# Patient Record
Sex: FEMALE | Race: WHITE | NOT HISPANIC OR LATINO | Employment: OTHER | URBAN - NONMETROPOLITAN AREA
[De-identification: names, ages, dates, MRNs, and addresses within clinical notes are randomized per-mention and may not be internally consistent; named-entity substitution may affect disease eponyms.]

---

## 2017-01-01 ENCOUNTER — HOSPITAL ENCOUNTER (OUTPATIENT)
Dept: OTHER | Facility: HOSPITAL | Age: 57
Setting detail: RADIATION/ONCOLOGY SERIES
Discharge: HOME | End: 2017-01-20
Attending: RADIOLOGY | Admitting: RADIOLOGY

## 2017-03-24 ENCOUNTER — HOSPITAL ENCOUNTER (OUTPATIENT)
Dept: CT IMAGING | Facility: HOSPITAL | Age: 57
Discharge: HOME | End: 2017-03-24
Admitting: INTERNAL MEDICINE

## 2017-03-24 ENCOUNTER — HOSPITAL ENCOUNTER (OUTPATIENT)
Dept: ULTRASOUND IMAGING | Facility: HOSPITAL | Age: 57
Discharge: HOME | End: 2017-03-24

## 2017-03-24 ENCOUNTER — HOSPITAL ENCOUNTER (OUTPATIENT)
Dept: CT IMAGING | Facility: HOSPITAL | Age: 57
End: 2017-03-24

## 2017-03-24 ENCOUNTER — HOSPITAL ENCOUNTER (OUTPATIENT)
Dept: INTERVENTIONAL RADIOLOGY/VASCULAR | Facility: HOSPITAL | Age: 57
Discharge: HOME | End: 2017-03-24
Attending: INTERNAL MEDICINE

## 2017-03-24 ENCOUNTER — LAB REQUISITION (OUTPATIENT)
Dept: LAB | Facility: HOSPITAL | Age: 57
End: 2017-03-24

## 2017-03-24 DIAGNOSIS — C82.90 FOLLICULAR LYMPHOMA, UNSPECIFIED BODY REGION, UNSPECIFIED FOLLICULAR LYMPHOMA TYPE (HCC): Primary | ICD-10-CM

## 2017-03-24 DIAGNOSIS — C82.95: ICD-10-CM

## 2017-03-24 DIAGNOSIS — C82.93 FOLLICULAR LYMPHOMA OF INTRA-ABDOMINAL LYMPH NODES (HCC): ICD-10-CM

## 2017-03-24 DIAGNOSIS — C82.93: ICD-10-CM

## 2017-03-24 DIAGNOSIS — C82.90 FOLLICULAR LYMPHOMA (HCC): ICD-10-CM

## 2017-03-24 LAB
ALBUMIN SERPL-MCNC: 4.6 G/DL (ref 3.4–4.8)
ALBUMIN/GLOB SERPL: 1.6 G/DL (ref 1.1–1.8)
ALP SERPL-CCNC: 79 U/L (ref 38–126)
ALT SERPL W P-5'-P-CCNC: 45 U/L (ref 9–52)
ANION GAP SERPL CALCULATED.3IONS-SCNC: 15 MMOL/L (ref 5–15)
AST SERPL-CCNC: 36 U/L (ref 14–36)
BASOPHILS # BLD AUTO: 0.03 10*3/MM3 (ref 0–0.2)
BASOPHILS NFR BLD AUTO: 0.5 % (ref 0–2)
BILIRUB SERPL-MCNC: 0.5 MG/DL (ref 0.2–1.3)
BUN BLD-MCNC: 11 MG/DL (ref 7–21)
BUN/CREAT SERPL: 18 (ref 7–25)
CALCIUM SPEC-SCNC: 9.4 MG/DL (ref 8.4–10.2)
CHLORIDE SERPL-SCNC: 97 MMOL/L (ref 95–110)
CO2 SERPL-SCNC: 24 MMOL/L (ref 22–31)
CREAT BLD-MCNC: 0.61 MG/DL (ref 0.5–1)
DEPRECATED RDW RBC AUTO: 45.3 FL (ref 36.4–46.3)
EOSINOPHIL # BLD AUTO: 0 10*3/MM3 (ref 0–0.7)
EOSINOPHIL NFR BLD AUTO: 0 % (ref 0–7)
ERYTHROCYTE [DISTWIDTH] IN BLOOD BY AUTOMATED COUNT: 13.7 % (ref 11.5–14.5)
GFR SERPL CREATININE-BSD FRML MDRD: 101 ML/MIN/1.73 (ref 51–120)
GLOBULIN UR ELPH-MCNC: 2.9 GM/DL (ref 2.3–3.5)
GLUCOSE BLD-MCNC: 140 MG/DL (ref 60–100)
HCT VFR BLD AUTO: 41.2 % (ref 35–45)
HGB BLD-MCNC: 14.3 G/DL (ref 12–15.5)
IMM GRANULOCYTES # BLD: 0.05 10*3/MM3 (ref 0–0.02)
IMM GRANULOCYTES NFR BLD: 0.9 % (ref 0–0.5)
LDH SERPL-CCNC: 356 U/L (ref 313–618)
LYMPHOCYTES # BLD AUTO: 0.64 10*3/MM3 (ref 0.6–4.2)
LYMPHOCYTES NFR BLD AUTO: 11.1 % (ref 10–50)
MCH RBC QN AUTO: 31.3 PG (ref 26.5–34)
MCHC RBC AUTO-ENTMCNC: 34.7 G/DL (ref 31.4–36)
MCV RBC AUTO: 90.2 FL (ref 80–98)
MONOCYTES # BLD AUTO: 0.62 10*3/MM3 (ref 0–0.9)
MONOCYTES NFR BLD AUTO: 10.7 % (ref 0–12)
NEUTROPHILS # BLD AUTO: 4.44 10*3/MM3 (ref 2–8.6)
NEUTROPHILS NFR BLD AUTO: 76.8 % (ref 37–80)
NRBC BLD MANUAL-RTO: 0 /100 WBC (ref 0–0)
PLATELET # BLD AUTO: 272 10*3/MM3 (ref 150–450)
PMV BLD AUTO: 8.9 FL (ref 8–12)
POTASSIUM BLD-SCNC: 4.3 MMOL/L (ref 3.5–5.1)
PROT SERPL-MCNC: 7.5 G/DL (ref 6.3–8.6)
RBC # BLD AUTO: 4.57 10*6/MM3 (ref 3.77–5.16)
SODIUM BLD-SCNC: 136 MMOL/L (ref 137–145)
WBC NRBC COR # BLD: 5.78 10*3/MM3 (ref 3.2–9.8)

## 2017-03-24 PROCEDURE — 76937 US GUIDE VASCULAR ACCESS: CPT

## 2017-03-24 PROCEDURE — C1751 CATH, INF, PER/CENT/MIDLINE: HCPCS

## 2017-03-24 PROCEDURE — 85025 COMPLETE CBC W/AUTO DIFF WBC: CPT | Performed by: INTERNAL MEDICINE

## 2017-03-24 PROCEDURE — 71260 CT THORAX DX C+: CPT

## 2017-03-24 PROCEDURE — 83615 LACTATE (LD) (LDH) ENZYME: CPT | Performed by: INTERNAL MEDICINE

## 2017-03-24 PROCEDURE — 74177 CT ABD & PELVIS W/CONTRAST: CPT

## 2017-03-24 PROCEDURE — 80053 COMPREHEN METABOLIC PANEL: CPT | Performed by: INTERNAL MEDICINE

## 2017-03-24 PROCEDURE — 0 IOPAMIDOL 61 % SOLUTION: Performed by: FAMILY MEDICINE

## 2017-03-24 RX ADMIN — IOPAMIDOL 94 ML: 612 INJECTION, SOLUTION INTRAVENOUS at 13:45

## 2017-03-24 NOTE — PROGRESS NOTES
TWO PATIENT IDENTIFIERS WERE USED. THE PATIENT WAS DRAPED AND PREPPED IN USUAL STERILE TECHNIQUE. ULTRASOUND WAS USED TO LOCALIZE THERIGHT BASILIC VEIN. AT THAT POINT, THE SKIN WAS ANESTHETIZED WITH 2% LIDOCAINE. A 21 GAUGE NEEDLE WAS INSERTED INTO THERIGHT BASILIC VEIN AT THAT POINT AN 0.018 WIRE WAS INSERTED THROUG THE NEEDLE AND THE NEEDLE WAS REMOVED. A 4FR. CATHETER WAS PLACED OVER THE WIRE INTO THE VEIN. THE WIRE WAS REMOVED. CATHETER WAS FLUSHED WITH NORMAL SALINE AND SECURED WITH A TEGADERM. THIS WAS DONE IN ANGIOSUITE. PATIENT TOLERATED PROCEDURE WELL.    IMPRESSION: SUCCESSFUL PLACEMENT OF MIDLINE      Chloe Hamilton RN  3/24/2017  12:38 PM

## 2017-03-31 ENCOUNTER — OFFICE VISIT (OUTPATIENT)
Dept: RADIATION ONCOLOGY | Facility: HOSPITAL | Age: 57
End: 2017-03-31

## 2017-03-31 ENCOUNTER — APPOINTMENT (OUTPATIENT)
Dept: ONCOLOGY | Facility: HOSPITAL | Age: 57
End: 2017-03-31

## 2017-03-31 ENCOUNTER — HOSPITAL ENCOUNTER (OUTPATIENT)
Dept: RADIATION ONCOLOGY | Facility: HOSPITAL | Age: 57
Setting detail: RADIATION/ONCOLOGY SERIES
End: 2017-03-31

## 2017-03-31 ENCOUNTER — OFFICE VISIT (OUTPATIENT)
Dept: ONCOLOGY | Facility: CLINIC | Age: 57
End: 2017-03-31

## 2017-03-31 ENCOUNTER — LAB (OUTPATIENT)
Dept: ONCOLOGY | Facility: HOSPITAL | Age: 57
End: 2017-03-31

## 2017-03-31 VITALS
WEIGHT: 210.2 LBS | BODY MASS INDEX: 36.08 KG/M2 | TEMPERATURE: 97.5 F | RESPIRATION RATE: 16 BRPM | DIASTOLIC BLOOD PRESSURE: 77 MMHG | HEART RATE: 88 BPM | SYSTOLIC BLOOD PRESSURE: 138 MMHG

## 2017-03-31 VITALS
TEMPERATURE: 97.5 F | BODY MASS INDEX: 35.9 KG/M2 | HEIGHT: 64 IN | HEART RATE: 88 BPM | DIASTOLIC BLOOD PRESSURE: 77 MMHG | WEIGHT: 210.3 LBS | RESPIRATION RATE: 16 BRPM | SYSTOLIC BLOOD PRESSURE: 138 MMHG

## 2017-03-31 DIAGNOSIS — C82.08 GRADE 1 FOLLICULAR LYMPHOMA OF LYMPH NODES OF MULTIPLE REGIONS (HCC): ICD-10-CM

## 2017-03-31 DIAGNOSIS — C82.03 FOLLICULAR LYMPHOMA GRADE I OF INTRA-ABDOMINAL LYMPH NODES (HCC): ICD-10-CM

## 2017-03-31 DIAGNOSIS — C82.03 FOLLICULAR LYMPHOMA GRADE I OF INTRA-ABDOMINAL LYMPH NODES (HCC): Primary | ICD-10-CM

## 2017-03-31 PROBLEM — C82.00 FOLLICULAR LYMPHOMA GRADE I: Status: ACTIVE | Noted: 2017-03-31

## 2017-03-31 PROCEDURE — 86705 HEP B CORE ANTIBODY IGM: CPT | Performed by: INTERNAL MEDICINE

## 2017-03-31 PROCEDURE — 86803 HEPATITIS C AB TEST: CPT | Performed by: INTERNAL MEDICINE

## 2017-03-31 PROCEDURE — 86704 HEP B CORE ANTIBODY TOTAL: CPT | Performed by: INTERNAL MEDICINE

## 2017-03-31 PROCEDURE — 87350 HEPATITIS BE AG IA: CPT | Performed by: INTERNAL MEDICINE

## 2017-03-31 PROCEDURE — 86706 HEP B SURFACE ANTIBODY: CPT | Performed by: INTERNAL MEDICINE

## 2017-03-31 PROCEDURE — 86707 HEPATITIS BE ANTIBODY: CPT | Performed by: INTERNAL MEDICINE

## 2017-03-31 PROCEDURE — 99214 OFFICE O/P EST MOD 30 MIN: CPT | Performed by: INTERNAL MEDICINE

## 2017-03-31 PROCEDURE — 36415 COLL VENOUS BLD VENIPUNCTURE: CPT | Performed by: INTERNAL MEDICINE

## 2017-03-31 PROCEDURE — 99214 OFFICE O/P EST MOD 30 MIN: CPT | Performed by: RADIOLOGY

## 2017-03-31 PROCEDURE — G0463 HOSPITAL OUTPT CLINIC VISIT: HCPCS | Performed by: INTERNAL MEDICINE

## 2017-03-31 PROCEDURE — 87340 HEPATITIS B SURFACE AG IA: CPT | Performed by: INTERNAL MEDICINE

## 2017-03-31 NOTE — PROGRESS NOTES
Patient:  Chasity Leon  :  1960  Medical Record Number:  [id number]    Encounter Date:  3/31/2017      Diagnosis:      ICD-10-CM ICD-9-CM   1. Follicular lymphoma grade I of intra-abdominal lymph nodes C82.03 202.03   2. Grade 1 follicular lymphoma of lymph nodes of multiple regions C82.08 202.08    multiple recurrence at different site including groin and the pelvic lymph node area.    Treatment: Palliative intent radiation to the multiple site including groin pelvic lymph node.  Second course right inguinal radiation was completed 2016 and 2520 cGy was delivered via IMRT to the both groin region.     Chief Complaint:  Chief Complaint   Patient presents with   • Follow-up     lymphoma        Interval History:  Dr. Sood has seemed patient this morning and gave that test result of CT abdomen pelvis which has now shown new 5 cm lymph node mass in the periaortic and portocaval region.  This is the only known site at present time.  Patient disease is not remaining in remission from longer time and within short time.  It's coming back to different part of the body patient herself denies night sweats fever itching or weight loss.    Medications:  Medication reconciliation for the patient was reviewed and confirmed in the electronic medical record    History of Present Illness : Radiation follow-up and test result     Review of Systems:    Review of Systems   All other systems reviewed and are negative.      CONSTITUTIONAL: No  complaint of fatigue. Denies any fever, chills or weight loss.     HEENT:  No epistaxis, mouth sores or difficulty swallowing.    RESPIRATORY:  No complaint of shortness of breath. cough . Denies any hemoptysis.    CARDIOVASCULAR:  No chest pain or palpitations.    GASTROINTESTINAL:  No abdominal pain nausea, vomiting or blood in the stool, diarrhea.    GENITOURINARY:  Denies any hematuria. Dysuria, urgency or nocturia    MUSCULOSKELETAL: No complaint of bony pain,      LYMPHATICS:  Denies any abnormal swollen glands, lumps or bumps anywhere in the body.    Other six system reviewed and are negative.    Physical Exam:     Vitals:    Vitals:    03/31/17 1001   BP: 138/77   Pulse: 88   Resp: 16   Temp: 97.5 °F (36.4 °C)     BMI:  Body mass index is 36.08 kg/(m^2).     Patient was counseled on current BMI.  Patient was encouraged for diet, exercise, and healthy lifestyle.  BSA:  Body surface area is 2 meters squared.    Performance Status:  (0) Fully active, able to carry on all predisease performance without restriction  ACP: Advanced Care Planning was discussed. The patient does not have a living will documented in the medical record and declined to perform one today.  Smoking Cessation: Counseling given: Yes   quit long time ago and on planning to restart.    Physical Exam  :    Constitutional:  Well-developed, well-nourished  female   not in acute distress. Alert and oriented x 3.     HEENT:  Pupils reactive to light, conjunctivae pink. Oral cavity/oropharyngeal exam normal.    Neck:  Supple, no thyromegaly.    Lymphatics:  There is no lymphadenopathy in head, neck, supraclaivicular, axillary, or inguinal area.  Rest of the lymphatic drainage area exam in and no lymphadenopathy notice.    Lungs:  Clear on auscultation,no wheezing, crepitation.    Heart:  Rate and rhythm regular, no murmur    Abdomen: Soft, without organomegaly, non-tender, non-distended. No other masses palpable in abdomen. Bowel sounds are present.  Previously treated groin area is showing post radiation changes with minimal tanning but no residual recurrent lymph node mass notice.    Extremities:  Without cyanosis or edema, no clubbing.    Spine:  No tenderness    Skin:  Warm, dry, normal texture. No sign of jaundice.    Neurologic:  No focal neurological deficits. Physiologically intact. Normal gait.    Psychologic:  Alert and oriented x 3, good inisight.    Diagnostic Data:    Lab Requisition on 03/24/2017    Component Date Value Ref Range Status   • Glucose 03/24/2017 140* 60 - 100 mg/dL Final   • BUN 03/24/2017 11  7 - 21 mg/dL Final   • Creatinine 03/24/2017 0.61  0.50 - 1.00 mg/dL Final   • Sodium 03/24/2017 136* 137 - 145 mmol/L Final   • Potassium 03/24/2017 4.3  3.5 - 5.1 mmol/L Final   • Chloride 03/24/2017 97  95 - 110 mmol/L Final   • CO2 03/24/2017 24.0  22.0 - 31.0 mmol/L Final   • Calcium 03/24/2017 9.4  8.4 - 10.2 mg/dL Final   • Total Protein 03/24/2017 7.5  6.3 - 8.6 g/dL Final   • Albumin 03/24/2017 4.60  3.40 - 4.80 g/dL Final   • ALT (SGPT) 03/24/2017 45  9 - 52 U/L Final   • AST (SGOT) 03/24/2017 36  14 - 36 U/L Final   • Alkaline Phosphatase 03/24/2017 79  38 - 126 U/L Final   • Total Bilirubin 03/24/2017 0.5  0.2 - 1.3 mg/dL Final   • eGFR Non African Amer 03/24/2017 101  51 - 120 mL/min/1.73 Final   • Globulin 03/24/2017 2.9  2.3 - 3.5 gm/dL Final   • A/G Ratio 03/24/2017 1.6  1.1 - 1.8 g/dL Final   • BUN/Creatinine Ratio 03/24/2017 18.0  7.0 - 25.0 Final   • Anion Gap 03/24/2017 15.0  5.0 - 15.0 mmol/L Final   • LDH 03/24/2017 356  313 - 618 U/L Final   • WBC 03/24/2017 5.78  3.20 - 9.80 10*3/mm3 Final   • RBC 03/24/2017 4.57  3.77 - 5.16 10*6/mm3 Final   • Hemoglobin 03/24/2017 14.3  12.0 - 15.5 g/dL Final   • Hematocrit 03/24/2017 41.2  35.0 - 45.0 % Final   • MCV 03/24/2017 90.2  80.0 - 98.0 fL Final   • MCH 03/24/2017 31.3  26.5 - 34.0 pg Final   • MCHC 03/24/2017 34.7  31.4 - 36.0 g/dL Final   • RDW 03/24/2017 13.7  11.5 - 14.5 % Final   • RDW-SD 03/24/2017 45.3  36.4 - 46.3 fl Final   • MPV 03/24/2017 8.9  8.0 - 12.0 fL Final   • Platelets 03/24/2017 272  150 - 450 10*3/mm3 Final   • Neutrophil % 03/24/2017 76.8  37.0 - 80.0 % Final   • Lymphocyte % 03/24/2017 11.1  10.0 - 50.0 % Final   • Monocyte % 03/24/2017 10.7  0.0 - 12.0 % Final   • Eosinophil % 03/24/2017 0.0  0.0 - 7.0 % Final   • Basophil % 03/24/2017 0.5  0.0 - 2.0 % Final   • Immature Grans % 03/24/2017 0.9* 0.0 - 0.5 % Final    • Neutrophils, Absolute 03/24/2017 4.44  2.00 - 8.60 10*3/mm3 Final   • Lymphocytes, Absolute 03/24/2017 0.64  0.60 - 4.20 10*3/mm3 Final   • Monocytes, Absolute 03/24/2017 0.62  0.00 - 0.90 10*3/mm3 Final   • Eosinophils, Absolute 03/24/2017 0.00  0.00 - 0.70 10*3/mm3 Final   • Basophils, Absolute 03/24/2017 0.03  0.00 - 0.20 10*3/mm3 Final   • Immature Grans, Absolute 03/24/2017 0.05* 0.00 - 0.02 10*3/mm3 Final   • nRBC 03/24/2017 0.0  0.0 - 0.0 /100 WBC Final        CT of the abdomen pelvis reviewed and finding notated as well as discussed with the patient.  Case was also discussed with Dr. Sood.  Following consistencies has been reach.    Quality of Life:  100 - Full Activity    Time Spent Face to Face:  80 % of total 18 minute time spent in counseling, coordination of care, overall prognosis, different treatment options, and outcome result.       Impression:  Oncology Impressions: Progression of cancer    Plan:  1.  This is a time we should consider more systemic treatment instead of changes in individual  Recurrent site for radiation.  2.  Dr. Sood has agreed for this systemic the Rituxan and this point and see that help control lymphoma and put patient in remission for prolonged time.  3.  I will just remain on sideline and follow patient along with medical oncologist.  I will be happy to see patient sooner than next follow-up appointment if new problems arise.     Follow Up:  Return in about 6 months (around 9/30/2017) for Office Visit.    Sincerely,    Electronically signed by:    Leonel Vargas MD March 31, 2017 10:35 AM     [facility] [place]    CC:  Dr. Sood

## 2017-03-31 NOTE — PATIENT INSTRUCTIONS
Rituximab injection  What is this medicine?  RITUXIMAB (ri TUX i mab) is a monoclonal antibody. It is used commonly to treat non-Hodgkin lymphoma and other conditions. It is also used to treat rheumatoid arthritis (RA). In RA, this medicine slows the inflammatory process and help reduce joint pain and swelling. This medicine is often used with other cancer or arthritis medications.  This medicine may be used for other purposes; ask your health care provider or pharmacist if you have questions.  COMMON BRAND NAME(S): Rituxan  What should I tell my health care provider before I take this medicine?  They need to know if you have any of these conditions:  -blood disorders  -heart disease  -history of hepatitis B  -infection (especially a virus infection such as chickenpox, cold sores, or herpes)  -irregular heartbeat  -kidney disease  -lung or breathing disease, like asthma  -lupus  -an unusual or allergic reaction to rituximab, mouse proteins, other medicines, foods, dyes, or preservatives  -pregnant or trying to get pregnant  -breast-feeding  How should I use this medicine?  This medicine is for infusion into a vein. It is administered in a hospital or clinic by a specially trained health care professional.  A special MedGuide will be given to you by the pharmacist with each prescription and refill. Be sure to read this information carefully each time.  Talk to your pediatrician regarding the use of this medicine in children. This medicine is not approved for use in children.  Overdosage: If you think you have taken too much of this medicine contact a poison control center or emergency room at once.  NOTE: This medicine is only for you. Do not share this medicine with others.  What if I miss a dose?  It is important not to miss a dose. Call your doctor or health care professional if you are unable to keep an appointment.  What may interact with this medicine?  -cisplatin  -medicines for blood pressure  -some other  medicines for arthritis  -vaccines  This list may not describe all possible interactions. Give your health care provider a list of all the medicines, herbs, non-prescription drugs, or dietary supplements you use. Also tell them if you smoke, drink alcohol, or use illegal drugs. Some items may interact with your medicine.  What should I watch for while using this medicine?  Report any side effects that you notice during your treatment right away, such as changes in your breathing, fever, chills, dizziness or lightheadedness. These effects are more common with the first dose.  Visit your prescriber or health care professional for checks on your progress. You will need to have regular blood work. Report any other side effects. The side effects of this medicine can continue after you finish your treatment. Continue your course of treatment even though you feel ill unless your doctor tells you to stop.  Call your doctor or health care professional for advice if you get a fever, chills or sore throat, or other symptoms of a cold or flu. Do not treat yourself. This drug decreases your body's ability to fight infections. Try to avoid being around people who are sick.  This medicine may increase your risk to bruise or bleed. Call your doctor or health care professional if you notice any unusual bleeding.  Be careful brushing and flossing your teeth or using a toothpick because you may get an infection or bleed more easily. If you have any dental work done, tell your dentist you are receiving this medicine.  Avoid taking products that contain aspirin, acetaminophen, ibuprofen, naproxen, or ketoprofen unless instructed by your doctor. These medicines may hide a fever.  Do not become pregnant while taking this medicine. Women should inform their doctor if they wish to become pregnant or think they might be pregnant. There is a potential for serious side effects to an unborn child. Talk to your health care professional or  pharmacist for more information. Do not breast-feed an infant while taking this medicine.  What side effects may I notice from receiving this medicine?  Side effects that you should report to your doctor or health care professional as soon as possible:  -allergic reactions like skin rash, itching or hives, swelling of the face, lips, or tongue  -low blood counts - this medicine may decrease the number of white blood cells, red blood cells and platelets. You may be at increased risk for infections and bleeding.  -signs of infection - fever or chills, cough, sore throat, pain or difficulty passing urine  -signs of decreased platelets or bleeding - bruising, pinpoint red spots on the skin, black, tarry stools, blood in the urine  -signs of decreased red blood cells - unusually weak or tired, fainting spells, lightheadedness  -breathing problems  -confused, not responsive  -chest pain  -fast, irregular heartbeat  -feeling faint or lightheaded, falls  -mouth sores  -redness, blistering, peeling or loosening of the skin, including inside the mouth  -stomach pain  -swelling of the ankles, feet, or hands  -trouble passing urine or change in the amount of urine  Side effects that usually do not require medical attention (report to your doctor or health care professional if they continue or are bothersome):  -anxiety  -headache  -loss of appetite  -muscle aches  -nausea  -night sweats  This list may not describe all possible side effects. Call your doctor for medical advice about side effects. You may report side effects to FDA at 1-594-FDA-7242.  Where should I keep my medicine?  This drug is given in a hospital or clinic and will not be stored at home.  NOTE: This sheet is a summary. It may not cover all possible information. If you have questions about this medicine, talk to your doctor, pharmacist, or health care provider.     © 2017, [employer]. (2017-01-19 12:31:08)  Rituximab injection  What is this  medicine?  RITUXIMAB (ri TUX i mab) is a monoclonal antibody. It is used commonly to treat non-Hodgkin lymphoma and other conditions. It is also used to treat rheumatoid arthritis (RA). In RA, this medicine slows the inflammatory process and help reduce joint pain and swelling. This medicine is often used with other cancer or arthritis medications.  This medicine may be used for other purposes; ask your health care provider or pharmacist if you have questions.  COMMON BRAND NAME(S): Rituxan  What should I tell my health care provider before I take this medicine?  They need to know if you have any of these conditions:  -blood disorders  -heart disease  -history of hepatitis B  -infection (especially a virus infection such as chickenpox, cold sores, or herpes)  -irregular heartbeat  -kidney disease  -lung or breathing disease, like asthma  -lupus  -an unusual or allergic reaction to rituximab, mouse proteins, other medicines, foods, dyes, or preservatives  -pregnant or trying to get pregnant  -breast-feeding  How should I use this medicine?  This medicine is for infusion into a vein. It is administered in a hospital or clinic by a specially trained health care professional.  A special MedGuide will be given to you by the pharmacist with each prescription and refill. Be sure to read this information carefully each time.  Talk to your pediatrician regarding the use of this medicine in children. This medicine is not approved for use in children.  Overdosage: If you think you have taken too much of this medicine contact a poison control center or emergency room at once.  NOTE: This medicine is only for you. Do not share this medicine with others.  What if I miss a dose?  It is important not to miss a dose. Call your doctor or health care professional if you are unable to keep an appointment.  What may interact with this medicine?  -cisplatin  -medicines for blood pressure  -some other medicines for  arthritis  -vaccines  This list may not describe all possible interactions. Give your health care provider a list of all the medicines, herbs, non-prescription drugs, or dietary supplements you use. Also tell them if you smoke, drink alcohol, or use illegal drugs. Some items may interact with your medicine.  What should I watch for while using this medicine?  Report any side effects that you notice during your treatment right away, such as changes in your breathing, fever, chills, dizziness or lightheadedness. These effects are more common with the first dose.  Visit your prescriber or health care professional for checks on your progress. You will need to have regular blood work. Report any other side effects. The side effects of this medicine can continue after you finish your treatment. Continue your course of treatment even though you feel ill unless your doctor tells you to stop.  Call your doctor or health care professional for advice if you get a fever, chills or sore throat, or other symptoms of a cold or flu. Do not treat yourself. This drug decreases your body's ability to fight infections. Try to avoid being around people who are sick.  This medicine may increase your risk to bruise or bleed. Call your doctor or health care professional if you notice any unusual bleeding.  Be careful brushing and flossing your teeth or using a toothpick because you may get an infection or bleed more easily. If you have any dental work done, tell your dentist you are receiving this medicine.  Avoid taking products that contain aspirin, acetaminophen, ibuprofen, naproxen, or ketoprofen unless instructed by your doctor. These medicines may hide a fever.  Do not become pregnant while taking this medicine. Women should inform their doctor if they wish to become pregnant or think they might be pregnant. There is a potential for serious side effects to an unborn child. Talk to your health care professional or pharmacist for more  information. Do not breast-feed an infant while taking this medicine.  What side effects may I notice from receiving this medicine?  Side effects that you should report to your doctor or health care professional as soon as possible:  -allergic reactions like skin rash, itching or hives, swelling of the face, lips, or tongue  -low blood counts - this medicine may decrease the number of white blood cells, red blood cells and platelets. You may be at increased risk for infections and bleeding.  -signs of infection - fever or chills, cough, sore throat, pain or difficulty passing urine  -signs of decreased platelets or bleeding - bruising, pinpoint red spots on the skin, black, tarry stools, blood in the urine  -signs of decreased red blood cells - unusually weak or tired, fainting spells, lightheadedness  -breathing problems  -confused, not responsive  -chest pain  -fast, irregular heartbeat  -feeling faint or lightheaded, falls  -mouth sores  -redness, blistering, peeling or loosening of the skin, including inside the mouth  -stomach pain  -swelling of the ankles, feet, or hands  -trouble passing urine or change in the amount of urine  Side effects that usually do not require medical attention (report to your doctor or health care professional if they continue or are bothersome):  -anxiety  -headache  -loss of appetite  -muscle aches  -nausea  -night sweats  This list may not describe all possible side effects. Call your doctor for medical advice about side effects. You may report side effects to FDA at 8-941-FDA-6212.  Where should I keep my medicine?  This drug is given in a hospital or clinic and will not be stored at home.  NOTE: This sheet is a summary. It may not cover all possible information. If you have questions about this medicine, talk to your doctor, pharmacist, or health care provider.     © 2017, [employer]. (2017-01-19 12:31:08)

## 2017-03-31 NOTE — PROGRESS NOTES
DATE OF VISIT: 3/31/2017    REASON FOR VISIT: Recurrent follicular lymphoma    HISTORY OF PRESENT ILLNESS:    57-year-old female with a past medical history significant for recurrent follicular lymphoma who was last seen in clinic on December 30, 2016 is here for follow-up visit today.  Patient recently had a restaging CT of chest abdomen and pelvis with contrast, she is here to discuss the result of CT scan and further treatment recommendation.  Denies any fever or chills or night sweats.  Denies any abnormal swollen and anywhere in the body.  Denies any weight loss.        PAST MEDICAL HISTORY:    Past Medical History:   Diagnosis Date   • Acute bronchitis    • Agoraphobia with panic disorder     on SNRI, prn buspar, prn klonopin     • Anxiety    • Atrophic vaginitis    • Chest pain     work up as new onset angina    • Depression      MDD      • Essential hypertension    • Follicular non-Hodgkin's lymphoma    • Generalized anxiety disorder     SSRI - buspar, cont cymbalta, prn klonopin      • Hip pain     arthritis, artifical right hip     • History of bone density study 02/09/2009    DEXA (bone density) test, peripheral (Normal   • History of echocardiogram 01/03/2012    Echocardiogram 45772 (Normal echocardigram. EF 55-60%)   • History of mammogram    • Hyperlipidemia    • Mass of lower limb    • Nuclear senile cataract    • Obesity    • Otalgia     ENT REFER   • Panic disorder     with agoraphobia. On buspar and klonopin now      • Type 2 diabetes mellitus     NO BDR   • Upper respiratory infection        SOCIAL HISTORY:    Social History   Substance Use Topics   • Smoking status: Former Smoker     Years: 12.00     Quit date: 1/1/1998   • Smokeless tobacco: None      Comment: Smoked 1 pack per 2 weeks   • Alcohol use No       Surgical History :  Past Surgical History:   Procedure Laterality Date   • CENTRAL VENOUS LINE INSERTION  03/11/2016    Central line insertion (Successful placement of right upper  extremity midline.)   • COLONOSCOPY     • CYSTOSCOPY  03/23/1976    Cystoscopy (external urethroplasty and cysto-panendoscopy,urethal hymenal fusion with hypospadias)   • DILATATION AND CURETTAGE  01/25/1980    D&C (removal of IUD)   • EXCISION LESION  04/15/2014    Remove thigh lesion (Excision of a mass of the right thigh using a 16.2 cm incision with intermediate layered closure.)   • HYSTEROSCOPY  02/28/2003    Hysteroscope procedure (diagnostic hysteroscopy with fractional D&C)   • INJECTION OF MEDICATION  08/22/2011    kenalog (1)   • OTHER SURGICAL HISTORY      Explore parathyroid glands   • OTHER SURGICAL HISTORY  08/26/1985    Laparosc diagnostic (desires elective tubal reversal)   • PAP SMEAR  05/24/2005    PAP SMEAR (normal)   • SHOULDER ARTHROSCOPY  11/11/2013    Shoulder arthroscopy/surgery (Right shoulder. Rotator cuff repair. Subacromial decompression. Edgar procedure.)   • SHOULDER SURGERY  12/28/2015    Shoulder surgery procedure (Arthroscopy of the left shoulder with rotator cuff repair.Edgar procedure.Subacromial decompression.)   • TONSILLECTOMY AND ADENOIDECTOMY  05/29/1967    T&A (hypertrophied tonsils and adenoids with recurrent infection)   • TOTAL ABDOMINAL HYSTERECTOMY WITH SALPINGO OOPHORECTOMY  02/03/2004    ARIELLE/BSO (with a preop fractional dilation and curettage with frozen section,menorrhagia,dysmenorrhea,unresponsive to medical intervention)   • TOTAL HIP ARTHROPLASTY     • VAGINA SURGERY  03/02/1981    Anesth, surgery on vagina (colpotomy with bilateral partial salpingectomy, multiparity contraindicated)       ALLERGIES:    Allergies   Allergen Reactions   • Ace Inhibitors    • Latex      BLISTER     • Morphine And Related Nausea And Vomiting   • Lortab [Hydrocodone-Acetaminophen] Palpitations       REVIEW OF SYSTEMS:      CONSTITUTIONAL:  No fever, chills, or night sweats.     HEENT:  No epistaxis, mouth sores, or difficulty swallowing.    RESPIRATORY:  No new shortness of breath  "or cough at present.    CARDIOVASCULAR:  No chest pain or palpitations.    GASTROINTESTINAL:  No abdominal pain, nausea, vomiting, or blood in the stool.    GENITOURINARY:  No dysuria or hematuria.    MUSCULOSKELETAL:  No any new back pain or arthralgias.     NEUROLOGICAL:  No tingling or numbness. No new headache or dizziness.     LYMPHATICS:  Denies any abnormal swollen and anywhere in the body.    SKIN:  Denies any new skin rash.          PHYSICAL EXAMINATION:      VITAL SIGNS:  /77  Pulse 88  Temp 97.5 °F (36.4 °C)  Resp 16  Ht 64\" (162.6 cm)  Wt 210 lb 4.8 oz (95.4 kg)  BMI 36.1 kg/m2    GENERAL:  Not in any distress.    HEENT:  Normocephalic, Atraumatic.Mild Conjunctival pallor. No icterus. Extraocular Movements Intact. No Fascial Asymmetry noted.    NECK:  No adenopathy. No JVD.    RESPIRATORY:  Fair air entry bilateral. No rhonchi or wheezing.    CARDIOVASCULAR:  S1, S2. Regular rate and rhythm. No murmur or gallop appreciated.    ABDOMEN:  Soft, obese, nontender. Bowel sounds present in all four quadrants.  No organomegaly appreciated.    EXTREMITIES:  No edema.No Calf Tenderness.    NEUROLOGIC:  Alert, awake and oriented ×3.  No  Motor or sensory deficit appreciated. Cranial Nerves 2-12 grossly intact.          DIAGNOSTIC DATA:    Glucose   Date Value Ref Range Status   03/24/2017 140 (H) 60 - 100 mg/dL Final     Sodium   Date Value Ref Range Status   03/24/2017 136 (L) 137 - 145 mmol/L Final     Potassium   Date Value Ref Range Status   03/24/2017 4.3 3.5 - 5.1 mmol/L Final     CO2   Date Value Ref Range Status   03/24/2017 24.0 22.0 - 31.0 mmol/L Final     Chloride   Date Value Ref Range Status   03/24/2017 97 95 - 110 mmol/L Final     Anion Gap   Date Value Ref Range Status   03/24/2017 15.0 5.0 - 15.0 mmol/L Final     Creatinine   Date Value Ref Range Status   03/24/2017 0.61 0.50 - 1.00 mg/dL Final     BUN   Date Value Ref Range Status   03/24/2017 11 7 - 21 mg/dL Final     BUN/Creatinine " Ratio   Date Value Ref Range Status   03/24/2017 18.0 7.0 - 25.0 Final     Calcium   Date Value Ref Range Status   03/24/2017 9.4 8.4 - 10.2 mg/dL Final     eGFR Non  Amer   Date Value Ref Range Status   03/24/2017 101 51 - 120 mL/min/1.73 Final     Alkaline Phosphatase   Date Value Ref Range Status   03/24/2017 79 38 - 126 U/L Final     Total Protein   Date Value Ref Range Status   03/24/2017 7.5 6.3 - 8.6 g/dL Final     ALT (SGPT)   Date Value Ref Range Status   03/24/2017 45 9 - 52 U/L Final     AST (SGOT)   Date Value Ref Range Status   03/24/2017 36 14 - 36 U/L Final     Total Bilirubin   Date Value Ref Range Status   03/24/2017 0.5 0.2 - 1.3 mg/dL Final     Albumin   Date Value Ref Range Status   03/24/2017 4.60 3.40 - 4.80 g/dL Final     Globulin   Date Value Ref Range Status   03/24/2017 2.9 2.3 - 3.5 gm/dL Final     A/G Ratio   Date Value Ref Range Status   03/24/2017 1.6 1.1 - 1.8 g/dL Final     Lab Results   Component Value Date    WBC 5.78 03/24/2017    HGB 14.3 03/24/2017    HCT 41.2 03/24/2017    MCV 90.2 03/24/2017     03/24/2017     Lab Results   Component Value Date    NEUTROABS 4.44 03/24/2017         PATHOLOGY:  Pathology from right thigh biopsy done in April 2014 showed:  FINAL DIAGNOSIS:   A.  SUBCUTANEOUS MASS, RIGHT THIGH:             FOLLICULAR LYMPHOMA, GRADE 1 OF 3.   B.  SUBCUTANEOUS MASS, RIGHT THIGH:             FOLLICULAR LYMPHOMA, GRADE 1 OF 3.          Comment   COMMENT:   Immunostains have follicular lymphoma positive for CD20, negative for   CD3, negative for CD5, positive for CD10, positive for CD23, negative   for CD43, negative for cyclin D1, positive for BCL-2 and positive for   BCL-6.  The case is submitted to the [facility] for evaluation and   their report is attached.            RADIOLOGY DATA :  CT of chest, abdomen and pelvis with contrast done on March 24, 2017 was reviewed, discussed with patient, it showed:  CHEST FINDINGS:     LUNGS/PLEURA: The lungs are  normal.  TRACHEA AND BRONCHI: The trachea and bronchi are patent.  MEDIASTINUM, ROXANA AND LYMPH NODES: The mediastinum, roxana and  lymph nodes are normal.  HEART AND PERICARDIUM: The heart and pericardium are normal.  VASCULAR: There is a small amount of atherosclerotic  calcification in the coronary arteries.  OSSEOUS STRUCTURES: Unremarkable.        ABDOMINAL/PELVIC FINDINGS:     HEPATOBILIARY: Unremarkable.  SPLEEN: Unremarkable.  PANCREAS: Unremarkable  ADRENAL GLANDS: Unremarkable.  KIDNEYS/URETERS: No evidence of hydronephrosis or suspicious  mass.     GASTROINTESTINAL: Unremarkable  REPRODUCTIVE ORGANS: Unremarkable.  URINARY BLADDER: Unremarkable     VASCULAR: Unremarkable  LYMPH NODES: There is an enlarged lymph node in the aortocaval  region measuring 3.4 x 2 x 5.8 cm, not present on the prior exam.  PERITONEUM/RETROPERITONEUM: Unremarkable.      OSSEOUS STRUCTURES: Degenerative disc disease noted at L5-S1.  There is a left hip prosthesis.        IMPRESSION:  CONCLUSION:   Enlarged aortocaval lymph node, concerning for neoplasm.          ASSESSMENT AND PLAN:       1.  Recurrent follicular lymphoma grade 1, initially patient was diagnosed with a right groin lymph node adenopathy in appendectomy love 2014 for which patient underwent palliative radiation therapy.  Subsequently patient again had a relapse around September 2016 for workup with second course of palliative radiation therapy was given by Dr. Leonel Vargas in October 2016.  In view of enlarged lymph node in aortocaval region about 5 cm in size recommendation for treatment were discussed with patient with either single agent rituximab versus third course of radiation therapy.  In view of having a recurrence of adenopathy in short span after radiation therapy it was recommended to undergo treatment with single agent rituximab.  Patient is in agreement to get chemotherapy at this point.  We will do hepatitis profile today.  Since patient has a very poor  peripheral veins, we will refer patient to Gen. surgery for port placement.  We will start her treatment with rituximab on April 10, 2017.  All side effect of rituximab were discussed with patient and her  in the room.  We'll also provided information to read about rituximab today.  Case was discussed with Dr. Leonel Vargas today.    2.  History of dyslipidemia    3.  Health maintenance: Patient does not smoke.  Last colonoscopy was done in 2014 which was normal as per patient.  She remains full code.        Tino Sood MD  3/31/2017  3:49 PM        EMR Dragon/Transcription disclaimer:   Much of this encounter note is an electronic transcription/translation of spoken language to printed text. The electronic translation of spoken language may permit erroneous, or at times, nonsensical words or phrases to be inadvertently transcribed; Although I have reviewed the note for such errors, some may still exist.

## 2017-04-01 LAB — HCV AB SER DONR QL: NEGATIVE

## 2017-04-03 ENCOUNTER — OFFICE VISIT (OUTPATIENT)
Dept: SURGERY | Facility: CLINIC | Age: 57
End: 2017-04-03

## 2017-04-03 ENCOUNTER — ANESTHESIA EVENT (OUTPATIENT)
Dept: PERIOP | Facility: HOSPITAL | Age: 57
End: 2017-04-03

## 2017-04-03 ENCOUNTER — APPOINTMENT (OUTPATIENT)
Dept: PREADMISSION TESTING | Facility: HOSPITAL | Age: 57
End: 2017-04-03

## 2017-04-03 VITALS
SYSTOLIC BLOOD PRESSURE: 118 MMHG | HEIGHT: 64 IN | DIASTOLIC BLOOD PRESSURE: 80 MMHG | BODY MASS INDEX: 35.85 KG/M2 | WEIGHT: 210 LBS

## 2017-04-03 VITALS
DIASTOLIC BLOOD PRESSURE: 80 MMHG | OXYGEN SATURATION: 98 % | WEIGHT: 210 LBS | HEART RATE: 78 BPM | SYSTOLIC BLOOD PRESSURE: 142 MMHG | BODY MASS INDEX: 35.85 KG/M2 | HEIGHT: 64 IN | RESPIRATION RATE: 18 BRPM

## 2017-04-03 DIAGNOSIS — C82.08 GRADE 1 FOLLICULAR LYMPHOMA OF LYMPH NODES OF MULTIPLE REGIONS (HCC): Primary | ICD-10-CM

## 2017-04-03 DIAGNOSIS — C82.03 FOLLICULAR LYMPHOMA GRADE I OF INTRA-ABDOMINAL LYMPH NODES (HCC): Primary | ICD-10-CM

## 2017-04-03 PROCEDURE — 99202 OFFICE O/P NEW SF 15 MIN: CPT | Performed by: SURGERY

## 2017-04-03 PROCEDURE — 93005 ELECTROCARDIOGRAM TRACING: CPT

## 2017-04-03 PROCEDURE — 93010 ELECTROCARDIOGRAM REPORT: CPT | Performed by: INTERNAL MEDICINE

## 2017-04-03 RX ORDER — SODIUM CHLORIDE 0.9 % (FLUSH) 0.9 %
1-10 SYRINGE (ML) INJECTION AS NEEDED
Status: CANCELLED | OUTPATIENT
Start: 2017-04-03

## 2017-04-03 RX ORDER — SODIUM CHLORIDE 9 MG/ML
30 INJECTION, SOLUTION INTRAVENOUS CONTINUOUS
Status: CANCELLED | OUTPATIENT
Start: 2017-04-03

## 2017-04-03 NOTE — DISCHARGE INSTRUCTIONS
[facility]  Pre-op Information and Guidelines    You will be called after 2 p.m. the day before your surgery (Friday for Monday surgery) and notified of your time for arrival and approximate surgery time.  If you have not received a call by 4P.M., please contact Same Day Surgery at [phone] of if outside [facility] call [phone].    Please Follow these Important Safety Guidelines:    • The morning of your procedure, take only the medications listed below with   A sip of water:_____________________________________________       ______________________________________________    • DO NOT eat or drink anything after 12:00 midnight the night before surgery  Specific instructions concerning drinking clear liquids will be discussed during  the pre-surgery instruction call the day before your surgery.    • If you take a blood thinner (ex. Plavix, Coumadin, aspirin), ask your doctor when to stop it before surgery  STOP DATE: _________________    • Only 2 visitors are allowed in patient rooms at a time  Your visitors will be asked to wait in the lobby until the admission process is complete with the exception of a parent with a child and patients in need of special assistance.    • YOU CANNOT DRIVE YOURSELF HOME  You must be accompanied by someone who will be responsible for driving you home after surgery and for your care at home.    • DO NOT chew gum, use breath mints, hard candy, or smoke the day of surgery  • DO NOT drink alcohol for at least 24 hours before your surgery  • DO NOT wear any jewelry and remove all body piercing before coming to the hospital  • DO NOT wear make-up to the hospital  • If you are having surgery on an extremity (arm/leg/foot) remove nail polish/artificial nails on the surgical side  • Clothing, glasses, contacts, dentures, and hairpieces must be removed before surgery  • Bathe the night before or the morning of your surgery and do not use powders/lotions on  skin.

## 2017-04-04 ENCOUNTER — ANESTHESIA (OUTPATIENT)
Dept: PERIOP | Facility: HOSPITAL | Age: 57
End: 2017-04-04

## 2017-04-04 ENCOUNTER — APPOINTMENT (OUTPATIENT)
Dept: GENERAL RADIOLOGY | Facility: HOSPITAL | Age: 57
End: 2017-04-04

## 2017-04-04 ENCOUNTER — HOSPITAL ENCOUNTER (OUTPATIENT)
Facility: HOSPITAL | Age: 57
Setting detail: HOSPITAL OUTPATIENT SURGERY
Discharge: HOME | End: 2017-04-04
Attending: SURGERY | Admitting: SURGERY

## 2017-04-04 VITALS
SYSTOLIC BLOOD PRESSURE: 122 MMHG | TEMPERATURE: 98 F | BODY MASS INDEX: 35.83 KG/M2 | DIASTOLIC BLOOD PRESSURE: 58 MMHG | WEIGHT: 209.88 LBS | RESPIRATION RATE: 20 BRPM | OXYGEN SATURATION: 95 % | HEIGHT: 64 IN | HEART RATE: 80 BPM

## 2017-04-04 DIAGNOSIS — C82.03 FOLLICULAR LYMPHOMA GRADE I OF INTRA-ABDOMINAL LYMPH NODES (HCC): ICD-10-CM

## 2017-04-04 LAB — GLUCOSE BLDC GLUCOMTR-MCNC: 151 MG/DL (ref 70–130)

## 2017-04-04 PROCEDURE — C1788 PORT, INDWELLING, IMP: HCPCS | Performed by: SURGERY

## 2017-04-04 PROCEDURE — 82962 GLUCOSE BLOOD TEST: CPT

## 2017-04-04 PROCEDURE — 25010000002 ONDANSETRON PER 1 MG: Performed by: NURSE ANESTHETIST, CERTIFIED REGISTERED

## 2017-04-04 PROCEDURE — 76000 FLUOROSCOPY <1 HR PHYS/QHP: CPT

## 2017-04-04 PROCEDURE — 25010000002 PHENYLEPHRINE PER 1 ML: Performed by: NURSE ANESTHETIST, CERTIFIED REGISTERED

## 2017-04-04 PROCEDURE — 25010000002 MIDAZOLAM PER 1 MG: Performed by: NURSE ANESTHETIST, CERTIFIED REGISTERED

## 2017-04-04 PROCEDURE — 25010000002 PROPOFOL 10 MG/ML EMULSION: Performed by: NURSE ANESTHETIST, CERTIFIED REGISTERED

## 2017-04-04 PROCEDURE — 36561 INSERT TUNNELED CV CATH: CPT | Performed by: SURGERY

## 2017-04-04 PROCEDURE — 25010000002 HEPARIN FLUSH (PORCINE) 100 UNIT/ML SOLUTION: Performed by: SURGERY

## 2017-04-04 PROCEDURE — 25010000002 FENTANYL CITRATE (PF) 100 MCG/2ML SOLUTION: Performed by: NURSE ANESTHETIST, CERTIFIED REGISTERED

## 2017-04-04 DEVICE — POWERPORT M.R.I. IMPLANTABLE PORT WITH PRE-ATTACHED 9.6F  OPEN-ENDED SINGLE-LUMEN VENOUS CATHETER. INTERMEDIATE KIT (WITH SUTURE PLUGS)
Type: IMPLANTABLE DEVICE | Site: CHEST | Status: FUNCTIONAL
Brand: POWERPORT M.R.I.

## 2017-04-04 RX ORDER — ONDANSETRON 2 MG/ML
4 INJECTION INTRAMUSCULAR; INTRAVENOUS ONCE AS NEEDED
Status: DISCONTINUED | OUTPATIENT
Start: 2017-04-04 | End: 2017-04-04 | Stop reason: HOSPADM

## 2017-04-04 RX ORDER — SODIUM CHLORIDE 0.9 % (FLUSH) 0.9 %
1-10 SYRINGE (ML) INJECTION AS NEEDED
Status: DISCONTINUED | OUTPATIENT
Start: 2017-04-04 | End: 2017-04-04 | Stop reason: HOSPADM

## 2017-04-04 RX ORDER — OXYCODONE HYDROCHLORIDE AND ACETAMINOPHEN 5; 325 MG/1; MG/1
1 TABLET ORAL EVERY 6 HOURS PRN
Status: DISCONTINUED | OUTPATIENT
Start: 2017-04-04 | End: 2017-04-04 | Stop reason: HOSPADM

## 2017-04-04 RX ORDER — PROPOFOL 10 MG/ML
VIAL (ML) INTRAVENOUS AS NEEDED
Status: DISCONTINUED | OUTPATIENT
Start: 2017-04-04 | End: 2017-04-04 | Stop reason: SURG

## 2017-04-04 RX ORDER — ACETAMINOPHEN 325 MG/1
650 TABLET ORAL ONCE AS NEEDED
Status: DISCONTINUED | OUTPATIENT
Start: 2017-04-04 | End: 2017-04-04 | Stop reason: HOSPADM

## 2017-04-04 RX ORDER — NALOXONE HCL 0.4 MG/ML
0.2 VIAL (ML) INJECTION AS NEEDED
Status: DISCONTINUED | OUTPATIENT
Start: 2017-04-04 | End: 2017-04-04 | Stop reason: HOSPADM

## 2017-04-04 RX ORDER — LIDOCAINE HYDROCHLORIDE 20 MG/ML
INJECTION, SOLUTION INFILTRATION; PERINEURAL AS NEEDED
Status: DISCONTINUED | OUTPATIENT
Start: 2017-04-04 | End: 2017-04-04 | Stop reason: SURG

## 2017-04-04 RX ORDER — FENTANYL CITRATE 50 UG/ML
INJECTION, SOLUTION INTRAMUSCULAR; INTRAVENOUS AS NEEDED
Status: DISCONTINUED | OUTPATIENT
Start: 2017-04-04 | End: 2017-04-04 | Stop reason: SURG

## 2017-04-04 RX ORDER — ACETAMINOPHEN 650 MG/1
650 SUPPOSITORY RECTAL ONCE AS NEEDED
Status: DISCONTINUED | OUTPATIENT
Start: 2017-04-04 | End: 2017-04-04 | Stop reason: HOSPADM

## 2017-04-04 RX ORDER — LABETALOL HYDROCHLORIDE 5 MG/ML
5 INJECTION, SOLUTION INTRAVENOUS
Status: DISCONTINUED | OUTPATIENT
Start: 2017-04-04 | End: 2017-04-04 | Stop reason: HOSPADM

## 2017-04-04 RX ORDER — MIDAZOLAM HYDROCHLORIDE 1 MG/ML
INJECTION INTRAMUSCULAR; INTRAVENOUS AS NEEDED
Status: DISCONTINUED | OUTPATIENT
Start: 2017-04-04 | End: 2017-04-04 | Stop reason: SURG

## 2017-04-04 RX ORDER — DIPHENHYDRAMINE HYDROCHLORIDE 50 MG/ML
12.5 INJECTION INTRAMUSCULAR; INTRAVENOUS
Status: DISCONTINUED | OUTPATIENT
Start: 2017-04-04 | End: 2017-04-04 | Stop reason: HOSPADM

## 2017-04-04 RX ORDER — FLUMAZENIL 0.1 MG/ML
0.2 INJECTION INTRAVENOUS AS NEEDED
Status: DISCONTINUED | OUTPATIENT
Start: 2017-04-04 | End: 2017-04-04 | Stop reason: HOSPADM

## 2017-04-04 RX ORDER — ONDANSETRON 2 MG/ML
INJECTION INTRAMUSCULAR; INTRAVENOUS AS NEEDED
Status: DISCONTINUED | OUTPATIENT
Start: 2017-04-04 | End: 2017-04-04 | Stop reason: SURG

## 2017-04-04 RX ORDER — SODIUM CHLORIDE 9 MG/ML
30 INJECTION, SOLUTION INTRAVENOUS CONTINUOUS
Status: DISCONTINUED | OUTPATIENT
Start: 2017-04-04 | End: 2017-04-04 | Stop reason: HOSPADM

## 2017-04-04 RX ADMIN — SODIUM CHLORIDE 30 ML/HR: 900 INJECTION, SOLUTION INTRAVENOUS at 08:54

## 2017-04-04 RX ADMIN — FENTANYL CITRATE 50 MCG: 50 INJECTION, SOLUTION INTRAMUSCULAR; INTRAVENOUS at 11:49

## 2017-04-04 RX ADMIN — PHENYLEPHRINE HYDROCHLORIDE 100 MCG: 10 INJECTION INTRAVENOUS at 12:24

## 2017-04-04 RX ADMIN — CEFAZOLIN 1 G: 1 INJECTION, POWDER, FOR SOLUTION INTRAMUSCULAR; INTRAVENOUS; PARENTERAL at 11:56

## 2017-04-04 RX ADMIN — PHENYLEPHRINE HYDROCHLORIDE 100 MCG: 10 INJECTION INTRAVENOUS at 12:23

## 2017-04-04 RX ADMIN — FENTANYL CITRATE 50 MCG: 50 INJECTION, SOLUTION INTRAMUSCULAR; INTRAVENOUS at 12:00

## 2017-04-04 RX ADMIN — PROPOFOL 50 MG: 10 INJECTION, EMULSION INTRAVENOUS at 12:15

## 2017-04-04 RX ADMIN — PROPOFOL 150 MG: 10 INJECTION, EMULSION INTRAVENOUS at 11:49

## 2017-04-04 RX ADMIN — OXYCODONE HYDROCHLORIDE AND ACETAMINOPHEN 1 TABLET: 5; 325 TABLET ORAL at 14:03

## 2017-04-04 RX ADMIN — ONDANSETRON 4 MG: 2 INJECTION INTRAMUSCULAR; INTRAVENOUS at 12:25

## 2017-04-04 RX ADMIN — PHENYLEPHRINE HYDROCHLORIDE 100 MCG: 10 INJECTION INTRAVENOUS at 12:32

## 2017-04-04 RX ADMIN — MIDAZOLAM 2 MG: 1 INJECTION INTRAMUSCULAR; INTRAVENOUS at 11:46

## 2017-04-04 RX ADMIN — LIDOCAINE HYDROCHLORIDE 80 MG: 20 INJECTION, SOLUTION INFILTRATION; PERINEURAL at 11:49

## 2017-04-04 NOTE — OP NOTE
INSERTION VENOUS ACCESS DEVICE  Procedure Note    Chasity Leon  4/4/2017    Pre-op Diagnosis:   Follicular lymphoma grade I of intra-abdominal lymph nodes [C82.03]    Post-op Diagnosis:     Post-Op Diagnosis Codes:     * Follicular lymphoma grade I of intra-abdominal lymph nodes [C82.03]      Procedure(s):  INSERTION VENOUS ACCESS DEVICE (MEDIPORT) RIGHT INTERNAL JUGULAR VEIN    Surgeon(s):  Fortino Medina MD    Anesthesia: General    Staff:   Karime Musa    Estimated Blood Loss: 5 cc    Specimens:                * No specimens in log *      Drains:    None       Findings: None    Complications: None      INDICATION:  This is a 57-year-old with follicular lymphoma considered for chemotherapy. Taken to the OR for mediport placement    DESCRIPTION: The patient is brought to the operating room and placed supine on the operating table.  The right neck and chest wall were prepped and draped in sterile manner.  Ultrasound was used to localize the right internal jugular vein and a needle was passed under ultrasound guidance into the jugular vein.  A wire was passed through the needle and directed centrally.  Its good position was confirmed with fluoroscopy. The needle was removed. Chest incision was made transversely beneath the clavicle.  Subcutaneous pocket was created on the chest wall and a port was placed into the pocket and sutured in place with 3-0 Prolene.  The tubing was passed subcutaneously to the wire insertion site and cut to proper length.  Under fluoroscopic guidance, a dilator and sheath were passed over the wire and directed centrally. The wire and the dilator were removed leaving the sheath in excellent position.  The tubing was then passed through the sheath and the sheath peeled away leaving the tubing in excellent position in the superior vena cava.  Excellent flow and aspiration were achieved through the port.  The Prolene sutures were tied.  The neck wound was closed with 4-0 subcuticular Vicryl  on skin.  The chest wound was closed with interrupted 3-0 Vicryl deep and continuous 4-0 subcuticular Vicryl on skin.  Procedure was terminated. The patient tolerated well.

## 2017-04-04 NOTE — DISCHARGE INSTRUCTIONS
Dr. Fortino Medina  [address] [place] [address]  [phone] (office)  [phone] (hospital)    Discharge Instructions for Port Placement      1. Go home, rest and take it easy today; however, you should get up and move about several times today to reduce the risk of developing a clot in your legs.      2. You may experience some dizziness or memory loss from the anesthesia.  This may last for the next 24 hours.  Someone should plan on staying with you for the first 24 hours for your safety.    3. Do not make any important legal decisions or sign any legal papers for the next 24 hours.      4. Eat and drink lightly today.  Start off with liquids, jello, soup, crackers or other bland foods at first. You may advance your diet tomorrow as tolerated as long as you do not experience any nausea or vomiting.     5. You may remove your outer dressings in 3-4 days or until your first treatment whichever comes first. If you remove the dressing before your first treatment, please leave the little white tapes known as steri-strips alone.  They usually fall off in 1-2 weeks.  Do not worry if they come off sooner.     6. You may notice some bleeding/drainage on your outer dressings. A little bloody drainage is normal. If the bleeding/drainage is such that the bandage cannot absorb it, remove the dressing, apply clean gauze and apply firm pressure for a full 15 minutes.  If the bleeding continues, please call me.    7. You may shower tomorrow.  No tub baths until your incisions are completely healed.    8. You may have received a prescription for a narcotic pain medicine, as you may have some pain/discomfort following surgery. You will not be totally pain free, but your pain medicine should make the pain tolerable.  Please take your pain medicine as prescribed and always take your pills with food to prevent nausea. If you are having severe pain that cannot be controlled by the pain medicine, please contact me.  If the  pain is such that narcotic pain medicine is not required, you may take Tylenol or Ibuprofen as directed unless indicated otherwise.      9. You may have also received a prescription for an anti-nausea medicine.  Please take this as prescribed for any nausea or vomiting.  Nausea could be a result of the anesthesia or a result of the narcotic pain medicine.  If you experience severe nausea and vomiting that cannot be controlled by the nausea medicine, please call me.      10. No driving for 24 hours and for as long as you are taking your prescription pain medicine.        11. No surgical follow-up is needed unless a problem (such as drainage, redness of the incision, malfunction of the port) arises.              12. Remember to contact me for any of the following:    • Fever> 101 degrees  • Severe pain that cannot be controlled by taking your pain pills  • Severe nausea or vomiting that cannot be controlled by taking your nausea medicine  • Significant bleeding from your incision  • Drainage that has a bad smell or is yellow or green in appearance  • Any other questions or concerns

## 2017-04-04 NOTE — ANESTHESIA POSTPROCEDURE EVALUATION
Patient: Chasity Leon    Procedure Summary     Date Anesthesia Start Anesthesia Stop Room / Location    04/04/17 1148 1251 [facility] OR 10 / [facility]       Procedure Diagnosis Surgeon Provider    INSERTION VENOUS ACCESS DEVICE (MEDIPORT) right chest (Right Chest) Follicular lymphoma grade I of intra-abdominal lymph nodes  (Follicular lymphoma grade I of intra-abdominal lymph nodes [C82.03]) MD Kevon Yan MD          Anesthesia Type: general  Last vitals  /56 (04/04/17 1302)    Temp 97.3 °F (36.3 °C) (04/04/17 1302)    Pulse 77 (04/04/17 1302)   Resp 16 (04/04/17 1302)    SpO2 96 % (04/04/17 1302)      Post Anesthesia Care and Evaluation    Patient location during evaluation: bedside  Patient participation: complete - patient participated  Level of consciousness: awake and alert  Pain score: 1  Pain management: adequate  Airway patency: patent  Anesthetic complications: No anesthetic complications  PONV Status: none  Cardiovascular status: acceptable and stable  Respiratory status: room air  Hydration status: acceptable

## 2017-04-04 NOTE — ANESTHESIA PREPROCEDURE EVALUATION
Anesthesia Evaluation     Patient summary reviewed and Nursing notes reviewed   NPO Status: > 8 hours   Airway   Mallampati: III  TM distance: >3 FB  Neck ROM: full  Narrow palate and possible difficult intubation  Dental - normal exam     Pulmonary - negative pulmonary ROS and normal exam   Cardiovascular - normal exam    (+) hypertension,       Neuro/Psych- negative ROS  GI/Hepatic/Renal/Endo    (+) obesity,      Musculoskeletal (-) negative ROS    Abdominal  - normal exam   Substance History - negative use     OB/GYN negative ob/gyn ROS         Other                                    Anesthesia Plan    ASA 3     general     intravenous induction   Anesthetic plan and risks discussed with patient.    Plan discussed with CRNA.

## 2017-04-04 NOTE — PLAN OF CARE
Problem: Patient Care Overview (Adult)  Goal: Plan of Care Review  Outcome: Outcome(s) achieved Date Met:  04/04/17  Goal: Discharge Needs Assessment  Outcome: Outcome(s) achieved Date Met:  04/04/17    Problem: Perioperative Period (Adult)  Goal: Signs and Symptoms of Listed Potential Problems Will be Absent or Manageable (Perioperative Period)  Outcome: Outcome(s) achieved Date Met:  04/04/17

## 2017-04-04 NOTE — ANESTHESIA PROCEDURE NOTES
Airway  Urgency: elective    Date/Time: 4/4/2017 11:54 AM  Airway not difficult    General Information and Staff    Patient location during procedure: OR  CRNA: KARSTEN LAINEZ    Indications and Patient Condition  Indications for airway management: airway protection    Preoxygenated: yes  MILS maintained throughout  Mask difficulty assessment: 0 - not attempted    Final Airway Details  Final airway type: supraglottic airway      Successful airway: classic  Size 4    Number of attempts at approach: 1

## 2017-04-04 NOTE — H&P (VIEW-ONLY)
Chief Complaint   Patient presents with   • Follow-up     possible mediport placement        HPI  Hx of abdominal lymphoma- recurrent. Has had abdominal radiation and now with recurrence, chemotherapy is planned. No port hx. No anticoagulants.    Past Medical History:   Diagnosis Date   • Acute bronchitis    • Agoraphobia with panic disorder     on SNRI, prn buspar, prn klonopin     • Anxiety    • Atrophic vaginitis    • Chest pain     work up as new onset angina    • Depression      MDD      • Essential hypertension    • Follicular non-Hodgkin's lymphoma    • Generalized anxiety disorder     SSRI - buspar, cont cymbalta, prn klonopin      • Hip pain     arthritis, artifical right hip     • History of bone density study 02/09/2009    DEXA (bone density) test, peripheral (Normal   • History of echocardiogram 01/03/2012    Echocardiogram 82465 (Normal echocardigram. EF 55-60%)   • History of mammogram    • Hyperlipidemia    • Mass of lower limb    • Nuclear senile cataract    • Obesity    • Otalgia     ENT REFER   • Panic disorder     with agoraphobia. On buspar and klonopin now      • Type 2 diabetes mellitus     NO BDR   • Upper respiratory infection        Past Surgical History:   Procedure Laterality Date   • CENTRAL VENOUS LINE INSERTION  03/11/2016    Central line insertion (Successful placement of right upper extremity midline.)   • COLONOSCOPY     • CYSTOSCOPY  03/23/1976    Cystoscopy (external urethroplasty and cysto-panendoscopy,urethal hymenal fusion with hypospadias)   • DILATATION AND CURETTAGE  01/25/1980    D&C (removal of IUD)   • EXCISION LESION  04/15/2014    Remove thigh lesion (Excision of a mass of the right thigh using a 16.2 cm incision with intermediate layered closure.)   • HYSTEROSCOPY  02/28/2003    Hysteroscope procedure (diagnostic hysteroscopy with fractional D&C)   • INJECTION OF MEDICATION  08/22/2011    kenalog (1)             • OTHER SURGICAL HISTORY  08/26/1985    Laparosc  diagnostic (desires elective tubal reversal)             • SHOULDER ARTHROSCOPY  11/11/2013    Shoulder arthroscopy/surgery (Right shoulder. Rotator cuff repair. Subacromial decompression. Edgar procedure.)   • SHOULDER SURGERY  12/28/2015    Shoulder surgery procedure (Arthroscopy of the left shoulder with rotator cuff repair.Edgar procedure.Subacromial decompression.)   • TONSILLECTOMY AND ADENOIDECTOMY  05/29/1967    T&A (hypertrophied tonsils and adenoids with recurrent infection)   • TOTAL ABDOMINAL HYSTERECTOMY WITH SALPINGO OOPHORECTOMY  02/03/2004    ARIELLE/BSO (with a preop fractional dilation and curettage with frozen section,menorrhagia,dysmenorrhea,unresponsive to medical intervention)   • TOTAL HIP ARTHROPLASTY     • VAGINA SURGERY  03/02/1981    Anesth, surgery on vagina (colpotomy with bilateral partial salpingectomy, multiparity contraindicated)         Current Outpatient Prescriptions:     •  ARIPiprazole (ABILIFY) 10 MG tablet, Take 10 mg by mouth every night., Disp: , Rfl:   •  busPIRone (BUSPAR) 15 MG tablet, Take 15 mg by mouth 2 (two) times a day., Disp: , Rfl:   •  Calcium Citrate-Vitamin D (CALCIUM CITRATE +D PO), Take 2 tablets by mouth daily., Disp: , Rfl:   •  clonazePAM (KlonoPIN) 1 MG tablet, Take 1 mg by mouth as needed for seizures., Disp: , Rfl:   •  Desvenlafaxine Succinate (PRISTIQ PO), Take 1 tablet by mouth daily., Disp: , Rfl:   •  gabapentin (NEURONTIN) 300 MG capsule, Take 300 mg by mouth every night., Disp: , Rfl:   •  lamoTRIgine (LAMICTAL) 150 MG tablet, Take 150 mg by mouth 2 (two) times a day., Disp: , Rfl:   •  losartan (COZAAR) 100 MG tablet, Take 100 mg by mouth daily., Disp: , Rfl:   •  Multiple Vitamins-Minerals (CENTRUM SILVER PO), Take 1 tablet by mouth daily., Disp: , Rfl:   •  Omega-3 Fatty Acids (FISH OIL) 1000 MG capsule capsule, Take 1,000 mg by mouth 2 (two) times a day., Disp: , Rfl:   •  oxyCODONE-acetaminophen (PERCOCET) 5-325 MG per tablet, Take 1 tablet  by mouth every 6 (six) hours as needed., Disp: , Rfl:   •  ranitidine (ZANTAC) 150 MG tablet, Take 150 mg by mouth 2 (two) times a day., Disp: , Rfl:   •  simvastatin (ZOCOR) 80 MG tablet, Take 80 mg by mouth every night., Disp: , Rfl:   •  sitaGLIPtin-metFORMIN (JANUMET)  MG per tablet, Take 1 tablet by mouth 2 (two) times a day., Disp: , Rfl:   •  TRAZODONE HCL PO, Take 225 mg by mouth every night., Disp: , Rfl:   •  AMOXICILLIN PO, Take  by mouth daily., Disp: , Rfl:   •  solifenacin (VESICARE) 10 MG tablet, Take 10 mg by mouth every night., Disp: , Rfl:     Allergies   Allergen Reactions   • Ace Inhibitors    • Latex      BLISTER     • Morphine And Related Nausea And Vomiting   • Lortab [Hydrocodone-Acetaminophen] Palpitations       Family History   Problem Relation Age of Onset   • Breast cancer Mother    • Heart disease Father    • Liver cancer Father    • Diabetes Sister      INSULIN DEPENDENT   • Diabetes Brother      INSULIN DEPENDENT   • Coronary artery disease Other    • Diabetes Other        Social History     Social History   • Marital status:      Social History Main Topics   • Smoking status: Former Smoker     Years: 12.00     Quit date: 1/1/1998   • Smokeless tobacco: Not on file      Comment: Smoked 1 pack per 2 weeks   • Alcohol use No   • Drug use: No     Review of Systems   Constitutional: Positive for unexpected weight change. Negative for appetite change, chills and fever.   HENT: Negative for hearing loss, nosebleeds and trouble swallowing.    Eyes: Negative for visual disturbance.   Respiratory: Negative for apnea, cough, choking, chest tightness, shortness of breath, wheezing and stridor.    Cardiovascular: Negative for chest pain, palpitations and leg swelling.   Gastrointestinal: Positive for diarrhea. Negative for abdominal distention, abdominal pain, blood in stool, constipation, nausea and vomiting.   Endocrine: Negative for cold intolerance, heat intolerance, polydipsia,  polyphagia and polyuria.   Genitourinary: Negative for difficulty urinating, dysuria, frequency, hematuria and urgency.   Musculoskeletal: Negative for arthralgias, back pain, myalgias and neck pain.   Skin: Negative for color change, pallor and rash.   Allergic/Immunologic: Negative for immunocompromised state.   Neurological: Positive for dizziness. Negative for seizures, syncope, light-headedness, numbness and headaches.   Hematological: Negative for adenopathy.   Psychiatric/Behavioral: Negative for sleep disturbance and suicidal ideas. The patient is not nervous/anxious.        Physical Exam   Constitutional: She is oriented to person, place, and time. She appears well-developed and well-nourished.   HENT:   Head: Normocephalic.   Eyes: EOM are normal. Pupils are equal, round, and reactive to light.   Neck: Normal range of motion. Neck supple. No JVD present. No tracheal deviation present. No thyromegaly present.   Cardiovascular: Normal rate and regular rhythm.    No murmur heard.  Pulmonary/Chest: Effort normal and breath sounds normal. No stridor. No respiratory distress.   Abdominal: Soft. Bowel sounds are normal.   Musculoskeletal: Normal range of motion.   Lymphadenopathy:     She has no cervical adenopathy.   Neurological: She is alert and oriented to person, place, and time.   Skin: Skin is warm and dry. No rash noted. No erythema. No pallor.   Psychiatric: She has a normal mood and affect. Her behavior is normal. Judgment and thought content normal.   Vitals reviewed.        ASSESSMENT    Chasity was seen today for follow-up.    Diagnoses and all orders for this visit:    Follicular lymphoma grade I of intra-abdominal lymph nodes      PLAN    1. Mediport placement    The procedure is explained to the patient.  Risks of the procedure include but are not limited to bleeding, infection, pneumothorax, blood clots, poor wound healing, poor port function, and pain.  Alternatives include peripheral PICC line  placement area, although there are complications associated with this as well.  She understands, agrees, and desires to proceed.          This document has been electronically signed by Fortino Medina MD on April 3, 2017 10:28 AM

## 2017-04-06 ENCOUNTER — INFUSION (OUTPATIENT)
Dept: ONCOLOGY | Facility: HOSPITAL | Age: 57
End: 2017-04-06

## 2017-04-06 DIAGNOSIS — C82.08 GRADE 1 FOLLICULAR LYMPHOMA OF LYMPH NODES OF MULTIPLE REGIONS (HCC): Primary | ICD-10-CM

## 2017-04-06 PROCEDURE — 36591 DRAW BLOOD OFF VENOUS DEVICE: CPT

## 2017-04-06 PROCEDURE — 25010000002 HEPARIN FLUSH (PORCINE) 100 UNIT/ML SOLUTION: Performed by: INTERNAL MEDICINE

## 2017-04-06 RX ORDER — SODIUM CHLORIDE 0.9 % (FLUSH) 0.9 %
10 SYRINGE (ML) INJECTION AS NEEDED
Status: DISCONTINUED | OUTPATIENT
Start: 2017-04-06 | End: 2017-04-06 | Stop reason: HOSPADM

## 2017-04-06 RX ORDER — SODIUM CHLORIDE 0.9 % (FLUSH) 0.9 %
10 SYRINGE (ML) INJECTION AS NEEDED
Status: CANCELLED | OUTPATIENT
Start: 2017-04-06

## 2017-04-06 RX ADMIN — Medication 10 ML: at 09:53

## 2017-04-06 RX ADMIN — SODIUM CHLORIDE, PRESERVATIVE FREE 500 UNITS: 5 INJECTION INTRAVENOUS at 09:53

## 2017-04-07 ENCOUNTER — TELEPHONE (OUTPATIENT)
Dept: ONCOLOGY | Facility: HOSPITAL | Age: 57
End: 2017-04-07

## 2017-04-07 LAB
HBV CORE AB SER DONR QL IA: NEGATIVE
HBV CORE IGM SERPL QL IA: NEGATIVE
HBV E AB SERPL QL IA: NEGATIVE
HBV E AG SERPL QL IA: NEGATIVE
HBV SURFACE AB SER QL: NON REACTIVE
HBV SURFACE AG SERPL QL IA: NEGATIVE

## 2017-04-07 NOTE — TELEPHONE ENCOUNTER
Pt has had diarrhea for the last week she is going 3-4 times a day. She has not taken anything till today to stop diarrhea. She took a dose of pepto. Pt encourage to drink plenty of fluids and to take immodium for diarrhea. Pt is suppose to receive rituxan Monday. Dr Sood made aware of problems.

## 2017-04-07 NOTE — TELEPHONE ENCOUNTER
----- Message from Elaine Quinones sent at 4/7/2017  3:05 PM CDT -----  Regarding: Please call   Contact: [phone]  Pt states that she has had an increased amount of diarrhea, all week. Please call her back and advise. Indigo told her at her last visit that might be a sign of increased cancer activity .

## 2017-04-10 ENCOUNTER — INFUSION (OUTPATIENT)
Dept: ONCOLOGY | Facility: HOSPITAL | Age: 57
End: 2017-04-10

## 2017-04-10 ENCOUNTER — OFFICE VISIT (OUTPATIENT)
Dept: ONCOLOGY | Facility: CLINIC | Age: 57
End: 2017-04-10

## 2017-04-10 VITALS
WEIGHT: 210.3 LBS | DIASTOLIC BLOOD PRESSURE: 74 MMHG | BODY MASS INDEX: 36.1 KG/M2 | RESPIRATION RATE: 18 BRPM | HEART RATE: 98 BPM | TEMPERATURE: 97 F | SYSTOLIC BLOOD PRESSURE: 137 MMHG

## 2017-04-10 DIAGNOSIS — C82.08 GRADE 1 FOLLICULAR LYMPHOMA OF LYMPH NODES OF MULTIPLE REGIONS (HCC): Primary | ICD-10-CM

## 2017-04-10 DIAGNOSIS — C82.08 GRADE 1 FOLLICULAR LYMPHOMA OF LYMPH NODES OF MULTIPLE REGIONS (HCC): ICD-10-CM

## 2017-04-10 LAB
ALBUMIN SERPL-MCNC: 4.1 G/DL (ref 3.4–4.8)
ALBUMIN/GLOB SERPL: 1.6 G/DL (ref 1.1–1.8)
ALP SERPL-CCNC: 96 U/L (ref 38–126)
ALT SERPL W P-5'-P-CCNC: 44 U/L (ref 9–52)
ANION GAP SERPL CALCULATED.3IONS-SCNC: 19 MMOL/L (ref 5–15)
AST SERPL-CCNC: 33 U/L (ref 14–36)
BASOPHILS # BLD AUTO: 0.02 10*3/MM3 (ref 0–0.2)
BASOPHILS NFR BLD AUTO: 0.5 % (ref 0–2)
BILIRUB SERPL-MCNC: 0.5 MG/DL (ref 0.2–1.3)
BUN BLD-MCNC: 10 MG/DL (ref 7–21)
BUN/CREAT SERPL: 16.1 (ref 7–25)
CALCIUM SPEC-SCNC: 9.5 MG/DL (ref 8.4–10.2)
CHLORIDE SERPL-SCNC: 100 MMOL/L (ref 95–110)
CO2 SERPL-SCNC: 20 MMOL/L (ref 22–31)
CREAT BLD-MCNC: 0.62 MG/DL (ref 0.5–1)
DEPRECATED RDW RBC AUTO: 44.5 FL (ref 36.4–46.3)
EOSINOPHIL # BLD AUTO: 0 10*3/MM3 (ref 0–0.7)
EOSINOPHIL NFR BLD AUTO: 0 % (ref 0–7)
ERYTHROCYTE [DISTWIDTH] IN BLOOD BY AUTOMATED COUNT: 13.7 % (ref 11.5–14.5)
GFR SERPL CREATININE-BSD FRML MDRD: 99 ML/MIN/1.73 (ref 51–120)
GLOBULIN UR ELPH-MCNC: 2.5 GM/DL (ref 2.3–3.5)
GLUCOSE BLD-MCNC: 222 MG/DL (ref 60–100)
HCT VFR BLD AUTO: 37.7 % (ref 35–45)
HGB BLD-MCNC: 13.1 G/DL (ref 12–15.5)
HOLD SPECIMEN: NORMAL
IMM GRANULOCYTES # BLD: 0.02 10*3/MM3 (ref 0–0.02)
IMM GRANULOCYTES NFR BLD: 0.5 % (ref 0–0.5)
LDH SERPL-CCNC: 336 U/L (ref 313–618)
LYMPHOCYTES # BLD AUTO: 0.42 10*3/MM3 (ref 0.6–4.2)
LYMPHOCYTES NFR BLD AUTO: 11.4 % (ref 10–50)
MCH RBC QN AUTO: 30.8 PG (ref 26.5–34)
MCHC RBC AUTO-ENTMCNC: 34.7 G/DL (ref 31.4–36)
MCV RBC AUTO: 88.7 FL (ref 80–98)
MONOCYTES # BLD AUTO: 0.3 10*3/MM3 (ref 0–0.9)
MONOCYTES NFR BLD AUTO: 8.2 % (ref 0–12)
NEUTROPHILS # BLD AUTO: 2.92 10*3/MM3 (ref 2–8.6)
NEUTROPHILS NFR BLD AUTO: 79.4 % (ref 37–80)
NRBC BLD MANUAL-RTO: 0 /100 WBC (ref 0–0)
PLATELET # BLD AUTO: 260 10*3/MM3 (ref 150–450)
PMV BLD AUTO: 9.2 FL (ref 8–12)
POTASSIUM BLD-SCNC: 4.1 MMOL/L (ref 3.5–5.1)
PROT SERPL-MCNC: 6.6 G/DL (ref 6.3–8.6)
RBC # BLD AUTO: 4.25 10*6/MM3 (ref 3.77–5.16)
SODIUM BLD-SCNC: 139 MMOL/L (ref 137–145)
WBC NRBC COR # BLD: 3.68 10*3/MM3 (ref 3.2–9.8)

## 2017-04-10 PROCEDURE — 25010000002 RITUXIMAB 100 MG/10ML SOLUTION 10 ML VIAL: Performed by: INTERNAL MEDICINE

## 2017-04-10 PROCEDURE — 96375 TX/PRO/DX INJ NEW DRUG ADDON: CPT | Performed by: INTERNAL MEDICINE

## 2017-04-10 PROCEDURE — 36415 COLL VENOUS BLD VENIPUNCTURE: CPT | Performed by: INTERNAL MEDICINE

## 2017-04-10 PROCEDURE — 96415 CHEMO IV INFUSION ADDL HR: CPT | Performed by: INTERNAL MEDICINE

## 2017-04-10 PROCEDURE — 96413 CHEMO IV INFUSION 1 HR: CPT | Performed by: INTERNAL MEDICINE

## 2017-04-10 PROCEDURE — 25010000002 RITUXIMAB 500 MG/50ML SOLUTION 50 ML VIAL: Performed by: INTERNAL MEDICINE

## 2017-04-10 PROCEDURE — 25010000002 HEPARIN FLUSH (PORCINE) 100 UNIT/ML SOLUTION: Performed by: INTERNAL MEDICINE

## 2017-04-10 PROCEDURE — 25010000002 DIPHENHYDRAMINE PER 50 MG: Performed by: INTERNAL MEDICINE

## 2017-04-10 PROCEDURE — 99214 OFFICE O/P EST MOD 30 MIN: CPT | Performed by: INTERNAL MEDICINE

## 2017-04-10 RX ORDER — SODIUM CHLORIDE 9 MG/ML
250 INJECTION, SOLUTION INTRAVENOUS ONCE
Status: CANCELLED | OUTPATIENT
Start: 2017-04-10

## 2017-04-10 RX ORDER — FAMOTIDINE 10 MG/ML
20 INJECTION, SOLUTION INTRAVENOUS AS NEEDED
Status: DISCONTINUED | OUTPATIENT
Start: 2017-04-10 | End: 2017-04-10 | Stop reason: HOSPADM

## 2017-04-10 RX ORDER — MEPERIDINE HYDROCHLORIDE 50 MG/ML
25 INJECTION INTRAMUSCULAR; INTRAVENOUS; SUBCUTANEOUS
Status: CANCELLED | OUTPATIENT
Start: 2017-04-10

## 2017-04-10 RX ORDER — ACETAMINOPHEN 325 MG/1
650 TABLET ORAL ONCE
Status: COMPLETED | OUTPATIENT
Start: 2017-04-10 | End: 2017-04-10

## 2017-04-10 RX ORDER — DIPHENHYDRAMINE HYDROCHLORIDE 50 MG/ML
50 INJECTION INTRAMUSCULAR; INTRAVENOUS AS NEEDED
Status: CANCELLED | OUTPATIENT
Start: 2017-04-10

## 2017-04-10 RX ORDER — ACETAMINOPHEN 325 MG/1
650 TABLET ORAL ONCE
Status: CANCELLED | OUTPATIENT
Start: 2017-04-10

## 2017-04-10 RX ORDER — SODIUM CHLORIDE 9 MG/ML
250 INJECTION, SOLUTION INTRAVENOUS ONCE
Status: COMPLETED | OUTPATIENT
Start: 2017-04-10 | End: 2017-04-10

## 2017-04-10 RX ORDER — DIPHENHYDRAMINE HYDROCHLORIDE 50 MG/ML
50 INJECTION INTRAMUSCULAR; INTRAVENOUS AS NEEDED
Status: DISCONTINUED | OUTPATIENT
Start: 2017-04-10 | End: 2017-04-10 | Stop reason: HOSPADM

## 2017-04-10 RX ORDER — SODIUM CHLORIDE 0.9 % (FLUSH) 0.9 %
10 SYRINGE (ML) INJECTION AS NEEDED
Status: CANCELLED | OUTPATIENT
Start: 2017-04-10

## 2017-04-10 RX ORDER — SODIUM CHLORIDE 0.9 % (FLUSH) 0.9 %
10 SYRINGE (ML) INJECTION AS NEEDED
Status: DISCONTINUED | OUTPATIENT
Start: 2017-04-10 | End: 2017-04-10 | Stop reason: HOSPADM

## 2017-04-10 RX ORDER — MEPERIDINE HYDROCHLORIDE 50 MG/ML
25 INJECTION INTRAMUSCULAR; INTRAVENOUS; SUBCUTANEOUS
Status: DISCONTINUED | OUTPATIENT
Start: 2017-04-10 | End: 2017-04-10 | Stop reason: HOSPADM

## 2017-04-10 RX ORDER — FAMOTIDINE 10 MG/ML
20 INJECTION, SOLUTION INTRAVENOUS AS NEEDED
Status: CANCELLED | OUTPATIENT
Start: 2017-04-10

## 2017-04-10 RX ADMIN — SODIUM CHLORIDE 250 ML: 9 INJECTION, SOLUTION INTRAVENOUS at 09:14

## 2017-04-10 RX ADMIN — Medication 10 ML: at 07:59

## 2017-04-10 RX ADMIN — ACETAMINOPHEN 650 MG: 325 TABLET ORAL at 09:25

## 2017-04-10 RX ADMIN — SODIUM CHLORIDE, PRESERVATIVE FREE 500 UNITS: 5 INJECTION INTRAVENOUS at 14:59

## 2017-04-10 RX ADMIN — Medication 10 ML: at 14:59

## 2017-04-10 RX ADMIN — DIPHENHYDRAMINE HYDROCHLORIDE 25 MG: 50 INJECTION INTRAMUSCULAR; INTRAVENOUS at 09:40

## 2017-04-10 RX ADMIN — RITUXIMAB 750 MG: 10 INJECTION, SOLUTION INTRAVENOUS at 10:39

## 2017-04-10 NOTE — PROGRESS NOTES
DATE OF VISIT: 4/10/2017    REASON FOR VISIT: Recurrent follicular lymphoma    HISTORY OF PRESENT ILLNESS:    57-year-old female with a past medical history significant for recurrent follicular lymphoma, last round of radiation was around September 2016 for follicular lymphoma.  Patient was again found to have aortocaval adenopathy on recently done CT of chest abdomen and pelvis in March 2017.  Patient is here to get her first dose of rituximab today.  She states she had a diarrheal illness last week which is much better now.  Denies any fever or chills or night sweats.  Denies any abnormal swollen and anywhere in the body.  Denies any weight loss.        PAST MEDICAL HISTORY:    Past Medical History:   Diagnosis Date   • Acute bronchitis    • Agoraphobia with panic disorder     on SNRI, prn buspar, prn klonopin     • Anxiety    • Atrophic vaginitis    • Chest pain     work up as new onset angina    • Depression      MDD      • Essential hypertension    • Follicular non-Hodgkin's lymphoma    • Generalized anxiety disorder     SSRI - buspar, cont cymbalta, prn klonopin      • Hip pain     arthritis, artifical right hip     • History of bone density study 02/09/2009    DEXA (bone density) test, peripheral (Normal   • History of echocardiogram 01/03/2012    Echocardiogram 23526 (Normal echocardigram. EF 55-60%)   • History of mammogram    • Hyperlipidemia    • Mass of lower limb    • Nuclear senile cataract    • Obesity    • Otalgia     ENT REFER   • Panic disorder     with agoraphobia. On buspar and klonopin now      • Type 2 diabetes mellitus     NO BDR   • Upper respiratory infection        SOCIAL HISTORY:    Social History   Substance Use Topics   • Smoking status: Former Smoker     Years: 12.00     Quit date: 1/1/1998   • Smokeless tobacco: Never Used      Comment: Smoked 1 pack per 2 weeks   • Alcohol use No       Surgical History :  Past Surgical History:   Procedure Laterality Date   • CENTRAL VENOUS LINE  INSERTION  03/11/2016    Central line insertion (Successful placement of right upper extremity midline.)   • COLONOSCOPY     • CYSTOSCOPY  03/23/1976    Cystoscopy (external urethroplasty and cysto-panendoscopy,urethal hymenal fusion with hypospadias)   • DILATATION AND CURETTAGE  01/25/1980    D&C (removal of IUD)   • EXCISION LESION  04/15/2014    Remove thigh lesion (Excision of a mass of the right thigh using a 16.2 cm incision with intermediate layered closure.)   • HYSTEROSCOPY  02/28/2003    Hysteroscope procedure (diagnostic hysteroscopy with fractional D&C)   • INJECTION OF MEDICATION  08/22/2011    kenalog (1)   • OTHER SURGICAL HISTORY      Explore parathyroid glands   • OTHER SURGICAL HISTORY  08/26/1985    Laparosc diagnostic (desires elective tubal reversal)   • PAP SMEAR  05/24/2005    PAP SMEAR (normal)   • DE INSJ TUNNELED CVC W/O SUBQ PORT/ AGE 5 YR/> Right 4/4/2017    Procedure: INSERTION VENOUS ACCESS DEVICE (MEDIPORT) right chest;  Surgeon: Fortino Medina MD;  Location: [facility];  Service: General   • SHOULDER ARTHROSCOPY  11/11/2013    Shoulder arthroscopy/surgery (Right shoulder. Rotator cuff repair. Subacromial decompression. Edgar procedure.)   • SHOULDER SURGERY  12/28/2015    Shoulder surgery procedure (Arthroscopy of the left shoulder with rotator cuff repair.Edgar procedure.Subacromial decompression.)   • TONSILLECTOMY AND ADENOIDECTOMY  05/29/1967    T&A (hypertrophied tonsils and adenoids with recurrent infection)   • TOTAL ABDOMINAL HYSTERECTOMY WITH SALPINGO OOPHORECTOMY  02/03/2004    ARIELLE/BSO (with a preop fractional dilation and curettage with frozen section,menorrhagia,dysmenorrhea,unresponsive to medical intervention)   • TOTAL HIP ARTHROPLASTY     • VAGINA SURGERY  03/02/1981    Anesth, surgery on vagina (colpotomy with bilateral partial salpingectomy, multiparity contraindicated)       ALLERGIES:    Allergies   Allergen Reactions   • Ace Inhibitors    • Latex       BLISTER     • Morphine And Related Nausea And Vomiting   • Lortab [Hydrocodone-Acetaminophen] Palpitations       REVIEW OF SYSTEMS:      CONSTITUTIONAL:  No fever, chills, or night sweats.     HEENT:  No epistaxis, mouth sores, or difficulty swallowing.    RESPIRATORY:  No new shortness of breath or cough at present.    CARDIOVASCULAR:  No chest pain or palpitations.    GASTROINTESTINAL: Had Diarrhea last week which is resolved now.  No abdominal pain, nausea, vomiting, or blood in the stool.    GENITOURINARY:  No dysuria or hematuria.    MUSCULOSKELETAL:  No any new back pain or arthralgias.     NEUROLOGICAL:  No tingling or numbness. No new headache or dizziness.     LYMPHATICS:  Denies any abnormal swollen and anywhere in the body.    SKIN:  Denies any new skin rash.          PHYSICAL EXAMINATION:      VITAL SIGNS:  /74  Pulse 98  Temp 97 °F (36.1 °C)  Resp 18  Wt 210 lb 4.8 oz (95.4 kg)  BMI 36.1 kg/m2    GENERAL:  Not in any distress.    HEENT:  Normocephalic, Atraumatic.Mild Conjunctival pallor. No icterus. Extraocular Movements Intact. No Fascial Asymmetry noted.    NECK:  No adenopathy. No JVD.    RESPIRATORY:  Fair air entry bilateral. No rhonchi or wheezing.    CARDIOVASCULAR:  S1, S2. Regular rate and rhythm. No murmur or gallop appreciated.  Port-A-Cath present on the right chest wall.    ABDOMEN:  Soft, obese, nontender. Bowel sounds present in all four quadrants.  No organomegaly appreciated.    EXTREMITIES:  No edema.No Calf Tenderness.    NEUROLOGIC:  Alert, awake and oriented ×3.  No  Motor or sensory deficit appreciated. Cranial Nerves 2-12 grossly intact.          DIAGNOSTIC DATA:    Glucose   Date Value Ref Range Status   04/10/2017 222 (H) 60 - 100 mg/dL Final     Sodium   Date Value Ref Range Status   04/10/2017 139 137 - 145 mmol/L Final     Potassium   Date Value Ref Range Status   04/10/2017 4.1 3.5 - 5.1 mmol/L Final     CO2   Date Value Ref Range Status   04/10/2017 20.0 (L)  22.0 - 31.0 mmol/L Final     Chloride   Date Value Ref Range Status   04/10/2017 100 95 - 110 mmol/L Final     Anion Gap   Date Value Ref Range Status   04/10/2017 19.0 (H) 5.0 - 15.0 mmol/L Final     Creatinine   Date Value Ref Range Status   04/10/2017 0.62 0.50 - 1.00 mg/dL Final     BUN   Date Value Ref Range Status   04/10/2017 10 7 - 21 mg/dL Final     BUN/Creatinine Ratio   Date Value Ref Range Status   04/10/2017 16.1 7.0 - 25.0 Final     Calcium   Date Value Ref Range Status   04/10/2017 9.5 8.4 - 10.2 mg/dL Final     eGFR Non  Amer   Date Value Ref Range Status   04/10/2017 99 51 - 120 mL/min/1.73 Final     Alkaline Phosphatase   Date Value Ref Range Status   04/10/2017 96 38 - 126 U/L Final     Total Protein   Date Value Ref Range Status   04/10/2017 6.6 6.3 - 8.6 g/dL Final     ALT (SGPT)   Date Value Ref Range Status   04/10/2017 44 9 - 52 U/L Final     AST (SGOT)   Date Value Ref Range Status   04/10/2017 33 14 - 36 U/L Final     Total Bilirubin   Date Value Ref Range Status   04/10/2017 0.5 0.2 - 1.3 mg/dL Final     Albumin   Date Value Ref Range Status   04/10/2017 4.10 3.40 - 4.80 g/dL Final     Globulin   Date Value Ref Range Status   04/10/2017 2.5 2.3 - 3.5 gm/dL Final     A/G Ratio   Date Value Ref Range Status   04/10/2017 1.6 1.1 - 1.8 g/dL Final     Lab Results   Component Value Date    WBC 5.78 03/24/2017    HGB 14.3 03/24/2017    HCT 41.2 03/24/2017    MCV 90.2 03/24/2017     03/24/2017     Lab Results   Component Value Date    NEUTROABS 4.44 03/24/2017         PATHOLOGY:  Pathology from right thigh biopsy done in April 2014 showed:  FINAL DIAGNOSIS:   A.  SUBCUTANEOUS MASS, RIGHT THIGH:             FOLLICULAR LYMPHOMA, GRADE 1 OF 3.   B.  SUBCUTANEOUS MASS, RIGHT THIGH:             FOLLICULAR LYMPHOMA, GRADE 1 OF 3.          Comment   COMMENT:   Immunostains have follicular lymphoma positive for CD20, negative for   CD3, negative for CD5, positive for CD10, positive for CD23,  negative   for CD43, negative for cyclin D1, positive for BCL-2 and positive for   BCL-6.  The case is submitted to the [facility] for evaluation and   their report is attached.            RADIOLOGY DATA :  CT of chest, abdomen and pelvis with contrast done on March 24, 2017 was reviewed, discussed with patient, it showed:  CHEST FINDINGS:     LUNGS/PLEURA: The lungs are normal.  TRACHEA AND BRONCHI: The trachea and bronchi are patent.  MEDIASTINUM, ROXANA AND LYMPH NODES: The mediastinum, roxana and  lymph nodes are normal.  HEART AND PERICARDIUM: The heart and pericardium are normal.  VASCULAR: There is a small amount of atherosclerotic  calcification in the coronary arteries.  OSSEOUS STRUCTURES: Unremarkable.        ABDOMINAL/PELVIC FINDINGS:     HEPATOBILIARY: Unremarkable.  SPLEEN: Unremarkable.  PANCREAS: Unremarkable  ADRENAL GLANDS: Unremarkable.  KIDNEYS/URETERS: No evidence of hydronephrosis or suspicious  mass.     GASTROINTESTINAL: Unremarkable  REPRODUCTIVE ORGANS: Unremarkable.  URINARY BLADDER: Unremarkable     VASCULAR: Unremarkable  LYMPH NODES: There is an enlarged lymph node in the aortocaval  region measuring 3.4 x 2 x 5.8 cm, not present on the prior exam.  PERITONEUM/RETROPERITONEUM: Unremarkable.      OSSEOUS STRUCTURES: Degenerative disc disease noted at L5-S1.  There is a left hip prosthesis.        IMPRESSION:  CONCLUSION:   Enlarged aortocaval lymph node, concerning for neoplasm.          ASSESSMENT AND PLAN:       1.  Recurrent follicular lymphoma grade 1, initially patient was diagnosed with a right groin lymph node adenopathy in April 2014 for which patient underwent palliative radiation therapy.  Subsequently patient again had a relapse around September 2016 for workup with second course of palliative radiation therapy was given by Dr. Leonel Vargas in October 2016.  In view of enlarged lymph node in aortocaval region about 5 cm in size recommendation for treatment were discussed with  patient with either single agent rituximab versus third course of radiation therapy.  Will start her on rituximab today.  Plan is to get her rituximab every 2 months for next 2 years.  After getting had 3 dose of rituximab every 2 months with repeat a CT of chest abdomen and pelvis in about 6 months from now which will be October 2017.  Patient was explained about side effect of rituximab as well was provided information to read about rituximab last week.  She does not have any questions about rituximab infusion today.    2.  History of dyslipidemia    3.  Diabetes mellitus: Patient's blood sugar is 220 today she was counseled about the importance of controlling her blood sugar to prevent complication of diabetes mellitus.    4.  Health maintenance: Patient does not smoke.  Last colonoscopy was done in 2014 which was normal as per patient.  She remains full code.        Tino Sood MD  4/10/2017  8:45 AM        EMR Dragon/Transcription disclaimer:   Much of this encounter note is an electronic transcription/translation of spoken language to printed text. The electronic translation of spoken language may permit erroneous, or at times, nonsensical words or phrases to be inadvertently transcribed; Although I have reviewed the note for such errors, some may still exist.

## 2017-05-22 ENCOUNTER — INFUSION (OUTPATIENT)
Dept: ONCOLOGY | Facility: HOSPITAL | Age: 57
End: 2017-05-22

## 2017-05-22 DIAGNOSIS — Z45.2 ENCOUNTER FOR VENOUS ACCESS DEVICE CARE: ICD-10-CM

## 2017-05-22 DIAGNOSIS — C82.08 GRADE 1 FOLLICULAR LYMPHOMA OF LYMPH NODES OF MULTIPLE REGIONS (HCC): Primary | ICD-10-CM

## 2017-05-22 PROCEDURE — 25010000002 HEPARIN FLUSH (PORCINE) 100 UNIT/ML SOLUTION: Performed by: INTERNAL MEDICINE

## 2017-05-22 PROCEDURE — 96523 IRRIG DRUG DELIVERY DEVICE: CPT | Performed by: INTERNAL MEDICINE

## 2017-05-22 RX ORDER — SODIUM CHLORIDE 0.9 % (FLUSH) 0.9 %
10 SYRINGE (ML) INJECTION AS NEEDED
Status: DISCONTINUED | OUTPATIENT
Start: 2017-05-22 | End: 2017-05-22 | Stop reason: HOSPADM

## 2017-05-22 RX ORDER — SODIUM CHLORIDE 0.9 % (FLUSH) 0.9 %
10 SYRINGE (ML) INJECTION AS NEEDED
Status: CANCELLED | OUTPATIENT
Start: 2017-05-22

## 2017-05-22 RX ADMIN — SODIUM CHLORIDE, PRESERVATIVE FREE 500 UNITS: 5 INJECTION INTRAVENOUS at 11:29

## 2017-05-22 RX ADMIN — Medication 10 ML: at 11:29

## 2017-05-24 PROBLEM — Z45.2 ENCOUNTER FOR VENOUS ACCESS DEVICE CARE: Status: ACTIVE | Noted: 2017-05-24

## 2017-06-05 ENCOUNTER — OFFICE VISIT (OUTPATIENT)
Dept: SURGERY | Facility: CLINIC | Age: 57
End: 2017-06-05

## 2017-06-05 ENCOUNTER — OFFICE VISIT (OUTPATIENT)
Dept: ONCOLOGY | Facility: CLINIC | Age: 57
End: 2017-06-05

## 2017-06-05 ENCOUNTER — HOSPITAL ENCOUNTER (OUTPATIENT)
Dept: GENERAL RADIOLOGY | Facility: HOSPITAL | Age: 57
Discharge: HOME | End: 2017-06-05
Admitting: INTERNAL MEDICINE

## 2017-06-05 ENCOUNTER — INFUSION (OUTPATIENT)
Dept: ONCOLOGY | Facility: HOSPITAL | Age: 57
End: 2017-06-05

## 2017-06-05 ENCOUNTER — ANESTHESIA EVENT (OUTPATIENT)
Dept: PERIOP | Facility: HOSPITAL | Age: 57
End: 2017-06-05

## 2017-06-05 VITALS
HEIGHT: 64 IN | SYSTOLIC BLOOD PRESSURE: 110 MMHG | WEIGHT: 208 LBS | DIASTOLIC BLOOD PRESSURE: 70 MMHG | BODY MASS INDEX: 35.51 KG/M2

## 2017-06-05 VITALS
WEIGHT: 208.4 LBS | SYSTOLIC BLOOD PRESSURE: 144 MMHG | RESPIRATION RATE: 18 BRPM | HEART RATE: 85 BPM | TEMPERATURE: 97.6 F | HEIGHT: 64 IN | BODY MASS INDEX: 35.58 KG/M2 | DIASTOLIC BLOOD PRESSURE: 71 MMHG

## 2017-06-05 DIAGNOSIS — C82.08 GRADE 1 FOLLICULAR LYMPHOMA OF LYMPH NODES OF MULTIPLE REGIONS (HCC): Primary | ICD-10-CM

## 2017-06-05 DIAGNOSIS — Z45.2 ENCOUNTER FOR VENOUS ACCESS DEVICE CARE: ICD-10-CM

## 2017-06-05 LAB
ALBUMIN SERPL-MCNC: 4.4 G/DL (ref 3.4–4.8)
ALBUMIN/GLOB SERPL: 1.4 G/DL (ref 1.1–1.8)
ALP SERPL-CCNC: 76 U/L (ref 38–126)
ALT SERPL W P-5'-P-CCNC: 33 U/L (ref 9–52)
ANION GAP SERPL CALCULATED.3IONS-SCNC: 15 MMOL/L (ref 5–15)
AST SERPL-CCNC: 29 U/L (ref 14–36)
BASOPHILS # BLD AUTO: 0.09 10*3/MM3 (ref 0–0.2)
BASOPHILS NFR BLD AUTO: 2 % (ref 0–2)
BILIRUB SERPL-MCNC: 0.5 MG/DL (ref 0.2–1.3)
BUN BLD-MCNC: 12 MG/DL (ref 7–21)
BUN/CREAT SERPL: 16.4 (ref 7–25)
CALCIUM SPEC-SCNC: 9.6 MG/DL (ref 8.4–10.2)
CHLORIDE SERPL-SCNC: 99 MMOL/L (ref 95–110)
CO2 SERPL-SCNC: 24 MMOL/L (ref 22–31)
CREAT BLD-MCNC: 0.73 MG/DL (ref 0.5–1)
DEPRECATED RDW RBC AUTO: 46.9 FL (ref 36.4–46.3)
EOSINOPHIL # BLD AUTO: 0.27 10*3/MM3 (ref 0–0.7)
EOSINOPHIL NFR BLD AUTO: 6.1 % (ref 0–7)
ERYTHROCYTE [DISTWIDTH] IN BLOOD BY AUTOMATED COUNT: 14.5 % (ref 11.5–14.5)
GFR SERPL CREATININE-BSD FRML MDRD: 82 ML/MIN/1.73 (ref 51–120)
GLOBULIN UR ELPH-MCNC: 3.1 GM/DL (ref 2.3–3.5)
GLUCOSE BLD-MCNC: 137 MG/DL (ref 60–100)
HCT VFR BLD AUTO: 38.5 % (ref 35–45)
HGB BLD-MCNC: 13.5 G/DL (ref 12–15.5)
HOLD SPECIMEN: NORMAL
IMM GRANULOCYTES # BLD: 0.07 10*3/MM3 (ref 0–0.02)
IMM GRANULOCYTES NFR BLD: 1.6 % (ref 0–0.5)
LYMPHOCYTES # BLD AUTO: 0.45 10*3/MM3 (ref 0.6–4.2)
LYMPHOCYTES NFR BLD AUTO: 10.1 % (ref 10–50)
MCH RBC QN AUTO: 30.8 PG (ref 26.5–34)
MCHC RBC AUTO-ENTMCNC: 35.1 G/DL (ref 31.4–36)
MCV RBC AUTO: 87.7 FL (ref 80–98)
MONOCYTES # BLD AUTO: 0.48 10*3/MM3 (ref 0–0.9)
MONOCYTES NFR BLD AUTO: 10.8 % (ref 0–12)
NEUTROPHILS # BLD AUTO: 3.08 10*3/MM3 (ref 2–8.6)
NEUTROPHILS NFR BLD AUTO: 69.4 % (ref 37–80)
NRBC BLD MANUAL-RTO: 0 /100 WBC (ref 0–0)
PLATELET # BLD AUTO: 220 10*3/MM3 (ref 150–450)
PMV BLD AUTO: 10.1 FL (ref 8–12)
POTASSIUM BLD-SCNC: 4.4 MMOL/L (ref 3.5–5.1)
PROT SERPL-MCNC: 7.5 G/DL (ref 6.3–8.6)
RBC # BLD AUTO: 4.39 10*6/MM3 (ref 3.77–5.16)
SODIUM BLD-SCNC: 138 MMOL/L (ref 137–145)
WBC NRBC COR # BLD: 4.44 10*3/MM3 (ref 3.2–9.8)
WHOLE BLOOD HOLD SPECIMEN: NORMAL

## 2017-06-05 PROCEDURE — 0 IOPAMIDOL 61 % SOLUTION: Performed by: RADIOLOGY

## 2017-06-05 PROCEDURE — 99214 OFFICE O/P EST MOD 30 MIN: CPT | Performed by: INTERNAL MEDICINE

## 2017-06-05 PROCEDURE — 85025 COMPLETE CBC W/AUTO DIFF WBC: CPT | Performed by: INTERNAL MEDICINE

## 2017-06-05 PROCEDURE — 96523 IRRIG DRUG DELIVERY DEVICE: CPT | Performed by: INTERNAL MEDICINE

## 2017-06-05 PROCEDURE — 99024 POSTOP FOLLOW-UP VISIT: CPT | Performed by: SURGERY

## 2017-06-05 PROCEDURE — 36598 INJ W/FLUOR EVAL CV DEVICE: CPT

## 2017-06-05 PROCEDURE — 80053 COMPREHEN METABOLIC PANEL: CPT | Performed by: INTERNAL MEDICINE

## 2017-06-05 PROCEDURE — G0463 HOSPITAL OUTPT CLINIC VISIT: HCPCS | Performed by: INTERNAL MEDICINE

## 2017-06-05 RX ORDER — PIOGLITAZONEHYDROCHLORIDE 30 MG/1
30 TABLET ORAL DAILY
COMMUNITY
End: 2018-03-26

## 2017-06-05 RX ORDER — FESOTERODINE FUMARATE 8 MG/1
8 TABLET, EXTENDED RELEASE ORAL
COMMUNITY
End: 2020-08-27

## 2017-06-05 RX ORDER — SODIUM CHLORIDE 0.9 % (FLUSH) 0.9 %
1-10 SYRINGE (ML) INJECTION AS NEEDED
Status: CANCELLED | OUTPATIENT
Start: 2017-06-05

## 2017-06-05 RX ORDER — SODIUM CHLORIDE 0.9 % (FLUSH) 0.9 %
10 SYRINGE (ML) INJECTION AS NEEDED
Status: DISCONTINUED | OUTPATIENT
Start: 2017-06-05 | End: 2017-06-05 | Stop reason: HOSPADM

## 2017-06-05 RX ORDER — SODIUM CHLORIDE 0.9 % (FLUSH) 0.9 %
10 SYRINGE (ML) INJECTION AS NEEDED
Status: CANCELLED | OUTPATIENT
Start: 2017-06-05

## 2017-06-05 RX ORDER — ARIPIPRAZOLE 20 MG/1
20 TABLET ORAL NIGHTLY
COMMUNITY
Start: 2017-05-23

## 2017-06-05 RX ORDER — SODIUM CHLORIDE 9 MG/ML
30 INJECTION, SOLUTION INTRAVENOUS CONTINUOUS
Status: CANCELLED | OUTPATIENT
Start: 2017-06-05

## 2017-06-05 RX ADMIN — Medication 10 ML: at 07:49

## 2017-06-05 RX ADMIN — IOPAMIDOL 3.5 ML: 612 INJECTION, SOLUTION INTRAVENOUS at 10:00

## 2017-06-05 NOTE — PROGRESS NOTES
Chief Complaint   Patient presents with   • Follow-up     Recheck mediport        HPI  Port is not functioning well- xrays show it has pulled back significantly into the neck. Unable to flush. Currently being treated for follicular lymphoma.    Past Medical History:   Diagnosis Date   • Acute bronchitis    • Agoraphobia with panic disorder     on SNRI, prn buspar, prn klonopin     • Anxiety    • Atrophic vaginitis    • Chest pain     work up as new onset angina    • Depression      MDD      • Essential hypertension    • Follicular non-Hodgkin's lymphoma    • Generalized anxiety disorder     SSRI - buspar, cont cymbalta, prn klonopin      • Hip pain     arthritis, artifical right hip     • History of bone density study 02/09/2009    DEXA (bone density) test, peripheral (Normal   • History of echocardiogram 01/03/2012    Echocardiogram 79943 (Normal echocardigram. EF 55-60%)   • History of mammogram    • Hyperlipidemia    • Mass of lower limb    • Nuclear senile cataract    • Obesity    • Otalgia     ENT REFER   • Panic disorder     with agoraphobia. On buspar and klonopin now      • Type 2 diabetes mellitus     NO BDR   • Upper respiratory infection        Past Surgical History:   Procedure Laterality Date   • CENTRAL VENOUS LINE INSERTION  03/11/2016    Central line insertion (Successful placement of right upper extremity midline.)   • COLONOSCOPY     • CYSTOSCOPY  03/23/1976    Cystoscopy (external urethroplasty and cysto-panendoscopy,urethal hymenal fusion with hypospadias)   • DILATATION AND CURETTAGE  01/25/1980    D&C (removal of IUD)   • EXCISION LESION  04/15/2014    Remove thigh lesion (Excision of a mass of the right thigh using a 16.2 cm incision with intermediate layered closure.)   • HYSTEROSCOPY  02/28/2003    Hysteroscope procedure (diagnostic hysteroscopy with fractional D&C)   • INJECTION OF MEDICATION  08/22/2011    kenalog (1)   • OTHER SURGICAL HISTORY      Explore parathyroid glands   • OTHER  SURGICAL HISTORY  08/26/1985    Laparosc diagnostic (desires elective tubal reversal)   • PAP SMEAR  05/24/2005    PAP SMEAR (normal)   • [facility] INSJ TUNNELED CVC W/O SUBQ PORT/ AGE 5 YR/> Right 4/4/2017    Procedure: INSERTION VENOUS ACCESS DEVICE (MEDIPORT) right chest;  Surgeon: Fortino Medina MD;  Location: [facility];  Service: General   • SHOULDER ARTHROSCOPY  11/11/2013    Shoulder arthroscopy/surgery (Right shoulder. Rotator cuff repair. Subacromial decompression. Edgar procedure.)   • SHOULDER SURGERY  12/28/2015    Shoulder surgery procedure (Arthroscopy of the left shoulder with rotator cuff repair.Edgar procedure.Subacromial decompression.)   • TONSILLECTOMY AND ADENOIDECTOMY  05/29/1967    T&A (hypertrophied tonsils and adenoids with recurrent infection)   • TOTAL ABDOMINAL HYSTERECTOMY WITH SALPINGO OOPHORECTOMY  02/03/2004    ARIELLE/BSO (with a preop fractional dilation and curettage with frozen section,menorrhagia,dysmenorrhea,unresponsive to medical intervention)   • TOTAL HIP ARTHROPLASTY     • VAGINA SURGERY  03/02/1981    Anesth, surgery on vagina (colpotomy with bilateral partial salpingectomy, multiparity contraindicated)         Current Outpatient Prescriptions:   •  ARIPiprazole (ABILIFY) 10 MG tablet, Take 10 mg by mouth every night., Disp: , Rfl:   •  ARIPiprazole (ABILIFY) 20 MG tablet, , Disp: , Rfl:   •  busPIRone (BUSPAR) 15 MG tablet, Take 15 mg by mouth 2 (two) times a day., Disp: , Rfl:   •  Calcium Citrate-Vitamin D (CALCIUM CITRATE +D PO), Take 2 tablets by mouth daily., Disp: , Rfl:   •  clonazePAM (KlonoPIN) 1 MG tablet, Take 1 mg by mouth as needed for seizures., Disp: , Rfl:   •  Desvenlafaxine Succinate (PRISTIQ PO), Take 1 tablet by mouth daily., Disp: , Rfl:   •  gabapentin (NEURONTIN) 300 MG capsule, Take 300 mg by mouth every night., Disp: , Rfl:   •  lamoTRIgine (LAMICTAL) 150 MG tablet, Take 150 mg by mouth 2 (two) times a day., Disp: , Rfl:   •  losartan (COZAAR) 100 MG  tablet, Take 100 mg by mouth daily., Disp: , Rfl:   •  Multiple Vitamins-Minerals (CENTRUM SILVER PO), Take 1 tablet by mouth daily., Disp: , Rfl:   •  Omega-3 Fatty Acids (FISH OIL) 1000 MG capsule capsule, Take 1,000 mg by mouth 2 (two) times a day., Disp: , Rfl:   •  oxyCODONE-acetaminophen (PERCOCET) 5-325 MG per tablet, Take 1 tablet by mouth every 6 (six) hours as needed., Disp: , Rfl:   •  ranitidine (ZANTAC) 150 MG tablet, Take 150 mg by mouth 2 (two) times a day., Disp: , Rfl:   •  simvastatin (ZOCOR) 80 MG tablet, Take 80 mg by mouth every night., Disp: , Rfl:   •  sitaGLIPtin-metFORMIN (JANUMET)  MG per tablet, Take 1 tablet by mouth 2 (two) times a day., Disp: , Rfl:   •  solifenacin (VESICARE) 10 MG tablet, Take 10 mg by mouth every night., Disp: , Rfl:   •  TRAZODONE HCL PO, Take 225 mg by mouth every night., Disp: , Rfl:   No current facility-administered medications for this visit.     Facility-Administered Medications Ordered in Other Visits:   •  heparin flush (porcine) 100 UNIT/ML injection 500 Units, 500 Units, Intravenous, PRN, Tino Sood MD  •  sodium chloride 0.9 % flush 10 mL, 10 mL, Intravenous, PRN, Tino Sood MD, 10 mL at 06/05/17 0749    Allergies   Allergen Reactions   • Ace Inhibitors    • Latex      BLISTER     • Morphine And Related Nausea And Vomiting   • Lortab [Hydrocodone-Acetaminophen] Palpitations       Family History   Problem Relation Age of Onset   • Breast cancer Mother    • Heart disease Father    • Liver cancer Father    • Diabetes Sister      INSULIN DEPENDENT   • Diabetes Brother      INSULIN DEPENDENT   • Coronary artery disease Other    • Diabetes Other        Social History     Social History   • Marital status:      Spouse name: N/A   • Number of children: N/A   • Years of education: N/A     Occupational History   • Not on file.     Social History Main Topics   • Smoking status: Former Smoker     Years: 12.00     Quit date: 1/1/1998   • Smokeless  tobacco: Never Used      Comment: Smoked 1 pack per 2 weeks   • Alcohol use No   • Drug use: No   • Sexual activity: Defer     Other Topics Concern   • Not on file     Social History Narrative       Review of Systems  Nothing to add  Physical Exam   Constitutional: She appears well-developed and well-nourished.   Neck: Normal range of motion. Neck supple. No JVD present. No tracheal deviation present. No thyromegaly present.   Cardiovascular: Normal rate and regular rhythm.    Pulmonary/Chest: Effort normal and breath sounds normal.       Lymphadenopathy:     She has no cervical adenopathy.   Vitals reviewed.        ASSESSMENT    Chasity was seen today for follow-up.    Diagnoses and all orders for this visit:    Grade 1 follicular lymphoma of lymph nodes of multiple regions  -     sodium chloride 0.9 % flush 1-10 mL; Infuse 1-10 mL into a venous catheter As Needed for Line Care.  -     sodium chloride 0.9 % infusion; Infuse 30 mL/hr into a venous catheter Continuous.  -     ceFAZolin (ANCEF) 1 g in sodium chloride 0.9 % 100 mL IVPB; Infuse 1 g into a venous catheter 1 (One) Time.  -     Case Request; Standing    Other orders  -     Follow anesthesia standing orders.; Standing  -     Insert Peripheral IV; Standing  -     Saline Lock & Maintain IV Access; Standing  -     Follow anesthesia standing orders.  -     Provide instructions to patient on NPO status  -     CONNOR hose- To be placed on patient in pre-op; Standing  -     Obtain informed consent; Standing        PLAN    1. Revision or replacement of mediport    The procedure is explained to the patient.  Risks of the procedure include but are not limited to bleeding, infection, pneumothorax, blood clots, poor wound healing, poor port function, and pain.  Alternatives include peripheral PICC line placement area, although there are complications associated with this as well.  He understands, agrees, and desires to proceed.          This document has been electronically  signed by Fortino Medina MD on June 5, 2017 10:31 AM

## 2017-06-05 NOTE — PROGRESS NOTES
DATE OF VISIT: 6/5/2017    REASON FOR VISIT: Recurrent follicular lymphoma    HISTORY OF PRESENT ILLNESS:    57-year-old female with a past medical history significant for recurrent follicular lymphoma, last round of radiation was around September 2016 for follicular lymphoma.  Patient was again found to have aortocaval adenopathy on recently done CT of chest abdomen and pelvis in March 2017.  Patient was started on rituximab April 10, 2017.  Patient is here to get a second dose of rituximab today.  Complains of nausea and fatigue with the first dose of rituximab.  At the time of accessing her port) hard time getting blood return and she complain of pain in neck when the port was flushed.  Complains of some swelling on her side of left thigh.  Denies any fevers or chills or night sweats.        PAST MEDICAL HISTORY:    Past Medical History:   Diagnosis Date   • Acute bronchitis    • Agoraphobia with panic disorder     on SNRI, prn buspar, prn klonopin     • Anxiety    • Atrophic vaginitis    • Chest pain     work up as new onset angina    • Depression      MDD      • Essential hypertension    • Follicular non-Hodgkin's lymphoma    • Generalized anxiety disorder     SSRI - buspar, cont cymbalta, prn klonopin      • Hip pain     arthritis, artifical right hip     • History of bone density study 02/09/2009    DEXA (bone density) test, peripheral (Normal   • History of echocardiogram 01/03/2012    Echocardiogram 55533 (Normal echocardigram. EF 55-60%)   • History of mammogram    • Hyperlipidemia    • Mass of lower limb    • Nuclear senile cataract    • Obesity    • Otalgia     ENT REFER   • Panic disorder     with agoraphobia. On buspar and klonopin now      • Type 2 diabetes mellitus     NO BDR   • Upper respiratory infection        SOCIAL HISTORY:    Social History   Substance Use Topics   • Smoking status: Former Smoker     Years: 12.00     Quit date: 1/1/1998   • Smokeless tobacco: Never Used      Comment: Smoked 1  pack per 2 weeks   • Alcohol use No       Surgical History :  Past Surgical History:   Procedure Laterality Date   • CENTRAL VENOUS LINE INSERTION  03/11/2016    Central line insertion (Successful placement of right upper extremity midline.)   • COLONOSCOPY     • CYSTOSCOPY  03/23/1976    Cystoscopy (external urethroplasty and cysto-panendoscopy,urethal hymenal fusion with hypospadias)   • DILATATION AND CURETTAGE  01/25/1980    D&C (removal of IUD)   • EXCISION LESION  04/15/2014    Remove thigh lesion (Excision of a mass of the right thigh using a 16.2 cm incision with intermediate layered closure.)   • HYSTEROSCOPY  02/28/2003    Hysteroscope procedure (diagnostic hysteroscopy with fractional D&C)   • INJECTION OF MEDICATION  08/22/2011    kenalog (1)   • OTHER SURGICAL HISTORY      Explore parathyroid glands   • OTHER SURGICAL HISTORY  08/26/1985    Laparosc diagnostic (desires elective tubal reversal)   • PAP SMEAR  05/24/2005    PAP SMEAR (normal)   • [facility] INSJ TUNNELED CVC W/O SUBQ PORT/ AGE 5 YR/> Right 4/4/2017    Procedure: INSERTION VENOUS ACCESS DEVICE (MEDIPORT) right chest;  Surgeon: Fortino Medina MD;  Location: [facility];  Service: General   • SHOULDER ARTHROSCOPY  11/11/2013    Shoulder arthroscopy/surgery (Right shoulder. Rotator cuff repair. Subacromial decompression. Edgar procedure.)   • SHOULDER SURGERY  12/28/2015    Shoulder surgery procedure (Arthroscopy of the left shoulder with rotator cuff repair.Edgar procedure.Subacromial decompression.)   • TONSILLECTOMY AND ADENOIDECTOMY  05/29/1967    T&A (hypertrophied tonsils and adenoids with recurrent infection)   • TOTAL ABDOMINAL HYSTERECTOMY WITH SALPINGO OOPHORECTOMY  02/03/2004    ARIELLE/BSO (with a preop fractional dilation and curettage with frozen section,menorrhagia,dysmenorrhea,unresponsive to medical intervention)   • TOTAL HIP ARTHROPLASTY     • VAGINA SURGERY  03/02/1981    Anesth, surgery on vagina (colpotomy with bilateral partial  "salpingectomy, multiparity contraindicated)       ALLERGIES:    Allergies   Allergen Reactions   • Ace Inhibitors    • Latex      BLISTER     • Morphine And Related Nausea And Vomiting   • Lortab [Hydrocodone-Acetaminophen] Palpitations       REVIEW OF SYSTEMS:      CONSTITUTIONAL:  No fever, chills, or night sweats.     HEENT:Complains of pain in the neck after getting port flush this morning.  No epistaxis, mouth sores, or difficulty swallowing.    RESPIRATORY:  No new shortness of breath or cough at present.    CARDIOVASCULAR:  No chest pain or palpitations.    GASTROINTESTINAL:   No abdominal pain, nausea, vomiting, or blood in the stool.    GENITOURINARY:  No dysuria or hematuria.    MUSCULOSKELETAL: Complains of swelling getting her side of left side.  No any new back pain or arthralgias.     NEUROLOGICAL:  No tingling or numbness. No new headache or dizziness.     LYMPHATICS:  Denies any abnormal swollen and anywhere in the body.    SKIN:  Denies any new skin rash.          PHYSICAL EXAMINATION:      VITAL SIGNS:  /71  Pulse 85  Temp 97.6 °F (36.4 °C)  Resp 18  Ht 64\" (162.6 cm)  Wt 208 lb 6.4 oz (94.5 kg)  BMI 35.77 kg/m2    GENERAL:  Not in any distress.    HEENT:  Normocephalic, Atraumatic.Mild Conjunctival pallor. No icterus. Extraocular Movements Intact. No Fascial Asymmetry noted.    NECK:  No adenopathy. No JVD.  There is swelling present on the right side of the neck along the line of port.    RESPIRATORY:  Fair air entry bilateral. No rhonchi or wheezing.    CARDIOVASCULAR:  S1, S2. Regular rate and rhythm. No murmur or gallop appreciated.  Port-A-Cath present on the right chest wall.    ABDOMEN:  Soft, obese, nontender. Bowel sounds present in all four quadrants.  No organomegaly appreciated.    EXTREMITIES:  No edema.No Calf Tenderness.  No discrete and adenopathy palpable in the inguinal region when examination was done in presence of registered nurse Jiménez.    NEUROLOGIC:  Alert, " awake and oriented ×3.  No  Motor or sensory deficit appreciated. Cranial Nerves 2-12 grossly intact.          DIAGNOSTIC DATA:    Glucose   Date Value Ref Range Status   06/05/2017 137 (H) 60 - 100 mg/dL Final     Sodium   Date Value Ref Range Status   06/05/2017 138 137 - 145 mmol/L Final     Potassium   Date Value Ref Range Status   06/05/2017 4.4 3.5 - 5.1 mmol/L Final     CO2   Date Value Ref Range Status   06/05/2017 24.0 22.0 - 31.0 mmol/L Final     Chloride   Date Value Ref Range Status   06/05/2017 99 95 - 110 mmol/L Final     Anion Gap   Date Value Ref Range Status   06/05/2017 15.0 5.0 - 15.0 mmol/L Final     Creatinine   Date Value Ref Range Status   06/05/2017 0.73 0.50 - 1.00 mg/dL Final     BUN   Date Value Ref Range Status   06/05/2017 12 7 - 21 mg/dL Final     BUN/Creatinine Ratio   Date Value Ref Range Status   06/05/2017 16.4 7.0 - 25.0 Final     Calcium   Date Value Ref Range Status   06/05/2017 9.6 8.4 - 10.2 mg/dL Final     eGFR Non  Amer   Date Value Ref Range Status   06/05/2017 82 51 - 120 mL/min/1.73 Final     Alkaline Phosphatase   Date Value Ref Range Status   06/05/2017 76 38 - 126 U/L Final     Total Protein   Date Value Ref Range Status   06/05/2017 7.5 6.3 - 8.6 g/dL Final     ALT (SGPT)   Date Value Ref Range Status   06/05/2017 33 9 - 52 U/L Final     AST (SGOT)   Date Value Ref Range Status   06/05/2017 29 14 - 36 U/L Final     Total Bilirubin   Date Value Ref Range Status   06/05/2017 0.5 0.2 - 1.3 mg/dL Final     Albumin   Date Value Ref Range Status   06/05/2017 4.40 3.40 - 4.80 g/dL Final     Globulin   Date Value Ref Range Status   06/05/2017 3.1 2.3 - 3.5 gm/dL Final     A/G Ratio   Date Value Ref Range Status   06/05/2017 1.4 1.1 - 1.8 g/dL Final     Lab Results   Component Value Date    WBC 4.44 06/05/2017    HGB 13.5 06/05/2017    HCT 38.5 06/05/2017    MCV 87.7 06/05/2017     06/05/2017     Lab Results   Component Value Date    NEUTROABS 3.08 06/05/2017          PATHOLOGY:  Pathology from right thigh biopsy done in April 2014 showed:  FINAL DIAGNOSIS:   A.  SUBCUTANEOUS MASS, RIGHT THIGH:             FOLLICULAR LYMPHOMA, GRADE 1 OF 3.   B.  SUBCUTANEOUS MASS, RIGHT THIGH:             FOLLICULAR LYMPHOMA, GRADE 1 OF 3.          Comment   COMMENT:   Immunostains have follicular lymphoma positive for CD20, negative for   CD3, negative for CD5, positive for CD10, positive for CD23, negative   for CD43, negative for cyclin D1, positive for BCL-2 and positive for   BCL-6.  The case is submitted to the [facility] for evaluation and   their report is attached.            RADIOLOGY DATA :  IR Port study done earlier today on June 5, 2017 showed:  Seven seconds fluoroscopy was used. 5 mL of Isovue injected. 14  images were obtained from fluoroscopy, 2/s.     Contrast injected demonstrates the tip of the catheter within a   small venous structure probably a branch of the external jugular  vein. Contrast flows superiorly into the neck and not into the  superior vena cava.     Port revision may be necessary.           These findings communicated to Dr. Sood in the [facility] center at  9:55 AM.     IMPRESSION:  CONCLUSION: As above.        CT of chest, abdomen and pelvis with contrast done on March 24, 2017 was reviewed, discussed with patient, it showed:  CHEST FINDINGS:     LUNGS/PLEURA: The lungs are normal.  TRACHEA AND BRONCHI: The trachea and bronchi are patent.  MEDIASTINUM, ROXANA AND LYMPH NODES: The mediastinum, roxana and  lymph nodes are normal.  HEART AND PERICARDIUM: The heart and pericardium are normal.  VASCULAR: There is a small amount of atherosclerotic  calcification in the coronary arteries.  OSSEOUS STRUCTURES: Unremarkable.        ABDOMINAL/PELVIC FINDINGS:     HEPATOBILIARY: Unremarkable.  SPLEEN: Unremarkable.  PANCREAS: Unremarkable  ADRENAL GLANDS: Unremarkable.  KIDNEYS/URETERS: No evidence of hydronephrosis or suspicious  mass.     GASTROINTESTINAL:  Unremarkable  REPRODUCTIVE ORGANS: Unremarkable.  URINARY BLADDER: Unremarkable     VASCULAR: Unremarkable  LYMPH NODES: There is an enlarged lymph node in the aortocaval  region measuring 3.4 x 2 x 5.8 cm, not present on the prior exam.  PERITONEUM/RETROPERITONEUM: Unremarkable.      OSSEOUS STRUCTURES: Degenerative disc disease noted at L5-S1.  There is a left hip prosthesis.        IMPRESSION:  CONCLUSION:   Enlarged aortocaval lymph node, concerning for neoplasm.          ASSESSMENT AND PLAN:       1.  Recurrent follicular lymphoma grade 1, initially patient was diagnosed with a right groin lymph node adenopathy in April 2014 for which patient underwent palliative radiation therapy.  Subsequently patient again had a relapse around September 2016 for workup with second course of palliative radiation therapy was given by Dr. Leonel Vargas in October 2016.  In view of enlarged lymph node in aortocaval region about 5 cm in size recommendation for treatment were discussed with patient with either single agent rituximab versus third course of radiation therapy.  Patient was started on every 2 monthly rituximab on April 10, 2017.  In view of having problem with the port today we will hold rituximab today.  We will refer her back to Dr. Medina to get port fixed and then resume her rituximab.  Plan is to see her back in about 2 months and will get a CT scan of chest abdomen and pelvis with contrast prior to that to see response to dose of rituximab.    2.  History of dyslipidemia    3.  Diabetes mellitus    4.  Health maintenance: Patient does not smoke.  Last colonoscopy was done in 2014 which was normal as per patient.  She remains full code.        Tino Sood MD  6/5/2017  11:55 AM        EMR Dragon/Transcription disclaimer:   Much of this encounter note is an electronic transcription/translation of spoken language to printed text. The electronic translation of spoken language may permit erroneous, or at times,  nonsensical words or phrases to be inadvertently transcribed; Although I have reviewed the note for such errors, some may still exist.

## 2017-06-06 ENCOUNTER — HOSPITAL ENCOUNTER (OUTPATIENT)
Facility: HOSPITAL | Age: 57
Setting detail: HOSPITAL OUTPATIENT SURGERY
Discharge: HOME | End: 2017-06-06
Attending: SURGERY | Admitting: SURGERY

## 2017-06-06 ENCOUNTER — APPOINTMENT (OUTPATIENT)
Dept: GENERAL RADIOLOGY | Facility: HOSPITAL | Age: 57
End: 2017-06-06

## 2017-06-06 ENCOUNTER — ANESTHESIA (OUTPATIENT)
Dept: PERIOP | Facility: HOSPITAL | Age: 57
End: 2017-06-06

## 2017-06-06 VITALS
RESPIRATION RATE: 18 BRPM | BODY MASS INDEX: 35.53 KG/M2 | HEIGHT: 64 IN | OXYGEN SATURATION: 94 % | SYSTOLIC BLOOD PRESSURE: 125 MMHG | TEMPERATURE: 98 F | HEART RATE: 87 BPM | WEIGHT: 208.11 LBS | DIASTOLIC BLOOD PRESSURE: 65 MMHG

## 2017-06-06 DIAGNOSIS — C82.08 GRADE 1 FOLLICULAR LYMPHOMA OF LYMPH NODES OF MULTIPLE REGIONS (HCC): ICD-10-CM

## 2017-06-06 LAB — GLUCOSE BLDC GLUCOMTR-MCNC: 146 MG/DL (ref 70–130)

## 2017-06-06 PROCEDURE — 25010000002 SUCCINYLCHOLINE PER 20 MG: Performed by: NURSE ANESTHETIST, CERTIFIED REGISTERED

## 2017-06-06 PROCEDURE — 25010000002 MIDAZOLAM PER 1 MG: Performed by: NURSE ANESTHETIST, CERTIFIED REGISTERED

## 2017-06-06 PROCEDURE — 87205 SMEAR GRAM STAIN: CPT | Performed by: SURGERY

## 2017-06-06 PROCEDURE — 77001 FLUOROGUIDE FOR VEIN DEVICE: CPT | Performed by: SURGERY

## 2017-06-06 PROCEDURE — C1788 PORT, INDWELLING, IMP: HCPCS | Performed by: SURGERY

## 2017-06-06 PROCEDURE — 82962 GLUCOSE BLOOD TEST: CPT

## 2017-06-06 PROCEDURE — 25010000002 PHENYLEPHRINE PER 1 ML: Performed by: NURSE ANESTHETIST, CERTIFIED REGISTERED

## 2017-06-06 PROCEDURE — 25010000002 ONDANSETRON PER 1 MG: Performed by: NURSE ANESTHETIST, CERTIFIED REGISTERED

## 2017-06-06 PROCEDURE — 25010000002 HEPARIN FLUSH (PORCINE) 100 UNIT/ML SOLUTION: Performed by: SURGERY

## 2017-06-06 PROCEDURE — 25010000002 FENTANYL CITRATE (PF) 100 MCG/2ML SOLUTION: Performed by: NURSE ANESTHETIST, CERTIFIED REGISTERED

## 2017-06-06 PROCEDURE — 36561 INSERT TUNNELED CV CATH: CPT | Performed by: SURGERY

## 2017-06-06 PROCEDURE — 88300 SURGICAL PATH GROSS: CPT | Performed by: SURGERY

## 2017-06-06 PROCEDURE — 87070 CULTURE OTHR SPECIMN AEROBIC: CPT | Performed by: SURGERY

## 2017-06-06 PROCEDURE — 25010000002 PROPOFOL 10 MG/ML EMULSION: Performed by: NURSE ANESTHETIST, CERTIFIED REGISTERED

## 2017-06-06 PROCEDURE — 88300 SURGICAL PATH GROSS: CPT | Performed by: PATHOLOGY

## 2017-06-06 PROCEDURE — 87176 TISSUE HOMOGENIZATION CULTR: CPT | Performed by: SURGERY

## 2017-06-06 PROCEDURE — 36590 REMOVAL TUNNELED CV CATH: CPT | Performed by: SURGERY

## 2017-06-06 PROCEDURE — 76000 FLUOROSCOPY <1 HR PHYS/QHP: CPT

## 2017-06-06 DEVICE — POWERPORT M.R.I. IMPLANTABLE PORT WITH PRE-ATTACHED 9.6F  OPEN-ENDED SINGLE-LUMEN VENOUS CATHETER. INTERMEDIATE KIT (WITH SUTURE PLUGS)
Type: IMPLANTABLE DEVICE | Site: CHEST | Status: FUNCTIONAL
Brand: POWERPORT M.R.I.

## 2017-06-06 RX ORDER — SODIUM CHLORIDE 9 MG/ML
30 INJECTION, SOLUTION INTRAVENOUS CONTINUOUS
Status: DISCONTINUED | OUTPATIENT
Start: 2017-06-06 | End: 2017-06-06 | Stop reason: HOSPADM

## 2017-06-06 RX ORDER — LIDOCAINE HYDROCHLORIDE 20 MG/ML
INJECTION, SOLUTION INFILTRATION; PERINEURAL AS NEEDED
Status: DISCONTINUED | OUTPATIENT
Start: 2017-06-06 | End: 2017-06-06 | Stop reason: SURG

## 2017-06-06 RX ORDER — SODIUM CHLORIDE 0.9 % (FLUSH) 0.9 %
1-10 SYRINGE (ML) INJECTION AS NEEDED
Status: DISCONTINUED | OUTPATIENT
Start: 2017-06-06 | End: 2017-06-06 | Stop reason: HOSPADM

## 2017-06-06 RX ORDER — ROCURONIUM BROMIDE 10 MG/ML
INJECTION, SOLUTION INTRAVENOUS AS NEEDED
Status: DISCONTINUED | OUTPATIENT
Start: 2017-06-06 | End: 2017-06-06 | Stop reason: SURG

## 2017-06-06 RX ORDER — FLUMAZENIL 0.1 MG/ML
0.2 INJECTION INTRAVENOUS AS NEEDED
Status: DISCONTINUED | OUTPATIENT
Start: 2017-06-06 | End: 2017-06-06 | Stop reason: HOSPADM

## 2017-06-06 RX ORDER — FENTANYL CITRATE 50 UG/ML
INJECTION, SOLUTION INTRAMUSCULAR; INTRAVENOUS AS NEEDED
Status: DISCONTINUED | OUTPATIENT
Start: 2017-06-06 | End: 2017-06-06 | Stop reason: SURG

## 2017-06-06 RX ORDER — ACETAMINOPHEN 325 MG/1
650 TABLET ORAL ONCE AS NEEDED
Status: DISCONTINUED | OUTPATIENT
Start: 2017-06-06 | End: 2017-06-06 | Stop reason: HOSPADM

## 2017-06-06 RX ORDER — SUCCINYLCHOLINE CHLORIDE 20 MG/ML
INJECTION INTRAMUSCULAR; INTRAVENOUS AS NEEDED
Status: DISCONTINUED | OUTPATIENT
Start: 2017-06-06 | End: 2017-06-06 | Stop reason: SURG

## 2017-06-06 RX ORDER — ACETAMINOPHEN 650 MG/1
650 SUPPOSITORY RECTAL ONCE AS NEEDED
Status: DISCONTINUED | OUTPATIENT
Start: 2017-06-06 | End: 2017-06-06 | Stop reason: HOSPADM

## 2017-06-06 RX ORDER — ONDANSETRON 2 MG/ML
4 INJECTION INTRAMUSCULAR; INTRAVENOUS ONCE AS NEEDED
Status: DISCONTINUED | OUTPATIENT
Start: 2017-06-06 | End: 2017-06-06 | Stop reason: HOSPADM

## 2017-06-06 RX ORDER — LABETALOL HYDROCHLORIDE 5 MG/ML
5 INJECTION, SOLUTION INTRAVENOUS
Status: DISCONTINUED | OUTPATIENT
Start: 2017-06-06 | End: 2017-06-06 | Stop reason: HOSPADM

## 2017-06-06 RX ORDER — ONDANSETRON 2 MG/ML
INJECTION INTRAMUSCULAR; INTRAVENOUS AS NEEDED
Status: DISCONTINUED | OUTPATIENT
Start: 2017-06-06 | End: 2017-06-06 | Stop reason: SURG

## 2017-06-06 RX ORDER — MIDAZOLAM HYDROCHLORIDE 1 MG/ML
INJECTION INTRAMUSCULAR; INTRAVENOUS AS NEEDED
Status: DISCONTINUED | OUTPATIENT
Start: 2017-06-06 | End: 2017-06-06 | Stop reason: SURG

## 2017-06-06 RX ORDER — DIPHENHYDRAMINE HYDROCHLORIDE 50 MG/ML
12.5 INJECTION INTRAMUSCULAR; INTRAVENOUS
Status: DISCONTINUED | OUTPATIENT
Start: 2017-06-06 | End: 2017-06-06 | Stop reason: HOSPADM

## 2017-06-06 RX ORDER — PROPOFOL 10 MG/ML
VIAL (ML) INTRAVENOUS AS NEEDED
Status: DISCONTINUED | OUTPATIENT
Start: 2017-06-06 | End: 2017-06-06 | Stop reason: SURG

## 2017-06-06 RX ORDER — NALOXONE HCL 0.4 MG/ML
0.2 VIAL (ML) INJECTION AS NEEDED
Status: DISCONTINUED | OUTPATIENT
Start: 2017-06-06 | End: 2017-06-06 | Stop reason: HOSPADM

## 2017-06-06 RX ADMIN — PROPOFOL 20 MG: 10 INJECTION, EMULSION INTRAVENOUS at 09:43

## 2017-06-06 RX ADMIN — PROPOFOL 180 MG: 10 INJECTION, EMULSION INTRAVENOUS at 09:30

## 2017-06-06 RX ADMIN — PHENYLEPHRINE HYDROCHLORIDE 100 MCG: 10 INJECTION INTRAVENOUS at 10:23

## 2017-06-06 RX ADMIN — ROCURONIUM BROMIDE 5 MG: 10 INJECTION INTRAVENOUS at 09:30

## 2017-06-06 RX ADMIN — PHENYLEPHRINE HYDROCHLORIDE 100 MCG: 10 INJECTION INTRAVENOUS at 10:48

## 2017-06-06 RX ADMIN — SODIUM CHLORIDE: 9 INJECTION, SOLUTION INTRAVENOUS at 10:35

## 2017-06-06 RX ADMIN — FENTANYL CITRATE 25 MCG: 50 INJECTION, SOLUTION INTRAMUSCULAR; INTRAVENOUS at 10:40

## 2017-06-06 RX ADMIN — EPHEDRINE SULFATE 10 MG: 50 INJECTION INTRAMUSCULAR; INTRAVENOUS; SUBCUTANEOUS at 10:48

## 2017-06-06 RX ADMIN — SODIUM CHLORIDE 30 ML/HR: 9 INJECTION, SOLUTION INTRAVENOUS at 07:32

## 2017-06-06 RX ADMIN — PHENYLEPHRINE HYDROCHLORIDE 100 MCG: 10 INJECTION INTRAVENOUS at 10:20

## 2017-06-06 RX ADMIN — ONDANSETRON 4 MG: 2 INJECTION INTRAMUSCULAR; INTRAVENOUS at 10:40

## 2017-06-06 RX ADMIN — FENTANYL CITRATE 25 MCG: 50 INJECTION, SOLUTION INTRAMUSCULAR; INTRAVENOUS at 10:12

## 2017-06-06 RX ADMIN — MIDAZOLAM 2 MG: 1 INJECTION INTRAMUSCULAR; INTRAVENOUS at 09:21

## 2017-06-06 RX ADMIN — PHENYLEPHRINE HYDROCHLORIDE 100 MCG: 10 INJECTION INTRAVENOUS at 09:49

## 2017-06-06 RX ADMIN — CEFAZOLIN 1 G: 1 INJECTION, POWDER, FOR SOLUTION INTRAMUSCULAR; INTRAVENOUS; PARENTERAL at 09:36

## 2017-06-06 RX ADMIN — EPHEDRINE SULFATE 10 MG: 50 INJECTION INTRAMUSCULAR; INTRAVENOUS; SUBCUTANEOUS at 09:56

## 2017-06-06 RX ADMIN — PROPOFOL 50 MG: 10 INJECTION, EMULSION INTRAVENOUS at 10:34

## 2017-06-06 RX ADMIN — PHENYLEPHRINE HYDROCHLORIDE 100 MCG: 10 INJECTION INTRAVENOUS at 09:56

## 2017-06-06 RX ADMIN — FENTANYL CITRATE 50 MCG: 50 INJECTION, SOLUTION INTRAMUSCULAR; INTRAVENOUS at 09:30

## 2017-06-06 RX ADMIN — PHENYLEPHRINE HYDROCHLORIDE 100 MCG: 10 INJECTION INTRAVENOUS at 09:57

## 2017-06-06 RX ADMIN — LIDOCAINE HYDROCHLORIDE 50 MG: 20 INJECTION, SOLUTION INFILTRATION; PERINEURAL at 09:30

## 2017-06-06 RX ADMIN — SUCCINYLCHOLINE CHLORIDE 180 MG: 20 INJECTION, SOLUTION INTRAMUSCULAR; INTRAVENOUS at 09:30

## 2017-06-06 RX ADMIN — EPHEDRINE SULFATE 10 MG: 50 INJECTION INTRAMUSCULAR; INTRAVENOUS; SUBCUTANEOUS at 10:22

## 2017-06-06 NOTE — H&P (VIEW-ONLY)
Chief Complaint   Patient presents with   • Follow-up     Recheck mediport        HPI  Port is not functioning well- xrays show it has pulled back significantly into the neck. Unable to flush. Currently being treated for follicular lymphoma.    Past Medical History:   Diagnosis Date   • Acute bronchitis    • Agoraphobia with panic disorder     on SNRI, prn buspar, prn klonopin     • Anxiety    • Atrophic vaginitis    • Chest pain     work up as new onset angina    • Depression      MDD      • Essential hypertension    • Follicular non-Hodgkin's lymphoma    • Generalized anxiety disorder     SSRI - buspar, cont cymbalta, prn klonopin      • Hip pain     arthritis, artifical right hip     • History of bone density study 02/09/2009    DEXA (bone density) test, peripheral (Normal   • History of echocardiogram 01/03/2012    Echocardiogram 57150 (Normal echocardigram. EF 55-60%)   • History of mammogram    • Hyperlipidemia    • Mass of lower limb    • Nuclear senile cataract    • Obesity    • Otalgia     ENT REFER   • Panic disorder     with agoraphobia. On buspar and klonopin now      • Type 2 diabetes mellitus     NO BDR   • Upper respiratory infection        Past Surgical History:   Procedure Laterality Date   • CENTRAL VENOUS LINE INSERTION  03/11/2016    Central line insertion (Successful placement of right upper extremity midline.)   • COLONOSCOPY     • CYSTOSCOPY  03/23/1976    Cystoscopy (external urethroplasty and cysto-panendoscopy,urethal hymenal fusion with hypospadias)   • DILATATION AND CURETTAGE  01/25/1980    D&C (removal of IUD)   • EXCISION LESION  04/15/2014    Remove thigh lesion (Excision of a mass of the right thigh using a 16.2 cm incision with intermediate layered closure.)   • HYSTEROSCOPY  02/28/2003    Hysteroscope procedure (diagnostic hysteroscopy with fractional D&C)   • INJECTION OF MEDICATION  08/22/2011    kenalog (1)   • OTHER SURGICAL HISTORY      Explore parathyroid glands   • OTHER  SURGICAL HISTORY  08/26/1985    Laparosc diagnostic (desires elective tubal reversal)   • PAP SMEAR  05/24/2005    PAP SMEAR (normal)   • MS INSJ TUNNELED CVC W/O SUBQ PORT/ AGE 5 YR/> Right 4/4/2017    Procedure: INSERTION VENOUS ACCESS DEVICE (MEDIPORT) right chest;  Surgeon: Fortino Medina MD;  Location: [facility];  Service: General   • SHOULDER ARTHROSCOPY  11/11/2013    Shoulder arthroscopy/surgery (Right shoulder. Rotator cuff repair. Subacromial decompression. Edgar procedure.)   • SHOULDER SURGERY  12/28/2015    Shoulder surgery procedure (Arthroscopy of the left shoulder with rotator cuff repair.Edgar procedure.Subacromial decompression.)   • TONSILLECTOMY AND ADENOIDECTOMY  05/29/1967    T&A (hypertrophied tonsils and adenoids with recurrent infection)   • TOTAL ABDOMINAL HYSTERECTOMY WITH SALPINGO OOPHORECTOMY  02/03/2004    ARIELLE/BSO (with a preop fractional dilation and curettage with frozen section,menorrhagia,dysmenorrhea,unresponsive to medical intervention)   • TOTAL HIP ARTHROPLASTY     • VAGINA SURGERY  03/02/1981    Anesth, surgery on vagina (colpotomy with bilateral partial salpingectomy, multiparity contraindicated)         Current Outpatient Prescriptions:   •  ARIPiprazole (ABILIFY) 10 MG tablet, Take 10 mg by mouth every night., Disp: , Rfl:   •  ARIPiprazole (ABILIFY) 20 MG tablet, , Disp: , Rfl:   •  busPIRone (BUSPAR) 15 MG tablet, Take 15 mg by mouth 2 (two) times a day., Disp: , Rfl:   •  Calcium Citrate-Vitamin D (CALCIUM CITRATE +D PO), Take 2 tablets by mouth daily., Disp: , Rfl:   •  clonazePAM (KlonoPIN) 1 MG tablet, Take 1 mg by mouth as needed for seizures., Disp: , Rfl:   •  Desvenlafaxine Succinate (PRISTIQ PO), Take 1 tablet by mouth daily., Disp: , Rfl:   •  gabapentin (NEURONTIN) 300 MG capsule, Take 300 mg by mouth every night., Disp: , Rfl:   •  lamoTRIgine (LAMICTAL) 150 MG tablet, Take 150 mg by mouth 2 (two) times a day., Disp: , Rfl:   •  losartan (COZAAR) 100 MG  tablet, Take 100 mg by mouth daily., Disp: , Rfl:   •  Multiple Vitamins-Minerals (CENTRUM SILVER PO), Take 1 tablet by mouth daily., Disp: , Rfl:   •  Omega-3 Fatty Acids (FISH OIL) 1000 MG capsule capsule, Take 1,000 mg by mouth 2 (two) times a day., Disp: , Rfl:   •  oxyCODONE-acetaminophen (PERCOCET) 5-325 MG per tablet, Take 1 tablet by mouth every 6 (six) hours as needed., Disp: , Rfl:   •  ranitidine (ZANTAC) 150 MG tablet, Take 150 mg by mouth 2 (two) times a day., Disp: , Rfl:   •  simvastatin (ZOCOR) 80 MG tablet, Take 80 mg by mouth every night., Disp: , Rfl:   •  sitaGLIPtin-metFORMIN (JANUMET)  MG per tablet, Take 1 tablet by mouth 2 (two) times a day., Disp: , Rfl:   •  solifenacin (VESICARE) 10 MG tablet, Take 10 mg by mouth every night., Disp: , Rfl:   •  TRAZODONE HCL PO, Take 225 mg by mouth every night., Disp: , Rfl:   No current facility-administered medications for this visit.     Facility-Administered Medications Ordered in Other Visits:   •  heparin flush (porcine) 100 UNIT/ML injection 500 Units, 500 Units, Intravenous, PRN, Tino Sood MD  •  sodium chloride 0.9 % flush 10 mL, 10 mL, Intravenous, PRN, Tino Sood MD, 10 mL at 06/05/17 0749    Allergies   Allergen Reactions   • Ace Inhibitors    • Latex      BLISTER     • Morphine And Related Nausea And Vomiting   • Lortab [Hydrocodone-Acetaminophen] Palpitations       Family History   Problem Relation Age of Onset   • Breast cancer Mother    • Heart disease Father    • Liver cancer Father    • Diabetes Sister      INSULIN DEPENDENT   • Diabetes Brother      INSULIN DEPENDENT   • Coronary artery disease Other    • Diabetes Other        Social History     Social History   • Marital status:      Spouse name: N/A   • Number of children: N/A   • Years of education: N/A     Occupational History   • Not on file.     Social History Main Topics   • Smoking status: Former Smoker     Years: 12.00     Quit date: 1/1/1998   • Smokeless  tobacco: Never Used      Comment: Smoked 1 pack per 2 weeks   • Alcohol use No   • Drug use: No   • Sexual activity: Defer     Other Topics Concern   • Not on file     Social History Narrative       Review of Systems  Nothing to add  Physical Exam   Constitutional: She appears well-developed and well-nourished.   Neck: Normal range of motion. Neck supple. No JVD present. No tracheal deviation present. No thyromegaly present.   Cardiovascular: Normal rate and regular rhythm.    Pulmonary/Chest: Effort normal and breath sounds normal.       Lymphadenopathy:     She has no cervical adenopathy.   Vitals reviewed.        ASSESSMENT    Chasity was seen today for follow-up.    Diagnoses and all orders for this visit:    Grade 1 follicular lymphoma of lymph nodes of multiple regions  -     sodium chloride 0.9 % flush 1-10 mL; Infuse 1-10 mL into a venous catheter As Needed for Line Care.  -     sodium chloride 0.9 % infusion; Infuse 30 mL/hr into a venous catheter Continuous.  -     ceFAZolin (ANCEF) 1 g in sodium chloride 0.9 % 100 mL IVPB; Infuse 1 g into a venous catheter 1 (One) Time.  -     Case Request; Standing    Other orders  -     Follow anesthesia standing orders.; Standing  -     Insert Peripheral IV; Standing  -     Saline Lock & Maintain IV Access; Standing  -     Follow anesthesia standing orders.  -     Provide instructions to patient on NPO status  -     CONNOR hose- To be placed on patient in pre-op; Standing  -     Obtain informed consent; Standing        PLAN    1. Revision or replacement of mediport    The procedure is explained to the patient.  Risks of the procedure include but are not limited to bleeding, infection, pneumothorax, blood clots, poor wound healing, poor port function, and pain.  Alternatives include peripheral PICC line placement area, although there are complications associated with this as well.  He understands, agrees, and desires to proceed.          This document has been electronically  signed by Fortino Medina MD on June 5, 2017 10:31 AM

## 2017-06-06 NOTE — ANESTHESIA PREPROCEDURE EVALUATION
Anesthesia Evaluation     Patient summary reviewed and Nursing notes reviewed   NPO Solid Status: > 8 hours       Airway   Mallampati: III  TM distance: <3 FB  Neck ROM: full  possible difficult intubation  Dental - normal exam     Pulmonary - normal exam    breath sounds clear to auscultation  (+) recent URI resolved,   Cardiovascular - normal exam    ECG reviewed  Rhythm: regular  Rate: normal    (+) hypertension well controlled, hyperlipidemia    ROS comment: EKG:NSR    Neuro/Psych  (+) psychiatric history Anxiety and Depression,    GI/Hepatic/Renal/Endo    (+) morbid obesity, diabetes mellitus type 2 poorly controlled,     Musculoskeletal (-) negative ROS    Abdominal   (+) obese,    Substance History - negative use     OB/GYN negative ob/gyn ROS         Other - negative ROS                                       Anesthesia Plan    ASA 3     general and MAC     intravenous induction   Anesthetic plan and risks discussed with patient and spouse/significant other.

## 2017-06-06 NOTE — PLAN OF CARE
Problem: Patient Care Overview (Adult)  Goal: Plan of Care Review  Outcome: Ongoing (interventions implemented as appropriate)    06/06/17 1132   Coping/Psychosocial Response Interventions   Plan Of Care Reviewed With patient   Patient Care Overview   Progress improving   Outcome Evaluation   Outcome Summary/Follow up Plan VSS, pt meets pacu d/c criteria       Goal: Adult Individualization and Mutuality  Outcome: Ongoing (interventions implemented as appropriate)    Problem: Perioperative Period (Adult)  Goal: Signs and Symptoms of Listed Potential Problems Will be Absent or Manageable (Perioperative Period)  Outcome: Ongoing (interventions implemented as appropriate)

## 2017-06-06 NOTE — DISCHARGE INSTRUCTIONS
Dr. Fortino Medina  [address] [place] [address]  [phone] (office)  [phone] (hospital)  [phone] (cell)    Discharge Instructions for Port Placement      1. Go home, rest and take it easy today; however, you should get up and move about several times today to reduce the risk of developing a clot in your legs.    2. You may experience some dizziness or memory loss from the anesthesia.  This may last for the next 24 hours.  Someone should plan on staying with you for the first 24 hours for your safety.Do not make any important legal decisions or sign any legal papers for the next 24 hours.    3. Eat and drink lightly today.  Start off with liquids, jello, soup, crackers or other bland foods at first. You may advance your diet tomorrow as tolerated as long as you do not experience any nausea or vomiting.   4. You may remove your outer dressings in 3-4 days or until your first treatment whichever comes first. If you remove the dressing before your first treatment, please leave the little white tapes known as steri-strips alone.  They usually fall off in 1-2 weeks.  Do not worry if they come off sooner.   5. You may notice some bleeding/drainage on your outer dressings. A little bloody drainage is normal. If the bleeding/drainage is such that the bandage cannot absorb it, remove the dressing, apply clean gauze and apply firm pressure for a full 15 minutes.  If the bleeding continues, please call me.  6. You may shower tomorrow.  No tub baths until your incisions are completely healed.  7. You may have received a prescription for a narcotic pain medicine, as you may have some pain/discomfort following surgery. You will not be totally pain free, but your pain medicine should make the pain tolerable.  Please take your pain medicine as prescribed and always take your pills with food to prevent nausea. If you are having severe pain that cannot be controlled by the pain medicine, please contact me.  If the  pain is such that narcotic pain medicine is not required, you may take Tylenol or Ibuprofen as directed unless indicated otherwise.    8. You may have also received a prescription for an anti-nausea medicine.  Please take this as prescribed for any nausea or vomiting.  Nausea could be a result of the anesthesia or a result of the narcotic pain medicine.  If you experience severe nausea and vomiting that cannot be controlled by the nausea medicine, please call me.    9. No driving for 24 hours and for as long as you are taking your prescription pain medicine.    10. No surgical follow-up is needed unless a problem (such as drainage, redness of the incision, malfunction of the port) arises.    11. Remember to contact me for any of the following:    • Fever> 101 degrees  • Severe pain that cannot be controlled by taking your pain pills  • Severe nausea or vomiting that cannot be controlled by taking your nausea medicine  • Significant bleeding from your incision  • Drainage that has a bad smell or is yellow or green in appearance  • Any other questions or concerns

## 2017-06-06 NOTE — PLAN OF CARE
Problem: Patient Care Overview (Adult)  Goal: Plan of Care Review  Outcome: Outcome(s) achieved Date Met:  06/06/17  Dc criteria met.

## 2017-06-06 NOTE — ANESTHESIA POSTPROCEDURE EVALUATION
Patient: Chasity Leon    Procedure Summary     Date Anesthesia Start Anesthesia Stop Room / Location    06/06/17 0921 1112  [facility] / [facility]       Procedure Diagnosis Surgeon Provider    INSERTION VENOUS ACCESS DEVICE   (MEDIPORT)   (C-ARM#1) (N/A ) Grade 1 follicular lymphoma of lymph nodes of multiple regions  (Grade 1 follicular lymphoma of lymph nodes of multiple regions [C82.08]) MD Brice Yan MD          Anesthesia Type: general, MAC  Last vitals  BP      Temp      Pulse     Resp      SpO2        Post Anesthesia Care and Evaluation    Patient location during evaluation: PACU  Patient participation: complete - patient participated  Level of consciousness: awake and alert  Pain management: adequate  Airway patency: patent  Anesthetic complications: No anesthetic complications    Cardiovascular status: acceptable and stable  Respiratory status: acceptable, face mask and spontaneous ventilation  Hydration status: acceptable

## 2017-06-07 ENCOUNTER — INFUSION (OUTPATIENT)
Dept: ONCOLOGY | Facility: HOSPITAL | Age: 57
End: 2017-06-07

## 2017-06-07 ENCOUNTER — APPOINTMENT (OUTPATIENT)
Dept: ONCOLOGY | Facility: CLINIC | Age: 57
End: 2017-06-07

## 2017-06-07 VITALS — TEMPERATURE: 99 F | HEART RATE: 88 BPM | DIASTOLIC BLOOD PRESSURE: 50 MMHG | SYSTOLIC BLOOD PRESSURE: 125 MMHG

## 2017-06-07 DIAGNOSIS — C82.08 GRADE 1 FOLLICULAR LYMPHOMA OF LYMPH NODES OF MULTIPLE REGIONS (HCC): Primary | ICD-10-CM

## 2017-06-07 DIAGNOSIS — C82.08 GRADE 1 FOLLICULAR LYMPHOMA OF LYMPH NODES OF MULTIPLE REGIONS (HCC): ICD-10-CM

## 2017-06-07 DIAGNOSIS — Z45.2 ENCOUNTER FOR VENOUS ACCESS DEVICE CARE: ICD-10-CM

## 2017-06-07 LAB
LAB AP CASE REPORT: NORMAL
Lab: NORMAL
PATH REPORT.FINAL DX SPEC: NORMAL
PATH REPORT.GROSS SPEC: NORMAL

## 2017-06-07 PROCEDURE — 25010000002 DIPHENHYDRAMINE PER 50 MG: Performed by: INTERNAL MEDICINE

## 2017-06-07 PROCEDURE — 25010000002 RITUXIMAB 500 MG/50ML SOLUTION 50 ML VIAL: Performed by: INTERNAL MEDICINE

## 2017-06-07 PROCEDURE — 96413 CHEMO IV INFUSION 1 HR: CPT | Performed by: INTERNAL MEDICINE

## 2017-06-07 PROCEDURE — 96375 TX/PRO/DX INJ NEW DRUG ADDON: CPT | Performed by: INTERNAL MEDICINE

## 2017-06-07 PROCEDURE — 25010000002 RITUXIMAB 100 MG/10ML SOLUTION 10 ML VIAL: Performed by: INTERNAL MEDICINE

## 2017-06-07 PROCEDURE — 96415 CHEMO IV INFUSION ADDL HR: CPT | Performed by: INTERNAL MEDICINE

## 2017-06-07 PROCEDURE — 25010000002 HEPARIN FLUSH (PORCINE) 100 UNIT/ML SOLUTION: Performed by: INTERNAL MEDICINE

## 2017-06-07 RX ORDER — FAMOTIDINE 10 MG/ML
20 INJECTION, SOLUTION INTRAVENOUS AS NEEDED
Status: CANCELLED | OUTPATIENT
Start: 2017-06-07

## 2017-06-07 RX ORDER — SODIUM CHLORIDE 9 MG/ML
250 INJECTION, SOLUTION INTRAVENOUS ONCE
Status: COMPLETED | OUTPATIENT
Start: 2017-06-07 | End: 2017-06-07

## 2017-06-07 RX ORDER — SODIUM CHLORIDE 0.9 % (FLUSH) 0.9 %
10 SYRINGE (ML) INJECTION AS NEEDED
Status: DISCONTINUED | OUTPATIENT
Start: 2017-06-07 | End: 2017-06-07 | Stop reason: HOSPADM

## 2017-06-07 RX ORDER — SODIUM CHLORIDE 9 MG/ML
250 INJECTION, SOLUTION INTRAVENOUS ONCE
Status: CANCELLED | OUTPATIENT
Start: 2017-06-07

## 2017-06-07 RX ORDER — SODIUM CHLORIDE 0.9 % (FLUSH) 0.9 %
10 SYRINGE (ML) INJECTION AS NEEDED
Status: CANCELLED | OUTPATIENT
Start: 2017-06-07

## 2017-06-07 RX ORDER — DIPHENHYDRAMINE HYDROCHLORIDE 50 MG/ML
50 INJECTION INTRAMUSCULAR; INTRAVENOUS AS NEEDED
Status: CANCELLED | OUTPATIENT
Start: 2017-06-07

## 2017-06-07 RX ORDER — ACETAMINOPHEN 325 MG/1
650 TABLET ORAL ONCE
Status: CANCELLED | OUTPATIENT
Start: 2017-06-07

## 2017-06-07 RX ORDER — MEPERIDINE HYDROCHLORIDE 50 MG/ML
25 INJECTION INTRAMUSCULAR; INTRAVENOUS; SUBCUTANEOUS
Status: CANCELLED | OUTPATIENT
Start: 2017-06-07

## 2017-06-07 RX ORDER — ACETAMINOPHEN 325 MG/1
650 TABLET ORAL ONCE
Status: COMPLETED | OUTPATIENT
Start: 2017-06-07 | End: 2017-06-07

## 2017-06-07 RX ADMIN — Medication 10 ML: at 12:00

## 2017-06-07 RX ADMIN — SODIUM CHLORIDE, PRESERVATIVE FREE 500 UNITS: 5 INJECTION INTRAVENOUS at 12:00

## 2017-06-07 RX ADMIN — SODIUM CHLORIDE 250 ML: 9 INJECTION, SOLUTION INTRAVENOUS at 08:38

## 2017-06-07 RX ADMIN — RITUXIMAB 750 MG: 10 INJECTION, SOLUTION INTRAVENOUS at 09:13

## 2017-06-07 RX ADMIN — DIPHENHYDRAMINE HYDROCHLORIDE 25 MG: 50 INJECTION INTRAMUSCULAR; INTRAVENOUS at 08:37

## 2017-06-07 RX ADMIN — ACETAMINOPHEN 650 MG: 325 TABLET ORAL at 08:36

## 2017-06-08 LAB — CATHETER CULTURE: NORMAL

## 2017-06-11 LAB
BACTERIA SPEC AEROBE CULT: NORMAL
GRAM STN SPEC: NORMAL
GRAM STN SPEC: NORMAL

## 2017-07-05 ENCOUNTER — INFUSION (OUTPATIENT)
Dept: ONCOLOGY | Facility: HOSPITAL | Age: 57
End: 2017-07-05

## 2017-07-05 DIAGNOSIS — C82.08 GRADE 1 FOLLICULAR LYMPHOMA OF LYMPH NODES OF MULTIPLE REGIONS (HCC): ICD-10-CM

## 2017-07-05 DIAGNOSIS — Z45.2 ENCOUNTER FOR VENOUS ACCESS DEVICE CARE: Primary | ICD-10-CM

## 2017-07-05 PROCEDURE — 25010000002 HEPARIN FLUSH (PORCINE) 100 UNIT/ML SOLUTION: Performed by: INTERNAL MEDICINE

## 2017-07-05 PROCEDURE — 96523 IRRIG DRUG DELIVERY DEVICE: CPT | Performed by: INTERNAL MEDICINE

## 2017-07-05 RX ORDER — SODIUM CHLORIDE 0.9 % (FLUSH) 0.9 %
10 SYRINGE (ML) INJECTION AS NEEDED
Status: DISCONTINUED | OUTPATIENT
Start: 2017-07-05 | End: 2017-07-05 | Stop reason: HOSPADM

## 2017-07-05 RX ORDER — SODIUM CHLORIDE 0.9 % (FLUSH) 0.9 %
10 SYRINGE (ML) INJECTION AS NEEDED
Status: CANCELLED | OUTPATIENT
Start: 2017-07-05

## 2017-07-05 RX ADMIN — Medication 10 ML: at 14:57

## 2017-07-05 RX ADMIN — SODIUM CHLORIDE, PRESERVATIVE FREE 500 UNITS: 5 INJECTION INTRAVENOUS at 14:57

## 2017-07-17 ENCOUNTER — DOCUMENTATION (OUTPATIENT)
Dept: ONCOLOGY | Facility: HOSPITAL | Age: 57
End: 2017-07-17

## 2017-07-17 ENCOUNTER — HOSPITAL ENCOUNTER (OUTPATIENT)
Dept: CT IMAGING | Facility: HOSPITAL | Age: 57
Discharge: HOME | End: 2017-07-17
Attending: INTERNAL MEDICINE | Admitting: INTERNAL MEDICINE

## 2017-07-17 ENCOUNTER — LAB (OUTPATIENT)
Dept: LAB | Facility: HOSPITAL | Age: 57
End: 2017-07-17
Attending: INTERNAL MEDICINE

## 2017-07-17 ENCOUNTER — HOSPITAL ENCOUNTER (OUTPATIENT)
Dept: CT IMAGING | Facility: HOSPITAL | Age: 57
End: 2017-07-17
Attending: INTERNAL MEDICINE

## 2017-07-17 DIAGNOSIS — C82.08 GRADE 1 FOLLICULAR LYMPHOMA OF LYMPH NODES OF MULTIPLE REGIONS (HCC): ICD-10-CM

## 2017-07-17 LAB
ALBUMIN SERPL-MCNC: 4.2 G/DL (ref 3.4–4.8)
ALBUMIN/GLOB SERPL: 1.4 G/DL (ref 1.1–1.8)
ALP SERPL-CCNC: 84 U/L (ref 38–126)
ALT SERPL W P-5'-P-CCNC: 38 U/L (ref 9–52)
ANION GAP SERPL CALCULATED.3IONS-SCNC: 12 MMOL/L (ref 5–15)
AST SERPL-CCNC: 28 U/L (ref 14–36)
BASOPHILS # BLD AUTO: 0.03 10*3/MM3 (ref 0–0.2)
BASOPHILS NFR BLD AUTO: 0.8 % (ref 0–2)
BILIRUB SERPL-MCNC: 0.3 MG/DL (ref 0.2–1.3)
BUN BLD-MCNC: 5 MG/DL (ref 7–21)
BUN/CREAT SERPL: 8.9 (ref 7–25)
CALCIUM SPEC-SCNC: 9.1 MG/DL (ref 8.4–10.2)
CHLORIDE SERPL-SCNC: 98 MMOL/L (ref 95–110)
CO2 SERPL-SCNC: 26 MMOL/L (ref 22–31)
CREAT BLD-MCNC: 0.56 MG/DL (ref 0.5–1)
DEPRECATED RDW RBC AUTO: 48.9 FL (ref 36.4–46.3)
EOSINOPHIL # BLD AUTO: 0.07 10*3/MM3 (ref 0–0.7)
EOSINOPHIL NFR BLD AUTO: 1.9 % (ref 0–7)
ERYTHROCYTE [DISTWIDTH] IN BLOOD BY AUTOMATED COUNT: 14.6 % (ref 11.5–14.5)
GFR SERPL CREATININE-BSD FRML MDRD: 112 ML/MIN/1.73 (ref 60–120)
GLOBULIN UR ELPH-MCNC: 3 GM/DL (ref 2.3–3.5)
GLUCOSE BLD-MCNC: 109 MG/DL (ref 60–100)
HCT VFR BLD AUTO: 37.3 % (ref 35–45)
HGB BLD-MCNC: 12.5 G/DL (ref 12–15.5)
IMM GRANULOCYTES # BLD: 0.03 10*3/MM3 (ref 0–0.02)
IMM GRANULOCYTES NFR BLD: 0.8 % (ref 0–0.5)
LYMPHOCYTES # BLD AUTO: 0.42 10*3/MM3 (ref 0.6–4.2)
LYMPHOCYTES NFR BLD AUTO: 11.4 % (ref 10–50)
MCH RBC QN AUTO: 30.6 PG (ref 26.5–34)
MCHC RBC AUTO-ENTMCNC: 33.5 G/DL (ref 31.4–36)
MCV RBC AUTO: 91.2 FL (ref 80–98)
MONOCYTES # BLD AUTO: 0.42 10*3/MM3 (ref 0–0.9)
MONOCYTES NFR BLD AUTO: 11.4 % (ref 0–12)
NEUTROPHILS # BLD AUTO: 2.7 10*3/MM3 (ref 2–8.6)
NEUTROPHILS NFR BLD AUTO: 73.7 % (ref 37–80)
PLATELET # BLD AUTO: 283 10*3/MM3 (ref 150–450)
PMV BLD AUTO: 9.1 FL (ref 8–12)
POTASSIUM BLD-SCNC: 4.2 MMOL/L (ref 3.5–5.1)
PROT SERPL-MCNC: 7.2 G/DL (ref 6.3–8.6)
RBC # BLD AUTO: 4.09 10*6/MM3 (ref 3.77–5.16)
SODIUM BLD-SCNC: 136 MMOL/L (ref 137–145)
WBC NRBC COR # BLD: 3.67 10*3/MM3 (ref 3.2–9.8)

## 2017-07-17 PROCEDURE — 85025 COMPLETE CBC W/AUTO DIFF WBC: CPT

## 2017-07-17 PROCEDURE — 74177 CT ABD & PELVIS W/CONTRAST: CPT

## 2017-07-17 PROCEDURE — 80053 COMPREHEN METABOLIC PANEL: CPT

## 2017-07-17 PROCEDURE — 0 IOPAMIDOL 61 % SOLUTION: Performed by: INTERNAL MEDICINE

## 2017-07-17 PROCEDURE — 36415 COLL VENOUS BLD VENIPUNCTURE: CPT

## 2017-07-17 PROCEDURE — 71260 CT THORAX DX C+: CPT

## 2017-07-17 RX ADMIN — IOPAMIDOL 95 ML: 612 INJECTION, SOLUTION INTRAVENOUS at 12:45

## 2017-08-02 ENCOUNTER — INFUSION (OUTPATIENT)
Dept: ONCOLOGY | Facility: HOSPITAL | Age: 57
End: 2017-08-02

## 2017-08-02 ENCOUNTER — OFFICE VISIT (OUTPATIENT)
Dept: ONCOLOGY | Facility: CLINIC | Age: 57
End: 2017-08-02

## 2017-08-02 VITALS
DIASTOLIC BLOOD PRESSURE: 69 MMHG | WEIGHT: 210.1 LBS | RESPIRATION RATE: 16 BRPM | HEIGHT: 64 IN | BODY MASS INDEX: 35.87 KG/M2 | HEART RATE: 83 BPM | TEMPERATURE: 98.1 F | SYSTOLIC BLOOD PRESSURE: 137 MMHG

## 2017-08-02 DIAGNOSIS — C82.08 GRADE 1 FOLLICULAR LYMPHOMA OF LYMPH NODES OF MULTIPLE REGIONS (HCC): ICD-10-CM

## 2017-08-02 DIAGNOSIS — Z45.2 ENCOUNTER FOR VENOUS ACCESS DEVICE CARE: Primary | ICD-10-CM

## 2017-08-02 DIAGNOSIS — C82.03 FOLLICULAR LYMPHOMA GRADE I OF INTRA-ABDOMINAL LYMPH NODES (HCC): ICD-10-CM

## 2017-08-02 LAB
ALBUMIN SERPL-MCNC: 4.2 G/DL (ref 3.4–4.8)
ALBUMIN/GLOB SERPL: 1.4 G/DL (ref 1.1–1.8)
ALP SERPL-CCNC: 72 U/L (ref 38–126)
ALT SERPL W P-5'-P-CCNC: 43 U/L (ref 9–52)
ANION GAP SERPL CALCULATED.3IONS-SCNC: 13 MMOL/L (ref 5–15)
AST SERPL-CCNC: 28 U/L (ref 14–36)
BASOPHILS # BLD AUTO: 0.02 10*3/MM3 (ref 0–0.2)
BASOPHILS NFR BLD AUTO: 0.4 % (ref 0–2)
BILIRUB SERPL-MCNC: 0.3 MG/DL (ref 0.2–1.3)
BUN BLD-MCNC: 7 MG/DL (ref 7–21)
BUN/CREAT SERPL: 10.6 (ref 7–25)
CALCIUM SPEC-SCNC: 9.3 MG/DL (ref 8.4–10.2)
CHLORIDE SERPL-SCNC: 100 MMOL/L (ref 95–110)
CO2 SERPL-SCNC: 23 MMOL/L (ref 22–31)
CREAT BLD-MCNC: 0.66 MG/DL (ref 0.5–1)
DEPRECATED RDW RBC AUTO: 49.2 FL (ref 36.4–46.3)
EOSINOPHIL # BLD AUTO: 0.1 10*3/MM3 (ref 0–0.7)
EOSINOPHIL NFR BLD AUTO: 2.1 % (ref 0–7)
ERYTHROCYTE [DISTWIDTH] IN BLOOD BY AUTOMATED COUNT: 14.9 % (ref 11.5–14.5)
GFR SERPL CREATININE-BSD FRML MDRD: 92 ML/MIN/1.73 (ref 51–120)
GLOBULIN UR ELPH-MCNC: 2.9 GM/DL (ref 2.3–3.5)
GLUCOSE BLD-MCNC: 147 MG/DL (ref 60–100)
HCT VFR BLD AUTO: 37.7 % (ref 35–45)
HGB BLD-MCNC: 12.7 G/DL (ref 12–15.5)
IMM GRANULOCYTES # BLD: 0.02 10*3/MM3 (ref 0–0.02)
IMM GRANULOCYTES NFR BLD: 0.4 % (ref 0–0.5)
LYMPHOCYTES # BLD AUTO: 0.41 10*3/MM3 (ref 0.6–4.2)
LYMPHOCYTES NFR BLD AUTO: 8.7 % (ref 10–50)
MCH RBC QN AUTO: 30.5 PG (ref 26.5–34)
MCHC RBC AUTO-ENTMCNC: 33.7 G/DL (ref 31.4–36)
MCV RBC AUTO: 90.4 FL (ref 80–98)
MONOCYTES # BLD AUTO: 0.52 10*3/MM3 (ref 0–0.9)
MONOCYTES NFR BLD AUTO: 11 % (ref 0–12)
NEUTROPHILS # BLD AUTO: 3.66 10*3/MM3 (ref 2–8.6)
NEUTROPHILS NFR BLD AUTO: 77.4 % (ref 37–80)
PLATELET # BLD AUTO: 274 10*3/MM3 (ref 150–450)
PMV BLD AUTO: 8.9 FL (ref 8–12)
POTASSIUM BLD-SCNC: 3.9 MMOL/L (ref 3.5–5.1)
PROT SERPL-MCNC: 7.1 G/DL (ref 6.3–8.6)
RBC # BLD AUTO: 4.17 10*6/MM3 (ref 3.77–5.16)
SODIUM BLD-SCNC: 136 MMOL/L (ref 137–145)
WBC NRBC COR # BLD: 4.73 10*3/MM3 (ref 3.2–9.8)

## 2017-08-02 PROCEDURE — 25010000002 HEPARIN FLUSH (PORCINE) 100 UNIT/ML SOLUTION: Performed by: INTERNAL MEDICINE

## 2017-08-02 PROCEDURE — 99214 OFFICE O/P EST MOD 30 MIN: CPT | Performed by: INTERNAL MEDICINE

## 2017-08-02 PROCEDURE — 25010000002 RITUXIMAB 100 MG/10ML SOLUTION 10 ML VIAL: Performed by: INTERNAL MEDICINE

## 2017-08-02 PROCEDURE — 25010000002 RITUXIMAB 500 MG/50ML SOLUTION 50 ML VIAL: Performed by: INTERNAL MEDICINE

## 2017-08-02 PROCEDURE — 25010000002 DIPHENHYDRAMINE PER 50 MG: Performed by: INTERNAL MEDICINE

## 2017-08-02 PROCEDURE — 96413 CHEMO IV INFUSION 1 HR: CPT | Performed by: INTERNAL MEDICINE

## 2017-08-02 PROCEDURE — 96375 TX/PRO/DX INJ NEW DRUG ADDON: CPT | Performed by: INTERNAL MEDICINE

## 2017-08-02 PROCEDURE — 36591 DRAW BLOOD OFF VENOUS DEVICE: CPT | Performed by: INTERNAL MEDICINE

## 2017-08-02 RX ORDER — SODIUM CHLORIDE 9 MG/ML
250 INJECTION, SOLUTION INTRAVENOUS ONCE
Status: COMPLETED | OUTPATIENT
Start: 2017-08-02 | End: 2017-08-02

## 2017-08-02 RX ORDER — SODIUM CHLORIDE 0.9 % (FLUSH) 0.9 %
10 SYRINGE (ML) INJECTION AS NEEDED
Status: CANCELLED | OUTPATIENT
Start: 2017-08-02

## 2017-08-02 RX ORDER — MEPERIDINE HYDROCHLORIDE 50 MG/ML
25 INJECTION INTRAMUSCULAR; INTRAVENOUS; SUBCUTANEOUS
Status: CANCELLED | OUTPATIENT
Start: 2017-08-02

## 2017-08-02 RX ORDER — ACETAMINOPHEN 325 MG/1
650 TABLET ORAL ONCE
Status: COMPLETED | OUTPATIENT
Start: 2017-08-02 | End: 2017-08-02

## 2017-08-02 RX ORDER — SODIUM CHLORIDE 9 MG/ML
250 INJECTION, SOLUTION INTRAVENOUS ONCE
Status: CANCELLED | OUTPATIENT
Start: 2017-08-02

## 2017-08-02 RX ORDER — DIPHENHYDRAMINE HYDROCHLORIDE 50 MG/ML
50 INJECTION INTRAMUSCULAR; INTRAVENOUS AS NEEDED
Status: DISCONTINUED | OUTPATIENT
Start: 2017-08-02 | End: 2017-08-02 | Stop reason: HOSPADM

## 2017-08-02 RX ORDER — ACETAMINOPHEN 325 MG/1
650 TABLET ORAL ONCE
Status: CANCELLED | OUTPATIENT
Start: 2017-08-02

## 2017-08-02 RX ORDER — SODIUM CHLORIDE 0.9 % (FLUSH) 0.9 %
10 SYRINGE (ML) INJECTION AS NEEDED
Status: DISCONTINUED | OUTPATIENT
Start: 2017-08-02 | End: 2017-08-02 | Stop reason: HOSPADM

## 2017-08-02 RX ORDER — DIPHENHYDRAMINE HYDROCHLORIDE 50 MG/ML
50 INJECTION INTRAMUSCULAR; INTRAVENOUS AS NEEDED
Status: CANCELLED | OUTPATIENT
Start: 2017-08-02

## 2017-08-02 RX ORDER — FAMOTIDINE 10 MG/ML
20 INJECTION, SOLUTION INTRAVENOUS AS NEEDED
Status: CANCELLED | OUTPATIENT
Start: 2017-08-02

## 2017-08-02 RX ORDER — FAMOTIDINE 10 MG/ML
20 INJECTION, SOLUTION INTRAVENOUS AS NEEDED
Status: DISCONTINUED | OUTPATIENT
Start: 2017-08-02 | End: 2017-08-02 | Stop reason: HOSPADM

## 2017-08-02 RX ORDER — MEPERIDINE HYDROCHLORIDE 50 MG/ML
25 INJECTION INTRAMUSCULAR; INTRAVENOUS; SUBCUTANEOUS
Status: DISCONTINUED | OUTPATIENT
Start: 2017-08-02 | End: 2017-08-02 | Stop reason: HOSPADM

## 2017-08-02 RX ADMIN — SODIUM CHLORIDE, PRESERVATIVE FREE 500 UNITS: 5 INJECTION INTRAVENOUS at 11:30

## 2017-08-02 RX ADMIN — Medication 10 ML: at 08:26

## 2017-08-02 RX ADMIN — DIPHENHYDRAMINE HYDROCHLORIDE 25 MG: 50 INJECTION INTRAMUSCULAR; INTRAVENOUS at 09:27

## 2017-08-02 RX ADMIN — Medication 10 ML: at 11:30

## 2017-08-02 RX ADMIN — RITUXIMAB 750 MG: 10 INJECTION, SOLUTION INTRAVENOUS at 09:56

## 2017-08-02 RX ADMIN — SODIUM CHLORIDE 250 ML: 9 INJECTION, SOLUTION INTRAVENOUS at 09:11

## 2017-08-02 RX ADMIN — ACETAMINOPHEN 650 MG: 325 TABLET ORAL at 09:34

## 2017-08-02 NOTE — PROGRESS NOTES
DATE OF VISIT: 8/2/2017    REASON FOR VISIT: Recurrent follicular lymphoma    HISTORY OF PRESENT ILLNESS:    57-year-old female with a past medical history significant for recurrent follicular lymphoma, last round of radiation was around September 2016 for follicular lymphoma.  Patient was again found to have aortocaval adenopathy on  CT of chest abdomen and pelvis in March 2017.  Patient was started on rituximab April 10, 2017.  Patient is here to get a third dose of rituximab today.  Complains of nausea and fatigue for 2-3 days after rituximab. Denies any fevers or chills or night sweats.  Denies any abnormal swollen and anywhere in the body.        PAST MEDICAL HISTORY:    Past Medical History:   Diagnosis Date   • Acute bronchitis    • Agoraphobia with panic disorder     on SNRI, prn buspar, prn klonopin     • Anxiety    • Arthritis    • Atrophic vaginitis    • Chest pain     work up as new onset angina    • Depression      MDD      • Essential hypertension    • Follicular non-Hodgkin's lymphoma    • Generalized anxiety disorder     SSRI - buspar, cont cymbalta, prn klonopin      • Hip pain     arthritis, artifical right hip     • History of bone density study 02/09/2009    DEXA (bone density) test, peripheral (Normal   • History of echocardiogram 01/03/2012    Echocardiogram 62388 (Normal echocardigram. EF 55-60%)   • History of mammogram    • Hyperlipidemia    • Mass of lower limb    • Nuclear senile cataract    • Obesity    • Otalgia     ENT REFER   • Panic disorder     with agoraphobia. On buspar and klonopin now      • Type 2 diabetes mellitus     NO BDR   • Upper respiratory infection        SOCIAL HISTORY:    Social History   Substance Use Topics   • Smoking status: Former Smoker     Years: 12.00     Quit date: 1/1/1998   • Smokeless tobacco: Never Used      Comment: Smoked 1 pack per 2 weeks   • Alcohol use No       Surgical History :  Past Surgical History:   Procedure Laterality Date   • CENTRAL VENOUS  LINE INSERTION  03/11/2016    Central line insertion (Successful placement of right upper extremity midline.)   • COLONOSCOPY     • CYSTOSCOPY  03/23/1976    Cystoscopy (external urethroplasty and cysto-panendoscopy,urethal hymenal fusion with hypospadias)   • DILATATION AND CURETTAGE  01/25/1980    D&C (removal of IUD)   • EXCISION LESION  04/15/2014    Remove thigh lesion (Excision of a mass of the right thigh using a 16.2 cm incision with intermediate layered closure.)   • HYSTEROSCOPY  02/28/2003    Hysteroscope procedure (diagnostic hysteroscopy with fractional D&C)   • INJECTION OF MEDICATION  08/22/2011    kenalog (1)   • OTHER SURGICAL HISTORY      Explore parathyroid glands   • OTHER SURGICAL HISTORY  08/26/1985    Laparosc diagnostic (desires elective tubal reversal)   • PAP SMEAR  05/24/2005    PAP SMEAR (normal)   • NC INSJ TUNNELED CVC W/O SUBQ PORT/ AGE 5 YR/> Right 4/4/2017    Procedure: INSERTION VENOUS ACCESS DEVICE (MEDIPORT) right chest;  Surgeon: Fortino Medina MD;  Location: [facility];  Service: General   • NC INSJ TUNNELED CVC W/O SUBQ PORT/ AGE 5 YR/> N/A 6/6/2017    Procedure: INSERTION VENOUS ACCESS DEVICE   (MEDIPORT)   (C-ARM#1);  Surgeon: Fortino Medina MD;  Location: [facility];  Service: General   • SHOULDER ARTHROSCOPY  11/11/2013    Shoulder arthroscopy/surgery (Right shoulder. Rotator cuff repair. Subacromial decompression. Edgar procedure.)   • SHOULDER SURGERY  12/28/2015    Shoulder surgery procedure (Arthroscopy of the left shoulder with rotator cuff repair.Edgar procedure.Subacromial decompression.)   • TONSILLECTOMY AND ADENOIDECTOMY  05/29/1967    T&A (hypertrophied tonsils and adenoids with recurrent infection)   • TOTAL ABDOMINAL HYSTERECTOMY WITH SALPINGO OOPHORECTOMY  02/03/2004    ARIELLE/BSO (with a preop fractional dilation and curettage with frozen section,menorrhagia,dysmenorrhea,unresponsive to medical intervention)   • TOTAL HIP ARTHROPLASTY     • VAGINA SURGERY   "03/02/1981    Anesth, surgery on vagina (colpotomy with bilateral partial salpingectomy, multiparity contraindicated)       ALLERGIES:    Allergies   Allergen Reactions   • Ace Inhibitors Cough   • Latex      BLISTER     • Morphine And Related Nausea And Vomiting   • Lortab [Hydrocodone-Acetaminophen] Palpitations       REVIEW OF SYSTEMS:      CONSTITUTIONAL:  No fever, chills, or night sweats.     HEENT:  No epistaxis, mouth sores, or difficulty swallowing.    RESPIRATORY:  No new shortness of breath or cough at present.    CARDIOVASCULAR:  No chest pain or palpitations.    GASTROINTESTINAL: Complains of nausea for 2-3 days after getting rituximab.  No abdominal pain,  vomiting, or blood in the stool.    GENITOURINARY:  No dysuria or hematuria.    MUSCULOSKELETAL:   No any new back pain or arthralgias.     NEUROLOGICAL:  No tingling or numbness. No new headache or dizziness.     LYMPHATICS:  Denies any abnormal swollen and anywhere in the body.    SKIN:  Denies any new skin rash.          PHYSICAL EXAMINATION:      VITAL SIGNS:  /69  Pulse 83  Temp 98.1 °F (36.7 °C) (Temporal Artery )   Resp 16  Ht 64\" (162.6 cm)  Wt 210 lb 1.6 oz (95.3 kg)  BMI 36.06 kg/m2    GENERAL:  Not in any distress.    HEENT:  Normocephalic, Atraumatic.Mild Conjunctival pallor. No icterus. Extraocular Movements Intact. No Fascial Asymmetry noted.    NECK:  No adenopathy. No JVD.      RESPIRATORY:  Fair air entry bilateral. No rhonchi or wheezing.    CARDIOVASCULAR:  S1, S2. Regular rate and rhythm. No murmur or gallop appreciated.  Port-A-Cath present on the right chest wall.    ABDOMEN:  Soft, obese, nontender. Bowel sounds present in all four quadrants.  No organomegaly appreciated.    EXTREMITIES:  No edema.No Calf Tenderness.  No discrete and adenopathy palpable in the inguinal region when examination was done in presence of registered nurse Jessy.    NEUROLOGIC:  Alert, awake and oriented ×3.  No  Motor or sensory deficit " appreciated. Cranial Nerves 2-12 grossly intact.          DIAGNOSTIC DATA:    Glucose   Date Value Ref Range Status   08/02/2017 147 (H) 60 - 100 mg/dL Final     Sodium   Date Value Ref Range Status   08/02/2017 136 (L) 137 - 145 mmol/L Final     Potassium   Date Value Ref Range Status   08/02/2017 3.9 3.5 - 5.1 mmol/L Final     CO2   Date Value Ref Range Status   08/02/2017 23.0 22.0 - 31.0 mmol/L Final     Chloride   Date Value Ref Range Status   08/02/2017 100 95 - 110 mmol/L Final     Anion Gap   Date Value Ref Range Status   08/02/2017 13.0 5.0 - 15.0 mmol/L Final     Creatinine   Date Value Ref Range Status   08/02/2017 0.66 0.50 - 1.00 mg/dL Final     BUN   Date Value Ref Range Status   08/02/2017 7 7 - 21 mg/dL Final     BUN/Creatinine Ratio   Date Value Ref Range Status   08/02/2017 10.6 7.0 - 25.0 Final     Calcium   Date Value Ref Range Status   08/02/2017 9.3 8.4 - 10.2 mg/dL Final     eGFR Non  Amer   Date Value Ref Range Status   08/02/2017 92 51 - 120 mL/min/1.73 Final     Alkaline Phosphatase   Date Value Ref Range Status   08/02/2017 72 38 - 126 U/L Final     Total Protein   Date Value Ref Range Status   08/02/2017 7.1 6.3 - 8.6 g/dL Final     ALT (SGPT)   Date Value Ref Range Status   08/02/2017 43 9 - 52 U/L Final     AST (SGOT)   Date Value Ref Range Status   08/02/2017 28 14 - 36 U/L Final     Total Bilirubin   Date Value Ref Range Status   08/02/2017 0.3 0.2 - 1.3 mg/dL Final     Albumin   Date Value Ref Range Status   08/02/2017 4.20 3.40 - 4.80 g/dL Final     Globulin   Date Value Ref Range Status   08/02/2017 2.9 2.3 - 3.5 gm/dL Final     A/G Ratio   Date Value Ref Range Status   08/02/2017 1.4 1.1 - 1.8 g/dL Final     Lab Results   Component Value Date    WBC 4.73 08/02/2017    HGB 12.7 08/02/2017    HCT 37.7 08/02/2017    MCV 90.4 08/02/2017     08/02/2017     Lab Results   Component Value Date    NEUTROABS 3.66 08/02/2017         PATHOLOGY:  Pathology from right thigh biopsy  done in April 2014 showed:  FINAL DIAGNOSIS:   A.  SUBCUTANEOUS MASS, RIGHT THIGH:             FOLLICULAR LYMPHOMA, GRADE 1 OF 3.   B.  SUBCUTANEOUS MASS, RIGHT THIGH:             FOLLICULAR LYMPHOMA, GRADE 1 OF 3.          Comment   COMMENT:   Immunostains have follicular lymphoma positive for CD20, negative for   CD3, negative for CD5, positive for CD10, positive for CD23, negative   for CD43, negative for cyclin D1, positive for BCL-2 and positive for   BCL-6.  The case is submitted to the [facility] for evaluation and   their report is attached.            RADIOLOGY DATA :  CT Of chest, abdomen and pelvis with contrast done on July 17, 2017 showed:  IMPRESSION:  CONCLUSION:      Decrease in size of the enlarged aortocaval retroperitoneal lymph  nodes seen on prior examination March 24, 2017. Favorable  interval change. Right total hip arthroplasty. Otherwise  unremarkable CT chest, abdomen and pelvis with contrast.            ASSESSMENT AND PLAN:       1.  Recurrent follicular lymphoma grade 1, initially patient was diagnosed with a right groin lymph node adenopathy in April 2014 for which patient underwent palliative radiation therapy.  Subsequently patient again had a relapse around September 2016 for workup with second course of palliative radiation therapy was given by Dr. Leonel Vargas in October 2016.  Her CHOP 2017 again in view of enlarged aortocaval lymph nodes, patient was started on rituximab treatment.  So far patient has got 2 cycles of rituximab 2 months apart.  Recently density scan in July 2017 shows decreasing in the size of aortocaval lymph node which was discussed with patient.  Plan is to continue with rituximab maintenance at this point.  We will see her back in about 2 months with a repeat CBC and CMP on that day.  Patient was encouraged to call us with any side effects from rituximab. Next CT scan is being planned around April 2018 which was discussed with patient    2.  History of  dyslipidemia    3.  Diabetes mellitus    4.  Health maintenance: Patient does not smoke.  Last colonoscopy was done in 2014 which was normal as per patient.  She remains full code.        Tino Sood MD  8/2/2017  8:52 AM        EMR Dragon/Transcription disclaimer:   Much of this encounter note is an electronic transcription/translation of spoken language to printed text. The electronic translation of spoken language may permit erroneous, or at times, nonsensical words or phrases to be inadvertently transcribed; Although I have reviewed the note for such errors, some may still exist.

## 2017-08-30 ENCOUNTER — INFUSION (OUTPATIENT)
Dept: ONCOLOGY | Facility: HOSPITAL | Age: 57
End: 2017-08-30

## 2017-08-30 DIAGNOSIS — C82.08 GRADE 1 FOLLICULAR LYMPHOMA OF LYMPH NODES OF MULTIPLE REGIONS (HCC): ICD-10-CM

## 2017-08-30 DIAGNOSIS — Z45.2 ENCOUNTER FOR VENOUS ACCESS DEVICE CARE: Primary | ICD-10-CM

## 2017-08-30 PROCEDURE — 96523 IRRIG DRUG DELIVERY DEVICE: CPT | Performed by: INTERNAL MEDICINE

## 2017-08-30 PROCEDURE — 25010000002 HEPARIN FLUSH (PORCINE) 100 UNIT/ML SOLUTION: Performed by: INTERNAL MEDICINE

## 2017-08-30 RX ORDER — SODIUM CHLORIDE 0.9 % (FLUSH) 0.9 %
10 SYRINGE (ML) INJECTION AS NEEDED
Status: CANCELLED | OUTPATIENT
Start: 2017-08-30

## 2017-08-30 RX ORDER — SODIUM CHLORIDE 0.9 % (FLUSH) 0.9 %
10 SYRINGE (ML) INJECTION AS NEEDED
Status: DISCONTINUED | OUTPATIENT
Start: 2017-08-30 | End: 2017-08-30 | Stop reason: HOSPADM

## 2017-08-30 RX ADMIN — Medication 10 ML: at 15:00

## 2017-08-30 RX ADMIN — SODIUM CHLORIDE, PRESERVATIVE FREE 500 UNITS: 5 INJECTION INTRAVENOUS at 14:59

## 2017-10-09 ENCOUNTER — INFUSION (OUTPATIENT)
Dept: ONCOLOGY | Facility: HOSPITAL | Age: 57
End: 2017-10-09

## 2017-10-09 ENCOUNTER — OFFICE VISIT (OUTPATIENT)
Dept: ONCOLOGY | Facility: CLINIC | Age: 57
End: 2017-10-09

## 2017-10-09 VITALS
HEART RATE: 83 BPM | BODY MASS INDEX: 36.77 KG/M2 | TEMPERATURE: 97.8 F | DIASTOLIC BLOOD PRESSURE: 66 MMHG | SYSTOLIC BLOOD PRESSURE: 148 MMHG | WEIGHT: 214.2 LBS | RESPIRATION RATE: 16 BRPM

## 2017-10-09 DIAGNOSIS — Z45.2 ENCOUNTER FOR VENOUS ACCESS DEVICE CARE: ICD-10-CM

## 2017-10-09 DIAGNOSIS — C82.08 GRADE 1 FOLLICULAR LYMPHOMA OF LYMPH NODES OF MULTIPLE REGIONS (HCC): Primary | ICD-10-CM

## 2017-10-09 DIAGNOSIS — C82.08 GRADE 1 FOLLICULAR LYMPHOMA OF LYMPH NODES OF MULTIPLE REGIONS (HCC): ICD-10-CM

## 2017-10-09 LAB
ALBUMIN SERPL-MCNC: 3.9 G/DL (ref 3.4–4.8)
ALBUMIN/GLOB SERPL: 1.3 G/DL (ref 1.1–1.8)
ALP SERPL-CCNC: 68 U/L (ref 38–126)
ALT SERPL W P-5'-P-CCNC: 38 U/L (ref 9–52)
ANION GAP SERPL CALCULATED.3IONS-SCNC: 12 MMOL/L (ref 5–15)
AST SERPL-CCNC: 32 U/L (ref 14–36)
BASOPHILS # BLD AUTO: 0.05 10*3/MM3 (ref 0–0.2)
BASOPHILS NFR BLD AUTO: 0.9 % (ref 0–2)
BILIRUB SERPL-MCNC: 0.5 MG/DL (ref 0.2–1.3)
BUN BLD-MCNC: 7 MG/DL (ref 7–21)
BUN/CREAT SERPL: 11.1 (ref 7–25)
CALCIUM SPEC-SCNC: 9.4 MG/DL (ref 8.4–10.2)
CHLORIDE SERPL-SCNC: 98 MMOL/L (ref 95–110)
CO2 SERPL-SCNC: 26 MMOL/L (ref 22–31)
CREAT BLD-MCNC: 0.63 MG/DL (ref 0.5–1)
DEPRECATED RDW RBC AUTO: 48.2 FL (ref 36.4–46.3)
EOSINOPHIL # BLD AUTO: 0.38 10*3/MM3 (ref 0–0.7)
EOSINOPHIL NFR BLD AUTO: 6.9 % (ref 0–7)
ERYTHROCYTE [DISTWIDTH] IN BLOOD BY AUTOMATED COUNT: 14.7 % (ref 11.5–14.5)
GFR SERPL CREATININE-BSD FRML MDRD: 97 ML/MIN/1.73 (ref 60–120)
GLOBULIN UR ELPH-MCNC: 3.1 GM/DL (ref 2.3–3.5)
GLUCOSE BLD-MCNC: 165 MG/DL (ref 60–100)
HCT VFR BLD AUTO: 36.7 % (ref 35–45)
HGB BLD-MCNC: 12.9 G/DL (ref 12–15.5)
IMM GRANULOCYTES # BLD: 0.07 10*3/MM3 (ref 0–0.02)
IMM GRANULOCYTES NFR BLD: 1.3 % (ref 0–0.5)
LYMPHOCYTES # BLD AUTO: 0.41 10*3/MM3 (ref 0.6–4.2)
LYMPHOCYTES NFR BLD AUTO: 7.5 % (ref 10–50)
MCH RBC QN AUTO: 31.5 PG (ref 26.5–34)
MCHC RBC AUTO-ENTMCNC: 35.1 G/DL (ref 31.4–36)
MCV RBC AUTO: 89.5 FL (ref 80–98)
MONOCYTES # BLD AUTO: 0.6 10*3/MM3 (ref 0–0.9)
MONOCYTES NFR BLD AUTO: 10.9 % (ref 0–12)
NEUTROPHILS # BLD AUTO: 3.98 10*3/MM3 (ref 2–8.6)
NEUTROPHILS NFR BLD AUTO: 72.5 % (ref 37–80)
PLATELET # BLD AUTO: 331 10*3/MM3 (ref 150–450)
PMV BLD AUTO: 8.4 FL (ref 8–12)
POTASSIUM BLD-SCNC: 4.3 MMOL/L (ref 3.5–5.1)
PROT SERPL-MCNC: 7 G/DL (ref 6.3–8.6)
RBC # BLD AUTO: 4.1 10*6/MM3 (ref 3.77–5.16)
SODIUM BLD-SCNC: 136 MMOL/L (ref 137–145)
WBC NRBC COR # BLD: 5.49 10*3/MM3 (ref 3.2–9.8)

## 2017-10-09 PROCEDURE — 96375 TX/PRO/DX INJ NEW DRUG ADDON: CPT | Performed by: INTERNAL MEDICINE

## 2017-10-09 PROCEDURE — 25010000002 HEPARIN FLUSH (PORCINE) 100 UNIT/ML SOLUTION: Performed by: INTERNAL MEDICINE

## 2017-10-09 PROCEDURE — 99214 OFFICE O/P EST MOD 30 MIN: CPT | Performed by: INTERNAL MEDICINE

## 2017-10-09 PROCEDURE — 80053 COMPREHEN METABOLIC PANEL: CPT

## 2017-10-09 PROCEDURE — 96413 CHEMO IV INFUSION 1 HR: CPT | Performed by: INTERNAL MEDICINE

## 2017-10-09 PROCEDURE — 85025 COMPLETE CBC W/AUTO DIFF WBC: CPT

## 2017-10-09 PROCEDURE — 25010000002 DIPHENHYDRAMINE PER 50 MG: Performed by: INTERNAL MEDICINE

## 2017-10-09 PROCEDURE — 25010000002 RITUXIMAB 100 MG/10ML SOLUTION 10 ML VIAL: Performed by: INTERNAL MEDICINE

## 2017-10-09 PROCEDURE — 36591 DRAW BLOOD OFF VENOUS DEVICE: CPT | Performed by: INTERNAL MEDICINE

## 2017-10-09 PROCEDURE — 96415 CHEMO IV INFUSION ADDL HR: CPT | Performed by: INTERNAL MEDICINE

## 2017-10-09 PROCEDURE — 25010000002 RITUXIMAB 500 MG/50ML SOLUTION 50 ML VIAL: Performed by: INTERNAL MEDICINE

## 2017-10-09 RX ORDER — DIPHENHYDRAMINE HYDROCHLORIDE 50 MG/ML
50 INJECTION INTRAMUSCULAR; INTRAVENOUS AS NEEDED
Status: DISCONTINUED | OUTPATIENT
Start: 2017-10-09 | End: 2017-10-09 | Stop reason: HOSPADM

## 2017-10-09 RX ORDER — DIPHENHYDRAMINE HYDROCHLORIDE 50 MG/ML
50 INJECTION INTRAMUSCULAR; INTRAVENOUS AS NEEDED
Status: CANCELLED | OUTPATIENT
Start: 2017-10-09

## 2017-10-09 RX ORDER — ACETAMINOPHEN 325 MG/1
650 TABLET ORAL ONCE
Status: COMPLETED | OUTPATIENT
Start: 2017-10-09 | End: 2017-10-09

## 2017-10-09 RX ORDER — MEPERIDINE HYDROCHLORIDE 50 MG/ML
25 INJECTION INTRAMUSCULAR; INTRAVENOUS; SUBCUTANEOUS
Status: CANCELLED | OUTPATIENT
Start: 2017-10-09

## 2017-10-09 RX ORDER — SODIUM CHLORIDE 0.9 % (FLUSH) 0.9 %
10 SYRINGE (ML) INJECTION AS NEEDED
Status: DISCONTINUED | OUTPATIENT
Start: 2017-10-09 | End: 2017-10-09 | Stop reason: HOSPADM

## 2017-10-09 RX ORDER — SODIUM CHLORIDE 9 MG/ML
250 INJECTION, SOLUTION INTRAVENOUS ONCE
Status: CANCELLED | OUTPATIENT
Start: 2017-10-09

## 2017-10-09 RX ORDER — FAMOTIDINE 10 MG/ML
20 INJECTION, SOLUTION INTRAVENOUS AS NEEDED
Status: CANCELLED | OUTPATIENT
Start: 2017-10-09

## 2017-10-09 RX ORDER — SODIUM CHLORIDE 9 MG/ML
250 INJECTION, SOLUTION INTRAVENOUS ONCE
Status: COMPLETED | OUTPATIENT
Start: 2017-10-09 | End: 2017-10-09

## 2017-10-09 RX ORDER — ACETAMINOPHEN 325 MG/1
650 TABLET ORAL ONCE
Status: CANCELLED | OUTPATIENT
Start: 2017-10-09

## 2017-10-09 RX ORDER — MEPERIDINE HYDROCHLORIDE 50 MG/ML
25 INJECTION INTRAMUSCULAR; INTRAVENOUS; SUBCUTANEOUS
Status: DISCONTINUED | OUTPATIENT
Start: 2017-10-09 | End: 2017-10-09 | Stop reason: HOSPADM

## 2017-10-09 RX ORDER — SODIUM CHLORIDE 0.9 % (FLUSH) 0.9 %
10 SYRINGE (ML) INJECTION AS NEEDED
Status: CANCELLED | OUTPATIENT
Start: 2017-10-09

## 2017-10-09 RX ORDER — FAMOTIDINE 10 MG/ML
20 INJECTION, SOLUTION INTRAVENOUS AS NEEDED
Status: DISCONTINUED | OUTPATIENT
Start: 2017-10-09 | End: 2017-10-09 | Stop reason: HOSPADM

## 2017-10-09 RX ADMIN — RITUXIMAB 750 MG: 10 INJECTION, SOLUTION INTRAVENOUS at 13:07

## 2017-10-09 RX ADMIN — Medication 10 ML: at 15:19

## 2017-10-09 RX ADMIN — SODIUM CHLORIDE, PRESERVATIVE FREE 500 UNITS: 5 INJECTION INTRAVENOUS at 15:21

## 2017-10-09 RX ADMIN — SODIUM CHLORIDE 250 ML: 9 INJECTION, SOLUTION INTRAVENOUS at 10:39

## 2017-10-09 RX ADMIN — Medication 10 ML: at 09:21

## 2017-10-09 RX ADMIN — DIPHENHYDRAMINE HYDROCHLORIDE 25 MG: 50 INJECTION INTRAMUSCULAR; INTRAVENOUS at 10:42

## 2017-10-09 RX ADMIN — ACETAMINOPHEN 650 MG: 325 TABLET ORAL at 10:42

## 2017-10-09 NOTE — PROGRESS NOTES
DATE OF VISIT: 10/9/2017    REASON FOR VISIT: Recurrent follicular lymphoma    HISTORY OF PRESENT ILLNESS:    57-year-old female with a past medical history significant for recurrent follicular lymphoma, last round of radiation was around September 2016 for follicular lymphoma.  Patient was again found to have aortocaval adenopathy on  CT of chest abdomen and pelvis in March 2017.  Patient was started on rituximab April 10, 2017.  Patient is here to get fourth dose of rituximab today.  Complains of postnasal drainage.  Complains of cough productive of clear sputum.  Denies any fevers or chills or night sweats.  Denies any abnormal swollen and anywhere in the body.        PAST MEDICAL HISTORY:    Past Medical History:   Diagnosis Date   • Acute bronchitis    • Agoraphobia with panic disorder     on SNRI, prn buspar, prn klonopin     • Anxiety    • Arthritis    • Atrophic vaginitis    • Chest pain     work up as new onset angina    • Depression      MDD      • Essential hypertension    • Follicular non-Hodgkin's lymphoma    • Generalized anxiety disorder     SSRI - buspar, cont cymbalta, prn klonopin      • Hip pain     arthritis, artifical right hip     • History of bone density study 02/09/2009    DEXA (bone density) test, peripheral (Normal   • History of echocardiogram 01/03/2012    Echocardiogram 14257 (Normal echocardigram. EF 55-60%)   • History of mammogram    • Hyperlipidemia    • Mass of lower limb    • Nuclear senile cataract    • Obesity    • Otalgia     ENT REFER   • Panic disorder     with agoraphobia. On buspar and klonopin now      • Type 2 diabetes mellitus     NO BDR   • Upper respiratory infection        SOCIAL HISTORY:    Social History   Substance Use Topics   • Smoking status: Former Smoker     Years: 12.00     Quit date: 1/1/1998   • Smokeless tobacco: Never Used      Comment: Smoked 1 pack per 2 weeks   • Alcohol use No       Surgical History :  Past Surgical History:   Procedure Laterality  Date   • CENTRAL VENOUS LINE INSERTION  03/11/2016    Central line insertion (Successful placement of right upper extremity midline.)   • COLONOSCOPY     • CYSTOSCOPY  03/23/1976    Cystoscopy (external urethroplasty and cysto-panendoscopy,urethal hymenal fusion with hypospadias)   • DILATATION AND CURETTAGE  01/25/1980    D&C (removal of IUD)   • EXCISION LESION  04/15/2014    Remove thigh lesion (Excision of a mass of the right thigh using a 16.2 cm incision with intermediate layered closure.)   • HYSTEROSCOPY  02/28/2003    Hysteroscope procedure (diagnostic hysteroscopy with fractional D&C)   • INJECTION OF MEDICATION  08/22/2011    kenalog (1)   • OTHER SURGICAL HISTORY      Explore parathyroid glands   • OTHER SURGICAL HISTORY  08/26/1985    Laparosc diagnostic (desires elective tubal reversal)   • PAP SMEAR  05/24/2005    PAP SMEAR (normal)   • RI INSJ TUNNELED CVC W/O SUBQ PORT/ AGE 5 YR/> Right 4/4/2017    Procedure: INSERTION VENOUS ACCESS DEVICE (MEDIPORT) right chest;  Surgeon: Fortino Medina MD;  Location: [facility];  Service: General   • RI INSJ TUNNELED CVC W/O SUBQ PORT/ AGE 5 YR/> N/A 6/6/2017    Procedure: INSERTION VENOUS ACCESS DEVICE   (MEDIPORT)   (C-ARM#1);  Surgeon: Fortino Medina MD;  Location: [facility];  Service: General   • SHOULDER ARTHROSCOPY  11/11/2013    Shoulder arthroscopy/surgery (Right shoulder. Rotator cuff repair. Subacromial decompression. Edgar procedure.)   • SHOULDER SURGERY  12/28/2015    Shoulder surgery procedure (Arthroscopy of the left shoulder with rotator cuff repair.Edgar procedure.Subacromial decompression.)   • TONSILLECTOMY AND ADENOIDECTOMY  05/29/1967    T&A (hypertrophied tonsils and adenoids with recurrent infection)   • TOTAL ABDOMINAL HYSTERECTOMY WITH SALPINGO OOPHORECTOMY  02/03/2004    ARIELLE/BSO (with a preop fractional dilation and curettage with frozen section,menorrhagia,dysmenorrhea,unresponsive to medical intervention)   • TOTAL HIP ARTHROPLASTY      • VAGINA SURGERY  03/02/1981    Anesth, surgery on vagina (colpotomy with bilateral partial salpingectomy, multiparity contraindicated)       ALLERGIES:    Allergies   Allergen Reactions   • Ace Inhibitors Cough   • Latex      BLISTER     • Morphine And Related Nausea And Vomiting   • Lortab [Hydrocodone-Acetaminophen] Palpitations       REVIEW OF SYSTEMS:      CONSTITUTIONAL:  No fever, chills, or night sweats.     HEENT:  No epistaxis, mouth sores, or difficulty swallowing.    RESPIRATORY: Complains of postnasal drainage.  Complains of cough productive of clear sputum.  No new shortness of breath .    CARDIOVASCULAR:  No chest pain or palpitations.    GASTROINTESTINAL:   No abdominal pain,  Nausea,vomiting, or blood in the stool.    GENITOURINARY:  No dysuria or hematuria.    MUSCULOSKELETAL:   No any new back pain or arthralgias.     NEUROLOGICAL:  No tingling or numbness. No new headache or dizziness.     LYMPHATICS:  Denies any abnormal swollen and anywhere in the body.    SKIN:  Denies any new skin rash.          PHYSICAL EXAMINATION:      VITAL SIGNS:  /66  Pulse 83  Temp 97.8 °F (36.6 °C)  Resp 16  Wt 214 lb 3.2 oz (97.2 kg)  BMI 36.77 kg/m2    GENERAL:  Not in any distress.    HEENT:  Normocephalic, Atraumatic.Mild Conjunctival pallor. No icterus. Extraocular Movements Intact. No Fascial Asymmetry noted.    NECK:  No adenopathy. No JVD.      RESPIRATORY:  Fair air entry bilateral. No rhonchi or wheezing.    CARDIOVASCULAR:  S1, S2. Regular rate and rhythm. No murmur or gallop appreciated.  Port-A-Cath present on the right chest wall.    ABDOMEN:  Soft, obese, nontender. Bowel sounds present in all four quadrants.  No organomegaly appreciated.    EXTREMITIES:  No edema.No Calf Tenderness.      NEUROLOGIC:  Alert, awake and oriented ×3.  No  Motor or sensory deficit appreciated. Cranial Nerves 2-12 grossly intact.          DIAGNOSTIC DATA:    Glucose   Date Value Ref Range Status   10/09/2017  165 (H) 60 - 100 mg/dL Final     Sodium   Date Value Ref Range Status   10/09/2017 136 (L) 137 - 145 mmol/L Final     Potassium   Date Value Ref Range Status   10/09/2017 4.3 3.5 - 5.1 mmol/L Final     CO2   Date Value Ref Range Status   10/09/2017 26.0 22.0 - 31.0 mmol/L Final     Chloride   Date Value Ref Range Status   10/09/2017 98 95 - 110 mmol/L Final     Anion Gap   Date Value Ref Range Status   10/09/2017 12.0 5.0 - 15.0 mmol/L Final     Creatinine   Date Value Ref Range Status   10/09/2017 0.63 0.50 - 1.00 mg/dL Final     BUN   Date Value Ref Range Status   10/09/2017 7 7 - 21 mg/dL Final     BUN/Creatinine Ratio   Date Value Ref Range Status   10/09/2017 11.1 7.0 - 25.0 Final     Calcium   Date Value Ref Range Status   10/09/2017 9.4 8.4 - 10.2 mg/dL Final     eGFR Non  Amer   Date Value Ref Range Status   10/09/2017 97 >60 mL/min/1.73 Final     Alkaline Phosphatase   Date Value Ref Range Status   10/09/2017 68 38 - 126 U/L Final     Total Protein   Date Value Ref Range Status   10/09/2017 7.0 6.3 - 8.6 g/dL Final     ALT (SGPT)   Date Value Ref Range Status   10/09/2017 38 9 - 52 U/L Final     AST (SGOT)   Date Value Ref Range Status   10/09/2017 32 14 - 36 U/L Final     Total Bilirubin   Date Value Ref Range Status   10/09/2017 0.5 0.2 - 1.3 mg/dL Final     Albumin   Date Value Ref Range Status   10/09/2017 3.90 3.40 - 4.80 g/dL Final     Globulin   Date Value Ref Range Status   10/09/2017 3.1 2.3 - 3.5 gm/dL Final     A/G Ratio   Date Value Ref Range Status   10/09/2017 1.3 1.1 - 1.8 g/dL Final     Lab Results   Component Value Date    WBC 5.49 10/09/2017    HGB 12.9 10/09/2017    HCT 36.7 10/09/2017    MCV 89.5 10/09/2017     10/09/2017     Lab Results   Component Value Date    NEUTROABS 3.98 10/09/2017         PATHOLOGY:  Pathology from right thigh biopsy done in April 2014 showed:  FINAL DIAGNOSIS:   A.  SUBCUTANEOUS MASS, RIGHT THIGH:             FOLLICULAR LYMPHOMA, GRADE 1 OF 3.   B.   SUBCUTANEOUS MASS, RIGHT THIGH:             FOLLICULAR LYMPHOMA, GRADE 1 OF 3.          Comment   COMMENT:   Immunostains have follicular lymphoma positive for CD20, negative for   CD3, negative for CD5, positive for CD10, positive for CD23, negative   for CD43, negative for cyclin D1, positive for BCL-2 and positive for   BCL-6.  The case is submitted to the [facility] for evaluation and   their report is attached.            RADIOLOGY DATA :  CT Of chest, abdomen and pelvis with contrast done on July 17, 2017 showed:  IMPRESSION:  CONCLUSION:      Decrease in size of the enlarged aortocaval retroperitoneal lymph  nodes seen on prior examination March 24, 2017. Favorable  interval change. Right total hip arthroplasty. Otherwise  unremarkable CT chest, abdomen and pelvis with contrast.            ASSESSMENT AND PLAN:       1.  Recurrent follicular lymphoma grade 1, initially patient was diagnosed with a right groin lymph node adenopathy in April 2014 for which patient underwent palliative radiation therapy.  Subsequently patient again had a relapse around September 2016 for workup with second course of palliative radiation therapy was given by Dr. Leonel Vargas in October 2016.  In view of enlarged aortocaval lymph nodes, patient was started on rituximab treatment in April 2017.  So far patient has got 3 cycles of rituximab 2 months apart.  CT scan done in July 2017 shows decreasing in the size of aortocaval lymph node which was discussed with patient.  We will go head with cycle 4 of rituximab today.  We will see her back in about 2 months with a repeat CBC and CMP on that day.  Patient was encouraged to call us with any side effects from rituximab. Next CT scan is being planned around April 2018 which was discussed with patient    2.  History of dyslipidemia    3.  Diabetes mellitus    4.  Health maintenance: Patient does not smoke.  Last colonoscopy was done in 2014 which was normal as per patient.  She remains  full code.        Tino Sood MD  10/9/2017  9:56 AM        EMR Dragon/Transcription disclaimer:   Much of this encounter note is an electronic transcription/translation of spoken language to printed text. The electronic translation of spoken language may permit erroneous, or at times, nonsensical words or phrases to be inadvertently transcribed; Although I have reviewed the note for such errors, some may still exist.

## 2017-11-06 ENCOUNTER — INFUSION (OUTPATIENT)
Dept: ONCOLOGY | Facility: HOSPITAL | Age: 57
End: 2017-11-06

## 2017-11-06 DIAGNOSIS — C82.08 GRADE 1 FOLLICULAR LYMPHOMA OF LYMPH NODES OF MULTIPLE REGIONS (HCC): ICD-10-CM

## 2017-11-06 DIAGNOSIS — Z45.2 ENCOUNTER FOR VENOUS ACCESS DEVICE CARE: Primary | ICD-10-CM

## 2017-11-06 PROCEDURE — 25010000002 HEPARIN FLUSH (PORCINE) 100 UNIT/ML SOLUTION: Performed by: INTERNAL MEDICINE

## 2017-11-06 PROCEDURE — 96523 IRRIG DRUG DELIVERY DEVICE: CPT

## 2017-11-06 RX ORDER — SODIUM CHLORIDE 0.9 % (FLUSH) 0.9 %
10 SYRINGE (ML) INJECTION AS NEEDED
Status: CANCELLED | OUTPATIENT
Start: 2017-11-06

## 2017-11-06 RX ORDER — SODIUM CHLORIDE 0.9 % (FLUSH) 0.9 %
10 SYRINGE (ML) INJECTION AS NEEDED
Status: DISCONTINUED | OUTPATIENT
Start: 2017-11-06 | End: 2017-11-06 | Stop reason: HOSPADM

## 2017-11-06 RX ADMIN — SODIUM CHLORIDE, PRESERVATIVE FREE 500 UNITS: 5 INJECTION INTRAVENOUS at 14:22

## 2017-11-06 RX ADMIN — Medication 10 ML: at 14:21

## 2017-11-21 ENCOUNTER — LAB (OUTPATIENT)
Dept: LAB | Facility: CLINIC | Age: 57
End: 2017-11-21

## 2017-11-21 ENCOUNTER — TRANSCRIBE ORDERS (OUTPATIENT)
Dept: LAB | Facility: CLINIC | Age: 57
End: 2017-11-21

## 2017-11-21 DIAGNOSIS — N30.90 CYSTITIS, UNSPECIFIED WITHOUT HEMATURIA: Primary | ICD-10-CM

## 2017-11-21 DIAGNOSIS — E11.9 DIABETES MELLITUS WITHOUT COMPLICATION (HCC): Primary | ICD-10-CM

## 2017-11-21 PROCEDURE — 87086 URINE CULTURE/COLONY COUNT: CPT | Performed by: FAMILY MEDICINE

## 2017-11-21 PROCEDURE — 81001 URINALYSIS AUTO W/SCOPE: CPT | Performed by: FAMILY MEDICINE

## 2017-11-22 LAB
BACTERIA UR QL AUTO: ABNORMAL /HPF
BILIRUB UR QL STRIP: NEGATIVE
CLARITY UR: ABNORMAL
COLOR UR: ABNORMAL
GLUCOSE UR STRIP-MCNC: NEGATIVE MG/DL
HGB UR QL STRIP.AUTO: NEGATIVE
HYALINE CASTS UR QL AUTO: ABNORMAL /LPF
KETONES UR QL STRIP: NEGATIVE
LEUKOCYTE ESTERASE UR QL STRIP.AUTO: ABNORMAL
NITRITE UR QL STRIP: POSITIVE
PH UR STRIP.AUTO: 6.5 [PH] (ref 5–9)
PROT UR QL STRIP: NEGATIVE
RBC # UR: ABNORMAL /HPF
REF LAB TEST METHOD: ABNORMAL
SP GR UR STRIP: 1 (ref 1–1.03)
SQUAMOUS #/AREA URNS HPF: ABNORMAL /HPF
UROBILINOGEN UR QL STRIP: ABNORMAL
WBC UR QL AUTO: ABNORMAL /HPF

## 2017-11-22 PROCEDURE — 87220 TISSUE EXAM FOR FUNGI: CPT | Performed by: FAMILY MEDICINE

## 2017-11-22 PROCEDURE — 87102 FUNGUS ISOLATION CULTURE: CPT | Performed by: FAMILY MEDICINE

## 2017-11-23 LAB
BACTERIA SPEC AEROBE CULT: NORMAL
KOH PREP NAIL: NORMAL

## 2017-12-04 ENCOUNTER — OFFICE VISIT (OUTPATIENT)
Dept: ONCOLOGY | Facility: CLINIC | Age: 57
End: 2017-12-04

## 2017-12-04 ENCOUNTER — INFUSION (OUTPATIENT)
Dept: ONCOLOGY | Facility: HOSPITAL | Age: 57
End: 2017-12-04

## 2017-12-04 VITALS
HEART RATE: 85 BPM | SYSTOLIC BLOOD PRESSURE: 138 MMHG | RESPIRATION RATE: 18 BRPM | WEIGHT: 213.63 LBS | TEMPERATURE: 97.8 F | HEIGHT: 64 IN | BODY MASS INDEX: 36.47 KG/M2 | DIASTOLIC BLOOD PRESSURE: 81 MMHG

## 2017-12-04 VITALS — HEART RATE: 86 BPM | SYSTOLIC BLOOD PRESSURE: 125 MMHG | DIASTOLIC BLOOD PRESSURE: 64 MMHG

## 2017-12-04 DIAGNOSIS — Z45.2 ENCOUNTER FOR VENOUS ACCESS DEVICE CARE: ICD-10-CM

## 2017-12-04 DIAGNOSIS — C82.08 GRADE 1 FOLLICULAR LYMPHOMA OF LYMPH NODES OF MULTIPLE REGIONS (HCC): Primary | ICD-10-CM

## 2017-12-04 DIAGNOSIS — C82.08 GRADE 1 FOLLICULAR LYMPHOMA OF LYMPH NODES OF MULTIPLE REGIONS (HCC): ICD-10-CM

## 2017-12-04 LAB
ALBUMIN SERPL-MCNC: 4.1 G/DL (ref 3.4–4.8)
ALBUMIN/GLOB SERPL: 1.3 G/DL (ref 1.1–1.8)
ALP SERPL-CCNC: 73 U/L (ref 38–126)
ALT SERPL W P-5'-P-CCNC: 36 U/L (ref 9–52)
ANION GAP SERPL CALCULATED.3IONS-SCNC: 12 MMOL/L (ref 5–15)
AST SERPL-CCNC: 28 U/L (ref 14–36)
BASOPHILS # BLD AUTO: 0.05 10*3/MM3 (ref 0–0.2)
BASOPHILS NFR BLD AUTO: 1.1 % (ref 0–2)
BILIRUB SERPL-MCNC: 0.5 MG/DL (ref 0.2–1.3)
BUN BLD-MCNC: 8 MG/DL (ref 7–21)
BUN/CREAT SERPL: 12.5 (ref 7–25)
CALCIUM SPEC-SCNC: 9.4 MG/DL (ref 8.4–10.2)
CHLORIDE SERPL-SCNC: 98 MMOL/L (ref 95–110)
CO2 SERPL-SCNC: 25 MMOL/L (ref 22–31)
CREAT BLD-MCNC: 0.64 MG/DL (ref 0.5–1)
DEPRECATED RDW RBC AUTO: 46.8 FL (ref 36.4–46.3)
EOSINOPHIL # BLD AUTO: 0.13 10*3/MM3 (ref 0–0.7)
EOSINOPHIL NFR BLD AUTO: 2.7 % (ref 0–7)
ERYTHROCYTE [DISTWIDTH] IN BLOOD BY AUTOMATED COUNT: 14.3 % (ref 11.5–14.5)
GFR SERPL CREATININE-BSD FRML MDRD: 96 ML/MIN/1.73 (ref 51–120)
GLOBULIN UR ELPH-MCNC: 3.2 GM/DL (ref 2.3–3.5)
GLUCOSE BLD-MCNC: 160 MG/DL (ref 60–100)
HCT VFR BLD AUTO: 37 % (ref 35–45)
HGB BLD-MCNC: 12.5 G/DL (ref 12–15.5)
IMM GRANULOCYTES # BLD: 0.04 10*3/MM3 (ref 0–0.02)
IMM GRANULOCYTES NFR BLD: 0.8 % (ref 0–0.5)
LYMPHOCYTES # BLD AUTO: 0.48 10*3/MM3 (ref 0.6–4.2)
LYMPHOCYTES NFR BLD AUTO: 10.1 % (ref 10–50)
MCH RBC QN AUTO: 30.4 PG (ref 26.5–34)
MCHC RBC AUTO-ENTMCNC: 33.8 G/DL (ref 31.4–36)
MCV RBC AUTO: 90 FL (ref 80–98)
MONOCYTES # BLD AUTO: 0.49 10*3/MM3 (ref 0–0.9)
MONOCYTES NFR BLD AUTO: 10.4 % (ref 0–12)
NEUTROPHILS # BLD AUTO: 3.54 10*3/MM3 (ref 2–8.6)
NEUTROPHILS NFR BLD AUTO: 74.9 % (ref 37–80)
PLATELET # BLD AUTO: 301 10*3/MM3 (ref 150–450)
PMV BLD AUTO: 9 FL (ref 8–12)
POTASSIUM BLD-SCNC: 4.1 MMOL/L (ref 3.5–5.1)
PROT SERPL-MCNC: 7.3 G/DL (ref 6.3–8.6)
RBC # BLD AUTO: 4.11 10*6/MM3 (ref 3.77–5.16)
SODIUM BLD-SCNC: 135 MMOL/L (ref 137–145)
WBC NRBC COR # BLD: 4.73 10*3/MM3 (ref 3.2–9.8)

## 2017-12-04 PROCEDURE — 25010000002 RITUXIMAB 100 MG/10ML SOLUTION 10 ML VIAL: Performed by: INTERNAL MEDICINE

## 2017-12-04 PROCEDURE — 80053 COMPREHEN METABOLIC PANEL: CPT | Performed by: INTERNAL MEDICINE

## 2017-12-04 PROCEDURE — 96415 CHEMO IV INFUSION ADDL HR: CPT | Performed by: INTERNAL MEDICINE

## 2017-12-04 PROCEDURE — 25010000002 RITUXIMAB 500 MG/50ML SOLUTION 50 ML VIAL: Performed by: INTERNAL MEDICINE

## 2017-12-04 PROCEDURE — 99214 OFFICE O/P EST MOD 30 MIN: CPT | Performed by: INTERNAL MEDICINE

## 2017-12-04 PROCEDURE — 36415 COLL VENOUS BLD VENIPUNCTURE: CPT | Performed by: INTERNAL MEDICINE

## 2017-12-04 PROCEDURE — 25010000002 DIPHENHYDRAMINE PER 50 MG: Performed by: INTERNAL MEDICINE

## 2017-12-04 PROCEDURE — 25010000002 HEPARIN FLUSH (PORCINE) 100 UNIT/ML SOLUTION: Performed by: INTERNAL MEDICINE

## 2017-12-04 PROCEDURE — 96413 CHEMO IV INFUSION 1 HR: CPT | Performed by: INTERNAL MEDICINE

## 2017-12-04 PROCEDURE — 96375 TX/PRO/DX INJ NEW DRUG ADDON: CPT | Performed by: INTERNAL MEDICINE

## 2017-12-04 PROCEDURE — 36591 DRAW BLOOD OFF VENOUS DEVICE: CPT | Performed by: INTERNAL MEDICINE

## 2017-12-04 PROCEDURE — 85025 COMPLETE CBC W/AUTO DIFF WBC: CPT | Performed by: INTERNAL MEDICINE

## 2017-12-04 RX ORDER — SODIUM CHLORIDE 0.9 % (FLUSH) 0.9 %
10 SYRINGE (ML) INJECTION AS NEEDED
Status: CANCELLED | OUTPATIENT
Start: 2017-12-04

## 2017-12-04 RX ORDER — DIPHENHYDRAMINE HYDROCHLORIDE 50 MG/ML
50 INJECTION INTRAMUSCULAR; INTRAVENOUS AS NEEDED
Status: DISCONTINUED | OUTPATIENT
Start: 2017-12-04 | End: 2017-12-04 | Stop reason: HOSPADM

## 2017-12-04 RX ORDER — ACETAMINOPHEN 325 MG/1
650 TABLET ORAL ONCE
Status: CANCELLED | OUTPATIENT
Start: 2017-12-04

## 2017-12-04 RX ORDER — ACETAMINOPHEN 325 MG/1
650 TABLET ORAL ONCE
Status: COMPLETED | OUTPATIENT
Start: 2017-12-04 | End: 2017-12-04

## 2017-12-04 RX ORDER — FAMOTIDINE 10 MG/ML
20 INJECTION, SOLUTION INTRAVENOUS AS NEEDED
Status: CANCELLED | OUTPATIENT
Start: 2017-12-04

## 2017-12-04 RX ORDER — DIPHENHYDRAMINE HYDROCHLORIDE 50 MG/ML
50 INJECTION INTRAMUSCULAR; INTRAVENOUS AS NEEDED
Status: CANCELLED | OUTPATIENT
Start: 2017-12-04

## 2017-12-04 RX ORDER — MEPERIDINE HYDROCHLORIDE 50 MG/ML
25 INJECTION INTRAMUSCULAR; INTRAVENOUS; SUBCUTANEOUS
Status: DISCONTINUED | OUTPATIENT
Start: 2017-12-04 | End: 2017-12-04 | Stop reason: HOSPADM

## 2017-12-04 RX ORDER — FAMOTIDINE 10 MG/ML
20 INJECTION, SOLUTION INTRAVENOUS AS NEEDED
Status: DISCONTINUED | OUTPATIENT
Start: 2017-12-04 | End: 2017-12-04 | Stop reason: HOSPADM

## 2017-12-04 RX ORDER — SODIUM CHLORIDE 0.9 % (FLUSH) 0.9 %
10 SYRINGE (ML) INJECTION AS NEEDED
Status: DISCONTINUED | OUTPATIENT
Start: 2017-12-04 | End: 2017-12-04 | Stop reason: HOSPADM

## 2017-12-04 RX ORDER — SODIUM CHLORIDE 9 MG/ML
250 INJECTION, SOLUTION INTRAVENOUS ONCE
Status: CANCELLED | OUTPATIENT
Start: 2017-12-04

## 2017-12-04 RX ORDER — MEPERIDINE HYDROCHLORIDE 50 MG/ML
25 INJECTION INTRAMUSCULAR; INTRAVENOUS; SUBCUTANEOUS
Status: CANCELLED | OUTPATIENT
Start: 2017-12-04

## 2017-12-04 RX ORDER — SODIUM CHLORIDE 9 MG/ML
250 INJECTION, SOLUTION INTRAVENOUS ONCE
Status: COMPLETED | OUTPATIENT
Start: 2017-12-04 | End: 2017-12-04

## 2017-12-04 RX ADMIN — Medication 10 ML: at 08:19

## 2017-12-04 RX ADMIN — RITUXIMAB 750 MG: 10 INJECTION, SOLUTION INTRAVENOUS at 10:16

## 2017-12-04 RX ADMIN — Medication 10 ML: at 13:11

## 2017-12-04 RX ADMIN — SODIUM CHLORIDE 250 ML: 9 INJECTION, SOLUTION INTRAVENOUS at 09:14

## 2017-12-04 RX ADMIN — ACETAMINOPHEN 650 MG: 325 TABLET ORAL at 09:41

## 2017-12-04 RX ADMIN — SODIUM CHLORIDE, PRESERVATIVE FREE 500 UNITS: 5 INJECTION INTRAVENOUS at 13:11

## 2017-12-04 RX ADMIN — DIPHENHYDRAMINE HYDROCHLORIDE 25 MG: 50 INJECTION INTRAMUSCULAR; INTRAVENOUS at 09:51

## 2017-12-04 NOTE — PROGRESS NOTES
DATE OF VISIT: 12/4/2017    REASON FOR VISIT: Recurrent follicular lymphoma    HISTORY OF PRESENT ILLNESS:    57-year-old female with a past medical history significant for recurrent follicular lymphoma status post radiation, last round of radiation was around September 2016 for follicular lymphoma.  Patient was again found to have aortocaval adenopathy on  CT of chest abdomen and pelvis in March 2017.  Patient was started on rituximab April 10, 2017.  Patient is here to get fifth dose of rituximab today.  Denies any fevers or chills or night sweats.  Denies any abnormal swollen and anywhere in the body.        PAST MEDICAL HISTORY:    Past Medical History:   Diagnosis Date   • Acute bronchitis    • Agoraphobia with panic disorder     on SNRI, prn buspar, prn klonopin     • Anxiety    • Arthritis    • Atrophic vaginitis    • Chest pain     work up as new onset angina    • Depression      MDD      • Essential hypertension    • Follicular non-Hodgkin's lymphoma    • Generalized anxiety disorder     SSRI - buspar, cont cymbalta, prn klonopin      • Hip pain     arthritis, artifical right hip     • History of bone density study 02/09/2009    DEXA (bone density) test, peripheral (Normal   • History of echocardiogram 01/03/2012    Echocardiogram 16115 (Normal echocardigram. EF 55-60%)   • History of mammogram    • Hyperlipidemia    • Mass of lower limb    • Nuclear senile cataract    • Obesity    • Otalgia     ENT REFER   • Panic disorder     with agoraphobia. On buspar and klonopin now      • Type 2 diabetes mellitus     NO BDR   • Upper respiratory infection        SOCIAL HISTORY:    Social History   Substance Use Topics   • Smoking status: Former Smoker     Years: 12.00     Quit date: 1/1/1998   • Smokeless tobacco: Never Used      Comment: Smoked 1 pack per 2 weeks   • Alcohol use No       Surgical History :  Past Surgical History:   Procedure Laterality Date   • CENTRAL VENOUS LINE INSERTION  03/11/2016    Central  line insertion (Successful placement of right upper extremity midline.)   • COLONOSCOPY     • CYSTOSCOPY  03/23/1976    Cystoscopy (external urethroplasty and cysto-panendoscopy,urethal hymenal fusion with hypospadias)   • DILATATION AND CURETTAGE  01/25/1980    D&C (removal of IUD)   • EXCISION LESION  04/15/2014    Remove thigh lesion (Excision of a mass of the right thigh using a 16.2 cm incision with intermediate layered closure.)   • HYSTEROSCOPY  02/28/2003    Hysteroscope procedure (diagnostic hysteroscopy with fractional D&C)   • INJECTION OF MEDICATION  08/22/2011    kenalog (1)   • OTHER SURGICAL HISTORY      Explore parathyroid glands   • OTHER SURGICAL HISTORY  08/26/1985    Laparosc diagnostic (desires elective tubal reversal)   • PAP SMEAR  05/24/2005    PAP SMEAR (normal)   • AK INSJ TUNNELED CVC W/O SUBQ PORT/ AGE 5 YR/> Right 4/4/2017    Procedure: INSERTION VENOUS ACCESS DEVICE (MEDIPORT) right chest;  Surgeon: Fortino Medina MD;  Location: [facility];  Service: General   • AK INSJ TUNNELED CVC W/O SUBQ PORT/ AGE 5 YR/> N/A 6/6/2017    Procedure: INSERTION VENOUS ACCESS DEVICE   (MEDIPORT)   (C-ARM#1);  Surgeon: Fortino Medina MD;  Location: [facility];  Service: General   • SHOULDER ARTHROSCOPY  11/11/2013    Shoulder arthroscopy/surgery (Right shoulder. Rotator cuff repair. Subacromial decompression. Edgar procedure.)   • SHOULDER SURGERY  12/28/2015    Shoulder surgery procedure (Arthroscopy of the left shoulder with rotator cuff repair.Edgar procedure.Subacromial decompression.)   • TONSILLECTOMY AND ADENOIDECTOMY  05/29/1967    T&A (hypertrophied tonsils and adenoids with recurrent infection)   • TOTAL ABDOMINAL HYSTERECTOMY WITH SALPINGO OOPHORECTOMY  02/03/2004    ARIELLE/BSO (with a preop fractional dilation and curettage with frozen section,menorrhagia,dysmenorrhea,unresponsive to medical intervention)   • TOTAL HIP ARTHROPLASTY     • VAGINA SURGERY  03/02/1981    Anesth, surgery on vagina  "(colpotomy with bilateral partial salpingectomy, multiparity contraindicated)       ALLERGIES:    Allergies   Allergen Reactions   • Ace Inhibitors Cough   • Latex      BLISTER     • Morphine And Related Nausea And Vomiting   • Lortab [Hydrocodone-Acetaminophen] Palpitations       REVIEW OF SYSTEMS:      CONSTITUTIONAL: Complains of fatigue for 2 weeks after rituximab infusion.  No fever, chills, or night sweats.     HEENT:  No epistaxis, mouth sores, or difficulty swallowing.    RESPIRATORY:  No new shortness of breath .  No new cough or hemoptysis.    CARDIOVASCULAR:  No chest pain or palpitations.    GASTROINTESTINAL: Complains of nausea without vomiting for 1 week after rituximab infusion.  No abdominal pain,  Nausea,vomiting, or blood in the stool.    GENITOURINARY:  No dysuria or hematuria.    MUSCULOSKELETAL:   No any new back pain or arthralgias.     NEUROLOGICAL:  No tingling or numbness. No new headache or dizziness.     LYMPHATICS:  Denies any abnormal swollen and anywhere in the body.    SKIN:  Denies any new skin rash.          PHYSICAL EXAMINATION:      VITAL SIGNS:  /81  Pulse 85  Temp 97.8 °F (36.6 °C) (Temporal Artery )   Resp 18  Ht 64.02\" (162.6 cm)  Wt 213 lb 10 oz (96.9 kg)  BMI 36.65 kg/m2    GENERAL:  Not in any distress.    HEENT:  Normocephalic, Atraumatic.Mild Conjunctival pallor. No icterus. Extraocular Movements Intact. No Fascial Asymmetry noted.    NECK:  No adenopathy. No JVD.      RESPIRATORY:  Fair air entry bilateral. No rhonchi or wheezing.    CARDIOVASCULAR:  S1, S2. Regular rate and rhythm. No murmur or gallop appreciated.  Port-A-Cath present on the right chest wall.    ABDOMEN:  Soft, obese, nontender. Bowel sounds present in all four quadrants.  No organomegaly appreciated.    EXTREMITIES:  No edema.No Calf Tenderness.      NEUROLOGIC:  Alert, awake and oriented ×3.  No  Motor or sensory deficit appreciated. Cranial Nerves 2-12 grossly intact.          DIAGNOSTIC " DATA:    Glucose   Date Value Ref Range Status   12/04/2017 160 (H) 60 - 100 mg/dL Final     Sodium   Date Value Ref Range Status   12/04/2017 135 (L) 137 - 145 mmol/L Final     Potassium   Date Value Ref Range Status   12/04/2017 4.1 3.5 - 5.1 mmol/L Final     CO2   Date Value Ref Range Status   12/04/2017 25.0 22.0 - 31.0 mmol/L Final     Chloride   Date Value Ref Range Status   12/04/2017 98 95 - 110 mmol/L Final     Anion Gap   Date Value Ref Range Status   12/04/2017 12.0 5.0 - 15.0 mmol/L Final     Creatinine   Date Value Ref Range Status   12/04/2017 0.64 0.50 - 1.00 mg/dL Final     BUN   Date Value Ref Range Status   12/04/2017 8 7 - 21 mg/dL Final     BUN/Creatinine Ratio   Date Value Ref Range Status   12/04/2017 12.5 7.0 - 25.0 Final     Calcium   Date Value Ref Range Status   12/04/2017 9.4 8.4 - 10.2 mg/dL Final     eGFR Non  Amer   Date Value Ref Range Status   12/04/2017 96 51 - 120 mL/min/1.73 Final     Alkaline Phosphatase   Date Value Ref Range Status   12/04/2017 73 38 - 126 U/L Final     Total Protein   Date Value Ref Range Status   12/04/2017 7.3 6.3 - 8.6 g/dL Final     ALT (SGPT)   Date Value Ref Range Status   12/04/2017 36 9 - 52 U/L Final     AST (SGOT)   Date Value Ref Range Status   12/04/2017 28 14 - 36 U/L Final     Total Bilirubin   Date Value Ref Range Status   12/04/2017 0.5 0.2 - 1.3 mg/dL Final     Albumin   Date Value Ref Range Status   12/04/2017 4.10 3.40 - 4.80 g/dL Final     Globulin   Date Value Ref Range Status   12/04/2017 3.2 2.3 - 3.5 gm/dL Final     A/G Ratio   Date Value Ref Range Status   12/04/2017 1.3 1.1 - 1.8 g/dL Final     Lab Results   Component Value Date    WBC 4.73 12/04/2017    HGB 12.5 12/04/2017    HCT 37.0 12/04/2017    MCV 90.0 12/04/2017     12/04/2017     Lab Results   Component Value Date    NEUTROABS 3.54 12/04/2017         PATHOLOGY:  Pathology from right thigh biopsy done in April 2014 showed:  FINAL DIAGNOSIS:   A.  SUBCUTANEOUS MASS,  RIGHT THIGH:             FOLLICULAR LYMPHOMA, GRADE 1 OF 3.   B.  SUBCUTANEOUS MASS, RIGHT THIGH:             FOLLICULAR LYMPHOMA, GRADE 1 OF 3.          Comment   COMMENT:   Immunostains have follicular lymphoma positive for CD20, negative for   CD3, negative for CD5, positive for CD10, positive for CD23, negative   for CD43, negative for cyclin D1, positive for BCL-2 and positive for   BCL-6.  The case is submitted to the [facility] for evaluation and   their report is attached.            RADIOLOGY DATA :  CT Of chest, abdomen and pelvis with contrast done on July 17, 2017 showed:  IMPRESSION:  CONCLUSION:      Decrease in size of the enlarged aortocaval retroperitoneal lymph  nodes seen on prior examination March 24, 2017. Favorable  interval change. Right total hip arthroplasty. Otherwise  unremarkable CT chest, abdomen and pelvis with contrast.            ASSESSMENT AND PLAN:       1.  Recurrent follicular lymphoma grade 1, initially patient was diagnosed with a right groin lymph node adenopathy in April 2014 for which patient underwent palliative radiation therapy.  Subsequently patient again had a relapse around September 2016 for workup with second course of palliative radiation therapy was given by Dr. Leonel Vargas in October 2016.  In view of enlarged aortocaval lymph nodes, patient was started on rituximab treatment in April 2017.    CT scan done in July 2017 shows decreasing in the size of aortocaval lymph node which was discussed with patient.  We will go head with cycle 5 of rituximab today.  We will see her back in about 2 months with a repeat CBC and CMP on that day.  Patient was encouraged to call us with any side effects from rituximab. Next CT scan is being planned around April 2018 which was discussed with patient    2.  History of dyslipidemia    3.  Diabetes mellitus    4.  Health maintenance: Patient does not smoke.  Last colonoscopy was done in 2014 which was normal as per patient.  She  remains full code.        Tino Sood MD  12/4/2017  9:52 AM        EMR Dragon/Transcription disclaimer:   Much of this encounter note is an electronic transcription/translation of spoken language to printed text. The electronic translation of spoken language may permit erroneous, or at times, nonsensical words or phrases to be inadvertently transcribed; Although I have reviewed the note for such errors, some may still exist.

## 2017-12-04 NOTE — PROGRESS NOTES
1045- Rituxan rate slowed due to pt having complaints of heart 'fluttering'.  Vs obtained and are wnl, will continue to monitor pt.

## 2017-12-08 ENCOUNTER — DOCUMENTATION (OUTPATIENT)
Dept: NUTRITION | Facility: HOSPITAL | Age: 57
End: 2017-12-08

## 2017-12-08 NOTE — PROGRESS NOTES
"Adult Outpatient Nutrition  Assessment    Patient Name:  Chasity Leon  YOB: 1960  MRN: [id number]    Assessment Date:  Late Entry from 12/4/17  CHART REVIEW  Chasity Leon   1960  57 y.o.  Wt: 213.9 lb  Ht: 64 in  Dx: recurrent follicular lymphoma  Tx: chemo  Labs: blood sugar 160-- \"150-200\" lately at home    PATIENT/FAMILY COMMENTS  Weight Change: stable  Diet: diabetic  Food Allergies:   Number of Feedings Daily: 2  Appetite/Intake: good  Supplements: none  Meal Preparation: \"not much cooking going on at home\" stated pt  (eats out or uses processed options)  Nutrition Concerns:  -mild nausea  -hyperglycemia       GOAL(s)  -to optimize nutrition in attempt to prevent loss of LBM      EDUCATION  Discussed  -high protein diet (urged more frequent feedings and simple sugars less often)  -importance of staying hydrated  -food safety    To reinforce education, written information with RD's name & number provided.  Patient receptive to suggestions--agreed to call on dietitian as needed.              Raegan Penny RD                                Electronically signed by:  Raegan Penny RD  12/08/17 2:26 PM   "

## 2017-12-20 LAB — FUNGUS WND CULT: NORMAL

## 2018-01-02 ENCOUNTER — APPOINTMENT (OUTPATIENT)
Dept: ONCOLOGY | Facility: HOSPITAL | Age: 58
End: 2018-01-02

## 2018-01-05 ENCOUNTER — INFUSION (OUTPATIENT)
Dept: ONCOLOGY | Facility: HOSPITAL | Age: 58
End: 2018-01-05

## 2018-01-05 DIAGNOSIS — C82.08 GRADE 1 FOLLICULAR LYMPHOMA OF LYMPH NODES OF MULTIPLE REGIONS (HCC): ICD-10-CM

## 2018-01-05 DIAGNOSIS — Z45.2 ENCOUNTER FOR VENOUS ACCESS DEVICE CARE: Primary | ICD-10-CM

## 2018-01-05 PROCEDURE — 96523 IRRIG DRUG DELIVERY DEVICE: CPT | Performed by: NURSE PRACTITIONER

## 2018-01-05 PROCEDURE — 25010000002 HEPARIN FLUSH (PORCINE) 100 UNIT/ML SOLUTION

## 2018-01-05 RX ORDER — SODIUM CHLORIDE 0.9 % (FLUSH) 0.9 %
10 SYRINGE (ML) INJECTION AS NEEDED
Status: DISCONTINUED | OUTPATIENT
Start: 2018-01-05 | End: 2018-01-05 | Stop reason: HOSPADM

## 2018-01-05 RX ORDER — SODIUM CHLORIDE 0.9 % (FLUSH) 0.9 %
10 SYRINGE (ML) INJECTION AS NEEDED
Status: CANCELLED | OUTPATIENT
Start: 2018-01-29

## 2018-01-05 RX ADMIN — SODIUM CHLORIDE, PRESERVATIVE FREE 500 UNITS: 5 INJECTION INTRAVENOUS at 14:42

## 2018-01-05 RX ADMIN — Medication 10 ML: at 14:41

## 2018-01-29 ENCOUNTER — OFFICE VISIT (OUTPATIENT)
Dept: ONCOLOGY | Facility: CLINIC | Age: 58
End: 2018-01-29

## 2018-01-29 ENCOUNTER — INFUSION (OUTPATIENT)
Dept: ONCOLOGY | Facility: HOSPITAL | Age: 58
End: 2018-01-29

## 2018-01-29 VITALS
TEMPERATURE: 98 F | RESPIRATION RATE: 18 BRPM | HEART RATE: 80 BPM | SYSTOLIC BLOOD PRESSURE: 147 MMHG | BODY MASS INDEX: 36.85 KG/M2 | DIASTOLIC BLOOD PRESSURE: 85 MMHG | WEIGHT: 214.8 LBS

## 2018-01-29 DIAGNOSIS — C82.08 GRADE 1 FOLLICULAR LYMPHOMA OF LYMPH NODES OF MULTIPLE REGIONS (HCC): ICD-10-CM

## 2018-01-29 DIAGNOSIS — C82.08 GRADE 1 FOLLICULAR LYMPHOMA OF LYMPH NODES OF MULTIPLE REGIONS (HCC): Primary | ICD-10-CM

## 2018-01-29 DIAGNOSIS — Z45.2 ENCOUNTER FOR VENOUS ACCESS DEVICE CARE: ICD-10-CM

## 2018-01-29 LAB
ALBUMIN SERPL-MCNC: 4.1 G/DL (ref 3.4–4.8)
ALBUMIN/GLOB SERPL: 1.5 G/DL (ref 1.1–1.8)
ALP SERPL-CCNC: 80 U/L (ref 38–126)
ALT SERPL W P-5'-P-CCNC: 34 U/L (ref 9–52)
ANION GAP SERPL CALCULATED.3IONS-SCNC: 12 MMOL/L (ref 5–15)
AST SERPL-CCNC: 25 U/L (ref 14–36)
BASOPHILS # BLD AUTO: 0.02 10*3/MM3 (ref 0–0.2)
BASOPHILS NFR BLD AUTO: 0.4 % (ref 0–2)
BILIRUB SERPL-MCNC: 0.2 MG/DL (ref 0.2–1.3)
BUN BLD-MCNC: 8 MG/DL (ref 7–21)
BUN/CREAT SERPL: 14 (ref 7–25)
CALCIUM SPEC-SCNC: 9.3 MG/DL (ref 8.4–10.2)
CHLORIDE SERPL-SCNC: 100 MMOL/L (ref 95–110)
CO2 SERPL-SCNC: 24 MMOL/L (ref 22–31)
CREAT BLD-MCNC: 0.57 MG/DL (ref 0.5–1)
DEPRECATED RDW RBC AUTO: 48 FL (ref 36.4–46.3)
EOSINOPHIL # BLD AUTO: 0.14 10*3/MM3 (ref 0–0.7)
EOSINOPHIL NFR BLD AUTO: 2.8 % (ref 0–7)
ERYTHROCYTE [DISTWIDTH] IN BLOOD BY AUTOMATED COUNT: 14.6 % (ref 11.5–14.5)
GFR SERPL CREATININE-BSD FRML MDRD: 109 ML/MIN/1.73 (ref 51–120)
GLOBULIN UR ELPH-MCNC: 2.8 GM/DL (ref 2.3–3.5)
GLUCOSE BLD-MCNC: 142 MG/DL (ref 60–100)
HCT VFR BLD AUTO: 36.9 % (ref 35–45)
HGB BLD-MCNC: 12.5 G/DL (ref 12–15.5)
IMM GRANULOCYTES # BLD: 0.03 10*3/MM3 (ref 0–0.02)
IMM GRANULOCYTES NFR BLD: 0.6 % (ref 0–0.5)
LYMPHOCYTES # BLD AUTO: 0.52 10*3/MM3 (ref 0.6–4.2)
LYMPHOCYTES NFR BLD AUTO: 10.3 % (ref 10–50)
MCH RBC QN AUTO: 30.2 PG (ref 26.5–34)
MCHC RBC AUTO-ENTMCNC: 33.9 G/DL (ref 31.4–36)
MCV RBC AUTO: 89.1 FL (ref 80–98)
MONOCYTES # BLD AUTO: 0.54 10*3/MM3 (ref 0–0.9)
MONOCYTES NFR BLD AUTO: 10.7 % (ref 0–12)
NEUTROPHILS # BLD AUTO: 3.81 10*3/MM3 (ref 2–8.6)
NEUTROPHILS NFR BLD AUTO: 75.2 % (ref 37–80)
PLATELET # BLD AUTO: 310 10*3/MM3 (ref 150–450)
PMV BLD AUTO: 8.9 FL (ref 8–12)
POTASSIUM BLD-SCNC: 4 MMOL/L (ref 3.5–5.1)
PROT SERPL-MCNC: 6.9 G/DL (ref 6.3–8.6)
RBC # BLD AUTO: 4.14 10*6/MM3 (ref 3.77–5.16)
SODIUM BLD-SCNC: 136 MMOL/L (ref 137–145)
WBC NRBC COR # BLD: 5.06 10*3/MM3 (ref 3.2–9.8)

## 2018-01-29 PROCEDURE — 96413 CHEMO IV INFUSION 1 HR: CPT | Performed by: NURSE PRACTITIONER

## 2018-01-29 PROCEDURE — 25010000002 RITUXIMAB 100 MG/10ML SOLUTION 10 ML VIAL: Performed by: NURSE PRACTITIONER

## 2018-01-29 PROCEDURE — 85025 COMPLETE CBC W/AUTO DIFF WBC: CPT | Performed by: INTERNAL MEDICINE

## 2018-01-29 PROCEDURE — 80053 COMPREHEN METABOLIC PANEL: CPT | Performed by: INTERNAL MEDICINE

## 2018-01-29 PROCEDURE — 25010000002 DIPHENHYDRAMINE PER 50 MG: Performed by: NURSE PRACTITIONER

## 2018-01-29 PROCEDURE — 96374 THER/PROPH/DIAG INJ IV PUSH: CPT | Performed by: NURSE PRACTITIONER

## 2018-01-29 PROCEDURE — 99214 OFFICE O/P EST MOD 30 MIN: CPT | Performed by: NURSE PRACTITIONER

## 2018-01-29 PROCEDURE — 25010000002 HEPARIN FLUSH (PORCINE) 100 UNIT/ML SOLUTION: Performed by: INTERNAL MEDICINE

## 2018-01-29 PROCEDURE — 25010000002 RITUXIMAB 500 MG/50ML SOLUTION 50 ML VIAL: Performed by: NURSE PRACTITIONER

## 2018-01-29 PROCEDURE — 36415 COLL VENOUS BLD VENIPUNCTURE: CPT | Performed by: INTERNAL MEDICINE

## 2018-01-29 RX ORDER — DIPHENHYDRAMINE HYDROCHLORIDE 50 MG/ML
50 INJECTION INTRAMUSCULAR; INTRAVENOUS AS NEEDED
Status: CANCELLED | OUTPATIENT
Start: 2018-01-29

## 2018-01-29 RX ORDER — ACETAMINOPHEN 325 MG/1
650 TABLET ORAL ONCE
Status: CANCELLED | OUTPATIENT
Start: 2018-01-29

## 2018-01-29 RX ORDER — SODIUM CHLORIDE 9 MG/ML
250 INJECTION, SOLUTION INTRAVENOUS ONCE
Status: CANCELLED | OUTPATIENT
Start: 2018-01-29

## 2018-01-29 RX ORDER — MEPERIDINE HYDROCHLORIDE 50 MG/ML
25 INJECTION INTRAMUSCULAR; INTRAVENOUS; SUBCUTANEOUS
Status: CANCELLED | OUTPATIENT
Start: 2018-01-29

## 2018-01-29 RX ORDER — SODIUM CHLORIDE 0.9 % (FLUSH) 0.9 %
10 SYRINGE (ML) INJECTION AS NEEDED
Status: CANCELLED | OUTPATIENT
Start: 2018-02-26

## 2018-01-29 RX ORDER — FAMOTIDINE 10 MG/ML
20 INJECTION, SOLUTION INTRAVENOUS AS NEEDED
Status: CANCELLED | OUTPATIENT
Start: 2018-01-29

## 2018-01-29 RX ORDER — ACETAMINOPHEN 325 MG/1
650 TABLET ORAL ONCE
Status: COMPLETED | OUTPATIENT
Start: 2018-01-29 | End: 2018-01-29

## 2018-01-29 RX ORDER — FAMOTIDINE 10 MG/ML
20 INJECTION, SOLUTION INTRAVENOUS AS NEEDED
Status: DISCONTINUED | OUTPATIENT
Start: 2018-01-29 | End: 2018-01-29 | Stop reason: HOSPADM

## 2018-01-29 RX ORDER — DIPHENHYDRAMINE HYDROCHLORIDE 50 MG/ML
50 INJECTION INTRAMUSCULAR; INTRAVENOUS AS NEEDED
Status: DISCONTINUED | OUTPATIENT
Start: 2018-01-29 | End: 2018-01-29 | Stop reason: HOSPADM

## 2018-01-29 RX ORDER — SODIUM CHLORIDE 9 MG/ML
250 INJECTION, SOLUTION INTRAVENOUS ONCE
Status: COMPLETED | OUTPATIENT
Start: 2018-01-29 | End: 2018-01-29

## 2018-01-29 RX ORDER — SODIUM CHLORIDE 0.9 % (FLUSH) 0.9 %
10 SYRINGE (ML) INJECTION AS NEEDED
Status: DISCONTINUED | OUTPATIENT
Start: 2018-01-29 | End: 2018-01-29 | Stop reason: HOSPADM

## 2018-01-29 RX ORDER — MEPERIDINE HYDROCHLORIDE 50 MG/ML
25 INJECTION INTRAMUSCULAR; INTRAVENOUS; SUBCUTANEOUS
Status: DISCONTINUED | OUTPATIENT
Start: 2018-01-29 | End: 2018-01-29 | Stop reason: HOSPADM

## 2018-01-29 RX ADMIN — DIPHENHYDRAMINE HYDROCHLORIDE 25 MG: 50 INJECTION INTRAMUSCULAR; INTRAVENOUS at 10:36

## 2018-01-29 RX ADMIN — Medication 10 ML: at 13:40

## 2018-01-29 RX ADMIN — Medication 10 ML: at 08:59

## 2018-01-29 RX ADMIN — SODIUM CHLORIDE, PRESERVATIVE FREE 500 UNITS: 5 INJECTION INTRAVENOUS at 13:40

## 2018-01-29 RX ADMIN — ACETAMINOPHEN 650 MG: 325 TABLET ORAL at 10:19

## 2018-01-29 RX ADMIN — SODIUM CHLORIDE 250 ML: 9 INJECTION, SOLUTION INTRAVENOUS at 10:18

## 2018-01-29 RX ADMIN — RITUXIMAB 750 MG: 10 INJECTION, SOLUTION INTRAVENOUS at 11:59

## 2018-01-29 NOTE — PROGRESS NOTES
DATE OF VISIT: 1/29/2018    REASON FOR VISIT:  Recurrent Follicular Lymphoma    HISTORY OF PRESENT ILLNESS:  58-year-old female with a past medical history significant for recurrent follicular lymphoma status post radiation, last round of radiation was around September 2016 for follicular lymphoma.  Patient was again found to have aortocaval adenopathy on  CT of chest abdomen and pelvis in March 2017.  Patient was started on maintenance rituximab April 10, 2017.  Patient is here to get 6th dose of rituximab today.  Denies any fevers or chills or night sweats.  Denies any abnormal swollen and anywhere in the body. Per Dr. Sood last note she will due for CT scan in April.       PAST MEDICAL HISTORY:    Past Medical History:   Diagnosis Date   • Acute bronchitis    • Agoraphobia with panic disorder     on SNRI, prn buspar, prn klonopin     • Anxiety    • Arthritis    • Atrophic vaginitis    • Chest pain     work up as new onset angina    • Depression      MDD      • Essential hypertension    • Follicular non-Hodgkin's lymphoma    • Generalized anxiety disorder     SSRI - buspar, cont cymbalta, prn klonopin      • Hip pain     arthritis, artifical right hip     • History of bone density study 02/09/2009    DEXA (bone density) test, peripheral (Normal   • History of echocardiogram 01/03/2012    Echocardiogram 41544 (Normal echocardigram. EF 55-60%)   • History of mammogram    • Hyperlipidemia    • Mass of lower limb    • Nuclear senile cataract    • Obesity    • Otalgia     ENT REFER   • Panic disorder     with agoraphobia. On buspar and klonopin now      • Type 2 diabetes mellitus     NO BDR   • Upper respiratory infection        SOCIAL HISTORY:    Social History   Substance Use Topics   • Smoking status: Former Smoker     Years: 12.00     Quit date: 1/1/1998   • Smokeless tobacco: Never Used      Comment: Smoked 1 pack per 2 weeks   • Alcohol use No       Surgical History :  Past Surgical History:   Procedure  Laterality Date   • CENTRAL VENOUS LINE INSERTION  03/11/2016    Central line insertion (Successful placement of right upper extremity midline.)   • COLONOSCOPY     • CYSTOSCOPY  03/23/1976    Cystoscopy (external urethroplasty and cysto-panendoscopy,urethal hymenal fusion with hypospadias)   • DILATATION AND CURETTAGE  01/25/1980    D&C (removal of IUD)   • EXCISION LESION  04/15/2014    Remove thigh lesion (Excision of a mass of the right thigh using a 16.2 cm incision with intermediate layered closure.)   • HYSTEROSCOPY  02/28/2003    Hysteroscope procedure (diagnostic hysteroscopy with fractional D&C)   • INJECTION OF MEDICATION  08/22/2011    kenalog (1)   • OTHER SURGICAL HISTORY      Explore parathyroid glands   • OTHER SURGICAL HISTORY  08/26/1985    Laparosc diagnostic (desires elective tubal reversal)   • PAP SMEAR  05/24/2005    PAP SMEAR (normal)   • NV INSJ TUNNELED CVC W/O SUBQ PORT/ AGE 5 YR/> Right 4/4/2017    Procedure: INSERTION VENOUS ACCESS DEVICE (MEDIPORT) right chest;  Surgeon: Fortino Medina MD;  Location: [facility];  Service: General   • NV INSJ TUNNELED CVC W/O SUBQ PORT/ AGE 5 YR/> N/A 6/6/2017    Procedure: INSERTION VENOUS ACCESS DEVICE   (MEDIPORT)   (C-ARM#1);  Surgeon: Fortino Medina MD;  Location: [facility];  Service: General   • SHOULDER ARTHROSCOPY  11/11/2013    Shoulder arthroscopy/surgery (Right shoulder. Rotator cuff repair. Subacromial decompression. Edgar procedure.)   • SHOULDER SURGERY  12/28/2015    Shoulder surgery procedure (Arthroscopy of the left shoulder with rotator cuff repair.Edgar procedure.Subacromial decompression.)   • TONSILLECTOMY AND ADENOIDECTOMY  05/29/1967    T&A (hypertrophied tonsils and adenoids with recurrent infection)   • TOTAL ABDOMINAL HYSTERECTOMY WITH SALPINGO OOPHORECTOMY  02/03/2004    ARIELLE/BSO (with a preop fractional dilation and curettage with frozen section,menorrhagia,dysmenorrhea,unresponsive to medical intervention)   • TOTAL HIP  ARTHROPLASTY     • VAGINA SURGERY  03/02/1981    Anesth, surgery on vagina (colpotomy with bilateral partial salpingectomy, multiparity contraindicated)       ALLERGIES:    Allergies   Allergen Reactions   • Ace Inhibitors Cough   • Latex      BLISTER     • Morphine And Related Nausea And Vomiting   • Lortab [Hydrocodone-Acetaminophen] Palpitations       REVIEW OF SYSTEMS:      CONSTITUTIONAL:  No fever, chills, or night sweats.     HEENT:  No epistaxis, mouth sores, or difficulty swallowing.    RESPIRATORY:  No new shortness of breath or cough at present.    CARDIOVASCULAR:  No chest pain or palpitations.    GASTROINTESTINAL:  No abdominal pain, nausea, vomiting, or blood in the stool.    GENITOURINARY:  No dysuria or hematuria.    MUSCULOSKELETAL:  No any new back pain or arthralgias.     NEUROLOGICAL:  No tingling or numbness. No new headache or dizziness.     LYMPHATICS:  Denies any abnormal swollen and anywhere in the body.    SKIN:  Denies any new skin rash.    PHYSICAL EXAMINATION:      VITAL SIGNS:  /85  Pulse 80  Temp 98 °F (36.7 °C)  Resp 18  Wt 97.4 kg (214 lb 12.8 oz)  BMI 36.85 kg/m2    GENERAL:  Not in any distress.    HEENT:  Normocephalic, Atraumatic.Mild Conjunctival pallor. No icterus.  No Facial Asymmetry noted.    NECK:  No adenopathy. No JVD.    RESPIRATORY:  Fair air entry bilateral. No rhonchi or wheezing.    CARDIOVASCULAR:  S1, S2. Regular rate and rhythm. No murmur or gallop appreciated.    ABDOMEN:  Soft, obese, nontender. Bowel sounds present in all four quadrants.  No organomegaly appreciated.    EXTREMITIES:  No edema.No Calf Tenderness.    NEUROLOGIC:  Alert, awake and oriented ×3.      SKIN : No new skin lesion identified    Lymph: no palpable cervical, supraclavicular or axillary adenopathy appreciated.    DIAGNOSTIC DATA:    Glucose   Date Value Ref Range Status   01/29/2018 142 (H) 60 - 100 mg/dL Final     Sodium   Date Value Ref Range Status   01/29/2018 136 (L) 137 -  145 mmol/L Final     Potassium   Date Value Ref Range Status   01/29/2018 4.0 3.5 - 5.1 mmol/L Final     CO2   Date Value Ref Range Status   01/29/2018 24.0 22.0 - 31.0 mmol/L Final     Chloride   Date Value Ref Range Status   01/29/2018 100 95 - 110 mmol/L Final     Anion Gap   Date Value Ref Range Status   01/29/2018 12.0 5.0 - 15.0 mmol/L Final     Creatinine   Date Value Ref Range Status   01/29/2018 0.57 0.50 - 1.00 mg/dL Final     BUN   Date Value Ref Range Status   01/29/2018 8 7 - 21 mg/dL Final     BUN/Creatinine Ratio   Date Value Ref Range Status   01/29/2018 14.0 7.0 - 25.0 Final     Calcium   Date Value Ref Range Status   01/29/2018 9.3 8.4 - 10.2 mg/dL Final     eGFR Non  Amer   Date Value Ref Range Status   01/29/2018 109 51 - 120 mL/min/1.73 Final     Alkaline Phosphatase   Date Value Ref Range Status   01/29/2018 80 38 - 126 U/L Final     Total Protein   Date Value Ref Range Status   01/29/2018 6.9 6.3 - 8.6 g/dL Final     ALT (SGPT)   Date Value Ref Range Status   01/29/2018 34 9 - 52 U/L Final     AST (SGOT)   Date Value Ref Range Status   01/29/2018 25 14 - 36 U/L Final     Total Bilirubin   Date Value Ref Range Status   01/29/2018 0.2 0.2 - 1.3 mg/dL Final     Albumin   Date Value Ref Range Status   01/29/2018 4.10 3.40 - 4.80 g/dL Final     Globulin   Date Value Ref Range Status   01/29/2018 2.8 2.3 - 3.5 gm/dL Final     A/G Ratio   Date Value Ref Range Status   01/29/2018 1.5 1.1 - 1.8 g/dL Final     Lab Results   Component Value Date    WBC 5.06 01/29/2018    HGB 12.5 01/29/2018    HCT 36.9 01/29/2018    MCV 89.1 01/29/2018     01/29/2018     Lab Results   Component Value Date    NEUTROABS 3.81 01/29/2018     No results found for: , LABCA2, AFPTM, HCGQUANT, , CHROMGRNA, [id number], CEA, REFLABREPO]    PATHOLOGY:  Pathology from right thigh biopsy done in April 2014 showed:  FINAL DIAGNOSIS:   A.  SUBCUTANEOUS MASS, RIGHT THIGH:             FOLLICULAR LYMPHOMA, GRADE 1  OF 3.   B.  SUBCUTANEOUS MASS, RIGHT THIGH:             FOLLICULAR LYMPHOMA, GRADE 1 OF 3.          Comment   COMMENT:   Immunostains have follicular lymphoma positive for CD20, negative for   CD3, negative for CD5, positive for CD10, positive for CD23, negative   for CD43, negative for cyclin D1, positive for BCL-2 and positive for   BCL-6.  The case is submitted to the [facility] for evaluation and   their report is attached.                RADIOLOGY DATA :  CT Of chest, abdomen and pelvis with contrast done on July 17, 2017 showed:  IMPRESSION:  CONCLUSION:       Decrease in size of the enlarged aortocaval retroperitoneal lymph  nodes seen on prior examination March 24, 2017. Favorable  interval change. Right total hip arthroplasty. Otherwise  unremarkable CT chest, abdomen and pelvis with contrast.           ASSESSMENT AND PLAN:    1. Recurrent follicular lymphoma grade 1, initially patient was diagnosed with a right groin lymph node adenopathy in April 2014 for which patient underwent palliative radiation therapy.  Subsequently patient again had a relapse around September 2016 for workup with second course of palliative radiation therapy was given by Dr. Leonel Vargas in October 2016.  In view of enlarged aortocaval lymph nodes, patient was started on rituximab treatment in April 2017.    CT scan done in July 2017 shows decreasing in the size of aortocaval lymph node which was discussed with patient.  We will go head with cycle 6 of rituximab today. She will return to clinic end of March for cycle #7 and will be scheduled for CT scans in April. This was discussed with patient.     2. Diabetes, pt blood sugar was 142    3. Health maintenance, she does not smoke and she had a colonoscopy in 2014.       This document has been signed by MANSI Pop on January 29, 2018 10:30 AM

## 2018-01-30 ENCOUNTER — DOCUMENTATION (OUTPATIENT)
Dept: NUTRITION | Facility: HOSPITAL | Age: 58
End: 2018-01-30

## 2018-02-26 ENCOUNTER — INFUSION (OUTPATIENT)
Dept: ONCOLOGY | Facility: HOSPITAL | Age: 58
End: 2018-02-26

## 2018-02-26 ENCOUNTER — APPOINTMENT (OUTPATIENT)
Dept: ONCOLOGY | Facility: HOSPITAL | Age: 58
End: 2018-02-26

## 2018-02-26 DIAGNOSIS — C82.08 GRADE 1 FOLLICULAR LYMPHOMA OF LYMPH NODES OF MULTIPLE REGIONS (HCC): ICD-10-CM

## 2018-02-26 DIAGNOSIS — Z45.2 ENCOUNTER FOR VENOUS ACCESS DEVICE CARE: Primary | ICD-10-CM

## 2018-02-26 PROCEDURE — 25010000002 HEPARIN FLUSH (PORCINE) 100 UNIT/ML SOLUTION: Performed by: INTERNAL MEDICINE

## 2018-02-26 PROCEDURE — 96523 IRRIG DRUG DELIVERY DEVICE: CPT | Performed by: INTERNAL MEDICINE

## 2018-02-26 RX ORDER — SODIUM CHLORIDE 0.9 % (FLUSH) 0.9 %
10 SYRINGE (ML) INJECTION AS NEEDED
Status: CANCELLED | OUTPATIENT
Start: 2018-03-26

## 2018-02-26 RX ORDER — SODIUM CHLORIDE 0.9 % (FLUSH) 0.9 %
10 SYRINGE (ML) INJECTION AS NEEDED
Status: DISCONTINUED | OUTPATIENT
Start: 2018-02-26 | End: 2018-02-26 | Stop reason: HOSPADM

## 2018-02-26 RX ADMIN — Medication 10 ML: at 15:12

## 2018-02-26 RX ADMIN — SODIUM CHLORIDE, PRESERVATIVE FREE 500 UNITS: 5 INJECTION INTRAVENOUS at 15:12

## 2018-03-26 ENCOUNTER — INFUSION (OUTPATIENT)
Dept: ONCOLOGY | Facility: HOSPITAL | Age: 58
End: 2018-03-26

## 2018-03-26 ENCOUNTER — OFFICE VISIT (OUTPATIENT)
Dept: ONCOLOGY | Facility: CLINIC | Age: 58
End: 2018-03-26

## 2018-03-26 VITALS
RESPIRATION RATE: 16 BRPM | HEART RATE: 97 BPM | SYSTOLIC BLOOD PRESSURE: 130 MMHG | BODY MASS INDEX: 38.75 KG/M2 | HEIGHT: 63 IN | WEIGHT: 218.7 LBS | TEMPERATURE: 97.9 F | DIASTOLIC BLOOD PRESSURE: 64 MMHG

## 2018-03-26 DIAGNOSIS — C82.08 GRADE 1 FOLLICULAR LYMPHOMA OF LYMPH NODES OF MULTIPLE REGIONS (HCC): Primary | ICD-10-CM

## 2018-03-26 DIAGNOSIS — C82.08 GRADE 1 FOLLICULAR LYMPHOMA OF LYMPH NODES OF MULTIPLE REGIONS (HCC): ICD-10-CM

## 2018-03-26 DIAGNOSIS — R11.0 NAUSEA: ICD-10-CM

## 2018-03-26 DIAGNOSIS — Z45.2 ENCOUNTER FOR VENOUS ACCESS DEVICE CARE: ICD-10-CM

## 2018-03-26 LAB
ALBUMIN SERPL-MCNC: 4.2 G/DL (ref 3.4–4.8)
ALBUMIN/GLOB SERPL: 1.4 G/DL (ref 1.1–1.8)
ALP SERPL-CCNC: 90 U/L (ref 38–126)
ALT SERPL W P-5'-P-CCNC: 46 U/L (ref 9–52)
ANION GAP SERPL CALCULATED.3IONS-SCNC: 20 MMOL/L (ref 5–15)
AST SERPL-CCNC: 36 U/L (ref 14–36)
BASOPHILS # BLD AUTO: 0.02 10*3/MM3 (ref 0–0.2)
BASOPHILS NFR BLD AUTO: 0.4 % (ref 0–2)
BILIRUB SERPL-MCNC: 0.3 MG/DL (ref 0.2–1.3)
BUN BLD-MCNC: 4 MG/DL (ref 7–21)
BUN/CREAT SERPL: 7.7 (ref 7–25)
CALCIUM SPEC-SCNC: 9.3 MG/DL (ref 8.4–10.2)
CHLORIDE SERPL-SCNC: 95 MMOL/L (ref 95–110)
CO2 SERPL-SCNC: 20 MMOL/L (ref 22–31)
CREAT BLD-MCNC: 0.52 MG/DL (ref 0.5–1)
DEPRECATED RDW RBC AUTO: 46 FL (ref 36.4–46.3)
EOSINOPHIL # BLD AUTO: 0.12 10*3/MM3 (ref 0–0.7)
EOSINOPHIL NFR BLD AUTO: 2.4 % (ref 0–7)
ERYTHROCYTE [DISTWIDTH] IN BLOOD BY AUTOMATED COUNT: 14.2 % (ref 11.5–14.5)
GFR SERPL CREATININE-BSD FRML MDRD: 121 ML/MIN/1.73 (ref 51–120)
GLOBULIN UR ELPH-MCNC: 3 GM/DL (ref 2.3–3.5)
GLUCOSE BLD-MCNC: 228 MG/DL (ref 60–100)
HCT VFR BLD AUTO: 37.5 % (ref 35–45)
HGB BLD-MCNC: 13 G/DL (ref 12–15.5)
IMM GRANULOCYTES # BLD: 0.05 10*3/MM3 (ref 0–0.02)
IMM GRANULOCYTES NFR BLD: 1 % (ref 0–0.5)
LYMPHOCYTES # BLD AUTO: 0.48 10*3/MM3 (ref 0.6–4.2)
LYMPHOCYTES NFR BLD AUTO: 9.6 % (ref 10–50)
MCH RBC QN AUTO: 30.7 PG (ref 26.5–34)
MCHC RBC AUTO-ENTMCNC: 34.7 G/DL (ref 31.4–36)
MCV RBC AUTO: 88.4 FL (ref 80–98)
MONOCYTES # BLD AUTO: 0.6 10*3/MM3 (ref 0–0.9)
MONOCYTES NFR BLD AUTO: 12 % (ref 0–12)
NEUTROPHILS # BLD AUTO: 3.73 10*3/MM3 (ref 2–8.6)
NEUTROPHILS NFR BLD AUTO: 74.6 % (ref 37–80)
NRBC BLD MANUAL-RTO: 0 /100 WBC (ref 0–0)
PLATELET # BLD AUTO: 319 10*3/MM3 (ref 150–450)
PMV BLD AUTO: 9 FL (ref 8–12)
POTASSIUM BLD-SCNC: 4.2 MMOL/L (ref 3.5–5.1)
PROT SERPL-MCNC: 7.2 G/DL (ref 6.3–8.6)
RBC # BLD AUTO: 4.24 10*6/MM3 (ref 3.77–5.16)
SODIUM BLD-SCNC: 135 MMOL/L (ref 137–145)
WBC NRBC COR # BLD: 5 10*3/MM3 (ref 3.2–9.8)

## 2018-03-26 PROCEDURE — 36415 COLL VENOUS BLD VENIPUNCTURE: CPT | Performed by: INTERNAL MEDICINE

## 2018-03-26 PROCEDURE — 25010000002 HEPARIN FLUSH (PORCINE) 100 UNIT/ML SOLUTION: Performed by: INTERNAL MEDICINE

## 2018-03-26 PROCEDURE — 25010000002 RITUXIMAB 500 MG/50ML SOLUTION 50 ML VIAL: Performed by: INTERNAL MEDICINE

## 2018-03-26 PROCEDURE — 85025 COMPLETE CBC W/AUTO DIFF WBC: CPT | Performed by: INTERNAL MEDICINE

## 2018-03-26 PROCEDURE — 99214 OFFICE O/P EST MOD 30 MIN: CPT | Performed by: INTERNAL MEDICINE

## 2018-03-26 PROCEDURE — 80053 COMPREHEN METABOLIC PANEL: CPT | Performed by: INTERNAL MEDICINE

## 2018-03-26 PROCEDURE — 25010000002 DIPHENHYDRAMINE PER 50 MG: Performed by: INTERNAL MEDICINE

## 2018-03-26 PROCEDURE — 25010000002 RITUXIMAB 100 MG/10ML SOLUTION 10 ML VIAL: Performed by: INTERNAL MEDICINE

## 2018-03-26 PROCEDURE — 25010000002 ONDANSETRON PER 1 MG: Performed by: INTERNAL MEDICINE

## 2018-03-26 PROCEDURE — 96415 CHEMO IV INFUSION ADDL HR: CPT | Performed by: NURSE PRACTITIONER

## 2018-03-26 PROCEDURE — 96413 CHEMO IV INFUSION 1 HR: CPT | Performed by: NURSE PRACTITIONER

## 2018-03-26 PROCEDURE — G8417 CALC BMI ABV UP PARAM F/U: HCPCS | Performed by: INTERNAL MEDICINE

## 2018-03-26 PROCEDURE — 96375 TX/PRO/DX INJ NEW DRUG ADDON: CPT | Performed by: NURSE PRACTITIONER

## 2018-03-26 RX ORDER — FAMOTIDINE 10 MG/ML
20 INJECTION, SOLUTION INTRAVENOUS AS NEEDED
Status: DISCONTINUED | OUTPATIENT
Start: 2018-03-26 | End: 2018-03-26 | Stop reason: HOSPADM

## 2018-03-26 RX ORDER — MEPERIDINE HYDROCHLORIDE 50 MG/ML
25 INJECTION INTRAMUSCULAR; INTRAVENOUS; SUBCUTANEOUS
Status: DISCONTINUED | OUTPATIENT
Start: 2018-03-26 | End: 2018-03-26 | Stop reason: HOSPADM

## 2018-03-26 RX ORDER — EXENATIDE 2 MG/.65ML
INJECTION, SUSPENSION, EXTENDED RELEASE SUBCUTANEOUS
COMMUNITY
Start: 2018-03-15 | End: 2020-08-27

## 2018-03-26 RX ORDER — SODIUM CHLORIDE 9 MG/ML
250 INJECTION, SOLUTION INTRAVENOUS ONCE
Status: CANCELLED | OUTPATIENT
Start: 2018-03-26

## 2018-03-26 RX ORDER — DIPHENHYDRAMINE HYDROCHLORIDE 50 MG/ML
50 INJECTION INTRAMUSCULAR; INTRAVENOUS AS NEEDED
Status: DISCONTINUED | OUTPATIENT
Start: 2018-03-26 | End: 2018-03-26 | Stop reason: HOSPADM

## 2018-03-26 RX ORDER — MEPERIDINE HYDROCHLORIDE 50 MG/ML
25 INJECTION INTRAMUSCULAR; INTRAVENOUS; SUBCUTANEOUS
Status: CANCELLED | OUTPATIENT
Start: 2018-03-26

## 2018-03-26 RX ORDER — FAMOTIDINE 10 MG/ML
20 INJECTION, SOLUTION INTRAVENOUS AS NEEDED
Status: CANCELLED | OUTPATIENT
Start: 2018-03-26

## 2018-03-26 RX ORDER — ACETAMINOPHEN 325 MG/1
650 TABLET ORAL ONCE
Status: CANCELLED | OUTPATIENT
Start: 2018-03-26

## 2018-03-26 RX ORDER — SODIUM CHLORIDE 9 MG/ML
250 INJECTION, SOLUTION INTRAVENOUS ONCE
Status: COMPLETED | OUTPATIENT
Start: 2018-03-26 | End: 2018-03-26

## 2018-03-26 RX ORDER — SODIUM CHLORIDE 0.9 % (FLUSH) 0.9 %
10 SYRINGE (ML) INJECTION AS NEEDED
Status: CANCELLED | OUTPATIENT
Start: 2018-04-30

## 2018-03-26 RX ORDER — DIPHENHYDRAMINE HYDROCHLORIDE 50 MG/ML
50 INJECTION INTRAMUSCULAR; INTRAVENOUS AS NEEDED
Status: CANCELLED | OUTPATIENT
Start: 2018-03-26

## 2018-03-26 RX ORDER — SODIUM CHLORIDE 0.9 % (FLUSH) 0.9 %
10 SYRINGE (ML) INJECTION AS NEEDED
Status: DISCONTINUED | OUTPATIENT
Start: 2018-03-26 | End: 2018-03-26 | Stop reason: HOSPADM

## 2018-03-26 RX ORDER — ACETAMINOPHEN 325 MG/1
650 TABLET ORAL ONCE
Status: COMPLETED | OUTPATIENT
Start: 2018-03-26 | End: 2018-03-26

## 2018-03-26 RX ADMIN — Medication 10 ML: at 08:53

## 2018-03-26 RX ADMIN — ONDANSETRON 8 MG: 2 INJECTION INTRAMUSCULAR; INTRAVENOUS at 11:40

## 2018-03-26 RX ADMIN — ACETAMINOPHEN 650 MG: 325 TABLET ORAL at 09:57

## 2018-03-26 RX ADMIN — SODIUM CHLORIDE, PRESERVATIVE FREE 500 UNITS: 5 INJECTION INTRAVENOUS at 14:35

## 2018-03-26 RX ADMIN — Medication 10 ML: at 14:35

## 2018-03-26 RX ADMIN — SODIUM CHLORIDE 250 ML: 9 INJECTION, SOLUTION INTRAVENOUS at 09:48

## 2018-03-26 RX ADMIN — DIPHENHYDRAMINE HYDROCHLORIDE 25 MG: 50 INJECTION INTRAMUSCULAR; INTRAVENOUS at 10:12

## 2018-03-26 RX ADMIN — RITUXIMAB 750 MG: 10 INJECTION, SOLUTION INTRAVENOUS at 10:48

## 2018-03-26 NOTE — PROGRESS NOTES
DATE OF VISIT: 3/26/2018    REASON FOR VISIT: Recurrent follicular lymphoma    HISTORY OF PRESENT ILLNESS:    58-year-old female with a past medical history significant for recurrent follicular lymphoma status post radiation, last round of radiation was around September 2016 for follicular lymphoma.  Patient was again found to have aortocaval adenopathy on  CT of chest abdomen and pelvis in March 2017.  Patient was started on rituximab April 10, 2017.  Patient is here to get Seventh dose of rituximab today.  Complains of intermittent nausea and diarrhea.  Complains of discomfort in right upper quadrant.  Denies any fevers or chills or night sweats.  Denies any abnormal swollen and anywhere in the body.        PAST MEDICAL HISTORY:    Past Medical History:   Diagnosis Date   • Acute bronchitis    • Agoraphobia with panic disorder     on SNRI, prn buspar, prn klonopin     • Anxiety    • Arthritis    • Atrophic vaginitis    • Chest pain     work up as new onset angina    • Depression      MDD      • Essential hypertension    • Follicular non-Hodgkin's lymphoma    • Generalized anxiety disorder     SSRI - buspar, cont cymbalta, prn klonopin      • Hip pain     arthritis, artifical right hip     • History of bone density study 02/09/2009    DEXA (bone density) test, peripheral (Normal   • History of echocardiogram 01/03/2012    Echocardiogram 43105 (Normal echocardigram. EF 55-60%)   • History of mammogram    • Hyperlipidemia    • Mass of lower limb    • Nuclear senile cataract    • Obesity    • Otalgia     ENT REFER   • Panic disorder     with agoraphobia. On buspar and klonopin now      • Type 2 diabetes mellitus     NO BDR   • Upper respiratory infection        SOCIAL HISTORY:    Social History   Substance Use Topics   • Smoking status: Former Smoker     Years: 12.00     Quit date: 1/1/1998   • Smokeless tobacco: Never Used      Comment: Smoked 1 pack per 2 weeks   • Alcohol use No       Surgical History :  Past  Surgical History:   Procedure Laterality Date   • CENTRAL VENOUS LINE INSERTION  03/11/2016    Central line insertion (Successful placement of right upper extremity midline.)   • COLONOSCOPY     • CYSTOSCOPY  03/23/1976    Cystoscopy (external urethroplasty and cysto-panendoscopy,urethal hymenal fusion with hypospadias)   • DILATATION AND CURETTAGE  01/25/1980    D&C (removal of IUD)   • EXCISION LESION  04/15/2014    Remove thigh lesion (Excision of a mass of the right thigh using a 16.2 cm incision with intermediate layered closure.)   • HYSTEROSCOPY  02/28/2003    Hysteroscope procedure (diagnostic hysteroscopy with fractional D&C)   • INJECTION OF MEDICATION  08/22/2011    kenalog (1)   • OTHER SURGICAL HISTORY      Explore parathyroid glands   • OTHER SURGICAL HISTORY  08/26/1985    Laparosc diagnostic (desires elective tubal reversal)   • PAP SMEAR  05/24/2005    PAP SMEAR (normal)   • [place] INSJ TUNNELED CVC W/O SUBQ PORT/ AGE 5 YR/> Right 4/4/2017    Procedure: INSERTION VENOUS ACCESS DEVICE (MEDIPORT) right chest;  Surgeon: Fortino Medina MD;  Location: [facility];  Service: General   • [place] INSJ TUNNELED CVC W/O SUBQ PORT/ AGE 5 YR/> N/A 6/6/2017    Procedure: INSERTION VENOUS ACCESS DEVICE   (MEDIPORT)   (C-ARM#1);  Surgeon: Fortino Medina MD;  Location: [facility];  Service: General   • SHOULDER ARTHROSCOPY  11/11/2013    Shoulder arthroscopy/surgery (Right shoulder. Rotator cuff repair. Subacromial decompression. Edgar procedure.)   • SHOULDER SURGERY  12/28/2015    Shoulder surgery procedure (Arthroscopy of the left shoulder with rotator cuff repair.Edgar procedure.Subacromial decompression.)   • TONSILLECTOMY AND ADENOIDECTOMY  05/29/1967    T&A (hypertrophied tonsils and adenoids with recurrent infection)   • TOTAL ABDOMINAL HYSTERECTOMY WITH SALPINGO OOPHORECTOMY  02/03/2004    ARIELLE/BSO (with a preop fractional dilation and curettage with frozen section,menorrhagia,dysmenorrhea,unresponsive to medical  "intervention)   • TOTAL HIP ARTHROPLASTY     • VAGINA SURGERY  03/02/1981    Anesth, surgery on vagina (colpotomy with bilateral partial salpingectomy, multiparity contraindicated)       ALLERGIES:    Allergies   Allergen Reactions   • Ace Inhibitors Cough   • Latex      BLISTER     • Morphine And Related Nausea And Vomiting   • Lortab [Hydrocodone-Acetaminophen] Palpitations       REVIEW OF SYSTEMS:      CONSTITUTIONAL:   No fever, chills, or night sweats.     HEENT:  No epistaxis, mouth sores, or difficulty swallowing.    RESPIRATORY:  No new shortness of breath .  No new cough or hemoptysis.    CARDIOVASCULAR:  No chest pain or palpitations.    GASTROINTESTINAL: Complains of intermittent nausea and diarrhea. Complains of discomfort in right upper quadrant. No vomiting, or blood in the stool.    GENITOURINARY:  No dysuria or hematuria.    MUSCULOSKELETAL:   No any new back pain or arthralgias.     NEUROLOGICAL:  No tingling or numbness. No new headache or dizziness.     LYMPHATICS:  Denies any abnormal swollen and anywhere in the body.    SKIN:  Denies any new skin rash.          PHYSICAL EXAMINATION:      VITAL SIGNS:  /64   Pulse 97   Temp 97.9 °F (36.6 °C) (Temporal Artery )   Resp 16   Ht 160 cm (63\") Comment: Manual  Wt 99.2 kg (218 lb 11.1 oz)   BMI 38.74 kg/m²     GENERAL:  Not in any distress.    HEENT:  Normocephalic, Atraumatic.Mild Conjunctival pallor. No icterus. Extraocular Movements Intact. No Fascial Asymmetry noted.    NECK:  No adenopathy. No JVD.      RESPIRATORY:  Fair air entry bilateral. No rhonchi or wheezing.    CARDIOVASCULAR:  S1, S2. Regular rate and rhythm. No murmur or gallop appreciated.  Port-A-Cath present on the right chest wall.    ABDOMEN:  Soft, obese, nontender. Bowel sounds present in all four quadrants.  No organomegaly appreciated.    EXTREMITIES:  No edema.No Calf Tenderness.      NEUROLOGIC:  Alert, awake and oriented ×3.  No  Motor or sensory deficit " appreciated. Cranial Nerves 2-12 grossly intact.          DIAGNOSTIC DATA:    Glucose   Date Value Ref Range Status   03/26/2018 228 (H) 60 - 100 mg/dL Final     Sodium   Date Value Ref Range Status   03/26/2018 135 (L) 137 - 145 mmol/L Final     Potassium   Date Value Ref Range Status   03/26/2018 4.2 3.5 - 5.1 mmol/L Final     CO2   Date Value Ref Range Status   03/26/2018 20.0 (L) 22.0 - 31.0 mmol/L Final     Chloride   Date Value Ref Range Status   03/26/2018 95 95 - 110 mmol/L Final     Anion Gap   Date Value Ref Range Status   03/26/2018 20.0 (H) 5.0 - 15.0 mmol/L Final     Creatinine   Date Value Ref Range Status   03/26/2018 0.52 0.50 - 1.00 mg/dL Final     BUN   Date Value Ref Range Status   03/26/2018 4 (L) 7 - 21 mg/dL Final     BUN/Creatinine Ratio   Date Value Ref Range Status   03/26/2018 7.7 7.0 - 25.0 Final     Calcium   Date Value Ref Range Status   03/26/2018 9.3 8.4 - 10.2 mg/dL Final     eGFR Non  Amer   Date Value Ref Range Status   03/26/2018 121 (H) 51 - 120 mL/min/1.73 Final     Alkaline Phosphatase   Date Value Ref Range Status   03/26/2018 90 38 - 126 U/L Final     Total Protein   Date Value Ref Range Status   03/26/2018 7.2 6.3 - 8.6 g/dL Final     ALT (SGPT)   Date Value Ref Range Status   03/26/2018 46 9 - 52 U/L Final     AST (SGOT)   Date Value Ref Range Status   03/26/2018 36 14 - 36 U/L Final     Total Bilirubin   Date Value Ref Range Status   03/26/2018 0.3 0.2 - 1.3 mg/dL Final     Albumin   Date Value Ref Range Status   03/26/2018 4.20 3.40 - 4.80 g/dL Final     Globulin   Date Value Ref Range Status   03/26/2018 3.0 2.3 - 3.5 gm/dL Final     A/G Ratio   Date Value Ref Range Status   03/26/2018 1.4 1.1 - 1.8 g/dL Final     Lab Results   Component Value Date    WBC 5.00 03/26/2018    HGB 13.0 03/26/2018    HCT 37.5 03/26/2018    MCV 88.4 03/26/2018     03/26/2018     Lab Results   Component Value Date    NEUTROABS 3.73 03/26/2018         PATHOLOGY:  Pathology from  right thigh biopsy done in April 2014 showed:  FINAL DIAGNOSIS:   A.  SUBCUTANEOUS MASS, RIGHT THIGH:             FOLLICULAR LYMPHOMA, GRADE 1 OF 3.   B.  SUBCUTANEOUS MASS, RIGHT THIGH:             FOLLICULAR LYMPHOMA, GRADE 1 OF 3.          Comment   COMMENT:   Immunostains have follicular lymphoma positive for CD20, negative for   CD3, negative for CD5, positive for CD10, positive for CD23, negative   for CD43, negative for cyclin D1, positive for BCL-2 and positive for   BCL-6.  The case is submitted to the [facility] for evaluation and   their report is attached.            RADIOLOGY DATA :  CT Of chest, abdomen and pelvis with contrast done on July 17, 2017 showed:  IMPRESSION:  CONCLUSION:      Decrease in size of the enlarged aortocaval retroperitoneal lymph  nodes seen on prior examination March 24, 2017. Favorable  interval change. Right total hip arthroplasty. Otherwise  unremarkable CT chest, abdomen and pelvis with contrast.            ASSESSMENT AND PLAN:       1.  Recurrent follicular lymphoma grade 1, initially patient was diagnosed with a right groin lymph node adenopathy in April 2014 for which patient underwent palliative radiation therapy.  Subsequently patient again had a relapse around September 2016 for workup with second course of palliative radiation therapy was given by Dr. Leonel Vargas in October 2016.  In view of enlarged aortocaval lymph nodes, patient was started on rituximab treatment in April 2017.    CT scan done in July 2017 shows decreasing in the size of aortocaval lymph node which was discussed with patient.  We will go head with cycle 7 of rituximab today.  We will see her back in about 2 months with a repeat CBC and CMP on that day. We'll get restaging CT of chest, abdomen and pelvis with contrast to be done prior to next clinic visit in 2 months.    2.  History of dyslipidemia    3.  Diabetes mellitus    4. Elevated BMI : Patient's BMI is elevated at 38.8.  Patient was  informed about elevated BMI and need to lose weight.  Patient was counseled about diet and exercise for weight loss.    5.  Health maintenance: Patient does not smoke.  Last colonoscopy was done in 2014 which was normal as per patient.  She remains full code.        Tino Sood MD  3/26/2018  1:10 PM        EMR Dragon/Transcription disclaimer:   Much of this encounter note is an electronic transcription/translation of spoken language to printed text. The electronic translation of spoken language may permit erroneous, or at times, nonsensical words or phrases to be inadvertently transcribed; Although I have reviewed the note for such errors, some may still exist.

## 2018-04-06 ENCOUNTER — TELEPHONE (OUTPATIENT)
Dept: ONCOLOGY | Facility: HOSPITAL | Age: 58
End: 2018-04-06

## 2018-04-06 NOTE — TELEPHONE ENCOUNTER
----- Message from Tino oSod MD sent at 4/5/2018  3:05 PM CDT -----  Regarding: RE: stomach pain  Contact: [phone]  No. If it shows something abnormal ,in that case we may have to.otherwise will keep same appointment. Thanks  ----- Message -----  From: Shannon Majano RN  Sent: 4/5/2018   2:40 PM  To: Tino Sood MD  Subject: FW: stomach pain                                 CT is scheduled for 4/11. Do I need to move her follow up with you?    ----- Message -----  From: Tino Sood MD  Sent: 4/5/2018  12:39 PM  To: Shannon Majano RN  Subject: RE: stomach pain                                 That is fine we can do it sooner. Please Schedule it next week. Thanks  ----- Message -----  From: Shannon Majano RN  Sent: 4/5/2018  10:42 AM  To: Tino Sood MD  Subject: FW: stomach pain                                 Patient states that she is still having stomach pain and wants to know if we can move her CT scan up sooner? Please advise.    ----- Message -----  From: Bindu Coronado  Sent: 4/5/2018  10:01 AM  To: [facility]  Subject: stomach pain                                     Patient is wanting to be seen, having stomach pain a little over a week. Please advise

## 2018-04-11 ENCOUNTER — APPOINTMENT (OUTPATIENT)
Dept: CT IMAGING | Facility: HOSPITAL | Age: 58
End: 2018-04-11

## 2018-04-11 ENCOUNTER — HOSPITAL ENCOUNTER (OUTPATIENT)
Dept: CT IMAGING | Facility: HOSPITAL | Age: 58
Discharge: HOME | End: 2018-04-11
Admitting: INTERNAL MEDICINE

## 2018-04-11 DIAGNOSIS — C82.08 GRADE 1 FOLLICULAR LYMPHOMA OF LYMPH NODES OF MULTIPLE REGIONS (HCC): ICD-10-CM

## 2018-04-11 PROCEDURE — 25010000002 IOPAMIDOL 61 % SOLUTION: Performed by: INTERNAL MEDICINE

## 2018-04-11 PROCEDURE — 71260 CT THORAX DX C+: CPT

## 2018-04-11 PROCEDURE — 74177 CT ABD & PELVIS W/CONTRAST: CPT

## 2018-04-11 RX ADMIN — IOPAMIDOL 93 ML: 612 INJECTION, SOLUTION INTRAVENOUS at 09:30

## 2018-04-13 ENCOUNTER — OFFICE VISIT (OUTPATIENT)
Dept: ONCOLOGY | Facility: CLINIC | Age: 58
End: 2018-04-13

## 2018-04-13 VITALS
DIASTOLIC BLOOD PRESSURE: 84 MMHG | SYSTOLIC BLOOD PRESSURE: 140 MMHG | WEIGHT: 215.6 LBS | HEIGHT: 63 IN | BODY MASS INDEX: 38.2 KG/M2 | HEART RATE: 87 BPM | RESPIRATION RATE: 18 BRPM | TEMPERATURE: 97.8 F

## 2018-04-13 DIAGNOSIS — C82.03 FOLLICULAR LYMPHOMA GRADE I OF INTRA-ABDOMINAL LYMPH NODES (HCC): Primary | ICD-10-CM

## 2018-04-13 DIAGNOSIS — C82.08 GRADE 1 FOLLICULAR LYMPHOMA OF LYMPH NODES OF MULTIPLE REGIONS (HCC): ICD-10-CM

## 2018-04-13 PROCEDURE — G0463 HOSPITAL OUTPT CLINIC VISIT: HCPCS | Performed by: INTERNAL MEDICINE

## 2018-04-13 PROCEDURE — 99214 OFFICE O/P EST MOD 30 MIN: CPT | Performed by: INTERNAL MEDICINE

## 2018-04-13 RX ORDER — ONDANSETRON 4 MG/1
4 TABLET, FILM COATED ORAL 4 TIMES DAILY PRN
Qty: 40 TABLET | Refills: 3 | Status: SHIPPED | OUTPATIENT
Start: 2018-04-13 | End: 2018-08-21

## 2018-04-13 NOTE — PATIENT INSTRUCTIONS
Bendamustine Injection  What is this medicine?  BENDAMUSTINE (FARAZ da MUS teen) is a chemotherapy drug. It is used to treat chronic lymphocytic leukemia and non-Hodgkin lymphoma.  This medicine may be used for other purposes; ask your health care provider or pharmacist if you have questions.  COMMON BRAND NAME(S): Marilee RAMAN  What should I tell my health care provider before I take this medicine?  They need to know if you have any of these conditions:  -infection (especially a virus infection such as chickenpox, cold sores, or herpes)  -kidney disease  -liver disease  -an unusual or allergic reaction to bendamustine, mannitol, other medicines, foods, dyes, or preservatives  -pregnant or trying to get pregnant  -breast-feeding  How should I use this medicine?  This medicine is for infusion into a vein. It is given by a health care professional in a hospital or clinic setting.  Talk to your pediatrician regarding the use of this medicine in children. Special care may be needed.  Overdosage: If you think you have taken too much of this medicine contact a poison control center or emergency room at once.  NOTE: This medicine is only for you. Do not share this medicine with others.  What if I miss a dose?  It is important not to miss your dose. Call your doctor or health care professional if you are unable to keep an appointment.  What may interact with this medicine?  Do not take this medicine with any of the following medications:  -clozapine  This medicine may also interact with the following medications:  -atazanavir  -cimetidine  -ciprofloxacin  -enoxacin  -fluvoxamine  -medicines for seizures like carbamazepine and phenobarbital  -mexiletine  -rifampin  -tacrine  -thiabendazole  -zileuton  This list may not describe all possible interactions. Give your health care provider a list of all the medicines, herbs, non-prescription drugs, or dietary supplements you use. Also tell them if you smoke, drink alcohol, or  use illegal drugs. Some items may interact with your medicine.  What should I watch for while using this medicine?  This drug may make you feel generally unwell. This is not uncommon, as chemotherapy can affect healthy cells as well as cancer cells. Report any side effects. Continue your course of treatment even though you feel ill unless your doctor tells you to stop.  You may need blood work done while you are taking this medicine.  Call your doctor or health care professional for advice if you get a fever, chills or sore throat, or other symptoms of a cold or flu. Do not treat yourself. This drug decreases your body's ability to fight infections. Try to avoid being around people who are sick.  This medicine may increase your risk to bruise or bleed. Call your doctor or health care professional if you notice any unusual bleeding.  Talk to your doctor about your risk of cancer. You may be more at risk for certain types of cancers if you take this medicine.  Do not become pregnant while taking this medicine or for 3 months after stopping it. Women should inform their doctor if they wish to become pregnant or think they might be pregnant. Men should not father a child while taking this medicine and for 3 months after stopping it.There is a potential for serious side effects to an unborn child. Talk to your health care professional or pharmacist for more information. Do not breast-feed an infant while taking this medicine.  This medicine may interfere with the ability to have a child. You should talk with your doctor or health care professional if you are concerned about your fertility.  What side effects may I notice from receiving this medicine?  Side effects that you should report to your doctor or health care professional as soon as possible:  -allergic reactions like skin rash, itching or hives, swelling of the face, lips, or tongue  -low blood counts - this medicine may decrease the number of white blood cells,  red blood cells and platelets. You may be at increased risk for infections and bleeding.  -redness, blistering, peeling or loosening of the skin, including inside the mouth  -signs of infection - fever or chills, cough, sore throat, pain or difficulty passing urine  -signs of decreased platelets or bleeding - bruising, pinpoint red spots on the skin, black, tarry stools, blood in the urine  -signs of decreased red blood cells - unusually weak or tired, fainting spells, lightheadedness  -signs and symptoms of kidney injury like trouble passing urine or change in the amount of urine  -signs and symptoms of liver injury like dark yellow or brown urine; general ill feeling or flu-like symptoms; light-colored stools; loss of appetite; nausea; right upper belly pain; unusually weak or tired; yellowing of the eyes or skin  Side effects that usually do not require medical attention (report to your doctor or health care professional if they continue or are bothersome):  -constipation  -decreased appetite  -diarrhea  -headache  -mouth sores  -nausea/vomiting  -tiredness  This list may not describe all possible side effects. Call your doctor for medical advice about side effects. You may report side effects to FDA at 6-222-FDA-0094.  Where should I keep my medicine?  This drug is given in a hospital or clinic and will not be stored at home.  NOTE: This sheet is a summary. It may not cover all possible information. If you have questions about this medicine, talk to your doctor, pharmacist, or health care provider.  © 2018 [employer] (2016-10-20 08:45:41)  Bendamustine Injection  What is this medicine?  BENDAMUSTINE (FARAZ da MUS teen) is a chemotherapy drug. It is used to treat chronic lymphocytic leukemia and non-Hodgkin lymphoma.  This medicine may be used for other purposes; ask your health care provider or pharmacist if you have questions.  COMMON BRAND NAME(S): Marilee RAMAN  What should I tell my health care  provider before I take this medicine?  They need to know if you have any of these conditions:  -infection (especially a virus infection such as chickenpox, cold sores, or herpes)  -kidney disease  -liver disease  -an unusual or allergic reaction to bendamustine, mannitol, other medicines, foods, dyes, or preservatives  -pregnant or trying to get pregnant  -breast-feeding  How should I use this medicine?  This medicine is for infusion into a vein. It is given by a health care professional in a hospital or clinic setting.  Talk to your pediatrician regarding the use of this medicine in children. Special care may be needed.  Overdosage: If you think you have taken too much of this medicine contact a poison control center or emergency room at once.  NOTE: This medicine is only for you. Do not share this medicine with others.  What if I miss a dose?  It is important not to miss your dose. Call your doctor or health care professional if you are unable to keep an appointment.  What may interact with this medicine?  Do not take this medicine with any of the following medications:  -clozapine  This medicine may also interact with the following medications:  -atazanavir  -cimetidine  -ciprofloxacin  -enoxacin  -fluvoxamine  -medicines for seizures like carbamazepine and phenobarbital  -mexiletine  -rifampin  -tacrine  -thiabendazole  -zileuton  This list may not describe all possible interactions. Give your health care provider a list of all the medicines, herbs, non-prescription drugs, or dietary supplements you use. Also tell them if you smoke, drink alcohol, or use illegal drugs. Some items may interact with your medicine.  What should I watch for while using this medicine?  This drug may make you feel generally unwell. This is not uncommon, as chemotherapy can affect healthy cells as well as cancer cells. Report any side effects. Continue your course of treatment even though you feel ill unless your doctor tells you to  stop.  You may need blood work done while you are taking this medicine.  Call your doctor or health care professional for advice if you get a fever, chills or sore throat, or other symptoms of a cold or flu. Do not treat yourself. This drug decreases your body's ability to fight infections. Try to avoid being around people who are sick.  This medicine may increase your risk to bruise or bleed. Call your doctor or health care professional if you notice any unusual bleeding.  Talk to your doctor about your risk of cancer. You may be more at risk for certain types of cancers if you take this medicine.  Do not become pregnant while taking this medicine or for 3 months after stopping it. Women should inform their doctor if they wish to become pregnant or think they might be pregnant. Men should not father a child while taking this medicine and for 3 months after stopping it.There is a potential for serious side effects to an unborn child. Talk to your health care professional or pharmacist for more information. Do not breast-feed an infant while taking this medicine.  This medicine may interfere with the ability to have a child. You should talk with your doctor or health care professional if you are concerned about your fertility.  What side effects may I notice from receiving this medicine?  Side effects that you should report to your doctor or health care professional as soon as possible:  -allergic reactions like skin rash, itching or hives, swelling of the face, lips, or tongue  -low blood counts - this medicine may decrease the number of white blood cells, red blood cells and platelets. You may be at increased risk for infections and bleeding.  -redness, blistering, peeling or loosening of the skin, including inside the mouth  -signs of infection - fever or chills, cough, sore throat, pain or difficulty passing urine  -signs of decreased platelets or bleeding - bruising, pinpoint red spots on the skin, black, tarry  stools, blood in the urine  -signs of decreased red blood cells - unusually weak or tired, fainting spells, lightheadedness  -signs and symptoms of kidney injury like trouble passing urine or change in the amount of urine  -signs and symptoms of liver injury like dark yellow or brown urine; general ill feeling or flu-like symptoms; light-colored stools; loss of appetite; nausea; right upper belly pain; unusually weak or tired; yellowing of the eyes or skin  Side effects that usually do not require medical attention (report to your doctor or health care professional if they continue or are bothersome):  -constipation  -decreased appetite  -diarrhea  -headache  -mouth sores  -nausea/vomiting  -tiredness  This list may not describe all possible side effects. Call your doctor for medical advice about side effects. You may report side effects to FDA at 3-705-FDA-5930.  Where should I keep my medicine?  This drug is given in a hospital or clinic and will not be stored at home.  NOTE: This sheet is a summary. It may not cover all possible information. If you have questions about this medicine, talk to your doctor, pharmacist, or health care provider.  © 2018 [employer] (2016-10-20 08:45:41)  Rituximab injection  What is this medicine?  RITUXIMAB (ri TUX i mab) is a monoclonal antibody. It is used to treat certain types of cancer like non-Hodgkin lymphoma and chronic lymphocytic leukemia. It is also used to treat rheumatoid arthritis, granulomatosis with polyangiitis (or Wegener's granulomatosis), and microscopic polyangiitis.  This medicine may be used for other purposes; ask your health care provider or pharmacist if you have questions.  COMMON BRAND NAME(S): Rituxan  What should I tell my health care provider before I take this medicine?  They need to know if you have any of these conditions:  -heart disease  -infection (especially a virus infection such as hepatitis B, chickenpox, cold sores, or  herpes)  -immune system problems  -irregular heartbeat  -kidney disease  -lung or breathing disease, like asthma  -recently received or scheduled to receive a vaccine  -an unusual or allergic reaction to rituximab, mouse proteins, other medicines, foods, dyes, or preservatives  -pregnant or trying to get pregnant  -breast-feeding  How should I use this medicine?  This medicine is for infusion into a vein. It is administered in a hospital or clinic by a specially trained health care professional.  A special MedGuide will be given to you by the pharmacist with each prescription and refill. Be sure to read this information carefully each time.  Talk to your pediatrician regarding the use of this medicine in children. This medicine is not approved for use in children.  Overdosage: If you think you have taken too much of this medicine contact a poison control center or emergency room at once.  NOTE: This medicine is only for you. Do not share this medicine with others.  What if I miss a dose?  It is important not to miss a dose. Call your doctor or health care professional if you are unable to keep an appointment.  What may interact with this medicine?  -cisplatin  -other medicines for arthritis like disease modifying antirheumatic drugs or tumor necrosis factor inhibitors  -live virus vaccines  This list may not describe all possible interactions. Give your health care provider a list of all the medicines, herbs, non-prescription drugs, or dietary supplements you use. Also tell them if you smoke, drink alcohol, or use illegal drugs. Some items may interact with your medicine.  What should I watch for while using this medicine?  Your condition will be monitored carefully while you are receiving this medicine. You may need blood work done while you are taking this medicine.  This medicine can cause serious allergic reactions. To reduce your risk you may need to take medicine before treatment with this medicine. Take your  medicine as directed.  In some patients, this medicine may cause a serious brain infection that may cause death. If you have any problems seeing, thinking, speaking, walking, or standing, tell your doctor right away. If you cannot reach your doctor, urgently seek other source of medical care.  Call your doctor or health care professional for advice if you get a fever, chills or sore throat, or other symptoms of a cold or flu. Do not treat yourself. This drug decreases your body's ability to fight infections. Try to avoid being around people who are sick.  Do not become pregnant while taking this medicine or for 12 months after stopping it. Women should inform their doctor if they wish to become pregnant or think they might be pregnant. There is a potential for serious side effects to an unborn child. Talk to your health care professional or pharmacist for more information.  What side effects may I notice from receiving this medicine?  Side effects that you should report to your doctor or health care professional as soon as possible:  -breathing problems  -chest pain  -dizziness or feeling faint  -fast, irregular heartbeat  -low blood counts - this medicine may decrease the number of white blood cells, red blood cells and platelets. You may be at increased risk for infections and bleeding.  -mouth sores  -redness, blistering, peeling or loosening of the skin, including inside the mouth (this can be added for any serious or exfoliative rash that could lead to hospitalization)  -signs of infection - fever or chills, cough, sore throat, pain or difficulty passing urine  -signs and symptoms of kidney injury like trouble passing urine or change in the amount of urine  -signs and symptoms of liver injury like dark yellow or brown urine; general ill feeling or flu-like symptoms; light-colored stools; loss of appetite; nausea; right upper belly pain; unusually weak or tired; yellowing of the eyes or skin  -stomach  pain  -vomiting  Side effects that usually do not require medical attention (report to your doctor or health care professional if they continue or are bothersome):  -headache  -joint pain  -muscle cramps or muscle pain  This list may not describe all possible side effects. Call your doctor for medical advice about side effects. You may report side effects to FDA at 3-552-JGS-8099.  Where should I keep my medicine?  This drug is given in a hospital or clinic and will not be stored at home.  NOTE: This sheet is a summary. It may not cover all possible information. If you have questions about this medicine, talk to your doctor, pharmacist, or health care provider.  © 2018 [employer] (2017-07-26 15:28:09)

## 2018-04-13 NOTE — PROGRESS NOTES
DATE OF VISIT: 4/13/2018    REASON FOR VISIT: Recurrent follicular lymphoma    HISTORY OF PRESENT ILLNESS:    58-year-old female with a past medical history significant for recurrent follicular lymphoma status post radiation, last round of radiation was around September 2016 for follicular lymphoma.  Patient was again found to have aortocaval adenopathy on  CT of chest abdomen and pelvis in March 2017.  Patient was started on rituximab April 10, 2017.  Patient has so far received 7 doses of rituximab 2 months apart last of which was on March 26,2018.  In view of worsening abdominal pain patient had a restaging CT of chest, abdomen and pelvis with contrast done earlier this week.  She is here to discuss the results of CT scan and further recommendation.  Complains of intermittent nausea.  Denies any bleeding.  Denies any new lymph node enlargement.      PAST MEDICAL HISTORY:    Past Medical History:   Diagnosis Date   • Acute bronchitis    • Agoraphobia with panic disorder     on SNRI, prn buspar, prn klonopin     • Anxiety    • Arthritis    • Atrophic vaginitis    • Chest pain     work up as new onset angina    • Depression      MDD      • Essential hypertension    • Follicular non-Hodgkin's lymphoma    • Generalized anxiety disorder     SSRI - buspar, cont cymbalta, prn klonopin      • Hip pain     arthritis, artifical right hip     • History of bone density study 02/09/2009    DEXA (bone density) test, peripheral (Normal   • History of echocardiogram 01/03/2012    Echocardiogram 78954 (Normal echocardigram. EF 55-60%)   • History of mammogram    • Hyperlipidemia    • Mass of lower limb    • Nuclear senile cataract    • Obesity    • Otalgia     ENT REFER   • Panic disorder     with agoraphobia. On buspar and klonopin now      • Type 2 diabetes mellitus     NO BDR   • Upper respiratory infection        SOCIAL HISTORY:    Social History   Substance Use Topics   • Smoking status: Former Smoker     Years: 12.00      Quit date: 1/1/1998   • Smokeless tobacco: Never Used      Comment: Smoked 1 pack per 2 weeks   • Alcohol use No       Surgical History :  Past Surgical History:   Procedure Laterality Date   • CENTRAL VENOUS LINE INSERTION  03/11/2016    Central line insertion (Successful placement of right upper extremity midline.)   • COLONOSCOPY     • CYSTOSCOPY  03/23/1976    Cystoscopy (external urethroplasty and cysto-panendoscopy,urethal hymenal fusion with hypospadias)   • DILATATION AND CURETTAGE  01/25/1980    D&C (removal of IUD)   • EXCISION LESION  04/15/2014    Remove thigh lesion (Excision of a mass of the right thigh using a 16.2 cm incision with intermediate layered closure.)   • HYSTEROSCOPY  02/28/2003    Hysteroscope procedure (diagnostic hysteroscopy with fractional D&C)   • INJECTION OF MEDICATION  08/22/2011    kenalog (1)   • OTHER SURGICAL HISTORY      Explore parathyroid glands   • OTHER SURGICAL HISTORY  08/26/1985    Laparosc diagnostic (desires elective tubal reversal)   • PAP SMEAR  05/24/2005    PAP SMEAR (normal)   • [place] INSJ TUNNELED CVC W/O SUBQ PORT/ AGE 5 YR/> Right 4/4/2017    Procedure: INSERTION VENOUS ACCESS DEVICE (MEDIPORT) right chest;  Surgeon: Fortino Medina MD;  Location: [facility] OR;  Service: General   • ID INSJ TUNNELED CVC W/O SUBQ PORT/ AGE 5 YR/> N/A 6/6/2017    Procedure: INSERTION VENOUS ACCESS DEVICE   (MEDIPORT)   (C-ARM#1);  Surgeon: Fortino Medina MD;  Location: [facility] OR;  Service: General   • SHOULDER ARTHROSCOPY  11/11/2013    Shoulder arthroscopy/surgery (Right shoulder. Rotator cuff repair. Subacromial decompression. Edgar procedure.)   • SHOULDER SURGERY  12/28/2015    Shoulder surgery procedure (Arthroscopy of the left shoulder with rotator cuff repair.Edgar procedure.Subacromial decompression.)   • TONSILLECTOMY AND ADENOIDECTOMY  05/29/1967    T&A (hypertrophied tonsils and adenoids with recurrent infection)   • TOTAL ABDOMINAL HYSTERECTOMY WITH SALPINGO  "OOPHORECTOMY  02/03/2004    ARIELEL/BSO (with a preop fractional dilation and curettage with frozen section,menorrhagia,dysmenorrhea,unresponsive to medical intervention)   • TOTAL HIP ARTHROPLASTY     • VAGINA SURGERY  03/02/1981    Anesth, surgery on vagina (colpotomy with bilateral partial salpingectomy, multiparity contraindicated)       ALLERGIES:    Allergies   Allergen Reactions   • Ace Inhibitors Cough   • Latex      BLISTER     • Morphine And Related Nausea And Vomiting   • Lortab [Hydrocodone-Acetaminophen] Palpitations       REVIEW OF SYSTEMS:      CONSTITUTIONAL:   No fever, chills, or night sweats.     HEENT:  No epistaxis, mouth sores, or difficulty swallowing.    RESPIRATORY:  No new shortness of breath .  No new cough or hemoptysis.    CARDIOVASCULAR:  No chest pain or palpitations.    GASTROINTESTINAL: Complains of intermittent nausea and diarrhea. Complains of worsening abdominal pain. No vomiting, or blood in the stool.    GENITOURINARY:  No dysuria or hematuria.    MUSCULOSKELETAL:   No any new back pain or arthralgias.     NEUROLOGICAL:  No tingling or numbness. No new headache or dizziness.     LYMPHATICS:  Denies any abnormal swollen and anywhere in the body.    SKIN:  Denies any new skin rash.          PHYSICAL EXAMINATION:      VITAL SIGNS:  /84   Pulse 87   Temp 97.8 °F (36.6 °C) (Temporal Artery )   Resp 18   Ht 160 cm (62.99\")   Wt 97.8 kg (215 lb 9.6 oz)   BMI 38.20 kg/m²      ECOG performance status: 1    GENERAL:  Not in any distress.    HEENT:  Normocephalic, Atraumatic.Mild Conjunctival pallor. No icterus. Extraocular Movements Intact. No Fascial Asymmetry noted.    NECK:  No adenopathy. No JVD.      RESPIRATORY:  Fair air entry bilateral. No rhonchi or wheezing.    CARDIOVASCULAR:  S1, S2. Regular rate and rhythm. No murmur or gallop appreciated.  Port-A-Cath present on the right chest wall.    ABDOMEN:  Soft, obese, nontender. Bowel sounds present in all four quadrants.  No " organomegaly appreciated.    MUSCULOSKELETAL:  No edema.No Calf Tenderness.      NEUROLOGIC:  Alert, awake and oriented ×3.  No  Motor or sensory deficit appreciated. Cranial Nerves 2-12 grossly intact.    SKIN: No new skin lesions.            DIAGNOSTIC DATA:    Glucose   Date Value Ref Range Status   03/26/2018 228 (H) 60 - 100 mg/dL Final     Sodium   Date Value Ref Range Status   03/26/2018 135 (L) 137 - 145 mmol/L Final     Potassium   Date Value Ref Range Status   03/26/2018 4.2 3.5 - 5.1 mmol/L Final     CO2   Date Value Ref Range Status   03/26/2018 20.0 (L) 22.0 - 31.0 mmol/L Final     Chloride   Date Value Ref Range Status   03/26/2018 95 95 - 110 mmol/L Final     Anion Gap   Date Value Ref Range Status   03/26/2018 20.0 (H) 5.0 - 15.0 mmol/L Final     Creatinine   Date Value Ref Range Status   03/26/2018 0.52 0.50 - 1.00 mg/dL Final     BUN   Date Value Ref Range Status   03/26/2018 4 (L) 7 - 21 mg/dL Final     BUN/Creatinine Ratio   Date Value Ref Range Status   03/26/2018 7.7 7.0 - 25.0 Final     Calcium   Date Value Ref Range Status   03/26/2018 9.3 8.4 - 10.2 mg/dL Final     eGFR Non  Amer   Date Value Ref Range Status   03/26/2018 121 (H) 51 - 120 mL/min/1.73 Final     Alkaline Phosphatase   Date Value Ref Range Status   03/26/2018 90 38 - 126 U/L Final     Total Protein   Date Value Ref Range Status   03/26/2018 7.2 6.3 - 8.6 g/dL Final     ALT (SGPT)   Date Value Ref Range Status   03/26/2018 46 9 - 52 U/L Final     AST (SGOT)   Date Value Ref Range Status   03/26/2018 36 14 - 36 U/L Final     Total Bilirubin   Date Value Ref Range Status   03/26/2018 0.3 0.2 - 1.3 mg/dL Final     Albumin   Date Value Ref Range Status   03/26/2018 4.20 3.40 - 4.80 g/dL Final     Globulin   Date Value Ref Range Status   03/26/2018 3.0 2.3 - 3.5 gm/dL Final     A/G Ratio   Date Value Ref Range Status   03/26/2018 1.4 1.1 - 1.8 g/dL Final     Lab Results   Component Value Date    WBC 5.00 03/26/2018    HGB 13.0  03/26/2018    HCT 37.5 03/26/2018    MCV 88.4 03/26/2018     03/26/2018     Lab Results   Component Value Date    NEUTROABS 3.73 03/26/2018         PATHOLOGY:  Pathology from right thigh biopsy done in April 2014 showed:  FINAL DIAGNOSIS:   A.  SUBCUTANEOUS MASS, RIGHT THIGH:             FOLLICULAR LYMPHOMA, GRADE 1 OF 3.   B.  SUBCUTANEOUS MASS, RIGHT THIGH:             FOLLICULAR LYMPHOMA, GRADE 1 OF 3.          Comment   COMMENT:   Immunostains have follicular lymphoma positive for CD20, negative for   CD3, negative for CD5, positive for CD10, positive for CD23, negative   for CD43, negative for cyclin D1, positive for BCL-2 and positive for   BCL-6.  The case is submitted to the [facility] for evaluation and   their report is attached.            RADIOLOGY DATA :  CT of chest, abdomen and pelvis with contrast done on April 6, 2018 was reviewed with radiologist Dr. lance, discussed with patient, it showed:  IMPRESSION:  CONCLUSION:   Enlarged aortocaval retroperitoneal lymph node/nodes measuring  3.4 x 2.5 cm, previously 4 x 2.3 cm, minimally increased in size  compared to the prior exam.       CT Of chest, abdomen and pelvis with contrast done on July 17, 2017 showed:  IMPRESSION:  CONCLUSION:      Decrease in size of the enlarged aortocaval retroperitoneal lymph  nodes seen on prior examination March 24, 2017. Favorable  interval change. Right total hip arthroplasty. Otherwise  unremarkable CT chest, abdomen and pelvis with contrast.            ASSESSMENT AND PLAN:       1.  Recurrent follicular lymphoma grade 1, initially patient was diagnosed with a right groin lymph node adenopathy in April 2014 for which patient underwent palliative radiation therapy.  Subsequently patient again had a relapse around September 2016 for workup with second course of palliative radiation therapy was given by Dr. Leonel Vargas in October 2016.  In view of enlarged aortocaval lymph nodes, patient was started on rituximab  treatment in April 2017.    CT scan done in July 2017 shows decreasing in the size of aortocaval lymph node which was discussed with patient.  Juhi has so far received 7 rituximab treatment every 2 months apart last of which was on March 26, 2018.  In view of generalized abdominal pain which was worsening, patient had CT scan done on April 6, 2018 which showed slight increase in the size of aortocaval adenopathy.  Treatment option consisting of changing treatment to bendamustine and rituximab versus palliative radiation treatment were discussed with patient and her family.  Side effects of chemotherapy with bendamustine and rituximab was also discussed with patient at great detail.  We provided information to read about chemotherapy today.  Patient wants to do chemotherapy rather than radiation.  We will start her on chemotherapy with bendamustine and rituximab in about 2 weeks from now.  Prescription for Zofran has been sent to her pharmacy today on April 13, 2018.    2.  History of dyslipidemia    3.  Diabetes mellitus    4. Elevated BMI : Patient's BMI is elevated at 38.8.  Patient was informed about elevated BMI and need to lose weight.  Patient was counseled about diet and exercise for weight loss.    5.  Health maintenance: Patient does not smoke.  Last colonoscopy was done in 2014 which was normal as per patient.  She remains full code.        Tino Sood MD  4/13/2018  3:00 PM        EMR Dragon/Transcription disclaimer:   Much of this encounter note is an electronic transcription/translation of spoken language to printed text. The electronic translation of spoken language may permit erroneous, or at times, nonsensical words or phrases to be inadvertently transcribed; Although I have reviewed the note for such errors, some may still exist.

## 2018-04-26 ENCOUNTER — APPOINTMENT (OUTPATIENT)
Dept: ONCOLOGY | Facility: HOSPITAL | Age: 58
End: 2018-04-26

## 2018-04-30 ENCOUNTER — OFFICE VISIT (OUTPATIENT)
Dept: ONCOLOGY | Facility: CLINIC | Age: 58
End: 2018-04-30

## 2018-04-30 ENCOUNTER — INFUSION (OUTPATIENT)
Dept: ONCOLOGY | Facility: HOSPITAL | Age: 58
End: 2018-04-30

## 2018-04-30 VITALS
BODY MASS INDEX: 38.48 KG/M2 | HEIGHT: 63 IN | SYSTOLIC BLOOD PRESSURE: 143 MMHG | HEART RATE: 91 BPM | RESPIRATION RATE: 18 BRPM | TEMPERATURE: 98.2 F | WEIGHT: 217.2 LBS | DIASTOLIC BLOOD PRESSURE: 73 MMHG

## 2018-04-30 DIAGNOSIS — C82.08 GRADE 1 FOLLICULAR LYMPHOMA OF LYMPH NODES OF MULTIPLE REGIONS (HCC): Primary | ICD-10-CM

## 2018-04-30 DIAGNOSIS — Z45.2 ENCOUNTER FOR VENOUS ACCESS DEVICE CARE: ICD-10-CM

## 2018-04-30 DIAGNOSIS — C82.08 GRADE 1 FOLLICULAR LYMPHOMA OF LYMPH NODES OF MULTIPLE REGIONS (HCC): ICD-10-CM

## 2018-04-30 LAB
ALBUMIN SERPL-MCNC: 4.2 G/DL (ref 3.4–4.8)
ALBUMIN/GLOB SERPL: 1.4 G/DL (ref 1.1–1.8)
ALP SERPL-CCNC: 89 U/L (ref 38–126)
ALT SERPL W P-5'-P-CCNC: 55 U/L (ref 9–52)
ANION GAP SERPL CALCULATED.3IONS-SCNC: 15 MMOL/L (ref 5–15)
AST SERPL-CCNC: 36 U/L (ref 14–36)
BASOPHILS # BLD AUTO: 0.02 10*3/MM3 (ref 0–0.2)
BASOPHILS NFR BLD AUTO: 0.3 % (ref 0–2)
BILIRUB SERPL-MCNC: 0.3 MG/DL (ref 0.2–1.3)
BUN BLD-MCNC: 5 MG/DL (ref 7–21)
BUN/CREAT SERPL: 10 (ref 7–25)
CALCIUM SPEC-SCNC: 9.4 MG/DL (ref 8.4–10.2)
CHLORIDE SERPL-SCNC: 99 MMOL/L (ref 95–110)
CO2 SERPL-SCNC: 23 MMOL/L (ref 22–31)
CREAT BLD-MCNC: 0.5 MG/DL (ref 0.5–1)
DEPRECATED RDW RBC AUTO: 45.5 FL (ref 36.4–46.3)
EOSINOPHIL # BLD AUTO: 0.03 10*3/MM3 (ref 0–0.7)
EOSINOPHIL NFR BLD AUTO: 0.5 % (ref 0–7)
ERYTHROCYTE [DISTWIDTH] IN BLOOD BY AUTOMATED COUNT: 14.2 % (ref 11.5–14.5)
GFR SERPL CREATININE-BSD FRML MDRD: 127 ML/MIN/1.73 (ref 51–120)
GLOBULIN UR ELPH-MCNC: 3 GM/DL (ref 2.3–3.5)
GLUCOSE BLD-MCNC: 144 MG/DL (ref 60–100)
HCT VFR BLD AUTO: 37.6 % (ref 35–45)
HGB BLD-MCNC: 13 G/DL (ref 12–15.5)
IMM GRANULOCYTES # BLD: 0.06 10*3/MM3 (ref 0–0.02)
IMM GRANULOCYTES NFR BLD: 1 % (ref 0–0.5)
LYMPHOCYTES # BLD AUTO: 0.34 10*3/MM3 (ref 0.6–4.2)
LYMPHOCYTES NFR BLD AUTO: 5.6 % (ref 10–50)
MCH RBC QN AUTO: 30.4 PG (ref 26.5–34)
MCHC RBC AUTO-ENTMCNC: 34.6 G/DL (ref 31.4–36)
MCV RBC AUTO: 87.9 FL (ref 80–98)
MONOCYTES # BLD AUTO: 0.63 10*3/MM3 (ref 0–0.9)
MONOCYTES NFR BLD AUTO: 10.4 % (ref 0–12)
NEUTROPHILS # BLD AUTO: 4.99 10*3/MM3 (ref 2–8.6)
NEUTROPHILS NFR BLD AUTO: 82.2 % (ref 37–80)
PLATELET # BLD AUTO: 336 10*3/MM3 (ref 150–450)
PMV BLD AUTO: 9 FL (ref 8–12)
POTASSIUM BLD-SCNC: 4 MMOL/L (ref 3.5–5.1)
PROT SERPL-MCNC: 7.2 G/DL (ref 6.3–8.6)
RBC # BLD AUTO: 4.28 10*6/MM3 (ref 3.77–5.16)
SODIUM BLD-SCNC: 137 MMOL/L (ref 137–145)
WBC NRBC COR # BLD: 6.07 10*3/MM3 (ref 3.2–9.8)

## 2018-04-30 PROCEDURE — 96375 TX/PRO/DX INJ NEW DRUG ADDON: CPT | Performed by: INTERNAL MEDICINE

## 2018-04-30 PROCEDURE — 25010000002 RITUXIMAB 10 MG/ML SOLUTION 10 ML VIAL: Performed by: INTERNAL MEDICINE

## 2018-04-30 PROCEDURE — 25010000002 PALONOSETRON PER 25 MCG: Performed by: INTERNAL MEDICINE

## 2018-04-30 PROCEDURE — 25010000002 HEPARIN FLUSH (PORCINE) 100 UNIT/ML SOLUTION: Performed by: INTERNAL MEDICINE

## 2018-04-30 PROCEDURE — 96413 CHEMO IV INFUSION 1 HR: CPT | Performed by: INTERNAL MEDICINE

## 2018-04-30 PROCEDURE — 25010000002 BENDAMUSTINE PER 1 MG: Performed by: INTERNAL MEDICINE

## 2018-04-30 PROCEDURE — 99214 OFFICE O/P EST MOD 30 MIN: CPT | Performed by: INTERNAL MEDICINE

## 2018-04-30 PROCEDURE — 96415 CHEMO IV INFUSION ADDL HR: CPT | Performed by: INTERNAL MEDICINE

## 2018-04-30 PROCEDURE — 85025 COMPLETE CBC W/AUTO DIFF WBC: CPT

## 2018-04-30 PROCEDURE — 25010000002 RITUXIMAB 10 MG/ML SOLUTION 50 ML VIAL: Performed by: INTERNAL MEDICINE

## 2018-04-30 PROCEDURE — 80053 COMPREHEN METABOLIC PANEL: CPT

## 2018-04-30 PROCEDURE — 25010000002 DIPHENHYDRAMINE PER 50 MG: Performed by: INTERNAL MEDICINE

## 2018-04-30 PROCEDURE — G8417 CALC BMI ABV UP PARAM F/U: HCPCS | Performed by: INTERNAL MEDICINE

## 2018-04-30 PROCEDURE — 25010000003 DEXAMETHASONE SODIUM PHOSPHATE 100 MG/10ML SOLUTION 10 ML VIAL: Performed by: INTERNAL MEDICINE

## 2018-04-30 PROCEDURE — 96417 CHEMO IV INFUS EACH ADDL SEQ: CPT | Performed by: INTERNAL MEDICINE

## 2018-04-30 RX ORDER — ACETAMINOPHEN 325 MG/1
650 TABLET ORAL ONCE
Status: CANCELLED | OUTPATIENT
Start: 2018-04-30

## 2018-04-30 RX ORDER — FAMOTIDINE 10 MG/ML
20 INJECTION, SOLUTION INTRAVENOUS AS NEEDED
Status: CANCELLED | OUTPATIENT
Start: 2018-04-30

## 2018-04-30 RX ORDER — SODIUM CHLORIDE 0.9 % (FLUSH) 0.9 %
10 SYRINGE (ML) INJECTION AS NEEDED
Status: CANCELLED | OUTPATIENT
Start: 2018-05-01

## 2018-04-30 RX ORDER — DIPHENHYDRAMINE HYDROCHLORIDE 50 MG/ML
50 INJECTION INTRAMUSCULAR; INTRAVENOUS AS NEEDED
Status: CANCELLED | OUTPATIENT
Start: 2018-04-30

## 2018-04-30 RX ORDER — SODIUM CHLORIDE 0.9 % (FLUSH) 0.9 %
10 SYRINGE (ML) INJECTION AS NEEDED
Status: DISCONTINUED | OUTPATIENT
Start: 2018-04-30 | End: 2018-04-30 | Stop reason: HOSPADM

## 2018-04-30 RX ORDER — MEPERIDINE HYDROCHLORIDE 50 MG/ML
25 INJECTION INTRAMUSCULAR; INTRAVENOUS; SUBCUTANEOUS
Status: CANCELLED | OUTPATIENT
Start: 2018-04-30

## 2018-04-30 RX ORDER — ACETAMINOPHEN 325 MG/1
650 TABLET ORAL ONCE
Status: COMPLETED | OUTPATIENT
Start: 2018-04-30 | End: 2018-04-30

## 2018-04-30 RX ORDER — SODIUM CHLORIDE 9 MG/ML
250 INJECTION, SOLUTION INTRAVENOUS ONCE
Status: CANCELLED | OUTPATIENT
Start: 2018-05-01 | End: 2018-05-01

## 2018-04-30 RX ORDER — PALONOSETRON 0.05 MG/ML
0.25 INJECTION, SOLUTION INTRAVENOUS ONCE
Status: COMPLETED | OUTPATIENT
Start: 2018-04-30 | End: 2018-04-30

## 2018-04-30 RX ORDER — SODIUM CHLORIDE 9 MG/ML
250 INJECTION, SOLUTION INTRAVENOUS ONCE
Status: CANCELLED | OUTPATIENT
Start: 2018-04-30

## 2018-04-30 RX ORDER — PALONOSETRON 0.05 MG/ML
0.25 INJECTION, SOLUTION INTRAVENOUS ONCE
Status: CANCELLED | OUTPATIENT
Start: 2018-04-30

## 2018-04-30 RX ORDER — SODIUM CHLORIDE 9 MG/ML
250 INJECTION, SOLUTION INTRAVENOUS ONCE
Status: COMPLETED | OUTPATIENT
Start: 2018-04-30 | End: 2018-04-30

## 2018-04-30 RX ADMIN — ACETAMINOPHEN 650 MG: 325 TABLET ORAL at 09:46

## 2018-04-30 RX ADMIN — BENDAMUSTINE HYDROCHLORIDE 180 MG: 100 INJECTION, POWDER, LYOPHILIZED, FOR SOLUTION INTRAVENOUS at 11:32

## 2018-04-30 RX ADMIN — DIPHENHYDRAMINE HYDROCHLORIDE 50 MG: 50 INJECTION INTRAMUSCULAR; INTRAVENOUS at 10:10

## 2018-04-30 RX ADMIN — PALONOSETRON HYDROCHLORIDE 0.25 MG: 0.25 INJECTION INTRAVENOUS at 11:06

## 2018-04-30 RX ADMIN — SODIUM CHLORIDE 250 ML: 9 INJECTION, SOLUTION INTRAVENOUS at 09:46

## 2018-04-30 RX ADMIN — DEXAMETHASONE SODIUM PHOSPHATE 12 MG: 10 INJECTION, SOLUTION INTRAMUSCULAR; INTRAVENOUS at 10:37

## 2018-04-30 RX ADMIN — SODIUM CHLORIDE, PRESERVATIVE FREE 500 UNITS: 5 INJECTION INTRAVENOUS at 15:34

## 2018-04-30 RX ADMIN — Medication 10 ML: at 15:34

## 2018-04-30 RX ADMIN — RITUXIMAB 750 MG: 10 INJECTION, SOLUTION INTRAVENOUS at 12:34

## 2018-04-30 RX ADMIN — Medication 10 ML: at 08:43

## 2018-04-30 NOTE — PROGRESS NOTES
DATE OF VISIT: 4/30/2018      REASON FOR VISIT: Recurrent follicular lymphoma      HISTORY OF PRESENT ILLNESS:    58-year-old female with a past medical history significant for recurrent follicular lymphoma status post radiation, last round of radiation was around September 2016 for follicular lymphoma.  Patient was again found to have aortocaval adenopathy on  CT of chest abdomen and pelvis in March 2017.  Patient was started on rituximab April 10, 2017.  Patient has so far received 7 doses of rituximab 2 months apart last of which was on March 26,2018.  In view of enlargement of aortocaval lymph node, patient is here to resume for cycle of chemotherapy with bendamustine and rituximab today on April 30, 2018.  Still complains of abdominal discomfort.  Complains of intermittent nausea.  Denies any bleeding.  Denies any new lymph node enlargement.      PAST MEDICAL HISTORY:    Past Medical History:   Diagnosis Date   • Acute bronchitis    • Agoraphobia with panic disorder     on SNRI, prn buspar, prn klonopin     • Anxiety    • Arthritis    • Atrophic vaginitis    • Chest pain     work up as new onset angina    • Depression      MDD      • Essential hypertension    • Follicular non-Hodgkin's lymphoma    • Generalized anxiety disorder     SSRI - buspar, cont cymbalta, prn klonopin      • Hip pain     arthritis, artifical right hip     • History of bone density study 02/09/2009    DEXA (bone density) test, peripheral (Normal   • History of echocardiogram 01/03/2012    Echocardiogram 31704 (Normal echocardigram. EF 55-60%)   • History of mammogram    • Hyperlipidemia    • Mass of lower limb    • Nuclear senile cataract    • Obesity    • Otalgia     ENT REFER   • Panic disorder     with agoraphobia. On buspar and klonopin now      • Type 2 diabetes mellitus     NO BDR   • Upper respiratory infection        SOCIAL HISTORY:    Social History   Substance Use Topics   • Smoking status: Former Smoker     Years: 12.00      Quit date: 1/1/1998   • Smokeless tobacco: Never Used      Comment: Smoked 1 pack per 2 weeks   • Alcohol use No       Surgical History :  Past Surgical History:   Procedure Laterality Date   • CENTRAL VENOUS LINE INSERTION  03/11/2016    Central line insertion (Successful placement of right upper extremity midline.)   • COLONOSCOPY     • CYSTOSCOPY  03/23/1976    Cystoscopy (external urethroplasty and cysto-panendoscopy,urethal hymenal fusion with hypospadias)   • DILATATION AND CURETTAGE  01/25/1980    D&C (removal of IUD)   • EXCISION LESION  04/15/2014    Remove thigh lesion (Excision of a mass of the right thigh using a 16.2 cm incision with intermediate layered closure.)   • HYSTEROSCOPY  02/28/2003    Hysteroscope procedure (diagnostic hysteroscopy with fractional D&C)   • INJECTION OF MEDICATION  08/22/2011    kenalog (1)   • OTHER SURGICAL HISTORY      Explore parathyroid glands   • OTHER SURGICAL HISTORY  08/26/1985    Laparosc diagnostic (desires elective tubal reversal)   • PAP SMEAR  05/24/2005    PAP SMEAR (normal)   • [place] INSJ TUNNELED CVC W/O SUBQ PORT/ AGE 5 YR/> Right 4/4/2017    Procedure: INSERTION VENOUS ACCESS DEVICE (MEDIPORT) right chest;  Surgeon: Fortino Medina MD;  Location: [facility] OR;  Service: General   • [place] INSJ TUNNELED CVC W/O SUBQ PORT/ AGE 5 YR/> N/A 6/6/2017    Procedure: INSERTION VENOUS ACCESS DEVICE   (MEDIPORT)   (C-ARM#1);  Surgeon: Fortino Medina MD;  Location: [facility] OR;  Service: General   • SHOULDER ARTHROSCOPY  11/11/2013    Shoulder arthroscopy/surgery (Right shoulder. Rotator cuff repair. Subacromial decompression. Edgar procedure.)   • SHOULDER SURGERY  12/28/2015    Shoulder surgery procedure (Arthroscopy of the left shoulder with rotator cuff repair.Edgar procedure.Subacromial decompression.)   • TONSILLECTOMY AND ADENOIDECTOMY  05/29/1967    T&A (hypertrophied tonsils and adenoids with recurrent infection)   • TOTAL ABDOMINAL HYSTERECTOMY WITH SALPINGO  "OOPHORECTOMY  02/03/2004    ARIELLE/BSO (with a preop fractional dilation and curettage with frozen section,menorrhagia,dysmenorrhea,unresponsive to medical intervention)   • TOTAL HIP ARTHROPLASTY     • VAGINA SURGERY  03/02/1981    Anesth, surgery on vagina (colpotomy with bilateral partial salpingectomy, multiparity contraindicated)       ALLERGIES:    Allergies   Allergen Reactions   • Ace Inhibitors Cough   • Latex      BLISTER     • Morphine And Related Nausea And Vomiting   • Lortab [Hydrocodone-Acetaminophen] Palpitations       REVIEW OF SYSTEMS:      CONSTITUTIONAL:   No fever, chills, or night sweats.     HEENT:  No epistaxis, mouth sores, or difficulty swallowing.    RESPIRATORY:  No new shortness of breath .  No new cough or hemoptysis.    CARDIOVASCULAR:  No chest pain or palpitations.    GASTROINTESTINAL: Complains of intermittent nausea and diarrhea. Complains of intermittent abdominal pain. No vomiting, or blood in the stool.    GENITOURINARY:  No dysuria or hematuria.    MUSCULOSKELETAL:   No any new back pain or arthralgias.     NEUROLOGICAL:  No tingling or numbness. No new headache or dizziness.     LYMPHATICS:  Denies any abnormal swollen and anywhere in the body.    SKIN:  Denies any new skin rash.          PHYSICAL EXAMINATION:      VITAL SIGNS:  /73   Pulse 91   Temp 98.2 °F (36.8 °C) (Temporal Artery )   Resp 18   Ht 160 cm (62.99\")   Wt 98.5 kg (217 lb 3.2 oz)   BMI 38.49 kg/m²      ECOG performance status: 1    GENERAL:  Not in any distress.    HEENT:  Normocephalic, Atraumatic.Mild Conjunctival pallor. No icterus. Extraocular Movements Intact. No Fascial Asymmetry noted.    NECK:  No adenopathy. No JVD.      RESPIRATORY:  Fair air entry bilateral. No rhonchi or wheezing.    CARDIOVASCULAR:  S1, S2. Regular rate and rhythm. No murmur or gallop appreciated.  Port-A-Cath present on the right chest wall.    ABDOMEN:  Soft, obese, nontender. Bowel sounds present in all four quadrants.  " No organomegaly appreciated.    MUSCULOSKELETAL:  No edema.No Calf Tenderness.      NEUROLOGIC:  Alert, awake and oriented ×3.  No  Motor or sensory deficit appreciated. Cranial Nerves 2-12 grossly intact.    SKIN: No new skin lesions.    LYMPHATICS: No new enlarged lymph node in neck or supraclavicular area.            DIAGNOSTIC DATA:    Glucose   Date Value Ref Range Status   04/30/2018 144 (H) 60 - 100 mg/dL Final     Sodium   Date Value Ref Range Status   04/30/2018 137 137 - 145 mmol/L Final     Potassium   Date Value Ref Range Status   04/30/2018 4.0 3.5 - 5.1 mmol/L Final     CO2   Date Value Ref Range Status   04/30/2018 23.0 22.0 - 31.0 mmol/L Final     Chloride   Date Value Ref Range Status   04/30/2018 99 95 - 110 mmol/L Final     Anion Gap   Date Value Ref Range Status   04/30/2018 15.0 5.0 - 15.0 mmol/L Final     Creatinine   Date Value Ref Range Status   04/30/2018 0.50 0.50 - 1.00 mg/dL Final     BUN   Date Value Ref Range Status   04/30/2018 5 (L) 7 - 21 mg/dL Final     BUN/Creatinine Ratio   Date Value Ref Range Status   04/30/2018 10.0 7.0 - 25.0 Final     Calcium   Date Value Ref Range Status   04/30/2018 9.4 8.4 - 10.2 mg/dL Final     eGFR Non  Amer   Date Value Ref Range Status   04/30/2018 127 (H) 51 - 120 mL/min/1.73 Final     Alkaline Phosphatase   Date Value Ref Range Status   04/30/2018 89 38 - 126 U/L Final     Total Protein   Date Value Ref Range Status   04/30/2018 7.2 6.3 - 8.6 g/dL Final     ALT (SGPT)   Date Value Ref Range Status   04/30/2018 55 (H) 9 - 52 U/L Final     AST (SGOT)   Date Value Ref Range Status   04/30/2018 36 14 - 36 U/L Final     Total Bilirubin   Date Value Ref Range Status   04/30/2018 0.3 0.2 - 1.3 mg/dL Final     Albumin   Date Value Ref Range Status   04/30/2018 4.20 3.40 - 4.80 g/dL Final     Globulin   Date Value Ref Range Status   04/30/2018 3.0 2.3 - 3.5 gm/dL Final     A/G Ratio   Date Value Ref Range Status   04/30/2018 1.4 1.1 - 1.8 g/dL Final      Lab Results   Component Value Date    WBC 6.07 04/30/2018    HGB 13.0 04/30/2018    HCT 37.6 04/30/2018    MCV 87.9 04/30/2018     04/30/2018     Lab Results   Component Value Date    NEUTROABS 4.99 04/30/2018         PATHOLOGY:  Pathology from right thigh biopsy done in April 2014 showed:  FINAL DIAGNOSIS:   A.  SUBCUTANEOUS MASS, RIGHT THIGH:             FOLLICULAR LYMPHOMA, GRADE 1 OF 3.   B.  SUBCUTANEOUS MASS, RIGHT THIGH:             FOLLICULAR LYMPHOMA, GRADE 1 OF 3.          Comment   COMMENT:   Immunostains have follicular lymphoma positive for CD20, negative for   CD3, negative for CD5, positive for CD10, positive for CD23, negative   for CD43, negative for cyclin D1, positive for BCL-2 and positive for   BCL-6.  The case is submitted to the [facility] for evaluation and   their report is attached.            RADIOLOGY DATA :  CT of chest, abdomen and pelvis with contrast done on April 6, 2018 was reviewed with radiologist Dr. lance, discussed with patient, it showed:  IMPRESSION:  CONCLUSION:   Enlarged aortocaval retroperitoneal lymph node/nodes measuring  3.4 x 2.5 cm, previously 4 x 2.3 cm, minimally increased in size  compared to the prior exam.       CT Of chest, abdomen and pelvis with contrast done on July 17, 2017 showed:  IMPRESSION:  CONCLUSION:      Decrease in size of the enlarged aortocaval retroperitoneal lymph  nodes seen on prior examination March 24, 2017. Favorable  interval change. Right total hip arthroplasty. Otherwise  unremarkable CT chest, abdomen and pelvis with contrast.            ASSESSMENT AND PLAN:       1.  Recurrent follicular lymphoma grade 1, initially patient was diagnosed with a right groin lymph node adenopathy in April 2014 for which patient underwent palliative radiation therapy.  Subsequently patient again had a relapse around September 2016 for workup with second course of palliative radiation therapy was given by Dr. Leonel Vargas in October 2016.  In  view of enlarged aortocaval lymph nodes, patient was started on rituximab treatment in April 2017.    CT scan done in July 2017 shows decreasing in the size of aortocaval lymph node which was discussed with patient.  Juhi has so far received 7 rituximab treatment every 2 months apart last of which was on March 26, 2018.  In view of generalized abdominal pain which was worsening, patient had CT scan done on April 6, 2018 which showed slight increase in the size of aortocaval adenopathy.  Treatment option consisting of changing treatment to bendamustine and rituximab versus palliative radiation treatment were discussed with patient and her family.  Patient wanted to try chemotherapy with bendamustine and rituximab first.  We will coordinate with cycle 1 of bendamustine and rituximab today on April 30, 2018.  Plan is to dose 2 cycles of bendamustine and rituximab, after that we will do CT of abdomen and pelvis with contrast for re-staging purpose which was discussed with patient.    2.  History of dyslipidemia    3.  Diabetes mellitus    4. Elevated BMI : Patient's BMI is elevated at 38.8.  Patient was informed about elevated BMI and need to lose weight.  Patient was counseled about diet and exercise for weight loss.    5.  Health maintenance: Patient does not smoke.  Last colonoscopy was done in 2014 which was normal as per patient.  She remains full code.        Tino Sood MD  4/30/2018  4:36 PM        EMR Dragon/Transcription disclaimer:   Much of this encounter note is an electronic transcription/translation of spoken language to printed text. The electronic translation of spoken language may permit erroneous, or at times, nonsensical words or phrases to be inadvertently transcribed; Although I have reviewed the note for such errors, some may still exist.

## 2018-05-01 ENCOUNTER — INFUSION (OUTPATIENT)
Dept: ONCOLOGY | Facility: HOSPITAL | Age: 58
End: 2018-05-01

## 2018-05-01 VITALS
HEART RATE: 83 BPM | RESPIRATION RATE: 18 BRPM | DIASTOLIC BLOOD PRESSURE: 67 MMHG | TEMPERATURE: 98.4 F | SYSTOLIC BLOOD PRESSURE: 138 MMHG

## 2018-05-01 DIAGNOSIS — C82.08 GRADE 1 FOLLICULAR LYMPHOMA OF LYMPH NODES OF MULTIPLE REGIONS (HCC): Primary | ICD-10-CM

## 2018-05-01 DIAGNOSIS — Z45.2 ENCOUNTER FOR VENOUS ACCESS DEVICE CARE: ICD-10-CM

## 2018-05-01 PROCEDURE — 25010000002 HEPARIN FLUSH (PORCINE) 100 UNIT/ML SOLUTION: Performed by: INTERNAL MEDICINE

## 2018-05-01 PROCEDURE — 96413 CHEMO IV INFUSION 1 HR: CPT | Performed by: INTERNAL MEDICINE

## 2018-05-01 PROCEDURE — 96375 TX/PRO/DX INJ NEW DRUG ADDON: CPT | Performed by: INTERNAL MEDICINE

## 2018-05-01 PROCEDURE — 25010000003 DEXAMETHASONE SODIUM PHOSPHATE 100 MG/10ML SOLUTION 10 ML VIAL: Performed by: INTERNAL MEDICINE

## 2018-05-01 PROCEDURE — 25010000002 BENDAMUSTINE PER 1 MG: Performed by: INTERNAL MEDICINE

## 2018-05-01 RX ORDER — SODIUM CHLORIDE 0.9 % (FLUSH) 0.9 %
10 SYRINGE (ML) INJECTION AS NEEDED
Status: DISCONTINUED | OUTPATIENT
Start: 2018-05-01 | End: 2018-05-01 | Stop reason: HOSPADM

## 2018-05-01 RX ORDER — SODIUM CHLORIDE 0.9 % (FLUSH) 0.9 %
10 SYRINGE (ML) INJECTION AS NEEDED
Status: CANCELLED | OUTPATIENT
Start: 2018-05-29

## 2018-05-01 RX ORDER — SODIUM CHLORIDE 9 MG/ML
250 INJECTION, SOLUTION INTRAVENOUS ONCE
Status: COMPLETED | OUTPATIENT
Start: 2018-05-01 | End: 2018-05-01

## 2018-05-01 RX ADMIN — DEXAMETHASONE SODIUM PHOSPHATE 12 MG: 10 INJECTION, SOLUTION INTRAMUSCULAR; INTRAVENOUS at 13:10

## 2018-05-01 RX ADMIN — BENDAMUSTINE HYDROCHLORIDE 180 MG: 100 INJECTION, POWDER, LYOPHILIZED, FOR SOLUTION INTRAVENOUS at 13:43

## 2018-05-01 RX ADMIN — Medication 10 ML: at 14:26

## 2018-05-01 RX ADMIN — SODIUM CHLORIDE, PRESERVATIVE FREE 500 UNITS: 5 INJECTION INTRAVENOUS at 14:26

## 2018-05-01 RX ADMIN — SODIUM CHLORIDE 250 ML: 9 INJECTION, SOLUTION INTRAVENOUS at 13:09

## 2018-05-28 NOTE — PROGRESS NOTES
DATE OF VISIT: 5/29/2018      REASON FOR VISIT: Recurrent follicular lymphoma on Bendamustine/rituximab      HISTORY OF PRESENT ILLNESS:    58-year-old female with a past medical history significant for recurrent follicular lymphoma status post radiation, last round of radiation was around September 2016 for follicular lymphoma.  Patient was again found to have aortocaval adenopathy on  CT of chest abdomen and pelvis in March 2017.  Patient was started on rituximab April 10, 2017.  Patient has so far received 7 doses of rituximab 2 months apart last of which was on March 26,2018.  In view of enlargement of aortocaval lymph node, patient was started on  chemotherapy with bendamustine and rituximab  on April 30, 2018.Patient is here to get 2nd cycle of chemotherapy with bendamustine and rituximab.  Still complains of abdominal discomfort.  Complains of intermittent nausea.  Denies any bleeding.  Denies any new lymph node enlargement.      PAST MEDICAL HISTORY:    Past Medical History:   Diagnosis Date   • Acute bronchitis    • Agoraphobia with panic disorder     on SNRI, prn buspar, prn klonopin     • Anxiety    • Arthritis    • Atrophic vaginitis    • Chest pain     work up as new onset angina    • Depression      MDD      • Essential hypertension    • Follicular non-Hodgkin's lymphoma    • Generalized anxiety disorder     SSRI - buspar, cont cymbalta, prn klonopin      • Hip pain     arthritis, artifical right hip     • History of bone density study 02/09/2009    DEXA (bone density) test, peripheral (Normal   • History of echocardiogram 01/03/2012    Echocardiogram 50520 (Normal echocardigram. EF 55-60%)   • History of mammogram    • Hyperlipidemia    • Mass of lower limb    • Nuclear senile cataract    • Obesity    • Otalgia     ENT REFER   • Panic disorder     with agoraphobia. On buspar and klonopin now      • Type 2 diabetes mellitus     NO BDR   • Upper respiratory infection        SOCIAL HISTORY:    Social  History   Substance Use Topics   • Smoking status: Former Smoker     Years: 12.00     Quit date: 1/1/1998   • Smokeless tobacco: Never Used      Comment: Smoked 1 pack per 2 weeks   • Alcohol use No       Surgical History :  Past Surgical History:   Procedure Laterality Date   • CENTRAL VENOUS LINE INSERTION  03/11/2016    Central line insertion (Successful placement of right upper extremity midline.)   • COLONOSCOPY     • CYSTOSCOPY  03/23/1976    Cystoscopy (external urethroplasty and cysto-panendoscopy,urethal hymenal fusion with hypospadias)   • DILATATION AND CURETTAGE  01/25/1980    D&C (removal of IUD)   • EXCISION LESION  04/15/2014    Remove thigh lesion (Excision of a mass of the right thigh using a 16.2 cm incision with intermediate layered closure.)   • HYSTEROSCOPY  02/28/2003    Hysteroscope procedure (diagnostic hysteroscopy with fractional D&C)   • INJECTION OF MEDICATION  08/22/2011    kenalog (1)   • OTHER SURGICAL HISTORY      Explore parathyroid glands   • OTHER SURGICAL HISTORY  08/26/1985    Laparosc diagnostic (desires elective tubal reversal)   • PAP SMEAR  05/24/2005    PAP SMEAR (normal)   • IL INSJ TUNNELED CVC W/O SUBQ PORT/ AGE 5 YR/> Right 4/4/2017    Procedure: INSERTION VENOUS ACCESS DEVICE (MEDIPORT) right chest;  Surgeon: Fortino Medina MD;  Location: [facility];  Service: General   • IL INSJ TUNNELED CVC W/O SUBQ PORT/ AGE 5 YR/> N/A 6/6/2017    Procedure: INSERTION VENOUS ACCESS DEVICE   (MEDIPORT)   (C-ARM#1);  Surgeon: Fortino Medina MD;  Location: [facility];  Service: General   • SHOULDER ARTHROSCOPY  11/11/2013    Shoulder arthroscopy/surgery (Right shoulder. Rotator cuff repair. Subacromial decompression. Edgar procedure.)   • SHOULDER SURGERY  12/28/2015    Shoulder surgery procedure (Arthroscopy of the left shoulder with rotator cuff repair.Edgar procedure.Subacromial decompression.)   • TONSILLECTOMY AND ADENOIDECTOMY  05/29/1967    T&A (hypertrophied tonsils and  "adenoids with recurrent infection)   • TOTAL ABDOMINAL HYSTERECTOMY WITH SALPINGO OOPHORECTOMY  02/03/2004    ARIELLE/BSO (with a preop fractional dilation and curettage with frozen section,menorrhagia,dysmenorrhea,unresponsive to medical intervention)   • TOTAL HIP ARTHROPLASTY     • VAGINA SURGERY  03/02/1981    Anesth, surgery on vagina (colpotomy with bilateral partial salpingectomy, multiparity contraindicated)       ALLERGIES:    Allergies   Allergen Reactions   • Ace Inhibitors Cough   • Latex      BLISTER     • Morphine And Related Nausea And Vomiting   • Lortab [Hydrocodone-Acetaminophen] Palpitations       REVIEW OF SYSTEMS:      CONSTITUTIONAL:   No fever, chills, or night sweats.     HEENT:  No epistaxis, mouth sores, or difficulty swallowing.    RESPIRATORY:  No new shortness of breath .  No new cough or hemoptysis.    CARDIOVASCULAR:  No chest pain or palpitations.    GASTROINTESTINAL: Complains of intermittent nausea . Complains of intermittent abdominal pain.Complains of diarrhea alternating with constipation. No vomiting, or blood in the stool.    GENITOURINARY:  No dysuria or hematuria.    MUSCULOSKELETAL:   No any new back pain or arthralgias.     NEUROLOGICAL:  No tingling or numbness. No new headache or dizziness.     LYMPHATICS:  Denies any abnormal swollen and anywhere in the body.    SKIN:  Denies any new skin rash.          PHYSICAL EXAMINATION:      VITAL SIGNS:  /81   Pulse 101   Temp 98.8 °F (37.1 °C) (Temporal Artery )   Resp 18   Ht 160 cm (62.99\")   Wt 97.1 kg (214 lb)   BMI 37.92 kg/m²      ECOG performance status: 1    GENERAL:  Not in any distress.Obese female.    HEENT:  Normocephalic, Atraumatic.Mild Conjunctival pallor. No icterus. Extraocular Movements Intact. No Fascial Asymmetry noted.    NECK:  No adenopathy. No JVD.      RESPIRATORY:  Fair air entry bilateral. No rhonchi or wheezing.    CARDIOVASCULAR:  S1, S2. Regular rate and rhythm. No murmur or gallop " appreciated.  Port-A-Cath present on the right chest wall.    ABDOMEN:  Soft, obese, nontender. Bowel sounds present in all four quadrants.  No organomegaly appreciated.    MUSCULOSKELETAL:  No edema.No Calf Tenderness.      NEUROLOGIC:  Alert, awake and oriented ×3.  No  Motor or sensory deficit appreciated. Cranial Nerves 2-12 grossly intact.    SKIN: No new skin lesions.    LYMPHATICS: No new enlarged lymph node in neck or supraclavicular area.            DIAGNOSTIC DATA:    Glucose   Date Value Ref Range Status   05/29/2018 214 (H) 60 - 100 mg/dL Final     Sodium   Date Value Ref Range Status   05/29/2018 140 137 - 145 mmol/L Final     Potassium   Date Value Ref Range Status   05/29/2018 4.1 3.5 - 5.1 mmol/L Final     CO2   Date Value Ref Range Status   05/29/2018 22.0 22.0 - 31.0 mmol/L Final     Chloride   Date Value Ref Range Status   05/29/2018 102 95 - 110 mmol/L Final     Anion Gap   Date Value Ref Range Status   05/29/2018 16.0 (H) 5.0 - 15.0 mmol/L Final     Creatinine   Date Value Ref Range Status   05/29/2018 0.54 0.50 - 1.00 mg/dL Final     BUN   Date Value Ref Range Status   05/29/2018 8 7 - 21 mg/dL Final     BUN/Creatinine Ratio   Date Value Ref Range Status   05/29/2018 14.8 7.0 - 25.0 Final     Calcium   Date Value Ref Range Status   05/29/2018 9.4 8.4 - 10.2 mg/dL Final     eGFR Non  Amer   Date Value Ref Range Status   05/29/2018 116 51 - 120 mL/min/1.73 Final     Alkaline Phosphatase   Date Value Ref Range Status   05/29/2018 79 38 - 126 U/L Final     Total Protein   Date Value Ref Range Status   05/29/2018 7.0 6.3 - 8.6 g/dL Final     ALT (SGPT)   Date Value Ref Range Status   05/29/2018 69 (H) 9 - 52 U/L Final     AST (SGOT)   Date Value Ref Range Status   05/29/2018 41 (H) 14 - 36 U/L Final     Total Bilirubin   Date Value Ref Range Status   05/29/2018 0.3 0.2 - 1.3 mg/dL Final     Albumin   Date Value Ref Range Status   05/29/2018 4.20 3.40 - 4.80 g/dL Final     Globulin   Date  Value Ref Range Status   05/29/2018 2.8 2.3 - 3.5 gm/dL Final     A/G Ratio   Date Value Ref Range Status   05/29/2018 1.5 1.1 - 1.8 g/dL Final     Lab Results   Component Value Date    WBC 6.15 05/29/2018    HGB 13.2 05/29/2018    HCT 38.8 05/29/2018    MCV 88.6 05/29/2018     05/29/2018     Lab Results   Component Value Date    NEUTROABS 5.26 05/29/2018         PATHOLOGY:  Pathology from right thigh biopsy done in April 2014 showed:  FINAL DIAGNOSIS:   A.  SUBCUTANEOUS MASS, RIGHT THIGH:             FOLLICULAR LYMPHOMA, GRADE 1 OF 3.   B.  SUBCUTANEOUS MASS, RIGHT THIGH:             FOLLICULAR LYMPHOMA, GRADE 1 OF 3.          Comment   COMMENT:   Immunostains have follicular lymphoma positive for CD20, negative for   CD3, negative for CD5, positive for CD10, positive for CD23, negative   for CD43, negative for cyclin D1, positive for BCL-2 and positive for   BCL-6.  The case is submitted to the [facility] for evaluation and   their report is attached.            RADIOLOGY DATA :  CT of chest, abdomen and pelvis with contrast done on April 6, 2018 was reviewed with radiologist Dr. lance, discussed with patient, it showed:  IMPRESSION:  CONCLUSION:   Enlarged aortocaval retroperitoneal lymph node/nodes measuring  3.4 x 2.5 cm, previously 4 x 2.3 cm, minimally increased in size  compared to the prior exam.       CT Of chest, abdomen and pelvis with contrast done on July 17, 2017 showed:  IMPRESSION:  CONCLUSION:      Decrease in size of the enlarged aortocaval retroperitoneal lymph  nodes seen on prior examination March 24, 2017. Favorable  interval change. Right total hip arthroplasty. Otherwise  unremarkable CT chest, abdomen and pelvis with contrast.            ASSESSMENT AND PLAN:       1.  Recurrent follicular lymphoma grade 1, initially patient was diagnosed with a right groin lymph node adenopathy in April 2014 for which patient underwent palliative radiation therapy.   - Subsequently patient again had  a relapse around September 2016 for workup with second course of palliative radiation therapy was given by Dr. Leonel Vargas in October 2016.    -In view of enlarged aortocaval lymph nodes, patient was started on rituximab treatment in April 2017.    CT scan done in July 2017 shows decreasing in the size of aortocaval lymph node which was discussed with patient.  -  Patient  had so far received 7 rituximab treatment every 2 months apart last of which was on March 26, 2018.  -  In view of generalized abdominal pain which was worsening, patient had CT scan done on April 6, 2018 which showed slight increase in the size of aortocaval adenopathy.  Treatment option consisting of changing treatment to bendamustine and rituximab versus palliative radiation treatment were discussed with patient and her family.  Patient wanted to try chemotherapy with bendamustine and rituximab first.    -Patient was started on chemotherapy with bendamustine and rituximab on April 30, 2018.  Patient tolerated first round of chemotherapy well.  We will go ahead with round 2 of chemotherapy today with bendamustine and rituximab.  -We will get restaging CT of chest, abdomen and pelvis with contrast prior to next clinic visit in one month which was discussed with patient.    2.  History of dyslipidemia    3.  Diabetes mellitus    4.  Elevated liver function test: Patient could've component of fatty liver plus chemotherapy can also contribute to elevated liver function tests were monitored with CMP for now.    5. Elevated BMI : Patient's BMI is elevated at 37.9.  Patient was informed about elevated BMI and need to lose weight.  Patient was counseled about diet and exercise for weight loss.    6.  Health maintenance: Patient does not smoke.  Last colonoscopy was done in 2014 which was normal as per patient.      7.  Advance care planning: For now patient remains full code unable to make her decisions.  Patient is health care surrogate mentioned on  chart.        Tino Sood MD  5/29/2018  1:19 PM        EMR Dragon/Transcription disclaimer:   Much of this encounter note is an electronic transcription/translation of spoken language to printed text. The electronic translation of spoken language may permit erroneous, or at times, nonsensical words or phrases to be inadvertently transcribed; Although I have reviewed the note for such errors, some may still exist.

## 2018-05-29 ENCOUNTER — INFUSION (OUTPATIENT)
Dept: ONCOLOGY | Facility: HOSPITAL | Age: 58
End: 2018-05-29

## 2018-05-29 ENCOUNTER — OFFICE VISIT (OUTPATIENT)
Dept: ONCOLOGY | Facility: CLINIC | Age: 58
End: 2018-05-29

## 2018-05-29 VITALS — TEMPERATURE: 98.8 F | DIASTOLIC BLOOD PRESSURE: 91 MMHG | HEART RATE: 85 BPM | SYSTOLIC BLOOD PRESSURE: 159 MMHG

## 2018-05-29 VITALS
TEMPERATURE: 98.8 F | HEART RATE: 101 BPM | DIASTOLIC BLOOD PRESSURE: 81 MMHG | HEIGHT: 63 IN | RESPIRATION RATE: 18 BRPM | WEIGHT: 214 LBS | BODY MASS INDEX: 37.92 KG/M2 | SYSTOLIC BLOOD PRESSURE: 151 MMHG

## 2018-05-29 DIAGNOSIS — Z45.2 ENCOUNTER FOR VENOUS ACCESS DEVICE CARE: ICD-10-CM

## 2018-05-29 DIAGNOSIS — R79.89 ELEVATED LFTS: Primary | ICD-10-CM

## 2018-05-29 DIAGNOSIS — C82.08 GRADE 1 FOLLICULAR LYMPHOMA OF LYMPH NODES OF MULTIPLE REGIONS (HCC): ICD-10-CM

## 2018-05-29 DIAGNOSIS — C82.08 GRADE 1 FOLLICULAR LYMPHOMA OF LYMPH NODES OF MULTIPLE REGIONS (HCC): Primary | ICD-10-CM

## 2018-05-29 LAB
ALBUMIN SERPL-MCNC: 4.2 G/DL (ref 3.4–4.8)
ALBUMIN/GLOB SERPL: 1.5 G/DL (ref 1.1–1.8)
ALP SERPL-CCNC: 79 U/L (ref 38–126)
ALT SERPL W P-5'-P-CCNC: 69 U/L (ref 9–52)
ANION GAP SERPL CALCULATED.3IONS-SCNC: 16 MMOL/L (ref 5–15)
AST SERPL-CCNC: 41 U/L (ref 14–36)
BASOPHILS # BLD AUTO: 0.02 10*3/MM3 (ref 0–0.2)
BASOPHILS NFR BLD AUTO: 0.3 % (ref 0–2)
BILIRUB SERPL-MCNC: 0.3 MG/DL (ref 0.2–1.3)
BUN BLD-MCNC: 8 MG/DL (ref 7–21)
BUN/CREAT SERPL: 14.8 (ref 7–25)
CALCIUM SPEC-SCNC: 9.4 MG/DL (ref 8.4–10.2)
CHLORIDE SERPL-SCNC: 102 MMOL/L (ref 95–110)
CO2 SERPL-SCNC: 22 MMOL/L (ref 22–31)
CREAT BLD-MCNC: 0.54 MG/DL (ref 0.5–1)
DEPRECATED RDW RBC AUTO: 47.5 FL (ref 36.4–46.3)
EOSINOPHIL # BLD AUTO: 0.15 10*3/MM3 (ref 0–0.7)
EOSINOPHIL NFR BLD AUTO: 2.4 % (ref 0–7)
ERYTHROCYTE [DISTWIDTH] IN BLOOD BY AUTOMATED COUNT: 14.6 % (ref 11.5–14.5)
GFR SERPL CREATININE-BSD FRML MDRD: 116 ML/MIN/1.73 (ref 51–120)
GLOBULIN UR ELPH-MCNC: 2.8 GM/DL (ref 2.3–3.5)
GLUCOSE BLD-MCNC: 214 MG/DL (ref 60–100)
HCT VFR BLD AUTO: 38.8 % (ref 35–45)
HGB BLD-MCNC: 13.2 G/DL (ref 12–15.5)
IMM GRANULOCYTES # BLD: 0.03 10*3/MM3 (ref 0–0.02)
IMM GRANULOCYTES NFR BLD: 0.5 % (ref 0–0.5)
LYMPHOCYTES # BLD AUTO: 0.2 10*3/MM3 (ref 0.6–4.2)
LYMPHOCYTES NFR BLD AUTO: 3.3 % (ref 10–50)
MCH RBC QN AUTO: 30.1 PG (ref 26.5–34)
MCHC RBC AUTO-ENTMCNC: 34 G/DL (ref 31.4–36)
MCV RBC AUTO: 88.6 FL (ref 80–98)
MONOCYTES # BLD AUTO: 0.49 10*3/MM3 (ref 0–0.9)
MONOCYTES NFR BLD AUTO: 8 % (ref 0–12)
NEUTROPHILS # BLD AUTO: 5.26 10*3/MM3 (ref 2–8.6)
NEUTROPHILS NFR BLD AUTO: 85.5 % (ref 37–80)
PLATELET # BLD AUTO: 234 10*3/MM3 (ref 150–450)
PMV BLD AUTO: 9.2 FL (ref 8–12)
POTASSIUM BLD-SCNC: 4.1 MMOL/L (ref 3.5–5.1)
PROT SERPL-MCNC: 7 G/DL (ref 6.3–8.6)
RBC # BLD AUTO: 4.38 10*6/MM3 (ref 3.77–5.16)
SODIUM BLD-SCNC: 140 MMOL/L (ref 137–145)
WBC NRBC COR # BLD: 6.15 10*3/MM3 (ref 3.2–9.8)

## 2018-05-29 PROCEDURE — 25010000002 BENDAMUSTINE HCL 100 MG/4ML SOLUTION 4 ML VIAL: Performed by: INTERNAL MEDICINE

## 2018-05-29 PROCEDURE — 96415 CHEMO IV INFUSION ADDL HR: CPT | Performed by: INTERNAL MEDICINE

## 2018-05-29 PROCEDURE — 25010000002 HEPARIN FLUSH (PORCINE) 100 UNIT/ML SOLUTION: Performed by: INTERNAL MEDICINE

## 2018-05-29 PROCEDURE — 96413 CHEMO IV INFUSION 1 HR: CPT | Performed by: INTERNAL MEDICINE

## 2018-05-29 PROCEDURE — 96375 TX/PRO/DX INJ NEW DRUG ADDON: CPT | Performed by: INTERNAL MEDICINE

## 2018-05-29 PROCEDURE — 25010000002 DIPHENHYDRAMINE PER 50 MG: Performed by: INTERNAL MEDICINE

## 2018-05-29 PROCEDURE — 99214 OFFICE O/P EST MOD 30 MIN: CPT | Performed by: INTERNAL MEDICINE

## 2018-05-29 PROCEDURE — 80053 COMPREHEN METABOLIC PANEL: CPT

## 2018-05-29 PROCEDURE — 25010000002 PALONOSETRON PER 25 MCG: Performed by: INTERNAL MEDICINE

## 2018-05-29 PROCEDURE — 25010000002 RITUXIMAB 500 MG/50ML SOLUTION 50 ML VIAL: Performed by: INTERNAL MEDICINE

## 2018-05-29 PROCEDURE — G8417 CALC BMI ABV UP PARAM F/U: HCPCS | Performed by: INTERNAL MEDICINE

## 2018-05-29 PROCEDURE — 85025 COMPLETE CBC W/AUTO DIFF WBC: CPT

## 2018-05-29 PROCEDURE — 25010000002 RITUXIMAB 100 MG/10ML SOLUTION 10 ML VIAL: Performed by: INTERNAL MEDICINE

## 2018-05-29 PROCEDURE — 25010000003 DEXAMETHASONE SODIUM PHOSPHATE 100 MG/10ML SOLUTION 10 ML VIAL: Performed by: INTERNAL MEDICINE

## 2018-05-29 PROCEDURE — 96411 CHEMO IV PUSH ADDL DRUG: CPT | Performed by: INTERNAL MEDICINE

## 2018-05-29 PROCEDURE — 1123F ACP DISCUSS/DSCN MKR DOCD: CPT | Performed by: INTERNAL MEDICINE

## 2018-05-29 RX ORDER — PALONOSETRON 0.05 MG/ML
0.25 INJECTION, SOLUTION INTRAVENOUS ONCE
Status: COMPLETED | OUTPATIENT
Start: 2018-05-29 | End: 2018-05-29

## 2018-05-29 RX ORDER — MEPERIDINE HYDROCHLORIDE 50 MG/ML
25 INJECTION INTRAMUSCULAR; INTRAVENOUS; SUBCUTANEOUS
Status: CANCELLED | OUTPATIENT
Start: 2018-05-29

## 2018-05-29 RX ORDER — MEPERIDINE HYDROCHLORIDE 50 MG/ML
25 INJECTION INTRAMUSCULAR; INTRAVENOUS; SUBCUTANEOUS
Status: DISCONTINUED | OUTPATIENT
Start: 2018-05-29 | End: 2018-05-29 | Stop reason: HOSPADM

## 2018-05-29 RX ORDER — SODIUM CHLORIDE 0.9 % (FLUSH) 0.9 %
10 SYRINGE (ML) INJECTION AS NEEDED
Status: DISCONTINUED | OUTPATIENT
Start: 2018-05-29 | End: 2018-05-29 | Stop reason: HOSPADM

## 2018-05-29 RX ORDER — FAMOTIDINE 10 MG/ML
20 INJECTION, SOLUTION INTRAVENOUS AS NEEDED
Status: CANCELLED | OUTPATIENT
Start: 2018-05-29

## 2018-05-29 RX ORDER — FAMOTIDINE 10 MG/ML
20 INJECTION, SOLUTION INTRAVENOUS AS NEEDED
Status: DISCONTINUED | OUTPATIENT
Start: 2018-05-29 | End: 2018-05-29 | Stop reason: HOSPADM

## 2018-05-29 RX ORDER — ACETAMINOPHEN 325 MG/1
650 TABLET ORAL ONCE
Status: COMPLETED | OUTPATIENT
Start: 2018-05-29 | End: 2018-05-29

## 2018-05-29 RX ORDER — SODIUM CHLORIDE 0.9 % (FLUSH) 0.9 %
10 SYRINGE (ML) INJECTION AS NEEDED
Status: CANCELLED | OUTPATIENT
Start: 2018-05-30

## 2018-05-29 RX ORDER — SODIUM CHLORIDE 9 MG/ML
250 INJECTION, SOLUTION INTRAVENOUS ONCE
Status: CANCELLED | OUTPATIENT
Start: 2018-05-30 | End: 2018-05-30

## 2018-05-29 RX ORDER — ACETAMINOPHEN 325 MG/1
650 TABLET ORAL ONCE
Status: CANCELLED | OUTPATIENT
Start: 2018-05-29

## 2018-05-29 RX ORDER — SODIUM CHLORIDE 9 MG/ML
250 INJECTION, SOLUTION INTRAVENOUS ONCE
Status: COMPLETED | OUTPATIENT
Start: 2018-05-29 | End: 2018-05-29

## 2018-05-29 RX ORDER — PALONOSETRON 0.05 MG/ML
0.25 INJECTION, SOLUTION INTRAVENOUS ONCE
Status: CANCELLED | OUTPATIENT
Start: 2018-05-29

## 2018-05-29 RX ORDER — SODIUM CHLORIDE 9 MG/ML
250 INJECTION, SOLUTION INTRAVENOUS ONCE
Status: CANCELLED | OUTPATIENT
Start: 2018-05-29

## 2018-05-29 RX ORDER — DIPHENHYDRAMINE HYDROCHLORIDE 50 MG/ML
50 INJECTION INTRAMUSCULAR; INTRAVENOUS AS NEEDED
Status: CANCELLED | OUTPATIENT
Start: 2018-05-29

## 2018-05-29 RX ADMIN — DEXAMETHASONE SODIUM PHOSPHATE 12 MG: 10 INJECTION, SOLUTION INTRAMUSCULAR; INTRAVENOUS at 13:47

## 2018-05-29 RX ADMIN — ACETAMINOPHEN 650 MG: 325 TABLET ORAL at 10:20

## 2018-05-29 RX ADMIN — BENDAMUSTINE HYDROCHLORIDE 180 MG: 25 INJECTION, SOLUTION INTRAVENOUS at 14:27

## 2018-05-29 RX ADMIN — SODIUM CHLORIDE, PRESERVATIVE FREE 500 UNITS: 5 INJECTION INTRAVENOUS at 14:47

## 2018-05-29 RX ADMIN — DIPHENHYDRAMINE HYDROCHLORIDE 50 MG: 50 INJECTION INTRAMUSCULAR; INTRAVENOUS at 10:40

## 2018-05-29 RX ADMIN — SODIUM CHLORIDE 250 ML: 9 INJECTION, SOLUTION INTRAVENOUS at 10:20

## 2018-05-29 RX ADMIN — PALONOSETRON HYDROCHLORIDE 0.25 MG: 0.25 INJECTION INTRAVENOUS at 14:13

## 2018-05-29 RX ADMIN — Medication 10 ML: at 08:50

## 2018-05-29 RX ADMIN — Medication 10 ML: at 14:47

## 2018-05-29 RX ADMIN — RITUXIMAB 750 MG: 10 INJECTION, SOLUTION INTRAVENOUS at 11:07

## 2018-05-30 ENCOUNTER — INFUSION (OUTPATIENT)
Dept: ONCOLOGY | Facility: HOSPITAL | Age: 58
End: 2018-05-30

## 2018-05-30 VITALS
RESPIRATION RATE: 18 BRPM | SYSTOLIC BLOOD PRESSURE: 159 MMHG | HEART RATE: 85 BPM | DIASTOLIC BLOOD PRESSURE: 92 MMHG | TEMPERATURE: 98.2 F

## 2018-05-30 DIAGNOSIS — Z45.2 ENCOUNTER FOR VENOUS ACCESS DEVICE CARE: ICD-10-CM

## 2018-05-30 DIAGNOSIS — C82.08 GRADE 1 FOLLICULAR LYMPHOMA OF LYMPH NODES OF MULTIPLE REGIONS (HCC): Primary | ICD-10-CM

## 2018-05-30 PROCEDURE — 25010000002 HEPARIN FLUSH (PORCINE) 100 UNIT/ML SOLUTION: Performed by: INTERNAL MEDICINE

## 2018-05-30 PROCEDURE — 25010000002 BENDAMUSTINE HCL 100 MG/4ML SOLUTION 4 ML VIAL: Performed by: INTERNAL MEDICINE

## 2018-05-30 PROCEDURE — 96413 CHEMO IV INFUSION 1 HR: CPT | Performed by: INTERNAL MEDICINE

## 2018-05-30 PROCEDURE — 25010000003 DEXAMETHASONE SODIUM PHOSPHATE 100 MG/10ML SOLUTION 10 ML VIAL: Performed by: INTERNAL MEDICINE

## 2018-05-30 PROCEDURE — 96375 TX/PRO/DX INJ NEW DRUG ADDON: CPT | Performed by: INTERNAL MEDICINE

## 2018-05-30 RX ORDER — SODIUM CHLORIDE 0.9 % (FLUSH) 0.9 %
10 SYRINGE (ML) INJECTION AS NEEDED
Status: CANCELLED | OUTPATIENT
Start: 2018-05-30

## 2018-05-30 RX ORDER — SODIUM CHLORIDE 9 MG/ML
250 INJECTION, SOLUTION INTRAVENOUS ONCE
Status: COMPLETED | OUTPATIENT
Start: 2018-05-30 | End: 2018-05-30

## 2018-05-30 RX ORDER — SODIUM CHLORIDE 0.9 % (FLUSH) 0.9 %
10 SYRINGE (ML) INJECTION AS NEEDED
Status: DISCONTINUED | OUTPATIENT
Start: 2018-05-30 | End: 2018-05-30 | Stop reason: HOSPADM

## 2018-05-30 RX ADMIN — DEXAMETHASONE SODIUM PHOSPHATE 12 MG: 10 INJECTION, SOLUTION INTRAMUSCULAR; INTRAVENOUS at 10:59

## 2018-05-30 RX ADMIN — Medication 10 ML: at 11:53

## 2018-05-30 RX ADMIN — SODIUM CHLORIDE 250 ML: 9 INJECTION, SOLUTION INTRAVENOUS at 10:39

## 2018-05-30 RX ADMIN — BENDAMUSTINE HYDROCHLORIDE 180 MG: 25 INJECTION, SOLUTION INTRAVENOUS at 11:31

## 2018-05-30 RX ADMIN — SODIUM CHLORIDE, PRESERVATIVE FREE 500 UNITS: 5 INJECTION INTRAVENOUS at 11:53

## 2018-06-04 RX ORDER — PROMETHAZINE HYDROCHLORIDE 25 MG/1
25 TABLET ORAL EVERY 6 HOURS PRN
Qty: 40 TABLET | Refills: 3 | Status: SHIPPED | OUTPATIENT
Start: 2018-06-04 | End: 2020-10-01 | Stop reason: SDUPTHER

## 2018-06-06 ENCOUNTER — TELEPHONE (OUTPATIENT)
Dept: ONCOLOGY | Facility: HOSPITAL | Age: 58
End: 2018-06-06

## 2018-06-06 NOTE — TELEPHONE ENCOUNTER
----- Message from Tino Avila MD sent at 6/4/2018  8:01 PM CDT -----  Regarding: RE: NEW PRESCRIPTION  Contact: [phone]  Prescription sent. Thanks  ----- Message -----  From: Chloe Gould RN  Sent: 6/4/2018   2:11 PM  To: Tino Avila MD  Subject: FW: NEW PRESCRIPTION                                 ----- Message -----  From: Jenna Aviles  Sent: 6/4/2018  12:49 PM  To: [facility]  Subject: NEW PRESCRIPTION                                 PT CALLED AND STATED THAT [facility] [employer] HER AND ASKED IF DR AVILA CAN PRESCRIBE HER PHENERGAN.   SHE USES [facility] IN [place].

## 2018-06-21 ENCOUNTER — INFUSION (OUTPATIENT)
Dept: ONCOLOGY | Facility: HOSPITAL | Age: 58
End: 2018-06-21

## 2018-06-21 ENCOUNTER — APPOINTMENT (OUTPATIENT)
Dept: CT IMAGING | Facility: HOSPITAL | Age: 58
End: 2018-06-21
Attending: INTERNAL MEDICINE

## 2018-06-21 ENCOUNTER — HOSPITAL ENCOUNTER (OUTPATIENT)
Dept: CT IMAGING | Facility: HOSPITAL | Age: 58
Discharge: HOME | End: 2018-06-21
Attending: INTERNAL MEDICINE | Admitting: INTERNAL MEDICINE

## 2018-06-21 DIAGNOSIS — Z45.2 ENCOUNTER FOR VENOUS ACCESS DEVICE CARE: ICD-10-CM

## 2018-06-21 DIAGNOSIS — C82.08 GRADE 1 FOLLICULAR LYMPHOMA OF LYMPH NODES OF MULTIPLE REGIONS (HCC): Primary | ICD-10-CM

## 2018-06-21 DIAGNOSIS — C82.08 GRADE 1 FOLLICULAR LYMPHOMA OF LYMPH NODES OF MULTIPLE REGIONS (HCC): ICD-10-CM

## 2018-06-21 PROCEDURE — 74177 CT ABD & PELVIS W/CONTRAST: CPT

## 2018-06-21 PROCEDURE — 25010000002 HEPARIN FLUSH (PORCINE) 100 UNIT/ML SOLUTION: Performed by: INTERNAL MEDICINE

## 2018-06-21 PROCEDURE — 25010000002 IOPAMIDOL 61 % SOLUTION: Performed by: INTERNAL MEDICINE

## 2018-06-21 PROCEDURE — 71260 CT THORAX DX C+: CPT

## 2018-06-21 RX ORDER — SODIUM CHLORIDE 0.9 % (FLUSH) 0.9 %
10 SYRINGE (ML) INJECTION AS NEEDED
Status: DISCONTINUED | OUTPATIENT
Start: 2018-06-21 | End: 2018-06-21 | Stop reason: HOSPADM

## 2018-06-21 RX ORDER — SODIUM CHLORIDE 0.9 % (FLUSH) 0.9 %
10 SYRINGE (ML) INJECTION AS NEEDED
Status: CANCELLED | OUTPATIENT
Start: 2018-06-26

## 2018-06-21 RX ADMIN — IOPAMIDOL 90 ML: 612 INJECTION, SOLUTION INTRAVENOUS at 12:15

## 2018-06-21 NOTE — PROGRESS NOTES
Patient did not get port accessed or lab drawn.  Pt checked in as add-on to get port accessed for CT.  Patient left went to CT for CT scan before we called back to Procedure Chair.  CT nurse accessed patient's port for CT and patient was discharged from CT.  Subha Luu RN  June 21, 2018  11:55 AM

## 2018-06-25 NOTE — PROGRESS NOTES
DATE OF VISIT: 6/26/2018      REASON FOR VISIT: Recurrent follicular lymphoma on Bendamustine/rituximab      HISTORY OF PRESENT ILLNESS:    58-year-old female with a past medical history significant for recurrent follicular lymphoma status post radiation, last round of radiation was around September 2016 for follicular lymphoma.  Patient was again found to have aortocaval adenopathy on  CT of chest abdomen and pelvis in March 2017.  Patient was started on rituximab April 10, 2017.  Patient has so far received 7 doses of rituximab 2 months apart last of which was on March 26,2018.  In view of enlargement of aortocaval lymph node, patient was started on  chemotherapy with bendamustine and rituximab  on April 30, 2018.Patient is here to get  cycle  3 of chemotherapy with bendamustine and rituximab.  Patient had a restaging CT of chest, abdomen and pelvis with contrast done on June 21, 2018.  She is here to discuss the result of CT scan and further recommendation.  Still complains of abdominal discomfort.  Complains of intermittent nausea.  Denies any bleeding.  Denies any new lymph node enlargement.      PAST MEDICAL HISTORY:    Past Medical History:   Diagnosis Date   • Acute bronchitis    • Agoraphobia with panic disorder     on SNRI, prn buspar, prn klonopin     • Anxiety    • Arthritis    • Atrophic vaginitis    • Chest pain     work up as new onset angina    • Depression      MDD      • Essential hypertension    • Follicular non-Hodgkin's lymphoma    • Generalized anxiety disorder     SSRI - buspar, cont cymbalta, prn klonopin      • Hip pain     arthritis, artifical right hip     • History of bone density study 02/09/2009    DEXA (bone density) test, peripheral (Normal   • History of echocardiogram 01/03/2012    Echocardiogram 02394 (Normal echocardigram. EF 55-60%)   • History of mammogram    • Hyperlipidemia    • Mass of lower limb    • Nuclear senile cataract    • Obesity    • Otalgia     ENT REFER   • Panic  disorder     with agoraphobia. On buspar and klonopin now      • Type 2 diabetes mellitus     NO BDR   • Upper respiratory infection        SOCIAL HISTORY:    Social History   Substance Use Topics   • Smoking status: Former Smoker     Years: 12.00     Quit date: 1/1/1998   • Smokeless tobacco: Never Used      Comment: Smoked 1 pack per 2 weeks   • Alcohol use No       Surgical History :  Past Surgical History:   Procedure Laterality Date   • CENTRAL VENOUS LINE INSERTION  03/11/2016    Central line insertion (Successful placement of right upper extremity midline.)   • COLONOSCOPY     • CYSTOSCOPY  03/23/1976    Cystoscopy (external urethroplasty and cysto-panendoscopy,urethal hymenal fusion with hypospadias)   • DILATATION AND CURETTAGE  01/25/1980    D&C (removal of IUD)   • EXCISION LESION  04/15/2014    Remove thigh lesion (Excision of a mass of the right thigh using a 16.2 cm incision with intermediate layered closure.)   • HYSTEROSCOPY  02/28/2003    Hysteroscope procedure (diagnostic hysteroscopy with fractional D&C)   • INJECTION OF MEDICATION  08/22/2011    kenalog (1)   • OTHER SURGICAL HISTORY      Explore parathyroid glands   • OTHER SURGICAL HISTORY  08/26/1985    Laparosc diagnostic (desires elective tubal reversal)   • PAP SMEAR  05/24/2005    PAP SMEAR (normal)   • WI INSJ TUNNELED CVC W/O SUBQ PORT/ AGE 5 YR/> Right 4/4/2017    Procedure: INSERTION VENOUS ACCESS DEVICE (MEDIPORT) right chest;  Surgeon: Fortino Medina MD;  Location: [facility] OR;  Service: General   • WI INSJ TUNNELED CVC W/O SUBQ PORT/ AGE 5 YR/> N/A 6/6/2017    Procedure: INSERTION VENOUS ACCESS DEVICE   (MEDIPORT)   (C-ARM#1);  Surgeon: Fortino Medina MD;  Location: [facility];  Service: General   • SHOULDER ARTHROSCOPY  11/11/2013    Shoulder arthroscopy/surgery (Right shoulder. Rotator cuff repair. Subacromial decompression. Edgar procedure.)   • SHOULDER SURGERY  12/28/2015    Shoulder surgery procedure (Arthroscopy of the left  "shoulder with rotator cuff repair.Edgar procedure.Subacromial decompression.)   • TONSILLECTOMY AND ADENOIDECTOMY  05/29/1967    T&A (hypertrophied tonsils and adenoids with recurrent infection)   • TOTAL ABDOMINAL HYSTERECTOMY WITH SALPINGO OOPHORECTOMY  02/03/2004    ARIELLE/BSO (with a preop fractional dilation and curettage with frozen section,menorrhagia,dysmenorrhea,unresponsive to medical intervention)   • TOTAL HIP ARTHROPLASTY     • VAGINA SURGERY  03/02/1981    Anesth, surgery on vagina (colpotomy with bilateral partial salpingectomy, multiparity contraindicated)       ALLERGIES:    Allergies   Allergen Reactions   • Ace Inhibitors Cough   • Latex      BLISTER     • Morphine And Related Nausea And Vomiting   • Lortab [Hydrocodone-Acetaminophen] Palpitations       REVIEW OF SYSTEMS:      CONSTITUTIONAL: 9 pound intentional weight loss since last clinic visit 4 weeks ago.  No fever, chills, or night sweats.     HEENT:  No epistaxis, mouth sores, or difficulty swallowing.    RESPIRATORY:  No new shortness of breath .  No new cough or hemoptysis.    CARDIOVASCULAR:  No chest pain or palpitations.    GASTROINTESTINAL: Complains of intermittent nausea . Complains of intermittent abdominal pain.Complains of diarrhea alternating with constipation. No vomiting, or blood in the stool.    GENITOURINARY:  No dysuria or hematuria.    MUSCULOSKELETAL:   No any new back pain or arthralgias.     NEUROLOGICAL:  No tingling or numbness. No new headache or dizziness.     LYMPHATICS:  Denies any abnormal swollen and anywhere in the body.    SKIN:  Denies any new skin rash.          PHYSICAL EXAMINATION:      VITAL SIGNS:  /81   Pulse 96   Temp 97.9 °F (36.6 °C) (Temporal Artery )   Resp 18   Ht 160 cm (62.99\")   Wt 93.3 kg (205 lb 9.6 oz)   BMI 36.43 kg/m²      ECOG performance status: 1    GENERAL:  Not in any distress.Obese female.    HEENT:  Normocephalic, Atraumatic.Mild Conjunctival pallor. No icterus. " Extraocular Movements Intact. No Fascial Asymmetry noted.    NECK:  No adenopathy. No JVD.      RESPIRATORY:  Fair air entry bilateral. No rhonchi or wheezing.    CARDIOVASCULAR:  S1, S2. Regular rate and rhythm. No murmur or gallop appreciated.  Port-A-Cath present on the right chest wall.    ABDOMEN:  Soft, obese, nontender. Bowel sounds present in all four quadrants.  No organomegaly appreciated.    MUSCULOSKELETAL:  No edema.No Calf Tenderness.      NEUROLOGIC:  Alert, awake and oriented ×3.  No  Motor or sensory deficit appreciated. Cranial Nerves 2-12 grossly intact.    SKIN: No new skin lesions.    LYMPHATICS: No new enlarged lymph node in neck or supraclavicular area.            DIAGNOSTIC DATA:    Glucose   Date Value Ref Range Status   06/26/2018 206 (H) 60 - 100 mg/dL Final     Sodium   Date Value Ref Range Status   06/26/2018 139 137 - 145 mmol/L Final     Potassium   Date Value Ref Range Status   06/26/2018 3.7 3.5 - 5.1 mmol/L Final     CO2   Date Value Ref Range Status   06/26/2018 22.0 22.0 - 31.0 mmol/L Final     Chloride   Date Value Ref Range Status   06/26/2018 102 95 - 110 mmol/L Final     Anion Gap   Date Value Ref Range Status   06/26/2018 15.0 5.0 - 15.0 mmol/L Final     Creatinine   Date Value Ref Range Status   06/26/2018 0.49 (L) 0.50 - 1.00 mg/dL Final     BUN   Date Value Ref Range Status   06/26/2018 5 (L) 7 - 21 mg/dL Final     BUN/Creatinine Ratio   Date Value Ref Range Status   06/26/2018 10.2 7.0 - 25.0 Final     Calcium   Date Value Ref Range Status   06/26/2018 9.5 8.4 - 10.2 mg/dL Final     eGFR Non  Amer   Date Value Ref Range Status   06/26/2018 130 (H) 51 - 120 mL/min/1.73 Final     Alkaline Phosphatase   Date Value Ref Range Status   06/26/2018 83 38 - 126 U/L Final     Total Protein   Date Value Ref Range Status   06/26/2018 7.2 6.3 - 8.6 g/dL Final     ALT (SGPT)   Date Value Ref Range Status   06/26/2018 114 (H) 9 - 52 U/L Final     AST (SGOT)   Date Value Ref Range  Status   06/26/2018 71 (H) 14 - 36 U/L Final     Total Bilirubin   Date Value Ref Range Status   06/26/2018 0.4 0.2 - 1.3 mg/dL Final     Albumin   Date Value Ref Range Status   06/26/2018 4.40 3.40 - 4.80 g/dL Final     Globulin   Date Value Ref Range Status   06/26/2018 2.8 2.3 - 3.5 gm/dL Final     A/G Ratio   Date Value Ref Range Status   06/26/2018 1.6 1.1 - 1.8 g/dL Final     Lab Results   Component Value Date    WBC 3.54 06/26/2018    HGB 13.9 06/26/2018    HCT 40.4 06/26/2018    MCV 87.8 06/26/2018     06/26/2018     Lab Results   Component Value Date    NEUTROABS 2.81 06/26/2018         PATHOLOGY:  Pathology from right thigh biopsy done in April 2014 showed:  FINAL DIAGNOSIS:   A.  SUBCUTANEOUS MASS, RIGHT THIGH:             FOLLICULAR LYMPHOMA, GRADE 1 OF 3.   B.  SUBCUTANEOUS MASS, RIGHT THIGH:             FOLLICULAR LYMPHOMA, GRADE 1 OF 3.          Comment   COMMENT:   Immunostains have follicular lymphoma positive for CD20, negative for   CD3, negative for CD5, positive for CD10, positive for CD23, negative   for CD43, negative for cyclin D1, positive for BCL-2 and positive for   BCL-6.  The case is submitted to the [facility] for evaluation and   their report is attached.            RADIOLOGY DATA :  CT of chest, abdomen and pelvis with contrast done on June 21, 2018 was reviewed, discussed with patient, it showed:  COMMENTS:             - - - CT CHEST - - -                 PULMONARY PARENCHYMA:           - air spaces:      negative         - interstitium:     grossly within normal limits for age         - misc.:      no pulmonary nodules or mass              MEDIASTINUM / ROXANA:         - heart:                normal size , no pericardial fluid         - aorta/great vessels:  normal caliber and configuration for  age         - misc.:      no mediastinal mass / significant adenopathy          PLEURAL COMPARTMENT:            - misc.:      no pleural fluid or mass               MISC:         -  inferior neck:     negative         - osseous / body wall:  negative         - misc:             - - - CT ABDOMEN - - -         ABDOMEN:           - LIVER:      normal size / contour, no ductal dilatation , no  focal lesion          - GB:      grossly negative         - CBD:      grossly negative        - SPLEEN:    normal size and contour         - PANCREAS:    normal in size, contour, no focal mass           - VISCERA:    normal caliber, no wall thickening        - MESENTERY:    no mesenteric mass        - CAVITY:    no free abdominal fluid, no free intraperitoneal  air        - BODY WALL:    wnl        - OSSEOUS:    Moderate to high-grade acquired spinal stenosis  with broad-based disc bulge at the L4/5 level.             RETROPERITONEUM:          - KIDNEYS:    normal size / contour, no collecting system  dilation                       no evidence of an enhancing mass          - URETERS:    normal course, caliber           - ADRENALS:    normal size, contour          - MISC:      no sig retroperitoneal adenopathy or mass        - VASCULAR:    aorta / iliacs: wnl for age            - - - CT PELVIS - - -           - VISCERA:    normal caliber small/large bowel, no focal  thickening/mass           - MESENTERY:      no mass          - VASCULAR:    wnl for age        - CAVITY:    no free fluid / air         - BLADDER:    unremarkable         - OSSEOUS:    Status post right hip replacement.         - MISC:     - UTERUS/OVARY:  status post hysterectomy                          .                  IMPRESSION:  CONCLUSION:      1. Stable examination.                       ASSESSMENT AND PLAN:       1.  Recurrent follicular lymphoma grade 1, initially patient was diagnosed with a right groin lymph node adenopathy in April 2014 for which patient underwent palliative radiation therapy.   - Subsequently patient again had a relapse around September 2016 for workup with second course of palliative radiation therapy was given by  Dr. Leonel Vargas in October 2016.    -In view of enlarged aortocaval lymph nodes, patient was started on rituximab treatment in April 2017.    CT scan done in July 2017 shows decreasing in the size of aortocaval lymph node which was discussed with patient.  -  Patient  had so far received 7 rituximab treatment every 2 months apart last of which was on March 26, 2018.  -  In view of generalized abdominal pain which was worsening, patient had CT scan done on April 6, 2018 which showed slight increase in the size of aortocaval adenopathy.  Treatment option consisting of changing treatment to bendamustine and rituximab versus palliative radiation treatment were discussed with patient and her family.  Patient wanted to try chemotherapy with bendamustine and rituximab first.    -Patient was started on chemotherapy with bendamustine and rituximab on April 30, 2018.  Patient tolerated first round of chemotherapy well.  We will go ahead with round 2 of chemotherapy today with bendamustine and rituximab.  -Restaging CT of chest, abdomen and pelvis with contrast done on June 21, 2018 shows decrease in size of adenopathy involving aortocaval area.  Result of CT scan were discussed with patient and her family.  -CMP done earlier today on June 26, 2018 shows worsening of AST and ALT which could be coming secondary to bendamustine.  At this point we'll hold chemotherapy.  We'll ask patient to return to clinic in 4 weeks with repeat repeat CBC and CMP on that day.  -Further treatment recommendation will depend on the result of CMP upon next clinic visit.    2.  History of dyslipidemia    3.  Diabetes mellitus    4.  Elevated liver function test: Most active secondary to chemotherapy plus or minus fatty liver.  Both AST and AST are elevated as compared to last clinic visit.  We'll hold chemotherapy for now and reevaluate in 4 weeks with CMP.    5. Elevated BMI : Patient's Body mass index is 36.43 kg/m². BMI is higher then reference  range.  Patient was informed about elevated BMI and need to lose weight.  Patient was counseled about diet and exercise for weight loss.    6.  Health maintenance: Patient does not smoke.  Last colonoscopy was done in 2014 which was normal as per patient.      7.  Advance care planning: For now patient remains full code unable to make her decisions.  Patient is health care surrogate mentioned on chart.        Tino Sood MD  6/26/2018  11:43 AM        EMR Dragon/Transcription disclaimer:   Much of this encounter note is an electronic transcription/translation of spoken language to printed text. The electronic translation of spoken language may permit erroneous, or at times, nonsensical words or phrases to be inadvertently transcribed; Although I have reviewed the note for such errors, some may still exist.

## 2018-06-26 ENCOUNTER — APPOINTMENT (OUTPATIENT)
Dept: ONCOLOGY | Facility: HOSPITAL | Age: 58
End: 2018-06-26

## 2018-06-26 ENCOUNTER — OFFICE VISIT (OUTPATIENT)
Dept: ONCOLOGY | Facility: CLINIC | Age: 58
End: 2018-06-26

## 2018-06-26 ENCOUNTER — INFUSION (OUTPATIENT)
Dept: ONCOLOGY | Facility: HOSPITAL | Age: 58
End: 2018-06-26

## 2018-06-26 VITALS
TEMPERATURE: 97.9 F | BODY MASS INDEX: 36.43 KG/M2 | SYSTOLIC BLOOD PRESSURE: 146 MMHG | DIASTOLIC BLOOD PRESSURE: 81 MMHG | RESPIRATION RATE: 18 BRPM | HEIGHT: 63 IN | WEIGHT: 205.6 LBS | HEART RATE: 96 BPM

## 2018-06-26 DIAGNOSIS — R79.89 ELEVATED LFTS: ICD-10-CM

## 2018-06-26 DIAGNOSIS — Z45.2 ENCOUNTER FOR VENOUS ACCESS DEVICE CARE: ICD-10-CM

## 2018-06-26 DIAGNOSIS — C82.08 GRADE 1 FOLLICULAR LYMPHOMA OF LYMPH NODES OF MULTIPLE REGIONS (HCC): Primary | ICD-10-CM

## 2018-06-26 DIAGNOSIS — C82.03 FOLLICULAR LYMPHOMA GRADE I OF INTRA-ABDOMINAL LYMPH NODES (HCC): Primary | ICD-10-CM

## 2018-06-26 LAB
ALBUMIN SERPL-MCNC: 4.4 G/DL (ref 3.4–4.8)
ALBUMIN/GLOB SERPL: 1.6 G/DL (ref 1.1–1.8)
ALP SERPL-CCNC: 83 U/L (ref 38–126)
ALT SERPL W P-5'-P-CCNC: 114 U/L (ref 9–52)
ANION GAP SERPL CALCULATED.3IONS-SCNC: 15 MMOL/L (ref 5–15)
AST SERPL-CCNC: 71 U/L (ref 14–36)
BASOPHILS # BLD AUTO: 0.02 10*3/MM3 (ref 0–0.2)
BASOPHILS NFR BLD AUTO: 0.6 % (ref 0–2)
BILIRUB SERPL-MCNC: 0.4 MG/DL (ref 0.2–1.3)
BUN BLD-MCNC: 5 MG/DL (ref 7–21)
BUN/CREAT SERPL: 10.2 (ref 7–25)
CALCIUM SPEC-SCNC: 9.5 MG/DL (ref 8.4–10.2)
CHLORIDE SERPL-SCNC: 102 MMOL/L (ref 95–110)
CO2 SERPL-SCNC: 22 MMOL/L (ref 22–31)
CREAT BLD-MCNC: 0.49 MG/DL (ref 0.5–1)
DEPRECATED RDW RBC AUTO: 49.2 FL (ref 36.4–46.3)
EOSINOPHIL # BLD AUTO: 0.06 10*3/MM3 (ref 0–0.7)
EOSINOPHIL NFR BLD AUTO: 1.7 % (ref 0–7)
ERYTHROCYTE [DISTWIDTH] IN BLOOD BY AUTOMATED COUNT: 15.4 % (ref 11.5–14.5)
GFR SERPL CREATININE-BSD FRML MDRD: 130 ML/MIN/1.73 (ref 51–120)
GLOBULIN UR ELPH-MCNC: 2.8 GM/DL (ref 2.3–3.5)
GLUCOSE BLD-MCNC: 206 MG/DL (ref 60–100)
HCT VFR BLD AUTO: 40.4 % (ref 35–45)
HGB BLD-MCNC: 13.9 G/DL (ref 12–15.5)
IMM GRANULOCYTES # BLD: 0.04 10*3/MM3 (ref 0–0.02)
IMM GRANULOCYTES NFR BLD: 1.1 % (ref 0–0.5)
LYMPHOCYTES # BLD AUTO: 0.34 10*3/MM3 (ref 0.6–4.2)
LYMPHOCYTES NFR BLD AUTO: 9.6 % (ref 10–50)
MCH RBC QN AUTO: 30.2 PG (ref 26.5–34)
MCHC RBC AUTO-ENTMCNC: 34.4 G/DL (ref 31.4–36)
MCV RBC AUTO: 87.8 FL (ref 80–98)
MONOCYTES # BLD AUTO: 0.27 10*3/MM3 (ref 0–0.9)
MONOCYTES NFR BLD AUTO: 7.6 % (ref 0–12)
NEUTROPHILS # BLD AUTO: 2.81 10*3/MM3 (ref 2–8.6)
NEUTROPHILS NFR BLD AUTO: 79.4 % (ref 37–80)
PLATELET # BLD AUTO: 247 10*3/MM3 (ref 150–450)
PMV BLD AUTO: 8.8 FL (ref 8–12)
POTASSIUM BLD-SCNC: 3.7 MMOL/L (ref 3.5–5.1)
PROT SERPL-MCNC: 7.2 G/DL (ref 6.3–8.6)
RBC # BLD AUTO: 4.6 10*6/MM3 (ref 3.77–5.16)
SODIUM BLD-SCNC: 139 MMOL/L (ref 137–145)
WBC NRBC COR # BLD: 3.54 10*3/MM3 (ref 3.2–9.8)

## 2018-06-26 PROCEDURE — 85025 COMPLETE CBC W/AUTO DIFF WBC: CPT

## 2018-06-26 PROCEDURE — 1123F ACP DISCUSS/DSCN MKR DOCD: CPT | Performed by: INTERNAL MEDICINE

## 2018-06-26 PROCEDURE — 25010000002 HEPARIN FLUSH (PORCINE) 100 UNIT/ML SOLUTION: Performed by: INTERNAL MEDICINE

## 2018-06-26 PROCEDURE — 36591 DRAW BLOOD OFF VENOUS DEVICE: CPT | Performed by: INTERNAL MEDICINE

## 2018-06-26 PROCEDURE — G8417 CALC BMI ABV UP PARAM F/U: HCPCS | Performed by: INTERNAL MEDICINE

## 2018-06-26 PROCEDURE — 80053 COMPREHEN METABOLIC PANEL: CPT

## 2018-06-26 PROCEDURE — G0463 HOSPITAL OUTPT CLINIC VISIT: HCPCS | Performed by: INTERNAL MEDICINE

## 2018-06-26 PROCEDURE — 99214 OFFICE O/P EST MOD 30 MIN: CPT | Performed by: INTERNAL MEDICINE

## 2018-06-26 RX ORDER — SODIUM CHLORIDE 0.9 % (FLUSH) 0.9 %
10 SYRINGE (ML) INJECTION AS NEEDED
Status: CANCELLED | OUTPATIENT
Start: 2018-07-24

## 2018-06-26 RX ORDER — SODIUM CHLORIDE 0.9 % (FLUSH) 0.9 %
10 SYRINGE (ML) INJECTION AS NEEDED
Status: DISCONTINUED | OUTPATIENT
Start: 2018-06-26 | End: 2018-06-26 | Stop reason: HOSPADM

## 2018-06-26 RX ADMIN — Medication 10 ML: at 08:17

## 2018-06-26 RX ADMIN — SODIUM CHLORIDE, PRESERVATIVE FREE 500 UNITS: 5 INJECTION INTRAVENOUS at 09:01

## 2018-07-24 ENCOUNTER — OFFICE VISIT (OUTPATIENT)
Dept: ONCOLOGY | Facility: CLINIC | Age: 58
End: 2018-07-24

## 2018-07-24 ENCOUNTER — INFUSION (OUTPATIENT)
Dept: ONCOLOGY | Facility: HOSPITAL | Age: 58
End: 2018-07-24

## 2018-07-24 VITALS
WEIGHT: 205.8 LBS | TEMPERATURE: 98.1 F | SYSTOLIC BLOOD PRESSURE: 145 MMHG | RESPIRATION RATE: 18 BRPM | HEIGHT: 63 IN | OXYGEN SATURATION: 96 % | HEART RATE: 84 BPM | DIASTOLIC BLOOD PRESSURE: 71 MMHG | BODY MASS INDEX: 36.46 KG/M2

## 2018-07-24 VITALS — DIASTOLIC BLOOD PRESSURE: 59 MMHG | SYSTOLIC BLOOD PRESSURE: 137 MMHG | HEART RATE: 85 BPM

## 2018-07-24 DIAGNOSIS — R79.89 ELEVATED LFTS: ICD-10-CM

## 2018-07-24 DIAGNOSIS — C82.08 GRADE 1 FOLLICULAR LYMPHOMA OF LYMPH NODES OF MULTIPLE REGIONS (HCC): ICD-10-CM

## 2018-07-24 DIAGNOSIS — C82.03 FOLLICULAR LYMPHOMA GRADE I OF INTRA-ABDOMINAL LYMPH NODES (HCC): Primary | ICD-10-CM

## 2018-07-24 DIAGNOSIS — C82.08 GRADE 1 FOLLICULAR LYMPHOMA OF LYMPH NODES OF MULTIPLE REGIONS (HCC): Primary | ICD-10-CM

## 2018-07-24 DIAGNOSIS — Z45.2 ENCOUNTER FOR VENOUS ACCESS DEVICE CARE: ICD-10-CM

## 2018-07-24 LAB
ALBUMIN SERPL-MCNC: 4.5 G/DL (ref 3.4–4.8)
ALBUMIN/GLOB SERPL: 1.5 G/DL (ref 1.1–1.8)
ALP SERPL-CCNC: 93 U/L (ref 38–126)
ALT SERPL W P-5'-P-CCNC: 78 U/L (ref 9–52)
ANION GAP SERPL CALCULATED.3IONS-SCNC: 14 MMOL/L (ref 5–15)
AST SERPL-CCNC: 63 U/L (ref 14–36)
BASOPHILS # BLD AUTO: 0.03 10*3/MM3 (ref 0–0.2)
BASOPHILS NFR BLD AUTO: 0.4 % (ref 0–2)
BILIRUB SERPL-MCNC: 0.4 MG/DL (ref 0.2–1.3)
BUN BLD-MCNC: 6 MG/DL (ref 7–21)
BUN/CREAT SERPL: 11.1 (ref 7–25)
CALCIUM SPEC-SCNC: 9.4 MG/DL (ref 8.4–10.2)
CHLORIDE SERPL-SCNC: 101 MMOL/L (ref 95–110)
CO2 SERPL-SCNC: 23 MMOL/L (ref 22–31)
CREAT BLD-MCNC: 0.54 MG/DL (ref 0.5–1)
DEPRECATED RDW RBC AUTO: 50.6 FL (ref 36.4–46.3)
EOSINOPHIL # BLD AUTO: 0.12 10*3/MM3 (ref 0–0.7)
EOSINOPHIL NFR BLD AUTO: 1.6 % (ref 0–7)
ERYTHROCYTE [DISTWIDTH] IN BLOOD BY AUTOMATED COUNT: 15.6 % (ref 11.5–14.5)
GFR SERPL CREATININE-BSD FRML MDRD: 116 ML/MIN/1.73 (ref 51–120)
GLOBULIN UR ELPH-MCNC: 3 GM/DL (ref 2.3–3.5)
GLUCOSE BLD-MCNC: 193 MG/DL (ref 60–100)
HCT VFR BLD AUTO: 39.2 % (ref 35–45)
HGB BLD-MCNC: 13.5 G/DL (ref 12–15.5)
IMM GRANULOCYTES # BLD: 0.06 10*3/MM3 (ref 0–0.02)
IMM GRANULOCYTES NFR BLD: 0.8 % (ref 0–0.5)
LYMPHOCYTES # BLD AUTO: 0.67 10*3/MM3 (ref 0.6–4.2)
LYMPHOCYTES NFR BLD AUTO: 8.9 % (ref 10–50)
MCH RBC QN AUTO: 30.4 PG (ref 26.5–34)
MCHC RBC AUTO-ENTMCNC: 34.4 G/DL (ref 31.4–36)
MCV RBC AUTO: 88.3 FL (ref 80–98)
MONOCYTES # BLD AUTO: 0.46 10*3/MM3 (ref 0–0.9)
MONOCYTES NFR BLD AUTO: 6.1 % (ref 0–12)
NEUTROPHILS # BLD AUTO: 6.16 10*3/MM3 (ref 2–8.6)
NEUTROPHILS NFR BLD AUTO: 82.2 % (ref 37–80)
PLATELET # BLD AUTO: 287 10*3/MM3 (ref 150–450)
PMV BLD AUTO: 8.9 FL (ref 8–12)
POTASSIUM BLD-SCNC: 4.2 MMOL/L (ref 3.5–5.1)
PROT SERPL-MCNC: 7.5 G/DL (ref 6.3–8.6)
RBC # BLD AUTO: 4.44 10*6/MM3 (ref 3.77–5.16)
SODIUM BLD-SCNC: 138 MMOL/L (ref 137–145)
WBC NRBC COR # BLD: 7.5 10*3/MM3 (ref 3.2–9.8)

## 2018-07-24 PROCEDURE — G8417 CALC BMI ABV UP PARAM F/U: HCPCS | Performed by: INTERNAL MEDICINE

## 2018-07-24 PROCEDURE — 99214 OFFICE O/P EST MOD 30 MIN: CPT | Performed by: INTERNAL MEDICINE

## 2018-07-24 PROCEDURE — 25010000002 RITUXIMAB 10 MG/ML SOLUTION 50 ML VIAL: Performed by: INTERNAL MEDICINE

## 2018-07-24 PROCEDURE — 25010000002 BENDAMUSTINE HCL 100 MG/4ML SOLUTION 4 ML VIAL: Performed by: INTERNAL MEDICINE

## 2018-07-24 PROCEDURE — 1123F ACP DISCUSS/DSCN MKR DOCD: CPT | Performed by: INTERNAL MEDICINE

## 2018-07-24 PROCEDURE — 25010000002 PALONOSETRON PER 25 MCG: Performed by: INTERNAL MEDICINE

## 2018-07-24 PROCEDURE — 80053 COMPREHEN METABOLIC PANEL: CPT

## 2018-07-24 PROCEDURE — 96375 TX/PRO/DX INJ NEW DRUG ADDON: CPT | Performed by: INTERNAL MEDICINE

## 2018-07-24 PROCEDURE — 25010000002 HEPARIN FLUSH (PORCINE) 100 UNIT/ML SOLUTION: Performed by: INTERNAL MEDICINE

## 2018-07-24 PROCEDURE — 85025 COMPLETE CBC W/AUTO DIFF WBC: CPT

## 2018-07-24 PROCEDURE — 25010000003 DEXAMETHASONE SODIUM PHOSPHATE 100 MG/10ML SOLUTION 10 ML VIAL: Performed by: INTERNAL MEDICINE

## 2018-07-24 PROCEDURE — 25010000002 RITUXIMAB 10 MG/ML SOLUTION 10 ML VIAL: Performed by: INTERNAL MEDICINE

## 2018-07-24 PROCEDURE — 96415 CHEMO IV INFUSION ADDL HR: CPT | Performed by: INTERNAL MEDICINE

## 2018-07-24 PROCEDURE — 25010000002 DIPHENHYDRAMINE PER 50 MG: Performed by: INTERNAL MEDICINE

## 2018-07-24 PROCEDURE — 96413 CHEMO IV INFUSION 1 HR: CPT | Performed by: INTERNAL MEDICINE

## 2018-07-24 PROCEDURE — 96411 CHEMO IV PUSH ADDL DRUG: CPT | Performed by: INTERNAL MEDICINE

## 2018-07-24 RX ORDER — SODIUM CHLORIDE 9 MG/ML
250 INJECTION, SOLUTION INTRAVENOUS ONCE
Status: CANCELLED | OUTPATIENT
Start: 2018-07-24

## 2018-07-24 RX ORDER — FAMOTIDINE 10 MG/ML
20 INJECTION, SOLUTION INTRAVENOUS AS NEEDED
Status: CANCELLED | OUTPATIENT
Start: 2018-07-24

## 2018-07-24 RX ORDER — SODIUM CHLORIDE 9 MG/ML
250 INJECTION, SOLUTION INTRAVENOUS ONCE
Status: COMPLETED | OUTPATIENT
Start: 2018-07-24 | End: 2018-07-24

## 2018-07-24 RX ORDER — ACETAMINOPHEN 325 MG/1
650 TABLET ORAL ONCE
Status: COMPLETED | OUTPATIENT
Start: 2018-07-24 | End: 2018-07-24

## 2018-07-24 RX ORDER — SODIUM CHLORIDE 9 MG/ML
250 INJECTION, SOLUTION INTRAVENOUS ONCE
Status: CANCELLED | OUTPATIENT
Start: 2018-07-25 | End: 2018-07-25

## 2018-07-24 RX ORDER — SODIUM CHLORIDE 0.9 % (FLUSH) 0.9 %
10 SYRINGE (ML) INJECTION AS NEEDED
Status: CANCELLED | OUTPATIENT
Start: 2018-07-25

## 2018-07-24 RX ORDER — SODIUM CHLORIDE 0.9 % (FLUSH) 0.9 %
10 SYRINGE (ML) INJECTION AS NEEDED
Status: DISCONTINUED | OUTPATIENT
Start: 2018-07-24 | End: 2018-07-24 | Stop reason: HOSPADM

## 2018-07-24 RX ORDER — ACETAMINOPHEN 325 MG/1
650 TABLET ORAL ONCE
Status: CANCELLED | OUTPATIENT
Start: 2018-07-24

## 2018-07-24 RX ORDER — DIPHENHYDRAMINE HYDROCHLORIDE 50 MG/ML
50 INJECTION INTRAMUSCULAR; INTRAVENOUS AS NEEDED
Status: CANCELLED | OUTPATIENT
Start: 2018-07-24

## 2018-07-24 RX ORDER — PALONOSETRON 0.05 MG/ML
0.25 INJECTION, SOLUTION INTRAVENOUS ONCE
Status: CANCELLED | OUTPATIENT
Start: 2018-07-24

## 2018-07-24 RX ORDER — PALONOSETRON 0.05 MG/ML
0.25 INJECTION, SOLUTION INTRAVENOUS ONCE
Status: COMPLETED | OUTPATIENT
Start: 2018-07-24 | End: 2018-07-24

## 2018-07-24 RX ORDER — MEPERIDINE HYDROCHLORIDE 50 MG/ML
25 INJECTION INTRAMUSCULAR; INTRAVENOUS; SUBCUTANEOUS
Status: CANCELLED | OUTPATIENT
Start: 2018-07-24

## 2018-07-24 RX ADMIN — SODIUM CHLORIDE 250 ML: 9 INJECTION, SOLUTION INTRAVENOUS at 09:13

## 2018-07-24 RX ADMIN — DIPHENHYDRAMINE HYDROCHLORIDE 50 MG: 50 INJECTION INTRAMUSCULAR; INTRAVENOUS at 09:25

## 2018-07-24 RX ADMIN — Medication 10 ML: at 14:39

## 2018-07-24 RX ADMIN — RITUXIMAB 740 MG: 10 INJECTION, SOLUTION INTRAVENOUS at 10:56

## 2018-07-24 RX ADMIN — PALONOSETRON HYDROCHLORIDE 0.25 MG: 0.25 INJECTION INTRAVENOUS at 10:21

## 2018-07-24 RX ADMIN — BENDAMUSTINE HYDROCHLORIDE 140 MG: 25 INJECTION, SOLUTION INTRAVENOUS at 10:31

## 2018-07-24 RX ADMIN — DEXAMETHASONE SODIUM PHOSPHATE 12 MG: 10 INJECTION, SOLUTION INTRAMUSCULAR; INTRAVENOUS at 09:52

## 2018-07-24 RX ADMIN — ACETAMINOPHEN 650 MG: 325 TABLET ORAL at 09:25

## 2018-07-24 RX ADMIN — SODIUM CHLORIDE, PRESERVATIVE FREE 500 UNITS: 5 INJECTION INTRAVENOUS at 14:39

## 2018-07-24 RX ADMIN — Medication 10 ML: at 08:15

## 2018-07-24 NOTE — PROGRESS NOTES
DATE OF VISIT: 7/24/2018      REASON FOR VISIT: Recurrent follicular lymphoma on Bendamustine/rituximab      HISTORY OF PRESENT ILLNESS:    58-year-old female with a past medical history significant for recurrent follicular lymphoma status post radiation, last round of radiation was around September 2016 for follicular lymphoma.  Patient was again found to have aortocaval adenopathy on  CT of chest abdomen and pelvis in March 2017.  Patient was started on rituximab April 10, 2017.  Patient has so far received 7 doses of rituximab 2 months apart last of which was on March 26,2018.  In view of enlargement of aortocaval lymph node, patient was started on  chemotherapy with bendamustine and rituximab  on April 30, 2018.Patient is here to get  cycle  3 of chemotherapy with bendamustine and rituximab.  Upon last clinic visit on June 26, 2018 chemotherapy was held secondary to elevated AST and ALT.  Complains of intermittent nausea.  Denies any bleeding.  Denies any new lymph node enlargement.      PAST MEDICAL HISTORY:    Past Medical History:   Diagnosis Date   • Acute bronchitis    • Agoraphobia with panic disorder     on SNRI, prn buspar, prn klonopin     • Anxiety    • Arthritis    • Atrophic vaginitis    • Chest pain     work up as new onset angina    • Depression      MDD      • Essential hypertension    • Follicular non-Hodgkin's lymphoma (CMS/HCC)    • Generalized anxiety disorder     SSRI - buspar, cont cymbalta, prn klonopin      • Hip pain     arthritis, artifical right hip     • History of bone density study 02/09/2009    DEXA (bone density) test, peripheral (Normal   • History of echocardiogram 01/03/2012    Echocardiogram 49741 (Normal echocardigram. EF 55-60%)   • History of mammogram    • Hyperlipidemia    • Mass of lower limb    • Nuclear senile cataract    • Obesity    • Otalgia     ENT REFER   • Panic disorder     with agoraphobia. On buspar and klonopin now      • Type 2 diabetes mellitus (CMS/HCC)      NO BDR   • Upper respiratory infection        SOCIAL HISTORY:    Social History   Substance Use Topics   • Smoking status: Former Smoker     Years: 12.00     Quit date: 1/1/1998   • Smokeless tobacco: Never Used      Comment: Smoked 1 pack per 2 weeks   • Alcohol use No       Surgical History :  Past Surgical History:   Procedure Laterality Date   • CENTRAL VENOUS LINE INSERTION  03/11/2016    Central line insertion (Successful placement of right upper extremity midline.)   • COLONOSCOPY     • CYSTOSCOPY  03/23/1976    Cystoscopy (external urethroplasty and cysto-panendoscopy,urethal hymenal fusion with hypospadias)   • DILATATION AND CURETTAGE  01/25/1980    D&C (removal of IUD)   • EXCISION LESION  04/15/2014    Remove thigh lesion (Excision of a mass of the right thigh using a 16.2 cm incision with intermediate layered closure.)   • HYSTEROSCOPY  02/28/2003    Hysteroscope procedure (diagnostic hysteroscopy with fractional D&C)   • INJECTION OF MEDICATION  08/22/2011    kenalog (1)   • OTHER SURGICAL HISTORY      Explore parathyroid glands   • OTHER SURGICAL HISTORY  08/26/1985    Laparosc diagnostic (desires elective tubal reversal)   • PAP SMEAR  05/24/2005    PAP SMEAR (normal)   • GA INSJ TUNNELED CVC W/O SUBQ PORT/ AGE 5 YR/> Right 4/4/2017    Procedure: INSERTION VENOUS ACCESS DEVICE (MEDIPORT) right chest;  Surgeon: Fortino Medina MD;  Location: [facility];  Service: General   • GA INSJ TUNNELED CVC W/O SUBQ PORT/ AGE 5 YR/> N/A 6/6/2017    Procedure: INSERTION VENOUS ACCESS DEVICE   (MEDIPORT)   (C-ARM#1);  Surgeon: Fortino Medina MD;  Location: [facility];  Service: General   • SHOULDER ARTHROSCOPY  11/11/2013    Shoulder arthroscopy/surgery (Right shoulder. Rotator cuff repair. Subacromial decompression. Edgar procedure.)   • SHOULDER SURGERY  12/28/2015    Shoulder surgery procedure (Arthroscopy of the left shoulder with rotator cuff repair.Edgar procedure.Subacromial decompression.)   •  "TONSILLECTOMY AND ADENOIDECTOMY  05/29/1967    T&A (hypertrophied tonsils and adenoids with recurrent infection)   • TOTAL ABDOMINAL HYSTERECTOMY WITH SALPINGO OOPHORECTOMY  02/03/2004    ARIELLE/BSO (with a preop fractional dilation and curettage with frozen section,menorrhagia,dysmenorrhea,unresponsive to medical intervention)   • TOTAL HIP ARTHROPLASTY     • VAGINA SURGERY  03/02/1981    Anesth, surgery on vagina (colpotomy with bilateral partial salpingectomy, multiparity contraindicated)       ALLERGIES:    Allergies   Allergen Reactions   • Ace Inhibitors Cough   • Latex      BLISTER     • Morphine And Related Nausea And Vomiting   • Lortab [Hydrocodone-Acetaminophen] Palpitations       REVIEW OF SYSTEMS:      CONSTITUTIONAL: 9 pound intentional weight loss since last clinic visit 4 weeks ago.  No fever, chills, or night sweats.     HEENT:  No epistaxis, mouth sores, or difficulty swallowing.    RESPIRATORY:  No new shortness of breath .  No new cough or hemoptysis.    CARDIOVASCULAR:  No chest pain or palpitations.    GASTROINTESTINAL: Complains of intermittent nausea . Complains of diarrhea alternating with constipation. No vomiting, or blood in the stool.    GENITOURINARY:  No dysuria or hematuria.    MUSCULOSKELETAL:   No any new back pain or arthralgias.     NEUROLOGICAL:  No tingling or numbness. No new headache or dizziness.     LYMPHATICS:  Denies any abnormal swollen and anywhere in the body.    SKIN:  Denies any new skin rash.          PHYSICAL EXAMINATION:      VITAL SIGNS:  /71   Pulse 84   Temp 98.1 °F (36.7 °C) (Temporal Artery )   Resp 18   Ht 160 cm (62.99\")   Wt 93.4 kg (205 lb 12.8 oz)   SpO2 96%   BMI 36.47 kg/m²      ECOG performance status: 1    GENERAL:  Not in any distress.Obese female.    HEENT:  Normocephalic, Atraumatic.Mild Conjunctival pallor. No icterus. Extraocular Movements Intact. No Fascial Asymmetry noted.    NECK:  No adenopathy. No JVD.      RESPIRATORY:  Fair air " entry bilateral. No rhonchi or wheezing.    CARDIOVASCULAR:  S1, S2. Regular rate and rhythm. No murmur or gallop appreciated.  Port-A-Cath present on the right chest wall.    ABDOMEN:  Soft, obese, nontender. Bowel sounds present in all four quadrants.  No organomegaly appreciated.    MUSCULOSKELETAL:  No edema.No Calf Tenderness.  Decreased range of motion    NEUROLOGIC:  Alert, awake and oriented ×3.  No  Motor  deficit appreciated. Cranial Nerves 2-12 grossly intact.    SKIN: No new skin lesions.    LYMPHATICS: No new enlarged lymph node in neck or supraclavicular area.            DIAGNOSTIC DATA:    Glucose   Date Value Ref Range Status   07/24/2018 193 (H) 60 - 100 mg/dL Final     Sodium   Date Value Ref Range Status   07/24/2018 138 137 - 145 mmol/L Final     Potassium   Date Value Ref Range Status   07/24/2018 4.2 3.5 - 5.1 mmol/L Final     CO2   Date Value Ref Range Status   07/24/2018 23.0 22.0 - 31.0 mmol/L Final     Chloride   Date Value Ref Range Status   07/24/2018 101 95 - 110 mmol/L Final     Anion Gap   Date Value Ref Range Status   07/24/2018 14.0 5.0 - 15.0 mmol/L Final     Creatinine   Date Value Ref Range Status   07/24/2018 0.54 0.50 - 1.00 mg/dL Final     BUN   Date Value Ref Range Status   07/24/2018 6 (L) 7 - 21 mg/dL Final     BUN/Creatinine Ratio   Date Value Ref Range Status   07/24/2018 11.1 7.0 - 25.0 Final     Calcium   Date Value Ref Range Status   07/24/2018 9.4 8.4 - 10.2 mg/dL Final     eGFR Non  Amer   Date Value Ref Range Status   07/24/2018 116 51 - 120 mL/min/1.73 Final     Alkaline Phosphatase   Date Value Ref Range Status   07/24/2018 93 38 - 126 U/L Final     Total Protein   Date Value Ref Range Status   07/24/2018 7.5 6.3 - 8.6 g/dL Final     ALT (SGPT)   Date Value Ref Range Status   07/24/2018 78 (H) 9 - 52 U/L Final     AST (SGOT)   Date Value Ref Range Status   07/24/2018 63 (H) 14 - 36 U/L Final     Total Bilirubin   Date Value Ref Range Status   07/24/2018 0.4  0.2 - 1.3 mg/dL Final     Albumin   Date Value Ref Range Status   07/24/2018 4.50 3.40 - 4.80 g/dL Final     Globulin   Date Value Ref Range Status   07/24/2018 3.0 2.3 - 3.5 gm/dL Final     Lab Results   Component Value Date    WBC 7.50 07/24/2018    HGB 13.5 07/24/2018    HCT 39.2 07/24/2018    MCV 88.3 07/24/2018     07/24/2018     Lab Results   Component Value Date    NEUTROABS 6.16 07/24/2018         PATHOLOGY:  Pathology from right thigh biopsy done in April 2014 showed:  FINAL DIAGNOSIS:   A.  SUBCUTANEOUS MASS, RIGHT THIGH:             FOLLICULAR LYMPHOMA, GRADE 1 OF 3.   B.  SUBCUTANEOUS MASS, RIGHT THIGH:             FOLLICULAR LYMPHOMA, GRADE 1 OF 3.          Comment   COMMENT:   Immunostains have follicular lymphoma positive for CD20, negative for   CD3, negative for CD5, positive for CD10, positive for CD23, negative   for CD43, negative for cyclin D1, positive for BCL-2 and positive for   BCL-6.  The case is submitted to the [facility] for evaluation and   their report is attached.            RADIOLOGY DATA :  CT of chest, abdomen and pelvis with contrast done on June 21, 2018 was reviewed, discussed with patient, it showed:  COMMENTS:             - - - CT CHEST - - -                 PULMONARY PARENCHYMA:           - air spaces:      negative         - interstitium:     grossly within normal limits for age         - misc.:      no pulmonary nodules or mass              MEDIASTINUM / ROXANA:         - heart:                normal size , no pericardial fluid         - aorta/great vessels:  normal caliber and configuration for  age         - misc.:      no mediastinal mass / significant adenopathy          PLEURAL COMPARTMENT:            - misc.:      no pleural fluid or mass               MISC:         - inferior neck:     negative         - osseous / body wall:  negative         - misc:             - - - CT ABDOMEN - - -         ABDOMEN:           - LIVER:      normal size / contour, no ductal  dilatation , no  focal lesion          - GB:      grossly negative         - CBD:      grossly negative        - SPLEEN:    normal size and contour         - PANCREAS:    normal in size, contour, no focal mass           - VISCERA:    normal caliber, no wall thickening        - MESENTERY:    no mesenteric mass        - CAVITY:    no free abdominal fluid, no free intraperitoneal  air        - BODY WALL:    wnl        - OSSEOUS:    Moderate to high-grade acquired spinal stenosis  with broad-based disc bulge at the L4/5 level.             RETROPERITONEUM:          - KIDNEYS:    normal size / contour, no collecting system  dilation                       no evidence of an enhancing mass          - URETERS:    normal course, caliber           - ADRENALS:    normal size, contour          - MISC:      no sig retroperitoneal adenopathy or mass        - VASCULAR:    aorta / iliacs: wnl for age            - - - CT PELVIS - - -           - VISCERA:    normal caliber small/large bowel, no focal  thickening/mass           - MESENTERY:      no mass          - VASCULAR:    wnl for age        - CAVITY:    no free fluid / air         - BLADDER:    unremarkable         - OSSEOUS:    Status post right hip replacement.         - MISC:     - UTERUS/OVARY:  status post hysterectomy                          .                  IMPRESSION:  CONCLUSION:      1. Stable examination.                       ASSESSMENT AND PLAN:       1.  Recurrent follicular lymphoma grade 1, initially patient was diagnosed with a right groin lymph node adenopathy in April 2014 for which patient underwent palliative radiation therapy.   - Subsequently patient again had a relapse around September 2016 for workup with second course of palliative radiation therapy was given by Dr. Leonel Vargas in October 2016.    -In view of enlarged aortocaval lymph nodes, patient was started on rituximab treatment in April 2017.    CT scan done in July 2017 shows decreasing in the  size of aortocaval lymph node which was discussed with patient.  -  Patient  had so far received 7 rituximab treatment every 2 months apart last of which was on March 26, 2018.  -  In view of generalized abdominal pain which was worsening, patient had CT scan done on April 6, 2018 which showed slight increase in the size of aortocaval adenopathy.  Treatment option consisting of changing treatment to bendamustine and rituximab versus palliative radiation treatment were discussed with patient and her family.  Patient wanted to try chemotherapy with bendamustine and rituximab first.    -Patient was started on chemotherapy with bendamustine and rituximab on April 30, 2018.  Patient tolerated first round of chemotherapy well.  We will go ahead with round 2 of chemotherapy today with bendamustine and rituximab.  -Restaging CT of chest, abdomen and pelvis with contrast done on June 21, 2018 shows decrease in size of adenopathy involving aortocaval area.  Result of CT scan were discussed with patient and her family.  -CMP done  on June 26, 2018 shows worsening of AST and ALT which could be coming secondary to bendamustine.  Chemotherapy was held on that day.  -CMP done earlier today on July 24, 2018 shows improvement in AST and ALT.  We will resume chemotherapy today with cycle 3.  We'll decrease the dose of bendamustine by 20%.  Plan will be told to 4 rounds of chemotherapy with bendamustine and rituximab after that we'll repeat restaging CT of chest, abdomen and pelvis with contrast which was discussed with patient.  -We will ask patient return to clinic in 4 weeks with a repeat CBC will see and CMP on that day prior to cycle 4.    2.  History of dyslipidemia    3.  Diabetes mellitus    4.  Elevated liver function test: Most active secondary to chemotherapy plus or minus fatty liver.  Both AST and AST are improved.    5. Elevated BMI : Patient's Body mass index is 36.47 kg/m². BMI is higher then reference range.  Patient  was informed about elevated BMI and need to lose weight.  Patient was counseled about diet and exercise for weight loss.    6.  Health maintenance: Patient does not smoke.  Last colonoscopy was done in 2014 which was normal as per patient.      7.  Advance care planning: For now patient remains full code unable to make her decisions.  Patient is health care surrogate mentioned on chart.        Tino Sood MD  7/24/2018  5:32 PM        EMR Dragon/Transcription disclaimer:   Much of this encounter note is an electronic transcription/translation of spoken language to printed text. The electronic translation of spoken language may permit erroneous, or at times, nonsensical words or phrases to be inadvertently transcribed; Although I have reviewed the note for such errors, some may still exist.

## 2018-07-25 ENCOUNTER — INFUSION (OUTPATIENT)
Dept: ONCOLOGY | Facility: HOSPITAL | Age: 58
End: 2018-07-25

## 2018-07-25 VITALS
HEART RATE: 88 BPM | DIASTOLIC BLOOD PRESSURE: 67 MMHG | TEMPERATURE: 97.9 F | RESPIRATION RATE: 20 BRPM | SYSTOLIC BLOOD PRESSURE: 129 MMHG

## 2018-07-25 DIAGNOSIS — Z45.2 ENCOUNTER FOR VENOUS ACCESS DEVICE CARE: ICD-10-CM

## 2018-07-25 DIAGNOSIS — C82.08 GRADE 1 FOLLICULAR LYMPHOMA OF LYMPH NODES OF MULTIPLE REGIONS (HCC): Primary | ICD-10-CM

## 2018-07-25 PROCEDURE — 25010000002 BENDAMUSTINE HCL 100 MG/4ML SOLUTION 4 ML VIAL: Performed by: INTERNAL MEDICINE

## 2018-07-25 PROCEDURE — 25010000003 DEXAMETHASONE SODIUM PHOSPHATE 100 MG/10ML SOLUTION 10 ML VIAL: Performed by: INTERNAL MEDICINE

## 2018-07-25 PROCEDURE — 96375 TX/PRO/DX INJ NEW DRUG ADDON: CPT | Performed by: INTERNAL MEDICINE

## 2018-07-25 PROCEDURE — 25010000002 HEPARIN FLUSH (PORCINE) 100 UNIT/ML SOLUTION: Performed by: INTERNAL MEDICINE

## 2018-07-25 PROCEDURE — 96413 CHEMO IV INFUSION 1 HR: CPT | Performed by: INTERNAL MEDICINE

## 2018-07-25 RX ORDER — SODIUM CHLORIDE 9 MG/ML
250 INJECTION, SOLUTION INTRAVENOUS ONCE
Status: COMPLETED | OUTPATIENT
Start: 2018-07-25 | End: 2018-07-25

## 2018-07-25 RX ORDER — SODIUM CHLORIDE 0.9 % (FLUSH) 0.9 %
10 SYRINGE (ML) INJECTION AS NEEDED
Status: CANCELLED | OUTPATIENT
Start: 2018-08-21

## 2018-07-25 RX ORDER — SODIUM CHLORIDE 0.9 % (FLUSH) 0.9 %
10 SYRINGE (ML) INJECTION AS NEEDED
Status: DISCONTINUED | OUTPATIENT
Start: 2018-07-25 | End: 2018-07-25 | Stop reason: HOSPADM

## 2018-07-25 RX ADMIN — DEXAMETHASONE SODIUM PHOSPHATE 12 MG: 10 INJECTION, SOLUTION INTRAMUSCULAR; INTRAVENOUS at 13:23

## 2018-07-25 RX ADMIN — SODIUM CHLORIDE, PRESERVATIVE FREE 500 UNITS: 5 INJECTION INTRAVENOUS at 14:27

## 2018-07-25 RX ADMIN — BENDAMUSTINE HYDROCHLORIDE 140 MG: 25 INJECTION, SOLUTION INTRAVENOUS at 13:54

## 2018-07-25 RX ADMIN — SODIUM CHLORIDE 250 ML: 9 INJECTION, SOLUTION INTRAVENOUS at 12:57

## 2018-07-25 RX ADMIN — Medication 10 ML: at 14:27

## 2018-08-21 ENCOUNTER — OFFICE VISIT (OUTPATIENT)
Dept: ONCOLOGY | Facility: CLINIC | Age: 58
End: 2018-08-21

## 2018-08-21 ENCOUNTER — INFUSION (OUTPATIENT)
Dept: ONCOLOGY | Facility: HOSPITAL | Age: 58
End: 2018-08-21

## 2018-08-21 VITALS
HEIGHT: 63 IN | OXYGEN SATURATION: 93 % | SYSTOLIC BLOOD PRESSURE: 150 MMHG | TEMPERATURE: 98 F | RESPIRATION RATE: 18 BRPM | HEART RATE: 86 BPM | WEIGHT: 203.6 LBS | BODY MASS INDEX: 36.07 KG/M2 | DIASTOLIC BLOOD PRESSURE: 90 MMHG

## 2018-08-21 DIAGNOSIS — R79.89 ELEVATED LFTS: ICD-10-CM

## 2018-08-21 DIAGNOSIS — C82.08 GRADE 1 FOLLICULAR LYMPHOMA OF LYMPH NODES OF MULTIPLE REGIONS (HCC): ICD-10-CM

## 2018-08-21 DIAGNOSIS — Z45.2 ENCOUNTER FOR VENOUS ACCESS DEVICE CARE: ICD-10-CM

## 2018-08-21 DIAGNOSIS — C82.03 FOLLICULAR LYMPHOMA GRADE I OF INTRA-ABDOMINAL LYMPH NODES (HCC): Primary | ICD-10-CM

## 2018-08-21 DIAGNOSIS — C82.08 GRADE 1 FOLLICULAR LYMPHOMA OF LYMPH NODES OF MULTIPLE REGIONS (HCC): Primary | ICD-10-CM

## 2018-08-21 LAB
ALBUMIN SERPL-MCNC: 4.4 G/DL (ref 3.4–4.8)
ALBUMIN/GLOB SERPL: 1.3 G/DL (ref 1.1–1.8)
ALP SERPL-CCNC: 92 U/L (ref 38–126)
ALT SERPL W P-5'-P-CCNC: 88 U/L (ref 9–52)
ANION GAP SERPL CALCULATED.3IONS-SCNC: 12 MMOL/L (ref 5–15)
AST SERPL-CCNC: 67 U/L (ref 14–36)
BASOPHILS # BLD AUTO: 0.03 10*3/MM3 (ref 0–0.2)
BASOPHILS NFR BLD AUTO: 0.6 % (ref 0–2)
BILIRUB SERPL-MCNC: 0.5 MG/DL (ref 0.2–1.3)
BUN BLD-MCNC: 8 MG/DL (ref 7–21)
BUN/CREAT SERPL: 16 (ref 7–25)
CALCIUM SPEC-SCNC: 9.6 MG/DL (ref 8.4–10.2)
CHLORIDE SERPL-SCNC: 98 MMOL/L (ref 95–110)
CO2 SERPL-SCNC: 25 MMOL/L (ref 22–31)
CREAT BLD-MCNC: 0.5 MG/DL (ref 0.5–1)
DEPRECATED RDW RBC AUTO: 49.7 FL (ref 36.4–46.3)
EOSINOPHIL # BLD AUTO: 0.06 10*3/MM3 (ref 0–0.7)
EOSINOPHIL NFR BLD AUTO: 1.2 % (ref 0–7)
ERYTHROCYTE [DISTWIDTH] IN BLOOD BY AUTOMATED COUNT: 15.3 % (ref 11.5–14.5)
GFR SERPL CREATININE-BSD FRML MDRD: 127 ML/MIN/1.73 (ref 51–120)
GLOBULIN UR ELPH-MCNC: 3.4 GM/DL (ref 2.3–3.5)
GLUCOSE BLD-MCNC: 168 MG/DL (ref 60–100)
HCT VFR BLD AUTO: 40.4 % (ref 35–45)
HGB BLD-MCNC: 13.9 G/DL (ref 12–15.5)
IMM GRANULOCYTES # BLD: 0.04 10*3/MM3 (ref 0–0.02)
IMM GRANULOCYTES NFR BLD: 0.8 % (ref 0–0.5)
LYMPHOCYTES # BLD AUTO: 0.42 10*3/MM3 (ref 0.6–4.2)
LYMPHOCYTES NFR BLD AUTO: 8.6 % (ref 10–50)
MCH RBC QN AUTO: 30.6 PG (ref 26.5–34)
MCHC RBC AUTO-ENTMCNC: 34.4 G/DL (ref 31.4–36)
MCV RBC AUTO: 89 FL (ref 80–98)
MONOCYTES # BLD AUTO: 0.6 10*3/MM3 (ref 0–0.9)
MONOCYTES NFR BLD AUTO: 12.3 % (ref 0–12)
NEUTROPHILS # BLD AUTO: 3.71 10*3/MM3 (ref 2–8.6)
NEUTROPHILS NFR BLD AUTO: 76.5 % (ref 37–80)
PLATELET # BLD AUTO: 262 10*3/MM3 (ref 150–450)
PMV BLD AUTO: 8.9 FL (ref 8–12)
POTASSIUM BLD-SCNC: 4.3 MMOL/L (ref 3.5–5.1)
PROT SERPL-MCNC: 7.8 G/DL (ref 6.3–8.6)
RBC # BLD AUTO: 4.54 10*6/MM3 (ref 3.77–5.16)
SODIUM BLD-SCNC: 135 MMOL/L (ref 137–145)
WBC NRBC COR # BLD: 4.86 10*3/MM3 (ref 3.2–9.8)

## 2018-08-21 PROCEDURE — 25010000002 HEPARIN FLUSH (PORCINE) 100 UNIT/ML SOLUTION: Performed by: INTERNAL MEDICINE

## 2018-08-21 PROCEDURE — 25010000002 DIPHENHYDRAMINE PER 50 MG: Performed by: INTERNAL MEDICINE

## 2018-08-21 PROCEDURE — 80053 COMPREHEN METABOLIC PANEL: CPT

## 2018-08-21 PROCEDURE — 25010000002 BENDAMUSTINE HCL 100 MG/4ML SOLUTION 4 ML VIAL: Performed by: INTERNAL MEDICINE

## 2018-08-21 PROCEDURE — G8417 CALC BMI ABV UP PARAM F/U: HCPCS | Performed by: INTERNAL MEDICINE

## 2018-08-21 PROCEDURE — 85025 COMPLETE CBC W/AUTO DIFF WBC: CPT

## 2018-08-21 PROCEDURE — 25010000002 PALONOSETRON PER 25 MCG: Performed by: INTERNAL MEDICINE

## 2018-08-21 PROCEDURE — 96413 CHEMO IV INFUSION 1 HR: CPT

## 2018-08-21 PROCEDURE — 96415 CHEMO IV INFUSION ADDL HR: CPT

## 2018-08-21 PROCEDURE — 36591 DRAW BLOOD OFF VENOUS DEVICE: CPT | Performed by: INTERNAL MEDICINE

## 2018-08-21 PROCEDURE — 25010000002 RITUXIMAB 10 MG/ML SOLUTION 50 ML VIAL: Performed by: INTERNAL MEDICINE

## 2018-08-21 PROCEDURE — 99214 OFFICE O/P EST MOD 30 MIN: CPT | Performed by: INTERNAL MEDICINE

## 2018-08-21 PROCEDURE — 1123F ACP DISCUSS/DSCN MKR DOCD: CPT | Performed by: INTERNAL MEDICINE

## 2018-08-21 PROCEDURE — 25010000002 RITUXIMAB 10 MG/ML SOLUTION 10 ML VIAL: Performed by: INTERNAL MEDICINE

## 2018-08-21 PROCEDURE — 25010000003 DEXAMETHASONE SODIUM PHOSPHATE 100 MG/10ML SOLUTION 10 ML VIAL: Performed by: INTERNAL MEDICINE

## 2018-08-21 PROCEDURE — 96375 TX/PRO/DX INJ NEW DRUG ADDON: CPT

## 2018-08-21 PROCEDURE — 96411 CHEMO IV PUSH ADDL DRUG: CPT

## 2018-08-21 RX ORDER — FAMOTIDINE 10 MG/ML
20 INJECTION, SOLUTION INTRAVENOUS AS NEEDED
Status: DISCONTINUED | OUTPATIENT
Start: 2018-08-21 | End: 2018-08-21 | Stop reason: HOSPADM

## 2018-08-21 RX ORDER — ACETAMINOPHEN 325 MG/1
650 TABLET ORAL ONCE
Status: COMPLETED | OUTPATIENT
Start: 2018-08-21 | End: 2018-08-21

## 2018-08-21 RX ORDER — BUDESONIDE AND FORMOTEROL FUMARATE DIHYDRATE 160; 4.5 UG/1; UG/1
AEROSOL RESPIRATORY (INHALATION)
COMMUNITY
Start: 2018-08-06 | End: 2020-08-27

## 2018-08-21 RX ORDER — PALONOSETRON 0.05 MG/ML
0.25 INJECTION, SOLUTION INTRAVENOUS ONCE
Status: COMPLETED | OUTPATIENT
Start: 2018-08-21 | End: 2018-08-21

## 2018-08-21 RX ORDER — PALONOSETRON 0.05 MG/ML
0.25 INJECTION, SOLUTION INTRAVENOUS ONCE
Status: CANCELLED | OUTPATIENT
Start: 2018-08-21

## 2018-08-21 RX ORDER — DIPHENHYDRAMINE HYDROCHLORIDE 50 MG/ML
50 INJECTION INTRAMUSCULAR; INTRAVENOUS AS NEEDED
Status: CANCELLED | OUTPATIENT
Start: 2018-08-21

## 2018-08-21 RX ORDER — DIPHENHYDRAMINE HYDROCHLORIDE 50 MG/ML
50 INJECTION INTRAMUSCULAR; INTRAVENOUS AS NEEDED
Status: DISCONTINUED | OUTPATIENT
Start: 2018-08-21 | End: 2018-08-21 | Stop reason: HOSPADM

## 2018-08-21 RX ORDER — SODIUM CHLORIDE 9 MG/ML
250 INJECTION, SOLUTION INTRAVENOUS ONCE
Status: COMPLETED | OUTPATIENT
Start: 2018-08-21 | End: 2018-08-21

## 2018-08-21 RX ORDER — SODIUM CHLORIDE 9 MG/ML
250 INJECTION, SOLUTION INTRAVENOUS ONCE
Status: CANCELLED | OUTPATIENT
Start: 2018-08-21

## 2018-08-21 RX ORDER — MEPERIDINE HYDROCHLORIDE 50 MG/ML
25 INJECTION INTRAMUSCULAR; INTRAVENOUS; SUBCUTANEOUS
Status: DISCONTINUED | OUTPATIENT
Start: 2018-08-21 | End: 2018-08-21 | Stop reason: HOSPADM

## 2018-08-21 RX ORDER — MEPERIDINE HYDROCHLORIDE 50 MG/ML
25 INJECTION INTRAMUSCULAR; INTRAVENOUS; SUBCUTANEOUS
Status: CANCELLED | OUTPATIENT
Start: 2018-08-21

## 2018-08-21 RX ORDER — SODIUM CHLORIDE 0.9 % (FLUSH) 0.9 %
10 SYRINGE (ML) INJECTION AS NEEDED
Status: DISCONTINUED | OUTPATIENT
Start: 2018-08-21 | End: 2018-08-21 | Stop reason: HOSPADM

## 2018-08-21 RX ORDER — SODIUM CHLORIDE 9 MG/ML
250 INJECTION, SOLUTION INTRAVENOUS ONCE
Status: CANCELLED | OUTPATIENT
Start: 2018-08-22 | End: 2018-08-22

## 2018-08-21 RX ORDER — ACETAMINOPHEN 325 MG/1
650 TABLET ORAL ONCE
Status: CANCELLED | OUTPATIENT
Start: 2018-08-21

## 2018-08-21 RX ORDER — SODIUM CHLORIDE 0.9 % (FLUSH) 0.9 %
10 SYRINGE (ML) INJECTION AS NEEDED
Status: CANCELLED | OUTPATIENT
Start: 2018-08-22

## 2018-08-21 RX ORDER — FAMOTIDINE 10 MG/ML
20 INJECTION, SOLUTION INTRAVENOUS AS NEEDED
Status: CANCELLED | OUTPATIENT
Start: 2018-08-21

## 2018-08-21 RX ADMIN — DEXAMETHASONE SODIUM PHOSPHATE 12 MG: 10 INJECTION, SOLUTION INTRAMUSCULAR; INTRAVENOUS at 10:39

## 2018-08-21 RX ADMIN — BENDAMUSTINE HYDROCHLORIDE 140 MG: 25 INJECTION, SOLUTION INTRAVENOUS at 11:19

## 2018-08-21 RX ADMIN — ACETAMINOPHEN 650 MG: 325 TABLET ORAL at 10:16

## 2018-08-21 RX ADMIN — SODIUM CHLORIDE 250 ML: 9 INJECTION, SOLUTION INTRAVENOUS at 10:00

## 2018-08-21 RX ADMIN — Medication 10 ML: at 09:23

## 2018-08-21 RX ADMIN — DIPHENHYDRAMINE HYDROCHLORIDE 50 MG: 50 INJECTION INTRAMUSCULAR; INTRAVENOUS at 10:16

## 2018-08-21 RX ADMIN — Medication 10 ML: at 14:42

## 2018-08-21 RX ADMIN — RITUXIMAB 740 MG: 10 INJECTION, SOLUTION INTRAVENOUS at 11:48

## 2018-08-21 RX ADMIN — HEPARIN SODIUM (PORCINE) LOCK FLUSH IV SOLN 100 UNIT/ML 500 UNITS: 100 SOLUTION at 14:42

## 2018-08-21 RX ADMIN — PALONOSETRON HYDROCHLORIDE 0.25 MG: 0.25 INJECTION INTRAVENOUS at 11:07

## 2018-08-21 NOTE — PROGRESS NOTES
DATE OF VISIT: 8/21/2018      REASON FOR VISIT: Recurrent follicular lymphoma on Bendamustine/rituximab      HISTORY OF PRESENT ILLNESS:    58-year-old female with a past medical history significant for recurrent follicular lymphoma status post radiation, last round of radiation was around September 2016 for follicular lymphoma.  Patient was again found to have aortocaval adenopathy on  CT of chest abdomen and pelvis in March 2017.  Patient was started on rituximab April 10, 2017.  Patient has so far received 7 doses of rituximab 2 months apart last of which was on March 26,2018.  In view of enlargement of aortocaval lymph node, patient was started on  chemotherapy with bendamustine and rituximab  on April 30, 2018.Patient is here to get  cycle  4 of chemotherapy with bendamustine and rituximab.  Complains of intermittent diarrhea.  Complains of intermittent nausea.  Denies any bleeding.  Denies any new lymph node enlargement.      PAST MEDICAL HISTORY:    Past Medical History:   Diagnosis Date   • Acute bronchitis    • Agoraphobia with panic disorder     on SNRI, prn buspar, prn klonopin     • Anxiety    • Arthritis    • Atrophic vaginitis    • Chest pain     work up as new onset angina    • Depression      MDD      • Essential hypertension    • Follicular non-Hodgkin's lymphoma (CMS/HCC)    • Generalized anxiety disorder     SSRI - buspar, cont cymbalta, prn klonopin      • Hip pain     arthritis, artifical right hip     • History of bone density study 02/09/2009    DEXA (bone density) test, peripheral (Normal   • History of echocardiogram 01/03/2012    Echocardiogram 05370 (Normal echocardigram. EF 55-60%)   • History of mammogram    • Hyperlipidemia    • Mass of lower limb    • Nuclear senile cataract    • Obesity    • Otalgia     ENT REFER   • Panic disorder     with agoraphobia. On buspar and klonopin now      • Type 2 diabetes mellitus (CMS/HCC)     NO BDR   • Upper respiratory infection        SOCIAL  HISTORY:    Social History   Substance Use Topics   • Smoking status: Former Smoker     Years: 12.00     Quit date: 1/1/1998   • Smokeless tobacco: Never Used      Comment: Smoked 1 pack per 2 weeks   • Alcohol use No       Surgical History :  Past Surgical History:   Procedure Laterality Date   • CENTRAL VENOUS LINE INSERTION  03/11/2016    Central line insertion (Successful placement of right upper extremity midline.)   • COLONOSCOPY     • CYSTOSCOPY  03/23/1976    Cystoscopy (external urethroplasty and cysto-panendoscopy,urethal hymenal fusion with hypospadias)   • DILATATION AND CURETTAGE  01/25/1980    D&C (removal of IUD)   • EXCISION LESION  04/15/2014    Remove thigh lesion (Excision of a mass of the right thigh using a 16.2 cm incision with intermediate layered closure.)   • HYSTEROSCOPY  02/28/2003    Hysteroscope procedure (diagnostic hysteroscopy with fractional D&C)   • INJECTION OF MEDICATION  08/22/2011    kenalog (1)   • OTHER SURGICAL HISTORY      Explore parathyroid glands   • OTHER SURGICAL HISTORY  08/26/1985    Laparosc diagnostic (desires elective tubal reversal)   • PAP SMEAR  05/24/2005    PAP SMEAR (normal)   • KS INSJ TUNNELED CVC W/O SUBQ PORT/ AGE 5 YR/> Right 4/4/2017    Procedure: INSERTION VENOUS ACCESS DEVICE (MEDIPORT) right chest;  Surgeon: Fortino Medina MD;  Location: [facility];  Service: General   • KS INSJ TUNNELED CVC W/O SUBQ PORT/ AGE 5 YR/> N/A 6/6/2017    Procedure: INSERTION VENOUS ACCESS DEVICE   (MEDIPORT)   (C-ARM#1);  Surgeon: Fortino Medina MD;  Location: [facility];  Service: General   • SHOULDER ARTHROSCOPY  11/11/2013    Shoulder arthroscopy/surgery (Right shoulder. Rotator cuff repair. Subacromial decompression. Edgar procedure.)   • SHOULDER SURGERY  12/28/2015    Shoulder surgery procedure (Arthroscopy of the left shoulder with rotator cuff repair.Edgar procedure.Subacromial decompression.)   • TONSILLECTOMY AND ADENOIDECTOMY  05/29/1967    T&A  "(hypertrophied tonsils and adenoids with recurrent infection)   • TOTAL ABDOMINAL HYSTERECTOMY WITH SALPINGO OOPHORECTOMY  02/03/2004    ARIELLE/BSO (with a preop fractional dilation and curettage with frozen section,menorrhagia,dysmenorrhea,unresponsive to medical intervention)   • TOTAL HIP ARTHROPLASTY     • VAGINA SURGERY  03/02/1981    Anesth, surgery on vagina (colpotomy with bilateral partial salpingectomy, multiparity contraindicated)       ALLERGIES:    Allergies   Allergen Reactions   • Ace Inhibitors Cough   • Latex      BLISTER     • Morphine And Related Nausea And Vomiting   • Lortab [Hydrocodone-Acetaminophen] Palpitations       REVIEW OF SYSTEMS:      CONSTITUTIONAL: 2 pound intentional weight loss since last clinic visit 4 weeks ago.  No fever, chills, or night sweats.     HEENT:  No epistaxis, mouth sores, or difficulty swallowing.    RESPIRATORY:  No new shortness of breath .  No new cough or hemoptysis.    CARDIOVASCULAR:  No chest pain or palpitations.    GASTROINTESTINAL: Complains of intermittent nausea . Complains of intermittent diarrhea . No vomiting, or blood in the stool.    GENITOURINARY:  No dysuria or hematuria.    MUSCULOSKELETAL:   No any new back pain or arthralgias.     NEUROLOGICAL:  No tingling or numbness. No new headache or dizziness.     LYMPHATICS:  Denies any abnormal swollen and anywhere in the body.    SKIN:  Denies any new skin rash.          PHYSICAL EXAMINATION:      VITAL SIGNS:  /90   Pulse 86   Temp 98 °F (36.7 °C) (Temporal Artery )   Resp 18   Ht 160 cm (62.99\")   Wt 92.4 kg (203 lb 9.6 oz)   SpO2 93%   BMI 36.08 kg/m²      ECOG performance status: 1    GENERAL:  Not in any distress.Obese female.    HEENT:  Normocephalic, Atraumatic.Mild Conjunctival pallor. No icterus. Extraocular Movements Intact. No Fascial Asymmetry noted.    NECK:  No adenopathy. No JVD.      RESPIRATORY:  Fair air entry bilateral. No rhonchi or wheezing.    CARDIOVASCULAR:  S1, S2. " Regular rate and rhythm. No murmur or gallop appreciated.  Port-A-Cath present on the right chest wall.    ABDOMEN:  Soft, obese, nontender. Bowel sounds present in all four quadrants.  No organomegaly appreciated.    MUSCULOSKELETAL:  No edema.No Calf Tenderness.  Decreased range of motion    NEUROLOGIC:  Alert, awake and oriented ×3.  No  Motor  deficit appreciated. Cranial Nerves 2-12 grossly intact.    SKIN: No new skin lesions.    LYMPHATICS: No new enlarged lymph node in neck or supraclavicular area.            DIAGNOSTIC DATA:    Glucose   Date Value Ref Range Status   08/21/2018 168 (H) 60 - 100 mg/dL Final     Sodium   Date Value Ref Range Status   08/21/2018 135 (L) 137 - 145 mmol/L Final     Potassium   Date Value Ref Range Status   08/21/2018 4.3 3.5 - 5.1 mmol/L Final     CO2   Date Value Ref Range Status   08/21/2018 25.0 22.0 - 31.0 mmol/L Final     Chloride   Date Value Ref Range Status   08/21/2018 98 95 - 110 mmol/L Final     Anion Gap   Date Value Ref Range Status   08/21/2018 12.0 5.0 - 15.0 mmol/L Final     Creatinine   Date Value Ref Range Status   08/21/2018 0.50 0.50 - 1.00 mg/dL Final     BUN   Date Value Ref Range Status   08/21/2018 8 7 - 21 mg/dL Final     BUN/Creatinine Ratio   Date Value Ref Range Status   08/21/2018 16.0 7.0 - 25.0 Final     Calcium   Date Value Ref Range Status   08/21/2018 9.6 8.4 - 10.2 mg/dL Final     eGFR Non  Amer   Date Value Ref Range Status   08/21/2018 127 (H) 51 - 120 mL/min/1.73 Final     Alkaline Phosphatase   Date Value Ref Range Status   08/21/2018 92 38 - 126 U/L Final     Total Protein   Date Value Ref Range Status   08/21/2018 7.8 6.3 - 8.6 g/dL Final     ALT (SGPT)   Date Value Ref Range Status   08/21/2018 88 (H) 9 - 52 U/L Final     AST (SGOT)   Date Value Ref Range Status   08/21/2018 67 (H) 14 - 36 U/L Final     Total Bilirubin   Date Value Ref Range Status   08/21/2018 0.5 0.2 - 1.3 mg/dL Final     Albumin   Date Value Ref Range Status    08/21/2018 4.40 3.40 - 4.80 g/dL Final     Globulin   Date Value Ref Range Status   08/21/2018 3.4 2.3 - 3.5 gm/dL Final     Lab Results   Component Value Date    WBC 4.86 08/21/2018    HGB 13.9 08/21/2018    HCT 40.4 08/21/2018    MCV 89.0 08/21/2018     08/21/2018     Lab Results   Component Value Date    NEUTROABS 3.71 08/21/2018         PATHOLOGY:  Pathology from right thigh biopsy done in April 2014 showed:  FINAL DIAGNOSIS:   A.  SUBCUTANEOUS MASS, RIGHT THIGH:             FOLLICULAR LYMPHOMA, GRADE 1 OF 3.   B.  SUBCUTANEOUS MASS, RIGHT THIGH:             FOLLICULAR LYMPHOMA, GRADE 1 OF 3.          Comment   COMMENT:   Immunostains have follicular lymphoma positive for CD20, negative for   CD3, negative for CD5, positive for CD10, positive for CD23, negative   for CD43, negative for cyclin D1, positive for BCL-2 and positive for   BCL-6.  The case is submitted to the [facility] for evaluation and   their report is attached.            RADIOLOGY DATA :  CT of chest, abdomen and pelvis with contrast done on June 21, 2018 was reviewed, discussed with patient, it showed:  COMMENTS:             - - - CT CHEST - - -                 PULMONARY PARENCHYMA:           - air spaces:      negative         - interstitium:     grossly within normal limits for age         - misc.:      no pulmonary nodules or mass              MEDIASTINUM / ROXANA:         - heart:                normal size , no pericardial fluid         - aorta/great vessels:  normal caliber and configuration for  age         - misc.:      no mediastinal mass / significant adenopathy          PLEURAL COMPARTMENT:            - misc.:      no pleural fluid or mass               MISC:         - inferior neck:     negative         - osseous / body wall:  negative         - misc:             - - - CT ABDOMEN - - -         ABDOMEN:           - LIVER:      normal size / contour, no ductal dilatation , no  focal lesion          - GB:      grossly negative          - CBD:      grossly negative        - SPLEEN:    normal size and contour         - PANCREAS:    normal in size, contour, no focal mass           - VISCERA:    normal caliber, no wall thickening        - MESENTERY:    no mesenteric mass        - CAVITY:    no free abdominal fluid, no free intraperitoneal  air        - BODY WALL:    wnl        - OSSEOUS:    Moderate to high-grade acquired spinal stenosis  with broad-based disc bulge at the L4/5 level.             RETROPERITONEUM:          - KIDNEYS:    normal size / contour, no collecting system  dilation                       no evidence of an enhancing mass          - URETERS:    normal course, caliber           - ADRENALS:    normal size, contour          - MISC:      no sig retroperitoneal adenopathy or mass        - VASCULAR:    aorta / iliacs: wnl for age            - - - CT PELVIS - - -           - VISCERA:    normal caliber small/large bowel, no focal  thickening/mass           - MESENTERY:      no mass          - VASCULAR:    wnl for age        - CAVITY:    no free fluid / air         - BLADDER:    unremarkable         - OSSEOUS:    Status post right hip replacement.         - MISC:     - UTERUS/OVARY:  status post hysterectomy                          .                  IMPRESSION:  CONCLUSION:      1. Stable examination.                       ASSESSMENT AND PLAN:       1.  Recurrent follicular lymphoma grade 1, initially patient was diagnosed with a right groin lymph node adenopathy in April 2014 for which patient underwent palliative radiation therapy.   - Subsequently patient again had a relapse around September 2016 for workup with second course of palliative radiation therapy was given by Dr. Leonel Vargas in October 2016.    -In view of enlarged aortocaval lymph nodes, patient was started on rituximab treatment in April 2017.    CT scan done in July 2017 shows decreasing in the size of aortocaval lymph node which was discussed with patient.  -   Patient  had so far received 7 rituximab treatment every 2 months apart last of which was on March 26, 2018.  -  In view of generalized abdominal pain which was worsening, patient had CT scan done on April 6, 2018 which showed slight increase in the size of aortocaval adenopathy.  Treatment option consisting of changing treatment to bendamustine and rituximab versus palliative radiation treatment were discussed with patient and her family.  Patient wanted to try chemotherapy with bendamustine and rituximab first.    -Patient was started on chemotherapy with bendamustine and rituximab on April 30, 2018.  Patient tolerated first round of chemotherapy well.  We will go ahead with round 2 of chemotherapy today with bendamustine and rituximab.  -Restaging CT of chest, abdomen and pelvis with contrast done on June 21, 2018 shows decrease in size of adenopathy involving aortocaval area.  Result of CT scan were discussed with patient and her family.  -CMP done  on June 26, 2018 shows worsening of AST and ALT which could be coming secondary to bendamustine.  Chemotherapy was held on that day.  -Cycle 3 on July 24, 2018 dose of bendamustine was reduced by 20%.  -We will go head with cycle 4 today.  We will continue with the reduced dose of bendamustine with cycle 4 as well.  -We will ask patient to return to clinic in 4 weeks with a repeat CBC, CMP, CT of chest, abdomen and pelvis with contrast to be done prior to next clinic visit.  Further treatment recommendation will depend on the result of CT scan which was discussed with the patient.    2.  History of dyslipidemia    3.  Diabetes mellitus    4.  Elevated liver function test: Most active secondary to chemotherapy plus or minus fatty liver.  Both AST and AST are elavated again.    5. Elevated BMI : Patient's Body mass index is 36.08 kg/m². BMI is higher then reference range.  Patient was informed about elevated BMI and need to lose weight.  Patient was counseled about  diet and exercise for weight loss.    6.  Health maintenance: Patient does not smoke.  Last colonoscopy was done in 2014 which was normal as per patient.      7.  Advance care planning: For now patient remains full code unable to make her decisions.  Patient is health care surrogate mentioned on chart.        Tino Sood MD  8/21/2018  6:18 PM        EMR Dragon/Transcription disclaimer:   Much of this encounter note is an electronic transcription/translation of spoken language to printed text. The electronic translation of spoken language may permit erroneous, or at times, nonsensical words or phrases to be inadvertently transcribed; Although I have reviewed the note for such errors, some may still exist.

## 2018-08-21 NOTE — PATIENT INSTRUCTIONS
Patient Instructions for CT Scan    · Your CT scan is being done without any oral contrast.  Your CT scan may be done with IV contrast, if you have an allergy to iodine--please tell your nurse.    · Do not eat or drink 4 hours prior to scan.     · You may take your medications with sips of water, except DO NOT take your diabetic pill the morning of the test.    Arrive at the Outpatient Surgery entrance at [facility] 20 minutes prior to appointment time.    You will receive a phone call with an appointment for your CT scan.  Please call our office, if someone does not contact you with 3 days.    Shannon Majano RN  August 21, 2018  9:56 AM

## 2018-08-22 ENCOUNTER — INFUSION (OUTPATIENT)
Dept: ONCOLOGY | Facility: HOSPITAL | Age: 58
End: 2018-08-22

## 2018-08-22 ENCOUNTER — APPOINTMENT (OUTPATIENT)
Dept: ONCOLOGY | Facility: HOSPITAL | Age: 58
End: 2018-08-22

## 2018-08-22 VITALS
TEMPERATURE: 96 F | DIASTOLIC BLOOD PRESSURE: 73 MMHG | HEART RATE: 88 BPM | SYSTOLIC BLOOD PRESSURE: 137 MMHG | RESPIRATION RATE: 20 BRPM

## 2018-08-22 DIAGNOSIS — Z45.2 ENCOUNTER FOR VENOUS ACCESS DEVICE CARE: ICD-10-CM

## 2018-08-22 DIAGNOSIS — C82.08 GRADE 1 FOLLICULAR LYMPHOMA OF LYMPH NODES OF MULTIPLE REGIONS (HCC): Primary | ICD-10-CM

## 2018-08-22 PROCEDURE — 96409 CHEMO IV PUSH SNGL DRUG: CPT | Performed by: INTERNAL MEDICINE

## 2018-08-22 PROCEDURE — 96375 TX/PRO/DX INJ NEW DRUG ADDON: CPT | Performed by: INTERNAL MEDICINE

## 2018-08-22 PROCEDURE — 25010000003 DEXAMETHASONE SODIUM PHOSPHATE 100 MG/10ML SOLUTION 10 ML VIAL: Performed by: INTERNAL MEDICINE

## 2018-08-22 PROCEDURE — 25010000002 HEPARIN FLUSH (PORCINE) 100 UNIT/ML SOLUTION: Performed by: INTERNAL MEDICINE

## 2018-08-22 PROCEDURE — 25010000002 BENDAMUSTINE HCL 100 MG/4ML SOLUTION 4 ML VIAL: Performed by: INTERNAL MEDICINE

## 2018-08-22 RX ORDER — SODIUM CHLORIDE 0.9 % (FLUSH) 0.9 %
10 SYRINGE (ML) INJECTION AS NEEDED
Status: DISCONTINUED | OUTPATIENT
Start: 2018-08-22 | End: 2018-08-22 | Stop reason: HOSPADM

## 2018-08-22 RX ORDER — SODIUM CHLORIDE 9 MG/ML
250 INJECTION, SOLUTION INTRAVENOUS ONCE
Status: COMPLETED | OUTPATIENT
Start: 2018-08-22 | End: 2018-08-22

## 2018-08-22 RX ORDER — SODIUM CHLORIDE 0.9 % (FLUSH) 0.9 %
10 SYRINGE (ML) INJECTION AS NEEDED
Status: CANCELLED | OUTPATIENT
Start: 2018-09-18

## 2018-08-22 RX ADMIN — BENDAMUSTINE HYDROCHLORIDE 140 MG: 25 INJECTION, SOLUTION INTRAVENOUS at 10:25

## 2018-08-22 RX ADMIN — Medication 10 ML: at 10:45

## 2018-08-22 RX ADMIN — DEXAMETHASONE SODIUM PHOSPHATE 12 MG: 10 INJECTION, SOLUTION INTRAMUSCULAR; INTRAVENOUS at 09:51

## 2018-08-22 RX ADMIN — SODIUM CHLORIDE 250 ML: 9 INJECTION, SOLUTION INTRAVENOUS at 09:41

## 2018-08-22 RX ADMIN — HEPARIN SODIUM (PORCINE) LOCK FLUSH IV SOLN 100 UNIT/ML 500 UNITS: 100 SOLUTION at 10:45

## 2018-09-12 ENCOUNTER — HOSPITAL ENCOUNTER (OUTPATIENT)
Dept: CT IMAGING | Facility: HOSPITAL | Age: 58
Discharge: HOME | End: 2018-09-12
Attending: INTERNAL MEDICINE

## 2018-09-12 ENCOUNTER — HOSPITAL ENCOUNTER (OUTPATIENT)
Dept: CT IMAGING | Facility: HOSPITAL | Age: 58
Discharge: HOME | End: 2018-09-12
Attending: INTERNAL MEDICINE | Admitting: INTERNAL MEDICINE

## 2018-09-12 DIAGNOSIS — C82.08 GRADE 1 FOLLICULAR LYMPHOMA OF LYMPH NODES OF MULTIPLE REGIONS (HCC): ICD-10-CM

## 2018-09-12 DIAGNOSIS — R79.89 ELEVATED LFTS: ICD-10-CM

## 2018-09-12 DIAGNOSIS — C82.03 FOLLICULAR LYMPHOMA GRADE I OF INTRA-ABDOMINAL LYMPH NODES (HCC): ICD-10-CM

## 2018-09-12 PROCEDURE — 25010000002 IOPAMIDOL 61 % SOLUTION: Performed by: INTERNAL MEDICINE

## 2018-09-12 PROCEDURE — 71260 CT THORAX DX C+: CPT

## 2018-09-12 PROCEDURE — 74177 CT ABD & PELVIS W/CONTRAST: CPT

## 2018-09-12 PROCEDURE — 25010000002 HEPARIN FLUSH (PORCINE) 100 UNIT/ML SOLUTION: Performed by: INTERNAL MEDICINE

## 2018-09-12 RX ORDER — 0.9 % SODIUM CHLORIDE 0.9 %
10 VIAL (ML) INJECTION ONCE
Status: COMPLETED | OUTPATIENT
Start: 2018-09-12 | End: 2018-09-12

## 2018-09-12 RX ADMIN — SODIUM CHLORIDE, PRESERVATIVE FREE 500 UNITS: 5 INJECTION INTRAVENOUS at 07:57

## 2018-09-12 RX ADMIN — IOPAMIDOL 50 ML: 612 INJECTION, SOLUTION INTRAVENOUS at 08:06

## 2018-09-12 RX ADMIN — IOPAMIDOL 44 ML: 612 INJECTION, SOLUTION INTRAVENOUS at 08:07

## 2018-09-12 RX ADMIN — SODIUM CHLORIDE 10 ML: 9 INJECTION, SOLUTION INTRAMUSCULAR; INTRAVENOUS; SUBCUTANEOUS at 07:57

## 2018-09-18 ENCOUNTER — OFFICE VISIT (OUTPATIENT)
Dept: ONCOLOGY | Facility: CLINIC | Age: 58
End: 2018-09-18

## 2018-09-18 ENCOUNTER — INFUSION (OUTPATIENT)
Dept: ONCOLOGY | Facility: HOSPITAL | Age: 58
End: 2018-09-18

## 2018-09-18 VITALS
HEART RATE: 91 BPM | HEIGHT: 63 IN | RESPIRATION RATE: 18 BRPM | OXYGEN SATURATION: 96 % | DIASTOLIC BLOOD PRESSURE: 66 MMHG | BODY MASS INDEX: 35.72 KG/M2 | WEIGHT: 201.6 LBS | SYSTOLIC BLOOD PRESSURE: 140 MMHG | TEMPERATURE: 98.1 F

## 2018-09-18 DIAGNOSIS — C82.03 FOLLICULAR LYMPHOMA GRADE I OF INTRA-ABDOMINAL LYMPH NODES (HCC): Primary | ICD-10-CM

## 2018-09-18 DIAGNOSIS — C82.08 GRADE 1 FOLLICULAR LYMPHOMA OF LYMPH NODES OF MULTIPLE REGIONS (HCC): Primary | ICD-10-CM

## 2018-09-18 DIAGNOSIS — R79.89 ELEVATED LFTS: ICD-10-CM

## 2018-09-18 DIAGNOSIS — Z45.2 ENCOUNTER FOR VENOUS ACCESS DEVICE CARE: ICD-10-CM

## 2018-09-18 LAB
ALBUMIN SERPL-MCNC: 4.2 G/DL (ref 3.4–4.8)
ALBUMIN/GLOB SERPL: 1.4 G/DL (ref 1.1–1.8)
ALP SERPL-CCNC: 114 U/L (ref 38–126)
ALT SERPL W P-5'-P-CCNC: 117 U/L (ref 9–52)
ANION GAP SERPL CALCULATED.3IONS-SCNC: 14 MMOL/L (ref 5–15)
AST SERPL-CCNC: 79 U/L (ref 14–36)
BASOPHILS # BLD AUTO: 0.01 10*3/MM3 (ref 0–0.2)
BASOPHILS NFR BLD AUTO: 0.3 % (ref 0–2)
BILIRUB SERPL-MCNC: 0.4 MG/DL (ref 0.2–1.3)
BUN BLD-MCNC: 7 MG/DL (ref 7–21)
BUN/CREAT SERPL: 12.3 (ref 7–25)
CALCIUM SPEC-SCNC: 9.7 MG/DL (ref 8.4–10.2)
CHLORIDE SERPL-SCNC: 101 MMOL/L (ref 95–110)
CO2 SERPL-SCNC: 21 MMOL/L (ref 22–31)
CREAT BLD-MCNC: 0.57 MG/DL (ref 0.5–1)
DEPRECATED RDW RBC AUTO: 50.9 FL (ref 36.4–46.3)
EOSINOPHIL # BLD AUTO: 0 10*3/MM3 (ref 0–0.7)
EOSINOPHIL NFR BLD AUTO: 0 % (ref 0–7)
ERYTHROCYTE [DISTWIDTH] IN BLOOD BY AUTOMATED COUNT: 15.1 % (ref 11.5–14.5)
GFR SERPL CREATININE-BSD FRML MDRD: 109 ML/MIN/1.73 (ref 51–120)
GLOBULIN UR ELPH-MCNC: 3.1 GM/DL (ref 2.3–3.5)
GLUCOSE BLD-MCNC: 243 MG/DL (ref 60–100)
HCT VFR BLD AUTO: 39.9 % (ref 35–45)
HGB BLD-MCNC: 13.9 G/DL (ref 12–15.5)
IMM GRANULOCYTES # BLD: 0 10*3/MM3 (ref 0–0.02)
IMM GRANULOCYTES NFR BLD: 0 % (ref 0–0.5)
LYMPHOCYTES # BLD AUTO: 0.35 10*3/MM3 (ref 0.6–4.2)
LYMPHOCYTES NFR BLD AUTO: 10.3 % (ref 10–50)
MCH RBC QN AUTO: 31.7 PG (ref 26.5–34)
MCHC RBC AUTO-ENTMCNC: 34.8 G/DL (ref 31.4–36)
MCV RBC AUTO: 91.1 FL (ref 80–98)
MONOCYTES # BLD AUTO: 0.35 10*3/MM3 (ref 0–0.9)
MONOCYTES NFR BLD AUTO: 10.3 % (ref 0–12)
NEUTROPHILS # BLD AUTO: 2.69 10*3/MM3 (ref 2–8.6)
NEUTROPHILS NFR BLD AUTO: 79.1 % (ref 37–80)
PLATELET # BLD AUTO: 261 10*3/MM3 (ref 150–450)
PMV BLD AUTO: 8 FL (ref 8–12)
POTASSIUM BLD-SCNC: 4.2 MMOL/L (ref 3.5–5.1)
PROT SERPL-MCNC: 7.3 G/DL (ref 6.3–8.6)
RBC # BLD AUTO: 4.38 10*6/MM3 (ref 3.77–5.16)
SODIUM BLD-SCNC: 136 MMOL/L (ref 137–145)
WBC NRBC COR # BLD: 3.4 10*3/MM3 (ref 3.2–9.8)

## 2018-09-18 PROCEDURE — 80053 COMPREHEN METABOLIC PANEL: CPT

## 2018-09-18 PROCEDURE — G8417 CALC BMI ABV UP PARAM F/U: HCPCS | Performed by: INTERNAL MEDICINE

## 2018-09-18 PROCEDURE — 25010000002 HEPARIN FLUSH (PORCINE) 100 UNIT/ML SOLUTION: Performed by: INTERNAL MEDICINE

## 2018-09-18 PROCEDURE — 99214 OFFICE O/P EST MOD 30 MIN: CPT | Performed by: INTERNAL MEDICINE

## 2018-09-18 PROCEDURE — G0463 HOSPITAL OUTPT CLINIC VISIT: HCPCS | Performed by: INTERNAL MEDICINE

## 2018-09-18 PROCEDURE — 1123F ACP DISCUSS/DSCN MKR DOCD: CPT | Performed by: INTERNAL MEDICINE

## 2018-09-18 PROCEDURE — 85025 COMPLETE CBC W/AUTO DIFF WBC: CPT

## 2018-09-18 PROCEDURE — 36591 DRAW BLOOD OFF VENOUS DEVICE: CPT | Performed by: INTERNAL MEDICINE

## 2018-09-18 RX ORDER — SODIUM CHLORIDE 0.9 % (FLUSH) 0.9 %
10 SYRINGE (ML) INJECTION AS NEEDED
Status: CANCELLED | OUTPATIENT
Start: 2018-10-30

## 2018-09-18 RX ORDER — SODIUM CHLORIDE 0.9 % (FLUSH) 0.9 %
10 SYRINGE (ML) INJECTION AS NEEDED
Status: DISCONTINUED | OUTPATIENT
Start: 2018-09-18 | End: 2018-09-18 | Stop reason: HOSPADM

## 2018-09-18 RX ADMIN — Medication 10 ML: at 09:05

## 2018-09-18 RX ADMIN — Medication 10 ML: at 09:39

## 2018-09-18 RX ADMIN — HEPARIN SODIUM (PORCINE) LOCK FLUSH IV SOLN 100 UNIT/ML 500 UNITS: 100 SOLUTION at 09:39

## 2018-09-18 NOTE — PATIENT INSTRUCTIONS
Patient Instructions for CT Scan    · Your CT scan is being done without any oral contrast.  Your CT scan may be done with IV contrast, if you have an allergy to iodine--please tell your nurse.    · Do not eat or drink 4 hours prior to scan.     · You may take your medications with sips of water, except DO NOT take your diabetic pill the morning of the test.    Arrive at the Outpatient Surgery entrance at [facility] 20 minutes prior to appointment time.    You will receive a phone call with an appointment for your CT scan.  Please call our office, if someone does not contact you with 3 days.    Shannon Majano RN  September 18, 2018  9:42 AM

## 2018-09-18 NOTE — PROGRESS NOTES
DATE OF VISIT: 9/18/2018      REASON FOR VISIT: Recurrent follicular lymphoma on Bendamustine/rituximab ,Elevated LFT      HISTORY OF PRESENT ILLNESS:    58-year-old female with a past medical history significant for recurrent follicular lymphoma status post radiation, last round of radiation was around September 2016 for follicular lymphoma.  Patient was again found to have aortocaval adenopathy on  CT of chest abdomen and pelvis in March 2017.  Patient was started on rituximab April 10, 2017.  Patient has so far received 7 doses of rituximab 2 months apart last of which was on March 26,2018.  In view of enlargement of aortocaval lymph node, patient was started on  chemotherapy with bendamustine and rituximab  on April 30, 2018.Patient is here to get  cycle  5 of chemotherapy with bendamustine and rituximab.  Had a restaging CT of chest, abdomen and pelvis with contrast done on September 12, 2018.  Patient is here to get result of CT scan and further recommendation.  Complains of intermittent diarrhea.  Complains of intermittent nausea.  Denies any bleeding.  Denies any new lymph node enlargement.      PAST MEDICAL HISTORY:    Past Medical History:   Diagnosis Date   • Acute bronchitis    • Agoraphobia with panic disorder     on SNRI, prn buspar, prn klonopin     • Anxiety    • Arthritis    • Atrophic vaginitis    • Chest pain     work up as new onset angina    • Depression      MDD      • Essential hypertension    • Follicular non-Hodgkin's lymphoma (CMS/HCC)    • Generalized anxiety disorder     SSRI - buspar, cont cymbalta, prn klonopin      • Hip pain     arthritis, artifical right hip     • History of bone density study 02/09/2009    DEXA (bone density) test, peripheral (Normal   • History of echocardiogram 01/03/2012    Echocardiogram 29142 (Normal echocardigram. EF 55-60%)   • History of mammogram    • Hyperlipidemia    • Mass of lower limb    • Nuclear senile cataract    • Obesity    • Otalgia     ENT  REFER   • Panic disorder     with agoraphobia. On buspar and klonopin now      • Type 2 diabetes mellitus (CMS/HCC)     NO BDR   • Upper respiratory infection        SOCIAL HISTORY:    Social History   Substance Use Topics   • Smoking status: Former Smoker     Years: 12.00     Quit date: 1/1/1998   • Smokeless tobacco: Never Used      Comment: Smoked 1 pack per 2 weeks   • Alcohol use No       Surgical History :  Past Surgical History:   Procedure Laterality Date   • CENTRAL VENOUS LINE INSERTION  03/11/2016    Central line insertion (Successful placement of right upper extremity midline.)   • COLONOSCOPY     • CYSTOSCOPY  03/23/1976    Cystoscopy (external urethroplasty and cysto-panendoscopy,urethal hymenal fusion with hypospadias)   • DILATATION AND CURETTAGE  01/25/1980    D&C (removal of IUD)   • EXCISION LESION  04/15/2014    Remove thigh lesion (Excision of a mass of the right thigh using a 16.2 cm incision with intermediate layered closure.)   • HYSTEROSCOPY  02/28/2003    Hysteroscope procedure (diagnostic hysteroscopy with fractional D&C)   • INJECTION OF MEDICATION  08/22/2011    kenalog (1)   • OTHER SURGICAL HISTORY      Explore parathyroid glands   • OTHER SURGICAL HISTORY  08/26/1985    Laparosc diagnostic (desires elective tubal reversal)   • PAP SMEAR  05/24/2005    PAP SMEAR (normal)   • [place] INSJ TUNNELED CVC W/O SUBQ PORT/ AGE 5 YR/> Right 4/4/2017    Procedure: INSERTION VENOUS ACCESS DEVICE (MEDIPORT) right chest;  Surgeon: Fortino Medina MD;  Location: [facility];  Service: General   • [place] INSJ TUNNELED CVC W/O SUBQ PORT/ AGE 5 YR/> N/A 6/6/2017    Procedure: INSERTION VENOUS ACCESS DEVICE   (MEDIPORT)   (C-ARM#1);  Surgeon: Fortino Medina MD;  Location: [facility];  Service: General   • SHOULDER ARTHROSCOPY  11/11/2013    Shoulder arthroscopy/surgery (Right shoulder. Rotator cuff repair. Subacromial decompression. Edgar procedure.)   • SHOULDER SURGERY  12/28/2015    Shoulder surgery procedure  "(Arthroscopy of the left shoulder with rotator cuff repair.Edgar procedure.Subacromial decompression.)   • TONSILLECTOMY AND ADENOIDECTOMY  05/29/1967    T&A (hypertrophied tonsils and adenoids with recurrent infection)   • TOTAL ABDOMINAL HYSTERECTOMY WITH SALPINGO OOPHORECTOMY  02/03/2004    ARIELLE/BSO (with a preop fractional dilation and curettage with frozen section,menorrhagia,dysmenorrhea,unresponsive to medical intervention)   • TOTAL HIP ARTHROPLASTY     • VAGINA SURGERY  03/02/1981    Anesth, surgery on vagina (colpotomy with bilateral partial salpingectomy, multiparity contraindicated)       ALLERGIES:    Allergies   Allergen Reactions   • Ace Inhibitors Cough   • Latex      BLISTER     • Morphine And Related Nausea And Vomiting   • Lortab [Hydrocodone-Acetaminophen] Palpitations       REVIEW OF SYSTEMS:      CONSTITUTIONAL: 2 pound intentional weight loss since last clinic visit 4 weeks ago.  No fever, chills, or night sweats.     HEENT:  No epistaxis, mouth sores, or difficulty swallowing.    RESPIRATORY:  No new shortness of breath .  No new cough or hemoptysis.    CARDIOVASCULAR:  No chest pain or palpitations.    GASTROINTESTINAL: Complains of intermittent nausea . Complains of intermittent diarrhea . No vomiting, or blood in the stool.    GENITOURINARY:  No dysuria or hematuria.    MUSCULOSKELETAL:   No any new back pain or arthralgias.     NEUROLOGICAL:  No tingling or numbness. No new headache or dizziness.     LYMPHATICS:  Denies any abnormal swollen and anywhere in the body.    SKIN:  Denies any new skin rash.          PHYSICAL EXAMINATION:      VITAL SIGNS:  /66   Pulse 91   Temp 98.1 °F (36.7 °C) (Temporal Artery )   Resp 18   Ht 160 cm (62.99\")   Wt 91.4 kg (201 lb 9.6 oz)   SpO2 96%   BMI 35.72 kg/m²      ECOG performance status: 1    GENERAL:  Not in any distress.Obese female.    HEENT:  Normocephalic, Atraumatic.Mild Conjunctival pallor. No icterus. Extraocular Movements " Intact. No Fascial Asymmetry noted.    NECK:  No adenopathy. No JVD.      RESPIRATORY:  Fair air entry bilateral. No rhonchi or wheezing.    CARDIOVASCULAR:  S1, S2. Regular rate and rhythm. No murmur or gallop appreciated.  Port-A-Cath present on the right chest wall.    ABDOMEN:  Soft, obese, nontender. Bowel sounds present in all four quadrants.  No organomegaly appreciated.    MUSCULOSKELETAL:  No edema.No Calf Tenderness.  Decreased range of motion    NEUROLOGIC:  Alert, awake and oriented ×3.  No  Motor  deficit appreciated. Cranial Nerves 2-12 grossly intact.    SKIN: No new skin lesions.    LYMPHATICS: No new enlarged lymph node in neck or supraclavicular area.            DIAGNOSTIC DATA:    Glucose   Date Value Ref Range Status   09/18/2018 243 (H) 60 - 100 mg/dL Final     Sodium   Date Value Ref Range Status   09/18/2018 136 (L) 137 - 145 mmol/L Final     Potassium   Date Value Ref Range Status   09/18/2018 4.2 3.5 - 5.1 mmol/L Final     CO2   Date Value Ref Range Status   09/18/2018 21.0 (L) 22.0 - 31.0 mmol/L Final     Chloride   Date Value Ref Range Status   09/18/2018 101 95 - 110 mmol/L Final     Anion Gap   Date Value Ref Range Status   09/18/2018 14.0 5.0 - 15.0 mmol/L Final     Creatinine   Date Value Ref Range Status   09/18/2018 0.57 0.50 - 1.00 mg/dL Final     BUN   Date Value Ref Range Status   09/18/2018 7 7 - 21 mg/dL Final     BUN/Creatinine Ratio   Date Value Ref Range Status   09/18/2018 12.3 7.0 - 25.0 Final     Calcium   Date Value Ref Range Status   09/18/2018 9.7 8.4 - 10.2 mg/dL Final     eGFR Non  Amer   Date Value Ref Range Status   09/18/2018 109 51 - 120 mL/min/1.73 Final     Alkaline Phosphatase   Date Value Ref Range Status   09/18/2018 114 38 - 126 U/L Final     Total Protein   Date Value Ref Range Status   09/18/2018 7.3 6.3 - 8.6 g/dL Final     ALT (SGPT)   Date Value Ref Range Status   09/18/2018 117 (H) 9 - 52 U/L Final     AST (SGOT)   Date Value Ref Range Status    09/18/2018 79 (H) 14 - 36 U/L Final     Total Bilirubin   Date Value Ref Range Status   09/18/2018 0.4 0.2 - 1.3 mg/dL Final     Albumin   Date Value Ref Range Status   09/18/2018 4.20 3.40 - 4.80 g/dL Final     Globulin   Date Value Ref Range Status   09/18/2018 3.1 2.3 - 3.5 gm/dL Final     Lab Results   Component Value Date    WBC 3.40 09/18/2018    HGB 13.9 09/18/2018    HCT 39.9 09/18/2018    MCV 91.1 09/18/2018     09/18/2018     Lab Results   Component Value Date    NEUTROABS 2.69 09/18/2018         PATHOLOGY:  Pathology from right thigh biopsy done in April 2014 showed:  FINAL DIAGNOSIS:   A.  SUBCUTANEOUS MASS, RIGHT THIGH:             FOLLICULAR LYMPHOMA, GRADE 1 OF 3.   B.  SUBCUTANEOUS MASS, RIGHT THIGH:             FOLLICULAR LYMPHOMA, GRADE 1 OF 3.          Comment   COMMENT:   Immunostains have follicular lymphoma positive for CD20, negative for   CD3, negative for CD5, positive for CD10, positive for CD23, negative   for CD43, negative for cyclin D1, positive for BCL-2 and positive for   BCL-6.  The case is submitted to the [facility] for evaluation and   their report is attached.            RADIOLOGY DATA :  CT of chest, abdomen and pelvis with contrast done on September 12, 2018 was reviewed, discussed with patient, it showed:  - - - CT CHEST - - -                 PULMONARY PARENCHYMA:           - air spaces:      negative         - interstitium:     grossly within normal limits for age         - misc.:      no pulmonary nodules or mass              MEDIASTINUM / ROXANA:         - heart:                normal size , no pericardial fluid         - aorta/great vessels:  normal caliber and configuration for  age         - misc.:      no mediastinal mass / significant adenopathy          PLEURAL COMPARTMENT:            - misc.:      no pleural fluid or mass               MISC:         - inferior neck:     negative         - osseous / body wall:  negative         - misc:             - - - CT  ABDOMEN - - -         ABDOMEN:           - LIVER:      normal size / contour, no ductal dilatation , no  focal lesion          - GB:      grossly negative         - CBD:      grossly negative        - SPLEEN:    normal size and contour         - PANCREAS:    normal in size, contour, no focal mass           - VISCERA:    normal caliber, no wall thickening        - MESENTERY:    no mesenteric mass        - CAVITY:    no free abdominal fluid, no free intraperitoneal  air        - BODY WALL:    wnl        - OSSEOUS:    grossly negative for age             RETROPERITONEUM:          - KIDNEYS:    normal size / contour, no collecting system  dilation                       no evidence of an enhancing mass          - URETERS:    normal course, caliber           - ADRENALS:    normal size, contour          - MISC:      no sig retroperitoneal adenopathy or mass        - VASCULAR:    aorta / iliacs: wnl for age            - - - CT PELVIS - - -           - VISCERA:    normal caliber small/large bowel, no focal  thickening/mass           - MESENTERY:      no mass          - VASCULAR:    wnl for age        - CAVITY:    no free fluid / air         - BLADDER:    unremarkable         - OSSEOUS:    wnl         - MISC:     - UTERUS/OVARY: Status post hysterectomy                          .                  IMPRESSION:  CONCLUSION:      1. Stable examination.               CT of chest, abdomen and pelvis with contrast done on June 21, 2018 was reviewed, discussed with patient, it showed:  COMMENTS:             - - - CT CHEST - - -                 PULMONARY PARENCHYMA:           - air spaces:      negative         - interstitium:     grossly within normal limits for age         - misc.:      no pulmonary nodules or mass              MEDIASTINUM / ROXANA:         - heart:                normal size , no pericardial fluid         - aorta/great vessels:  normal caliber and configuration for  age         - misc.:      no mediastinal mass /  significant adenopathy          PLEURAL COMPARTMENT:            - misc.:      no pleural fluid or mass               MISC:         - inferior neck:     negative         - osseous / body wall:  negative         - misc:             - - - CT ABDOMEN - - -         ABDOMEN:           - LIVER:      normal size / contour, no ductal dilatation , no  focal lesion          - GB:      grossly negative         - CBD:      grossly negative        - SPLEEN:    normal size and contour         - PANCREAS:    normal in size, contour, no focal mass           - VISCERA:    normal caliber, no wall thickening        - MESENTERY:    no mesenteric mass        - CAVITY:    no free abdominal fluid, no free intraperitoneal  air        - BODY WALL:    wnl        - OSSEOUS:    Moderate to high-grade acquired spinal stenosis  with broad-based disc bulge at the L4/5 level.             RETROPERITONEUM:          - KIDNEYS:    normal size / contour, no collecting system  dilation                       no evidence of an enhancing mass          - URETERS:    normal course, caliber           - ADRENALS:    normal size, contour          - MISC:      no sig retroperitoneal adenopathy or mass        - VASCULAR:    aorta / iliacs: wnl for age            - - - CT PELVIS - - -           - VISCERA:    normal caliber small/large bowel, no focal  thickening/mass           - MESENTERY:      no mass          - VASCULAR:    wnl for age        - CAVITY:    no free fluid / air         - BLADDER:    unremarkable         - OSSEOUS:    Status post right hip replacement.         - MISC:     - UTERUS/OVARY:  status post hysterectomy                          .                  IMPRESSION:  CONCLUSION:      1. Stable examination.                       ASSESSMENT AND PLAN:       1.  Recurrent follicular lymphoma grade 1, initially patient was diagnosed with a right groin lymph node adenopathy in April 2014 for which patient underwent palliative radiation therapy.   -  Subsequently patient again had a relapse around September 2016 for workup with second course of palliative radiation therapy was given by Dr. Leonel Vargas in October 2016.    -In view of enlarged aortocaval lymph nodes, patient was started on rituximab treatment in April 2017.    CT scan done in July 2017 shows decreasing in the size of aortocaval lymph node which was discussed with patient.  -  Patient  had so far received 7 rituximab treatment every 2 months apart last of which was on March 26, 2018.  -  In view of generalized abdominal pain which was worsening, patient had CT scan done on April 6, 2018 which showed slight increase in the size of aortocaval adenopathy.  Treatment option consisting of changing treatment to bendamustine and rituximab versus palliative radiation treatment were discussed with patient and her family.  Patient wanted to try chemotherapy with bendamustine and rituximab first.    -Patient was started on chemotherapy with bendamustine and rituximab on April 30, 2018.  Patient tolerated first round of chemotherapy well.  We will go ahead with round 2 of chemotherapy today with bendamustine and rituximab.  -Restaging CT of chest, abdomen and pelvis with contrast done on June 21, 2018 shows decrease in size of adenopathy involving aortocaval area.  Result of CT scan were discussed with patient and her family.  -Cycle 3 on July 24, 2018 dose of bendamustine was reduced by 20%.  -Patient has received 4 cycles of bendamustine and rituximab from April 30, 2018 until August 21, 2018.  -CT of chest, abdomen and pelvis with contrast done on September 12, 2018 after cycle 4 of bendamustine and rituximab showed no evidence of lymphadenopathy involving chest, abdomen or pelvis.  At this point we will discontinue chemotherapy and continue with clinical surveillance.  -We will ask patient to return to clinic in 3 months with repeat CBC, CMP and CT of chest, abdomen and pelvis with contrast to be done  prior to that.    2.  History of dyslipidemia    3.  Diabetes mellitus    4.  Elevated liver function test: Most active secondary to chemotherapy plus or minus fatty liver.  Both AST and AST are elavated again.    5. Elevated BMI : Patient's Body mass index is 35.72 kg/m². BMI is higher then reference range.  Patient was informed about elevated BMI and need to lose weight.  Patient was counseled about diet and exercise for weight loss.    6.  Health maintenance: Patient does not smoke.  Last colonoscopy was done in 2014 which was normal as per patient.      7.  Advance care planning: For now patient remains full code unable to make her decisions.  Patient is health care surrogate mentioned on chart.        Tino Sood MD  9/18/2018  9:57 AM        EMR Dragon/Transcription disclaimer:   Much of this encounter note is an electronic transcription/translation of spoken language to printed text. The electronic translation of spoken language may permit erroneous, or at times, nonsensical words or phrases to be inadvertently transcribed; Although I have reviewed the note for such errors, some may still exist.

## 2018-12-13 ENCOUNTER — APPOINTMENT (OUTPATIENT)
Dept: LAB | Facility: HOSPITAL | Age: 58
End: 2018-12-13
Attending: INTERNAL MEDICINE

## 2018-12-13 ENCOUNTER — INFUSION (OUTPATIENT)
Dept: ONCOLOGY | Facility: HOSPITAL | Age: 58
End: 2018-12-13

## 2018-12-13 ENCOUNTER — HOSPITAL ENCOUNTER (OUTPATIENT)
Dept: CT IMAGING | Facility: HOSPITAL | Age: 58
Discharge: HOME | End: 2018-12-13
Admitting: INTERNAL MEDICINE

## 2018-12-13 DIAGNOSIS — Z45.2 ENCOUNTER FOR VENOUS ACCESS DEVICE CARE: Primary | ICD-10-CM

## 2018-12-13 DIAGNOSIS — R79.89 ELEVATED LFTS: ICD-10-CM

## 2018-12-13 DIAGNOSIS — C82.03 FOLLICULAR LYMPHOMA GRADE I OF INTRA-ABDOMINAL LYMPH NODES (HCC): ICD-10-CM

## 2018-12-13 LAB
ALBUMIN SERPL-MCNC: 4.5 G/DL (ref 3.4–4.8)
ALBUMIN/GLOB SERPL: 1.6 G/DL (ref 1.1–1.8)
ALP SERPL-CCNC: 82 U/L (ref 38–126)
ALT SERPL W P-5'-P-CCNC: 46 U/L (ref 9–52)
ANION GAP SERPL CALCULATED.3IONS-SCNC: 12 MMOL/L (ref 5–15)
AST SERPL-CCNC: 36 U/L (ref 14–36)
BASOPHILS # BLD AUTO: 0.01 10*3/MM3 (ref 0–0.2)
BASOPHILS NFR BLD AUTO: 0.2 % (ref 0–2)
BILIRUB SERPL-MCNC: 0.4 MG/DL (ref 0.2–1.3)
BUN BLD-MCNC: 7 MG/DL (ref 7–21)
BUN/CREAT SERPL: 11.7 (ref 7–25)
CALCIUM SPEC-SCNC: 9.6 MG/DL (ref 8.4–10.2)
CHLORIDE SERPL-SCNC: 99 MMOL/L (ref 95–110)
CO2 SERPL-SCNC: 25 MMOL/L (ref 22–31)
CREAT BLD-MCNC: 0.6 MG/DL (ref 0.5–1)
DEPRECATED RDW RBC AUTO: 45.6 FL (ref 36.4–46.3)
EOSINOPHIL # BLD AUTO: 0.05 10*3/MM3 (ref 0–0.7)
EOSINOPHIL NFR BLD AUTO: 1.2 % (ref 0–7)
ERYTHROCYTE [DISTWIDTH] IN BLOOD BY AUTOMATED COUNT: 13.9 % (ref 11.5–14.5)
GFR SERPL CREATININE-BSD FRML MDRD: 103 ML/MIN/1.73 (ref 51–120)
GLOBULIN UR ELPH-MCNC: 2.9 GM/DL (ref 2.3–3.5)
GLUCOSE BLD-MCNC: 164 MG/DL (ref 60–100)
HCT VFR BLD AUTO: 37.7 % (ref 35–45)
HGB BLD-MCNC: 13.3 G/DL (ref 12–15.5)
IMM GRANULOCYTES # BLD: 0.01 10*3/MM3 (ref 0–0.02)
IMM GRANULOCYTES NFR BLD: 0.2 % (ref 0–0.5)
LDH SERPL-CCNC: 379 U/L (ref 313–618)
LYMPHOCYTES # BLD AUTO: 0.3 10*3/MM3 (ref 0.6–4.2)
LYMPHOCYTES NFR BLD AUTO: 7.2 % (ref 10–50)
MCH RBC QN AUTO: 31.6 PG (ref 26.5–34)
MCHC RBC AUTO-ENTMCNC: 35.3 G/DL (ref 31.4–36)
MCV RBC AUTO: 89.5 FL (ref 80–98)
MONOCYTES # BLD AUTO: 0.45 10*3/MM3 (ref 0–0.9)
MONOCYTES NFR BLD AUTO: 10.8 % (ref 0–12)
NEUTROPHILS # BLD AUTO: 3.35 10*3/MM3 (ref 2–8.6)
NEUTROPHILS NFR BLD AUTO: 80.4 % (ref 37–80)
PLATELET # BLD AUTO: 303 10*3/MM3 (ref 150–450)
PMV BLD AUTO: 8.7 FL (ref 8–12)
POTASSIUM BLD-SCNC: 4.1 MMOL/L (ref 3.5–5.1)
PROT SERPL-MCNC: 7.4 G/DL (ref 6.3–8.6)
RBC # BLD AUTO: 4.21 10*6/MM3 (ref 3.77–5.16)
SODIUM BLD-SCNC: 136 MMOL/L (ref 137–145)
WBC NRBC COR # BLD: 4.17 10*3/MM3 (ref 3.2–9.8)

## 2018-12-13 PROCEDURE — 25010000002 IOPAMIDOL 61 % SOLUTION: Performed by: INTERNAL MEDICINE

## 2018-12-13 PROCEDURE — 80053 COMPREHEN METABOLIC PANEL: CPT

## 2018-12-13 PROCEDURE — 85025 COMPLETE CBC W/AUTO DIFF WBC: CPT

## 2018-12-13 PROCEDURE — 71260 CT THORAX DX C+: CPT

## 2018-12-13 PROCEDURE — 74177 CT ABD & PELVIS W/CONTRAST: CPT

## 2018-12-13 PROCEDURE — 83615 LACTATE (LD) (LDH) ENZYME: CPT

## 2018-12-13 PROCEDURE — 36591 DRAW BLOOD OFF VENOUS DEVICE: CPT | Performed by: INTERNAL MEDICINE

## 2018-12-13 RX ORDER — SODIUM CHLORIDE 0.9 % (FLUSH) 0.9 %
10 SYRINGE (ML) INJECTION AS NEEDED
Status: DISCONTINUED | OUTPATIENT
Start: 2018-12-13 | End: 2018-12-13 | Stop reason: HOSPADM

## 2018-12-13 RX ORDER — SODIUM CHLORIDE 0.9 % (FLUSH) 0.9 %
10 SYRINGE (ML) INJECTION AS NEEDED
Status: CANCELLED | OUTPATIENT
Start: 2019-01-24

## 2018-12-13 RX ORDER — 0.9 % SODIUM CHLORIDE 0.9 %
10 VIAL (ML) INJECTION ONCE
Status: COMPLETED | OUTPATIENT
Start: 2018-12-13 | End: 2018-12-13

## 2018-12-13 RX ADMIN — SODIUM CHLORIDE, PRESERVATIVE FREE 500 UNITS: 5 INJECTION INTRAVENOUS at 10:12

## 2018-12-13 RX ADMIN — IOPAMIDOL 95 ML: 612 INJECTION, SOLUTION INTRAVENOUS at 10:05

## 2018-12-13 RX ADMIN — Medication 10 ML: at 08:54

## 2018-12-13 RX ADMIN — SODIUM CHLORIDE 10 ML: 9 INJECTION, SOLUTION INTRAMUSCULAR; INTRAVENOUS; SUBCUTANEOUS at 10:11

## 2018-12-18 ENCOUNTER — OFFICE VISIT (OUTPATIENT)
Dept: ONCOLOGY | Facility: CLINIC | Age: 58
End: 2018-12-18

## 2018-12-18 VITALS
WEIGHT: 206.2 LBS | DIASTOLIC BLOOD PRESSURE: 67 MMHG | HEIGHT: 63 IN | OXYGEN SATURATION: 96 % | BODY MASS INDEX: 36.54 KG/M2 | HEART RATE: 97 BPM | TEMPERATURE: 98.7 F | RESPIRATION RATE: 18 BRPM | SYSTOLIC BLOOD PRESSURE: 144 MMHG

## 2018-12-18 DIAGNOSIS — C82.03 FOLLICULAR LYMPHOMA GRADE I OF INTRA-ABDOMINAL LYMPH NODES (HCC): Primary | ICD-10-CM

## 2018-12-18 DIAGNOSIS — R79.89 ELEVATED LFTS: ICD-10-CM

## 2018-12-18 PROCEDURE — 99214 OFFICE O/P EST MOD 30 MIN: CPT | Performed by: INTERNAL MEDICINE

## 2018-12-18 PROCEDURE — G8417 CALC BMI ABV UP PARAM F/U: HCPCS | Performed by: INTERNAL MEDICINE

## 2018-12-18 PROCEDURE — 1123F ACP DISCUSS/DSCN MKR DOCD: CPT | Performed by: INTERNAL MEDICINE

## 2018-12-18 PROCEDURE — G0463 HOSPITAL OUTPT CLINIC VISIT: HCPCS | Performed by: INTERNAL MEDICINE

## 2018-12-18 NOTE — PROGRESS NOTES
DATE OF VISIT: 12/18/2018      REASON FOR VISIT: Recurrent follicular lymphoma on surveillance ,Elevated LFT      HISTORY OF PRESENT ILLNESS:    58-year-old female with a past medical history significant for recurrent follicular lymphoma status post radiation, last round of radiation was around September 2016 for follicular lymphoma.  Patient was again found to have aortocaval adenopathy on  CT of chest abdomen and pelvis in March 2017.  Patient was started on rituximab April 10, 2017.  Patient has so far received 7 doses of rituximab 2 months apart last of which was on March 26,2018.  In view of enlargement of aortocaval lymph node, patient was started on  chemotherapy with bendamustine and rituximab  on April 30, 2018.  Patient received last cycle which was cycle 4 of bendamustine and rituximab on August 21, 2018.  After that patient was placed on surveillance.  Patient is here for 3 month follow-up appointment today.  She had a restaging CT of chest, abdomen and pelvis with contrast done last week.  She is here to discuss the results and further recommendation.  Denies any new lymph node enlargement.  Denies any new fever or night sweats.    PAST MEDICAL HISTORY:    Past Medical History:   Diagnosis Date   • Acute bronchitis    • Agoraphobia with panic disorder     on SNRI, prn buspar, prn klonopin     • Anxiety    • Arthritis    • Atrophic vaginitis    • Chest pain     work up as new onset angina    • Depression      MDD      • Essential hypertension    • Follicular non-Hodgkin's lymphoma (CMS/HCC)    • Generalized anxiety disorder     SSRI - buspar, cont cymbalta, prn klonopin      • Hip pain     arthritis, artifical right hip     • History of bone density study 02/09/2009    DEXA (bone density) test, peripheral (Normal   • History of echocardiogram 01/03/2012    Echocardiogram 70558 (Normal echocardigram. EF 55-60%)   • History of mammogram    • Hyperlipidemia    • Mass of lower limb    • Nuclear senile  cataract    • Obesity    • Otalgia     ENT REFER   • Panic disorder     with agoraphobia. On buspar and klonopin now      • Type 2 diabetes mellitus (CMS/HCC)     NO BDR   • Upper respiratory infection        SOCIAL HISTORY:    Social History     Tobacco Use   • Smoking status: Former Smoker     Years: 12.00     Last attempt to quit: 1998     Years since quittin.9   • Smokeless tobacco: Never Used   • Tobacco comment: Smoked 1 pack per 2 weeks   Substance Use Topics   • Alcohol use: No   • Drug use: No       Surgical History :  Past Surgical History:   Procedure Laterality Date   • CENTRAL VENOUS LINE INSERTION  2016    Central line insertion (Successful placement of right upper extremity midline.)   • COLONOSCOPY     • CYSTOSCOPY  1976    Cystoscopy (external urethroplasty and cysto-panendoscopy,urethal hymenal fusion with hypospadias)   • DILATATION AND CURETTAGE  1980    D&C (removal of IUD)   • EXCISION LESION  04/15/2014    Remove thigh lesion (Excision of a mass of the right thigh using a 16.2 cm incision with intermediate layered closure.)   • HYSTEROSCOPY  2003    Hysteroscope procedure (diagnostic hysteroscopy with fractional D&C)   • INJECTION OF MEDICATION  2011    kenalog (1)   • OTHER SURGICAL HISTORY      Explore parathyroid glands   • OTHER SURGICAL HISTORY  1985    Laparosc diagnostic (desires elective tubal reversal)   • PAP SMEAR  2005    PAP SMEAR (normal)   • SHOULDER ARTHROSCOPY  2013    Shoulder arthroscopy/surgery (Right shoulder. Rotator cuff repair. Subacromial decompression. Edgar procedure.)   • SHOULDER SURGERY  2015    Shoulder surgery procedure (Arthroscopy of the left shoulder with rotator cuff repair.Edgar procedure.Subacromial decompression.)   • TONSILLECTOMY AND ADENOIDECTOMY  1967    T&A (hypertrophied tonsils and adenoids with recurrent infection)   • TOTAL ABDOMINAL HYSTERECTOMY WITH SALPINGO OOPHORECTOMY   "02/03/2004    ARIELLE/BSO (with a preop fractional dilation and curettage with frozen section,menorrhagia,dysmenorrhea,unresponsive to medical intervention)   • TOTAL HIP ARTHROPLASTY     • VAGINA SURGERY  03/02/1981    Anesth, surgery on vagina (colpotomy with bilateral partial salpingectomy, multiparity contraindicated)       ALLERGIES:    Allergies   Allergen Reactions   • Ace Inhibitors Cough   • Latex      BLISTER     • Morphine And Related Nausea And Vomiting   • Lortab [Hydrocodone-Acetaminophen] Palpitations       REVIEW OF SYSTEMS:      CONSTITUTIONAL: Has gained 5 pounds since last clinic visit.  No fever, chills, or night sweats.     HEENT:  No epistaxis, mouth sores, or difficulty swallowing.    RESPIRATORY:  No new shortness of breath .  No new cough or hemoptysis.    CARDIOVASCULAR:  No chest pain or palpitations.    GASTROINTESTINAL: Complains of intermittent nausea . Complains of intermittent diarrhea . No vomiting, or blood in the stool.    GENITOURINARY:  No dysuria or hematuria.    MUSCULOSKELETAL:   No any new back pain or arthralgias.     NEUROLOGICAL:  No tingling or numbness. No new headache or dizziness.     LYMPHATICS:  Denies any abnormal swollen and anywhere in the body.    SKIN:  Denies any new skin rash.          PHYSICAL EXAMINATION:      VITAL SIGNS:  /67   Pulse 97   Temp 98.7 °F (37.1 °C) (Temporal)   Resp 18   Ht 160 cm (62.99\")   Wt 93.5 kg (206 lb 3.2 oz)   SpO2 96%   BMI 36.54 kg/m²      ECOG performance status: 1    GENERAL:  Not in any distress.Obese female.    HEENT:  Normocephalic, Atraumatic.Mild Conjunctival pallor. No icterus. Extraocular Movements Intact. No Fascial Asymmetry noted.    NECK:  No adenopathy. No JVD.      RESPIRATORY:  Fair air entry bilateral. No rhonchi or wheezing.    CARDIOVASCULAR:  S1, S2. Regular rate and rhythm. No murmur or gallop appreciated.  Port-A-Cath present on the right chest wall.    ABDOMEN:  Soft, obese, nontender. Bowel sounds " present in all four quadrants.  No hepatosplenomegaly  Appreciated due to body habitus.    MUSCULOSKELETAL:  No edema.No Calf Tenderness.  Decreased range of motion    NEUROLOGIC:  Alert, awake and oriented ×3.  No  Motor  deficit appreciated. Cranial Nerves 2-12 grossly intact.    SKIN: No new skin lesions.    LYMPHATICS: No new enlarged lymph node in neck or supraclavicular area.            DIAGNOSTIC DATA:    Glucose   Date Value Ref Range Status   12/13/2018 164 (H) 60 - 100 mg/dL Final     Sodium   Date Value Ref Range Status   12/13/2018 136 (L) 137 - 145 mmol/L Final     Potassium   Date Value Ref Range Status   12/13/2018 4.1 3.5 - 5.1 mmol/L Final     CO2   Date Value Ref Range Status   12/13/2018 25.0 22.0 - 31.0 mmol/L Final     Chloride   Date Value Ref Range Status   12/13/2018 99 95 - 110 mmol/L Final     Anion Gap   Date Value Ref Range Status   12/13/2018 12.0 5.0 - 15.0 mmol/L Final     Creatinine   Date Value Ref Range Status   12/13/2018 0.60 0.50 - 1.00 mg/dL Final     BUN   Date Value Ref Range Status   12/13/2018 7 7 - 21 mg/dL Final     BUN/Creatinine Ratio   Date Value Ref Range Status   12/13/2018 11.7 7.0 - 25.0 Final     Calcium   Date Value Ref Range Status   12/13/2018 9.6 8.4 - 10.2 mg/dL Final     eGFR Non  Amer   Date Value Ref Range Status   12/13/2018 103 51 - 120 mL/min/1.73 Final     Alkaline Phosphatase   Date Value Ref Range Status   12/13/2018 82 38 - 126 U/L Final     Total Protein   Date Value Ref Range Status   12/13/2018 7.4 6.3 - 8.6 g/dL Final     ALT (SGPT)   Date Value Ref Range Status   12/13/2018 46 9 - 52 U/L Final     AST (SGOT)   Date Value Ref Range Status   12/13/2018 36 14 - 36 U/L Final     Total Bilirubin   Date Value Ref Range Status   12/13/2018 0.4 0.2 - 1.3 mg/dL Final     Albumin   Date Value Ref Range Status   12/13/2018 4.50 3.40 - 4.80 g/dL Final     Globulin   Date Value Ref Range Status   12/13/2018 2.9 2.3 - 3.5 gm/dL Final     Lab Results    Component Value Date    WBC 4.17 12/13/2018    HGB 13.3 12/13/2018    HCT 37.7 12/13/2018    MCV 89.5 12/13/2018     12/13/2018     Lab Results   Component Value Date    NEUTROABS 3.35 12/13/2018         PATHOLOGY:  Pathology from right thigh biopsy done in April 2014 showed:  FINAL DIAGNOSIS:   A.  SUBCUTANEOUS MASS, RIGHT THIGH:             FOLLICULAR LYMPHOMA, GRADE 1 OF 3.   B.  SUBCUTANEOUS MASS, RIGHT THIGH:             FOLLICULAR LYMPHOMA, GRADE 1 OF 3.          Comment   COMMENT:   Immunostains have follicular lymphoma positive for CD20, negative for   CD3, negative for CD5, positive for CD10, positive for CD23, negative   for CD43, negative for cyclin D1, positive for BCL-2 and positive for   BCL-6.  The case is submitted to the [facility] for evaluation and   their report is attached.            RADIOLOGY DATA :  CT of chest, abdomen and pelvis with contrast done on December 13, 2018 was reviewed, discussed with patient, it showed:  FINDINGS: Comparison study dated September 12, 2018. Axial  computer tomography sequential imaging was performed from the  thoracic inlet through the symphysis pubis after administration  of IV contrast. .Sagittal and coronal reformation was performed .     Left subclavian Port-A-Cath with tip within the region of SVC.  Mediastinal structures of the chest are normal. No  lymphadenopathy or pericardial effusion. Lungs are clear. Pleural  spaces are normal.     The liver is normal. Very small gallstone is seen within the  gallbladder lumen. Otherwise gallbladder is unremarkable. The  biliary system is normal. The pancreas is normal. The spleen is  normal. Bilateral adrenal glands are normal. Right kidney and  ureter are normal. Left kidney and ureter are normal. The bladder  is normal. The uterus is surgically absent. Evaluation in the  pelvis is limited secondary to streak artifact from right hip  prostheses. The hollow viscera is normal. No lymphadenopathy in  the  abdomen or the pelvis. No acute osseous abnormality.     IMPRESSION:  1. Cholelithiasis.  2. No acute CT chest abdomen or pelvis abnormality.                      CT of chest, abdomen and pelvis with contrast done on June 21, 2018 was reviewed, discussed with patient, it showed:  COMMENTS:             - - - CT CHEST - - -                 PULMONARY PARENCHYMA:           - air spaces:      negative         - interstitium:     grossly within normal limits for age         - misc.:      no pulmonary nodules or mass              MEDIASTINUM / ROXANA:         - heart:                normal size , no pericardial fluid         - aorta/great vessels:  normal caliber and configuration for  age         - misc.:      no mediastinal mass / significant adenopathy          PLEURAL COMPARTMENT:            - misc.:      no pleural fluid or mass               MISC:         - inferior neck:     negative         - osseous / body wall:  negative         - misc:             - - - CT ABDOMEN - - -         ABDOMEN:           - LIVER:      normal size / contour, no ductal dilatation , no  focal lesion          - GB:      grossly negative         - CBD:      grossly negative        - SPLEEN:    normal size and contour         - PANCREAS:    normal in size, contour, no focal mass           - VISCERA:    normal caliber, no wall thickening        - MESENTERY:    no mesenteric mass        - CAVITY:    no free abdominal fluid, no free intraperitoneal  air        - BODY WALL:    wnl        - OSSEOUS:    Moderate to high-grade acquired spinal stenosis  with broad-based disc bulge at the L4/5 level.             RETROPERITONEUM:          - KIDNEYS:    normal size / contour, no collecting system  dilation                       no evidence of an enhancing mass          - URETERS:    normal course, caliber           - ADRENALS:    normal size, contour          - MISC:      no sig retroperitoneal adenopathy or mass        - VASCULAR:    aorta / iliacs: wnl  for age            - - - CT PELVIS - - -           - VISCERA:    normal caliber small/large bowel, no focal  thickening/mass           - MESENTERY:      no mass          - VASCULAR:    wnl for age        - CAVITY:    no free fluid / air         - BLADDER:    unremarkable         - OSSEOUS:    Status post right hip replacement.         - MISC:     - UTERUS/OVARY:  status post hysterectomy                          .                  IMPRESSION:  CONCLUSION:      1. Stable examination.                       ASSESSMENT AND PLAN:      1.  Recurrent follicular lymphoma grade 1, initially patient was diagnosed with a right groin lymph node adenopathy in April 2014 for which patient underwent palliative radiation therapy.   - Subsequently patient again had a relapse around September 2016 for workup with second course of palliative radiation therapy was given by Dr. Leonel Vargas in October 2016.    -In view of enlarged aortocaval lymph nodes, patient was started on rituximab treatment in April 2017.    CT scan done in July 2017 shows decreasing in the size of aortocaval lymph node which was discussed with patient.  -  Patient  had so far received 7 rituximab treatment every 2 months apart last of which was on March 26, 2018.  -  In view of generalized abdominal pain which was worsening, patient had CT scan done on April 6, 2018 which showed slight increase in the size of aortocaval adenopathy.  Treatment option consisting of changing treatment to bendamustine and rituximab versus palliative radiation treatment were discussed with patient and her family.  Patient wanted to try chemotherapy with bendamustine and rituximab first.    -Patient was started on chemotherapy with bendamustine and rituximab on April 30, 2018.  Patient tolerated first round of chemotherapy well.  We will go ahead with round 2 of chemotherapy today with bendamustine and rituximab.  -Restaging CT of chest, abdomen and pelvis with contrast done on June 21,  2018 shows decrease in size of adenopathy involving aortocaval area.  Result of CT scan were discussed with patient and her family.  -Cycle 3 on July 24, 2018 dose of bendamustine was reduced by 20%.  -Patient has received 4 cycles of bendamustine and rituximab from April 30, 2018 until August 21, 2018.  -CT of chest, abdomen and pelvis with contrast done on September 12, 2018 after cycle 4 of bendamustine and rituximab showed no evidence of lymphadenopathy involving chest, abdomen or pelvis.  -CT of chest, abdomen and pelvis with contrast done on December 13, 2018 does not show any evidence of recurrence or adenopathy.  Result of CT scan were discussed with patient.  -We will ask patient to return to clinic in 3 months with repeat CBC, CMP and LDH to be done on that day.  CT scan for surveillance will be planned around June 2019.    2.  History of dyslipidemia    3.  Diabetes mellitus    4.  Elevated liver function test: Most active secondary to chemotherapy plus or minus fatty liver.  Liver function is normal at this point.    5. Elevated BMI : Patient's Body mass index is 36.54 kg/m². BMI is higher then reference range.  Patient was informed about elevated BMI and need to lose weight.  Patient was counseled about diet and exercise for weight loss.    6.  Health maintenance: Patient does not smoke.  Last colonoscopy was done in 2014 which was normal as per patient.      7.  Advance care planning: For now patient remains full code and able to make her decisions.  Patient is health care surrogate mentioned on chart.        Tino Sood MD  12/18/2018  10:08 AM        EMR Dragon/Transcription disclaimer:   Much of this encounter note is an electronic transcription/translation of spoken language to printed text. The electronic translation of spoken language may permit erroneous, or at times, nonsensical words or phrases to be inadvertently transcribed; Although I have reviewed the note for such errors, some may still  exist.

## 2019-01-24 ENCOUNTER — INFUSION (OUTPATIENT)
Dept: ONCOLOGY | Facility: HOSPITAL | Age: 59
End: 2019-01-24

## 2019-01-24 DIAGNOSIS — Z45.2 ENCOUNTER FOR VENOUS ACCESS DEVICE CARE: Primary | ICD-10-CM

## 2019-01-24 PROCEDURE — 96523 IRRIG DRUG DELIVERY DEVICE: CPT | Performed by: INTERNAL MEDICINE

## 2019-01-24 RX ORDER — SODIUM CHLORIDE 0.9 % (FLUSH) 0.9 %
10 SYRINGE (ML) INJECTION AS NEEDED
Status: CANCELLED | OUTPATIENT
Start: 2019-03-07

## 2019-01-24 RX ORDER — SODIUM CHLORIDE 0.9 % (FLUSH) 0.9 %
10 SYRINGE (ML) INJECTION AS NEEDED
Status: DISCONTINUED | OUTPATIENT
Start: 2019-01-24 | End: 2019-01-24 | Stop reason: HOSPADM

## 2019-01-24 RX ADMIN — SODIUM CHLORIDE, PRESERVATIVE FREE 500 UNITS: 5 INJECTION INTRAVENOUS at 09:42

## 2019-01-24 RX ADMIN — SODIUM CHLORIDE, PRESERVATIVE FREE 10 ML: 5 INJECTION INTRAVENOUS at 09:42

## 2019-03-07 ENCOUNTER — INFUSION (OUTPATIENT)
Dept: ONCOLOGY | Facility: HOSPITAL | Age: 59
End: 2019-03-07

## 2019-03-07 ENCOUNTER — OFFICE VISIT (OUTPATIENT)
Dept: ONCOLOGY | Facility: CLINIC | Age: 59
End: 2019-03-07

## 2019-03-07 VITALS
HEIGHT: 63 IN | HEART RATE: 102 BPM | OXYGEN SATURATION: 94 % | TEMPERATURE: 98.2 F | DIASTOLIC BLOOD PRESSURE: 68 MMHG | SYSTOLIC BLOOD PRESSURE: 149 MMHG | BODY MASS INDEX: 37.17 KG/M2 | WEIGHT: 209.8 LBS | RESPIRATION RATE: 18 BRPM

## 2019-03-07 DIAGNOSIS — R79.89 ELEVATED LFTS: ICD-10-CM

## 2019-03-07 DIAGNOSIS — C82.03 FOLLICULAR LYMPHOMA GRADE I OF INTRA-ABDOMINAL LYMPH NODES (HCC): Primary | ICD-10-CM

## 2019-03-07 DIAGNOSIS — Z45.2 ENCOUNTER FOR VENOUS ACCESS DEVICE CARE: Primary | ICD-10-CM

## 2019-03-07 DIAGNOSIS — C82.03 FOLLICULAR LYMPHOMA GRADE I OF INTRA-ABDOMINAL LYMPH NODES (HCC): ICD-10-CM

## 2019-03-07 LAB
ALBUMIN SERPL-MCNC: 4.3 G/DL (ref 3.4–4.8)
ALBUMIN/GLOB SERPL: 1.4 G/DL (ref 1.1–1.8)
ALP SERPL-CCNC: 117 U/L (ref 38–126)
ALT SERPL W P-5'-P-CCNC: 78 U/L (ref 9–52)
ANION GAP SERPL CALCULATED.3IONS-SCNC: 12 MMOL/L (ref 5–15)
AST SERPL-CCNC: 61 U/L (ref 14–36)
BASOPHILS # BLD AUTO: 0.04 10*3/MM3 (ref 0–0.2)
BASOPHILS NFR BLD AUTO: 0.9 % (ref 0–1.5)
BILIRUB SERPL-MCNC: 0.4 MG/DL (ref 0.2–1.3)
BUN BLD-MCNC: 6 MG/DL (ref 7–21)
BUN/CREAT SERPL: 11.1 (ref 7–25)
CALCIUM SPEC-SCNC: 9.6 MG/DL (ref 8.4–10.2)
CHLORIDE SERPL-SCNC: 99 MMOL/L (ref 95–110)
CO2 SERPL-SCNC: 23 MMOL/L (ref 22–31)
CREAT BLD-MCNC: 0.54 MG/DL (ref 0.5–1)
DEPRECATED RDW RBC AUTO: 44.5 FL (ref 37–54)
EOSINOPHIL # BLD AUTO: 0 10*3/MM3 (ref 0–0.4)
EOSINOPHIL NFR BLD AUTO: 0 % (ref 0.3–6.2)
ERYTHROCYTE [DISTWIDTH] IN BLOOD BY AUTOMATED COUNT: 13.4 % (ref 12.3–15.4)
GFR SERPL CREATININE-BSD FRML MDRD: 116 ML/MIN/1.73 (ref 51–120)
GLOBULIN UR ELPH-MCNC: 3.1 GM/DL (ref 2.3–3.5)
GLUCOSE BLD-MCNC: 293 MG/DL (ref 60–100)
HCT VFR BLD AUTO: 38.8 % (ref 34–46.6)
HGB BLD-MCNC: 13.4 G/DL (ref 12–15.9)
IMM GRANULOCYTES # BLD AUTO: 0.03 10*3/MM3 (ref 0–0.05)
IMM GRANULOCYTES NFR BLD AUTO: 0.7 % (ref 0–0.5)
LDH SERPL-CCNC: 446 U/L (ref 313–618)
LYMPHOCYTES # BLD AUTO: 0.5 10*3/MM3 (ref 0.7–3.1)
LYMPHOCYTES NFR BLD AUTO: 11.4 % (ref 19.6–45.3)
MCH RBC QN AUTO: 31 PG (ref 26.6–33)
MCHC RBC AUTO-ENTMCNC: 34.5 G/DL (ref 31.5–35.7)
MCV RBC AUTO: 89.8 FL (ref 79–97)
MONOCYTES # BLD AUTO: 0.42 10*3/MM3 (ref 0.1–0.9)
MONOCYTES NFR BLD AUTO: 9.5 % (ref 5–12)
NEUTROPHILS # BLD AUTO: 3.41 10*3/MM3 (ref 1.4–7)
NEUTROPHILS NFR BLD AUTO: 77.5 % (ref 42.7–76)
NRBC BLD AUTO-RTO: 0 /100 WBC (ref 0–0)
PLATELET # BLD AUTO: 316 10*3/MM3 (ref 140–450)
PMV BLD AUTO: 9 FL (ref 6–12)
POTASSIUM BLD-SCNC: 4.1 MMOL/L (ref 3.5–5.1)
PROT SERPL-MCNC: 7.4 G/DL (ref 6.3–8.6)
RBC # BLD AUTO: 4.32 10*6/MM3 (ref 3.77–5.28)
SODIUM BLD-SCNC: 134 MMOL/L (ref 137–145)
WBC NRBC COR # BLD: 4.4 10*3/MM3 (ref 3.4–10.8)

## 2019-03-07 PROCEDURE — 83615 LACTATE (LD) (LDH) ENZYME: CPT

## 2019-03-07 PROCEDURE — 80053 COMPREHEN METABOLIC PANEL: CPT

## 2019-03-07 PROCEDURE — 36591 DRAW BLOOD OFF VENOUS DEVICE: CPT | Performed by: INTERNAL MEDICINE

## 2019-03-07 PROCEDURE — 1123F ACP DISCUSS/DSCN MKR DOCD: CPT | Performed by: INTERNAL MEDICINE

## 2019-03-07 PROCEDURE — G8417 CALC BMI ABV UP PARAM F/U: HCPCS | Performed by: INTERNAL MEDICINE

## 2019-03-07 PROCEDURE — 36591 DRAW BLOOD OFF VENOUS DEVICE: CPT

## 2019-03-07 PROCEDURE — 85025 COMPLETE CBC W/AUTO DIFF WBC: CPT

## 2019-03-07 PROCEDURE — 99213 OFFICE O/P EST LOW 20 MIN: CPT | Performed by: INTERNAL MEDICINE

## 2019-03-07 PROCEDURE — G0463 HOSPITAL OUTPT CLINIC VISIT: HCPCS | Performed by: INTERNAL MEDICINE

## 2019-03-07 RX ORDER — SODIUM CHLORIDE 0.9 % (FLUSH) 0.9 %
10 SYRINGE (ML) INJECTION AS NEEDED
Status: DISCONTINUED | OUTPATIENT
Start: 2019-03-07 | End: 2019-03-07 | Stop reason: HOSPADM

## 2019-03-07 RX ORDER — SODIUM CHLORIDE 0.9 % (FLUSH) 0.9 %
10 SYRINGE (ML) INJECTION AS NEEDED
Status: CANCELLED | OUTPATIENT
Start: 2019-04-18

## 2019-03-07 RX ADMIN — SODIUM CHLORIDE, PRESERVATIVE FREE 10 ML: 5 INJECTION INTRAVENOUS at 11:12

## 2019-03-07 RX ADMIN — SODIUM CHLORIDE, PRESERVATIVE FREE 10 ML: 5 INJECTION INTRAVENOUS at 10:43

## 2019-03-07 RX ADMIN — SODIUM CHLORIDE, PRESERVATIVE FREE 500 UNITS: 5 INJECTION INTRAVENOUS at 11:12

## 2019-03-07 NOTE — PATIENT INSTRUCTIONS
Patient Instructions for CT Scan    · Your CT scan is being done without any oral contrast.  Your CT scan may be done with IV contrast, if you have an allergy to iodine--please tell your nurse.    · Do not eat or drink 4 hours prior to scan.     · You may take your medications with sips of water, except DO NOT take your diabetic pill the morning of the test.    Arrive at the Outpatient Surgery entrance at [facility] 20 minutes prior to appointment time.    You will receive a phone call with an appointment for your CT scan.  Please call our office, if someone does not contact you with 3 days.    Shannon Majano RN  March 7, 2019  11:12 AM

## 2019-03-07 NOTE — PROGRESS NOTES
DATE OF VISIT: 3/7/2019      REASON FOR VISIT: Recurrent follicular lymphoma on surveillance ,Elevated LFT      HISTORY OF PRESENT ILLNESS:    58-year-old female with a past medical history significant for recurrent follicular lymphoma status post radiation, last round of radiation was around September 2016 for follicular lymphoma.  Patient was again found to have aortocaval adenopathy on  CT of chest abdomen and pelvis in March 2017.  Patient was started on rituximab April 10, 2017.  Patient has so far received 7 doses of rituximab 2 months apart last of which was on March 26,2018.  In view of enlargement of aortocaval lymph node, patient was started on  chemotherapy with bendamustine and rituximab  on April 30, 2018.  Patient received last cycle which was cycle 4 of bendamustine and rituximab on August 21, 2018.  After that patient was placed on surveillance.  Patient is here for 3 month follow-up appointment today.  Complains of night sweats.  Complains of diarrhea..  Denies any new lymph node enlargement.  Denies any new fever .    PAST MEDICAL HISTORY:    Past Medical History:   Diagnosis Date   • Acute bronchitis    • Agoraphobia with panic disorder     on SNRI, prn buspar, prn klonopin     • Anxiety    • Arthritis    • Atrophic vaginitis    • Chest pain     work up as new onset angina    • Depression      MDD      • Essential hypertension    • Follicular non-Hodgkin's lymphoma (CMS/HCC)    • Generalized anxiety disorder     SSRI - buspar, cont cymbalta, prn klonopin      • Hip pain     arthritis, artifical right hip     • History of bone density study 02/09/2009    DEXA (bone density) test, peripheral (Normal   • History of echocardiogram 01/03/2012    Echocardiogram 34299 (Normal echocardigram. EF 55-60%)   • History of mammogram    • Hyperlipidemia    • Mass of lower limb    • Nuclear senile cataract    • Obesity    • Otalgia     ENT REFER   • Panic disorder     with agoraphobia. On buspar and klonopin now       • Type 2 diabetes mellitus (CMS/HCC)     NO BDR   • Upper respiratory infection        SOCIAL HISTORY:    Social History     Tobacco Use   • Smoking status: Former Smoker     Years: 12.00     Last attempt to quit: 1998     Years since quittin.1   • Smokeless tobacco: Never Used   • Tobacco comment: Smoked 1 pack per 2 weeks   Substance Use Topics   • Alcohol use: No   • Drug use: No       Surgical History :  Past Surgical History:   Procedure Laterality Date   • CENTRAL VENOUS LINE INSERTION  2016    Central line insertion (Successful placement of right upper extremity midline.)   • COLONOSCOPY     • CYSTOSCOPY  1976    Cystoscopy (external urethroplasty and cysto-panendoscopy,urethal hymenal fusion with hypospadias)   • DILATATION AND CURETTAGE  1980    D&C (removal of IUD)   • EXCISION LESION  04/15/2014    Remove thigh lesion (Excision of a mass of the right thigh using a 16.2 cm incision with intermediate layered closure.)   • HYSTEROSCOPY  2003    Hysteroscope procedure (diagnostic hysteroscopy with fractional D&C)   • INJECTION OF MEDICATION  2011    kenalog (1)   • OTHER SURGICAL HISTORY      Explore parathyroid glands   • OTHER SURGICAL HISTORY  1985    Laparosc diagnostic (desires elective tubal reversal)   • PAP SMEAR  2005    PAP SMEAR (normal)   • OR INSJ TUNNELED CVC W/O SUBQ PORT/ AGE 5 YR/> Right 2017    Procedure: INSERTION VENOUS ACCESS DEVICE (MEDIPORT) right chest;  Surgeon: Fortino Medina MD;  Location: [facility];  Service: General   • OR INSJ TUNNELED CVC W/O SUBQ PORT/ AGE 5 YR/> N/A 2017    Procedure: INSERTION VENOUS ACCESS DEVICE   (MEDIPORT)   (C-ARM#1);  Surgeon: Fortino Medina MD;  Location: [facility];  Service: General   • SHOULDER ARTHROSCOPY  2013    Shoulder arthroscopy/surgery (Right shoulder. Rotator cuff repair. Subacromial decompression. Edgar procedure.)   • SHOULDER SURGERY  2015    Shoulder surgery  "procedure (Arthroscopy of the left shoulder with rotator cuff repair.Edgar procedure.Subacromial decompression.)   • TONSILLECTOMY AND ADENOIDECTOMY  05/29/1967    T&A (hypertrophied tonsils and adenoids with recurrent infection)   • TOTAL ABDOMINAL HYSTERECTOMY WITH SALPINGO OOPHORECTOMY  02/03/2004    ARIELLE/BSO (with a preop fractional dilation and curettage with frozen section,menorrhagia,dysmenorrhea,unresponsive to medical intervention)   • TOTAL HIP ARTHROPLASTY     • VAGINA SURGERY  03/02/1981    Anesth, surgery on vagina (colpotomy with bilateral partial salpingectomy, multiparity contraindicated)       ALLERGIES:    Allergies   Allergen Reactions   • Ace Inhibitors Cough   • Latex      BLISTER     • Morphine And Related Nausea And Vomiting   • Lortab [Hydrocodone-Acetaminophen] Palpitations       REVIEW OF SYSTEMS:      CONSTITUTIONAL: Has gained 3 pounds since last clinic visit.  Complains of night sweats.  No fever, chills.     HEENT:  No epistaxis, mouth sores, or difficulty swallowing.    RESPIRATORY:  No new shortness of breath .  No new cough or hemoptysis.    CARDIOVASCULAR:  No chest pain or palpitations.    GASTROINTESTINAL: Complains of intermittent nausea . Complains of intermittent diarrhea . No vomiting, or blood in the stool.    GENITOURINARY:  No dysuria or hematuria.    MUSCULOSKELETAL:   No any new back pain or arthralgias.     NEUROLOGICAL:  No tingling or numbness. No new headache or dizziness.     LYMPHATICS:  Denies any abnormal swollen and anywhere in the body.    SKIN:  Denies any new skin rash.          PHYSICAL EXAMINATION:      VITAL SIGNS:  /68   Pulse 102   Temp 98.2 °F (36.8 °C) (Temporal)   Resp 18   Ht 160 cm (62.99\")   Wt 95.2 kg (209 lb 12.8 oz)   SpO2 94%   BMI 37.17 kg/m²      ECOG performance status: 1    GENERAL:  Not in any distress.Obese female.    HEENT:  Normocephalic, Atraumatic.Mild Conjunctival pallor. No icterus. Extraocular Movements Intact. No " Fascial Asymmetry noted.    NECK:  No adenopathy. No JVD.      RESPIRATORY:  Fair air entry bilateral. No rhonchi or wheezing.    CARDIOVASCULAR:  S1, S2. Regular rate and rhythm. No murmur or gallop appreciated.  Port-A-Cath present on the right chest wall.    ABDOMEN:  Soft, obese, nontender. Bowel sounds present in all four quadrants.  No hepatosplenomegaly  Appreciated due to body habitus.    MUSCULOSKELETAL:  No edema.No Calf Tenderness.  Decreased range of motion    NEUROLOGIC:  Alert, awake and oriented ×3.  No  Motor  deficit appreciated. Cranial Nerves 2-12 grossly intact.    SKIN: No new skin lesions.    LYMPHATICS: No new enlarged lymph node in neck or supraclavicular area.            DIAGNOSTIC DATA:    Glucose   Date Value Ref Range Status   03/07/2019 293 (H) 60 - 100 mg/dL Final     Sodium   Date Value Ref Range Status   03/07/2019 134 (L) 137 - 145 mmol/L Final     Potassium   Date Value Ref Range Status   03/07/2019 4.1 3.5 - 5.1 mmol/L Final     CO2   Date Value Ref Range Status   03/07/2019 23.0 22.0 - 31.0 mmol/L Final     Chloride   Date Value Ref Range Status   03/07/2019 99 95 - 110 mmol/L Final     Anion Gap   Date Value Ref Range Status   03/07/2019 12.0 5.0 - 15.0 mmol/L Final     Creatinine   Date Value Ref Range Status   03/07/2019 0.54 0.50 - 1.00 mg/dL Final     BUN   Date Value Ref Range Status   03/07/2019 6 (L) 7 - 21 mg/dL Final     BUN/Creatinine Ratio   Date Value Ref Range Status   03/07/2019 11.1 7.0 - 25.0 Final     Calcium   Date Value Ref Range Status   03/07/2019 9.6 8.4 - 10.2 mg/dL Final     eGFR Non  Amer   Date Value Ref Range Status   03/07/2019 116 51 - 120 mL/min/1.73 Final     Alkaline Phosphatase   Date Value Ref Range Status   03/07/2019 117 38 - 126 U/L Final     Total Protein   Date Value Ref Range Status   03/07/2019 7.4 6.3 - 8.6 g/dL Final     ALT (SGPT)   Date Value Ref Range Status   03/07/2019 78 (H) 9 - 52 U/L Final     AST (SGOT)   Date Value Ref  Range Status   03/07/2019 61 (H) 14 - 36 U/L Final     Total Bilirubin   Date Value Ref Range Status   03/07/2019 0.4 0.2 - 1.3 mg/dL Final     Albumin   Date Value Ref Range Status   03/07/2019 4.30 3.40 - 4.80 g/dL Final     Globulin   Date Value Ref Range Status   03/07/2019 3.1 2.3 - 3.5 gm/dL Final     Lab Results   Component Value Date    WBC 4.40 03/07/2019    HGB 13.4 03/07/2019    HCT 38.8 03/07/2019    MCV 89.8 03/07/2019     03/07/2019     Lab Results   Component Value Date    NEUTROABS 3.41 03/07/2019         PATHOLOGY:  Pathology from right thigh biopsy done in April 2014 showed:  FINAL DIAGNOSIS:   A.  SUBCUTANEOUS MASS, RIGHT THIGH:             FOLLICULAR LYMPHOMA, GRADE 1 OF 3.   B.  SUBCUTANEOUS MASS, RIGHT THIGH:             FOLLICULAR LYMPHOMA, GRADE 1 OF 3.          Comment   COMMENT:   Immunostains have follicular lymphoma positive for CD20, negative for   CD3, negative for CD5, positive for CD10, positive for CD23, negative   for CD43, negative for cyclin D1, positive for BCL-2 and positive for   BCL-6.  The case is submitted to the [facility] for evaluation and   their report is attached.            RADIOLOGY DATA :  CT of chest, abdomen and pelvis with contrast done on December 13, 2018 was reviewed, discussed with patient, it showed:  FINDINGS: Comparison study dated September 12, 2018. Axial  computer tomography sequential imaging was performed from the  thoracic inlet through the symphysis pubis after administration  of IV contrast. .Sagittal and coronal reformation was performed .     Left subclavian Port-A-Cath with tip within the region of SVC.  Mediastinal structures of the chest are normal. No  lymphadenopathy or pericardial effusion. Lungs are clear. Pleural  spaces are normal.     The liver is normal. Very small gallstone is seen within the  gallbladder lumen. Otherwise gallbladder is unremarkable. The  biliary system is normal. The pancreas is normal. The spleen is  normal.  Bilateral adrenal glands are normal. Right kidney and  ureter are normal. Left kidney and ureter are normal. The bladder  is normal. The uterus is surgically absent. Evaluation in the  pelvis is limited secondary to streak artifact from right hip  prostheses. The hollow viscera is normal. No lymphadenopathy in  the abdomen or the pelvis. No acute osseous abnormality.     IMPRESSION:  1. Cholelithiasis.  2. No acute CT chest abdomen or pelvis abnormality.                      CT of chest, abdomen and pelvis with contrast done on June 21, 2018 was reviewed, discussed with patient, it showed:  COMMENTS:             - - - CT CHEST - - -                 PULMONARY PARENCHYMA:           - air spaces:      negative         - interstitium:     grossly within normal limits for age         - misc.:      no pulmonary nodules or mass              MEDIASTINUM / ROXANA:         - heart:                normal size , no pericardial fluid         - aorta/great vessels:  normal caliber and configuration for  age         - misc.:      no mediastinal mass / significant adenopathy          PLEURAL COMPARTMENT:            - misc.:      no pleural fluid or mass               MISC:         - inferior neck:     negative         - osseous / body wall:  negative         - misc:             - - - CT ABDOMEN - - -         ABDOMEN:           - LIVER:      normal size / contour, no ductal dilatation , no  focal lesion          - GB:      grossly negative         - CBD:      grossly negative        - SPLEEN:    normal size and contour         - PANCREAS:    normal in size, contour, no focal mass           - VISCERA:    normal caliber, no wall thickening        - MESENTERY:    no mesenteric mass        - CAVITY:    no free abdominal fluid, no free intraperitoneal  air        - BODY WALL:    wnl        - OSSEOUS:    Moderate to high-grade acquired spinal stenosis  with broad-based disc bulge at the L4/5 level.             RETROPERITONEUM:          -  KIDNEYS:    normal size / contour, no collecting system  dilation                       no evidence of an enhancing mass          - URETERS:    normal course, caliber           - ADRENALS:    normal size, contour          - MISC:      no sig retroperitoneal adenopathy or mass        - VASCULAR:    aorta / iliacs: wnl for age            - - - CT PELVIS - - -           - VISCERA:    normal caliber small/large bowel, no focal  thickening/mass           - MESENTERY:      no mass          - VASCULAR:    wnl for age        - CAVITY:    no free fluid / air         - BLADDER:    unremarkable         - OSSEOUS:    Status post right hip replacement.         - MISC:     - UTERUS/OVARY:  status post hysterectomy                          .                  IMPRESSION:  CONCLUSION:      1. Stable examination.                       ASSESSMENT AND PLAN:      1.  Recurrent follicular lymphoma grade 1, initially patient was diagnosed with a right groin lymph node adenopathy in April 2014 for which patient underwent palliative radiation therapy.   - Subsequently patient again had a relapse around September 2016 for workup with second course of palliative radiation therapy was given by Dr. Leonel Vargas in October 2016.    -In view of enlarged aortocaval lymph nodes, patient was started on rituximab treatment in April 2017.    CT scan done in July 2017 shows decreasing in the size of aortocaval lymph node which was discussed with patient.  -  Patient  had so far received 7 rituximab treatment every 2 months apart last of which was on March 26, 2018.  -  In view of generalized abdominal pain which was worsening, patient had CT scan done on April 6, 2018 which showed slight increase in the size of aortocaval adenopathy.  Treatment option consisting of changing treatment to bendamustine and rituximab versus palliative radiation treatment were discussed with patient and her family.  Patient wanted to try chemotherapy with bendamustine and  rituximab first.    -Patient was started on chemotherapy with bendamustine and rituximab on April 30, 2018.  Patient tolerated first round of chemotherapy well.  We will go ahead with round 2 of chemotherapy today with bendamustine and rituximab.  -Restaging CT of chest, abdomen and pelvis with contrast done on June 21, 2018 shows decrease in size of adenopathy involving aortocaval area.  Result of CT scan were discussed with patient and her family.  -Cycle 3 on July 24, 2018 dose of bendamustine was reduced by 20%.  -Patient has received 4 cycles of bendamustine and rituximab from April 30, 2018 until August 21, 2018.  -CT of chest, abdomen and pelvis with contrast done on September 12, 2018 after cycle 4 of bendamustine and rituximab showed no evidence of lymphadenopathy involving chest, abdomen or pelvis.  -CT of chest, abdomen and pelvis with contrast done on December 13, 2018 does not show any evidence of recurrence or adenopathy.  Result of CT scan were discussed with patient.  -Patient complains of night sweats but there is no evidence of adenopathy in neck or axillary area.  At this point will continue with clinical surveillance.  -We will ask patient to return to clinic in 3 months with repeat CBC, CMP and LDH and CT of chest, abdomen and pelvis with contrast to be done prior to that for surveillance purposes.       2.  History of dyslipidemia    3.  Diabetes mellitus    4.  Elevated liver function test: Secondary to fatty liver.  AST and ALT are mildly elevated today.  Patient was again counseled about weight loss today.    5. Elevated BMI : Patient's Body mass index is 37.17 kg/m². BMI is higher then reference range.  Patient was informed about elevated BMI and need to lose weight.  Patient was counseled about diet and exercise for weight loss.    6.  Health maintenance: Patient does not smoke.  Last colonoscopy was done in 2014 which was normal as per patient.      7.  Advance care planning: For now  patient remains full code and able to make her decisions.  Patient is health care surrogate mentioned on chart.        Tino Sood MD  3/7/2019  12:15 PM        EMR Dragon/Transcription disclaimer:   Much of this encounter note is an electronic transcription/translation of spoken language to printed text. The electronic translation of spoken language may permit erroneous, or at times, nonsensical words or phrases to be inadvertently transcribed; Although I have reviewed the note for such errors, some may still exist.

## 2019-04-18 ENCOUNTER — INFUSION (OUTPATIENT)
Dept: ONCOLOGY | Facility: HOSPITAL | Age: 59
End: 2019-04-18

## 2019-04-18 DIAGNOSIS — Z45.2 ENCOUNTER FOR VENOUS ACCESS DEVICE CARE: Primary | ICD-10-CM

## 2019-04-18 PROCEDURE — 96523 IRRIG DRUG DELIVERY DEVICE: CPT | Performed by: INTERNAL MEDICINE

## 2019-04-18 RX ORDER — SODIUM CHLORIDE 0.9 % (FLUSH) 0.9 %
10 SYRINGE (ML) INJECTION AS NEEDED
Status: CANCELLED | OUTPATIENT
Start: 2019-04-18

## 2019-04-18 RX ORDER — SODIUM CHLORIDE 0.9 % (FLUSH) 0.9 %
10 SYRINGE (ML) INJECTION AS NEEDED
Status: DISCONTINUED | OUTPATIENT
Start: 2019-04-18 | End: 2019-04-18 | Stop reason: HOSPADM

## 2019-04-18 RX ADMIN — Medication 500 UNITS: at 14:13

## 2019-04-18 RX ADMIN — SODIUM CHLORIDE, PRESERVATIVE FREE 10 ML: 5 INJECTION INTRAVENOUS at 14:13

## 2019-05-30 ENCOUNTER — TRANSCRIBE ORDERS (OUTPATIENT)
Dept: CT IMAGING | Facility: HOSPITAL | Age: 59
End: 2019-05-30

## 2019-05-30 DIAGNOSIS — C82.03 FOLLICULAR LYMPHOMA GRADE I OF INTRA-ABDOMINAL LYMPH NODES (HCC): Primary | ICD-10-CM

## 2019-06-04 ENCOUNTER — HOSPITAL ENCOUNTER (OUTPATIENT)
Dept: CT IMAGING | Facility: HOSPITAL | Age: 59
Discharge: HOME | End: 2019-06-04
Admitting: INTERNAL MEDICINE

## 2019-06-04 ENCOUNTER — LAB (OUTPATIENT)
Dept: ONCOLOGY | Facility: HOSPITAL | Age: 59
End: 2019-06-04

## 2019-06-04 ENCOUNTER — APPOINTMENT (OUTPATIENT)
Dept: CT IMAGING | Facility: HOSPITAL | Age: 59
End: 2019-06-04

## 2019-06-04 DIAGNOSIS — Z45.2 ENCOUNTER FOR VENOUS ACCESS DEVICE CARE: ICD-10-CM

## 2019-06-04 DIAGNOSIS — C82.03 FOLLICULAR LYMPHOMA GRADE I OF INTRA-ABDOMINAL LYMPH NODES (HCC): ICD-10-CM

## 2019-06-04 DIAGNOSIS — C82.03 FOLLICULAR LYMPHOMA GRADE I OF INTRA-ABDOMINAL LYMPH NODES (HCC): Primary | ICD-10-CM

## 2019-06-04 DIAGNOSIS — R79.89 ELEVATED LFTS: ICD-10-CM

## 2019-06-04 LAB
ALBUMIN SERPL-MCNC: 4.2 G/DL (ref 3.5–5.2)
ALBUMIN/GLOB SERPL: 1.6 G/DL
ALP SERPL-CCNC: 83 U/L (ref 39–117)
ALT SERPL W P-5'-P-CCNC: 68 U/L (ref 1–33)
ANION GAP SERPL CALCULATED.3IONS-SCNC: 14 MMOL/L
AST SERPL-CCNC: 49 U/L (ref 1–32)
BASOPHILS # BLD AUTO: 0.05 10*3/MM3 (ref 0–0.2)
BASOPHILS NFR BLD AUTO: 0.9 % (ref 0–1.5)
BILIRUB SERPL-MCNC: 0.3 MG/DL (ref 0.2–1.2)
BUN BLD-MCNC: 8 MG/DL (ref 6–20)
BUN/CREAT SERPL: 11.6 (ref 7–25)
CALCIUM SPEC-SCNC: 9.4 MG/DL (ref 8.6–10.5)
CHLORIDE SERPL-SCNC: 98 MMOL/L (ref 98–107)
CO2 SERPL-SCNC: 25 MMOL/L (ref 22–29)
CREAT BLD-MCNC: 0.69 MG/DL (ref 0.57–1)
DEPRECATED RDW RBC AUTO: 44.1 FL (ref 37–54)
EOSINOPHIL # BLD AUTO: 0 10*3/MM3 (ref 0–0.4)
EOSINOPHIL NFR BLD AUTO: 0 % (ref 0.3–6.2)
ERYTHROCYTE [DISTWIDTH] IN BLOOD BY AUTOMATED COUNT: 13.4 % (ref 12.3–15.4)
GFR SERPL CREATININE-BSD FRML MDRD: 87 ML/MIN/1.73
GLOBULIN UR ELPH-MCNC: 2.7 GM/DL
GLUCOSE BLD-MCNC: 180 MG/DL (ref 65–99)
HCT VFR BLD AUTO: 39.8 % (ref 34–46.6)
HGB BLD-MCNC: 13.5 G/DL (ref 12–15.9)
IMM GRANULOCYTES # BLD AUTO: 0.04 10*3/MM3 (ref 0–0.05)
IMM GRANULOCYTES NFR BLD AUTO: 0.7 % (ref 0–0.5)
LYMPHOCYTES # BLD AUTO: 1.1 10*3/MM3 (ref 0.7–3.1)
LYMPHOCYTES NFR BLD AUTO: 20.5 % (ref 19.6–45.3)
MCH RBC QN AUTO: 30.4 PG (ref 26.6–33)
MCHC RBC AUTO-ENTMCNC: 33.9 G/DL (ref 31.5–35.7)
MCV RBC AUTO: 89.6 FL (ref 79–97)
MONOCYTES # BLD AUTO: 0.5 10*3/MM3 (ref 0.1–0.9)
MONOCYTES NFR BLD AUTO: 9.3 % (ref 5–12)
NEUTROPHILS # BLD AUTO: 3.67 10*3/MM3 (ref 1.7–7)
NEUTROPHILS NFR BLD AUTO: 68.6 % (ref 42.7–76)
NRBC BLD AUTO-RTO: 0 /100 WBC (ref 0–0.2)
PLATELET # BLD AUTO: 313 10*3/MM3 (ref 140–450)
PMV BLD AUTO: 8.8 FL (ref 6–12)
POTASSIUM BLD-SCNC: 4.3 MMOL/L (ref 3.5–5.2)
PROT SERPL-MCNC: 6.9 G/DL (ref 6–8.5)
RBC # BLD AUTO: 4.44 10*6/MM3 (ref 3.77–5.28)
SODIUM BLD-SCNC: 137 MMOL/L (ref 136–145)
WBC NRBC COR # BLD: 5.36 10*3/MM3 (ref 3.4–10.8)

## 2019-06-04 PROCEDURE — 25010000002 IOPAMIDOL 61 % SOLUTION: Performed by: INTERNAL MEDICINE

## 2019-06-04 PROCEDURE — 85025 COMPLETE CBC W/AUTO DIFF WBC: CPT | Performed by: INTERNAL MEDICINE

## 2019-06-04 PROCEDURE — 80053 COMPREHEN METABOLIC PANEL: CPT | Performed by: INTERNAL MEDICINE

## 2019-06-04 PROCEDURE — 74177 CT ABD & PELVIS W/CONTRAST: CPT

## 2019-06-04 PROCEDURE — 36591 DRAW BLOOD OFF VENOUS DEVICE: CPT | Performed by: INTERNAL MEDICINE

## 2019-06-04 PROCEDURE — 71260 CT THORAX DX C+: CPT

## 2019-06-04 RX ORDER — SODIUM CHLORIDE 0.9 % (FLUSH) 0.9 %
10 SYRINGE (ML) INJECTION AS NEEDED
Status: DISCONTINUED | OUTPATIENT
Start: 2019-06-04 | End: 2019-06-04 | Stop reason: HOSPADM

## 2019-06-04 RX ORDER — SODIUM CHLORIDE 0.9 % (FLUSH) 0.9 %
10 SYRINGE (ML) INJECTION AS NEEDED
Status: CANCELLED | OUTPATIENT
Start: 2019-08-29

## 2019-06-04 RX ADMIN — IOPAMIDOL 90 ML: 612 INJECTION, SOLUTION INTRAVENOUS at 10:09

## 2019-06-04 RX ADMIN — SODIUM CHLORIDE, PRESERVATIVE FREE 10 ML: 5 INJECTION INTRAVENOUS at 09:39

## 2019-06-04 RX ADMIN — Medication 500 UNITS: at 10:15

## 2019-06-06 ENCOUNTER — OFFICE VISIT (OUTPATIENT)
Dept: ONCOLOGY | Facility: CLINIC | Age: 59
End: 2019-06-06

## 2019-06-06 VITALS
OXYGEN SATURATION: 97 % | SYSTOLIC BLOOD PRESSURE: 133 MMHG | RESPIRATION RATE: 18 BRPM | HEIGHT: 63 IN | DIASTOLIC BLOOD PRESSURE: 84 MMHG | WEIGHT: 213.2 LBS | HEART RATE: 80 BPM | BODY MASS INDEX: 37.78 KG/M2 | TEMPERATURE: 98.7 F

## 2019-06-06 DIAGNOSIS — R79.89 ELEVATED LFTS: ICD-10-CM

## 2019-06-06 DIAGNOSIS — R93.3 ABNORMAL CT SCAN, COLON: ICD-10-CM

## 2019-06-06 DIAGNOSIS — C82.03 FOLLICULAR LYMPHOMA GRADE I OF INTRA-ABDOMINAL LYMPH NODES (HCC): Primary | ICD-10-CM

## 2019-06-06 PROCEDURE — G8417 CALC BMI ABV UP PARAM F/U: HCPCS | Performed by: INTERNAL MEDICINE

## 2019-06-06 PROCEDURE — 1123F ACP DISCUSS/DSCN MKR DOCD: CPT | Performed by: INTERNAL MEDICINE

## 2019-06-06 PROCEDURE — 99214 OFFICE O/P EST MOD 30 MIN: CPT | Performed by: INTERNAL MEDICINE

## 2019-06-06 PROCEDURE — G9903 PT SCRN TBCO ID AS NON USER: HCPCS | Performed by: INTERNAL MEDICINE

## 2019-06-06 NOTE — PROGRESS NOTES
DATE OF VISIT: 6/6/2019      REASON FOR VISIT: Recurrent follicular lymphoma on surveillance ,Elevated LFT, abnormal CT scan of colon      HISTORY OF PRESENT ILLNESS:    59-year-old female with a past medical history significant for recurrent follicular lymphoma status post radiation, last round of radiation was around September 2016 for follicular lymphoma.  Patient was again found to have aortocaval adenopathy on  CT of chest abdomen and pelvis in March 2017.  Patient was started on rituximab April 10, 2017.  Patient has so far received 7 doses of rituximab 2 months apart last of which was on March 26,2018.  In view of enlargement of aortocaval lymph node, patient was started on  chemotherapy with bendamustine and rituximab  on April 30, 2018.  Patient received last cycle which was cycle 4 of bendamustine and rituximab on August 21, 2018.  After that patient was placed on surveillance.  Had a surveillance CT of chest, abdomen and pelvis with contrast done on June 4, 2019, she is here to discuss the results and further recommendation.  Patient is here for 3 month follow-up appointment today.  Complains of night sweats.  Denies any blood in stool.  Denies any change in bowel habits.  Denies any new lymph node enlargement.  Denies any new fever .    PAST MEDICAL HISTORY:    Past Medical History:   Diagnosis Date   • Acute bronchitis    • Agoraphobia with panic disorder     on SNRI, prn buspar, prn klonopin     • Anxiety    • Arthritis    • Atrophic vaginitis    • Chest pain     work up as new onset angina    • Depression      MDD      • Essential hypertension    • Follicular non-Hodgkin's lymphoma (CMS/HCC)    • Generalized anxiety disorder     SSRI - buspar, cont cymbalta, prn klonopin      • Hip pain     arthritis, artifical right hip     • History of bone density study 02/09/2009    DEXA (bone density) test, peripheral (Normal   • History of echocardiogram 01/03/2012    Echocardiogram 72005 (Normal echocardigram.  EF 55-60%)   • History of mammogram    • Hyperlipidemia    • Mass of lower limb    • Nuclear senile cataract    • Obesity    • Otalgia     ENT REFER   • Panic disorder     with agoraphobia. On buspar and klonopin now      • Type 2 diabetes mellitus (CMS/HCC)     NO BDR   • Upper respiratory infection        SOCIAL HISTORY:    Social History     Tobacco Use   • Smoking status: Former Smoker     Years: 12.00     Last attempt to quit: 1998     Years since quittin.4   • Smokeless tobacco: Never Used   • Tobacco comment: Smoked 1 pack per 2 weeks   Substance Use Topics   • Alcohol use: No   • Drug use: No       Surgical History :  Past Surgical History:   Procedure Laterality Date   • CENTRAL VENOUS LINE INSERTION  2016    Central line insertion (Successful placement of right upper extremity midline.)   • COLONOSCOPY     • CYSTOSCOPY  1976    Cystoscopy (external urethroplasty and cysto-panendoscopy,urethal hymenal fusion with hypospadias)   • DILATATION AND CURETTAGE  1980    D&C (removal of IUD)   • EXCISION LESION  04/15/2014    Remove thigh lesion (Excision of a mass of the right thigh using a 16.2 cm incision with intermediate layered closure.)   • HYSTEROSCOPY  2003    Hysteroscope procedure (diagnostic hysteroscopy with fractional D&C)   • INJECTION OF MEDICATION  2011    kenalog (1)   • OTHER SURGICAL HISTORY      Explore parathyroid glands   • OTHER SURGICAL HISTORY  1985    Laparosc diagnostic (desires elective tubal reversal)   • PAP SMEAR  2005    PAP SMEAR (normal)   • [place] INSJ TUNNELED CVC W/O SUBQ PORT/ AGE 5 YR/> Right 2017    Procedure: INSERTION VENOUS ACCESS DEVICE (MEDIPORT) right chest;  Surgeon: Fortino Medina MD;  Location: [facility] OR;  Service: General   • [place] INSJ TUNNELED CVC W/O SUBQ PORT/ AGE 5 YR/> N/A 2017    Procedure: INSERTION VENOUS ACCESS DEVICE   (MEDIPORT)   (C-ARM#1);  Surgeon: Fortino Medina MD;  Location: [facility] OR;   "Service: General   • SHOULDER ARTHROSCOPY  11/11/2013    Shoulder arthroscopy/surgery (Right shoulder. Rotator cuff repair. Subacromial decompression. Edgar procedure.)   • SHOULDER SURGERY  12/28/2015    Shoulder surgery procedure (Arthroscopy of the left shoulder with rotator cuff repair.Edgar procedure.Subacromial decompression.)   • TONSILLECTOMY AND ADENOIDECTOMY  05/29/1967    T&A (hypertrophied tonsils and adenoids with recurrent infection)   • TOTAL ABDOMINAL HYSTERECTOMY WITH SALPINGO OOPHORECTOMY  02/03/2004    ARIELLE/BSO (with a preop fractional dilation and curettage with frozen section,menorrhagia,dysmenorrhea,unresponsive to medical intervention)   • TOTAL HIP ARTHROPLASTY     • VAGINA SURGERY  03/02/1981    Anesth, surgery on vagina (colpotomy with bilateral partial salpingectomy, multiparity contraindicated)       ALLERGIES:    Allergies   Allergen Reactions   • Ace Inhibitors Cough   • Latex      BLISTER     • Morphine And Related Nausea And Vomiting   • Lortab [Hydrocodone-Acetaminophen] Palpitations       REVIEW OF SYSTEMS:      CONSTITUTIONAL: Has gained 4 pounds since last clinic visit.  Complains of night sweats.  No fever, chills.     HEENT:  No epistaxis, mouth sores, or difficulty swallowing.    RESPIRATORY:  No new shortness of breath .  No new cough or hemoptysis.    CARDIOVASCULAR:  No chest pain or palpitations.    GASTROINTESTINAL: Complains of intermittent nausea .  Denies any recent change in bowel habits. No vomiting, or blood in the stool.    GENITOURINARY:  No dysuria or hematuria.    MUSCULOSKELETAL:   No any new back pain or arthralgias.     NEUROLOGICAL:  No tingling or numbness. No new headache or dizziness.     LYMPHATICS:  Denies any abnormal swollen and anywhere in the body.    SKIN:  Denies any new skin rash.          PHYSICAL EXAMINATION:      VITAL SIGNS:  /84   Pulse 80   Temp 98.7 °F (37.1 °C) (Temporal)   Resp 18   Ht 160 cm (62.99\")   Wt 96.7 kg (213 lb " 3.2 oz)   SpO2 97%   BMI 37.78 kg/m²      ECOG performance status: 1    GENERAL:  Not in any distress.Obese female.    HEENT:  Normocephalic, Atraumatic.Mild Conjunctival pallor. No icterus. Extraocular Movements Intact. No Fascial Asymmetry noted.    NECK:  No adenopathy. No JVD.  Trachea in midline.    RESPIRATORY:  Fair air entry bilateral. No rhonchi or wheezing.    CARDIOVASCULAR:  S1, S2. Regular rate and rhythm. No murmur or gallop appreciated.  Port-A-Cath present on the right chest wall.    ABDOMEN:  Soft, obese, nontender. Bowel sounds present in all four quadrants.  No hepatosplenomegaly  appreciated due to body habitus.    MUSCULOSKELETAL:  No edema.No Calf Tenderness.  Decreased range of motion    NEUROLOGIC:  Alert, awake and oriented ×3.  No  Motor  deficit appreciated. Cranial Nerves 2-12 grossly intact.    SKIN: No new skin lesions.  Skin is warm and moist.    LYMPHATICS: No new enlarged lymph node in neck or supraclavicular area.            DIAGNOSTIC DATA:    Glucose   Date Value Ref Range Status   06/04/2019 180 (H) 65 - 99 mg/dL Final     Sodium   Date Value Ref Range Status   06/04/2019 137 136 - 145 mmol/L Final     Potassium   Date Value Ref Range Status   06/04/2019 4.3 3.5 - 5.2 mmol/L Final     CO2   Date Value Ref Range Status   06/04/2019 25.0 22.0 - 29.0 mmol/L Final     Chloride   Date Value Ref Range Status   06/04/2019 98 98 - 107 mmol/L Final     Anion Gap   Date Value Ref Range Status   06/04/2019 14.0 mmol/L Final     Creatinine   Date Value Ref Range Status   06/04/2019 0.69 0.57 - 1.00 mg/dL Final     BUN   Date Value Ref Range Status   06/04/2019 8 6 - 20 mg/dL Final     BUN/Creatinine Ratio   Date Value Ref Range Status   06/04/2019 11.6 7.0 - 25.0 Final     Calcium   Date Value Ref Range Status   06/04/2019 9.4 8.6 - 10.5 mg/dL Final     eGFR Non  Amer   Date Value Ref Range Status   06/04/2019 87 >60 mL/min/1.73 Final     Alkaline Phosphatase   Date Value Ref Range  Status   06/04/2019 83 39 - 117 U/L Final     Total Protein   Date Value Ref Range Status   06/04/2019 6.9 6.0 - 8.5 g/dL Final     ALT (SGPT)   Date Value Ref Range Status   06/04/2019 68 (H) 1 - 33 U/L Final     AST (SGOT)   Date Value Ref Range Status   06/04/2019 49 (H) 1 - 32 U/L Final     Total Bilirubin   Date Value Ref Range Status   06/04/2019 0.3 0.2 - 1.2 mg/dL Final     Albumin   Date Value Ref Range Status   06/04/2019 4.20 3.50 - 5.20 g/dL Final     Globulin   Date Value Ref Range Status   06/04/2019 2.7 gm/dL Final     Lab Results   Component Value Date    WBC 5.36 06/04/2019    HGB 13.5 06/04/2019    HCT 39.8 06/04/2019    MCV 89.6 06/04/2019     06/04/2019     Lab Results   Component Value Date    NEUTROABS 3.67 06/04/2019         PATHOLOGY:  Pathology from right thigh biopsy done in April 2014 showed:  FINAL DIAGNOSIS:   A.  SUBCUTANEOUS MASS, RIGHT THIGH:             FOLLICULAR LYMPHOMA, GRADE 1 OF 3.   B.  SUBCUTANEOUS MASS, RIGHT THIGH:             FOLLICULAR LYMPHOMA, GRADE 1 OF 3.          Comment   COMMENT:   Immunostains have follicular lymphoma positive for CD20, negative for   CD3, negative for CD5, positive for CD10, positive for CD23, negative   for CD43, negative for cyclin D1, positive for BCL-2 and positive for   BCL-6.  The case is submitted to the [facility] for evaluation and   their report is attached.            RADIOLOGY DATA :  CT of chest, abdomen and pelvis with contrast done on June 4, 2019 was reviewed, discussed with patient, it showed:  - - - CT CHEST - - -     Left-sided Mediport terminates in the superior vena cava     PULMONARY PARENCHYMA:      - air spaces: Negative    - interstitium: Grossly within normal limits for age    - misc: No pulmonary nodules or mass     MEDIASTINUM / ROXANA:      - heart: Normal size, no pericardial fluid. Mild  atherosclerotic coronary artery calcification.    - aorta/great vessels: Normal caliber and configuration for age    - misc:  No mediastinal mass / significant adenopathy     PLEURAL COMPARTMENT:      - misc: No pleural fluid or mass        MISC:      - inferior neck: Negative    - osseous / body wall: Negative     - misc:         - - - CT ABDOMEN - - -      ABDOMEN:   - LIVER:  Normal size / contour, no ductal dilatation, no focal  lesion. The liver appears diffusely fatty infiltrated   - GB: There may be a tiny gallstone in the gallbladder neck.   - CBD:  Grossly negative   - SPLEEN:  Normal size and contour   - PANCREAS:  Normal in size, contour, no focal mass    - VISCERA:  Normal caliber, no wall thickening   - MESENTERY:  No mesenteric mass   - CAVITY:  No free abdominal fluid, no free intraperitoneal air   - BODY WALL:  Small umbilical hernia contains only fat   - OSSEOUS:  Grossly negative for age      RETROPERITONEUM:   - KIDNEYS:Normal size / contour, no collecting system dilation                    No evidence of an enhancing mass   - URETERS:  Normal course, caliber   - ADRENALS:  Normal size, contour   - MISC:  No sig. retroperitoneal adenopathy or mass   - VASCULAR:  Aorta/iliacs: within normal limits for age     - - - CT PELVIS - - -   There is some limitation of evaluation due to beam hardening  artifact from patient's right prosthetic hip   - VISCERA:  Normal caliber small/large bowel, no focal   thickening/mass. There is focal narrowing at the rectosigmoid  junction, which may reflect focal peristalsis, but it can be seen  on the previous study, intraluminal narrowing is suspected.  Sigmoidoscopy may be helpful in further evaluation.    - APPENDIX:  Not visualized, and may be absent   - MESENTERY:  No mass   - VASCULAR:  Within normal limits for age   - CAVITY:  No free fluid/air   - BLADDER:  Unremarkable   - OSSEOUS: No acute abnormality. Right total hip prosthesis  again noted.   - MISC:   - UTERUS/OVARY: Uterus is surgically absent. The ovaries are not  well seen, and may be absent as well.     IMPRESSION:  1. No new  abnormalities identified  2. Hepatic steatosis  3. Possible tiny gallstone in the gallbladder  3. Surgical absence of the uterus  4. Luminal narrowing at the rectosigmoid junction, also present  on the previous study. Sigmoidoscopy may be helpful in further  evaluation of this finding.           CT of chest, abdomen and pelvis with contrast done on December 13, 2018 was reviewed, discussed with patient, it showed:  FINDINGS: Comparison study dated September 12, 2018. Axial  computer tomography sequential imaging was performed from the  thoracic inlet through the symphysis pubis after administration  of IV contrast. .Sagittal and coronal reformation was performed .     Left subclavian Port-A-Cath with tip within the region of SVC.  Mediastinal structures of the chest are normal. No  lymphadenopathy or pericardial effusion. Lungs are clear. Pleural  spaces are normal.     The liver is normal. Very small gallstone is seen within the  gallbladder lumen. Otherwise gallbladder is unremarkable. The  biliary system is normal. The pancreas is normal. The spleen is  normal. Bilateral adrenal glands are normal. Right kidney and  ureter are normal. Left kidney and ureter are normal. The bladder  is normal. The uterus is surgically absent. Evaluation in the  pelvis is limited secondary to streak artifact from right hip  prostheses. The hollow viscera is normal. No lymphadenopathy in  the abdomen or the pelvis. No acute osseous abnormality.     IMPRESSION:  1. Cholelithiasis.  2. No acute CT chest abdomen or pelvis abnormality.                                ASSESSMENT AND PLAN:      1.  Recurrent follicular lymphoma grade 1, initially patient was diagnosed with a right groin lymph node adenopathy in April 2014 for which patient underwent palliative radiation therapy.   - Subsequently patient again had a relapse around September 2016 for workup with second course of palliative radiation therapy was given by Dr. Leonel Vargas in  October 2016.    -In view of enlarged aortocaval lymph nodes, patient was started on rituximab treatment in April 2017.    CT scan done in July 2017 shows decreasing in the size of aortocaval lymph node which was discussed with patient.  -  Patient  had so far received 7 rituximab treatment every 2 months apart last of which was on March 26, 2018.  -  In view of generalized abdominal pain which was worsening, patient had CT scan done on April 6, 2018 which showed slight increase in the size of aortocaval adenopathy.  Treatment option consisting of changing treatment to bendamustine and rituximab versus palliative radiation treatment were discussed with patient and her family.  Patient wanted to try chemotherapy with bendamustine and rituximab first.    -Patient was started on chemotherapy with bendamustine and rituximab on April 30, 2018.  Patient tolerated first round of chemotherapy well.  We will go ahead with round 2 of chemotherapy today with bendamustine and rituximab.  -Restaging CT of chest, abdomen and pelvis with contrast done on June 21, 2018 shows decrease in size of adenopathy involving aortocaval area.  Result of CT scan were discussed with patient and her family.  -Cycle 3 on July 24, 2018 dose of bendamustine was reduced by 20%.  -Patient has received 4 cycles of bendamustine and rituximab from April 30, 2018 until August 21, 2018.  -CT of chest, abdomen and pelvis with contrast done on September 12, 2018 after cycle 4 of bendamustine and rituximab showed no evidence of lymphadenopathy involving chest, abdomen or pelvis.  -CT of chest, abdomen and pelvis with contrast done on December 13, 2018 does not show any evidence of recurrence or adenopathy.  Result of CT scan were discussed with patient.  -CT of chest, abdomen and pelvis with contrast done on June 4, 2019 does not show any evidence of recurrence of lymphoma or adenopathy.  However there is a luminal narrowing at rectosigmoid junction.   Results of CT scan were discussed with patient.  - At this point will continue with clinical surveillance.  -We will ask patient to return to clinic in 3 months with repeat CBC, CMP to be done on that day.       2.  Abnormal CT scan of colon:(Problem is new to me)  -CT of chest, abdomen and pelvis with contrast done on June 4, 2019 shows luminal narrowing at rectosigmoid junction.  Results of CT scan were discussed with the patient.  -In view of abnormal CT scan of colon as well as persistent elevated liver function test, will refer her to Dr Salvador today.  Referral has been placed today on June 6, 2019.    3.  Diabetes mellitus    4.  Elevated liver function test: Secondary to fatty liver.  AST and ALT are mildly elevated today.  Patient was again counseled about weight loss today.    5. Obesity : Patient's Body mass index is 37.78 kg/m². BMI is higher then reference range.  Patient was informed about elevated BMI and need to lose weight.  Patient was counseled about diet and exercise for weight loss.    6.  Health maintenance: Patient does not smoke.  Last colonoscopy was done around 2014 by Dr Salvador which was normal as per patient.      7.  Advance care planning: For now patient remains full code and able to make her decisions.  Patient is health care surrogate mentioned on chart.    8.  Pain assessment:  -Patient denies any pain today.            Tino Sood MD  6/6/2019  11:27 AM        EMR Dragon/Transcription disclaimer:   Much of this encounter note is an electronic transcription/translation of spoken language to printed text. The electronic translation of spoken language may permit erroneous, or at times, nonsensical words or phrases to be inadvertently transcribed; Although I have reviewed the note for such errors, some may still exist.

## 2019-07-18 ENCOUNTER — ANESTHESIA (OUTPATIENT)
Dept: GASTROENTEROLOGY | Facility: HOSPITAL | Age: 59
End: 2019-07-18

## 2019-07-18 ENCOUNTER — HOSPITAL ENCOUNTER (OUTPATIENT)
Facility: HOSPITAL | Age: 59
Setting detail: HOSPITAL OUTPATIENT SURGERY
Discharge: HOME | End: 2019-07-18
Attending: INTERNAL MEDICINE | Admitting: INTERNAL MEDICINE

## 2019-07-18 ENCOUNTER — ANESTHESIA EVENT (OUTPATIENT)
Dept: GASTROENTEROLOGY | Facility: HOSPITAL | Age: 59
End: 2019-07-18

## 2019-07-18 VITALS
WEIGHT: 208 LBS | RESPIRATION RATE: 18 BRPM | OXYGEN SATURATION: 95 % | BODY MASS INDEX: 35.51 KG/M2 | DIASTOLIC BLOOD PRESSURE: 54 MMHG | SYSTOLIC BLOOD PRESSURE: 106 MMHG | HEIGHT: 64 IN | TEMPERATURE: 97.1 F | HEART RATE: 85 BPM

## 2019-07-18 DIAGNOSIS — R93.3 ABNORMAL FINDINGS ON EXAMINATION OF GASTROINTESTINAL TRACT: ICD-10-CM

## 2019-07-18 LAB — GLUCOSE BLDC GLUCOMTR-MCNC: 133 MG/DL (ref 70–130)

## 2019-07-18 PROCEDURE — 88305 TISSUE EXAM BY PATHOLOGIST: CPT | Performed by: PATHOLOGY

## 2019-07-18 PROCEDURE — 25010000002 PROPOFOL 10 MG/ML EMULSION: Performed by: NURSE ANESTHETIST, CERTIFIED REGISTERED

## 2019-07-18 PROCEDURE — 88305 TISSUE EXAM BY PATHOLOGIST: CPT | Performed by: INTERNAL MEDICINE

## 2019-07-18 PROCEDURE — 82962 GLUCOSE BLOOD TEST: CPT

## 2019-07-18 RX ORDER — 0.9 % SODIUM CHLORIDE 0.9 %
10 VIAL (ML) INJECTION ONCE
Status: COMPLETED | OUTPATIENT
Start: 2019-07-18 | End: 2019-07-18

## 2019-07-18 RX ORDER — DEXTROSE AND SODIUM CHLORIDE 5; .45 G/100ML; G/100ML
30 INJECTION, SOLUTION INTRAVENOUS CONTINUOUS PRN
Status: DISCONTINUED | OUTPATIENT
Start: 2019-07-18 | End: 2019-07-18 | Stop reason: HOSPADM

## 2019-07-18 RX ORDER — LIDOCAINE HYDROCHLORIDE 20 MG/ML
INJECTION, SOLUTION INTRAVENOUS AS NEEDED
Status: DISCONTINUED | OUTPATIENT
Start: 2019-07-18 | End: 2019-07-18 | Stop reason: SURG

## 2019-07-18 RX ORDER — PROPOFOL 10 MG/ML
VIAL (ML) INTRAVENOUS AS NEEDED
Status: DISCONTINUED | OUTPATIENT
Start: 2019-07-18 | End: 2019-07-18 | Stop reason: SURG

## 2019-07-18 RX ADMIN — LIDOCAINE HYDROCHLORIDE 50 MG: 20 INJECTION, SOLUTION INTRAVENOUS at 11:13

## 2019-07-18 RX ADMIN — SODIUM CHLORIDE, PRESERVATIVE FREE 10 ML: 5 INJECTION INTRAVENOUS at 11:45

## 2019-07-18 RX ADMIN — PROPOFOL 70 MG: 10 INJECTION, EMULSION INTRAVENOUS at 11:13

## 2019-07-18 RX ADMIN — DEXTROSE AND SODIUM CHLORIDE 30 ML/HR: 5; 450 INJECTION, SOLUTION INTRAVENOUS at 10:50

## 2019-07-18 RX ADMIN — PROPOFOL 30 MG: 10 INJECTION, EMULSION INTRAVENOUS at 11:26

## 2019-07-18 RX ADMIN — PROPOFOL 40 MG: 10 INJECTION, EMULSION INTRAVENOUS at 11:23

## 2019-07-18 RX ADMIN — PROPOFOL 30 MG: 10 INJECTION, EMULSION INTRAVENOUS at 11:15

## 2019-07-18 RX ADMIN — PROPOFOL 40 MG: 10 INJECTION, EMULSION INTRAVENOUS at 11:17

## 2019-07-18 RX ADMIN — Medication 500 UNITS: at 11:46

## 2019-07-18 RX ADMIN — PROPOFOL 20 MG: 10 INJECTION, EMULSION INTRAVENOUS at 11:19

## 2019-07-18 NOTE — ANESTHESIA POSTPROCEDURE EVALUATION
Patient: Chasity Leon    Procedure Summary     Date:  07/18/19 Room / Location:  [facility] ENDOSCOPY 2 / [facility] ENDOSCOPY    Anesthesia Start:  1105 Anesthesia Stop:  1129    Procedure:  COLONOSCOPY (N/A ) Diagnosis:       Abnormal findings on examination of gastrointestinal tract      (Abnormal findings on examination of gastrointestinal tract [R93.3])    Surgeon:  Brandon Salvador DO Provider:  Jorge Luis Ly CRNA    Anesthesia Type:  general, MAC ASA Status:  3          Anesthesia Type: general, MAC  Last vitals  BP   144/79 (07/18/19 1029)   Temp   98.1 °F (36.7 °C) (07/18/19 1029)   Pulse   84 (07/18/19 1029)   Resp   20 (07/18/19 1029)     SpO2   96 % (07/18/19 1029)     Post Anesthesia Care and Evaluation    Patient location during evaluation: bedside  Patient participation: complete - patient participated  Level of consciousness: awake and awake and alert  Pain score: 0  Pain management: satisfactory to patient  Airway patency: patent  Anesthetic complications: No anesthetic complications  PONV Status: none  Cardiovascular status: acceptable and stable  Respiratory status: acceptable, room air and spontaneous ventilation  Hydration status: acceptable

## 2019-07-18 NOTE — ANESTHESIA PREPROCEDURE EVALUATION
Anesthesia Evaluation     Patient summary reviewed and Nursing notes reviewed   NPO Solid Status: > 8 hours  NPO Liquid Status: > 4 hours           Airway   Mallampati: III  TM distance: <3 FB  Neck ROM: full  possible difficult intubation  Dental - normal exam     Pulmonary - normal exam    breath sounds clear to auscultation  (+) recent URI resolved,   Cardiovascular - normal exam    ECG reviewed  Rhythm: regular  Rate: normal    (+) hypertension well controlled, hyperlipidemia,     ROS comment: EKG:NSR    Neuro/Psych  (+) psychiatric history Anxiety and Depression,     GI/Hepatic/Renal/Endo    (+) obesity, morbid obesity,  diabetes mellitus type 2 poorly controlled,     Musculoskeletal     Abdominal   (+) obese,    Substance History - negative use     OB/GYN negative ob/gyn ROS         Other   (+) arthritis   history of cancer                      Anesthesia Plan    ASA 3     general and MAC     intravenous induction   Anesthetic plan, all risks, benefits, and alternatives have been provided, discussed and informed consent has been obtained with: patient.

## 2019-07-18 NOTE — H&P
Winifred Cesar DO,[facility]  Gastroenterology  Hepatology  Endoscopy  Board Certified in Internal Medicine and gastroenterology  [address], [address]  [place]. [address]  - [phone]   F - [phone]     GASTROENTEROLOGY HISTORY AND PHYSICAL  NOTE   WINIFRED CESAR DO.         SUBJECTIVE:   7/18/2019    Name: Chasity Leon  DOD: 1960        Chief Complaint:       Subjective : Imaging shows a possible stricture of the sigmoid colon.  History of lymphoma treated with radiation therapy.  Completely asymptomatic  Patient is 59 y.o. female presents with desire for elective colonoscopy.      ROS/HISTORY/ CURRENT MEDICATIONS/OBJECTIVE/VS/PE:   Review of Systems:  All systems unremarkable unless specified below.  Constitutional   HENT  Eyes   Respiratory    Cardiovascular  Gastrointestinal   Endocrine  Genitourinary    Musculoskeletal   Skin  Allergic/Immunologic    Neurological    Hematological  Psychiatric/Behavioral    History:     Past Medical History:   Diagnosis Date   • Acute bronchitis    • Agoraphobia with panic disorder     on SNRI, prn buspar, prn klonopin     • Anxiety    • Arthritis    • Atrophic vaginitis    • Chest pain     work up as new onset angina    • Depression      MDD      • Essential hypertension    • Follicular non-Hodgkin's lymphoma (CMS/HCC)    • Generalized anxiety disorder     SSRI - buspar, cont cymbalta, prn klonopin      • Hip pain     arthritis, artifical right hip     • History of bone density study 02/09/2009    DEXA (bone density) test, peripheral (Normal   • History of echocardiogram 01/03/2012    Echocardiogram 78101 (Normal echocardigram. EF 55-60%)   • History of mammogram    • Hyperlipidemia    • Mass of lower limb    • Nuclear senile cataract    • Obesity    • Otalgia     ENT REFER   • Panic disorder     with agoraphobia. On buspar and klonopin now      • Type 2 diabetes mellitus (CMS/HCC)     NO BDR   • Upper respiratory infection      Past Surgical  History:   Procedure Laterality Date   • CENTRAL VENOUS LINE INSERTION  03/11/2016    Central line insertion (Successful placement of right upper extremity midline.)   • COLONOSCOPY     • CYSTOSCOPY  03/23/1976    Cystoscopy (external urethroplasty and cysto-panendoscopy,urethal hymenal fusion with hypospadias)   • DILATATION AND CURETTAGE  01/25/1980    D&C (removal of IUD)   • EXCISION LESION  04/15/2014    Remove thigh lesion (Excision of a mass of the right thigh using a 16.2 cm incision with intermediate layered closure.)   • HYSTEROSCOPY  02/28/2003    Hysteroscope procedure (diagnostic hysteroscopy with fractional D&C)   • INJECTION OF MEDICATION  08/22/2011    kenalog (1)   • OTHER SURGICAL HISTORY      Explore parathyroid glands   • OTHER SURGICAL HISTORY  08/26/1985    Laparosc diagnostic (desires elective tubal reversal)   • PAP SMEAR  05/24/2005    PAP SMEAR (normal)   • MN INSJ TUNNELED CVC W/O SUBQ PORT/ AGE 5 YR/> Right 4/4/2017    Procedure: INSERTION VENOUS ACCESS DEVICE (MEDIPORT) right chest;  Surgeon: Fortino Medina MD;  Location: [facility];  Service: General   • MN INSJ TUNNELED CVC W/O SUBQ PORT/ AGE 5 YR/> N/A 6/6/2017    Procedure: INSERTION VENOUS ACCESS DEVICE   (MEDIPORT)   (C-ARM#1);  Surgeon: Fortino Medina MD;  Location: [facility];  Service: General   • SHOULDER ARTHROSCOPY  11/11/2013    Shoulder arthroscopy/surgery (Right shoulder. Rotator cuff repair. Subacromial decompression. Edgar procedure.)   • SHOULDER SURGERY  12/28/2015    Shoulder surgery procedure (Arthroscopy of the left shoulder with rotator cuff repair.Edgar procedure.Subacromial decompression.)   • TONSILLECTOMY AND ADENOIDECTOMY  05/29/1967    T&A (hypertrophied tonsils and adenoids with recurrent infection)   • TOTAL ABDOMINAL HYSTERECTOMY WITH SALPINGO OOPHORECTOMY  02/03/2004    ARIELLE/BSO (with a preop fractional dilation and curettage with frozen section,menorrhagia,dysmenorrhea,unresponsive to medical  intervention)   • TOTAL HIP ARTHROPLASTY     • VAGINA SURGERY  1981    Anesth, surgery on vagina (colpotomy with bilateral partial salpingectomy, multiparity contraindicated)     Family History   Problem Relation Age of Onset   • Breast cancer Mother    • Heart disease Father    • Liver cancer Father    • Diabetes Sister         INSULIN DEPENDENT   • Diabetes Brother         INSULIN DEPENDENT   • Coronary artery disease Other    • Diabetes Other      Social History     Tobacco Use   • Smoking status: Former Smoker     Years: 12.00     Last attempt to quit: 1998     Years since quittin.5   • Smokeless tobacco: Never Used   • Tobacco comment: Smoked 1 pack per 2 weeks   Substance Use Topics   • Alcohol use: No   • Drug use: No     Medications Prior to Admission   Medication Sig Dispense Refill Last Dose   • ARIPiprazole (ABILIFY) 20 MG tablet Take 20 mg by mouth Every Night.   2019 at Unknown time   • busPIRone (BUSPAR) 15 MG tablet Take 15 mg by mouth 2 (two) times a day.   2019 at Unknown time   • BYDUREON 2 MG pen-injector injection    2019 at Unknown time   • Calcium Citrate-Vitamin D (CALCIUM CITRATE +D PO) Take 2 tablets by mouth daily.   2019 at Unknown time   • clonazePAM (KlonoPIN) 1 MG tablet Take 1 mg by mouth as needed for seizures.   2019 at Unknown time   • Desvenlafaxine Succinate (PRISTIQ PO) Take 1 tablet by mouth daily.   2019 at Unknown time   • fesoterodine fumarate (TOVIAZ ER) 8 MG tablet sustained-release 24 hour capsule Take 8 mg by mouth Daily. bedtime   2019 at Unknown time   • gabapentin (NEURONTIN) 300 MG capsule Take 300 mg by mouth every night.   2019 at Unknown time   • lamoTRIgine (LAMICTAL) 150 MG tablet Take 150 mg by mouth 2 (two) times a day.   2019 at Unknown time   • losartan (COZAAR) 100 MG tablet Take 100 mg by mouth daily.   2019 at Unknown time   • Multiple Vitamins-Minerals (CENTRUM SILVER PO) Take 1 tablet by  mouth daily.   7/18/2019 at Unknown time   • Omega-3 Fatty Acids (FISH OIL) 1000 MG capsule capsule Take 1,000 mg by mouth 2 (two) times a day.   7/17/2019 at Unknown time   • oxyCODONE-acetaminophen (PERCOCET) 5-325 MG per tablet Take 1 tablet by mouth every 6 (six) hours as needed.   7/17/2019 at Unknown time   • promethazine (PHENERGAN) 25 MG tablet Take 1 tablet by mouth Every 6 (Six) Hours As Needed for Nausea or Vomiting. 40 tablet 3 7/17/2019 at Unknown time   • ranitidine (ZANTAC) 150 MG tablet Take 150 mg by mouth 2 (two) times a day.   7/17/2019 at Unknown time   • simvastatin (ZOCOR) 80 MG tablet Take 80 mg by mouth every night.   7/17/2019 at Unknown time   • sitaGLIPtin-metFORMIN (JANUMET)  MG per tablet Take 1 tablet by mouth 2 (two) times a day.   7/17/2019 at Unknown time   • SYMBICORT 160-4.5 MCG/ACT inhaler    7/17/2019 at Unknown time   • TRAZODONE HCL PO Take 225 mg by mouth every night.   7/17/2019 at Unknown time     Allergies:  Ace inhibitors; Latex; Morphine and related; and Lortab [hydrocodone-acetaminophen]    I have reviewed the patients medical history, surgical history and family history in the available medical record system.     Current Medications:     Current Facility-Administered Medications   Medication Dose Route Frequency Provider Last Rate Last Dose   • dextrose 5 % and sodium chloride 0.45 % infusion  30 mL/hr Intravenous Continuous PRN Brandon Salvador DO 30 mL/hr at 07/18/19 1050 30 mL/hr at 07/18/19 1050   • heparin flush (porcine) 100 UNIT/ML injection 500 Units  500 Units Intracatheter Once Brandon Salvador DO       • sodium chloride 0.9 % flush 10 mL  10 mL Intracatheter Once Brandon Salvador DO           Objective     Physical Exam:   Temp:  [98.1 °F (36.7 °C)] 98.1 °F (36.7 °C)  Heart Rate:  [84] 84  Resp:  [20] 20  BP: (144)/(79) 144/79    Physical Exam:  General Appearance:    Alert, cooperative, in no acute distress   Head:    Normocephalic, without  obvious abnormality, atraumatic   Eyes:            Lids and lashes normal, conjunctivae and sclerae normal, no   icterus, no pallor, corneas clear, PERRLA   Ears:    Ears appear intact with no abnormalities noted   Throat:   No oral lesions, no thrush, oral mucosa moist   Neck:   No adenopathy, supple, trachea midline, no thyromegaly, no     carotid bruit, no JVD   Back:     No kyphosis present, no scoliosis present, no skin lesions,       erythema or scars, no tenderness to percussion or                   palpation,   range of motion normal   Lungs:     Clear to auscultation,respirations regular, even and                   unlabored    Heart:    Regular rhythm and normal rate, normal S1 and S2, no            murmur, no gallop, no rub, no click   Breast Exam:    Deferred   Abdomen:     Normal bowel sounds, no masses, no organomegaly, soft        non-tender, non-distended, no guarding, no rebound                 tenderness   Genitalia:    Deferred   Extremities:   Moves all extremities well, no edema, no cyanosis, no              redness   Pulses:   Pulses palpable and equal bilaterally   Skin:   No bleeding, bruising or rash   Lymph nodes:   No palpable adenopathy   Neurologic:   Cranial nerves 2 - 12 grossly intact, sensation intact, DTR        present and equal bilaterally      Results Review:     Lab Results   Component Value Date    WBC 5.36 06/04/2019    WBC 4.40 03/07/2019    WBC 4.17 12/13/2018    HGB 13.5 06/04/2019    HGB 13.4 03/07/2019    HGB 13.3 12/13/2018    HCT 39.8 06/04/2019    HCT 38.8 03/07/2019    HCT 37.7 12/13/2018     06/04/2019     03/07/2019     12/13/2018             No results found for: LIPASE  No results found for: INR       Radiology Review:  Imaging Results (last 72 hours)     ** No results found for the last 72 hours. **           I reviewed the patient's new clinical results.  I reviewed the patient's new imaging results and agree with the interpretation.      ASSESSMENT/PLAN:   ASSESSMENT:  1.  Abnormal findings on CT scan.  Rule out stricture related to radiation versus neoplasm    PLAN:  1.  Colonoscopy with biopsies    Risk and benefits associated with the procedure are reviewed with the patient.  The patient wished to proceed     Brandon Salvador DO  07/18/19  10:56 AM

## 2019-07-19 LAB
LAB AP CASE REPORT: NORMAL
PATH REPORT.FINAL DX SPEC: NORMAL
PATH REPORT.GROSS SPEC: NORMAL

## 2019-07-23 DIAGNOSIS — M54.5 LOW BACK PAIN, UNSPECIFIED BACK PAIN LATERALITY, UNSPECIFIED CHRONICITY, WITH SCIATICA PRESENCE UNSPECIFIED: ICD-10-CM

## 2019-07-23 DIAGNOSIS — M25.552 LEFT HIP PAIN: Primary | ICD-10-CM

## 2019-07-24 ENCOUNTER — OFFICE VISIT (OUTPATIENT)
Dept: ORTHOPEDIC SURGERY | Facility: CLINIC | Age: 59
End: 2019-07-24

## 2019-07-24 VITALS — HEIGHT: 64 IN | WEIGHT: 211 LBS | BODY MASS INDEX: 36.02 KG/M2

## 2019-07-24 DIAGNOSIS — M25.552 LEFT HIP PAIN: ICD-10-CM

## 2019-07-24 DIAGNOSIS — M54.16 LUMBAR RADICULOPATHY, ACUTE: Primary | ICD-10-CM

## 2019-07-24 DIAGNOSIS — M54.50 LUMBAR SPINE PAIN: ICD-10-CM

## 2019-07-24 PROCEDURE — 99214 OFFICE O/P EST MOD 30 MIN: CPT | Performed by: NURSE PRACTITIONER

## 2019-07-26 ENCOUNTER — TELEPHONE (OUTPATIENT)
Dept: ORTHOPEDIC SURGERY | Facility: CLINIC | Age: 59
End: 2019-07-26

## 2019-07-29 RX ORDER — DIAZEPAM 10 MG/1
TABLET ORAL
Qty: 1 TABLET | Refills: 0 | Status: SHIPPED | OUTPATIENT
Start: 2019-07-29 | End: 2020-08-27

## 2019-08-02 DIAGNOSIS — M54.50 LUMBAR SPINE PAIN: ICD-10-CM

## 2019-08-02 DIAGNOSIS — M54.16 LUMBAR RADICULOPATHY, ACUTE: ICD-10-CM

## 2019-08-02 DIAGNOSIS — M25.552 LEFT HIP PAIN: ICD-10-CM

## 2019-08-06 ENCOUNTER — OFFICE VISIT (OUTPATIENT)
Dept: ORTHOPEDIC SURGERY | Facility: CLINIC | Age: 59
End: 2019-08-06

## 2019-08-06 VITALS — HEIGHT: 64 IN | BODY MASS INDEX: 35.85 KG/M2 | WEIGHT: 210 LBS

## 2019-08-06 DIAGNOSIS — M48.061 FORAMINAL STENOSIS OF LUMBAR REGION: Primary | ICD-10-CM

## 2019-08-06 DIAGNOSIS — M54.16 LUMBAR RADICULOPATHY, ACUTE: ICD-10-CM

## 2019-08-06 DIAGNOSIS — M51.36 DDD (DEGENERATIVE DISC DISEASE), LUMBAR: ICD-10-CM

## 2019-08-06 PROBLEM — M54.50 LUMBAR SPINE PAIN: Status: ACTIVE | Noted: 2019-08-06

## 2019-08-06 PROCEDURE — 99214 OFFICE O/P EST MOD 30 MIN: CPT | Performed by: NURSE PRACTITIONER

## 2019-08-06 NOTE — PROGRESS NOTES
"Chasity Leno is a 59 y.o. female returns for     Chief Complaint   Patient presents with   • Lumbar Spine - Follow-up, Pain   • Results     mri pelvis and lumbar spine done on 8/1/2019       HISTORY OF PRESENT ILLNESS:  59-year-old  female in the office today for progressive worsening of lumbar spine pain.  She is reporting that the leg weakness and pain is worse since her last evaluation with me prior to the MRI.  She is completed the MRI of the lumbar spine and pelvis as directed.  She continues to take anti-inflammatories and her Percocet as needed for discomfort which marginally improves her symptoms.  She continues to complain of pain that is radiating down the left posterior lateral surface of the leg which is worse in the morning.     CONCURRENT MEDICAL HISTORY:    The following portions of the patient's history were reviewed and updated as appropriate: allergies, current medications, past family history, past medical history, past social history, past surgical history and problem list.     ROS  No fevers or chills.  No chest pain or shortness of air.  No GI or  disturbances.    PHYSICAL EXAMINATION:       Ht 162.6 cm (64\")   Wt 95.3 kg (210 lb)   BMI 36.05 kg/m²     Physical Exam   Constitutional: She is oriented to person, place, and time. Vital signs are normal. She appears well-developed and well-nourished. She is cooperative.   HENT:   Head: Normocephalic and atraumatic.   Neck: Trachea normal and phonation normal.   Pulmonary/Chest: Effort normal. No respiratory distress.   Abdominal: Soft. Normal appearance. She exhibits no distension.   Neurological: She is alert and oriented to person, place, and time. GCS eye subscore is 4. GCS verbal subscore is 5. GCS motor subscore is 6.   Skin: Skin is warm, dry and intact.   Psychiatric: She has a normal mood and affect. Her speech is normal and behavior is normal. Judgment and thought content normal. Cognition and memory are normal.   Vitals " reviewed.      GAIT:     []  Normal  []  Antalgic    Assistive device: [x]  None  []  Walker     []  Crutches  []  Cane     []  Wheelchair  []  Stretcher    Right Hip Exam   Right hip exam is normal.       Left Hip Exam     Tenderness   The patient is experiencing tenderness in the greater trochanter.    Range of Motion   Abduction: abnormal   Flexion: abnormal   External rotation: abnormal   Internal rotation: abnormal     Muscle Strength   Abduction: 4/5   Adduction: 4/5   Flexion: 4/5     Other   Erythema: absent  Scars: absent  Sensation: normal  Pulse: present      Back Exam     Tenderness   The patient is experiencing tenderness in the lumbar and sacroiliac.    Range of Motion   Extension: abnormal   Flexion: abnormal   Lateral bend right: abnormal   Lateral bend left: abnormal   Rotation right: abnormal   Rotation left: abnormal     Muscle Strength   Right Quadriceps:  4/5   Left Quadriceps:  4/5   Right Hamstrings:  4/5   Left Hamstrings:  4/5     Tests   Straight leg raise right: negative  Straight leg raise left: positive    Reflexes   Patellar: abnormal  Achilles: abnormal    Other   Toe walk: abnormal  Heel walk: abnormal  Sensation: decreased  Gait: antalgic   Erythema: no back redness  Scars: absent              Xr Spine Lumbar 2 Or 3 View    Result Date: 7/24/2019  Narrative: Ordering Provider:  Josue Carmona APRN Ordering Diagnosis/Indication:  Low back pain, unspecified back pain laterality, unspecified chronicity, with sciatica presence unspecified Procedure:  XR SPINE LUMBAR 2 OR 3 VW Exam Date:  7/24/19 COMPARISON:  Not applicable, no relevant images available.     Impression:  AP lateral view of the lumbar spine reveals degenerative changes at L5-S1 without any evidence of acute fracture, bone lesion or other radiological abnormality noted. MANSI Winter 7/24/19     Xr Hip With Or Without Pelvis 2 - 3 View Left    Result Date: 7/24/2019  Narrative: Ordering Provider:  Kristine  MANSI Powers Ordering Diagnosis/Indication:  Left hip pain Procedure:  XR HIP W OR WO PELVIS 2-3 VIEW LEFT Exam Date:  7/24/19 COMPARISON:  Not applicable, no relevant images available.     Impression:  AP of the pelvis with 2 views of the left hip reveal no evidence of acute fracture, bony lesion or other radiological abnormality noted and there are no recent comparison films on file. MANSI Winter 7/24/19       MRI lumbar spine without contrast8/1/2019  [facility]  Result Impression     1. At the L4-5 level a combination of hypertrophy of the posterior ligaments, a diffusely bulging annulus, and a small left paramedian disc protrusion are probably causing some degree of compression of the left L5 nerve root where it enters the left   lateral recess of L5 .  2. At the L5-S1 level there is a dramatic complex disc protrusion that does not appear acute and does not appear to compress the S1 nerve roots .    [id number]   Result Narrative   Back pain, left hip pain, no specific injury .    MRI L-spine: The lumbar vertebra have a normal signal and appearance . The L1-2 disc is normal    At the L2-3 level there is mild degenerative change in the facets with a mild bulge of the annulus    And the L3-4 levels there is a greater degree of degenerative change in the facets with a diffusely bulging annulus that does not appear to compress nerve roots    At the L4-5 level there is significant degenerative change in the facets . There is a diffusely bulging annulus with a small left paramedian disc protrusion which in combination with the thickened ligamentum flavum is probably causing mild compression of   the left L5 nerve root    At the L5-S1 level there is a diffusely bulging annulus with 3 distinct disc protrusions including a left-sided disc protrusion that does not appear to compress the left S1 nerve root and a right paramedian and a further right third disc protrusion do   not appear to compress  the right S1 nerve root    No spinal stenosis or significant foraminal narrowing .    The conus has a normal position and appearance .     No retroperitoneal abnormality .   Other Result Information   Andrez, Rad Results In - 08/01/2019  4:06 PM CDT  Back pain, left hip pain, no specific injury .    MRI L-spine: The lumbar vertebra have a normal signal and appearance . The L1-2 disc is normal    At the L2-3 level there is mild degenerative change in the facets with a mild bulge of the annulus    And the L3-4 levels there is a greater degree of degenerative change in the facets with a diffusely bulging annulus that does not appear to compress nerve roots    At the L4-5 level there is significant degenerative change in the facets . There is a diffusely bulging annulus with a small left paramedian disc protrusion which in combination with the thickened ligamentum flavum is probably causing mild compression of   the left L5 nerve root    At the L5-S1 level there is a diffusely bulging annulus with 3 distinct disc protrusions including a left-sided disc protrusion that does not appear to compress the left S1 nerve root and a right paramedian and a further right third disc protrusion do   not appear to compress the right S1 nerve root    No spinal stenosis or significant foraminal narrowing .    The conus has a normal position and appearance .     No retroperitoneal abnormality .    IMPRESSION:     1. At the L4-5 level a combination of hypertrophy of the posterior ligaments, a diffusely bulging annulus, and a small left paramedian disc protrusion are probably causing some degree of compression of the left L5 nerve root where it enters the left   lateral recess of L5 .  2. At the L5-S1 level there is a dramatic complex disc protrusion that does not appear acute and does not appear to compress the S1 nerve roots .    [id number]   Status Results Details     Unavailable   MRI pelvis without contrast8/1/2019  [facility]  Result Impression       1.  Normal left hip and pelvis.    2.  The mass in the upper anterior medial right thigh noted on March 2014 is no longer seen.    [id number]   Result Narrative   Indication:  Back pain and left hip pain for one month.    MRI, Pelvis without Gadolinium:  The left hip has a normal marrow signal with smooth articular surfaces and a good covering of cartilage.  The muscle attachments on the hip are unremarkable and there is no sign of muscle injury or fluid collection around   the hip joint.    The bony pelvis has a normal marrow signal.  The SI joints are normal.  There is a prosthetic right hip with associated field distortion, but no signal change is seen in the adjacent muscles and there are no abnormal fluid collections.    On the March 2014 study, there was a mass in the upper anterior medial right thigh; the upper portion of this mass, were it still present, should be apparent on the current images and is not seen.   Other Result Information   Andrez, Rad Results In - 08/01/2019  1:40 PM CDT  Indication:  Back pain and left hip pain for one month.    MRI, Pelvis without Gadolinium:  The left hip has a normal marrow signal with smooth articular surfaces and a good covering of cartilage.  The muscle attachments on the hip are unremarkable and there is no sign of muscle injury or fluid collection around   the hip joint.    The bony pelvis has a normal marrow signal.  The SI joints are normal.  There is a prosthetic right hip with associated field distortion, but no signal change is seen in the adjacent muscles and there are no abnormal fluid collections.    On the March 2014 study, there was a mass in the upper anterior medial right thigh; the upper portion of this mass, were it still present, should be apparent on the current images and is not seen.    IMPRESSION:       1.  Normal left hip and pelvis.    2.  The mass in the upper anterior medial right thigh noted on March 2014 is no  longer seen.    [id number]           ASSESSMENT:    Diagnoses and all orders for this visit:    Foraminal stenosis of lumbar region  -     Ambulatory Referral to Neurosurgery  -     Ambulatory Referral to Physical Therapy Evaluate and treat (traction ); Stretching, ROM    DDD (degenerative disc disease), lumbar  -     Ambulatory Referral to Neurosurgery  -     Ambulatory Referral to Physical Therapy Evaluate and treat (traction ); Stretching, ROM    Lumbar radiculopathy, acute  -     Ambulatory Referral to Neurosurgery  -     Ambulatory Referral to Physical Therapy Evaluate and treat (traction ); Stretching, ROM          PLAN  Plan will be to initiate physical therapy with traction referral until she can follow-up with neurosurgery as soon as possible for an evaluation of foraminal stenosis and L4 5 nerve impingement on the left side.  In the meantime the patient was instructed to contact us for worsening symptoms and continue her current medication regimen for pain.  No Follow-up on file.    Josue Carmona, APRN

## 2019-08-15 ENCOUNTER — APPOINTMENT (OUTPATIENT)
Dept: PHYSICAL THERAPY | Facility: HOSPITAL | Age: 59
End: 2019-08-15

## 2019-08-29 ENCOUNTER — INFUSION (OUTPATIENT)
Dept: ONCOLOGY | Facility: HOSPITAL | Age: 59
End: 2019-08-29

## 2019-08-29 DIAGNOSIS — Z45.2 ENCOUNTER FOR VENOUS ACCESS DEVICE CARE: Primary | ICD-10-CM

## 2019-08-29 PROCEDURE — 96523 IRRIG DRUG DELIVERY DEVICE: CPT | Performed by: NURSE PRACTITIONER

## 2019-08-29 RX ORDER — SODIUM CHLORIDE 0.9 % (FLUSH) 0.9 %
10 SYRINGE (ML) INJECTION AS NEEDED
Status: DISCONTINUED | OUTPATIENT
Start: 2019-08-29 | End: 2019-08-29 | Stop reason: HOSPADM

## 2019-08-29 RX ORDER — SODIUM CHLORIDE 0.9 % (FLUSH) 0.9 %
10 SYRINGE (ML) INJECTION AS NEEDED
Status: CANCELLED | OUTPATIENT
Start: 2019-09-25

## 2019-08-29 RX ADMIN — Medication 500 UNITS: at 14:51

## 2019-08-29 RX ADMIN — SODIUM CHLORIDE, PRESERVATIVE FREE 10 ML: 5 INJECTION INTRAVENOUS at 14:51

## 2019-09-03 ENCOUNTER — CONVERSION ENCOUNTER (OUTPATIENT)
Dept: NEUROLOGY | Facility: CLINIC | Age: 59
End: 2019-09-03

## 2019-09-03 ENCOUNTER — OFFICE VISIT CONVERTED (OUTPATIENT)
Dept: NEUROSURGERY | Facility: CLINIC | Age: 59
End: 2019-09-03
Attending: PHYSICIAN ASSISTANT

## 2019-09-13 ENCOUNTER — APPOINTMENT (OUTPATIENT)
Dept: ONCOLOGY | Facility: CLINIC | Age: 59
End: 2019-09-13

## 2019-09-13 ENCOUNTER — APPOINTMENT (OUTPATIENT)
Dept: ONCOLOGY | Facility: HOSPITAL | Age: 59
End: 2019-09-13

## 2019-09-25 ENCOUNTER — OFFICE VISIT (OUTPATIENT)
Dept: ORTHOPEDIC SURGERY | Facility: CLINIC | Age: 59
End: 2019-09-25

## 2019-09-25 ENCOUNTER — OFFICE VISIT (OUTPATIENT)
Dept: ONCOLOGY | Facility: CLINIC | Age: 59
End: 2019-09-25

## 2019-09-25 ENCOUNTER — INFUSION (OUTPATIENT)
Dept: ONCOLOGY | Facility: HOSPITAL | Age: 59
End: 2019-09-25

## 2019-09-25 VITALS
BODY MASS INDEX: 35.68 KG/M2 | HEIGHT: 64 IN | WEIGHT: 209 LBS | SYSTOLIC BLOOD PRESSURE: 139 MMHG | OXYGEN SATURATION: 97 % | HEART RATE: 88 BPM | TEMPERATURE: 98.4 F | DIASTOLIC BLOOD PRESSURE: 69 MMHG | RESPIRATION RATE: 20 BRPM

## 2019-09-25 VITALS — WEIGHT: 208.8 LBS | BODY MASS INDEX: 35.65 KG/M2 | HEIGHT: 64 IN

## 2019-09-25 DIAGNOSIS — R93.3 ABNORMAL CT SCAN, COLON: ICD-10-CM

## 2019-09-25 DIAGNOSIS — M70.62 TROCHANTERIC BURSITIS, LEFT HIP: Primary | ICD-10-CM

## 2019-09-25 DIAGNOSIS — M54.16 LUMBAR RADICULOPATHY: ICD-10-CM

## 2019-09-25 DIAGNOSIS — R79.89 ELEVATED LFTS: ICD-10-CM

## 2019-09-25 DIAGNOSIS — C82.03 FOLLICULAR LYMPHOMA GRADE I OF INTRA-ABDOMINAL LYMPH NODES (HCC): Primary | ICD-10-CM

## 2019-09-25 DIAGNOSIS — M51.36 DEGENERATIVE DISC DISEASE, LUMBAR: ICD-10-CM

## 2019-09-25 DIAGNOSIS — M25.552 LEFT HIP PAIN: ICD-10-CM

## 2019-09-25 DIAGNOSIS — Z45.2 ENCOUNTER FOR VENOUS ACCESS DEVICE CARE: ICD-10-CM

## 2019-09-25 PROBLEM — M79.672 LEFT FOOT PAIN: Status: ACTIVE | Noted: 2019-09-25

## 2019-09-25 LAB
ALBUMIN SERPL-MCNC: 4.4 G/DL (ref 3.5–5.2)
ALBUMIN/GLOB SERPL: 1.5 G/DL
ALP SERPL-CCNC: 84 U/L (ref 39–117)
ALT SERPL W P-5'-P-CCNC: 46 U/L (ref 1–33)
ANION GAP SERPL CALCULATED.3IONS-SCNC: 16 MMOL/L (ref 5–15)
AST SERPL-CCNC: 28 U/L (ref 1–32)
BASOPHILS # BLD AUTO: 0.05 10*3/MM3 (ref 0–0.2)
BASOPHILS NFR BLD AUTO: 0.8 % (ref 0–1.5)
BILIRUB SERPL-MCNC: 0.2 MG/DL (ref 0.2–1.2)
BUN BLD-MCNC: 10 MG/DL (ref 6–20)
BUN/CREAT SERPL: 15.4 (ref 7–25)
CALCIUM SPEC-SCNC: 9.5 MG/DL (ref 8.6–10.5)
CHLORIDE SERPL-SCNC: 93 MMOL/L (ref 98–107)
CO2 SERPL-SCNC: 25 MMOL/L (ref 22–29)
CREAT BLD-MCNC: 0.65 MG/DL (ref 0.57–1)
DEPRECATED RDW RBC AUTO: 43.3 FL (ref 37–54)
EOSINOPHIL # BLD AUTO: 0 10*3/MM3 (ref 0–0.4)
EOSINOPHIL NFR BLD AUTO: 0 % (ref 0.3–6.2)
ERYTHROCYTE [DISTWIDTH] IN BLOOD BY AUTOMATED COUNT: 13.3 % (ref 12.3–15.4)
GFR SERPL CREATININE-BSD FRML MDRD: 93 ML/MIN/1.73
GLOBULIN UR ELPH-MCNC: 3 GM/DL
GLUCOSE BLD-MCNC: 111 MG/DL (ref 65–99)
HCT VFR BLD AUTO: 38.5 % (ref 34–46.6)
HGB BLD-MCNC: 13.2 G/DL (ref 12–15.9)
IMM GRANULOCYTES # BLD AUTO: 0.03 10*3/MM3 (ref 0–0.05)
IMM GRANULOCYTES NFR BLD AUTO: 0.5 % (ref 0–0.5)
LYMPHOCYTES # BLD AUTO: 1.27 10*3/MM3 (ref 0.7–3.1)
LYMPHOCYTES NFR BLD AUTO: 21.2 % (ref 19.6–45.3)
MCH RBC QN AUTO: 30.4 PG (ref 26.6–33)
MCHC RBC AUTO-ENTMCNC: 34.3 G/DL (ref 31.5–35.7)
MCV RBC AUTO: 88.7 FL (ref 79–97)
MONOCYTES # BLD AUTO: 0.66 10*3/MM3 (ref 0.1–0.9)
MONOCYTES NFR BLD AUTO: 11 % (ref 5–12)
NEUTROPHILS # BLD AUTO: 3.98 10*3/MM3 (ref 1.7–7)
NEUTROPHILS NFR BLD AUTO: 66.5 % (ref 42.7–76)
NRBC BLD AUTO-RTO: 0 /100 WBC (ref 0–0.2)
PLATELET # BLD AUTO: 356 10*3/MM3 (ref 140–450)
PMV BLD AUTO: 8.3 FL (ref 6–12)
POTASSIUM BLD-SCNC: 4.6 MMOL/L (ref 3.5–5.2)
PROT SERPL-MCNC: 7.4 G/DL (ref 6–8.5)
RBC # BLD AUTO: 4.34 10*6/MM3 (ref 3.77–5.28)
SODIUM BLD-SCNC: 134 MMOL/L (ref 136–145)
WBC NRBC COR # BLD: 5.99 10*3/MM3 (ref 3.4–10.8)

## 2019-09-25 PROCEDURE — 36591 DRAW BLOOD OFF VENOUS DEVICE: CPT | Performed by: INTERNAL MEDICINE

## 2019-09-25 PROCEDURE — 80053 COMPREHEN METABOLIC PANEL: CPT | Performed by: INTERNAL MEDICINE

## 2019-09-25 PROCEDURE — G8730 PAIN DOC POS AND PLAN: HCPCS | Performed by: INTERNAL MEDICINE

## 2019-09-25 PROCEDURE — 1123F ACP DISCUSS/DSCN MKR DOCD: CPT | Performed by: INTERNAL MEDICINE

## 2019-09-25 PROCEDURE — 99214 OFFICE O/P EST MOD 30 MIN: CPT | Performed by: NURSE PRACTITIONER

## 2019-09-25 PROCEDURE — 99213 OFFICE O/P EST LOW 20 MIN: CPT | Performed by: INTERNAL MEDICINE

## 2019-09-25 PROCEDURE — 20610 DRAIN/INJ JOINT/BURSA W/O US: CPT | Performed by: NURSE PRACTITIONER

## 2019-09-25 PROCEDURE — 85025 COMPLETE CBC W/AUTO DIFF WBC: CPT | Performed by: INTERNAL MEDICINE

## 2019-09-25 PROCEDURE — G9903 PT SCRN TBCO ID AS NON USER: HCPCS | Performed by: INTERNAL MEDICINE

## 2019-09-25 RX ORDER — TRIAMCINOLONE ACETONIDE 40 MG/ML
80 INJECTION, SUSPENSION INTRA-ARTICULAR; INTRAMUSCULAR
Status: COMPLETED | OUTPATIENT
Start: 2019-09-25 | End: 2019-09-25

## 2019-09-25 RX ORDER — SODIUM CHLORIDE 0.9 % (FLUSH) 0.9 %
10 SYRINGE (ML) INJECTION AS NEEDED
Status: CANCELLED | OUTPATIENT
Start: 2019-11-06

## 2019-09-25 RX ORDER — SODIUM CHLORIDE 0.9 % (FLUSH) 0.9 %
10 SYRINGE (ML) INJECTION AS NEEDED
Status: DISCONTINUED | OUTPATIENT
Start: 2019-09-25 | End: 2019-09-25 | Stop reason: HOSPADM

## 2019-09-25 RX ORDER — LIDOCAINE HYDROCHLORIDE 10 MG/ML
2 INJECTION, SOLUTION EPIDURAL; INFILTRATION; INTRACAUDAL; PERINEURAL
Status: COMPLETED | OUTPATIENT
Start: 2019-09-25 | End: 2019-09-25

## 2019-09-25 RX ADMIN — TRIAMCINOLONE ACETONIDE 80 MG: 40 INJECTION, SUSPENSION INTRA-ARTICULAR; INTRAMUSCULAR at 11:09

## 2019-09-25 RX ADMIN — SODIUM CHLORIDE, PRESERVATIVE FREE 10 ML: 5 INJECTION INTRAVENOUS at 13:30

## 2019-09-25 RX ADMIN — SODIUM CHLORIDE, PRESERVATIVE FREE 10 ML: 5 INJECTION INTRAVENOUS at 12:56

## 2019-09-25 RX ADMIN — LIDOCAINE HYDROCHLORIDE 2 ML: 10 INJECTION, SOLUTION EPIDURAL; INFILTRATION; INTRACAUDAL; PERINEURAL at 11:09

## 2019-09-25 RX ADMIN — Medication 500 UNITS: at 13:30

## 2019-09-25 NOTE — PATIENT INSTRUCTIONS
Patient Instructions for CT Scan    · Your CT scan is being done without any oral contrast.  Your CT scan may be done with IV contrast, if you have an allergy to iodine--please tell your nurse.    · Do not eat or drink 4 hours prior to scan.     · You may take your medications with sips of water, except DO NOT take your diabetic pill the morning of the test.    Arrive at the Outpatient Surgery entrance at [facility] 20 minutes prior to appointment time.    You will receive a phone call with an appointment for your CT scan.  Please call our office, if someone does not contact you with 3 days.    Shannon Majano RN  September 25, 2019  1:38 PM

## 2019-09-25 NOTE — PROGRESS NOTES
DATE OF VISIT: 9/25/2019      REASON FOR VISIT: Recurrent follicular lymphoma on surveillance ,Elevated LFT, abnormal CT scan of colon      HISTORY OF PRESENT ILLNESS:    59-year-old female with a past medical history significant for recurrent follicular lymphoma status post radiation, last round of radiation was around September 2016 for follicular lymphoma.  Patient was again found to have aortocaval adenopathy on  CT of chest abdomen and pelvis in March 2017.  Patient was started on rituximab April 10, 2017.  Patient has so far received 7 doses of rituximab 2 months apart last of which was on March 26,2018.  In view of enlargement of aortocaval lymph node, patient was started on  chemotherapy with bendamustine and rituximab  on April 30, 2018.  Patient received last cycle which was cycle 4 of bendamustine and rituximab on August 21, 2018.  After that patient was placed on surveillance.  Had a surveillance CT of chest, abdomen and pelvis with contrast done on June 4, 2019,which showd colonic narrowing. Patient had colonoscopy done on 07/18/19 which did not show any malignancy..  Patient is here for 3 month follow-up appointment today.  Complains of night sweats.  Denies any blood in stool.  Denies any change in bowel habits.  Denies any new lymph node enlargement.  Denies any new fever .        PAST MEDICAL HISTORY:    Past Medical History:   Diagnosis Date   • Acute bronchitis    • Agoraphobia with panic disorder     on SNRI, prn buspar, prn klonopin     • Anxiety    • Arthritis    • Atrophic vaginitis    • Chest pain     work up as new onset angina    • Depression      MDD      • Essential hypertension    • Follicular non-Hodgkin's lymphoma (CMS/HCC)    • Generalized anxiety disorder     SSRI - buspar, cont cymbalta, prn klonopin      • Hip pain     arthritis, artifical right hip     • History of bone density study 02/09/2009    DEXA (bone density) test, peripheral (Normal   • History of echocardiogram  2012    Echocardiogram 04681 (Normal echocardigram. EF 55-60%)   • History of mammogram    • Hyperlipidemia    • Mass of lower limb    • Nuclear senile cataract    • Obesity    • Otalgia     ENT REFER   • Panic disorder     with agoraphobia. On buspar and klonopin now      • Type 2 diabetes mellitus (CMS/HCC)     NO BDR   • Upper respiratory infection        SOCIAL HISTORY:    Social History     Tobacco Use   • Smoking status: Former Smoker     Years: 12.00     Last attempt to quit: 1998     Years since quittin.7   • Smokeless tobacco: Never Used   • Tobacco comment: Smoked 1 pack per 2 weeks   Substance Use Topics   • Alcohol use: No   • Drug use: No       Surgical History :  Past Surgical History:   Procedure Laterality Date   • CENTRAL VENOUS LINE INSERTION  2016    Central line insertion (Successful placement of right upper extremity midline.)   • COLONOSCOPY     • COLONOSCOPY N/A 2019    Procedure: COLONOSCOPY;  Surgeon: Brandon Salvador DO;  Location: [facility] ENDOSCOPY;  Service: Gastroenterology   • CYSTOSCOPY  1976    Cystoscopy (external urethroplasty and cysto-panendoscopy,urethal hymenal fusion with hypospadias)   • DILATATION AND CURETTAGE  1980    D&C (removal of IUD)   • EXCISION LESION  04/15/2014    Remove thigh lesion (Excision of a mass of the right thigh using a 16.2 cm incision with intermediate layered closure.)   • HYSTEROSCOPY  2003    Hysteroscope procedure (diagnostic hysteroscopy with fractional D&C)   • INJECTION OF MEDICATION  2011    kenalog (1)   • OTHER SURGICAL HISTORY      Explore parathyroid glands   • OTHER SURGICAL HISTORY  1985    Laparosc diagnostic (desires elective tubal reversal)   • PAP SMEAR  2005    PAP SMEAR (normal)   • TN INSJ TUNNELED CVC W/O SUBQ PORT/ AGE 5 YR/> Right 2017    Procedure: INSERTION VENOUS ACCESS DEVICE (MEDIPORT) right chest;  Surgeon: Fortino Medina MD;  Location: [facility] OR;  Service:  General   • NM INSJ TUNNELED CVC W/O SUBQ PORT/ AGE 5 YR/> N/A 6/6/2017    Procedure: INSERTION VENOUS ACCESS DEVICE   (MEDIPORT)   (C-ARM#1);  Surgeon: Fortino Medina MD;  Location: [facility];  Service: General   • SHOULDER ARTHROSCOPY  11/11/2013    Shoulder arthroscopy/surgery (Right shoulder. Rotator cuff repair. Subacromial decompression. Edgar procedure.)   • SHOULDER SURGERY  12/28/2015    Shoulder surgery procedure (Arthroscopy of the left shoulder with rotator cuff repair.Edgar procedure.Subacromial decompression.)   • TONSILLECTOMY AND ADENOIDECTOMY  05/29/1967    T&A (hypertrophied tonsils and adenoids with recurrent infection)   • TOTAL ABDOMINAL HYSTERECTOMY WITH SALPINGO OOPHORECTOMY  02/03/2004    ARIELLE/BSO (with a preop fractional dilation and curettage with frozen section,menorrhagia,dysmenorrhea,unresponsive to medical intervention)   • TOTAL HIP ARTHROPLASTY     • VAGINA SURGERY  03/02/1981    Anesth, surgery on vagina (colpotomy with bilateral partial salpingectomy, multiparity contraindicated)       ALLERGIES:    Allergies   Allergen Reactions   • Ace Inhibitors Cough   • Latex      BLISTER     • Morphine And Related Nausea And Vomiting   • Lortab [Hydrocodone-Acetaminophen] Palpitations       REVIEW OF SYSTEMS:      CONSTITUTIONAL: Has lost 4 pounds since last clinic visit.  Complains of night sweats.  No fever, chills.     HEENT:  No epistaxis, mouth sores, or difficulty swallowing.    RESPIRATORY:  No new shortness of breath .  No new cough or hemoptysis.    CARDIOVASCULAR:  No chest pain or palpitations.    GASTROINTESTINAL: Complains of intermittent nausea .  Denies any recent change in bowel habits. No vomiting, or blood in the stool.    GENITOURINARY:  No dysuria or hematuria.    MUSCULOSKELETAL:   No any new back pain or arthralgias.     NEUROLOGICAL:  No tingling or numbness. No new headache or dizziness.     LYMPHATICS:  Denies any abnormal swollen and anywhere in the body.    SKIN:  " Denies any new skin rash.          PHYSICAL EXAMINATION:      VITAL SIGNS:  /69   Pulse 88   Temp 98.4 °F (36.9 °C) (Temporal)   Resp 20   Ht 162.6 cm (64.02\")   Wt 94.8 kg (209 lb)   SpO2 97%   BMI 35.86 kg/m²      ECOG performance status: 1    GENERAL:  Not in any distress.Obese female.    HEENT:  Normocephalic, Atraumatic.Mild Conjunctival pallor. No icterus. Extraocular Movements Intact. No Fascial Asymmetry noted.    NECK:  No adenopathy. No JVD.  Trachea in midline.    RESPIRATORY:  Fair air entry bilateral. No rhonchi or wheezing.    CARDIOVASCULAR:  S1, S2. Regular rate and rhythm. No murmur or gallop appreciated.  Port-A-Cath present on the right chest wall.    ABDOMEN:  Soft, obese, nontender. Bowel sounds present in all four quadrants.  No hepatosplenomegaly  appreciated due to body habitus.    MUSCULOSKELETAL:  No edema.No Calf Tenderness.  Decreased range of motion    NEUROLOGIC:  Alert, awake and oriented ×3.  No  Motor  deficit appreciated. Cranial Nerves 2-12 grossly intact.    SKIN: No new skin lesions.  Skin is warm and moist.    LYMPHATICS: No new enlarged lymph node in neck or supraclavicular area.            DIAGNOSTIC DATA:    Glucose   Date Value Ref Range Status   09/25/2019 111 (H) 65 - 99 mg/dL Final     Sodium   Date Value Ref Range Status   09/25/2019 134 (L) 136 - 145 mmol/L Final     Potassium   Date Value Ref Range Status   09/25/2019 4.6 3.5 - 5.2 mmol/L Final     CO2   Date Value Ref Range Status   09/25/2019 25.0 22.0 - 29.0 mmol/L Final     Chloride   Date Value Ref Range Status   09/25/2019 93 (L) 98 - 107 mmol/L Final     Anion Gap   Date Value Ref Range Status   09/25/2019 16.0 (H) 5.0 - 15.0 mmol/L Final     Creatinine   Date Value Ref Range Status   09/25/2019 0.65 0.57 - 1.00 mg/dL Final     BUN   Date Value Ref Range Status   09/25/2019 10 6 - 20 mg/dL Final     BUN/Creatinine Ratio   Date Value Ref Range Status   09/25/2019 15.4 7.0 - 25.0 Final     Calcium "   Date Value Ref Range Status   09/25/2019 9.5 8.6 - 10.5 mg/dL Final     eGFR Non  Amer   Date Value Ref Range Status   09/25/2019 93 >60 mL/min/1.73 Final     Alkaline Phosphatase   Date Value Ref Range Status   09/25/2019 84 39 - 117 U/L Final     Total Protein   Date Value Ref Range Status   09/25/2019 7.4 6.0 - 8.5 g/dL Final     ALT (SGPT)   Date Value Ref Range Status   09/25/2019 46 (H) 1 - 33 U/L Final     AST (SGOT)   Date Value Ref Range Status   09/25/2019 28 1 - 32 U/L Final     Total Bilirubin   Date Value Ref Range Status   09/25/2019 0.2 0.2 - 1.2 mg/dL Final     Albumin   Date Value Ref Range Status   09/25/2019 4.40 3.50 - 5.20 g/dL Final     Globulin   Date Value Ref Range Status   09/25/2019 3.0 gm/dL Final     Lab Results   Component Value Date    WBC 5.99 09/25/2019    HGB 13.2 09/25/2019    HCT 38.5 09/25/2019    MCV 88.7 09/25/2019     09/25/2019     Lab Results   Component Value Date    NEUTROABS 3.98 09/25/2019         PATHOLOGY:  Pathology from right thigh biopsy done in April 2014 showed:  FINAL DIAGNOSIS:   A.  SUBCUTANEOUS MASS, RIGHT THIGH:             FOLLICULAR LYMPHOMA, GRADE 1 OF 3.   B.  SUBCUTANEOUS MASS, RIGHT THIGH:             FOLLICULAR LYMPHOMA, GRADE 1 OF 3.          Comment   COMMENT:   Immunostains have follicular lymphoma positive for CD20, negative for   CD3, negative for CD5, positive for CD10, positive for CD23, negative   for CD43, negative for cyclin D1, positive for BCL-2 and positive for   BCL-6.  The case is submitted to the [facility] for evaluation and   their report is attached.            RADIOLOGY DATA :  CT of chest, abdomen and pelvis with contrast done on June 4, 2019 was reviewed, discussed with patient, it showed:  - - - CT CHEST - - -     Left-sided Mediport terminates in the superior vena cava     PULMONARY PARENCHYMA:      - air spaces: Negative    - interstitium: Grossly within normal limits for age    - misc: No pulmonary nodules or  mass     MEDIASTINUM / ROXANA:      - heart: Normal size, no pericardial fluid. Mild  atherosclerotic coronary artery calcification.    - aorta/great vessels: Normal caliber and configuration for age    - misc: No mediastinal mass / significant adenopathy     PLEURAL COMPARTMENT:      - misc: No pleural fluid or mass        MISC:      - inferior neck: Negative    - osseous / body wall: Negative     - misc:         - - - CT ABDOMEN - - -      ABDOMEN:   - LIVER:  Normal size / contour, no ductal dilatation, no focal  lesion. The liver appears diffusely fatty infiltrated   - GB: There may be a tiny gallstone in the gallbladder neck.   - CBD:  Grossly negative   - SPLEEN:  Normal size and contour   - PANCREAS:  Normal in size, contour, no focal mass    - VISCERA:  Normal caliber, no wall thickening   - MESENTERY:  No mesenteric mass   - CAVITY:  No free abdominal fluid, no free intraperitoneal air   - BODY WALL:  Small umbilical hernia contains only fat   - OSSEOUS:  Grossly negative for age      RETROPERITONEUM:   - KIDNEYS:Normal size / contour, no collecting system dilation                    No evidence of an enhancing mass   - URETERS:  Normal course, caliber   - ADRENALS:  Normal size, contour   - MISC:  No sig. retroperitoneal adenopathy or mass   - VASCULAR:  Aorta/iliacs: within normal limits for age     - - - CT PELVIS - - -   There is some limitation of evaluation due to beam hardening  artifact from patient's right prosthetic hip   - VISCERA:  Normal caliber small/large bowel, no focal   thickening/mass. There is focal narrowing at the rectosigmoid  junction, which may reflect focal peristalsis, but it can be seen  on the previous study, intraluminal narrowing is suspected.  Sigmoidoscopy may be helpful in further evaluation.    - APPENDIX:  Not visualized, and may be absent   - MESENTERY:  No mass   - VASCULAR:  Within normal limits for age   - CAVITY:  No free fluid/air   - BLADDER:  Unremarkable   -  OSSEOUS: No acute abnormality. Right total hip prosthesis  again noted.   - MISC:   - UTERUS/OVARY: Uterus is surgically absent. The ovaries are not  well seen, and may be absent as well.     IMPRESSION:  1. No new abnormalities identified  2. Hepatic steatosis  3. Possible tiny gallstone in the gallbladder  3. Surgical absence of the uterus  4. Luminal narrowing at the rectosigmoid junction, also present  on the previous study. Sigmoidoscopy may be helpful in further  evaluation of this finding.           CT of chest, abdomen and pelvis with contrast done on December 13, 2018 was reviewed, discussed with patient, it showed:  FINDINGS: Comparison study dated September 12, 2018. Axial  computer tomography sequential imaging was performed from the  thoracic inlet through the symphysis pubis after administration  of IV contrast. .Sagittal and coronal reformation was performed .     Left subclavian Port-A-Cath with tip within the region of SVC.  Mediastinal structures of the chest are normal. No  lymphadenopathy or pericardial effusion. Lungs are clear. Pleural  spaces are normal.     The liver is normal. Very small gallstone is seen within the  gallbladder lumen. Otherwise gallbladder is unremarkable. The  biliary system is normal. The pancreas is normal. The spleen is  normal. Bilateral adrenal glands are normal. Right kidney and  ureter are normal. Left kidney and ureter are normal. The bladder  is normal. The uterus is surgically absent. Evaluation in the  pelvis is limited secondary to streak artifact from right hip  prostheses. The hollow viscera is normal. No lymphadenopathy in  the abdomen or the pelvis. No acute osseous abnormality.     IMPRESSION:  1. Cholelithiasis.  2. No acute CT chest abdomen or pelvis abnormality.                                ASSESSMENT AND PLAN:      1.  Recurrent follicular lymphoma grade 1, initially patient was diagnosed with a right groin lymph node adenopathy in April 2014 for  which patient underwent palliative radiation therapy.   - Subsequently patient again had a relapse around September 2016 for workup with second course of palliative radiation therapy was given by Dr. Leonel Vargas in October 2016.    -In view of enlarged aortocaval lymph nodes, patient was started on rituximab treatment in April 2017.    CT scan done in July 2017 shows decreasing in the size of aortocaval lymph node which was discussed with patient.  -  Patient  had so far received 7 rituximab treatment every 2 months apart last of which was on March 26, 2018.  -  In view of generalized abdominal pain which was worsening, patient had CT scan done on April 6, 2018 which showed slight increase in the size of aortocaval adenopathy.  Treatment option consisting of changing treatment to bendamustine and rituximab versus palliative radiation treatment were discussed with patient and her family.  Patient wanted to try chemotherapy with bendamustine and rituximab first.    -Patient was started on chemotherapy with bendamustine and rituximab on April 30, 2018.  Patient tolerated first round of chemotherapy well.  We will go ahead with round 2 of chemotherapy today with bendamustine and rituximab.  -Restaging CT of chest, abdomen and pelvis with contrast done on June 21, 2018 shows decrease in size of adenopathy involving aortocaval area.  Result of CT scan were discussed with patient and her family.  -Cycle 3 on July 24, 2018 dose of bendamustine was reduced by 20%.  -Patient has received 4 cycles of bendamustine and rituximab from April 30, 2018 until August 21, 2018.  -CT of chest, abdomen and pelvis with contrast done on September 12, 2018 after cycle 4 of bendamustine and rituximab showed no evidence of lymphadenopathy involving chest, abdomen or pelvis.  -CT of chest, abdomen and pelvis with contrast done on December 13, 2018 does not show any evidence of recurrence or adenopathy.  Result of CT scan were discussed with  patient.  -CT of chest, abdomen and pelvis with contrast done on June 4, 2019 does not show any evidence of recurrence of lymphoma or adenopathy.  However there is a luminal narrowing at rectosigmoid junction.  Results of CT scan were discussed with patient.  - At this point will continue with clinical surveillance.  -We will ask patient to return to clinic in 3 months with repeat CBC, CMP ,ldh and surveillance CT chest,abdomen and pelvis with contrast to be done prior to that.       2.  Abnormal CT scan of colon:  -CT of chest, abdomen and pelvis with contrast done on June 4, 2019 shows luminal narrowing at rectosigmoid junction.  Results of CT scan were discussed with the patient.  -Patient was evaluated by Dr Salvador and had colonoscopy on 07/18/18 that showed benign appearing stenosis.    3.  Diabetes mellitus    4.  Elevated liver function test: Secondary to fatty liver.  AST and ALT are mildly elevated today.  Patient was again counseled about weight loss today.    5. Obesity : Patient's Body mass index is 35.86 kg/m². BMI is higher then reference range.  Patient was informed about elevated BMI and need to lose weight.  Patient was counseled about diet and exercise for weight loss.    6.  Health maintenance: Patient does not smoke.  Last colonoscopy was done around 2014 by Dr Salvador which was normal as per patient.      7.  Advance care planning: For now patient remains full code and able to make her decisions.  Patient is health care surrogate mentioned on chart.    8.  Pain assessment:  -Patient complains of hip pain. Patient is being evaluated by Orthopedic team for pain            Tino Sood MD  9/25/2019  6:16 PM        EMR Dragon/Transcription disclaimer:   Much of this encounter note is an electronic transcription/translation of spoken language to printed text. The electronic translation of spoken language may permit erroneous, or at times, nonsensical words or phrases to be inadvertently transcribed;  Although I have reviewed the note for such errors, some may still exist.

## 2019-09-25 NOTE — PROGRESS NOTES
"Chasity Leon is a 59 y.o. female returns for     Chief Complaint   Patient presents with   • Left Hip - Follow-up   • Left Foot - Follow-up       HISTORY OF PRESENT ILLNESS:     59-year-old  female in the office today for follow-up evaluation of lumbar spine left hip and leg pain.  Patient reports overall that her pain in her back and leg continues without improvement but her left hip is the worse particularly the tenderness on the lateral side.  Symptoms.  She denies any fever chills or evidence of infection       CONCURRENT MEDICAL HISTORY:    The following portions of the patient's history were reviewed and updated as appropriate: allergies, current medications, past family history, past medical history, past social history, past surgical history and problem list.     ROS  No fevers or chills.  No chest pain or shortness of air.  No GI or  disturbances.    PHYSICAL EXAMINATION:       Ht 162.6 cm (64\")   Wt 94.7 kg (208 lb 12.8 oz)   BMI 35.84 kg/m²     Physical Exam   Constitutional: She is oriented to person, place, and time. Vital signs are normal. She appears well-developed and well-nourished. She is cooperative.   HENT:   Head: Normocephalic and atraumatic.   Neck: Trachea normal and phonation normal.   Pulmonary/Chest: Effort normal. No respiratory distress.   Abdominal: Soft. Normal appearance. She exhibits no distension.   Neurological: She is alert and oriented to person, place, and time. GCS eye subscore is 4. GCS verbal subscore is 5. GCS motor subscore is 6.   Skin: Skin is warm, dry and intact. Capillary refill takes less than 2 seconds.   Psychiatric: She has a normal mood and affect. Her speech is normal and behavior is normal. Judgment and thought content normal. Cognition and memory are normal.   Vitals reviewed.      GAIT:     []  Normal  []  Antalgic    Assistive device: []  None  []  Walker     []  Crutches  []  Cane     []  Wheelchair  []  Stretcher    Right Hip Exam   Right " hip exam is normal.       Left Hip Exam     Tenderness   The patient is experiencing tenderness in the greater trochanter.    Range of Motion   Abduction: abnormal   Flexion: abnormal   External rotation: abnormal   Internal rotation: abnormal     Muscle Strength   Abduction: 4/5   Adduction: 4/5   Flexion: 4/5     Other   Erythema: absent  Scars: absent  Sensation: normal  Pulse: present      Back Exam     Tenderness   The patient is experiencing tenderness in the lumbar and sacroiliac.    Range of Motion   Extension: abnormal   Flexion: abnormal   Lateral bend right: abnormal   Lateral bend left: abnormal   Rotation right: abnormal   Rotation left: abnormal     Muscle Strength   Right Quadriceps:  4/5   Left Quadriceps:  4/5   Right Hamstrings:  4/5   Left Hamstrings:  4/5     Tests   Straight leg raise right: negative  Straight leg raise left: positive    Reflexes   Patellar: abnormal  Achilles: abnormal    Other   Toe walk: abnormal  Heel walk: abnormal  Sensation: decreased  Gait: antalgic   Erythema: no back redness  Scars: absent              No results found.          ASSESSMENT:    Diagnoses and all orders for this visit:    Trochanteric bursitis, left hip  -     Ambulatory Referral to Physical Therapy Evaluate and treat (traction)    Left hip pain  -     Large Joint Arthrocentesis: L hip joint  -     Ambulatory Referral to Physical Therapy Evaluate and treat (traction)    Degenerative disc disease, lumbar  -     Ambulatory Referral to Physical Therapy Evaluate and treat (traction)    Lumbar radiculopathy  -     Ambulatory Referral to Physical Therapy Evaluate and treat (traction)        Large Joint Arthrocentesis: L hip joint  Date/Time: 9/25/2019 11:09 AM  Consent given by: patient  Site marked: site marked  Timeout: Immediately prior to procedure a time out was called to verify the correct patient, procedure, equipment, support staff and site/side marked as required   Supporting  Documentation  Indications: pain   Procedure Details  Location: hip - L hip joint  Preparation: Patient was prepped and draped in the usual sterile fashion  Needle size: 22 G  Approach: lateral  Medications administered: 2 mL lidocaine PF 1% 1 %; 80 mg triamcinolone acetonide 40 MG/ML  Patient tolerance: patient tolerated the procedure well with no immediate complications          PLAN  I reviewed the MRI results with the patient we discussed options in detail.  This time she does not want to proceed with any neurosurgical evaluation.  She does complain of diffuse tenderness in the lateral hip which is consistent with trochanteric bursitis therefore we proceeded with a trochanteric bursa injection and I recommended follow-up course of physical therapy over the next 4 to 8 weeks.  We cautioned the patient if her symptoms worsen and she develops progressive neurological deficits that I need to know about these changes immediately.  Patient verbalized understand the discharge instructions and follow-up in 4 to 6 weeks for recheck.  No Follow-up on file.    Josue Carmona, APRN

## 2019-10-07 ENCOUNTER — TREATMENT (OUTPATIENT)
Dept: PHYSICAL THERAPY | Facility: CLINIC | Age: 59
End: 2019-10-07

## 2019-10-07 DIAGNOSIS — M25.552 LEFT HIP PAIN: ICD-10-CM

## 2019-10-07 DIAGNOSIS — M54.50 LUMBAR SPINE PAIN: Primary | ICD-10-CM

## 2019-10-07 DIAGNOSIS — M54.16 LUMBAR RADICULOPATHY, ACUTE: ICD-10-CM

## 2019-10-07 PROCEDURE — 97161 PT EVAL LOW COMPLEX 20 MIN: CPT | Performed by: PHYSICAL THERAPIST

## 2019-10-07 PROCEDURE — 97110 THERAPEUTIC EXERCISES: CPT | Performed by: PHYSICAL THERAPIST

## 2019-10-07 PROCEDURE — 97140 MANUAL THERAPY 1/> REGIONS: CPT | Performed by: PHYSICAL THERAPIST

## 2019-10-07 NOTE — PROGRESS NOTES
Physical Therapy Initial Evaluation and Plan of Care        Patient: Chasity Leon   : 1960  Diagnosis/ICD-10 Code:  Lumbar spine pain [M54.5]  Referring practitioner: MANSI Pedersen  Date of Initial Visit: 10/7/2019  Today's Date: 10/7/2019  Patient seen for 1 sessions  Recert 10/28  MD  None  made            Subjective Questionnaire: Oswestry: 58%      Subjective Evaluation    History of Present Illness  Onset date: 2-3 months.  Mechanism of injury: none    Subjective comment:  ongoing pain 2 to 3 months now, has had recurring back pain episodes over the years.  States left leg is numb especially the foot.  She has had no further treatment since MD visit and has been out of town for the last week.  Patient Occupation: unemployed Quality of life: good    Pain  Current pain ratin  Location: back and down left leg  Quality: radiating, burning and needle-like  Relieving factors: change in position and rest  Aggravating factors: standing, ambulation and prolonged positioning  Progression: worsening    Social Support  Lives in: one-story house  Lives with: spouse    Diagnostic Tests  X-ray: abnormal  MRI studies: abnormal    Treatments  Previous treatment: injection treatment (left hip)  Patient Goals  Patient goals for therapy: decreased pain  Patient goal: decrease back pain and get rid of numbness in foot           Objective       Palpation     Additional Palpation Details  Left piriformis. Left IT band.     Tenderness     Left Hip   Tenderness in the PSIS.     Right Hip   Tenderness in the PSIS.     Active Range of Motion     Lumbar   Flexion: Active lumbar flexion: hands on knees 50 deg.   Extension: 10 (list to the right) degrees   Left lateral flexion: Active left lumbar lateral flexion: 50%   Right lateral flexion: Active right lumbar lateral flexion: 75%     Strength/Myotome Testing     Left Hip   Planes of Motion   Flexion: 4  Extension: 4  Abduction: 4    Right Hip   Planes of Motion    Flexion: 4+  Extension: 4+  Abduction: 4+    Left Knee   Flexion: 5  Extension: 5    Right Knee   Flexion: 5  Extension: 5    Left Ankle/Foot   Dorsiflexion: 4+  Plantar flexion: 5    Right Ankle/Foot   Dorsiflexion: 5  Plantar flexion: 5         Assessment & Plan     Assessment  Impairments: abnormal gait, abnormal or restricted ROM, activity intolerance, lacks appropriate home exercise program and pain with function  Assessment details: Patient has mechanical back pain and hip pain.  There is SI dysfunction and leg length discrepancy leading to soft tissue flexibility and inflammation.  Patient benefit from manual physical therapy techniques stretching and strengthening exercise program  Prognosis: good  Functional Limitations: sleeping, walking and standing  Goals  Plan Goals: Short-term:  1.  Patient be independent home exercise program.  2.  Patient have lumbar flexion to mid shin.  3.  Patient will report decreased left lower extremity pain.  4.  Patient will report decreased numbness in the left foot.  5.  Patient have decreased tenderness to palpation to the left piriformis.  6.  Patient will have manual muscle testing of hip abductors 4+/5  7.  Patient have manual muscle testing hip flexors 4+/5.    Long-term goals  #1 Patient have a modified Oswestry score of 48% or less   2.  Patient have lumbar extension without list  3.  Patient be able to report better than 70% improvement      Plan  Therapy options: will be seen for skilled physical therapy services  Planned modality interventions: electrical stimulation/Russian stimulation, cryotherapy and traction  Planned therapy interventions: abdominal trunk stabilization, manual therapy, strengthening, stretching and home exercise program  Duration in visits: 19  Treatment plan discussed with: patient  Plan details: Core stabilization, flexibility for lumbar and hip region.  Manual therapy techniques for alignment and mechanical positioning of SI in the lumbar  region.  Lumbar mechanical traction for lower extremity radiculopathy.  May use ice and stim as needed        Visit Diagnoses:    ICD-10-CM ICD-9-CM   1. Lumbar spine pain M54.5 724.2   2. Lumbar radiculopathy, acute M54.16 724.4   3. Left hip pain M25.552 719.45       Timed:  Manual Therapy:  10       mins  34761;  Therapeutic Exercise:      15   mins  58007;     Neuromuscular Tereza:        mins  24693;    Therapeutic Activity:          mins  36897;     Gait Training:           mins  00716;     Ultrasound:          mins  59539;    Electrical Stimulation:         mins  78416 ( );    Untimed:  Electrical Stimulation:         mins  48337 ( );  Mechanical Traction:         mins  68287;     Timed Treatment:   25   mins   Total Treatment:    25    mins    PT SIGNATURE: Yaquelin Edge PT DPT   DATE TREATMENT INITIATED: 10/7/2019    Initial Certification  Certification Period: 1/5/2020  I certify that the therapy services are furnished while this patient is under my care.  The services outlined above are required by this patient, and will be reviewed every 90 days.     PHYSICIAN: Josue Carmona, APRN      DATE:     Please sign and return via fax to [phone].. Thank you, [facility].

## 2019-10-15 ENCOUNTER — TREATMENT (OUTPATIENT)
Dept: PHYSICAL THERAPY | Facility: CLINIC | Age: 59
End: 2019-10-15

## 2019-10-15 DIAGNOSIS — M25.552 LEFT HIP PAIN: ICD-10-CM

## 2019-10-15 DIAGNOSIS — M54.50 LUMBAR SPINE PAIN: Primary | ICD-10-CM

## 2019-10-15 DIAGNOSIS — M54.16 LUMBAR RADICULOPATHY, ACUTE: ICD-10-CM

## 2019-10-15 PROCEDURE — G0283 ELEC STIM OTHER THAN WOUND: HCPCS | Performed by: PHYSICAL THERAPIST

## 2019-10-15 PROCEDURE — 97110 THERAPEUTIC EXERCISES: CPT | Performed by: PHYSICAL THERAPIST

## 2019-10-15 NOTE — PROGRESS NOTES
Physical Therapy Daily Progress Note    Patient: Chasity Leon   : 1960  Diagnosis/ICD-10 Code:     Diagnosis Plan   1. Lumbar spine pain     2. Lumbar radiculopathy, acute     3. Left hip pain       Referring practitioner: MANSI Pedersen  Date of Initial Visit: Type: THERAPY  Noted: 10/7/2019  Today's Date: 10/15/2019  Patient seen for 2 sessions  Recert 10-28      Subjective Evaluation    History of Present Illness    Subjective comment: Pt reports doing better since eval. Less radicular sx. She is doing HEP. She reports L piriformis S really gets the problem area. Pain  Current pain rating: 3  Location: L LB, glute         Post treatment pain 3/10 @ L LB.   Precautions/ Contraindications:        Objective V cues necessary for correct TE performance.  See Exercise, Manual, and Modality Logs for complete treatment.       Assessment/Plan: L LE radicular sx have decreased with help of PT. Pt is compliant w/ HEP. Good bebo of increased ther ex on today's visit.   Progress per Plan of Care            Timed:  Manual Therapy:         mins  71460;  Therapeutic Exercise:    34     mins  43067;     Neuromuscular Tereza:        mins  82301;    Therapeutic Activity:          mins  37864;     Gait Training:           mins  44773;     Ultrasound:          mins  73317;    Electrical Stimulation:         mins  42870 ( );    Untimed:  Electrical Stimulation:    15     mins  98648 ( );  Mechanical Traction:         mins  94824;     Timed Treatment:   34   mins   Total Treatment:     49   mins  Jose Ness PTA  Physical Therapist

## 2019-10-17 ENCOUNTER — TREATMENT (OUTPATIENT)
Dept: PHYSICAL THERAPY | Facility: CLINIC | Age: 59
End: 2019-10-17

## 2019-10-17 DIAGNOSIS — M25.552 LEFT HIP PAIN: Primary | ICD-10-CM

## 2019-10-17 DIAGNOSIS — M54.16 LUMBAR RADICULOPATHY, ACUTE: ICD-10-CM

## 2019-10-17 PROCEDURE — 97110 THERAPEUTIC EXERCISES: CPT | Performed by: PHYSICAL THERAPIST

## 2019-10-17 PROCEDURE — G0283 ELEC STIM OTHER THAN WOUND: HCPCS | Performed by: PHYSICAL THERAPIST

## 2019-10-21 ENCOUNTER — TELEPHONE (OUTPATIENT)
Dept: ORTHOPEDIC SURGERY | Facility: CLINIC | Age: 59
End: 2019-10-21

## 2019-10-21 DIAGNOSIS — M25.552 LEFT HIP PAIN: Primary | ICD-10-CM

## 2019-10-21 DIAGNOSIS — M54.16 LUMBAR RADICULOPATHY: ICD-10-CM

## 2019-10-21 DIAGNOSIS — M51.36 DEGENERATIVE DISC DISEASE, LUMBAR: ICD-10-CM

## 2019-10-21 DIAGNOSIS — M70.62 TROCHANTERIC BURSITIS, LEFT HIP: ICD-10-CM

## 2019-10-22 ENCOUNTER — TREATMENT (OUTPATIENT)
Dept: PHYSICAL THERAPY | Facility: CLINIC | Age: 59
End: 2019-10-22

## 2019-10-22 ENCOUNTER — TELEPHONE (OUTPATIENT)
Dept: NEUROSURGERY | Facility: CLINIC | Age: 59
End: 2019-10-22

## 2019-10-22 DIAGNOSIS — M54.50 LUMBAR SPINE PAIN: ICD-10-CM

## 2019-10-22 DIAGNOSIS — M25.552 LEFT HIP PAIN: Primary | ICD-10-CM

## 2019-10-22 DIAGNOSIS — M54.16 LUMBAR RADICULOPATHY, ACUTE: ICD-10-CM

## 2019-10-22 PROCEDURE — 97012 MECHANICAL TRACTION THERAPY: CPT | Performed by: PHYSICAL THERAPIST

## 2019-10-22 PROCEDURE — 97110 THERAPEUTIC EXERCISES: CPT | Performed by: PHYSICAL THERAPIST

## 2019-10-22 NOTE — PROGRESS NOTES
Physical Therapy Daily Progress Note      Patient: Chasity Leon   : 1960  Referring practitioner: MANSI Pedersen  Date of Initial Visit: Type: THERAPY  Noted: 10/7/2019  Today's Date: 10/22/2019  Patient seen for 4 sessions  Recert due:  10-28-19  MD followup:  TBD (referred to neurosurgery)       Chasity Leon reports: No improvement yet  Subjective Questionnaire:     Subjective  Reports that her L hip and LE pain is getting worse.  Called MD yesterday and was referred to neurosurgeon.  Pre RX pain 8/10.    Objective LL=; pelvis level.  TTP with trigger points @ L SI  See Exercise, Manual, and Modality Logs for complete treatment.       Assessment & Plan     Assessment  Assessment details: Pt presented today with increased pain at L SI area and low back.  Alignment checked with no noted abnormality.  Does have trigger points in L piriformis.  Tried MFR with no relief.  Initiated lumbar mechanical traction with only 65# max pull today.  Pt able to tolerate traction, but no immediate relief reported.  Pt deferred IFC/ice as it didn't seem to help last visit.     Goals  Plan Goals: Plan Goals: Plan Goals: Short-term:  1.  Patient be independent home exercise program.  2.  Patient have lumbar flexion to mid shin.  3.  Patient will report decreased left lower extremity pain.  4.  Patient will report decreased numbness in the left foot.  5.  Patient have decreased tenderness to palpation to the left piriformis.  6.  Patient will have manual muscle testing of hip abductors 4+/5  7.  Patient have manual muscle testing hip flexors 4+/5.    Long-term goals  #1 Patient have a modified Oswestry score of 48% or less   2.  Patient have lumbar extension without list  3.  Patient be able to report better than 70% improvement    Plan  Duration in visits: 19  Plan details: Assess benefit of traction and cont if indicated.          Visit Diagnoses:    ICD-10-CM ICD-9-CM   1. Left hip pain M25.552 719.45   2.  Lumbar radiculopathy, acute M54.16 724.4   3. Lumbar spine pain M54.5 724.2                  Timed:  Manual Therapy:         mins  86324;  Therapeutic Exercise:      30   mins  27996;     Neuromuscular Tereza:        mins  35170;    Therapeutic Activity:          mins  17960;     Gait Training:           mins  23266;     Ultrasound:          mins  36812;    Electrical Stimulation:         mins  62967 ( );    Untimed:  Electrical Stimulation:         mins  08621 ( );  Mechanical Traction:     10    mins  48349;     Timed Treatment:   30   mins   Total Treatment:    45    mins  Addie Arce PTA  Physical Therapist

## 2019-10-22 NOTE — TELEPHONE ENCOUNTER
TRIED TO CONTACT PT TO SCHEDULE PT FOR NEW PT REFERRAL FROM [facility];   HAD TO LEAVE V/M FOR CALL BACK

## 2019-10-24 ENCOUNTER — TELEPHONE (OUTPATIENT)
Dept: NEUROSURGERY | Facility: CLINIC | Age: 59
End: 2019-10-24

## 2019-10-24 ENCOUNTER — TREATMENT (OUTPATIENT)
Dept: PHYSICAL THERAPY | Facility: CLINIC | Age: 59
End: 2019-10-24

## 2019-10-24 DIAGNOSIS — M25.552 LEFT HIP PAIN: Primary | ICD-10-CM

## 2019-10-24 DIAGNOSIS — M54.16 LUMBAR RADICULOPATHY, ACUTE: ICD-10-CM

## 2019-10-24 DIAGNOSIS — M54.50 LUMBAR SPINE PAIN: ICD-10-CM

## 2019-10-24 PROCEDURE — 97012 MECHANICAL TRACTION THERAPY: CPT | Performed by: PHYSICAL THERAPIST

## 2019-10-24 PROCEDURE — 97110 THERAPEUTIC EXERCISES: CPT | Performed by: PHYSICAL THERAPIST

## 2019-10-24 PROCEDURE — 97140 MANUAL THERAPY 1/> REGIONS: CPT | Performed by: PHYSICAL THERAPIST

## 2019-10-24 NOTE — TELEPHONE ENCOUNTER
PT CALLED AND DECLINED HER REFERRAL TO [facility]. SHE HAS DECIDED TO GO TO [place] FOR TREATMENT.     PUT NOTE IN REFERRAL AND NOTIFIED REFERRING PROVIDER.

## 2019-10-24 NOTE — PROGRESS NOTES
Re-Assessment / Re-Certification        Patient: Chasity Leon   : 1960  Diagnosis/ICD-10 Code:  Left hip pain [M25.552]  Referring practitioner: MANSI Pedersen  Date of Initial Visit: Type: THERAPY  Noted: 10/7/2019  Today's Date: 10/24/2019  Patient seen for 5 sessions      Subjective:   Chasity Leon reports: Went home after PT Tuesday and iced all after noon and took ibuprofen.  Back is better today.  Subjective Questionnaire: Oswestry: 54%  -58 at [facility]  Clinical Progress: unchanged  Home Program Compliance: Yes  Treatment has included: therapeutic exercise, manual therapy, traction and cryotherapy    Subjective Left foot is numb, less leg pain than before.  Objective   Slow and guarded gait.  TTP  Left PSIS, and piriformis.  Tight hamstring left, negative SLR.    Assessment & Plan     Assessment  Impairments: activity intolerance and pain with function  Assessment details: Continued referred pain and numbness in left foot,  MD suggested neurosurgeon in [place] and she is referred for a consult but has not been contacted yet for an appt.  Traction  2 x and manual have been somewhat beneficial. Pt needs core stabilization and strengthening.  Prognosis: good  Functional Limitations: walking, standing and stooping  Plan  Therapy options: will be seen for skilled physical therapy services  Planned modality interventions: cryotherapy and traction  Planned therapy interventions: manual therapy, strengthening and abdominal trunk stabilization  Duration in visits: 10  Treatment plan discussed with: patient  Plan details: Traction, manual, core strengthening.  ice        Visit Diagnoses:    ICD-10-CM ICD-9-CM   1. Left hip pain M25.552 719.45   2. Lumbar radiculopathy, acute M54.16 724.4   3. Lumbar spine pain M54.5 724.2       Progress toward previous goals: Partially Met  Goals  Plan Goals: Plan Goals: Plan Goals:   Short-term:  1.  Patient be independent home exercise program. progressing  2.   Patient have lumbar flexion to mid shin. ongoing  3.  Patient will report decreased left lower extremity pain. met  4.  Patient will report decreased numbness in the left foot. Not met  5.  Patient have decreased tenderness to palpation to the left piriformis.- progressing  6.  Patient will have manual muscle testing of hip abductors 4+/5  7.  Patient have manual muscle testing hip flexors 4+/5.    Long-term goals  #1 Patient have a modified Oswestry score of 48% or less   2.  Patient have lumbar extension without list  3.  Patient be able to report better than 70% improvement        Recommendations: Continue as planned  Timeframe: 3 weeks  Prognosis to achieve goals: good    PT Signature: Yaquelin Edge, RUFINA DPT      Based upon review of the patient's progress and continued therapy plan, it is my medical opinion that Chasity Leon should continue physical therapy treatment at [facility]  [address] [facility] [address] [phone].    Signature: __________________________________  Josue Carmona, MANSI    Timed:  Manual Therapy:  15       mins  19215;  Therapeutic Exercise:   24    mins  95331;     Neuromuscular Tereza:        mins  14752;    Therapeutic Activity:          mins  28890;     Gait Training:           mins  41380;     Ultrasound:          mins  08437;    Electrical Stimulation:         mins  31233 ( );    Untimed:  Electrical Stimulation:         mins  16932 ( );  Mechanical Traction: 15        mins  46009;     Timed Treatment:  39mins   Total Treatment:    54  mins

## 2019-10-28 ENCOUNTER — TREATMENT (OUTPATIENT)
Dept: PHYSICAL THERAPY | Facility: CLINIC | Age: 59
End: 2019-10-28

## 2019-10-28 DIAGNOSIS — M54.16 LUMBAR RADICULOPATHY, ACUTE: ICD-10-CM

## 2019-10-28 DIAGNOSIS — M25.552 LEFT HIP PAIN: Primary | ICD-10-CM

## 2019-10-28 DIAGNOSIS — M54.50 LUMBAR SPINE PAIN: ICD-10-CM

## 2019-10-28 PROCEDURE — 97012 MECHANICAL TRACTION THERAPY: CPT | Performed by: PHYSICAL THERAPIST

## 2019-10-28 PROCEDURE — 97110 THERAPEUTIC EXERCISES: CPT | Performed by: PHYSICAL THERAPIST

## 2019-10-28 NOTE — PROGRESS NOTES
Physical Therapy Daily Progress Note      Patient: Chasity Leon   : 1960  Referring practitioner: MANSI Pedersen  Date of Initial Visit: Type: THERAPY  Noted: 10/7/2019  Today's Date: 10/28/2019  Patient seen for 6 sessions  Recert due:  19  MD followup:  TBD (Neurosurgeon consult)       Chasity Leon reports:  No consistent improvement yet.   Subjective Questionnaire:       Subjective  Reports feeling better than last visit.  Pain only in low back.  Numbness in L foot has resolved.  Pre RX pain 4/10.     Objective  LL=; pelvis level  See Exercise, Manual, and Modality Logs for complete treatment.       Assessment & Plan     Assessment  Assessment details: Pt's pain isolated to low back only today, but was uncomfortable with position changes.  Tried mechanical traction again since it may have helped resolve radicular symptoms in L LE.      Goals  Plan Goals: Plan Goals: Plan Goals: Short-term:  1.  Patient be independent home exercise program.  2.  Patient have lumbar flexion to mid shin.  3.  Patient will report decreased left lower extremity pain- MET  4.  Patient will report decreased numbness in the left foot- MET  5.  Patient have decreased tenderness to palpation to the left piriformis.  6.  Patient will have manual muscle testing of hip abductors 4+/5  7.  Patient have manual muscle testing hip flexors 4+/5.    Long-term goals  #1 Patient have a modified Oswestry score of 48% or less   2.  Patient have lumbar extension without list  3.  Patient be able to report better than 70% improvement    Plan  Duration in visits: 19  Plan details: Assess benefit of traction and cont as indicated.         Visit Diagnoses:    ICD-10-CM ICD-9-CM   1. Left hip pain M25.552 719.45   2. Lumbar radiculopathy, acute M54.16 724.4   3. Lumbar spine pain M54.5 724.2                  Timed:  Manual Therapy:         mins  78559;  Therapeutic Exercise:      41   mins  53616;     Neuromuscular Tereza:         mins  26286;    Therapeutic Activity:          mins  52482;     Gait Training:           mins  88138;     Ultrasound:          mins  84129;    Electrical Stimulation:         mins  97763 ( );    Untimed:  Electrical Stimulation:         mins  22052 ( );  Mechanical Traction:    15     mins  42685;     Timed Treatment:    41  mins   Total Treatment:      56  mins  Addie Arce [facility]  Physical Therapist

## 2019-11-06 ENCOUNTER — INFUSION (OUTPATIENT)
Dept: ONCOLOGY | Facility: HOSPITAL | Age: 59
End: 2019-11-06

## 2019-11-06 VITALS
RESPIRATION RATE: 18 BRPM | TEMPERATURE: 97.6 F | HEART RATE: 98 BPM | SYSTOLIC BLOOD PRESSURE: 144 MMHG | DIASTOLIC BLOOD PRESSURE: 65 MMHG

## 2019-11-06 DIAGNOSIS — Z45.2 ENCOUNTER FOR VENOUS ACCESS DEVICE CARE: Primary | ICD-10-CM

## 2019-11-06 PROCEDURE — 96523 IRRIG DRUG DELIVERY DEVICE: CPT | Performed by: INTERNAL MEDICINE

## 2019-11-06 RX ORDER — SODIUM CHLORIDE 0.9 % (FLUSH) 0.9 %
10 SYRINGE (ML) INJECTION AS NEEDED
Status: CANCELLED | OUTPATIENT
Start: 2019-12-18

## 2019-11-06 RX ORDER — SODIUM CHLORIDE 0.9 % (FLUSH) 0.9 %
10 SYRINGE (ML) INJECTION AS NEEDED
Status: DISCONTINUED | OUTPATIENT
Start: 2019-11-06 | End: 2019-11-06 | Stop reason: HOSPADM

## 2019-11-06 RX ADMIN — SODIUM CHLORIDE, PRESERVATIVE FREE 10 ML: 5 INJECTION INTRAVENOUS at 14:45

## 2019-11-06 RX ADMIN — Medication 500 UNITS: at 14:45

## 2019-11-19 ENCOUNTER — DOCUMENTATION (OUTPATIENT)
Dept: PHYSICAL THERAPY | Facility: CLINIC | Age: 59
End: 2019-11-19

## 2019-11-19 NOTE — PROGRESS NOTES
Discharge Summary  Discharge Summary from Physical Therapy Report      Dates  PT visit: 10-17-19 / 10-28-19  Number of Visits: 6     Discharge Status of Patient: See MD Note dated      Goals:  Pt met 2/7 STGs and no LTGs    Discharge Plan: Pt to followup with MD due to lack of progress.    Comments     Date of Discharge 10-31-19        Addie Arce, PTA  Physical Therapist    Yaquelin Carmona, PT, DPT

## 2019-12-18 ENCOUNTER — INFUSION (OUTPATIENT)
Dept: ONCOLOGY | Facility: HOSPITAL | Age: 59
End: 2019-12-18

## 2019-12-18 DIAGNOSIS — Z45.2 ENCOUNTER FOR VENOUS ACCESS DEVICE CARE: Primary | ICD-10-CM

## 2019-12-18 PROCEDURE — 96523 IRRIG DRUG DELIVERY DEVICE: CPT | Performed by: INTERNAL MEDICINE

## 2019-12-18 RX ORDER — SODIUM CHLORIDE 0.9 % (FLUSH) 0.9 %
10 SYRINGE (ML) INJECTION AS NEEDED
Status: CANCELLED | OUTPATIENT
Start: 2020-01-06

## 2019-12-18 RX ORDER — SODIUM CHLORIDE 0.9 % (FLUSH) 0.9 %
10 SYRINGE (ML) INJECTION AS NEEDED
Status: DISCONTINUED | OUTPATIENT
Start: 2019-12-18 | End: 2019-12-18 | Stop reason: HOSPADM

## 2019-12-18 RX ADMIN — HEPARIN 500 UNITS: 100 SYRINGE at 14:46

## 2019-12-18 RX ADMIN — SODIUM CHLORIDE, PRESERVATIVE FREE 10 ML: 5 INJECTION INTRAVENOUS at 14:46

## 2020-01-06 ENCOUNTER — LAB (OUTPATIENT)
Dept: ONCOLOGY | Facility: HOSPITAL | Age: 60
End: 2020-01-06

## 2020-01-06 ENCOUNTER — HOSPITAL ENCOUNTER (OUTPATIENT)
Dept: CT IMAGING | Facility: HOSPITAL | Age: 60
Discharge: HOME | End: 2020-01-06
Admitting: INTERNAL MEDICINE

## 2020-01-06 DIAGNOSIS — C82.03 FOLLICULAR LYMPHOMA GRADE I OF INTRA-ABDOMINAL LYMPH NODES (HCC): ICD-10-CM

## 2020-01-06 DIAGNOSIS — Z45.2 ENCOUNTER FOR VENOUS ACCESS DEVICE CARE: Primary | ICD-10-CM

## 2020-01-06 LAB
ALBUMIN SERPL-MCNC: 4.2 G/DL (ref 3.5–5.2)
ALBUMIN/GLOB SERPL: 1.4 G/DL
ALP SERPL-CCNC: 82 U/L (ref 39–117)
ALT SERPL W P-5'-P-CCNC: 35 U/L (ref 1–33)
ANION GAP SERPL CALCULATED.3IONS-SCNC: 13 MMOL/L (ref 5–15)
AST SERPL-CCNC: 24 U/L (ref 1–32)
BASOPHILS # BLD AUTO: 0.05 10*3/MM3 (ref 0–0.2)
BASOPHILS NFR BLD AUTO: 0.8 % (ref 0–1.5)
BILIRUB SERPL-MCNC: 0.2 MG/DL (ref 0.2–1.2)
BUN BLD-MCNC: 5 MG/DL (ref 6–20)
BUN/CREAT SERPL: 7.6 (ref 7–25)
CALCIUM SPEC-SCNC: 10.2 MG/DL (ref 8.6–10.5)
CHLORIDE SERPL-SCNC: 97 MMOL/L (ref 98–107)
CO2 SERPL-SCNC: 26 MMOL/L (ref 22–29)
CREAT BLD-MCNC: 0.66 MG/DL (ref 0.57–1)
DEPRECATED RDW RBC AUTO: 45 FL (ref 37–54)
EOSINOPHIL # BLD AUTO: 0 10*3/MM3 (ref 0–0.4)
EOSINOPHIL NFR BLD AUTO: 0 % (ref 0.3–6.2)
ERYTHROCYTE [DISTWIDTH] IN BLOOD BY AUTOMATED COUNT: 13.6 % (ref 12.3–15.4)
GFR SERPL CREATININE-BSD FRML MDRD: 92 ML/MIN/1.73
GLOBULIN UR ELPH-MCNC: 2.9 GM/DL
GLUCOSE BLD-MCNC: 82 MG/DL (ref 65–99)
HCT VFR BLD AUTO: 38.9 % (ref 34–46.6)
HGB BLD-MCNC: 13.3 G/DL (ref 12–15.9)
IMM GRANULOCYTES # BLD AUTO: 0.05 10*3/MM3 (ref 0–0.05)
IMM GRANULOCYTES NFR BLD AUTO: 0.8 % (ref 0–0.5)
LDH SERPL-CCNC: 154 U/L (ref 135–214)
LYMPHOCYTES # BLD AUTO: 1.58 10*3/MM3 (ref 0.7–3.1)
LYMPHOCYTES NFR BLD AUTO: 25.3 % (ref 19.6–45.3)
MCH RBC QN AUTO: 30.9 PG (ref 26.6–33)
MCHC RBC AUTO-ENTMCNC: 34.2 G/DL (ref 31.5–35.7)
MCV RBC AUTO: 90.5 FL (ref 79–97)
MONOCYTES # BLD AUTO: 0.54 10*3/MM3 (ref 0.1–0.9)
MONOCYTES NFR BLD AUTO: 8.6 % (ref 5–12)
NEUTROPHILS # BLD AUTO: 4.03 10*3/MM3 (ref 1.7–7)
NEUTROPHILS NFR BLD AUTO: 64.5 % (ref 42.7–76)
NRBC BLD AUTO-RTO: 0 /100 WBC (ref 0–0.2)
PLATELET # BLD AUTO: 355 10*3/MM3 (ref 140–450)
PMV BLD AUTO: 8.5 FL (ref 6–12)
POTASSIUM BLD-SCNC: 4 MMOL/L (ref 3.5–5.2)
PROT SERPL-MCNC: 7.1 G/DL (ref 6–8.5)
RBC # BLD AUTO: 4.3 10*6/MM3 (ref 3.77–5.28)
SODIUM BLD-SCNC: 136 MMOL/L (ref 136–145)
WBC NRBC COR # BLD: 6.25 10*3/MM3 (ref 3.4–10.8)

## 2020-01-06 PROCEDURE — 83615 LACTATE (LD) (LDH) ENZYME: CPT | Performed by: INTERNAL MEDICINE

## 2020-01-06 PROCEDURE — 80053 COMPREHEN METABOLIC PANEL: CPT | Performed by: INTERNAL MEDICINE

## 2020-01-06 PROCEDURE — 25010000002 IOPAMIDOL 61 % SOLUTION: Performed by: INTERNAL MEDICINE

## 2020-01-06 PROCEDURE — 85025 COMPLETE CBC W/AUTO DIFF WBC: CPT | Performed by: INTERNAL MEDICINE

## 2020-01-06 PROCEDURE — 71260 CT THORAX DX C+: CPT

## 2020-01-06 PROCEDURE — 74177 CT ABD & PELVIS W/CONTRAST: CPT

## 2020-01-06 PROCEDURE — 36591 DRAW BLOOD OFF VENOUS DEVICE: CPT | Performed by: INTERNAL MEDICINE

## 2020-01-06 RX ORDER — SODIUM CHLORIDE 0.9 % (FLUSH) 0.9 %
10 SYRINGE (ML) INJECTION AS NEEDED
Status: DISCONTINUED | OUTPATIENT
Start: 2020-01-06 | End: 2020-01-06 | Stop reason: HOSPADM

## 2020-01-06 RX ORDER — SODIUM CHLORIDE 0.9 % (FLUSH) 0.9 %
10 SYRINGE (ML) INJECTION AS NEEDED
Status: CANCELLED | OUTPATIENT
Start: 2020-02-17

## 2020-01-06 RX ORDER — HEPARIN SODIUM (PORCINE) LOCK FLUSH IV SOLN 100 UNIT/ML 100 UNIT/ML
500 SOLUTION INTRAVENOUS AS NEEDED
Status: CANCELLED | OUTPATIENT
Start: 2020-02-17

## 2020-01-06 RX ADMIN — IOPAMIDOL 90 ML: 612 INJECTION, SOLUTION INTRAVENOUS at 14:00

## 2020-01-06 RX ADMIN — SODIUM CHLORIDE, PRESERVATIVE FREE 10 ML: 5 INJECTION INTRAVENOUS at 14:09

## 2020-01-06 RX ADMIN — HEPARIN 500 UNITS: 100 SYRINGE at 14:09

## 2020-01-08 ENCOUNTER — OFFICE VISIT (OUTPATIENT)
Dept: ONCOLOGY | Facility: CLINIC | Age: 60
End: 2020-01-08

## 2020-01-08 VITALS
BODY MASS INDEX: 36.12 KG/M2 | SYSTOLIC BLOOD PRESSURE: 169 MMHG | TEMPERATURE: 98.1 F | HEART RATE: 96 BPM | RESPIRATION RATE: 18 BRPM | WEIGHT: 211.6 LBS | HEIGHT: 64 IN | DIASTOLIC BLOOD PRESSURE: 76 MMHG

## 2020-01-08 DIAGNOSIS — C82.03 FOLLICULAR LYMPHOMA GRADE I OF INTRA-ABDOMINAL LYMPH NODES (HCC): Primary | ICD-10-CM

## 2020-01-08 DIAGNOSIS — R93.3 ABNORMAL CT SCAN, COLON: ICD-10-CM

## 2020-01-08 DIAGNOSIS — R79.89 ELEVATED LFTS: ICD-10-CM

## 2020-01-08 PROCEDURE — G0463 HOSPITAL OUTPT CLINIC VISIT: HCPCS | Performed by: INTERNAL MEDICINE

## 2020-01-08 PROCEDURE — 1123F ACP DISCUSS/DSCN MKR DOCD: CPT | Performed by: INTERNAL MEDICINE

## 2020-01-08 PROCEDURE — 99214 OFFICE O/P EST MOD 30 MIN: CPT | Performed by: INTERNAL MEDICINE

## 2020-01-08 PROCEDURE — G9903 PT SCRN TBCO ID AS NON USER: HCPCS | Performed by: INTERNAL MEDICINE

## 2020-01-08 PROCEDURE — G8730 PAIN DOC POS AND PLAN: HCPCS | Performed by: INTERNAL MEDICINE

## 2020-01-08 NOTE — PROGRESS NOTES
DATE OF VISIT: 1/8/2020      REASON FOR VISIT: Recurrent follicular lymphoma on surveillance ,Elevated LFT, abnormal CT scan of colon      HISTORY OF PRESENT ILLNESS:    59-year-old female with a past medical history significant for recurrent follicular lymphoma status post radiation, last round of radiation was around September 2016 for follicular lymphoma.  Patient was again found to have aortocaval adenopathy on  CT of chest abdomen and pelvis in March 2017.  Patient was started on rituximab April 10, 2017.  Patient has so far received 7 doses of rituximab 2 months apart last of which was on March 26,2018.  In view of enlargement of aortocaval lymph node, patient was started on  chemotherapy with bendamustine and rituximab  on April 30, 2018.  Patient received last cycle which was cycle 4 of bendamustine and rituximab on August 21, 2018.  After that patient was placed on surveillance.  Patient is here for 3 month follow-up appointment today.  Patient had surveillance CT of chest, abdomen and pelvis with contrast done on January 6, 2020, patient is here to discuss the results and further recommendation .  States she had a surgery done on lumbar spine in December 2019 at [place] for back pain and disc protrusion.  States her back pain is improved since surgery.  Denies any blood in stool.  Denies any change in bowel habits.  Denies any new lymph node enlargement.  Denies any new fever .        PAST MEDICAL HISTORY:    Past Medical History:   Diagnosis Date   • Acute bronchitis    • Agoraphobia with panic disorder     on SNRI, prn buspar, prn klonopin     • Anxiety    • Arthritis    • Atrophic vaginitis    • Chest pain     work up as new onset angina    • Depression      MDD      • Essential hypertension    • Follicular non-Hodgkin's lymphoma (CMS/HCC)    • Generalized anxiety disorder     SSRI - buspar, cont cymbalta, prn klonopin      • Hip pain     arthritis, artifical right hip     • History of bone density  study 2009    DEXA (bone density) test, peripheral (Normal   • History of echocardiogram 2012    Echocardiogram 18338 (Normal echocardigram. EF 55-60%)   • History of mammogram    • Hyperlipidemia    • Mass of lower limb    • Nuclear senile cataract    • Obesity    • Otalgia     ENT REFER   • Panic disorder     with agoraphobia. On buspar and klonopin now      • Type 2 diabetes mellitus (CMS/HCC)     NO BDR   • Upper respiratory infection        SOCIAL HISTORY:    Social History     Tobacco Use   • Smoking status: Former Smoker     Years: 12.00     Last attempt to quit: 1998     Years since quittin.0   • Smokeless tobacco: Never Used   • Tobacco comment: Smoked 1 pack per 2 weeks   Substance Use Topics   • Alcohol use: No   • Drug use: No       Surgical History :  Past Surgical History:   Procedure Laterality Date   • CENTRAL VENOUS LINE INSERTION  2016    Central line insertion (Successful placement of right upper extremity midline.)   • COLONOSCOPY     • COLONOSCOPY N/A 2019    Procedure: COLONOSCOPY;  Surgeon: Brandon Salvador DO;  Location: [facility] ENDOSCOPY;  Service: Gastroenterology   • CYSTOSCOPY  1976    Cystoscopy (external urethroplasty and cysto-panendoscopy,urethal hymenal fusion with hypospadias)   • DILATATION AND CURETTAGE  1980    D&C (removal of IUD)   • EXCISION LESION  04/15/2014    Remove thigh lesion (Excision of a mass of the right thigh using a 16.2 cm incision with intermediate layered closure.)   • HYSTEROSCOPY  2003    Hysteroscope procedure (diagnostic hysteroscopy with fractional D&C)   • INJECTION OF MEDICATION  2011    kenalog (1)   • OTHER SURGICAL HISTORY      Explore parathyroid glands   • OTHER SURGICAL HISTORY  1985    Laparosc diagnostic (desires elective tubal reversal)   • PAP SMEAR  2005    PAP SMEAR (normal)   • ND INSJ TUNNELED CVC W/O SUBQ PORT/ AGE 5 YR/> Right 2017    Procedure: INSERTION VENOUS  ACCESS DEVICE (MEDIPORT) right chest;  Surgeon: Fortino Medina MD;  Location: [facility] OR;  Service: General   • [place] INSJ TUNNELED CVC W/O SUBQ PORT/ AGE 5 YR/> N/A 6/6/2017    Procedure: INSERTION VENOUS ACCESS DEVICE   (MEDIPORT)   (C-ARM#1);  Surgeon: Fortino Medina MD;  Location: [facility] OR;  Service: General   • SHOULDER ARTHROSCOPY  11/11/2013    Shoulder arthroscopy/surgery (Right shoulder. Rotator cuff repair. Subacromial decompression. Edgar procedure.)   • SHOULDER SURGERY  12/28/2015    Shoulder surgery procedure (Arthroscopy of the left shoulder with rotator cuff repair.Edgar procedure.Subacromial decompression.)   • TONSILLECTOMY AND ADENOIDECTOMY  05/29/1967    T&A (hypertrophied tonsils and adenoids with recurrent infection)   • TOTAL ABDOMINAL HYSTERECTOMY WITH SALPINGO OOPHORECTOMY  02/03/2004    ARIELLE/BSO (with a preop fractional dilation and curettage with frozen section,menorrhagia,dysmenorrhea,unresponsive to medical intervention)   • TOTAL HIP ARTHROPLASTY     • VAGINA SURGERY  03/02/1981    Anesth, surgery on vagina (colpotomy with bilateral partial salpingectomy, multiparity contraindicated)       ALLERGIES:    Allergies   Allergen Reactions   • Ace Inhibitors Cough   • Latex      BLISTER     • Morphine And Related Nausea And Vomiting   • Lortab [Hydrocodone-Acetaminophen] Palpitations       REVIEW OF SYSTEMS:      CONSTITUTIONAL: Has gained 2 pounds since last clinic visit.  Complains of night sweats.  No fever, chills.     HEENT:  No epistaxis, mouth sores, or difficulty swallowing.    RESPIRATORY:  No new shortness of breath .  No new cough or hemoptysis.    CARDIOVASCULAR:  No chest pain or palpitations.    GASTROINTESTINAL: Complains of intermittent nausea .  Denies any recent change in bowel habits. No vomiting, or blood in the stool.    GENITOURINARY:  No dysuria or hematuria.    MUSCULOSKELETAL:   States her back pain is improved since surgical intervention in December 2019 at  "[place].  Complains of left hip pain.    NEUROLOGICAL:  No tingling or numbness. No new headache or dizziness.     LYMPHATICS:  Denies any abnormal swollen and anywhere in the body.    SKIN:  Denies any new skin rash.          PHYSICAL EXAMINATION:      VITAL SIGNS:  /76   Pulse 96   Temp 98.1 °F (36.7 °C) (Temporal)   Resp 18   Ht 162.6 cm (64.02\")   Wt 96 kg (211 lb 9.6 oz)   BMI 36.30 kg/m²      ECOG performance status: 1    GENERAL:  Not in any distress.Obese female.    HEENT:  Normocephalic, Atraumatic.Mild Conjunctival pallor. No icterus. Extraocular Movements Intact. No Fascial Asymmetry noted.    NECK:  No adenopathy. No JVD.  Trachea in midline.    RESPIRATORY:  Fair air entry bilateral. No rhonchi or wheezing.    CARDIOVASCULAR:  S1, S2. Regular rate and rhythm. No murmur or gallop appreciated.  Port-A-Cath present on  chest wall.    ABDOMEN:  Soft, obese, nontender. Bowel sounds present in all four quadrants.  No hepatosplenomegaly  appreciated due to body habitus.    MUSCULOSKELETAL:  No edema.No Calf Tenderness.  Decreased range of motion    NEUROLOGIC:  Alert, awake and oriented ×3.  No  Motor  deficit appreciated. Cranial Nerves 2-12 grossly intact.    SKIN: No new skin lesions.  Skin is warm and moist.    LYMPHATICS: No new enlarged lymph node in neck or supraclavicular area.            DIAGNOSTIC DATA:    Glucose   Date Value Ref Range Status   01/06/2020 82 65 - 99 mg/dL Final     Sodium   Date Value Ref Range Status   01/06/2020 136 136 - 145 mmol/L Final     Potassium   Date Value Ref Range Status   01/06/2020 4.0 3.5 - 5.2 mmol/L Final     CO2   Date Value Ref Range Status   01/06/2020 26.0 22.0 - 29.0 mmol/L Final     Chloride   Date Value Ref Range Status   01/06/2020 97 (L) 98 - 107 mmol/L Final     Anion Gap   Date Value Ref Range Status   01/06/2020 13.0 5.0 - 15.0 mmol/L Final     Creatinine   Date Value Ref Range Status   01/06/2020 0.66 0.57 - 1.00 mg/dL Final     BUN "   Date Value Ref Range Status   01/06/2020 5 (L) 6 - 20 mg/dL Final     BUN/Creatinine Ratio   Date Value Ref Range Status   01/06/2020 7.6 7.0 - 25.0 Final     Calcium   Date Value Ref Range Status   01/06/2020 10.2 8.6 - 10.5 mg/dL Final     eGFR Non  Amer   Date Value Ref Range Status   01/06/2020 92 >60 mL/min/1.73 Final     Alkaline Phosphatase   Date Value Ref Range Status   01/06/2020 82 39 - 117 U/L Final     Total Protein   Date Value Ref Range Status   01/06/2020 7.1 6.0 - 8.5 g/dL Final     ALT (SGPT)   Date Value Ref Range Status   01/06/2020 35 (H) 1 - 33 U/L Final     AST (SGOT)   Date Value Ref Range Status   01/06/2020 24 1 - 32 U/L Final     Total Bilirubin   Date Value Ref Range Status   01/06/2020 0.2 0.2 - 1.2 mg/dL Final     Albumin   Date Value Ref Range Status   01/06/2020 4.20 3.50 - 5.20 g/dL Final     Globulin   Date Value Ref Range Status   01/06/2020 2.9 gm/dL Final     Lab Results   Component Value Date    WBC 6.25 01/06/2020    HGB 13.3 01/06/2020    HCT 38.9 01/06/2020    MCV 90.5 01/06/2020     01/06/2020     Lab Results   Component Value Date    NEUTROABS 4.03 01/06/2020         PATHOLOGY:  Pathology from right thigh biopsy done in April 2014 showed:  FINAL DIAGNOSIS:   A.  SUBCUTANEOUS MASS, RIGHT THIGH:             FOLLICULAR LYMPHOMA, GRADE 1 OF 3.   B.  SUBCUTANEOUS MASS, RIGHT THIGH:             FOLLICULAR LYMPHOMA, GRADE 1 OF 3.          Comment   COMMENT:   Immunostains have follicular lymphoma positive for CD20, negative for   CD3, negative for CD5, positive for CD10, positive for CD23, negative   for CD43, negative for cyclin D1, positive for BCL-2 and positive for   BCL-6.  The case is submitted to the [facility] for evaluation and   their report is attached.            RADIOLOGY DATA :  CT of chest, abdomen and pelvis with contrast done on January 6, 2020 was reviewed, discussed with patient, it showed:  IMPRESSION:  No acute pulmonary or pleural  finding.     No pulmonary mass, suspicious nodule, or adenopathy.     No CT evidence of acute abdominal or pelvic finding. No  adenopathy.     Fatty liver infiltration redemonstrated.     Hysterectomy.     Soft tissue changes posterior midline L3-L4/5 level new compared  to the prior exam. Please correlate for any history of recent  intervention in this region to explain this finding.        CT of chest, abdomen and pelvis with contrast done on June 4, 2019 was reviewed, discussed with patient, it showed:  - - - CT CHEST - - -     Left-sided Mediport terminates in the superior vena cava     PULMONARY PARENCHYMA:      - air spaces: Negative    - interstitium: Grossly within normal limits for age    - misc: No pulmonary nodules or mass     MEDIASTINUM / ROXANA:      - heart: Normal size, no pericardial fluid. Mild  atherosclerotic coronary artery calcification.    - aorta/great vessels: Normal caliber and configuration for age    - misc: No mediastinal mass / significant adenopathy     PLEURAL COMPARTMENT:      - misc: No pleural fluid or mass        MISC:      - inferior neck: Negative    - osseous / body wall: Negative     - misc:         - - - CT ABDOMEN - - -      ABDOMEN:   - LIVER:  Normal size / contour, no ductal dilatation, no focal  lesion. The liver appears diffusely fatty infiltrated   - GB: There may be a tiny gallstone in the gallbladder neck.   - CBD:  Grossly negative   - SPLEEN:  Normal size and contour   - PANCREAS:  Normal in size, contour, no focal mass    - VISCERA:  Normal caliber, no wall thickening   - MESENTERY:  No mesenteric mass   - CAVITY:  No free abdominal fluid, no free intraperitoneal air   - BODY WALL:  Small umbilical hernia contains only fat   - OSSEOUS:  Grossly negative for age      RETROPERITONEUM:   - KIDNEYS:Normal size / contour, no collecting system dilation                    No evidence of an enhancing mass   - URETERS:  Normal course, caliber   - ADRENALS:  Normal size,  contour   - MISC:  No sig. retroperitoneal adenopathy or mass   - VASCULAR:  Aorta/iliacs: within normal limits for age     - - - CT PELVIS - - -   There is some limitation of evaluation due to beam hardening  artifact from patient's right prosthetic hip   - VISCERA:  Normal caliber small/large bowel, no focal   thickening/mass. There is focal narrowing at the rectosigmoid  junction, which may reflect focal peristalsis, but it can be seen  on the previous study, intraluminal narrowing is suspected.  Sigmoidoscopy may be helpful in further evaluation.    - APPENDIX:  Not visualized, and may be absent   - MESENTERY:  No mass   - VASCULAR:  Within normal limits for age   - CAVITY:  No free fluid/air   - BLADDER:  Unremarkable   - OSSEOUS: No acute abnormality. Right total hip prosthesis  again noted.   - MISC:   - UTERUS/OVARY: Uterus is surgically absent. The ovaries are not  well seen, and may be absent as well.     IMPRESSION:  1. No new abnormalities identified  2. Hepatic steatosis  3. Possible tiny gallstone in the gallbladder  3. Surgical absence of the uterus  4. Luminal narrowing at the rectosigmoid junction, also present  on the previous study. Sigmoidoscopy may be helpful in further  evaluation of this finding.           CT of chest, abdomen and pelvis with contrast done on December 13, 2018 was reviewed, discussed with patient, it showed:  FINDINGS: Comparison study dated September 12, 2018. Axial  computer tomography sequential imaging was performed from the  thoracic inlet through the symphysis pubis after administration  of IV contrast. .Sagittal and coronal reformation was performed .     Left subclavian Port-A-Cath with tip within the region of SVC.  Mediastinal structures of the chest are normal. No  lymphadenopathy or pericardial effusion. Lungs are clear. Pleural  spaces are normal.     The liver is normal. Very small gallstone is seen within the  gallbladder lumen. Otherwise gallbladder is  unremarkable. The  biliary system is normal. The pancreas is normal. The spleen is  normal. Bilateral adrenal glands are normal. Right kidney and  ureter are normal. Left kidney and ureter are normal. The bladder  is normal. The uterus is surgically absent. Evaluation in the  pelvis is limited secondary to streak artifact from right hip  prostheses. The hollow viscera is normal. No lymphadenopathy in  the abdomen or the pelvis. No acute osseous abnormality.     IMPRESSION:  1. Cholelithiasis.  2. No acute CT chest abdomen or pelvis abnormality.                                ASSESSMENT AND PLAN:      1.  Recurrent follicular lymphoma grade 1, initially patient was diagnosed with a right groin lymph node adenopathy in April 2014 for which patient underwent palliative radiation therapy.   - Subsequently patient again had a relapse around September 2016 for workup with second course of palliative radiation therapy was given by Dr. Leonel Vargas in October 2016.    -In view of enlarged aortocaval lymph nodes, patient was started on rituximab treatment in April 2017.    CT scan done in July 2017 shows decreasing in the size of aortocaval lymph node which was discussed with patient.  -  Patient  had so far received 7 rituximab treatment every 2 months apart last of which was on March 26, 2018.  -  In view of generalized abdominal pain which was worsening, patient had CT scan done on April 6, 2018 which showed slight increase in the size of aortocaval adenopathy.  Treatment option consisting of changing treatment to bendamustine and rituximab versus palliative radiation treatment were discussed with patient and her family.  Patient wanted to try chemotherapy with bendamustine and rituximab first.    -Patient was started on chemotherapy with bendamustine and rituximab on April 30, 2018.  Patient tolerated first round of chemotherapy well.  We will go ahead with round 2 of chemotherapy today with bendamustine and  rituximab.  -Restaging CT of chest, abdomen and pelvis with contrast done on June 21, 2018 shows decrease in size of adenopathy involving aortocaval area.  Result of CT scan were discussed with patient and her family.  -Cycle 3 on July 24, 2018 dose of bendamustine was reduced by 20%.  -Patient has received 4 cycles of bendamustine and rituximab from April 30, 2018 until August 21, 2018.  -CT of chest, abdomen and pelvis with contrast done on September 12, 2018 after cycle 4 of bendamustine and rituximab showed no evidence of lymphadenopathy involving chest, abdomen or pelvis.  -CT of chest, abdomen and pelvis with contrast done on January 6, 2020 does not show any evidence of recurrence of lymphoma or adenopathy.  There is fluid collection in soft tissue next to L3-L5 which could be secondary to recent surgical procedure that patient had.  - At this point will continue with clinical surveillance.  -We will ask patient to return to clinic in 3 months with repeat CBC, CMP ,ldh   -Next surveillance CT of chest, abdomen and pelvis with contrast will be done around July 2020.  If CT scan in July 2020 not showing any evidence of recurrence, will change surveillance CT scan to once a year which was discussed with patient today.      2.  Abnormal CT scan of colon:  -CT of chest, abdomen and pelvis with contrast done on June 4, 2019 shows luminal narrowing at rectosigmoid junction.  Results of CT scan were discussed with the patient.  -Patient was evaluated by Dr Salvador and had colonoscopy on 07/18/18 that showed benign appearing stenosis.    3.  Diabetes mellitus    4.  Elevated liver function test: Secondary to fatty liver.  ALT is mildly elevated at 35, AST is normal.  Patient was again counseled about weight loss today.    5. Obesity : Patient's Body mass index is 36.3 kg/m². BMI is higher then reference range.  Patient was informed about elevated BMI and need to lose weight.  Patient was counseled about diet and  exercise for weight loss.    6.  Health maintenance: Patient does not smoke.  Last colonoscopy was done around 2014 by Dr Salvador which was normal as per patient.      7.  Advance care planning: For now patient remains full code and able to make her decisions.  Patient is health care surrogate mentioned on chart.    8.  Pain assessment:  -Patient complains of hip pain. Patient is being evaluated by Orthopedic team for pain    9. PHQ-9 Total Score: 14   -Patient's PHQ 9 score is high because of her chronic fatigue as well as a history of depression.  Patient is currently on antidepressant medication.  Patient is not homicidal or suicidal, no intervention needs to be done today.            Tino Sood MD  1/8/2020  2:02 PM        EMR Dragon/Transcription disclaimer:   Much of this encounter note is an electronic transcription/translation of spoken language to printed text. The electronic translation of spoken language may permit erroneous, or at times, nonsensical words or phrases to be inadvertently transcribed; Although I have reviewed the note for such errors, some may still exist.

## 2020-02-17 ENCOUNTER — APPOINTMENT (OUTPATIENT)
Dept: ONCOLOGY | Facility: HOSPITAL | Age: 60
End: 2020-02-17

## 2020-02-17 ENCOUNTER — TELEPHONE (OUTPATIENT)
Dept: ONCOLOGY | Facility: CLINIC | Age: 60
End: 2020-02-17

## 2020-02-24 ENCOUNTER — INFUSION (OUTPATIENT)
Dept: ONCOLOGY | Facility: HOSPITAL | Age: 60
End: 2020-02-24

## 2020-02-24 DIAGNOSIS — Z45.2 ENCOUNTER FOR VENOUS ACCESS DEVICE CARE: Primary | ICD-10-CM

## 2020-02-24 PROCEDURE — 25010000003 HEPARIN LOCK FLUCH PER 10 UNITS: Performed by: INTERNAL MEDICINE

## 2020-02-24 PROCEDURE — 96523 IRRIG DRUG DELIVERY DEVICE: CPT | Performed by: INTERNAL MEDICINE

## 2020-02-24 RX ORDER — SODIUM CHLORIDE 0.9 % (FLUSH) 0.9 %
10 SYRINGE (ML) INJECTION AS NEEDED
Status: CANCELLED | OUTPATIENT
Start: 2020-02-24

## 2020-02-24 RX ORDER — HEPARIN SODIUM (PORCINE) LOCK FLUSH IV SOLN 100 UNIT/ML 100 UNIT/ML
500 SOLUTION INTRAVENOUS AS NEEDED
Status: CANCELLED | OUTPATIENT
Start: 2020-02-24

## 2020-02-24 RX ORDER — SODIUM CHLORIDE 0.9 % (FLUSH) 0.9 %
10 SYRINGE (ML) INJECTION AS NEEDED
Status: DISCONTINUED | OUTPATIENT
Start: 2020-02-24 | End: 2020-02-24 | Stop reason: HOSPADM

## 2020-02-24 RX ORDER — HEPARIN SODIUM (PORCINE) LOCK FLUSH IV SOLN 100 UNIT/ML 100 UNIT/ML
500 SOLUTION INTRAVENOUS AS NEEDED
Status: DISCONTINUED | OUTPATIENT
Start: 2020-02-24 | End: 2020-02-24 | Stop reason: HOSPADM

## 2020-02-24 RX ADMIN — HEPARIN 500 UNITS: 100 SYRINGE at 14:10

## 2020-04-01 ENCOUNTER — APPOINTMENT (OUTPATIENT)
Dept: ONCOLOGY | Facility: CLINIC | Age: 60
End: 2020-04-01

## 2020-04-01 ENCOUNTER — APPOINTMENT (OUTPATIENT)
Dept: ONCOLOGY | Facility: HOSPITAL | Age: 60
End: 2020-04-01

## 2020-05-13 ENCOUNTER — APPOINTMENT (OUTPATIENT)
Dept: ONCOLOGY | Facility: HOSPITAL | Age: 60
End: 2020-05-13

## 2020-06-02 ENCOUNTER — LAB (OUTPATIENT)
Dept: ONCOLOGY | Facility: HOSPITAL | Age: 60
End: 2020-06-02

## 2020-06-02 DIAGNOSIS — R79.89 ELEVATED LFTS: ICD-10-CM

## 2020-06-02 DIAGNOSIS — R93.3 ABNORMAL CT SCAN, COLON: ICD-10-CM

## 2020-06-02 DIAGNOSIS — Z45.2 ENCOUNTER FOR VENOUS ACCESS DEVICE CARE: Primary | ICD-10-CM

## 2020-06-02 DIAGNOSIS — C82.03 FOLLICULAR LYMPHOMA GRADE I OF INTRA-ABDOMINAL LYMPH NODES (HCC): ICD-10-CM

## 2020-06-02 LAB
ALBUMIN SERPL-MCNC: 4.2 G/DL (ref 3.5–5.2)
ALBUMIN/GLOB SERPL: 1.6 G/DL
ALP SERPL-CCNC: 79 U/L (ref 39–117)
ALT SERPL W P-5'-P-CCNC: 47 U/L (ref 1–33)
ANION GAP SERPL CALCULATED.3IONS-SCNC: 12 MMOL/L (ref 5–15)
AST SERPL-CCNC: 33 U/L (ref 1–32)
BASOPHILS # BLD AUTO: 0.05 10*3/MM3 (ref 0–0.2)
BASOPHILS NFR BLD AUTO: 0.8 % (ref 0–1.5)
BILIRUB SERPL-MCNC: 0.2 MG/DL (ref 0.2–1.2)
BUN BLD-MCNC: 6 MG/DL (ref 8–23)
BUN/CREAT SERPL: 10.3 (ref 7–25)
CALCIUM SPEC-SCNC: 9.6 MG/DL (ref 8.6–10.5)
CHLORIDE SERPL-SCNC: 99 MMOL/L (ref 98–107)
CO2 SERPL-SCNC: 26 MMOL/L (ref 22–29)
CREAT BLD-MCNC: 0.58 MG/DL (ref 0.57–1)
DEPRECATED RDW RBC AUTO: 46.1 FL (ref 37–54)
EOSINOPHIL # BLD AUTO: 0 10*3/MM3 (ref 0–0.4)
EOSINOPHIL NFR BLD AUTO: 0 % (ref 0.3–6.2)
ERYTHROCYTE [DISTWIDTH] IN BLOOD BY AUTOMATED COUNT: 14 % (ref 12.3–15.4)
GFR SERPL CREATININE-BSD FRML MDRD: 106 ML/MIN/1.73
GLOBULIN UR ELPH-MCNC: 2.7 GM/DL
GLUCOSE BLD-MCNC: 124 MG/DL (ref 65–99)
HCT VFR BLD AUTO: 37.6 % (ref 34–46.6)
HGB BLD-MCNC: 13 G/DL (ref 12–15.9)
IMM GRANULOCYTES # BLD AUTO: 0.07 10*3/MM3 (ref 0–0.05)
IMM GRANULOCYTES NFR BLD AUTO: 1.1 % (ref 0–0.5)
LYMPHOCYTES # BLD AUTO: 1.58 10*3/MM3 (ref 0.7–3.1)
LYMPHOCYTES NFR BLD AUTO: 24.9 % (ref 19.6–45.3)
MCH RBC QN AUTO: 31.3 PG (ref 26.6–33)
MCHC RBC AUTO-ENTMCNC: 34.6 G/DL (ref 31.5–35.7)
MCV RBC AUTO: 90.6 FL (ref 79–97)
MONOCYTES # BLD AUTO: 0.55 10*3/MM3 (ref 0.1–0.9)
MONOCYTES NFR BLD AUTO: 8.7 % (ref 5–12)
NEUTROPHILS # BLD AUTO: 4.1 10*3/MM3 (ref 1.7–7)
NEUTROPHILS NFR BLD AUTO: 64.5 % (ref 42.7–76)
NRBC BLD AUTO-RTO: 0 /100 WBC (ref 0–0.2)
PLATELET # BLD AUTO: 367 10*3/MM3 (ref 140–450)
PMV BLD AUTO: 8.7 FL (ref 6–12)
POTASSIUM BLD-SCNC: 3.8 MMOL/L (ref 3.5–5.2)
PROT SERPL-MCNC: 6.9 G/DL (ref 6–8.5)
RBC # BLD AUTO: 4.15 10*6/MM3 (ref 3.77–5.28)
SODIUM BLD-SCNC: 137 MMOL/L (ref 136–145)
WBC NRBC COR # BLD: 6.35 10*3/MM3 (ref 3.4–10.8)

## 2020-06-02 PROCEDURE — 25010000003 HEPARIN LOCK FLUSH PER 10 UNITS: Performed by: INTERNAL MEDICINE

## 2020-06-02 PROCEDURE — 80053 COMPREHEN METABOLIC PANEL: CPT | Performed by: INTERNAL MEDICINE

## 2020-06-02 PROCEDURE — 85025 COMPLETE CBC W/AUTO DIFF WBC: CPT | Performed by: INTERNAL MEDICINE

## 2020-06-02 PROCEDURE — 36591 DRAW BLOOD OFF VENOUS DEVICE: CPT | Performed by: INTERNAL MEDICINE

## 2020-06-02 RX ORDER — HEPARIN SODIUM (PORCINE) LOCK FLUSH IV SOLN 100 UNIT/ML 100 UNIT/ML
500 SOLUTION INTRAVENOUS AS NEEDED
Status: DISCONTINUED | OUTPATIENT
Start: 2020-06-02 | End: 2020-06-02 | Stop reason: HOSPADM

## 2020-06-02 RX ORDER — SODIUM CHLORIDE 0.9 % (FLUSH) 0.9 %
10 SYRINGE (ML) INJECTION AS NEEDED
Status: DISCONTINUED | OUTPATIENT
Start: 2020-06-02 | End: 2020-06-02 | Stop reason: HOSPADM

## 2020-06-02 RX ORDER — SODIUM CHLORIDE 0.9 % (FLUSH) 0.9 %
10 SYRINGE (ML) INJECTION AS NEEDED
Status: CANCELLED | OUTPATIENT
Start: 2020-07-14

## 2020-06-02 RX ORDER — HEPARIN SODIUM (PORCINE) LOCK FLUSH IV SOLN 100 UNIT/ML 100 UNIT/ML
500 SOLUTION INTRAVENOUS AS NEEDED
Status: CANCELLED | OUTPATIENT
Start: 2020-07-14

## 2020-06-02 RX ADMIN — HEPARIN 500 UNITS: 100 SYRINGE at 15:52

## 2020-06-02 RX ADMIN — SODIUM CHLORIDE, PRESERVATIVE FREE 10 ML: 5 INJECTION INTRAVENOUS at 15:52

## 2020-06-03 ENCOUNTER — APPOINTMENT (OUTPATIENT)
Dept: ONCOLOGY | Facility: HOSPITAL | Age: 60
End: 2020-06-03

## 2020-06-09 ENCOUNTER — TELEMEDICINE (OUTPATIENT)
Dept: ONCOLOGY | Facility: CLINIC | Age: 60
End: 2020-06-09

## 2020-06-09 ENCOUNTER — APPOINTMENT (OUTPATIENT)
Dept: ONCOLOGY | Facility: HOSPITAL | Age: 60
End: 2020-06-09

## 2020-06-09 DIAGNOSIS — C82.03 FOLLICULAR LYMPHOMA GRADE I OF INTRA-ABDOMINAL LYMPH NODES (HCC): Primary | ICD-10-CM

## 2020-06-09 PROCEDURE — 99442 PR PHYS/QHP TELEPHONE EVALUATION 11-20 MIN: CPT | Performed by: NURSE PRACTITIONER

## 2020-06-09 NOTE — PROGRESS NOTES
This visit has been rescheduled as a video visit to comply with patient safety concerns in accordance with CDC recommendations. Total time of discussion was 15 minutes.    You have chosen to receive care through a video visit. Do you consent to use a telephone visit for your medical care today? Yes      Diagnosis: Recurrent follicular lymphoma on surveillance        HISTORY OF PRESENT ILLNESS:    60-year-old female with a past medical history significant for recurrent follicular lymphoma status post radiation, last round of radiation was around September 2016 for follicular lymphoma.  Patient was again found to have aortocaval adenopathy on  CT of chest abdomen and pelvis in March 2017.  Patient was started on rituximab April 10, 2017.  Patient has so far received 7 doses of rituximab 2 months apart last of which was on March 26,2018.  In view of enlargement of aortocaval lymph node, patient was started on  chemotherapy with bendamustine and rituximab  on April 30, 2018.  Patient received last cycle which was cycle 4 of bendamustine and rituximab on August 21, 2018.  After that patient was placed on surveillance.    Patient is having 3 mos surveillance appointment today.   She states she has been having bladder pressure and pain and is currently on antibioitc and scheduled to see urology at the end of this month. Denies any blood in stool.  Denies any change in bowel habits.  Denies any new lymph node enlargement.    States she started having night sweats few days ago.    Review of Systems   Constitutional: Negative.    HENT: Negative.    Eyes: Negative.    Respiratory: Negative.    Cardiovascular: Negative.    Gastrointestinal: Negative.    Endocrine: Positive for heat intolerance.   Genitourinary: Positive for dysuria.   Musculoskeletal: Negative.    Skin: Negative.    Allergic/Immunologic: Negative.    Neurological: Negative.    Hematological: Negative.    Psychiatric/Behavioral: Negative.      Physical Exam    Constitutional: She appears well-developed and well-nourished.   HENT:   Head: Normocephalic and atraumatic.   Eyes: Pupils are equal, round, and reactive to light. Conjunctivae are normal.   Neck: Neck normal appearance.  Pulmonary/Chest: Effort normal.   Abdominal: Abdomen appears normal. Soft.   Neurological: She is alert.   Skin: Skin is warm and dry.   Psychiatric: She has a normal mood and affect.      RADIOLOGY DATA :  CT of chest, abdomen and pelvis with contrast done on January 6, 2020 was reviewed, discussed with patient, it showed:  IMPRESSION:  No acute pulmonary or pleural finding.     No pulmonary mass, suspicious nodule, or adenopathy.     No CT evidence of acute abdominal or pelvic finding. No  adenopathy.     Fatty liver infiltration redemonstrated.     Hysterectomy.     Soft tissue changes posterior midline L3-L4/5 level new compared  to the prior exam. Please correlate for any history of recent  intervention in this region to explain this finding.           CT of chest, abdomen and pelvis with contrast done on June 4, 2019 was reviewed, discussed with patient, it showed:  - - - CT CHEST - - -     Left-sided Mediport terminates in the superior vena cava     PULMONARY PARENCHYMA:      - air spaces: Negative    - interstitium: Grossly within normal limits for age    - misc: No pulmonary nodules or mass     MEDIASTINUM / ROXANA:      - heart: Normal size, no pericardial fluid. Mild  atherosclerotic coronary artery calcification.    - aorta/great vessels: Normal caliber and configuration for age    - misc: No mediastinal mass / significant adenopathy     PLEURAL COMPARTMENT:      - misc: No pleural fluid or mass        MISC:      - inferior neck: Negative    - osseous / body wall: Negative     - misc:         - - - CT ABDOMEN - - -      ABDOMEN:   - LIVER:  Normal size / contour, no ductal dilatation, no focal  lesion. The liver appears diffusely fatty infiltrated   - GB: There may be a tiny gallstone  in the gallbladder neck.   - CBD:  Grossly negative   - SPLEEN:  Normal size and contour   - PANCREAS:  Normal in size, contour, no focal mass    - VISCERA:  Normal caliber, no wall thickening   - MESENTERY:  No mesenteric mass   - CAVITY:  No free abdominal fluid, no free intraperitoneal air   - BODY WALL:  Small umbilical hernia contains only fat   - OSSEOUS:  Grossly negative for age      RETROPERITONEUM:   - KIDNEYS:Normal size / contour, no collecting system dilation                    No evidence of an enhancing mass   - URETERS:  Normal course, caliber   - ADRENALS:  Normal size, contour   - MISC:  No sig. retroperitoneal adenopathy or mass   - VASCULAR:  Aorta/iliacs: within normal limits for age     - - - CT PELVIS - - -   There is some limitation of evaluation due to beam hardening  artifact from patient's right prosthetic hip   - VISCERA:  Normal caliber small/large bowel, no focal   thickening/mass. There is focal narrowing at the rectosigmoid  junction, which may reflect focal peristalsis, but it can be seen  on the previous study, intraluminal narrowing is suspected.  Sigmoidoscopy may be helpful in further evaluation.    - APPENDIX:  Not visualized, and may be absent   - MESENTERY:  No mass   - VASCULAR:  Within normal limits for age   - CAVITY:  No free fluid/air   - BLADDER:  Unremarkable   - OSSEOUS: No acute abnormality. Right total hip prosthesis  again noted.   - MISC:   - UTERUS/OVARY: Uterus is surgically absent. The ovaries are not  well seen, and may be absent as well.     IMPRESSION:  1. No new abnormalities identified  2. Hepatic steatosis  3. Possible tiny gallstone in the gallbladder  3. Surgical absence of the uterus  4. Luminal narrowing at the rectosigmoid junction, also present  on the previous study. Sigmoidoscopy may be helpful in further  evaluation of this finding.              CT of chest, abdomen and pelvis with contrast done on December 13, 2018 was reviewed, discussed with  patient, it showed:  FINDINGS: Comparison study dated September 12, 2018. Axial  computer tomography sequential imaging was performed from the  thoracic inlet through the symphysis pubis after administration  of IV contrast. .Sagittal and coronal reformation was performed .     Left subclavian Port-A-Cath with tip within the region of SVC.  Mediastinal structures of the chest are normal. No  lymphadenopathy or pericardial effusion. Lungs are clear. Pleural  spaces are normal.     The liver is normal. Very small gallstone is seen within the  gallbladder lumen. Otherwise gallbladder is unremarkable. The  biliary system is normal. The pancreas is normal. The spleen is  normal. Bilateral adrenal glands are normal. Right kidney and  ureter are normal. Left kidney and ureter are normal. The bladder  is normal. The uterus is surgically absent. Evaluation in the  pelvis is limited secondary to streak artifact from right hip  prostheses. The hollow viscera is normal. No lymphadenopathy in  the abdomen or the pelvis. No acute osseous abnormality.     IMPRESSION:  1. Cholelithiasis.  2. No acute CT chest abdomen or pelvis abnormality.                                       ASSESSMENT AND PLAN:       1.  Recurrent follicular lymphoma grade 1, initially patient was diagnosed with a right groin lymph node adenopathy in April 2014 for which patient underwent palliative radiation therapy.   - Subsequently patient again had a relapse around September 2016 for workup with second course of palliative radiation therapy was given by Dr. Leonel Vargas in October 2016.    -In view of enlarged aortocaval lymph nodes, patient was started on rituximab treatment in April 2017.    CT scan done in July 2017 shows decreasing in the size of aortocaval lymph node which was discussed with patient.  -  Patient  had so far received 7 rituximab treatment every 2 months apart last of which was on March 26, 2018.  -  In view of generalized abdominal pain  which was worsening, patient had CT scan done on April 6, 2018 which showed slight increase in the size of aortocaval adenopathy.  Treatment option consisting of changing treatment to bendamustine and rituximab versus palliative radiation treatment were discussed with patient and her family.  Patient wanted to try chemotherapy with bendamustine and rituximab first.    -Patient was started on chemotherapy with bendamustine and rituximab on April 30, 2018.  Patient tolerated first round of chemotherapy well.  We will go ahead with round 2 of chemotherapy today with bendamustine and rituximab.  -Restaging CT of chest, abdomen and pelvis with contrast done on June 21, 2018 shows decrease in size of adenopathy involving aortocaval area.  Result of CT scan were discussed with patient and her family.  -Cycle 3 on July 24, 2018 dose of bendamustine was reduced by 20%.  -Patient has received 4 cycles of bendamustine and rituximab from April 30, 2018 until August 21, 2018.  -CT of chest, abdomen and pelvis with contrast done on September 12, 2018 after cycle 4 of bendamustine and rituximab showed no evidence of lymphadenopathy involving chest, abdomen or pelvis.  -CT of chest, abdomen and pelvis with contrast done on January 6, 2020 does not show any evidence of recurrence of lymphoma or adenopathy.  There is fluid collection in soft tissue next to L3-L5 which could be secondary to recent surgical procedure that patient had.  - At this point will continue with clinical surveillance.  -We will ask patient to return to clinic in 3 months with repeat CBC, CMP ,LDH  -Next surveillance CT of chest, abdomen and pelvis with contrast will be done around July 2020. Orders placed today; per D.r Indigo last office note if CT scan in July 2020 not showing any evidence of recurrence, will change surveillance CT scan to once a year which was discussed with patient today.     2.  Elevated liver function test: Secondary to fatty liver.  ALT is  mildly elevated at 47, AST is 33.  Patient was again counseled about weight loss today.

## 2020-06-18 ENCOUNTER — TELEPHONE (OUTPATIENT)
Dept: ONCOLOGY | Facility: CLINIC | Age: 60
End: 2020-06-18

## 2020-06-22 ENCOUNTER — TELEPHONE (OUTPATIENT)
Dept: ONCOLOGY | Facility: CLINIC | Age: 60
End: 2020-06-22

## 2020-06-22 NOTE — TELEPHONE ENCOUNTER
Dr. Sood wanted patient to haveCT first week in July.    Someone in the office told her the radiology department would call her with an appointment.    She called the radiation department today, and they said they are waiting for the [facility] to call them to schedule the appointment.    Please call patient asap at  [phone]

## 2020-06-22 NOTE — TELEPHONE ENCOUNTER
Called pt.  Information is out to insurance for authorization.  Once auth'd they will call pt to schedule.  Pt understood.

## 2020-07-14 ENCOUNTER — INFUSION (OUTPATIENT)
Dept: ONCOLOGY | Facility: HOSPITAL | Age: 60
End: 2020-07-14

## 2020-07-14 DIAGNOSIS — Z45.2 ENCOUNTER FOR VENOUS ACCESS DEVICE CARE: Primary | ICD-10-CM

## 2020-07-14 PROCEDURE — 96523 IRRIG DRUG DELIVERY DEVICE: CPT | Performed by: INTERNAL MEDICINE

## 2020-07-14 PROCEDURE — 25010000003 HEPARIN LOCK FLUSH PER 10 UNITS: Performed by: INTERNAL MEDICINE

## 2020-07-14 RX ORDER — HEPARIN SODIUM (PORCINE) LOCK FLUSH IV SOLN 100 UNIT/ML 100 UNIT/ML
500 SOLUTION INTRAVENOUS AS NEEDED
Status: DISCONTINUED | OUTPATIENT
Start: 2020-07-14 | End: 2020-07-14 | Stop reason: HOSPADM

## 2020-07-14 RX ORDER — SODIUM CHLORIDE 0.9 % (FLUSH) 0.9 %
10 SYRINGE (ML) INJECTION AS NEEDED
Status: CANCELLED | OUTPATIENT
Start: 2020-07-29

## 2020-07-14 RX ORDER — SODIUM CHLORIDE 0.9 % (FLUSH) 0.9 %
20 SYRINGE (ML) INJECTION AS NEEDED
Status: CANCELLED | OUTPATIENT
Start: 2020-07-29

## 2020-07-14 RX ORDER — HEPARIN SODIUM (PORCINE) LOCK FLUSH IV SOLN 100 UNIT/ML 100 UNIT/ML
500 SOLUTION INTRAVENOUS AS NEEDED
Status: CANCELLED | OUTPATIENT
Start: 2020-07-29

## 2020-07-14 RX ORDER — SODIUM CHLORIDE 0.9 % (FLUSH) 0.9 %
10 SYRINGE (ML) INJECTION AS NEEDED
Status: DISCONTINUED | OUTPATIENT
Start: 2020-07-14 | End: 2020-07-14 | Stop reason: HOSPADM

## 2020-07-14 RX ORDER — SODIUM CHLORIDE 0.9 % (FLUSH) 0.9 %
20 SYRINGE (ML) INJECTION AS NEEDED
Status: DISCONTINUED | OUTPATIENT
Start: 2020-07-14 | End: 2020-07-14 | Stop reason: HOSPADM

## 2020-07-14 RX ADMIN — HEPARIN 500 UNITS: 100 SYRINGE at 13:43

## 2020-07-29 ENCOUNTER — INFUSION (OUTPATIENT)
Dept: ONCOLOGY | Facility: HOSPITAL | Age: 60
End: 2020-07-29

## 2020-07-29 ENCOUNTER — HOSPITAL ENCOUNTER (OUTPATIENT)
Dept: CT IMAGING | Facility: HOSPITAL | Age: 60
Discharge: HOME | End: 2020-07-29
Admitting: NURSE PRACTITIONER

## 2020-07-29 DIAGNOSIS — Z45.2 ENCOUNTER FOR VENOUS ACCESS DEVICE CARE: ICD-10-CM

## 2020-07-29 DIAGNOSIS — C82.03 FOLLICULAR LYMPHOMA GRADE I OF INTRA-ABDOMINAL LYMPH NODES (HCC): Primary | ICD-10-CM

## 2020-07-29 DIAGNOSIS — C82.03 FOLLICULAR LYMPHOMA GRADE I OF INTRA-ABDOMINAL LYMPH NODES (HCC): ICD-10-CM

## 2020-07-29 LAB
ALBUMIN SERPL-MCNC: 4.1 G/DL (ref 3.5–5.2)
ALBUMIN/GLOB SERPL: 1.6 G/DL
ALP SERPL-CCNC: 80 U/L (ref 39–117)
ALT SERPL W P-5'-P-CCNC: 35 U/L (ref 1–33)
ANION GAP SERPL CALCULATED.3IONS-SCNC: 14 MMOL/L (ref 5–15)
AST SERPL-CCNC: 24 U/L (ref 1–32)
BASOPHILS # BLD AUTO: 0.06 10*3/MM3 (ref 0–0.2)
BASOPHILS NFR BLD AUTO: 1.2 % (ref 0–1.5)
BILIRUB SERPL-MCNC: 0.3 MG/DL (ref 0–1.2)
BUN SERPL-MCNC: 9 MG/DL (ref 8–23)
BUN/CREAT SERPL: 12 (ref 7–25)
CALCIUM SPEC-SCNC: 9.7 MG/DL (ref 8.6–10.5)
CHLORIDE SERPL-SCNC: 98 MMOL/L (ref 98–107)
CO2 SERPL-SCNC: 22 MMOL/L (ref 22–29)
CREAT SERPL-MCNC: 0.75 MG/DL (ref 0.57–1)
DEPRECATED RDW RBC AUTO: 45.1 FL (ref 37–54)
EOSINOPHIL # BLD AUTO: 0 10*3/MM3 (ref 0–0.4)
EOSINOPHIL NFR BLD AUTO: 0 % (ref 0.3–6.2)
ERYTHROCYTE [DISTWIDTH] IN BLOOD BY AUTOMATED COUNT: 14 % (ref 12.3–15.4)
GFR SERPL CREATININE-BSD FRML MDRD: 79 ML/MIN/1.73
GLOBULIN UR ELPH-MCNC: 2.6 GM/DL
GLUCOSE SERPL-MCNC: 211 MG/DL (ref 65–99)
HCT VFR BLD AUTO: 38.5 % (ref 34–46.6)
HGB BLD-MCNC: 13.2 G/DL (ref 12–15.9)
IMM GRANULOCYTES # BLD AUTO: 0.05 10*3/MM3 (ref 0–0.05)
IMM GRANULOCYTES NFR BLD AUTO: 1 % (ref 0–0.5)
LYMPHOCYTES # BLD AUTO: 0.87 10*3/MM3 (ref 0.7–3.1)
LYMPHOCYTES NFR BLD AUTO: 17.9 % (ref 19.6–45.3)
MCH RBC QN AUTO: 30.5 PG (ref 26.6–33)
MCHC RBC AUTO-ENTMCNC: 34.3 G/DL (ref 31.5–35.7)
MCV RBC AUTO: 88.9 FL (ref 79–97)
MONOCYTES # BLD AUTO: 0.47 10*3/MM3 (ref 0.1–0.9)
MONOCYTES NFR BLD AUTO: 9.7 % (ref 5–12)
NEUTROPHILS NFR BLD AUTO: 3.4 10*3/MM3 (ref 1.7–7)
NEUTROPHILS NFR BLD AUTO: 70.2 % (ref 42.7–76)
NRBC BLD AUTO-RTO: 0 /100 WBC (ref 0–0.2)
PLATELET # BLD AUTO: 364 10*3/MM3 (ref 140–450)
PMV BLD AUTO: 8.7 FL (ref 6–12)
POTASSIUM SERPL-SCNC: 4.2 MMOL/L (ref 3.5–5.2)
PROT SERPL-MCNC: 6.7 G/DL (ref 6–8.5)
RBC # BLD AUTO: 4.33 10*6/MM3 (ref 3.77–5.28)
SODIUM SERPL-SCNC: 134 MMOL/L (ref 136–145)
WBC # BLD AUTO: 4.85 10*3/MM3 (ref 3.4–10.8)

## 2020-07-29 PROCEDURE — 85025 COMPLETE CBC W/AUTO DIFF WBC: CPT | Performed by: NURSE PRACTITIONER

## 2020-07-29 PROCEDURE — 25010000003 HEPARIN LOCK FLUSH PER 10 UNITS: Performed by: INTERNAL MEDICINE

## 2020-07-29 PROCEDURE — 36591 DRAW BLOOD OFF VENOUS DEVICE: CPT | Performed by: INTERNAL MEDICINE

## 2020-07-29 PROCEDURE — 71260 CT THORAX DX C+: CPT

## 2020-07-29 PROCEDURE — 74177 CT ABD & PELVIS W/CONTRAST: CPT

## 2020-07-29 PROCEDURE — 80053 COMPREHEN METABOLIC PANEL: CPT | Performed by: NURSE PRACTITIONER

## 2020-07-29 PROCEDURE — 25010000002 IOPAMIDOL 61 % SOLUTION: Performed by: NURSE PRACTITIONER

## 2020-07-29 RX ORDER — SODIUM CHLORIDE 0.9 % (FLUSH) 0.9 %
20 SYRINGE (ML) INJECTION AS NEEDED
Status: DISCONTINUED | OUTPATIENT
Start: 2020-07-29 | End: 2020-07-29 | Stop reason: HOSPADM

## 2020-07-29 RX ORDER — SODIUM CHLORIDE 0.9 % (FLUSH) 0.9 %
10 SYRINGE (ML) INJECTION AS NEEDED
Status: CANCELLED | OUTPATIENT
Start: 2020-08-04

## 2020-07-29 RX ORDER — HEPARIN SODIUM (PORCINE) LOCK FLUSH IV SOLN 100 UNIT/ML 100 UNIT/ML
500 SOLUTION INTRAVENOUS AS NEEDED
Status: DISCONTINUED | OUTPATIENT
Start: 2020-07-29 | End: 2020-07-29 | Stop reason: HOSPADM

## 2020-07-29 RX ORDER — SODIUM CHLORIDE 0.9 % (FLUSH) 0.9 %
10 SYRINGE (ML) INJECTION AS NEEDED
Status: DISCONTINUED | OUTPATIENT
Start: 2020-07-29 | End: 2020-07-29 | Stop reason: HOSPADM

## 2020-07-29 RX ORDER — SODIUM CHLORIDE 0.9 % (FLUSH) 0.9 %
20 SYRINGE (ML) INJECTION AS NEEDED
Status: CANCELLED | OUTPATIENT
Start: 2020-08-04

## 2020-07-29 RX ORDER — HEPARIN SODIUM (PORCINE) LOCK FLUSH IV SOLN 100 UNIT/ML 100 UNIT/ML
500 SOLUTION INTRAVENOUS AS NEEDED
Status: CANCELLED | OUTPATIENT
Start: 2020-08-04

## 2020-07-29 RX ADMIN — SODIUM CHLORIDE, PRESERVATIVE FREE 10 ML: 5 INJECTION INTRAVENOUS at 08:18

## 2020-07-29 RX ADMIN — HEPARIN 500 UNITS: 100 SYRINGE at 08:20

## 2020-07-29 RX ADMIN — IOPAMIDOL 90 ML: 612 INJECTION, SOLUTION INTRAVENOUS at 08:10

## 2020-07-30 ENCOUNTER — TELEPHONE (OUTPATIENT)
Dept: ONCOLOGY | Facility: CLINIC | Age: 60
End: 2020-07-30

## 2020-07-30 NOTE — TELEPHONE ENCOUNTER
Informed patient only one visitor at this time, however, we can call additional person during visit. She verbalized understanding.

## 2020-07-30 NOTE — TELEPHONE ENCOUNTER
Patient has an appt on 08/04/20 to get lab results and to see dr. Sood. Patient wants to know if her  and daughter can come to the appt with her      Call patient back at [phone]

## 2020-08-04 ENCOUNTER — OFFICE VISIT (OUTPATIENT)
Dept: ONCOLOGY | Facility: CLINIC | Age: 60
End: 2020-08-04

## 2020-08-04 ENCOUNTER — APPOINTMENT (OUTPATIENT)
Dept: ONCOLOGY | Facility: HOSPITAL | Age: 60
End: 2020-08-04

## 2020-08-04 VITALS
HEART RATE: 104 BPM | HEIGHT: 64 IN | SYSTOLIC BLOOD PRESSURE: 166 MMHG | WEIGHT: 226.4 LBS | RESPIRATION RATE: 18 BRPM | TEMPERATURE: 99.2 F | DIASTOLIC BLOOD PRESSURE: 73 MMHG | BODY MASS INDEX: 38.65 KG/M2

## 2020-08-04 DIAGNOSIS — R93.3 ABNORMAL CT SCAN, COLON: ICD-10-CM

## 2020-08-04 DIAGNOSIS — C82.03 FOLLICULAR LYMPHOMA GRADE I OF INTRA-ABDOMINAL LYMPH NODES (HCC): ICD-10-CM

## 2020-08-04 PROCEDURE — 99214 OFFICE O/P EST MOD 30 MIN: CPT | Performed by: INTERNAL MEDICINE

## 2020-08-04 PROCEDURE — G0463 HOSPITAL OUTPT CLINIC VISIT: HCPCS | Performed by: INTERNAL MEDICINE

## 2020-08-04 PROCEDURE — G8731 PAIN NEG NO PLAN: HCPCS | Performed by: INTERNAL MEDICINE

## 2020-08-04 PROCEDURE — G9903 PT SCRN TBCO ID AS NON USER: HCPCS | Performed by: INTERNAL MEDICINE

## 2020-08-04 PROCEDURE — 1123F ACP DISCUSS/DSCN MKR DOCD: CPT | Performed by: INTERNAL MEDICINE

## 2020-08-04 NOTE — PROGRESS NOTES
DATE OF VISIT: 2020      REASON FOR VISIT: Recurrent follicular lymphoma on surveillance, elevated liver function test      HISTORY OF PRESENT ILLNESS:   60-year-old female with medical problems significant for recurrent follicular lymphoma currently on surveillance, elevated liver function test, dyslipidemia hypertension is here for follow-up appointment today.  Patient had a blood work done as well as a CT of chest abdomen and pelvis with contrast done recently, she is here to discuss the results and further recommendation.  Denies any bleeding.  Denies any new lymph node enlargement.  Denies any drenching night sweats.          PAST MEDICAL HISTORY:    Past Medical History:   Diagnosis Date   • Acute bronchitis    • Agoraphobia with panic disorder     on SNRI, prn buspar, prn klonopin     • Anxiety    • Arthritis    • Atrophic vaginitis    • Chest pain     work up as new onset angina    • Depression      MDD      • Essential hypertension    • Follicular non-Hodgkin's lymphoma (CMS/HCC)    • Generalized anxiety disorder     SSRI - buspar, cont cymbalta, prn klonopin      • Hip pain     arthritis, artifical right hip     • History of bone density study 2009    DEXA (bone density) test, peripheral (Normal   • History of echocardiogram 2012    Echocardiogram 43344 (Normal echocardigram. EF 55-60%)   • History of mammogram    • Hyperlipidemia    • Mass of lower limb    • Nuclear senile cataract    • Obesity    • Otalgia     ENT REFER   • Panic disorder     with agoraphobia. On buspar and klonopin now      • Type 2 diabetes mellitus (CMS/HCC)     NO BDR   • Upper respiratory infection        SOCIAL HISTORY:    Social History     Tobacco Use   • Smoking status: Former Smoker     Years: 12.00     Last attempt to quit: 1998     Years since quittin.6   • Smokeless tobacco: Never Used   • Tobacco comment: Smoked 1 pack per 2 weeks   Substance Use Topics   • Alcohol use: No   • Drug use: No        Surgical History :  Past Surgical History:   Procedure Laterality Date   • CENTRAL VENOUS LINE INSERTION  03/11/2016    Central line insertion (Successful placement of right upper extremity midline.)   • COLONOSCOPY     • COLONOSCOPY N/A 7/18/2019    Procedure: COLONOSCOPY;  Surgeon: Brandon Salvador DO;  Location: [facility] ENDOSCOPY;  Service: Gastroenterology   • CYSTOSCOPY  03/23/1976    Cystoscopy (external urethroplasty and cysto-panendoscopy,urethal hymenal fusion with hypospadias)   • DILATATION AND CURETTAGE  01/25/1980    D&C (removal of IUD)   • EXCISION LESION  04/15/2014    Remove thigh lesion (Excision of a mass of the right thigh using a 16.2 cm incision with intermediate layered closure.)   • HYSTEROSCOPY  02/28/2003    Hysteroscope procedure (diagnostic hysteroscopy with fractional D&C)   • INJECTION OF MEDICATION  08/22/2011    kenalog (1)   • OTHER SURGICAL HISTORY      Explore parathyroid glands   • OTHER SURGICAL HISTORY  08/26/1985    Laparosc diagnostic (desires elective tubal reversal)   • PAP SMEAR  05/24/2005    PAP SMEAR (normal)   • MA INSJ TUNNELED CVC W/O SUBQ PORT/ AGE 5 YR/> Right 4/4/2017    Procedure: INSERTION VENOUS ACCESS DEVICE (MEDIPORT) right chest;  Surgeon: Fortino Medina MD;  Location: [facility] OR;  Service: General   • MA INSJ TUNNELED CVC W/O SUBQ PORT/ AGE 5 YR/> N/A 6/6/2017    Procedure: INSERTION VENOUS ACCESS DEVICE   (MEDIPORT)   (C-ARM#1);  Surgeon: Fortino Medina MD;  Location: [facility] OR;  Service: General   • SHOULDER ARTHROSCOPY  11/11/2013    Shoulder arthroscopy/surgery (Right shoulder. Rotator cuff repair. Subacromial decompression. Edgar procedure.)   • SHOULDER SURGERY  12/28/2015    Shoulder surgery procedure (Arthroscopy of the left shoulder with rotator cuff repair.Edgar procedure.Subacromial decompression.)   • TONSILLECTOMY AND ADENOIDECTOMY  05/29/1967    T&A (hypertrophied tonsils and adenoids with recurrent infection)   • TOTAL ABDOMINAL  "HYSTERECTOMY WITH SALPINGO OOPHORECTOMY  02/03/2004    ARIELLE/BSO (with a preop fractional dilation and curettage with frozen section,menorrhagia,dysmenorrhea,unresponsive to medical intervention)   • TOTAL HIP ARTHROPLASTY     • VAGINA SURGERY  03/02/1981    Anesth, surgery on vagina (colpotomy with bilateral partial salpingectomy, multiparity contraindicated)       ALLERGIES:    Allergies   Allergen Reactions   • Ace Inhibitors Cough   • Latex      BLISTER     • Morphine And Related Nausea And Vomiting   • Lortab [Hydrocodone-Acetaminophen] Palpitations         FAMILY HISTORY:  Family History   Problem Relation Age of Onset   • Breast cancer Mother    • Heart disease Father    • Liver cancer Father    • Diabetes Sister         INSULIN DEPENDENT   • Diabetes Brother         INSULIN DEPENDENT   • Coronary artery disease Other    • Diabetes Other            REVIEW OF SYSTEMS:      CONSTITUTIONAL:  Complains of fatigue.  Has gained 15 pounds since last clinic visit.  Denies any fever, chills.     EYES: No visual disturbances. No discharge. No new lesions    ENMT:  No epistaxis, mouth sores or difficulty swallowing.    RESPIRATORY:  No new shortness of breath. No new cough or hemoptysis.    CARDIOVASCULAR:  No chest pain or palpitations.    GASTROINTESTINAL: Complains of intermittent nausea without vomiting.  No abdominal pain  or blood in the stool.    GENITOURINARY: No Dysuria or Hematuria.    MUSCULOSKELETAL: Positive  for chronic back pain and hip pain    LYMPHATICS:  Denies any abnormal swollen glands anywhere in the body.    NEUROLOGICAL : No tingling or numbness. No headache or dizziness. No seizures or balance problems.    SKIN: No new skin lesions.    ENDOCRINE : No new hot or cold intolerance. No new polyuria . No polydipsia.        PHYSICAL EXAMINATION:      VITAL SIGNS:  /73   Pulse 104   Temp 99.2 °F (37.3 °C) (Temporal)   Resp 18   Ht 162.6 cm (64.02\")   Wt 103 kg (226 lb 6.4 oz)   BMI 38.84 " kg/m²       08/04/20 0954   Weight: 103 kg (226 lb 6.4 oz)         ECOG performance status: 1    CONSTITUTIONAL:  Not in any distress.  Obese female.  Appears anxious.    EYES: Mild conjunctival Pallor. No Icterus. No Pterygium. Extraocular Movements intact.No ptosis.    ENMT:  Normocephalic, Atraumatic.No Facial Asymmetry noted.    NECK:  No adenopathy.Trachea midline. NO JVD.    RESPIRATORY:  Fair air entry bilateral. No rhonchi or wheezing.Fair respiratory effort.    CARDIOVASCULAR:  S1, S2. Regular rate and rhythm. No murmur or gallop appreciated.    ABDOMEN:  Soft, obese, nontender. Bowel sounds present in all four quadrants.  No Hepatosplenomegaly appreciated.    MUSCULOSKELETAL:  No edema.No Calf Tenderness.Decreased range of motion.    NEUROLOGIC:    No  Motor  deficit appreciated. Cranial Nerves 2-12 grossly intact.    SKIN : No new skin lesion identified. Skin is warm and moist to touch.    LYMPHATICS: No new enlarged lymph nodes in neck or supraclavicular area.    PSYCHIATRY: Alert, awake and oriented ×3.  Appears anxious.  Normal judgment.  Makes good eye contact.        DIAGNOSTIC DATA:    Glucose   Date Value Ref Range Status   07/29/2020 211 (H) 65 - 99 mg/dL Final     Sodium   Date Value Ref Range Status   07/29/2020 134 (L) 136 - 145 mmol/L Final     Potassium   Date Value Ref Range Status   07/29/2020 4.2 3.5 - 5.2 mmol/L Final     Comment:     Specimen hemolyzed.  Results may be affected.     CO2   Date Value Ref Range Status   07/29/2020 22.0 22.0 - 29.0 mmol/L Final     Chloride   Date Value Ref Range Status   07/29/2020 98 98 - 107 mmol/L Final     Anion Gap   Date Value Ref Range Status   07/29/2020 14.0 5.0 - 15.0 mmol/L Final     Creatinine   Date Value Ref Range Status   07/29/2020 0.75 0.57 - 1.00 mg/dL Final     BUN   Date Value Ref Range Status   07/29/2020 9 8 - 23 mg/dL Final     BUN/Creatinine Ratio   Date Value Ref Range Status   07/29/2020 12.0 7.0 - 25.0 Final     Calcium   Date  Value Ref Range Status   07/29/2020 9.7 8.6 - 10.5 mg/dL Final     eGFR Non  Amer   Date Value Ref Range Status   07/29/2020 79 >60 mL/min/1.73 Final     Alkaline Phosphatase   Date Value Ref Range Status   07/29/2020 80 39 - 117 U/L Final     Total Protein   Date Value Ref Range Status   07/29/2020 6.7 6.0 - 8.5 g/dL Final     ALT (SGPT)   Date Value Ref Range Status   07/29/2020 35 (H) 1 - 33 U/L Final     AST (SGOT)   Date Value Ref Range Status   07/29/2020 24 1 - 32 U/L Final     Total Bilirubin   Date Value Ref Range Status   07/29/2020 0.3 0.0 - 1.2 mg/dL Final     Albumin   Date Value Ref Range Status   07/29/2020 4.10 3.50 - 5.20 g/dL Final     Globulin   Date Value Ref Range Status   07/29/2020 2.6 gm/dL Final     Lab Results   Component Value Date    WBC 4.85 07/29/2020    HGB 13.2 07/29/2020    HCT 38.5 07/29/2020    MCV 88.9 07/29/2020     07/29/2020     Lab Results   Component Value Date    NEUTROABS 3.40 07/29/2020     No results found for: , LABCA2, AFPTM, HCGQUANT, , CHROMGRNA, 0DXQI19GXH, CEA, REFLABREPO              RADIOLOGY DATA :  Ct Chest With Contrast    Result Date: 7/29/2020  1. No acute CT chest, abdomen, or pelvis abnormality. Electronically signed by:  Benito Matthews MD  7/29/2020 12:00 PM CDT Workstation: [id number]    Ct Abdomen Pelvis With Contrast    Result Date: 7/29/2020  1. No acute CT chest, abdomen, or pelvis abnormality. Electronically signed by:  Benito Matthews MD  7/29/2020 12:00 PM CDT Workstation: [id number]        ASSESSMENT AND PLAN:      1.  Recurrent follicular lymphoma grade 1,  -Patient initially diagnosed in April 2015 for which patient underwent palliative radiation treatment.  -Second relapse happened in September 2016 for again patient had a radiation treatment.  - Patient was found to have enlarged aortocaval lymph node in April 2017 for which patient initially received rituximab every 2 months without any significant  improvement.  -Subsequently patient received 4 cycles of bendamustine and rituximab between April 30, 2018 and August 21, 2018.  -After that patient has been on surveillance.  - Recent CT of chest, abdomen and pelvis with contrast done on July 29, 2020 does not show any evidence of relapse.  Results were discussed with patient.  - Next surveillance CT of chest abdomen and pelvis with contrast will be done in July 2021.  -We will ask patient to return to clinic in 4 months with repeat CBC and CMP on that day.    2.  Elevated liver function test:  -Secondary to fatty liver.  Patient was again counseled about weight loss as well as controlling her blood glucose better.    3.  Health maintenance: Patient does not smoke.  She is not due for colonoscopy at present.    4. Advance Care Planning: For now patient remains full code and is able to make decisions.  Patient has health care surrogate mentioned on chart.        PHQ-9 Total Score: 0   -Patient is not homicidal or suicidal.  No acute intervention required.    Chasity Leon reports a pain score of 0.  Given her pain assessment as noted, treatment options were discussed and the following options were decided upon as a follow-up plan to address the patient's pain: No acute intervention required.         Tino Sood MD  8/4/2020  10:04        Part of this note may be an electronic transcription/translation of spoken language to printed text using the Dragon Dictation System.

## 2020-08-14 ENCOUNTER — TELEPHONE (OUTPATIENT)
Dept: ONCOLOGY | Facility: HOSPITAL | Age: 60
End: 2020-08-14

## 2020-08-14 NOTE — TELEPHONE ENCOUNTER
----- Message from Tino Avila MD sent at 8/14/2020 12:23 PM CDT -----  Recommend monitoring for 1 week, if lymph node continues to get enlarged, she needs to come and see me in the office again.  Thank you  ----- Message -----  From: Marta He RN  Sent: 8/14/2020  11:16 AM CDT  To: Tino Avila MD    Please see call.    ----- Message -----  From: Addie Mccauley  Sent: 8/14/2020  11:15 AM CDT  To: [facility] On[facility]    PT CALLED SAID DR AVILA TOLD HER TO CALL HIM IF SHE HAS ANY KNOTS COME UP AND SHE HAS ONE ON HER THROAT CALL PT

## 2020-08-20 ENCOUNTER — OFFICE VISIT (OUTPATIENT)
Dept: ONCOLOGY | Facility: CLINIC | Age: 60
End: 2020-08-20

## 2020-08-20 VITALS — HEIGHT: 64 IN | RESPIRATION RATE: 20 BRPM | BODY MASS INDEX: 38.58 KG/M2 | WEIGHT: 226 LBS | TEMPERATURE: 98.4 F

## 2020-08-20 DIAGNOSIS — C82.03 FOLLICULAR LYMPHOMA GRADE I OF INTRA-ABDOMINAL LYMPH NODES (HCC): ICD-10-CM

## 2020-08-20 DIAGNOSIS — R22.0 SWELLING OF LEFT SIDE OF FACE: Primary | ICD-10-CM

## 2020-08-20 PROCEDURE — G8731 PAIN NEG NO PLAN: HCPCS | Performed by: INTERNAL MEDICINE

## 2020-08-20 PROCEDURE — 99214 OFFICE O/P EST MOD 30 MIN: CPT | Performed by: INTERNAL MEDICINE

## 2020-08-20 PROCEDURE — 1123F ACP DISCUSS/DSCN MKR DOCD: CPT | Performed by: INTERNAL MEDICINE

## 2020-08-20 PROCEDURE — G0463 HOSPITAL OUTPT CLINIC VISIT: HCPCS | Performed by: INTERNAL MEDICINE

## 2020-08-20 PROCEDURE — G9903 PT SCRN TBCO ID AS NON USER: HCPCS | Performed by: INTERNAL MEDICINE

## 2020-08-20 NOTE — PROGRESS NOTES
DATE OF VISIT: 8/20/2020      REASON FOR VISIT: Recurrent follicular lymphoma on surveillance, elevated liver function test, swelling on left side of face      HISTORY OF PRESENT ILLNESS:   60-year-old female with medical problems significant for obesity, hypertension, anxiety, dyslipidemia, recurrent follicular lymphoma for which he is currently on surveillance he is here for unscheduled visit today.  Patient is here with a complaint of swelling on left side of face that started about 1 week ago.  Denies any new fever or any drenching night sweats.  Denies any bleeding.      Oncology History:    1.  Recurrent follicular lymphoma grade 1,  -Patient initially diagnosed in April 2015 for which patient underwent palliative radiation treatment.  -Second relapse happened in September 2016 for again patient had a radiation treatment.  - Patient was found to have enlarged aortocaval lymph node in April 2017 for which patient initially received rituximab every 2 months without any significant improvement.  -Subsequently patient received 4 cycles of bendamustine and rituximab between April 30, 2018 and August 21, 2018.  -Since then patient has been on observation with surveillance CT scan.              PAST MEDICAL HISTORY:    Past Medical History:   Diagnosis Date   • Acute bronchitis    • Agoraphobia with panic disorder     on SNRI, prn buspar, prn klonopin     • Anxiety    • Arthritis    • Atrophic vaginitis    • Chest pain     work up as new onset angina    • Depression      MDD      • Essential hypertension    • Follicular non-Hodgkin's lymphoma (CMS/HCC)    • Generalized anxiety disorder     SSRI - buspar, cont cymbalta, prn klonopin      • Hip pain     arthritis, artifical right hip     • History of bone density study 02/09/2009    DEXA (bone density) test, peripheral (Normal   • History of echocardiogram 01/03/2012    Echocardiogram 64162 (Normal echocardigram. EF 55-60%)   • History of mammogram    • Hyperlipidemia     • Mass of lower limb    • Nuclear senile cataract    • Obesity    • Otalgia     ENT REFER   • Panic disorder     with agoraphobia. On buspar and klonopin now      • Type 2 diabetes mellitus (CMS/HCC)     NO BDR   • Upper respiratory infection        SOCIAL HISTORY:    Social History     Tobacco Use   • Smoking status: Former Smoker     Years: 12.00     Last attempt to quit: 1998     Years since quittin.6   • Smokeless tobacco: Never Used   • Tobacco comment: Smoked 1 pack per 2 weeks   Substance Use Topics   • Alcohol use: No   • Drug use: No       Surgical History :  Past Surgical History:   Procedure Laterality Date   • CENTRAL VENOUS LINE INSERTION  2016    Central line insertion (Successful placement of right upper extremity midline.)   • COLONOSCOPY     • COLONOSCOPY N/A 2019    Procedure: COLONOSCOPY;  Surgeon: Brandon Salvador DO;  Location: [facility] ENDOSCOPY;  Service: Gastroenterology   • CYSTOSCOPY  1976    Cystoscopy (external urethroplasty and cysto-panendoscopy,urethal hymenal fusion with hypospadias)   • DILATATION AND CURETTAGE  1980    D&C (removal of IUD)   • EXCISION LESION  04/15/2014    Remove thigh lesion (Excision of a mass of the right thigh using a 16.2 cm incision with intermediate layered closure.)   • HYSTEROSCOPY  2003    Hysteroscope procedure (diagnostic hysteroscopy with fractional D&C)   • INJECTION OF MEDICATION  2011    kenalog (1)   • OTHER SURGICAL HISTORY      Explore parathyroid glands   • OTHER SURGICAL HISTORY  1985    Laparosc diagnostic (desires elective tubal reversal)   • PAP SMEAR  2005    PAP SMEAR (normal)   • IL INSJ TUNNELED CVC W/O SUBQ PORT/ AGE 5 YR/> Right 2017    Procedure: INSERTION VENOUS ACCESS DEVICE (MEDIPORT) right chest;  Surgeon: Fortino Medina MD;  Location: [facility] OR;  Service: General   • IL INSJ TUNNELED CVC W/O SUBQ PORT/ AGE 5 YR/> N/A 2017    Procedure: INSERTION VENOUS ACCESS  DEVICE   ([employer])   (C-ARM#1);  Surgeon: Fortino Medina MD;  Location: [facility];  Service: General   • SHOULDER ARTHROSCOPY  11/11/2013    Shoulder arthroscopy/surgery (Right shoulder. Rotator cuff repair. Subacromial decompression. Edgar procedure.)   • SHOULDER SURGERY  12/28/2015    Shoulder surgery procedure (Arthroscopy of the left shoulder with rotator cuff repair.Edgar procedure.Subacromial decompression.)   • TONSILLECTOMY AND ADENOIDECTOMY  05/29/1967    T&A (hypertrophied tonsils and adenoids with recurrent infection)   • TOTAL ABDOMINAL HYSTERECTOMY WITH SALPINGO OOPHORECTOMY  02/03/2004    ARIELLE/BSO (with a preop fractional dilation and curettage with frozen section,menorrhagia,dysmenorrhea,unresponsive to medical intervention)   • TOTAL HIP ARTHROPLASTY     • VAGINA SURGERY  03/02/1981    Anesth, surgery on vagina (colpotomy with bilateral partial salpingectomy, multiparity contraindicated)       ALLERGIES:    Allergies   Allergen Reactions   • Ace Inhibitors Cough   • Latex      BLISTER     • Morphine And Related Nausea And Vomiting   • Lortab [Hydrocodone-Acetaminophen] Palpitations         FAMILY HISTORY:  Family History   Problem Relation Age of Onset   • Breast cancer Mother    • Heart disease Father    • Liver cancer Father    • Diabetes Sister         INSULIN DEPENDENT   • Diabetes Brother         INSULIN DEPENDENT   • Coronary artery disease Other    • Diabetes Other            REVIEW OF SYSTEMS:      CONSTITUTIONAL:  Complains of fatigue. Denies any fever, chills or weight loss.     EYES: No visual disturbances. No discharge. No new lesions    ENMT: Complains of swelling on left side of face that started about 1 week ago.  No epistaxis, mouth sores or difficulty swallowing.    RESPIRATORY:  No new shortness of breath. No new cough or hemoptysis.    CARDIOVASCULAR:  No chest pain or palpitations.    GASTROINTESTINAL: Complains of intermittent nausea without vomiting.  No abdominal pain  or  "blood in the stool.    GENITOURINARY: No Dysuria or Hematuria.    MUSCULOSKELETAL: Positive for chronic back pain and hip pain.    LYMPHATICS:  Denies any abnormal swollen glands anywhere in the body.    NEUROLOGICAL : No tingling or numbness. No headache or dizziness. No seizures or balance problems.    SKIN: No new skin lesions.    ENDOCRINE : No new hot or cold intolerance. No new polyuria . No polydipsia.        PHYSICAL EXAMINATION:      VITAL SIGNS:  Temp 98.4 °F (36.9 °C) (Temporal)   Resp 20   Ht 162.6 cm (64.02\")   Wt 103 kg (226 lb)   BMI 38.77 kg/m²       08/20/20  0926   Weight: 103 kg (226 lb)         ECOG performance status: 1    CONSTITUTIONAL:  Not in any distress.  Appears anxious.    EYES: Mild conjunctival Pallor. No Icterus. No Pterygium. Extraocular Movements intact.No ptosis.    ENMT: Swelling evident in area of left parotid gland.  There is no evidence of enlarging adenopathy in neck.    NECK:  No adenopathy.Trachea midline. NO JVD.    RESPIRATORY:  Fair air entry bilateral. No rhonchi or wheezing.Fair respiratory effort.    CARDIOVASCULAR:  S1, S2. Regular rate and rhythm. No murmur or gallop appreciated.  Port-A-Cath present.    ABDOMEN:  Soft, obese, nontender. Bowel sounds present in all four quadrants.  No Hepatosplenomegaly appreciated.    MUSCULOSKELETAL:  No edema.No Calf Tenderness.Decreased range of motion.    NEUROLOGIC:    No  Motor  deficit appreciated. Cranial Nerves 2-12 grossly intact.    SKIN : No new skin lesion identified. Skin is warm and moist to touch.    LYMPHATICS: No new enlarged lymph nodes in neck or supraclavicular area.    PSYCHIATRY: Alert, awake and oriented ×3.Appears anxious.  Normal judgment.  Makes good eye contact.        DIAGNOSTIC DATA:    Glucose   Date Value Ref Range Status   07/29/2020 211 (H) 65 - 99 mg/dL Final     Sodium   Date Value Ref Range Status   07/29/2020 134 (L) 136 - 145 mmol/L Final     Potassium   Date Value Ref Range Status "   07/29/2020 4.2 3.5 - 5.2 mmol/L Final     Comment:     Specimen hemolyzed.  Results may be affected.     CO2   Date Value Ref Range Status   07/29/2020 22.0 22.0 - 29.0 mmol/L Final     Chloride   Date Value Ref Range Status   07/29/2020 98 98 - 107 mmol/L Final     Anion Gap   Date Value Ref Range Status   07/29/2020 14.0 5.0 - 15.0 mmol/L Final     Creatinine   Date Value Ref Range Status   07/29/2020 0.75 0.57 - 1.00 mg/dL Final     BUN   Date Value Ref Range Status   07/29/2020 9 8 - 23 mg/dL Final     BUN/Creatinine Ratio   Date Value Ref Range Status   07/29/2020 12.0 7.0 - 25.0 Final     Calcium   Date Value Ref Range Status   07/29/2020 9.7 8.6 - 10.5 mg/dL Final     eGFR Non  Amer   Date Value Ref Range Status   07/29/2020 79 >60 mL/min/1.73 Final     Alkaline Phosphatase   Date Value Ref Range Status   07/29/2020 80 39 - 117 U/L Final     Total Protein   Date Value Ref Range Status   07/29/2020 6.7 6.0 - 8.5 g/dL Final     ALT (SGPT)   Date Value Ref Range Status   07/29/2020 35 (H) 1 - 33 U/L Final     AST (SGOT)   Date Value Ref Range Status   07/29/2020 24 1 - 32 U/L Final     Total Bilirubin   Date Value Ref Range Status   07/29/2020 0.3 0.0 - 1.2 mg/dL Final     Albumin   Date Value Ref Range Status   07/29/2020 4.10 3.50 - 5.20 g/dL Final     Globulin   Date Value Ref Range Status   07/29/2020 2.6 gm/dL Final     Lab Results   Component Value Date    WBC 4.85 07/29/2020    HGB 13.2 07/29/2020    HCT 38.5 07/29/2020    MCV 88.9 07/29/2020     07/29/2020     Lab Results   Component Value Date    NEUTROABS 3.40 07/29/2020     No results found for: , LABCA2, AFPTM, HCGQUANT, , CHROMGRNA, [id number], CEA, REFLABREPO        ASSESSMENT AND PLAN:      1.  Swelling on left side of face:( problem is new to me)  -Patient is complaining of swelling on left side of face that started about 1 week ago.  -Clinically had a swelling appears to be involving parotid gland or salivary gland.  -  We will get CT of facial bones with contrast to further evaluate this abnormal swelling on left side of face.  Patient will be notified about results of CT scan over the phone next week.    2.  Recurrent follicular lymphoma grade 1:  -Please review oncology history for prior treatment details  - Most recent CT scan of chest abdomen and pelvis with contrast done on July 29, 2020 did not show any evidence of relapse.  - Patient will return to clinic in December 2020 with repeat CBC and CMP on that day  - Next surveillance CT of chest abdomen and pelvis with contrast will be done in July 2021.     3.  Elevated liver function test due to fatty liver    4.  Health maintenance: Patient does not smoke.  Had a colonoscopy in last 2 years.    5. Advance Care Planning: For now patient remains full code and is able to make decisions.  Patient has health care surrogate mentioned on chart.        PHQ-9 Total Score:       Chasity Leon reports a pain score of 0.  Given her pain assessment as noted, treatment options were discussed and the following options were decided upon as a follow-up plan to address the patient's pain: No acute intervention required.         Tino Sood MD  8/20/2020  18:12        Part of this note may be an electronic transcription/translation of spoken language to printed text using the Dragon Dictation System.

## 2020-08-21 ENCOUNTER — TELEPHONE (OUTPATIENT)
Dept: ONCOLOGY | Facility: CLINIC | Age: 60
End: 2020-08-21

## 2020-08-21 NOTE — TELEPHONE ENCOUNTER
Called and spoke with pt regarding scan to be ordered.  Called and spoke with dr. Sood, ct scan of facial bones to be ordered.

## 2020-08-24 ENCOUNTER — APPOINTMENT (OUTPATIENT)
Dept: ONCOLOGY | Facility: HOSPITAL | Age: 60
End: 2020-08-24

## 2020-08-24 ENCOUNTER — APPOINTMENT (OUTPATIENT)
Dept: CT IMAGING | Facility: HOSPITAL | Age: 60
End: 2020-08-24

## 2020-08-25 ENCOUNTER — INFUSION (OUTPATIENT)
Dept: ONCOLOGY | Facility: HOSPITAL | Age: 60
End: 2020-08-25

## 2020-08-25 ENCOUNTER — HOSPITAL ENCOUNTER (OUTPATIENT)
Dept: CT IMAGING | Facility: HOSPITAL | Age: 60
Discharge: HOME | End: 2020-08-25
Admitting: INTERNAL MEDICINE

## 2020-08-25 DIAGNOSIS — C82.03 FOLLICULAR LYMPHOMA GRADE I OF INTRA-ABDOMINAL LYMPH NODES (HCC): ICD-10-CM

## 2020-08-25 DIAGNOSIS — Z45.2 ENCOUNTER FOR VENOUS ACCESS DEVICE CARE: Primary | ICD-10-CM

## 2020-08-25 DIAGNOSIS — R22.0 SWELLING OF LEFT SIDE OF FACE: ICD-10-CM

## 2020-08-25 DIAGNOSIS — K11.8 MASS OF BOTH PAROTID GLANDS: Primary | ICD-10-CM

## 2020-08-25 PROCEDURE — 25010000002 IOPAMIDOL 61 % SOLUTION: Performed by: INTERNAL MEDICINE

## 2020-08-25 PROCEDURE — 25010000003 HEPARIN LOCK FLUSH PER 10 UNITS: Performed by: INTERNAL MEDICINE

## 2020-08-25 PROCEDURE — 70487 CT MAXILLOFACIAL W/DYE: CPT

## 2020-08-25 PROCEDURE — 96523 IRRIG DRUG DELIVERY DEVICE: CPT | Performed by: INTERNAL MEDICINE

## 2020-08-25 RX ORDER — SODIUM CHLORIDE 0.9 % (FLUSH) 0.9 %
10 SYRINGE (ML) INJECTION AS NEEDED
Status: DISCONTINUED | OUTPATIENT
Start: 2020-08-25 | End: 2020-08-25 | Stop reason: HOSPADM

## 2020-08-25 RX ORDER — HEPARIN SODIUM (PORCINE) LOCK FLUSH IV SOLN 100 UNIT/ML 100 UNIT/ML
500 SOLUTION INTRAVENOUS AS NEEDED
Status: CANCELLED | OUTPATIENT
Start: 2020-09-22

## 2020-08-25 RX ORDER — SODIUM CHLORIDE 0.9 % (FLUSH) 0.9 %
10 SYRINGE (ML) INJECTION AS NEEDED
Status: CANCELLED | OUTPATIENT
Start: 2020-09-22

## 2020-08-25 RX ORDER — SODIUM CHLORIDE 0.9 % (FLUSH) 0.9 %
20 SYRINGE (ML) INJECTION AS NEEDED
Status: CANCELLED | OUTPATIENT
Start: 2020-08-25

## 2020-08-25 RX ORDER — HEPARIN SODIUM (PORCINE) LOCK FLUSH IV SOLN 100 UNIT/ML 100 UNIT/ML
500 SOLUTION INTRAVENOUS AS NEEDED
Status: DISCONTINUED | OUTPATIENT
Start: 2020-08-25 | End: 2020-08-25 | Stop reason: HOSPADM

## 2020-08-25 RX ORDER — SODIUM CHLORIDE 0.9 % (FLUSH) 0.9 %
20 SYRINGE (ML) INJECTION AS NEEDED
Status: DISCONTINUED | OUTPATIENT
Start: 2020-08-25 | End: 2020-08-25 | Stop reason: HOSPADM

## 2020-08-25 RX ADMIN — SODIUM CHLORIDE, PRESERVATIVE FREE 10 ML: 5 INJECTION INTRAVENOUS at 10:42

## 2020-08-25 RX ADMIN — IOPAMIDOL 90 ML: 612 INJECTION, SOLUTION INTRAVENOUS at 10:35

## 2020-08-25 RX ADMIN — HEPARIN 500 UNITS: 100 SYRINGE at 10:42

## 2020-08-26 ENCOUNTER — TELEPHONE (OUTPATIENT)
Dept: ONCOLOGY | Facility: CLINIC | Age: 60
End: 2020-08-26

## 2020-08-26 NOTE — TELEPHONE ENCOUNTER
Patient thought she discussed, with Dr. Sood, having a port flush a week after her appt 08/27 w/ Dr. Ibrahim. I let her know I'd request someone call her back if this appt is necessary.     Pt ph# [phone]

## 2020-08-27 ENCOUNTER — OFFICE VISIT (OUTPATIENT)
Dept: OTOLARYNGOLOGY | Facility: CLINIC | Age: 60
End: 2020-08-27

## 2020-08-27 VITALS — HEIGHT: 64 IN | TEMPERATURE: 98.9 F | OXYGEN SATURATION: 97 % | WEIGHT: 227.8 LBS | BODY MASS INDEX: 38.89 KG/M2

## 2020-08-27 DIAGNOSIS — R22.1 NECK MASS: Primary | ICD-10-CM

## 2020-08-27 PROCEDURE — 99203 OFFICE O/P NEW LOW 30 MIN: CPT | Performed by: OTOLARYNGOLOGY

## 2020-08-27 RX ORDER — PIOGLITAZONEHYDROCHLORIDE 45 MG/1
45 TABLET ORAL DAILY
COMMUNITY
Start: 2020-06-11

## 2020-08-27 RX ORDER — DOXEPIN HYDROCHLORIDE 25 MG/1
25 CAPSULE ORAL NIGHTLY
COMMUNITY
Start: 2020-07-14 | End: 2022-10-13

## 2020-08-27 RX ORDER — DOXYCYCLINE 100 MG/1
CAPSULE ORAL
COMMUNITY
Start: 2020-08-26 | End: 2021-11-23

## 2020-08-27 RX ORDER — FAMOTIDINE 40 MG/1
40 TABLET, FILM COATED ORAL
COMMUNITY
Start: 2020-02-18 | End: 2022-07-07

## 2020-08-27 RX ORDER — CYCLOBENZAPRINE HCL 10 MG
TABLET ORAL
COMMUNITY
Start: 2020-06-22

## 2020-08-27 RX ORDER — GLIMEPIRIDE 4 MG/1
4 TABLET ORAL
COMMUNITY
Start: 2020-08-11

## 2020-08-27 RX ORDER — BUSPIRONE HYDROCHLORIDE 30 MG/1
30 TABLET ORAL 2 TIMES DAILY
COMMUNITY
Start: 2020-06-18 | End: 2023-02-15

## 2020-08-27 NOTE — PROGRESS NOTES
"Subjective   Chasity Leon is a 60 y.o. female.   This is a consultation from Dr. Sood  History of Present Illness   Patient has a history of non-Hodgkin's lymphoma, recurrent follicular grade 1.  This was initially diagnosed in April 2015 and she has had multiple relapses since then.  Reports that just in the last week she is developed a mass in the left side of of her neck just below her jawline.  She said it is \"uncomfortable\" but not really painful.  Denies any fevers, chills, night sweats.  No unexpected weight loss.  No known insect bites or animal scratches.  No dental issues.  Nothing in particular brought this on.  Underwent a CT scan that is personally reviewed.  This shows a 3.6 cm mass in the left neck and between the submandibular and parotid gland.  There is also a 1.9 cm mass in the right parotid gland that the patient says she cannot feel.    The following portions of the patient's history were reviewed and updated as appropriate: allergies, current medications, past family history, past medical history, past social history, past surgical history and problem list.      Chasity Leon reports that she quit smoking about 22 years ago. She quit after 12.00 years of use. She has never used smokeless tobacco. She reports that she does not drink alcohol or use drugs.  Patient is not a tobacco user and has not been counseled for use of tobacco products    Family History   Problem Relation Age of Onset   • Breast cancer Mother    • Cancer Mother    • Diabetes Mother    • Heart disease Father    • Liver cancer Father    • Cancer Father    • Diabetes Sister         INSULIN DEPENDENT   • Cancer Sister    • Diabetes Brother         INSULIN DEPENDENT   • Coronary artery disease Other    • Diabetes Other    • Diabetes Maternal Grandmother    • Cancer Maternal Grandfather        Allergies   Allergen Reactions   • Ace Inhibitors Cough   • Latex      BLISTER     • Morphine And Related Nausea And Vomiting   • " Lortab [Hydrocodone-Acetaminophen] Palpitations         Current Outpatient Medications:   •  ARIPiprazole (ABILIFY) 20 MG tablet, Take 20 mg by mouth Every Night., Disp: , Rfl:   •  busPIRone (BUSPAR) 30 MG tablet, , Disp: , Rfl:   •  Calcium Carbonate-Vitamin D 600-200 MG-UNIT tablet, Take 1 tablet by mouth Daily., Disp: , Rfl:   •  clonazePAM (KlonoPIN) 1 MG tablet, Take 1 mg by mouth as needed for seizures., Disp: , Rfl:   •  cyclobenzaprine (FLEXERIL) 10 MG tablet, TAKE 1 TABLET (10 MG) BY MOUTH 3 TIMES DAILY AS NEEDED FOR MUSCLE SPASMS - - MAY CAUSE DROWSINESS, Disp: , Rfl:   •  Desvenlafaxine Succinate (PRISTIQ PO), Take 1 tablet by mouth daily., Disp: , Rfl:   •  doxepin (SINEquan) 25 MG capsule, , Disp: , Rfl:   •  doxycycline (MONODOX) 100 MG capsule, , Disp: , Rfl:   •  famotidine (PEPCID) 40 MG tablet, Take 40 mg by mouth., Disp: , Rfl:   •  glimepiride (AMARYL) 4 MG tablet, , Disp: , Rfl:   •  lamoTRIgine (LAMICTAL) 150 MG tablet, Take 150 mg by mouth 2 (two) times a day., Disp: , Rfl:   •  losartan (COZAAR) 100 MG tablet, Take 100 mg by mouth daily., Disp: , Rfl:   •  metFORMIN (GLUCOPHAGE) 1000 MG tablet, , Disp: , Rfl:   •  Multiple Vitamins-Minerals (CENTRUM SILVER PO), Take 1 tablet by mouth daily., Disp: , Rfl:   •  oxyCODONE-acetaminophen (PERCOCET) 5-325 MG per tablet, Take 1 tablet by mouth every 6 (six) hours as needed., Disp: , Rfl:   •  pioglitazone (ACTOS) 45 MG tablet, , Disp: , Rfl:   •  promethazine (PHENERGAN) 25 MG tablet, Take 1 tablet by mouth Every 6 (Six) Hours As Needed for Nausea or Vomiting., Disp: 40 tablet, Rfl: 3  •  TRAZODONE HCL PO, Take 225 mg by mouth every night., Disp: , Rfl:     Past Medical History:   Diagnosis Date   • Acute bronchitis    • Agoraphobia with panic disorder     on SNRI, prn buspar, prn klonopin     • Anxiety    • Arthritis    • Atrophic vaginitis    • Chest pain     work up as new onset angina    • Depression      MDD      • Essential hypertension    •  Follicular non-Hodgkin's lymphoma (CMS/HCC)    • Generalized anxiety disorder     SSRI - buspar, cont cymbalta, prn klonopin      • Hip pain     arthritis, artifical right hip     • History of bone density study 02/09/2009    DEXA (bone density) test, peripheral (Normal   • History of echocardiogram 01/03/2012    Echocardiogram 23743 (Normal echocardigram. EF 55-60%)   • History of mammogram    • Hyperlipidemia    • Mass of lower limb    • Nuclear senile cataract    • Obesity    • Otalgia     ENT REFER   • Panic disorder     with agoraphobia. On buspar and klonopin now      • Type 2 diabetes mellitus (CMS/HCC)     NO BDR   • Upper respiratory infection        Past Surgical History:   Procedure Laterality Date   • CENTRAL VENOUS LINE INSERTION  03/11/2016    Central line insertion (Successful placement of right upper extremity midline.)   • COLONOSCOPY     • COLONOSCOPY N/A 7/18/2019    Procedure: COLONOSCOPY;  Surgeon: Brandon Salvador DO;  Location: [facility] ENDOSCOPY;  Service: Gastroenterology   • CYSTOSCOPY  03/23/1976    Cystoscopy (external urethroplasty and cysto-panendoscopy,urethal hymenal fusion with hypospadias)   • DILATATION AND CURETTAGE  01/25/1980    D&C (removal of IUD)   • EXCISION LESION  04/15/2014    Remove thigh lesion (Excision of a mass of the right thigh using a 16.2 cm incision with intermediate layered closure.)   • HYSTEROSCOPY  02/28/2003    Hysteroscope procedure (diagnostic hysteroscopy with fractional D&C)   • INJECTION OF MEDICATION  08/22/2011    kenalog (1)   • OTHER SURGICAL HISTORY      Explore parathyroid glands   • OTHER SURGICAL HISTORY  08/26/1985    Laparosc diagnostic (desires elective tubal reversal)   • PAP SMEAR  05/24/2005    PAP SMEAR (normal)   • IN INSJ TUNNELED CVC W/O SUBQ PORT/ AGE 5 YR/> Right 4/4/2017    Procedure: INSERTION VENOUS ACCESS DEVICE (MEDIPORT) right chest;  Surgeon: Fortino Medina MD;  Location: [facility] OR;  Service: General   • IN INSJ TUNNELED CVC  W/O SUBQ PORT/ AGE 5 YR/> N/A 6/6/2017    Procedure: INSERTION VENOUS ACCESS DEVICE   (MEDIPORT)   (C-ARM#1);  Surgeon: Fortino Medina MD;  Location: [facility];  Service: General   • SHOULDER ARTHROSCOPY  11/11/2013    Shoulder arthroscopy/surgery (Right shoulder. Rotator cuff repair. Subacromial decompression. Edgar procedure.)   • SHOULDER SURGERY  12/28/2015    Shoulder surgery procedure (Arthroscopy of the left shoulder with rotator cuff repair.Edgar procedure.Subacromial decompression.)   • TONSILLECTOMY  1972   • TONSILLECTOMY AND ADENOIDECTOMY  05/29/1967    T&A (hypertrophied tonsils and adenoids with recurrent infection)   • TOTAL ABDOMINAL HYSTERECTOMY WITH SALPINGO OOPHORECTOMY  02/03/2004    ARIELLE/BSO (with a preop fractional dilation and curettage with frozen section,menorrhagia,dysmenorrhea,unresponsive to medical intervention)   • TOTAL HIP ARTHROPLASTY     • VAGINA SURGERY  03/02/1981    Anesth, surgery on vagina (colpotomy with bilateral partial salpingectomy, multiparity contraindicated)         Review of Systems   Musculoskeletal: Positive for arthralgias and back pain.   Neurological: Positive for headaches.   Psychiatric/Behavioral:        Not assessed   All other systems reviewed and are negative.          Objective   Physical Exam  General: Well-developed well-nourished female in no acute distress.  Alert and oriented x-3. Head: Normocephalic. Face: Facial nerve function appears to be intact and symmetrical bilaterally. PERRL. EOMI. Voice:Strong. Speech:Fluent  Ears: External ears no deformity, canals no discharge, tympanic membranes intact clear and mobile bilaterally.  Nose: Nares show no discharge mass polyp or purulence.  Boggy mucosa is present.  No gross external deformity.  Septum: Midline  Oral cavity: Lips and gums without lesions.  Tongue and floor of mouth without lesions.  Parotid and submandibular ducts unobstructed.  No mucosal lesions on the buccal mucosa or vestibule of the  mouth.  Pharynx: No erythema exudate mass or ulcer  Neck: Palpable mass along the left jawline that is firm.  Anteriorly extends well beyond the anterior margin of the parotid but posteriorly cannot differentiate between the end of the mass in the parotid gland.  Measures at least 3.5 cm in diameter.  I cannot palpate a mass in the right neck or parotid gland.  No other palpable masses in the neck.  No thyromegaly.  Trachea and larynx midline.  No palpable masses in the submandibular glands bilaterally.        Assessment/Plan   Chasity was seen today for swollen glands.    Diagnoses and all orders for this visit:    Neck mass  -     Cancel: Flow Cytometry; Future  -     Cancel: Fine Needle Aspiration  -     Flow Cytometry; Future  -     Fine Needle Aspiration  -     Flow Cytometry  -     Flow Cytometry        Plan: Worrisome for recurrent lymphoma.  Will perform fine-needle aspiration biopsy with flow cytometry today in hopes of establishing diagnosis without having to surgically excise this mass which would be problematic in this area.  Patient understand risks of bleeding, bruising, nondiagnostic results and possible/likely need for further treatment depending on results.  Benefit is to attempt to obtain a diagnosis without invasive surgery.  Patient is agreeable.  Dr. Marks of pathology is in attendance to receive the specimen.  The left neck is cleansed with alcohol and infiltrated with 1% Xylocaine with epinephrine and fine-needle aspiration of the mass is carried out.  Patient be contacted by telephone with results and further treatment planning.    My thanks Dr. Sood for this consult.

## 2020-08-27 NOTE — TELEPHONE ENCOUNTER
Get seen by Dr. Ibrahim today.  If he decides to do a biopsy it may take some more time.  Give a tentative appointment for week of September 7, 2020.  That will provide enough time for pathology results to come back.  Thank you

## 2020-08-28 ENCOUNTER — TELEPHONE (OUTPATIENT)
Dept: OTOLARYNGOLOGY | Facility: CLINIC | Age: 60
End: 2020-08-28

## 2020-08-28 NOTE — TELEPHONE ENCOUNTER
No answer; left message to call office  ----- Message from Mandeep Ibrahim MD sent at 8/28/2020 11:23 AM CDT -----  Contact: [phone]  today  ----- Message -----  From: Dre Vasquez  Sent: 8/28/2020  10:58 AM CDT  To: Mandeep Ibrahim MD, Lola Zarate, #    Called wanting to know when she can remove her bandages.

## 2020-08-31 ENCOUNTER — TELEPHONE (OUTPATIENT)
Dept: OTOLARYNGOLOGY | Facility: CLINIC | Age: 60
End: 2020-08-31

## 2020-08-31 ENCOUNTER — TELEPHONE (OUTPATIENT)
Dept: ONCOLOGY | Facility: CLINIC | Age: 60
End: 2020-08-31

## 2020-08-31 NOTE — TELEPHONE ENCOUNTER
----- Message from Lola Zarate sent at 8/31/2020  8:37 AM CDT -----  Contact: [phone]  Pt calling for bx results.     Informed patient that her needle biopsy results were consistent with a recurrence of her lymphoma.  She has an appointment with Dr. Sood on 9/8/2020 and is encouraged to keep that appointment.  Told her she could call me with any questions or problems.

## 2020-09-02 DIAGNOSIS — C82.03 FOLLICULAR LYMPHOMA GRADE I OF INTRA-ABDOMINAL LYMPH NODES (HCC): ICD-10-CM

## 2020-09-02 DIAGNOSIS — K11.8 MASS OF BOTH PAROTID GLANDS: Primary | ICD-10-CM

## 2020-09-03 ENCOUNTER — TELEPHONE (OUTPATIENT)
Dept: ONCOLOGY | Facility: CLINIC | Age: 60
End: 2020-09-03

## 2020-09-03 ENCOUNTER — APPOINTMENT (OUTPATIENT)
Dept: ONCOLOGY | Facility: CLINIC | Age: 60
End: 2020-09-03

## 2020-09-03 DIAGNOSIS — K11.8 MASS OF BOTH PAROTID GLANDS: Primary | ICD-10-CM

## 2020-09-03 DIAGNOSIS — C82.03 FOLLICULAR LYMPHOMA GRADE I OF INTRA-ABDOMINAL LYMPH NODES (HCC): ICD-10-CM

## 2020-09-03 NOTE — TELEPHONE ENCOUNTER
Patient asking to speak with Adamaris or whomever can advise, wanting to know if her port will be accessed before her PET scan tomorrow     Her phone# is [phone]

## 2020-09-03 NOTE — TELEPHONE ENCOUNTER
Called and spoke with pt regarding pet scan.  We are waiting on nuclear to call us back to ensure that pet is ordered correctly in order to get correct images.  Once we hear back from nuclear, I will call pt back with appt time.  V/u obtained.

## 2020-09-04 ENCOUNTER — HOSPITAL ENCOUNTER (OUTPATIENT)
Dept: PET IMAGING | Facility: HOSPITAL | Age: 60
Discharge: HOME | End: 2020-09-04
Admitting: INTERNAL MEDICINE

## 2020-09-04 DIAGNOSIS — C82.03 FOLLICULAR LYMPHOMA GRADE I OF INTRA-ABDOMINAL LYMPH NODES (HCC): ICD-10-CM

## 2020-09-04 DIAGNOSIS — K11.8 MASS OF BOTH PAROTID GLANDS: ICD-10-CM

## 2020-09-04 LAB
LAB AP CASE REPORT: NORMAL
LAB AP FLOW CYTOMETRY REPORT,ADDENDUM: NORMAL
PATH REPORT.FINAL DX SPEC: NORMAL

## 2020-09-04 PROCEDURE — 78815 PET IMAGE W/CT SKULL-THIGH: CPT

## 2020-09-04 PROCEDURE — 0 FLUDEOXYGLUCOSE F18 SOLUTION: Performed by: INTERNAL MEDICINE

## 2020-09-04 PROCEDURE — A9552 F18 FDG: HCPCS | Performed by: INTERNAL MEDICINE

## 2020-09-04 RX ADMIN — FLUDEOXYGLUCOSE F18 1 DOSE: 300 INJECTION INTRAVENOUS at 13:31

## 2020-09-08 ENCOUNTER — TELEPHONE (OUTPATIENT)
Dept: ONCOLOGY | Facility: CLINIC | Age: 60
End: 2020-09-08

## 2020-09-08 ENCOUNTER — APPOINTMENT (OUTPATIENT)
Dept: ONCOLOGY | Facility: CLINIC | Age: 60
End: 2020-09-08

## 2020-09-08 ENCOUNTER — OFFICE VISIT (OUTPATIENT)
Dept: ONCOLOGY | Facility: CLINIC | Age: 60
End: 2020-09-08

## 2020-09-08 DIAGNOSIS — C82.08 GRADE 1 FOLLICULAR LYMPHOMA OF LYMPH NODES OF MULTIPLE REGIONS (HCC): Primary | ICD-10-CM

## 2020-09-08 PROCEDURE — G8730 PAIN DOC POS AND PLAN: HCPCS | Performed by: INTERNAL MEDICINE

## 2020-09-08 PROCEDURE — 1123F ACP DISCUSS/DSCN MKR DOCD: CPT | Performed by: INTERNAL MEDICINE

## 2020-09-08 PROCEDURE — 99442 PR PHYS/QHP TELEPHONE EVALUATION 11-20 MIN: CPT | Performed by: INTERNAL MEDICINE

## 2020-09-08 PROCEDURE — G9903 PT SCRN TBCO ID AS NON USER: HCPCS | Performed by: INTERNAL MEDICINE

## 2020-09-08 RX ORDER — LENALIDOMIDE 20 MG/1
20 CAPSULE ORAL DAILY
Qty: 21 CAPSULE | Refills: 2 | Status: SHIPPED | OUTPATIENT
Start: 2020-09-08 | End: 2020-10-13

## 2020-09-08 RX ORDER — ONDANSETRON HYDROCHLORIDE 8 MG/1
4 TABLET, FILM COATED ORAL 4 TIMES DAILY PRN
Qty: 40 TABLET | Refills: 5 | Status: CANCELLED | OUTPATIENT
Start: 2020-09-14

## 2020-09-08 RX ORDER — ONDANSETRON 4 MG/1
4 TABLET, FILM COATED ORAL 4 TIMES DAILY PRN
Qty: 40 TABLET | Refills: 3 | Status: SHIPPED | OUTPATIENT
Start: 2020-09-08 | End: 2022-06-03 | Stop reason: SDUPTHER

## 2020-09-08 RX ORDER — LENALIDOMIDE 20 MG/1
20 CAPSULE ORAL DAILY
Qty: 21 CAPSULE | Refills: 2 | Status: CANCELLED | OUTPATIENT
Start: 2020-09-14

## 2020-09-08 NOTE — PROGRESS NOTES
DATE OF VISIT: 9/8/2020      REASON FOR VISIT: Recurrent follicular lymphoma, left parotid mass consistent with follicular lymphoma, fatty liver with elevated liver function test      HISTORY OF PRESENT ILLNESS:    60-year-old female with medical problems significant for obesity, hypertension, anxiety, dyslipidemia, recurrent follicular lymphoma who was last seen here at clinic on August 20, 2020 with complaint of swelling on left side of face in the area of parotid gland.  Patient was referred to Dr. Ibrahim after CT scan of facial bones showing enlargement of parotid gland with mass on bilateral side.  Patient underwent fine-needle aspiration by Dr. Ibrahim on August 27, 2020 which showed CD10 plus B cell consistent with follicular lymphoma.  Patient had a subsequent PET/CT done last week, she is here to discuss the results and further recommendation.  Denies any bleeding.  Denies any drenching night sweats.      Oncology history:    1.  Recurrent follicular lymphoma grade 1,  -Patient initially diagnosed in April 2015 for which patient underwent palliative radiation treatment.  -Second relapse happened in September 2016 for again patient had a radiation treatment.  - Patient was found to have enlarged aortocaval lymph node in April 2017 for which patient initially received rituximab every 2 months without any significant improvement.  -Subsequently patient received 4 cycles of bendamustine and rituximab between April 30, 2018 and August 21, 2018.  -Since then patient has been on observation with surveillance CT scan.              PAST MEDICAL HISTORY:    Past Medical History:   Diagnosis Date   • Acute bronchitis    • Agoraphobia with panic disorder     on SNRI, prn buspar, prn klonopin     • Anxiety    • Arthritis    • Atrophic vaginitis    • Chest pain     work up as new onset angina    • Depression      MDD      • Essential hypertension    • Follicular non-Hodgkin's lymphoma (CMS/HCC)    • Generalized anxiety  disorder     SSRI - buspar, cont cymbalta, prn klonopin      • Hip pain     arthritis, artifical right hip     • History of bone density study 2009    DEXA (bone density) test, peripheral (Normal   • History of echocardiogram 2012    Echocardiogram 52813 (Normal echocardigram. EF 55-60%)   • History of mammogram    • Hyperlipidemia    • Mass of lower limb    • Nuclear senile cataract    • Obesity    • Otalgia     ENT REFER   • Panic disorder     with agoraphobia. On buspar and klonopin now      • Type 2 diabetes mellitus (CMS/HCC)     NO BDR   • Upper respiratory infection        SOCIAL HISTORY:    Social History     Tobacco Use   • Smoking status: Former Smoker     Years: 12.00     Last attempt to quit: 1998     Years since quittin.7   • Smokeless tobacco: Never Used   • Tobacco comment: Smoked 1 pack per 2 weeks   Substance Use Topics   • Alcohol use: No   • Drug use: No       Surgical History :  Past Surgical History:   Procedure Laterality Date   • CENTRAL VENOUS LINE INSERTION  2016    Central line insertion (Successful placement of right upper extremity midline.)   • COLONOSCOPY     • COLONOSCOPY N/A 2019    Procedure: COLONOSCOPY;  Surgeon: Brandno Salvador DO;  Location: [facility] ENDOSCOPY;  Service: Gastroenterology   • CYSTOSCOPY  1976    Cystoscopy (external urethroplasty and cysto-panendoscopy,urethal hymenal fusion with hypospadias)   • DILATATION AND CURETTAGE  1980    D&C (removal of IUD)   • EXCISION LESION  04/15/2014    Remove thigh lesion (Excision of a mass of the right thigh using a 16.2 cm incision with intermediate layered closure.)   • HYSTEROSCOPY  2003    Hysteroscope procedure (diagnostic hysteroscopy with fractional D&C)   • INJECTION OF MEDICATION  2011    kenalog (1)   • OTHER SURGICAL HISTORY      Explore parathyroid glands   • OTHER SURGICAL HISTORY  1985    Laparosc diagnostic (desires elective tubal reversal)   • PAP  SMEAR  05/24/2005    PAP SMEAR (normal)   • LA INSJ TUNNELED CVC W/O SUBQ PORT/ AGE 5 YR/> Right 4/4/2017    Procedure: INSERTION VENOUS ACCESS DEVICE (MEDIPORT) right chest;  Surgeon: Fortino Medina MD;  Location: [facility];  Service: General   • LA INSJ TUNNELED CVC W/O SUBQ PORT/ AGE 5 YR/> N/A 6/6/2017    Procedure: INSERTION VENOUS ACCESS DEVICE   (MEDIPORT)   (C-ARM#1);  Surgeon: Fortino Medina MD;  Location: [facility];  Service: General   • SHOULDER ARTHROSCOPY  11/11/2013    Shoulder arthroscopy/surgery (Right shoulder. Rotator cuff repair. Subacromial decompression. Edgar procedure.)   • SHOULDER SURGERY  12/28/2015    Shoulder surgery procedure (Arthroscopy of the left shoulder with rotator cuff repair.Edgar procedure.Subacromial decompression.)   • TONSILLECTOMY  1972   • TONSILLECTOMY AND ADENOIDECTOMY  05/29/1967    T&A (hypertrophied tonsils and adenoids with recurrent infection)   • TOTAL ABDOMINAL HYSTERECTOMY WITH SALPINGO OOPHORECTOMY  02/03/2004    ARIELLE/BSO (with a preop fractional dilation and curettage with frozen section,menorrhagia,dysmenorrhea,unresponsive to medical intervention)   • TOTAL HIP ARTHROPLASTY     • VAGINA SURGERY  03/02/1981    Anesth, surgery on vagina (colpotomy with bilateral partial salpingectomy, multiparity contraindicated)       ALLERGIES:    Allergies   Allergen Reactions   • Ace Inhibitors Cough   • Latex      BLISTER     • Morphine And Related Nausea And Vomiting   • Lortab [Hydrocodone-Acetaminophen] Palpitations         FAMILY HISTORY:  Family History   Problem Relation Age of Onset   • Breast cancer Mother    • Cancer Mother    • Diabetes Mother    • Heart disease Father    • Liver cancer Father    • Cancer Father    • Diabetes Sister         INSULIN DEPENDENT   • Cancer Sister    • Diabetes Brother         INSULIN DEPENDENT   • Coronary artery disease Other    • Diabetes Other    • Diabetes Maternal Grandmother    • Cancer Maternal Grandfather             REVIEW OF SYSTEMS:      CONSTITUTIONAL:  Complains of fatigue. Denies any fever, chills or weight loss.     EYES: No visual disturbances. No discharge. No new lesions    ENMT: Complains of swelling on left side of face.  No epistaxis, mouth sores or difficulty swallowing.    RESPIRATORY:  No new shortness of breath. No new cough or hemoptysis.    CARDIOVASCULAR:  No chest pain or palpitations.    GASTROINTESTINAL: Positive for intermittent nausea without vomiting.  No abdominal pain  or blood in the stool.    GENITOURINARY: No Dysuria or Hematuria.    MUSCULOSKELETAL: Positive for chronic back pain and hip pain    LYMPHATICS:  Denies any abnormal swollen glands anywhere in the body.    NEUROLOGICAL : No tingling or numbness. No headache or dizziness. No seizures or balance problems.    SKIN: No new skin lesions.    ENDOCRINE : No new hot or cold intolerance. No new polyuria . No polydipsia.        PHYSICAL EXAMINATION:    Unable to obtain secondary to being telephone visit      DIAGNOSTIC DATA:    Glucose   Date Value Ref Range Status   07/29/2020 211 (H) 65 - 99 mg/dL Final     Sodium   Date Value Ref Range Status   07/29/2020 134 (L) 136 - 145 mmol/L Final     Potassium   Date Value Ref Range Status   07/29/2020 4.2 3.5 - 5.2 mmol/L Final     Comment:     Specimen hemolyzed.  Results may be affected.     CO2   Date Value Ref Range Status   07/29/2020 22.0 22.0 - 29.0 mmol/L Final     Chloride   Date Value Ref Range Status   07/29/2020 98 98 - 107 mmol/L Final     Anion Gap   Date Value Ref Range Status   07/29/2020 14.0 5.0 - 15.0 mmol/L Final     Creatinine   Date Value Ref Range Status   07/29/2020 0.75 0.57 - 1.00 mg/dL Final     BUN   Date Value Ref Range Status   07/29/2020 9 8 - 23 mg/dL Final     BUN/Creatinine Ratio   Date Value Ref Range Status   07/29/2020 12.0 7.0 - 25.0 Final     Calcium   Date Value Ref Range Status   07/29/2020 9.7 8.6 - 10.5 mg/dL Final     eGFR Non  Amer   Date  Value Ref Range Status   07/29/2020 79 >60 mL/min/1.73 Final     Alkaline Phosphatase   Date Value Ref Range Status   07/29/2020 80 39 - 117 U/L Final     Total Protein   Date Value Ref Range Status   07/29/2020 6.7 6.0 - 8.5 g/dL Final     ALT (SGPT)   Date Value Ref Range Status   07/29/2020 35 (H) 1 - 33 U/L Final     AST (SGOT)   Date Value Ref Range Status   07/29/2020 24 1 - 32 U/L Final     Total Bilirubin   Date Value Ref Range Status   07/29/2020 0.3 0.0 - 1.2 mg/dL Final     Albumin   Date Value Ref Range Status   07/29/2020 4.10 3.50 - 5.20 g/dL Final     Globulin   Date Value Ref Range Status   07/29/2020 2.6 gm/dL Final     Lab Results   Component Value Date    WBC 4.85 07/29/2020    HGB 13.2 07/29/2020    HCT 38.5 07/29/2020    MCV 88.9 07/29/2020     07/29/2020     Lab Results   Component Value Date    NEUTROABS 3.40 07/29/2020     No results found for: , LABCA2, AFPTM, HCGQUANT, , CHROMGRNA, [id number], CEA, REFLABREPO        PATHOLOGY:  Pathology report from August 27, 2020 showed:                RADIOLOGY DATA :  Nm Pet Skull Base To Mid Thigh    Result Date: 9/4/2020  CONCLUSION: 1.  Right parotid lymph node with abnormal FDG activity with SUV of 3.06. Left submandibular angle mandible conglomeration of lymphadenopathy with abnormal FDG activity of 11.04. Left paravertebral enlarged lymph node with abnormal FDG activity with SUV of 4.72. These findings would be suspicious for recurrent follicular lymphoma. 2. Remainder of PET scan is unremarkable as described above. (Please note:  a Nuclear Medicine bone scan is considered more sensitive for the detection of osteoblastic metastatic disease). Electronically signed by:  Benito Matthews MD  9/4/2020 6:01 PM CDT Workstation: [id number]        ASSESSMENT AND PLAN:      1.  Recurrent follicular lymphoma:  - Please review oncology history for prior treatment details  - Patient had a swelling on left side of face in the region of parotid  gland for which patient had a CT scan of facial bones done on August 25, 2020 that showed a mass in the region of parotid gland bilaterally.  -Subsequently patient was referred to Dr. Ibrahim and had a fine-needle aspirate that showed CD10 plus positive B-cell of small size consistent with follicular lymphoma.  -Patient had a PET/CT done on September 4, 2020 that shows intense uptake in the region of left parotid gland and minimal uptake on right parotid gland area.  There is a lymph node on left side of neck that is uptake of 4.72 as well.  Result of PET/CT showing recurrence of follicular lymphoma was discussed with patient.  - Treatment option consisting of Revlimid with rituximab was discussed with patient today.  -Side effect of treatment including but not limited to risk of DVT, neuropathy, lowering of blood count, risk of infection, need for blood transfusion, risk of bleeding, nausea, vomiting, diarrhea, elevation in liver enzymes were discussed with patient today.  Will provide her information to read about chemotherapy regimen today.  -Patient does understand that her cancer is progressing which is potentially life-threatening.    2.  Fatty liver with elevated liver enzyme    3.  Health maintenance: Patient does not smoke.  Had a colonoscopy in last 2 years    4. Advance Care Planning: For now patient remains full code and is able to make decisions.  Patient has health care surrogate mentioned on chart.         PHQ-9 Total Score:       Chasity Leon reports a pain score of 3.  Given her pain assessment as noted, treatment options were discussed and the following options were decided upon as a follow-up plan to address the patient's pain: continuation of current treatment plan for pain.       You have chosen to receive care through a telephone visit. Do you consent to use a telephone visit for your medical care today? Yes    This visit has been rescheduled as a phone visit to comply with patient safety  concerns in accordance with CDC recommendations. Total time of discussion was 14 minutes.      Tino Sood MD  9/8/2020  09:44        Part of this note may be an electronic transcription/translation of spoken language to printed text using the Dragon Dictation System.

## 2020-09-09 ENCOUNTER — TELEPHONE (OUTPATIENT)
Dept: ONCOLOGY | Facility: CLINIC | Age: 60
End: 2020-09-09

## 2020-09-09 NOTE — TELEPHONE ENCOUNTER
Called and spoke with pt regarding pt question.  For now, pt is to get covid test on 9/11 and await results.  I will get back in touch with pt regarding further appt.  V/u obtained.

## 2020-09-09 NOTE — TELEPHONE ENCOUNTER
Caller: VALDO CADET    Relationship to patient: SELF    Best call back number: [phone]    Concerns or Questions if Applicable:  Pt has Chemo Teach in the morning 9AM,9.10.20.     HER  TESTED POSITIVE YESTERDAY, 9.8.20 FOR COVID-19    PT DOES NOT HAVE ANY SYMPTOMS but needs to reschedule

## 2020-09-14 ENCOUNTER — TELEPHONE (OUTPATIENT)
Dept: ONCOLOGY | Facility: HOSPITAL | Age: 60
End: 2020-09-14

## 2020-09-14 NOTE — TELEPHONE ENCOUNTER
----- Message from Chasity Leon sent at 9/12/2020  5:26 PM CDT -----  Regarding: Non-Urgent Medical Question  Contact: [phone]  I have Chemo teach 9- at 8:40 and port draw at 8:45. I believe the times are wrong.

## 2020-09-14 NOTE — TELEPHONE ENCOUNTER
----- Message from Chasity Leon sent at 9/12/2020  5:26 PM CDT -----  Regarding: Non-Urgent Medical Question  Contact: [phone]  I have Chemo teach 9- at 8:40 and port draw at 8:45. I believe the times are wrong.

## 2020-09-15 ENCOUNTER — APPOINTMENT (OUTPATIENT)
Dept: ONCOLOGY | Facility: HOSPITAL | Age: 60
End: 2020-09-15

## 2020-09-21 ENCOUNTER — INFUSION (OUTPATIENT)
Dept: ONCOLOGY | Facility: HOSPITAL | Age: 60
End: 2020-09-21

## 2020-09-21 ENCOUNTER — APPOINTMENT (OUTPATIENT)
Dept: ONCOLOGY | Facility: HOSPITAL | Age: 60
End: 2020-09-21

## 2020-09-21 ENCOUNTER — OFFICE VISIT (OUTPATIENT)
Dept: ONCOLOGY | Facility: CLINIC | Age: 60
End: 2020-09-21

## 2020-09-21 ENCOUNTER — DOCUMENTATION (OUTPATIENT)
Dept: NUTRITION | Facility: HOSPITAL | Age: 60
End: 2020-09-21

## 2020-09-21 VITALS
SYSTOLIC BLOOD PRESSURE: 156 MMHG | TEMPERATURE: 98 F | BODY MASS INDEX: 38.76 KG/M2 | DIASTOLIC BLOOD PRESSURE: 79 MMHG | HEIGHT: 64 IN | HEART RATE: 92 BPM | WEIGHT: 227 LBS | RESPIRATION RATE: 20 BRPM

## 2020-09-21 DIAGNOSIS — C82.08 GRADE 1 FOLLICULAR LYMPHOMA OF LYMPH NODES OF MULTIPLE REGIONS (HCC): Primary | ICD-10-CM

## 2020-09-21 DIAGNOSIS — C82.03 FOLLICULAR LYMPHOMA GRADE I OF INTRA-ABDOMINAL LYMPH NODES (HCC): ICD-10-CM

## 2020-09-21 DIAGNOSIS — R79.89 ELEVATED LFTS: ICD-10-CM

## 2020-09-21 DIAGNOSIS — K11.8 MASS OF BOTH PAROTID GLANDS: ICD-10-CM

## 2020-09-21 DIAGNOSIS — C82.08 GRADE 1 FOLLICULAR LYMPHOMA OF LYMPH NODES OF MULTIPLE REGIONS (HCC): ICD-10-CM

## 2020-09-21 LAB
ALBUMIN SERPL-MCNC: 4.4 G/DL (ref 3.5–5.2)
ALBUMIN/GLOB SERPL: 1.7 G/DL
ALP SERPL-CCNC: 77 U/L (ref 39–117)
ALT SERPL W P-5'-P-CCNC: 37 U/L (ref 1–33)
ANION GAP SERPL CALCULATED.3IONS-SCNC: 16 MMOL/L (ref 5–15)
AST SERPL-CCNC: 27 U/L (ref 1–32)
BASOPHILS # BLD AUTO: 0.04 10*3/MM3 (ref 0–0.2)
BASOPHILS NFR BLD AUTO: 0.7 % (ref 0–1.5)
BILIRUB SERPL-MCNC: 0.2 MG/DL (ref 0–1.2)
BUN SERPL-MCNC: 8 MG/DL (ref 8–23)
BUN/CREAT SERPL: 11 (ref 7–25)
CALCIUM SPEC-SCNC: 9.9 MG/DL (ref 8.6–10.5)
CHLORIDE SERPL-SCNC: 97 MMOL/L (ref 98–107)
CO2 SERPL-SCNC: 22 MMOL/L (ref 22–29)
CREAT SERPL-MCNC: 0.73 MG/DL (ref 0.57–1)
DEPRECATED RDW RBC AUTO: 46.8 FL (ref 37–54)
EOSINOPHIL # BLD AUTO: 0.01 10*3/MM3 (ref 0–0.4)
EOSINOPHIL NFR BLD AUTO: 0.2 % (ref 0.3–6.2)
ERYTHROCYTE [DISTWIDTH] IN BLOOD BY AUTOMATED COUNT: 14 % (ref 12.3–15.4)
GFR SERPL CREATININE-BSD FRML MDRD: 81 ML/MIN/1.73
GLOBULIN UR ELPH-MCNC: 2.6 GM/DL
GLUCOSE SERPL-MCNC: 163 MG/DL (ref 65–99)
HCT VFR BLD AUTO: 40.6 % (ref 34–46.6)
HGB BLD-MCNC: 14 G/DL (ref 12–15.9)
IMM GRANULOCYTES # BLD AUTO: 0.04 10*3/MM3 (ref 0–0.05)
IMM GRANULOCYTES NFR BLD AUTO: 0.7 % (ref 0–0.5)
LYMPHOCYTES # BLD AUTO: 0.98 10*3/MM3 (ref 0.7–3.1)
LYMPHOCYTES NFR BLD AUTO: 16.6 % (ref 19.6–45.3)
MCH RBC QN AUTO: 31.3 PG (ref 26.6–33)
MCHC RBC AUTO-ENTMCNC: 34.5 G/DL (ref 31.5–35.7)
MCV RBC AUTO: 90.8 FL (ref 79–97)
MONOCYTES # BLD AUTO: 0.48 10*3/MM3 (ref 0.1–0.9)
MONOCYTES NFR BLD AUTO: 8.1 % (ref 5–12)
NEUTROPHILS NFR BLD AUTO: 4.34 10*3/MM3 (ref 1.7–7)
NEUTROPHILS NFR BLD AUTO: 73.7 % (ref 42.7–76)
NRBC BLD AUTO-RTO: 0 /100 WBC (ref 0–0.2)
PLATELET # BLD AUTO: 357 10*3/MM3 (ref 140–450)
PMV BLD AUTO: 8.4 FL (ref 6–12)
POTASSIUM SERPL-SCNC: 4.2 MMOL/L (ref 3.5–5.2)
PROT SERPL-MCNC: 7 G/DL (ref 6–8.5)
RBC # BLD AUTO: 4.47 10*6/MM3 (ref 3.77–5.28)
SODIUM SERPL-SCNC: 135 MMOL/L (ref 136–145)
WBC # BLD AUTO: 5.89 10*3/MM3 (ref 3.4–10.8)

## 2020-09-21 PROCEDURE — 99214 OFFICE O/P EST MOD 30 MIN: CPT | Performed by: INTERNAL MEDICINE

## 2020-09-21 PROCEDURE — G9903 PT SCRN TBCO ID AS NON USER: HCPCS | Performed by: INTERNAL MEDICINE

## 2020-09-21 PROCEDURE — 1123F ACP DISCUSS/DSCN MKR DOCD: CPT | Performed by: INTERNAL MEDICINE

## 2020-09-21 PROCEDURE — 80053 COMPREHEN METABOLIC PANEL: CPT

## 2020-09-21 PROCEDURE — G8731 PAIN NEG NO PLAN: HCPCS | Performed by: INTERNAL MEDICINE

## 2020-09-21 PROCEDURE — 85025 COMPLETE CBC W/AUTO DIFF WBC: CPT

## 2020-09-21 PROCEDURE — 36591 DRAW BLOOD OFF VENOUS DEVICE: CPT | Performed by: INTERNAL MEDICINE

## 2020-09-21 RX ORDER — SODIUM CHLORIDE 9 MG/ML
250 INJECTION, SOLUTION INTRAVENOUS ONCE
Status: CANCELLED | OUTPATIENT
Start: 2020-09-22

## 2020-09-21 RX ORDER — ACETAMINOPHEN 325 MG/1
650 TABLET ORAL ONCE
Status: CANCELLED | OUTPATIENT
Start: 2020-09-22

## 2020-09-21 RX ORDER — FAMOTIDINE 10 MG/ML
20 INJECTION, SOLUTION INTRAVENOUS AS NEEDED
Status: CANCELLED | OUTPATIENT
Start: 2020-09-22

## 2020-09-21 RX ORDER — ORAL SEMAGLUTIDE 3 MG/1
TABLET ORAL DAILY
COMMUNITY
End: 2021-11-23

## 2020-09-21 RX ORDER — DIPHENHYDRAMINE HYDROCHLORIDE 50 MG/ML
50 INJECTION INTRAMUSCULAR; INTRAVENOUS AS NEEDED
Status: CANCELLED | OUTPATIENT
Start: 2020-09-22

## 2020-09-21 NOTE — PROGRESS NOTES
"Adult Outpatient Nutrition  Assessment    Patient Name:  Chasity Leon  YOB: 1960  MRN: [id number]    Assessment Date:  9/21/2020    CHART REVIEW  Chasity Leon   1960  60 y.o.  Wt: 227 lb  Ht: 64 in  Dx: follicular lymphoma (recurrence)  Tx: starting chemo 9/22/20  Labs: glucose \"123-200\" at home--on 3 oral agents (no insulin)       PATIENT/FAMILY COMMENTS   Weight Change: trending up  Diet: diabetic (fair compliance)  Number of Feedings Daily: 3  Appetite/Intake: great  Supplements: na  Meal Preparation: pt (great family & [employer] support)  Nutrition Concerns:  * diabetic--starting chemo      GOAL(s)  -to optimize nutrition in attempt to 1) prevent loss of LBM 2) control glucose          EDUCATION  Discussed  -importance of balanced nutrition/adequate protein  -consis carbohydrate diet--goal x3 carb choices per meal   -importance of staying hydrated  -food safety    Samples of Glucerna and Boost Glucose Control provided (in the event that pt needs supplement in future)    To reinforce education, written information with RD's name & number provided.  Patient receptive to suggestions--agreed to call on dietitian as needed.              Raegan Penny RD                                Electronically signed by:  Raegan Penny RD  09/21/20 10:36 CDT   "

## 2020-09-21 NOTE — PROGRESS NOTES
DATE OF VISIT: 9/21/2020      REASON FOR VISIT: Recurrent follicular lymphoma with involvement of bilateral parotid gland, fatty liver with elevated liver function test      HISTORY OF PRESENT ILLNESS:    60-year-old female with medical problems significant for obesity, hypertension, anxiety, dyslipidemia, recurrent follicular lymphoma with most recent recurrence diagnosed on August 27, 2020 with biopsy of parotid gland showing follicular lymphoma.  Patient is scheduled to start cycle 1 of Revlimid and rituximab today on September 21, 2020.  States her  was diagnosed with coronavirus infection recently but she did not have any symptoms with it and was tested negative at the same time.  Denies any bleeding.  Denies any drenching night sweats.  Complains of swelling on left side of face.  States preauricular area on right side is getting more prominent.  Denies any new lymph node enlargement.        Oncology history:    1.  Recurrent follicular lymphoma grade 1,  -Patient initially diagnosed in April 2015 for which patient underwent palliative radiation treatment.  -Second relapse happened in September 2016 for again patient had a radiation treatment.  - Patient was found to have enlarged aortocaval lymph node in April 2017 for which patient initially received rituximab every 2 months without any significant improvement.  -Subsequently patient received 4 cycles of bendamustine and rituximab between April 30, 2018 and August 21, 2018.  -Patient was diagnosed with involvement of left parotid gland on biopsy done on August 27, 2020 by Dr. Ibrahim.  PET/CT done shows uptake in bilateral parotid gland along with left sided neck lymph node involvement.  -Patient will be started on Revlimid and rituximab today on September 21, 2020.            PAST MEDICAL HISTORY:    Past Medical History:   Diagnosis Date   • Acute bronchitis    • Agoraphobia with panic disorder     on SNRI, prn buspar, prn klonopin     • Anxiety    •  Arthritis    • Atrophic vaginitis    • Chest pain     work up as new onset angina    • Depression      MDD      • Essential hypertension    • Follicular non-Hodgkin's lymphoma (CMS/HCC)    • Generalized anxiety disorder     SSRI - buspar, cont cymbalta, prn klonopin      • Hip pain     arthritis, artifical right hip     • History of bone density study 2009    DEXA (bone density) test, peripheral (Normal   • History of echocardiogram 2012    Echocardiogram 14343 (Normal echocardigram. EF 55-60%)   • History of mammogram    • Hyperlipidemia    • Mass of lower limb    • Nuclear senile cataract    • Obesity    • Otalgia     ENT REFER   • Panic disorder     with agoraphobia. On buspar and klonopin now      • Type 2 diabetes mellitus (CMS/HCC)     NO BDR   • Upper respiratory infection        SOCIAL HISTORY:    Social History     Tobacco Use   • Smoking status: Former Smoker     Years: 12.00     Quit date: 1998     Years since quittin.7   • Smokeless tobacco: Never Used   • Tobacco comment: Smoked 1 pack per 2 weeks   Substance Use Topics   • Alcohol use: No   • Drug use: No       Surgical History :  Past Surgical History:   Procedure Laterality Date   • CENTRAL VENOUS LINE INSERTION  2016    Central line insertion (Successful placement of right upper extremity midline.)   • COLONOSCOPY     • COLONOSCOPY N/A 2019    Procedure: COLONOSCOPY;  Surgeon: Brandon Salvador DO;  Location: [facility] ENDOSCOPY;  Service: Gastroenterology   • CYSTOSCOPY  1976    Cystoscopy (external urethroplasty and cysto-panendoscopy,urethal hymenal fusion with hypospadias)   • DILATATION AND CURETTAGE  1980    D&C (removal of IUD)   • EXCISION LESION  04/15/2014    Remove thigh lesion (Excision of a mass of the right thigh using a 16.2 cm incision with intermediate layered closure.)   • HYSTEROSCOPY  2003    Hysteroscope procedure (diagnostic hysteroscopy with fractional D&C)   • INJECTION OF  MEDICATION  08/22/2011    kenalog (1)   • OTHER SURGICAL HISTORY      Explore parathyroid glands   • OTHER SURGICAL HISTORY  08/26/1985    Laparosc diagnostic (desires elective tubal reversal)   • PAP SMEAR  05/24/2005    PAP SMEAR (normal)   • DC INSJ TUNNELED CVC W/O SUBQ PORT/ AGE 5 YR/> Right 4/4/2017    Procedure: INSERTION VENOUS ACCESS DEVICE (MEDIPORT) right chest;  Surgeon: Fortino Medina MD;  Location: [facility];  Service: General   • DC INSJ TUNNELED CVC W/O SUBQ PORT/ AGE 5 YR/> N/A 6/6/2017    Procedure: INSERTION VENOUS ACCESS DEVICE   (MEDIPORT)   (C-ARM#1);  Surgeon: Fortino Medina MD;  Location: [facility] OR;  Service: General   • SHOULDER ARTHROSCOPY  11/11/2013    Shoulder arthroscopy/surgery (Right shoulder. Rotator cuff repair. Subacromial decompression. Edgar procedure.)   • SHOULDER SURGERY  12/28/2015    Shoulder surgery procedure (Arthroscopy of the left shoulder with rotator cuff repair.Edgar procedure.Subacromial decompression.)   • TONSILLECTOMY  1972   • TONSILLECTOMY AND ADENOIDECTOMY  05/29/1967    T&A (hypertrophied tonsils and adenoids with recurrent infection)   • TOTAL ABDOMINAL HYSTERECTOMY WITH SALPINGO OOPHORECTOMY  02/03/2004    ARIELLE/BSO (with a preop fractional dilation and curettage with frozen section,menorrhagia,dysmenorrhea,unresponsive to medical intervention)   • TOTAL HIP ARTHROPLASTY     • VAGINA SURGERY  03/02/1981    Anesth, surgery on vagina (colpotomy with bilateral partial salpingectomy, multiparity contraindicated)       ALLERGIES:    Allergies   Allergen Reactions   • Ace Inhibitors Cough   • Latex      BLISTER     • Morphine And Related Nausea And Vomiting   • Lortab [Hydrocodone-Acetaminophen] Palpitations         FAMILY HISTORY:  Family History   Problem Relation Age of Onset   • Breast cancer Mother    • Cancer Mother    • Diabetes Mother    • Heart disease Father    • Liver cancer Father    • Cancer Father    • Diabetes Sister         INSULIN DEPENDENT    • Cancer Sister    • Diabetes Brother         INSULIN DEPENDENT   • Coronary artery disease Other    • Diabetes Other    • Diabetes Maternal Grandmother    • Cancer Maternal Grandfather            REVIEW OF SYSTEMS:      CONSTITUTIONAL:  Complains of fatigue. Denies any fever, chills or weight loss.     EYES: No visual disturbances. No discharge. No new lesions    ENMT: Complains of swelling on left side of face.  No epistaxis, mouth sores or difficulty swallowing.    RESPIRATORY:  No new shortness of breath. No new cough or hemoptysis.    CARDIOVASCULAR:  No chest pain or palpitations.    GASTROINTESTINAL: Positive for intermittent nausea without vomiting.  No abdominal pain nausea, vomiting or blood in the stool.    GENITOURINARY: No Dysuria or Hematuria.    MUSCULOSKELETAL: Positive for chronic back pain and hip pain    LYMPHATICS:  Denies any abnormal swollen glands anywhere in the body.    NEUROLOGICAL : No tingling or numbness. No headache or dizziness. No seizures or balance problems.    SKIN: No new skin lesions.    ENDOCRINE : No new hot or cold intolerance. No new polyuria . No polydipsia.        PHYSICAL EXAMINATION:      VITAL SIGNS:  There were no vitals taken for this visit.  There were no vitals filed for this visit.    Temperature 98, pulse 92, respiratory rate 20, blood pressure 156/79, weight 127 pounds.    ECOG performance status: 2    CONSTITUTIONAL:  Not in any distress.    EYES: Mild conjunctival Pallor. No Icterus. No Pterygium. Extraocular Movements intact.No ptosis.    ENMT:  Normocephalic, Atraumatic.swelling present on left side of face and region of parotid gland.  Mild prominence on right side of parotid gland region.    NECK: Enlarged lymph node present on left side of neck..Trachea midline. NO JVD.    RESPIRATORY:  Fair air entry bilateral. No rhonchi or wheezing.Fair respiratory effort.    CARDIOVASCULAR:  S1, S2. Regular rate and rhythm. No murmur or gallop appreciated.   Port-A-Cath present.    ABDOMEN:  Soft, obese, nontender. Bowel sounds present in all four quadrants.  No Hepatosplenomegaly appreciated.    MUSCULOSKELETAL:  No edema.No Calf Tenderness.Decreased range of motion.    NEUROLOGIC:    No  Motor or sensory deficit appreciated. Cranial Nerves 2-12 grossly intact.    SKIN : No new skin lesion identified. Skin is warm and moist to touch.    LYMPHATICS: No new enlarged lymph nodes in neck or supraclavicular area.    PSYCHIATRY: Alert, awake and oriented ×3.  Appears anxious.  Normal judgment.  Makes good eye contact.        DIAGNOSTIC DATA:    Glucose   Date Value Ref Range Status   09/21/2020 163 (H) 65 - 99 mg/dL Final     Sodium   Date Value Ref Range Status   09/21/2020 135 (L) 136 - 145 mmol/L Final     Potassium   Date Value Ref Range Status   09/21/2020 4.2 3.5 - 5.2 mmol/L Final     Comment:     Slight hemolysis detected by analyzer. Results may be affected.     CO2   Date Value Ref Range Status   09/21/2020 22.0 22.0 - 29.0 mmol/L Final     Chloride   Date Value Ref Range Status   09/21/2020 97 (L) 98 - 107 mmol/L Final     Anion Gap   Date Value Ref Range Status   09/21/2020 16.0 (H) 5.0 - 15.0 mmol/L Final     Creatinine   Date Value Ref Range Status   09/21/2020 0.73 0.57 - 1.00 mg/dL Final     BUN   Date Value Ref Range Status   09/21/2020 8 8 - 23 mg/dL Final     BUN/Creatinine Ratio   Date Value Ref Range Status   09/21/2020 11.0 7.0 - 25.0 Final     Calcium   Date Value Ref Range Status   09/21/2020 9.9 8.6 - 10.5 mg/dL Final     eGFR Non  Amer   Date Value Ref Range Status   09/21/2020 81 >60 mL/min/1.73 Final     Alkaline Phosphatase   Date Value Ref Range Status   09/21/2020 77 39 - 117 U/L Final     Total Protein   Date Value Ref Range Status   09/21/2020 7.0 6.0 - 8.5 g/dL Final     ALT (SGPT)   Date Value Ref Range Status   09/21/2020 37 (H) 1 - 33 U/L Final     AST (SGOT)   Date Value Ref Range Status   09/21/2020 27 1 - 32 U/L Final     Total  Bilirubin   Date Value Ref Range Status   09/21/2020 0.2 0.0 - 1.2 mg/dL Final     Albumin   Date Value Ref Range Status   09/21/2020 4.40 3.50 - 5.20 g/dL Final     Globulin   Date Value Ref Range Status   09/21/2020 2.6 gm/dL Final     Lab Results   Component Value Date    WBC 5.89 09/21/2020    HGB 14.0 09/21/2020    HCT 40.6 09/21/2020    MCV 90.8 09/21/2020     09/21/2020     Lab Results   Component Value Date    NEUTROABS 4.34 09/21/2020     No results found for: , LABCA2, AFPTM, HCGQUANT, , CHROMGRNA, [id number], CEA, REFLABREPO        PATHOLOGY:  Pathology report from August 27, 2020 showed:                      RADIOLOGY DATA :  Nm Pet Skull Base To Mid Thigh     Result Date: 9/4/2020  CONCLUSION: 1.  Right parotid lymph node with abnormal FDG activity with SUV of 3.06. Left submandibular angle mandible conglomeration of lymphadenopathy with abnormal FDG activity of 11.04. Left paravertebral enlarged lymph node with abnormal FDG activity with SUV of 4.72. These findings would be suspicious for recurrent follicular lymphoma. 2. Remainder of PET scan is unremarkable as described above. (Please note:  a Nuclear Medicine bone scan is considered more sensitive for the detection of osteoblastic metastatic disease). Electronically signed by:  Benito Matthews MD  9/4/2020 6:01 PM CDT Workstation: [id number]        ASSESSMENT AND PLAN:      1.  Recurrent follicular lymphoma:  -Please review oncology history for prior treatment weekly  - Recent CT of left parotid gland done by Dr. Ibrahim showed CD10 positive B-cell of small size consistent with follicular lymphoma.  - PET/CT done on September 4, 2020 shows intense uptake in left parotid gland with minimal uptake on right parotid gland area and lymph node on left side of neck also has pathologic uptake.  Result of PET/CT were again discussed with patient.  -We will go ahead with cycle 1 of Revlimid and rituximab today on September 21, 2020.  We will see  patient back in clinic with week 3 of rituximab infusion.  - Patient was encouraged to contact us with any unusual side effect from chemotherapy.  -Recommend starting aspirin 81 mg p.o. daily to prevent any thrombotic complication from Revlimid.    2.  Fatty liver with elevated liver enzymes: We will monitor with CMP    3.  Health maintenance: Patient does not smoke.  Had a colonoscopy last 2 years    4. Advance Care Planning: For now patient remains full code and is able to make decisions.  Patient has health care surrogate mentioned on chart.         PHQ-9 Total Score:       Chasity Leon reports a pain score of 0.  Given her pain assessment as noted, treatment options were discussed and the following options were decided upon as a follow-up plan to address the patient's pain: No acute intervention required.         Tino Sood MD  9/21/2020  13:24 CDT        Part of this note may be an electronic transcription/translation of spoken language to printed text using the Dragon Dictation System.

## 2020-09-21 NOTE — PROGRESS NOTES
CHEMOTHERAPY PREPARATION    Chasity Leon  [id number]  1960    Chief Complaint: chemo education    History of present illness:  Chasity Leon is a 60 y.o. year old female who is here today for chemotherapy preparation and needs assessment. The patient has been diagnosed with follicular lymphoma and is scheduled to begin oral and IV treatment with Rituxan and Revlimid.     Oncology History:    Oncology/Hematology History   Follicular lymphoma grade I (CMS/HCC)   3/31/2017 Initial Diagnosis    Follicular lymphoma grade I (CMS/HCC)     9/15/2020 -  Chemotherapy    OP LYMPHOMA Lenalidomide / RiTUXimab      Follicular lymphoma grade I of intra-abdominal lymph nodes (CMS/HCC)   3/31/2017 Initial Diagnosis    Follicular lymphoma grade I of intra-abdominal lymph nodes     4/30/2018 -  Chemotherapy    OP LYMPHOMA Bendamustine 90 mg/m2 / RiTUXimab 375 mg/m2           Past Medical History:   Diagnosis Date   • Acute bronchitis    • Agoraphobia with panic disorder     on SNRI, prn buspar, prn klonopin     • Anxiety    • Arthritis    • Atrophic vaginitis    • Chest pain     work up as new onset angina    • Depression      MDD      • Essential hypertension    • Follicular non-Hodgkin's lymphoma (CMS/HCC)    • Generalized anxiety disorder     SSRI - buspar, cont cymbalta, prn klonopin      • Hip pain     arthritis, artifical right hip     • History of bone density study 02/09/2009    DEXA (bone density) test, peripheral (Normal   • History of echocardiogram 01/03/2012    Echocardiogram 14174 (Normal echocardigram. EF 55-60%)   • History of mammogram    • Hyperlipidemia    • Mass of lower limb    • Nuclear senile cataract    • Obesity    • Otalgia     ENT REFER   • Panic disorder     with agoraphobia. On buspar and klonopin now      • Type 2 diabetes mellitus (CMS/HCC)     NO BDR   • Upper respiratory infection        Past Surgical History:   Procedure Laterality Date   • CENTRAL VENOUS LINE INSERTION  03/11/2016     Central line insertion (Successful placement of right upper extremity midline.)   • COLONOSCOPY     • COLONOSCOPY N/A 7/18/2019    Procedure: COLONOSCOPY;  Surgeon: Brandon Salvador DO;  Location: [facility] ENDOSCOPY;  Service: Gastroenterology   • CYSTOSCOPY  03/23/1976    Cystoscopy (external urethroplasty and cysto-panendoscopy,urethal hymenal fusion with hypospadias)   • DILATATION AND CURETTAGE  01/25/1980    D&C (removal of IUD)   • EXCISION LESION  04/15/2014    Remove thigh lesion (Excision of a mass of the right thigh using a 16.2 cm incision with intermediate layered closure.)   • HYSTEROSCOPY  02/28/2003    Hysteroscope procedure (diagnostic hysteroscopy with fractional D&C)   • INJECTION OF MEDICATION  08/22/2011    kenalog (1)   • OTHER SURGICAL HISTORY      Explore parathyroid glands   • OTHER SURGICAL HISTORY  08/26/1985    Laparosc diagnostic (desires elective tubal reversal)   • PAP SMEAR  05/24/2005    PAP SMEAR (normal)   • MI INSJ TUNNELED CVC W/O SUBQ PORT/ AGE 5 YR/> Right 4/4/2017    Procedure: INSERTION VENOUS ACCESS DEVICE (MEDIPORT) right chest;  Surgeon: Fortino Medina MD;  Location: [facility] OR;  Service: General   • MI INSJ TUNNELED CVC W/O SUBQ PORT/ AGE 5 YR/> N/A 6/6/2017    Procedure: INSERTION VENOUS ACCESS DEVICE   (MEDIPORT)   (C-ARM#1);  Surgeon: Fortino Medina MD;  Location: [facility] OR;  Service: General   • SHOULDER ARTHROSCOPY  11/11/2013    Shoulder arthroscopy/surgery (Right shoulder. Rotator cuff repair. Subacromial decompression. Edgar procedure.)   • SHOULDER SURGERY  12/28/2015    Shoulder surgery procedure (Arthroscopy of the left shoulder with rotator cuff repair.Edgar procedure.Subacromial decompression.)   • TONSILLECTOMY  1972   • TONSILLECTOMY AND ADENOIDECTOMY  05/29/1967    T&A (hypertrophied tonsils and adenoids with recurrent infection)   • TOTAL ABDOMINAL HYSTERECTOMY WITH SALPINGO OOPHORECTOMY  02/03/2004    ARIELLE/BSO (with a preop fractional dilation and  "curettage with frozen section,menorrhagia,dysmenorrhea,unresponsive to medical intervention)   • TOTAL HIP ARTHROPLASTY     • VAGINA SURGERY  03/02/1981    Anesth, surgery on vagina (colpotomy with bilateral partial salpingectomy, multiparity contraindicated)       MEDICATIONS: The current medication list was reviewed and reconciled.     Allergies:  is allergic to ace inhibitors; latex; morphine and related; and lortab [hydrocodone-acetaminophen].    Family History   Problem Relation Age of Onset   • Breast cancer Mother    • Cancer Mother    • Diabetes Mother    • Heart disease Father    • Liver cancer Father    • Cancer Father    • Diabetes Sister         INSULIN DEPENDENT   • Cancer Sister    • Diabetes Brother         INSULIN DEPENDENT   • Coronary artery disease Other    • Diabetes Other    • Diabetes Maternal Grandmother    • Cancer Maternal Grandfather          Review of Systems    Physical Exam  Vital Signs: /79   Pulse 92   Temp 98 °F (36.7 °C) (Temporal)   Resp 20   Ht 162.6 cm (64.02\")   Wt 103 kg (227 lb)   BMI 38.94 kg/m²    General Appearance:  alert, cooperative, no apparent distress and appears stated age   Neurologic/Psychiatric: A&O x 3, gait steady, appropriate affect   HEENT:  Normocephalic, without obvious abnormality, mucous membranes moist   Lungs:   Clear to auscultation bilaterally; respirations regular, even, and unlabored bilaterally   Heart:  Regular rate and rhythm, no murmurs appreciated   Extremities: Normal, atraumatic; no clubbing, cyanosis, or edema    Skin: No rashes, lesions, or abnormal coloration noted     ECOG Performance Status: (0) Fully Active - Able to Carry On All Pre-disease Performance Without Restriction          NEEDS ASSESSMENTS    Genetics  The patient's new diagnosis and family history have been reviewed for genetic counseling needs. A genetic referral is not recommended.     Psychosocial  The patient has completed a PHQ-9 Depression Screening and the " "Distress Thermometer (DT) today.   PHQ-9 results show 0 (No Depression). The patient scored their distress today as 0 on a scale of 0-10 with 0 being no distress and 10 being extreme distress.   Problems marked as being an issue for her within the last week include no problems.   Results were reviewed along with psychosocial resources offered by our cancer center. Our oncology social worker will be flagged for a DT score of 4 or above, and a same day call will be made for a score of 9 or 10. Copies of patient's questionnaires will be scanned into EMR for details and further reference.    Barriers to care  A barriers form was also completed by the patient today. We discussed services offered by our facility to help her have adequate access to care. A copy of the barriers form will also be scanned into EMR for details and further reference.     VAD Assessment  The patient has port a cath in place.    Advanced Care Planning  The patient and I discussed advanced care planning, \"Conversations that Matter\".   This service was offered, free of charge, for development of advance directives with a certified ACP facilitator.  The patient does not have an up-to-date advanced directive. This document is not on file with our office. The patient is not interested in an appointment with one of our facilitators to create or update their advanced directives.      Additional Referral needs  none      IV CHEMOTHERAPY EDUCATION    Booklets Given: Chemo cares education sheets on Revlimid and Rituxan and [facility] chemotherapy binder.     Chemotherapy/Biotherapy Education Sheets: (list all that apply)  nausea management, acid reflux management, diarrhea management, Cancer resourse contacts information, skin and mouth care and vaccination information                                                                                                                                                                 Chemotherapy Regimen: "   Treatment Plans     Name Type Plan Dates Plan Provider         Active    OP LYMPHOMA Lenalidomide / RiTUXimab  ONCOLOGY TREATMENT  9/14/2020 - Present Tino Sood MD                    TOPICS EDUCATION PROVIDED COMMENTS   ANEMIA:  role of RBC, cause, s/s, ways to manage, role of transfusion [x]    THROMBOCYTOPENIA:  role of platelet, cause, s/s, ways to prevent bleeding, things to avoid, when to seek help [x]    NEUTROPENIA:  role of WBC, cause, infection precautions, s/s of infection, when to call MD [x] Confirmed pt has thermometer at home   NUTRITION & APPETITE CHANGES:  importance of maintaining healthy diet & weight, ways to manage to improve intake, dietary consult, exercise regimen [x]    DIARRHEA:  causes, s/s of dehydration, ways to manage, dietary changes, when to call MD [x]    CONSTIPATION:  causes, ways to manage, dietary changes, when to call MD [x]    NAUSEA & VOMITING:  cause, use of antiemetics, dietary changes, when to call MD [x]    MOUTH SORES:  causes, oral care, ways to manage [x]    ALOPECIA:  cause, ways to manage, resources [x]    INFERTILITY & SEXUALITY:  causes, fertility preservation options, sexuality changes, ways to manage, importance of birth control [x]    NERVOUS SYSTEM CHANGES:  causes, s/s, neuropathies, cognitive changes, ways to manage [x]    PAIN:  causes, ways to manage [x] ????   SKIN & NAIL CHANGES:  cause, s/s, ways to manage [x]    ORGAN TOXICITIES:  cause, s/s, need for diagnostic tests, labs, when to notify MD [x]    SURVIVORSHIP:  distress, distress assessment, secondary malignancies, early/late effects, follow-up, social issues, social support [x]    HOME CARE:  use of spill kits, storing of PO chemo, how to manage bodily fluids [x]    MISCELLANEOUS:  drug interactions, administration, vesicant, et [x]                ORAL CHEMOTHERAPY TEACHING    Oral Chemotherapy Regimen: Revlimid    Date of Medication Start: 9/21/2020    Initial Teaching  [x] Comments   Safety     Storage instructions (away from children; away from heat/cold, sunlight, or moisture), handling - use of gloves (caregivers), washing hands after touching pills, managing waste Reinforced the storage and safe handling of medication   Adherence     patient and/or caregiver on how to take medications, take with/without food, assess their adherence potential, stress importance of adherence, ways to manage adherence (pill boxes, phone reminders, calendars), what to do if a dose is missed    Side Effects/Adverse Reactions     patient of potential side effects, s/s, ways to manage, when to call MD or seek help Discussed increased risk to develop DVT and or PE; signs and symptoms of each discussed with patient.   Miscellaneous    Food interactions, DDIs, financial issues      Additional chemotherapy notes:        Assessment and Plan:    Chasity was seen today for follow-up.    Diagnoses and all orders for this visit:    Follicular lymphoma grade I of intra-abdominal lymph nodes (CMS/HCC)        This was a 25 minute face-to-face visit with 25 minutes spent in  counseling and coordination of care as documented above.   The patient and I have reviewed their new cancer diagnosis and scheduled treatment plan. Needs assessment was completed including genetics, psychosocial needs, barriers to care, VAD evaluation, advanced care planning, and palliative care services. Referrals have been ordered as appropriate based upon our evaluation and patient desires.     IV and oral chemotherapy teaching was also completed today as documented above. Adequate time was given to answer all questions to her satisfaction. Patient and family are aware of their care team members and contact information if they have questions or problems throughout the treatment course. Needs assessments and education has been completed. The patient is adequately prepared to begin treatment as scheduled.       Jessica Segal, APRN

## 2020-09-21 NOTE — PROGRESS NOTES
Pt discharged home today due to lab delay.  Pt to come back at 0800 in the morning for chemotherapy.

## 2020-09-22 ENCOUNTER — APPOINTMENT (OUTPATIENT)
Dept: ONCOLOGY | Facility: HOSPITAL | Age: 60
End: 2020-09-22

## 2020-09-22 ENCOUNTER — INFUSION (OUTPATIENT)
Dept: ONCOLOGY | Facility: HOSPITAL | Age: 60
End: 2020-09-22

## 2020-09-22 ENCOUNTER — APPOINTMENT (OUTPATIENT)
Dept: ONCOLOGY | Facility: CLINIC | Age: 60
End: 2020-09-22

## 2020-09-22 VITALS
SYSTOLIC BLOOD PRESSURE: 148 MMHG | RESPIRATION RATE: 18 BRPM | TEMPERATURE: 98.1 F | HEART RATE: 89 BPM | OXYGEN SATURATION: 100 % | DIASTOLIC BLOOD PRESSURE: 75 MMHG

## 2020-09-22 DIAGNOSIS — Z45.2 ENCOUNTER FOR VENOUS ACCESS DEVICE CARE: ICD-10-CM

## 2020-09-22 DIAGNOSIS — C82.08 GRADE 1 FOLLICULAR LYMPHOMA OF LYMPH NODES OF MULTIPLE REGIONS (HCC): Primary | ICD-10-CM

## 2020-09-22 PROCEDURE — 25010000003 HEPARIN LOCK FLUSH PER 10 UNITS: Performed by: INTERNAL MEDICINE

## 2020-09-22 PROCEDURE — 96415 CHEMO IV INFUSION ADDL HR: CPT | Performed by: INTERNAL MEDICINE

## 2020-09-22 PROCEDURE — 25010000002 RITUXIMAB 500 MG/50ML SOLUTION 50 ML VIAL: Performed by: INTERNAL MEDICINE

## 2020-09-22 PROCEDURE — 96375 TX/PRO/DX INJ NEW DRUG ADDON: CPT | Performed by: INTERNAL MEDICINE

## 2020-09-22 PROCEDURE — 25010000002 RITUXIMAB 100 MG/10ML SOLUTION 10 ML VIAL: Performed by: INTERNAL MEDICINE

## 2020-09-22 PROCEDURE — 96413 CHEMO IV INFUSION 1 HR: CPT | Performed by: INTERNAL MEDICINE

## 2020-09-22 PROCEDURE — 25010000002 DIPHENHYDRAMINE PER 50 MG: Performed by: INTERNAL MEDICINE

## 2020-09-22 RX ORDER — SODIUM CHLORIDE 0.9 % (FLUSH) 0.9 %
10 SYRINGE (ML) INJECTION AS NEEDED
Status: DISCONTINUED | OUTPATIENT
Start: 2020-09-22 | End: 2020-09-22 | Stop reason: HOSPADM

## 2020-09-22 RX ORDER — SODIUM CHLORIDE 9 MG/ML
250 INJECTION, SOLUTION INTRAVENOUS ONCE
Status: COMPLETED | OUTPATIENT
Start: 2020-09-22 | End: 2020-09-22

## 2020-09-22 RX ORDER — ACETAMINOPHEN 325 MG/1
650 TABLET ORAL ONCE
Status: COMPLETED | OUTPATIENT
Start: 2020-09-22 | End: 2020-09-22

## 2020-09-22 RX ORDER — DIPHENHYDRAMINE HYDROCHLORIDE 50 MG/ML
50 INJECTION INTRAMUSCULAR; INTRAVENOUS AS NEEDED
Status: DISCONTINUED | OUTPATIENT
Start: 2020-09-22 | End: 2020-09-22 | Stop reason: HOSPADM

## 2020-09-22 RX ORDER — HEPARIN SODIUM (PORCINE) LOCK FLUSH IV SOLN 100 UNIT/ML 100 UNIT/ML
500 SOLUTION INTRAVENOUS AS NEEDED
Status: CANCELLED | OUTPATIENT
Start: 2020-09-22

## 2020-09-22 RX ORDER — SODIUM CHLORIDE 0.9 % (FLUSH) 0.9 %
10 SYRINGE (ML) INJECTION AS NEEDED
Status: CANCELLED | OUTPATIENT
Start: 2020-09-22

## 2020-09-22 RX ORDER — SODIUM CHLORIDE 0.9 % (FLUSH) 0.9 %
20 SYRINGE (ML) INJECTION AS NEEDED
Status: CANCELLED | OUTPATIENT
Start: 2020-09-22

## 2020-09-22 RX ORDER — HEPARIN SODIUM (PORCINE) LOCK FLUSH IV SOLN 100 UNIT/ML 100 UNIT/ML
500 SOLUTION INTRAVENOUS AS NEEDED
Status: DISCONTINUED | OUTPATIENT
Start: 2020-09-22 | End: 2020-09-22 | Stop reason: HOSPADM

## 2020-09-22 RX ORDER — FAMOTIDINE 10 MG/ML
20 INJECTION, SOLUTION INTRAVENOUS AS NEEDED
Status: DISCONTINUED | OUTPATIENT
Start: 2020-09-22 | End: 2020-09-22 | Stop reason: HOSPADM

## 2020-09-22 RX ADMIN — DIPHENHYDRAMINE HYDROCHLORIDE 50 MG: 50 INJECTION, SOLUTION INTRAMUSCULAR; INTRAVENOUS at 09:12

## 2020-09-22 RX ADMIN — ACETAMINOPHEN 650 MG: 325 TABLET, FILM COATED ORAL at 08:16

## 2020-09-22 RX ADMIN — RITUXIMAB 770 MG: 10 INJECTION, SOLUTION INTRAVENOUS at 09:55

## 2020-09-22 RX ADMIN — SODIUM CHLORIDE, PRESERVATIVE FREE 10 ML: 5 INJECTION INTRAVENOUS at 14:51

## 2020-09-22 RX ADMIN — SODIUM CHLORIDE 250 ML: 9 INJECTION, SOLUTION INTRAVENOUS at 08:17

## 2020-09-22 RX ADMIN — HEPARIN 500 UNITS: 100 SYRINGE at 14:51

## 2020-09-29 ENCOUNTER — INFUSION (OUTPATIENT)
Dept: ONCOLOGY | Facility: HOSPITAL | Age: 60
End: 2020-09-29

## 2020-09-29 VITALS — DIASTOLIC BLOOD PRESSURE: 61 MMHG | TEMPERATURE: 97.9 F | SYSTOLIC BLOOD PRESSURE: 143 MMHG

## 2020-09-29 DIAGNOSIS — C82.08 GRADE 1 FOLLICULAR LYMPHOMA OF LYMPH NODES OF MULTIPLE REGIONS (HCC): Primary | ICD-10-CM

## 2020-09-29 DIAGNOSIS — Z45.2 ENCOUNTER FOR VENOUS ACCESS DEVICE CARE: ICD-10-CM

## 2020-09-29 DIAGNOSIS — C82.03 FOLLICULAR LYMPHOMA GRADE I OF INTRA-ABDOMINAL LYMPH NODES (HCC): ICD-10-CM

## 2020-09-29 LAB
ALBUMIN SERPL-MCNC: 4.3 G/DL (ref 3.5–5.2)
ALBUMIN/GLOB SERPL: 1.7 G/DL
ALP SERPL-CCNC: 69 U/L (ref 39–117)
ALT SERPL W P-5'-P-CCNC: 36 U/L (ref 1–33)
ANION GAP SERPL CALCULATED.3IONS-SCNC: 12 MMOL/L (ref 5–15)
AST SERPL-CCNC: 25 U/L (ref 1–32)
BASOPHILS # BLD AUTO: 0.07 10*3/MM3 (ref 0–0.2)
BASOPHILS NFR BLD AUTO: 1.3 % (ref 0–1.5)
BILIRUB SERPL-MCNC: 0.2 MG/DL (ref 0–1.2)
BUN SERPL-MCNC: 9 MG/DL (ref 8–23)
BUN/CREAT SERPL: 12.7 (ref 7–25)
CALCIUM SPEC-SCNC: 9.7 MG/DL (ref 8.6–10.5)
CHLORIDE SERPL-SCNC: 99 MMOL/L (ref 98–107)
CO2 SERPL-SCNC: 27 MMOL/L (ref 22–29)
CREAT SERPL-MCNC: 0.71 MG/DL (ref 0.57–1)
DEPRECATED RDW RBC AUTO: 47.2 FL (ref 37–54)
EOSINOPHIL # BLD AUTO: 0 10*3/MM3 (ref 0–0.4)
EOSINOPHIL NFR BLD AUTO: 0 % (ref 0.3–6.2)
ERYTHROCYTE [DISTWIDTH] IN BLOOD BY AUTOMATED COUNT: 14 % (ref 12.3–15.4)
GFR SERPL CREATININE-BSD FRML MDRD: 84 ML/MIN/1.73
GLOBULIN UR ELPH-MCNC: 2.6 GM/DL
GLUCOSE SERPL-MCNC: 146 MG/DL (ref 65–99)
HCT VFR BLD AUTO: 40.1 % (ref 34–46.6)
HGB BLD-MCNC: 13.7 G/DL (ref 12–15.9)
IMM GRANULOCYTES # BLD AUTO: 0.13 10*3/MM3 (ref 0–0.05)
IMM GRANULOCYTES NFR BLD AUTO: 2.3 % (ref 0–0.5)
LYMPHOCYTES # BLD AUTO: 0.78 10*3/MM3 (ref 0.7–3.1)
LYMPHOCYTES NFR BLD AUTO: 13.9 % (ref 19.6–45.3)
MCH RBC QN AUTO: 31.1 PG (ref 26.6–33)
MCHC RBC AUTO-ENTMCNC: 34.2 G/DL (ref 31.5–35.7)
MCV RBC AUTO: 90.9 FL (ref 79–97)
MONOCYTES # BLD AUTO: 0.64 10*3/MM3 (ref 0.1–0.9)
MONOCYTES NFR BLD AUTO: 11.4 % (ref 5–12)
NEUTROPHILS NFR BLD AUTO: 3.98 10*3/MM3 (ref 1.7–7)
NEUTROPHILS NFR BLD AUTO: 71.1 % (ref 42.7–76)
NRBC BLD AUTO-RTO: 0 /100 WBC (ref 0–0.2)
PLATELET # BLD AUTO: 332 10*3/MM3 (ref 140–450)
PMV BLD AUTO: 9.1 FL (ref 6–12)
POTASSIUM SERPL-SCNC: 4.2 MMOL/L (ref 3.5–5.2)
PROT SERPL-MCNC: 6.9 G/DL (ref 6–8.5)
RBC # BLD AUTO: 4.41 10*6/MM3 (ref 3.77–5.28)
SODIUM SERPL-SCNC: 138 MMOL/L (ref 136–145)
WBC # BLD AUTO: 5.6 10*3/MM3 (ref 3.4–10.8)

## 2020-09-29 PROCEDURE — 25010000003 HEPARIN LOCK FLUSH PER 10 UNITS: Performed by: INTERNAL MEDICINE

## 2020-09-29 PROCEDURE — 96415 CHEMO IV INFUSION ADDL HR: CPT | Performed by: INTERNAL MEDICINE

## 2020-09-29 PROCEDURE — 85025 COMPLETE CBC W/AUTO DIFF WBC: CPT

## 2020-09-29 PROCEDURE — 96375 TX/PRO/DX INJ NEW DRUG ADDON: CPT | Performed by: INTERNAL MEDICINE

## 2020-09-29 PROCEDURE — 25010000002 RITUXIMAB 100 MG/10ML SOLUTION 10 ML VIAL: Performed by: INTERNAL MEDICINE

## 2020-09-29 PROCEDURE — 80053 COMPREHEN METABOLIC PANEL: CPT

## 2020-09-29 PROCEDURE — 25010000002 DIPHENHYDRAMINE PER 50 MG: Performed by: INTERNAL MEDICINE

## 2020-09-29 PROCEDURE — 25010000002 RITUXIMAB 500 MG/50ML SOLUTION 50 ML VIAL: Performed by: INTERNAL MEDICINE

## 2020-09-29 PROCEDURE — 96413 CHEMO IV INFUSION 1 HR: CPT | Performed by: INTERNAL MEDICINE

## 2020-09-29 RX ORDER — ACETAMINOPHEN 325 MG/1
650 TABLET ORAL ONCE
Status: CANCELLED | OUTPATIENT
Start: 2020-09-29

## 2020-09-29 RX ORDER — SODIUM CHLORIDE 9 MG/ML
250 INJECTION, SOLUTION INTRAVENOUS ONCE
Status: CANCELLED | OUTPATIENT
Start: 2020-09-29

## 2020-09-29 RX ORDER — SODIUM CHLORIDE 0.9 % (FLUSH) 0.9 %
10 SYRINGE (ML) INJECTION AS NEEDED
Status: DISCONTINUED | OUTPATIENT
Start: 2020-09-29 | End: 2020-09-29 | Stop reason: HOSPADM

## 2020-09-29 RX ORDER — SODIUM CHLORIDE 0.9 % (FLUSH) 0.9 %
20 SYRINGE (ML) INJECTION AS NEEDED
Status: CANCELLED | OUTPATIENT
Start: 2020-09-29

## 2020-09-29 RX ORDER — SODIUM CHLORIDE 0.9 % (FLUSH) 0.9 %
20 SYRINGE (ML) INJECTION AS NEEDED
Status: DISCONTINUED | OUTPATIENT
Start: 2020-09-29 | End: 2020-09-29 | Stop reason: HOSPADM

## 2020-09-29 RX ORDER — MEPERIDINE HYDROCHLORIDE 50 MG/ML
25 INJECTION INTRAMUSCULAR; INTRAVENOUS; SUBCUTANEOUS
Status: CANCELLED | OUTPATIENT
Start: 2020-09-29

## 2020-09-29 RX ORDER — HEPARIN SODIUM (PORCINE) LOCK FLUSH IV SOLN 100 UNIT/ML 100 UNIT/ML
500 SOLUTION INTRAVENOUS AS NEEDED
Status: CANCELLED | OUTPATIENT
Start: 2020-09-29

## 2020-09-29 RX ORDER — SODIUM CHLORIDE 0.9 % (FLUSH) 0.9 %
10 SYRINGE (ML) INJECTION AS NEEDED
Status: CANCELLED | OUTPATIENT
Start: 2020-09-29

## 2020-09-29 RX ORDER — ACETAMINOPHEN 325 MG/1
650 TABLET ORAL ONCE
Status: COMPLETED | OUTPATIENT
Start: 2020-09-29 | End: 2020-09-29

## 2020-09-29 RX ORDER — DIPHENHYDRAMINE HYDROCHLORIDE 50 MG/ML
50 INJECTION INTRAMUSCULAR; INTRAVENOUS AS NEEDED
Status: DISCONTINUED | OUTPATIENT
Start: 2020-09-29 | End: 2020-09-29 | Stop reason: HOSPADM

## 2020-09-29 RX ORDER — DIPHENHYDRAMINE HYDROCHLORIDE 50 MG/ML
50 INJECTION INTRAMUSCULAR; INTRAVENOUS AS NEEDED
Status: CANCELLED | OUTPATIENT
Start: 2020-09-29

## 2020-09-29 RX ORDER — SODIUM CHLORIDE 9 MG/ML
250 INJECTION, SOLUTION INTRAVENOUS ONCE
Status: COMPLETED | OUTPATIENT
Start: 2020-09-29 | End: 2020-09-29

## 2020-09-29 RX ORDER — FAMOTIDINE 10 MG/ML
20 INJECTION, SOLUTION INTRAVENOUS AS NEEDED
Status: DISCONTINUED | OUTPATIENT
Start: 2020-09-29 | End: 2020-09-29 | Stop reason: HOSPADM

## 2020-09-29 RX ORDER — FAMOTIDINE 10 MG/ML
20 INJECTION, SOLUTION INTRAVENOUS AS NEEDED
Status: CANCELLED | OUTPATIENT
Start: 2020-09-29

## 2020-09-29 RX ORDER — HEPARIN SODIUM (PORCINE) LOCK FLUSH IV SOLN 100 UNIT/ML 100 UNIT/ML
500 SOLUTION INTRAVENOUS AS NEEDED
Status: DISCONTINUED | OUTPATIENT
Start: 2020-09-29 | End: 2020-09-29 | Stop reason: HOSPADM

## 2020-09-29 RX ADMIN — ACETAMINOPHEN 650 MG: 325 TABLET, FILM COATED ORAL at 10:18

## 2020-09-29 RX ADMIN — SODIUM CHLORIDE 250 ML: 9 INJECTION, SOLUTION INTRAVENOUS at 10:18

## 2020-09-29 RX ADMIN — RITUXIMAB 770 MG: 10 INJECTION, SOLUTION INTRAVENOUS at 11:11

## 2020-09-29 RX ADMIN — HEPARIN 500 UNITS: 100 SYRINGE at 14:32

## 2020-09-29 RX ADMIN — SODIUM CHLORIDE, PRESERVATIVE FREE 10 ML: 5 INJECTION INTRAVENOUS at 14:32

## 2020-09-29 RX ADMIN — DIPHENHYDRAMINE HYDROCHLORIDE 25 MG: 50 INJECTION, SOLUTION INTRAMUSCULAR; INTRAVENOUS at 10:28

## 2020-10-01 RX ORDER — PROMETHAZINE HYDROCHLORIDE 25 MG/1
25 TABLET ORAL EVERY 6 HOURS PRN
Qty: 40 TABLET | Refills: 3 | Status: SHIPPED | OUTPATIENT
Start: 2020-10-01 | End: 2021-11-18 | Stop reason: SDUPTHER

## 2020-10-06 ENCOUNTER — TELEPHONE (OUTPATIENT)
Dept: ONCOLOGY | Facility: CLINIC | Age: 60
End: 2020-10-06

## 2020-10-06 ENCOUNTER — OFFICE VISIT (OUTPATIENT)
Dept: ONCOLOGY | Facility: CLINIC | Age: 60
End: 2020-10-06

## 2020-10-06 ENCOUNTER — INFUSION (OUTPATIENT)
Dept: ONCOLOGY | Facility: HOSPITAL | Age: 60
End: 2020-10-06

## 2020-10-06 VITALS
HEART RATE: 85 BPM | TEMPERATURE: 97.7 F | WEIGHT: 230.7 LBS | SYSTOLIC BLOOD PRESSURE: 144 MMHG | HEIGHT: 64 IN | RESPIRATION RATE: 18 BRPM | DIASTOLIC BLOOD PRESSURE: 69 MMHG | BODY MASS INDEX: 39.38 KG/M2

## 2020-10-06 DIAGNOSIS — R19.7 DIARRHEA, UNSPECIFIED TYPE: ICD-10-CM

## 2020-10-06 DIAGNOSIS — Z45.2 ENCOUNTER FOR VENOUS ACCESS DEVICE CARE: ICD-10-CM

## 2020-10-06 DIAGNOSIS — R79.89 ELEVATED LFTS: ICD-10-CM

## 2020-10-06 DIAGNOSIS — C82.08 GRADE 1 FOLLICULAR LYMPHOMA OF LYMPH NODES OF MULTIPLE REGIONS (HCC): Primary | ICD-10-CM

## 2020-10-06 DIAGNOSIS — K11.8 MASS OF BOTH PAROTID GLANDS: ICD-10-CM

## 2020-10-06 LAB
ALBUMIN SERPL-MCNC: 4.3 G/DL (ref 3.5–5.2)
ALBUMIN/GLOB SERPL: 1.9 G/DL
ALP SERPL-CCNC: 74 U/L (ref 39–117)
ALT SERPL W P-5'-P-CCNC: 38 U/L (ref 1–33)
ANION GAP SERPL CALCULATED.3IONS-SCNC: 13 MMOL/L (ref 5–15)
AST SERPL-CCNC: 26 U/L (ref 1–32)
BASOPHILS # BLD AUTO: 0.07 10*3/MM3 (ref 0–0.2)
BASOPHILS NFR BLD AUTO: 1.4 % (ref 0–1.5)
BILIRUB SERPL-MCNC: 0.3 MG/DL (ref 0–1.2)
BUN SERPL-MCNC: 9 MG/DL (ref 8–23)
BUN/CREAT SERPL: 12 (ref 7–25)
CALCIUM SPEC-SCNC: 9 MG/DL (ref 8.6–10.5)
CHLORIDE SERPL-SCNC: 99 MMOL/L (ref 98–107)
CO2 SERPL-SCNC: 24 MMOL/L (ref 22–29)
CREAT SERPL-MCNC: 0.75 MG/DL (ref 0.57–1)
DEPRECATED RDW RBC AUTO: 48.1 FL (ref 37–54)
EOSINOPHIL # BLD AUTO: 0.01 10*3/MM3 (ref 0–0.4)
EOSINOPHIL NFR BLD AUTO: 0.2 % (ref 0.3–6.2)
ERYTHROCYTE [DISTWIDTH] IN BLOOD BY AUTOMATED COUNT: 14.5 % (ref 12.3–15.4)
GFR SERPL CREATININE-BSD FRML MDRD: 79 ML/MIN/1.73
GLOBULIN UR ELPH-MCNC: 2.3 GM/DL
GLUCOSE SERPL-MCNC: 178 MG/DL (ref 65–99)
HCT VFR BLD AUTO: 39 % (ref 34–46.6)
HGB BLD-MCNC: 13.3 G/DL (ref 12–15.9)
IMM GRANULOCYTES # BLD AUTO: 0.05 10*3/MM3 (ref 0–0.05)
IMM GRANULOCYTES NFR BLD AUTO: 1 % (ref 0–0.5)
LYMPHOCYTES # BLD AUTO: 0.92 10*3/MM3 (ref 0.7–3.1)
LYMPHOCYTES NFR BLD AUTO: 18 % (ref 19.6–45.3)
MCH RBC QN AUTO: 30.9 PG (ref 26.6–33)
MCHC RBC AUTO-ENTMCNC: 34.1 G/DL (ref 31.5–35.7)
MCV RBC AUTO: 90.5 FL (ref 79–97)
MONOCYTES # BLD AUTO: 0.97 10*3/MM3 (ref 0.1–0.9)
MONOCYTES NFR BLD AUTO: 19 % (ref 5–12)
NEUTROPHILS NFR BLD AUTO: 3.09 10*3/MM3 (ref 1.7–7)
NEUTROPHILS NFR BLD AUTO: 60.4 % (ref 42.7–76)
NRBC BLD AUTO-RTO: 0 /100 WBC (ref 0–0.2)
PLATELET # BLD AUTO: 328 10*3/MM3 (ref 140–450)
PMV BLD AUTO: 9.2 FL (ref 6–12)
POTASSIUM SERPL-SCNC: 4 MMOL/L (ref 3.5–5.2)
PROT SERPL-MCNC: 6.6 G/DL (ref 6–8.5)
RBC # BLD AUTO: 4.31 10*6/MM3 (ref 3.77–5.28)
SODIUM SERPL-SCNC: 136 MMOL/L (ref 136–145)
WBC # BLD AUTO: 5.11 10*3/MM3 (ref 3.4–10.8)

## 2020-10-06 PROCEDURE — 25010000002 RITUXIMAB 500 MG/50ML SOLUTION 50 ML VIAL: Performed by: INTERNAL MEDICINE

## 2020-10-06 PROCEDURE — 1123F ACP DISCUSS/DSCN MKR DOCD: CPT | Performed by: INTERNAL MEDICINE

## 2020-10-06 PROCEDURE — 80053 COMPREHEN METABOLIC PANEL: CPT

## 2020-10-06 PROCEDURE — 96375 TX/PRO/DX INJ NEW DRUG ADDON: CPT | Performed by: INTERNAL MEDICINE

## 2020-10-06 PROCEDURE — 96413 CHEMO IV INFUSION 1 HR: CPT | Performed by: INTERNAL MEDICINE

## 2020-10-06 PROCEDURE — G9903 PT SCRN TBCO ID AS NON USER: HCPCS | Performed by: INTERNAL MEDICINE

## 2020-10-06 PROCEDURE — 25010000002 DIPHENHYDRAMINE PER 50 MG: Performed by: INTERNAL MEDICINE

## 2020-10-06 PROCEDURE — 99214 OFFICE O/P EST MOD 30 MIN: CPT | Performed by: INTERNAL MEDICINE

## 2020-10-06 PROCEDURE — 25010000003 HEPARIN LOCK FLUSH PER 10 UNITS: Performed by: INTERNAL MEDICINE

## 2020-10-06 PROCEDURE — 25010000002 RITUXIMAB 100 MG/10ML SOLUTION 10 ML VIAL: Performed by: INTERNAL MEDICINE

## 2020-10-06 PROCEDURE — 96415 CHEMO IV INFUSION ADDL HR: CPT | Performed by: INTERNAL MEDICINE

## 2020-10-06 PROCEDURE — G8731 PAIN NEG NO PLAN: HCPCS | Performed by: INTERNAL MEDICINE

## 2020-10-06 PROCEDURE — 85025 COMPLETE CBC W/AUTO DIFF WBC: CPT

## 2020-10-06 RX ORDER — HEPARIN SODIUM (PORCINE) LOCK FLUSH IV SOLN 100 UNIT/ML 100 UNIT/ML
500 SOLUTION INTRAVENOUS AS NEEDED
Status: CANCELLED | OUTPATIENT
Start: 2020-10-06

## 2020-10-06 RX ORDER — SODIUM CHLORIDE 0.9 % (FLUSH) 0.9 %
20 SYRINGE (ML) INJECTION AS NEEDED
Status: DISCONTINUED | OUTPATIENT
Start: 2020-10-06 | End: 2020-10-06 | Stop reason: HOSPADM

## 2020-10-06 RX ORDER — HEPARIN SODIUM (PORCINE) LOCK FLUSH IV SOLN 100 UNIT/ML 100 UNIT/ML
500 SOLUTION INTRAVENOUS AS NEEDED
Status: DISCONTINUED | OUTPATIENT
Start: 2020-10-06 | End: 2020-10-06 | Stop reason: HOSPADM

## 2020-10-06 RX ORDER — DIPHENHYDRAMINE HYDROCHLORIDE 50 MG/ML
50 INJECTION INTRAMUSCULAR; INTRAVENOUS AS NEEDED
Status: CANCELLED | OUTPATIENT
Start: 2020-10-06

## 2020-10-06 RX ORDER — DIPHENHYDRAMINE HYDROCHLORIDE 50 MG/ML
50 INJECTION INTRAMUSCULAR; INTRAVENOUS AS NEEDED
Status: DISCONTINUED | OUTPATIENT
Start: 2020-10-06 | End: 2020-10-06 | Stop reason: HOSPADM

## 2020-10-06 RX ORDER — SODIUM CHLORIDE 0.9 % (FLUSH) 0.9 %
10 SYRINGE (ML) INJECTION AS NEEDED
Status: DISCONTINUED | OUTPATIENT
Start: 2020-10-06 | End: 2020-10-06 | Stop reason: HOSPADM

## 2020-10-06 RX ORDER — SODIUM CHLORIDE 0.9 % (FLUSH) 0.9 %
10 SYRINGE (ML) INJECTION AS NEEDED
Status: CANCELLED | OUTPATIENT
Start: 2020-10-06

## 2020-10-06 RX ORDER — SODIUM CHLORIDE 0.9 % (FLUSH) 0.9 %
20 SYRINGE (ML) INJECTION AS NEEDED
Status: CANCELLED | OUTPATIENT
Start: 2020-10-06

## 2020-10-06 RX ORDER — SODIUM CHLORIDE 9 MG/ML
250 INJECTION, SOLUTION INTRAVENOUS ONCE
Status: COMPLETED | OUTPATIENT
Start: 2020-10-06 | End: 2020-10-06

## 2020-10-06 RX ORDER — MEPERIDINE HYDROCHLORIDE 50 MG/ML
25 INJECTION INTRAMUSCULAR; INTRAVENOUS; SUBCUTANEOUS
Status: CANCELLED | OUTPATIENT
Start: 2020-10-06

## 2020-10-06 RX ORDER — ACETAMINOPHEN 325 MG/1
650 TABLET ORAL ONCE
Status: CANCELLED | OUTPATIENT
Start: 2020-10-06

## 2020-10-06 RX ORDER — FAMOTIDINE 10 MG/ML
20 INJECTION, SOLUTION INTRAVENOUS AS NEEDED
Status: DISCONTINUED | OUTPATIENT
Start: 2020-10-06 | End: 2020-10-06 | Stop reason: HOSPADM

## 2020-10-06 RX ORDER — SODIUM CHLORIDE 9 MG/ML
250 INJECTION, SOLUTION INTRAVENOUS ONCE
Status: CANCELLED | OUTPATIENT
Start: 2020-10-06

## 2020-10-06 RX ORDER — ACETAMINOPHEN 325 MG/1
650 TABLET ORAL ONCE
Status: COMPLETED | OUTPATIENT
Start: 2020-10-06 | End: 2020-10-06

## 2020-10-06 RX ORDER — FAMOTIDINE 10 MG/ML
20 INJECTION, SOLUTION INTRAVENOUS AS NEEDED
Status: CANCELLED | OUTPATIENT
Start: 2020-10-06

## 2020-10-06 RX ADMIN — HEPARIN 500 UNITS: 100 SYRINGE at 12:42

## 2020-10-06 RX ADMIN — DIPHENHYDRAMINE HYDROCHLORIDE 25 MG: 50 INJECTION, SOLUTION INTRAMUSCULAR; INTRAVENOUS at 08:56

## 2020-10-06 RX ADMIN — SODIUM CHLORIDE 250 ML: 9 INJECTION, SOLUTION INTRAVENOUS at 08:46

## 2020-10-06 RX ADMIN — RITUXIMAB 770 MG: 10 INJECTION, SOLUTION INTRAVENOUS at 09:41

## 2020-10-06 RX ADMIN — ACETAMINOPHEN 650 MG: 325 TABLET, FILM COATED ORAL at 08:45

## 2020-10-06 RX ADMIN — SODIUM CHLORIDE, PRESERVATIVE FREE 10 ML: 5 INJECTION INTRAVENOUS at 12:42

## 2020-10-06 NOTE — ANESTHESIA PROCEDURE NOTES
Airway  Urgency: elective    Airway not difficult    General Information and Staff    Patient location during procedure: OR  CRNA: KAL JASON    Indications and Patient Condition  Indications for airway management: airway protection    Preoxygenated: yes  MILS maintained throughout  Mask difficulty assessment: 1 - vent by mask    Final Airway Details  Final airway type: endotracheal airway      Successful airway: ETT  Cuffed: yes   Successful intubation technique: direct laryngoscopy  Endotracheal tube insertion site: oral  Blade: Sabas  Blade size: #3  ETT size: 7.0 mm  Cormack-Lehane Classification: grade I - full view of glottis  Placement verified by: chest auscultation   Measured from: gums  ETT to gums (cm): 21  Number of attempts at approach: 1               left upper arm

## 2020-10-06 NOTE — TELEPHONE ENCOUNTER
Patient calling to speak with Pushpa about paperwork we needed to fill out for her insurance cancer policy. Would like callback at [phone]

## 2020-10-06 NOTE — PROGRESS NOTES
DATE OF VISIT: 10/6/2020      REASON FOR VISIT: Recurrent follicular lymphoma with involvement of bilateral parotid gland, fatty liver with elevated liver function test      HISTORY OF PRESENT ILLNESS:    60-year-old female with medical problems significant for obesity, hypertension, anxiety, dyslipidemia, recurrent follicular lymphoma with most recent recurrence diagnosed on August 27, 2020 with biopsy of parotid gland on left side.  Patient was started on cycle 1 of Revlimid with rituximab on September 22, 2020.  Patient is here for follow-up appointment today.  Complains of intermittent nausea without vomiting.  Complains of intermittent diarrhea.  Denies any new lymph node enlargement.  Denies any drenching night sweats.  Denies any bleeding.        Oncology history:    1.  Recurrent follicular lymphoma grade 1,  -Patient initially diagnosed in April 2015 for which patient underwent palliative radiation treatment.  -Second relapse happened in September 2016 for again patient had a radiation treatment.  - Patient was found to have enlarged aortocaval lymph node in April 2017 for which patient initially received rituximab every 2 months without any significant improvement.  -Subsequently patient received 4 cycles of bendamustine and rituximab between April 30, 2018 and August 21, 2018.  -Patient was diagnosed with involvement of left parotid gland on biopsy done on August 27, 2020 by Dr. Ibrahim.  PET/CT done shows uptake in bilateral parotid gland along with left sided neck lymph node involvement.  -Patient was started on Revlimid with rituximab on September 22, 2020.          PAST MEDICAL HISTORY:    Past Medical History:   Diagnosis Date   • Acute bronchitis    • Agoraphobia with panic disorder     on SNRI, prn buspar, prn klonopin     • Anxiety    • Arthritis    • Atrophic vaginitis    • Chest pain     work up as new onset angina    • Depression      MDD      • Essential hypertension    • Follicular  non-Hodgkin's lymphoma (CMS/HCC)    • Generalized anxiety disorder     SSRI - buspar, cont cymbalta, prn klonopin      • Hip pain     arthritis, artifical right hip     • History of bone density study 2009    DEXA (bone density) test, peripheral (Normal   • History of echocardiogram 2012    Echocardiogram 47407 (Normal echocardigram. EF 55-60%)   • History of mammogram    • Hyperlipidemia    • Mass of lower limb    • Nuclear senile cataract    • Obesity    • Otalgia     ENT REFER   • Panic disorder     with agoraphobia. On buspar and klonopin now      • Type 2 diabetes mellitus (CMS/HCC)     NO BDR   • Upper respiratory infection        SOCIAL HISTORY:    Social History     Tobacco Use   • Smoking status: Former Smoker     Years: 12.00     Quit date: 1998     Years since quittin.7   • Smokeless tobacco: Never Used   • Tobacco comment: Smoked 1 pack per 2 weeks   Substance Use Topics   • Alcohol use: No   • Drug use: No       Surgical History :  Past Surgical History:   Procedure Laterality Date   • CENTRAL VENOUS LINE INSERTION  2016    Central line insertion (Successful placement of right upper extremity midline.)   • COLONOSCOPY     • COLONOSCOPY N/A 2019    Procedure: COLONOSCOPY;  Surgeon: Brandon Salvador DO;  Location: [facility] ENDOSCOPY;  Service: Gastroenterology   • CYSTOSCOPY  1976    Cystoscopy (external urethroplasty and cysto-panendoscopy,urethal hymenal fusion with hypospadias)   • DILATATION AND CURETTAGE  1980    D&C (removal of IUD)   • EXCISION LESION  04/15/2014    Remove thigh lesion (Excision of a mass of the right thigh using a 16.2 cm incision with intermediate layered closure.)   • HYSTEROSCOPY  2003    Hysteroscope procedure (diagnostic hysteroscopy with fractional D&C)   • INJECTION OF MEDICATION  2011    kenalog (1)   • OTHER SURGICAL HISTORY      Explore parathyroid glands   • OTHER SURGICAL HISTORY  1985    Laparosc diagnostic  (desires elective tubal reversal)   • PAP SMEAR  05/24/2005    PAP SMEAR (normal)   • OH INSJ TUNNELED CVC W/O SUBQ PORT/ AGE 5 YR/> Right 4/4/2017    Procedure: INSERTION VENOUS ACCESS DEVICE (MEDIPORT) right chest;  Surgeon: Fortino Medina MD;  Location: [facility] OR;  Service: General   • OH INSJ TUNNELED CVC W/O SUBQ PORT/ AGE 5 YR/> N/A 6/6/2017    Procedure: INSERTION VENOUS ACCESS DEVICE   (MEDIPORT)   (C-ARM#1);  Surgeon: Fortino Medina MD;  Location: [facility] OR;  Service: General   • SHOULDER ARTHROSCOPY  11/11/2013    Shoulder arthroscopy/surgery (Right shoulder. Rotator cuff repair. Subacromial decompression. Edgar procedure.)   • SHOULDER SURGERY  12/28/2015    Shoulder surgery procedure (Arthroscopy of the left shoulder with rotator cuff repair.Edgar procedure.Subacromial decompression.)   • TONSILLECTOMY  1972   • TONSILLECTOMY AND ADENOIDECTOMY  05/29/1967    T&A (hypertrophied tonsils and adenoids with recurrent infection)   • TOTAL ABDOMINAL HYSTERECTOMY WITH SALPINGO OOPHORECTOMY  02/03/2004    ARIELLE/BSO (with a preop fractional dilation and curettage with frozen section,menorrhagia,dysmenorrhea,unresponsive to medical intervention)   • TOTAL HIP ARTHROPLASTY     • VAGINA SURGERY  03/02/1981    Anesth, surgery on vagina (colpotomy with bilateral partial salpingectomy, multiparity contraindicated)       ALLERGIES:    Allergies   Allergen Reactions   • Ace Inhibitors Cough   • Latex      BLISTER     • Morphine And Related Nausea And Vomiting   • Lortab [Hydrocodone-Acetaminophen] Palpitations         FAMILY HISTORY:  Family History   Problem Relation Age of Onset   • Breast cancer Mother    • Cancer Mother    • Diabetes Mother    • Heart disease Father    • Liver cancer Father    • Cancer Father    • Diabetes Sister         INSULIN DEPENDENT   • Cancer Sister    • Diabetes Brother         INSULIN DEPENDENT   • Coronary artery disease Other    • Diabetes Other    • Diabetes Maternal Grandmother    •  "Cancer Maternal Grandfather            REVIEW OF SYSTEMS:      CONSTITUTIONAL:  Complains of fatigue.  Has gained 3 pounds since last clinic visit.  Denies any fever, chills .     EYES: No visual disturbances. No discharge. No new lesions    ENMT: Positive for swelling on left side of face, states swelling affecting bilateral face is decreased in size since starting treatment..  No epistaxis, mouth sores or difficulty swallowing.    RESPIRATORY:  No new shortness of breath. No new cough or hemoptysis.    CARDIOVASCULAR:  No chest pain or palpitations.    GASTROINTESTINAL: Complains of intermittent nausea without vomiting.  Complains of intermittent diarrhea.  No abdominal pain  or blood in the stool.    GENITOURINARY: No Dysuria or Hematuria.    MUSCULOSKELETAL: Positive for chronic back pain and hip pain.    LYMPHATICS:  Denies any abnormal swollen glands anywhere in the body.    NEUROLOGICAL : No tingling or numbness. No headache or dizziness. No seizures or balance problems.    SKIN: No new skin lesions.    ENDOCRINE : No new hot or cold intolerance. No new polyuria . No polydipsia.        PHYSICAL EXAMINATION:      VITAL SIGNS:  /69   Pulse 85   Temp 97.7 °F (36.5 °C) (Temporal)   Resp 18   Ht 162.6 cm (64.02\")   Wt 105 kg (230 lb 11.2 oz)   BMI 39.58 kg/m²       10/06/20  0747   Weight: 105 kg (230 lb 11.2 oz)         ECOG performance status: 2    CONSTITUTIONAL:  Not in any distress.    EYES: Mild conjunctival Pallor. No Icterus. No Pterygium. Extraocular Movements intact.No ptosis.    ENMT:  Normocephalic, Atraumatic.swelling affecting bilateral parotid region has decreased in size since starting treatment.    NECK:  No adenopathy.Trachea midline. NO JVD.    RESPIRATORY:  Fair air entry bilateral. No rhonchi or wheezing.Fair respiratory effort.    CARDIOVASCULAR:  S1, S2. Regular rate and rhythm. No murmur or gallop appreciated.  Port-A-Cath present.    ABDOMEN:  Soft, obese, nontender. Bowel " sounds present in all four quadrants.  No Hepatosplenomegaly appreciated.    MUSCULOSKELETAL:  No edema.No Calf Tenderness.Decreased range of motion.    NEUROLOGIC:    No  Motor or sensory deficit appreciated. Cranial Nerves 2-12 grossly intact.    SKIN : No new skin lesion identified. Skin is warm and moist to touch.    LYMPHATICS: No new enlarged lymph nodes in neck or supraclavicular area.    PSYCHIATRY: Alert, awake and oriented ×3.  Appears anxious.  Normal judgment.  Makes good eye contact.        DIAGNOSTIC DATA:    Glucose   Date Value Ref Range Status   10/06/2020 178 (H) 65 - 99 mg/dL Final     Sodium   Date Value Ref Range Status   10/06/2020 136 136 - 145 mmol/L Final     Potassium   Date Value Ref Range Status   10/06/2020 4.0 3.5 - 5.2 mmol/L Final     CO2   Date Value Ref Range Status   10/06/2020 24.0 22.0 - 29.0 mmol/L Final     Chloride   Date Value Ref Range Status   10/06/2020 99 98 - 107 mmol/L Final     Anion Gap   Date Value Ref Range Status   10/06/2020 13.0 5.0 - 15.0 mmol/L Final     Creatinine   Date Value Ref Range Status   10/06/2020 0.75 0.57 - 1.00 mg/dL Final     BUN   Date Value Ref Range Status   10/06/2020 9 8 - 23 mg/dL Final     BUN/Creatinine Ratio   Date Value Ref Range Status   10/06/2020 12.0 7.0 - 25.0 Final     Calcium   Date Value Ref Range Status   10/06/2020 9.0 8.6 - 10.5 mg/dL Final     eGFR Non  Amer   Date Value Ref Range Status   10/06/2020 79 >60 mL/min/1.73 Final     Alkaline Phosphatase   Date Value Ref Range Status   10/06/2020 74 39 - 117 U/L Final     Total Protein   Date Value Ref Range Status   10/06/2020 6.6 6.0 - 8.5 g/dL Final     ALT (SGPT)   Date Value Ref Range Status   10/06/2020 38 (H) 1 - 33 U/L Final     AST (SGOT)   Date Value Ref Range Status   10/06/2020 26 1 - 32 U/L Final     Total Bilirubin   Date Value Ref Range Status   10/06/2020 0.3 0.0 - 1.2 mg/dL Final     Albumin   Date Value Ref Range Status   10/06/2020 4.30 3.50 - 5.20 g/dL  Final     Globulin   Date Value Ref Range Status   10/06/2020 2.3 gm/dL Final     Lab Results   Component Value Date    WBC 5.11 10/06/2020    HGB 13.3 10/06/2020    HCT 39.0 10/06/2020    MCV 90.5 10/06/2020     10/06/2020     Lab Results   Component Value Date    NEUTROABS 3.09 10/06/2020     No results found for: , LABCA2, AFPTM, HCGQUANT, , CHROMGRNA, [id number], CEA, REFLABREPO      PATHOLOGY:  Pathology report from August 27, 2020 showed:                      RADIOLOGY DATA :  Nm Pet Skull Base To Mid Thigh     Result Date: 9/4/2020  CONCLUSION: 1.  Right parotid lymph node with abnormal FDG activity with SUV of 3.06. Left submandibular angle mandible conglomeration of lymphadenopathy with abnormal FDG activity of 11.04. Left paravertebral enlarged lymph node with abnormal FDG activity with SUV of 4.72. These findings would be suspicious for recurrent follicular lymphoma. 2. Remainder of PET scan is unremarkable as described above. (Please note:  a Nuclear Medicine bone scan is considered more sensitive for the detection of osteoblastic metastatic disease). Electronically signed by:  Benito Matthews MD  9/4/2020 6:01 PM CDT Workstation: [id number]        ASSESSMENT AND PLAN:      1.  Recurrent follicular lymphoma:  -Please review oncology history for prior treatment details  - Patient was started on cycle 1 of Revlimid and rituximab on September 22, 2020.  Patient is tolerating treatment well except for intermittent nausea for which he is taking Phenergan.  - Recommend completing third week of Revlimid this week.  - We will ask patient to return to clinic in 2 weeks with beginning of cycle 2 of Revlimid and rituximab.  -Patient was instructed to continue taking aspirin 81 mg p.o. daily to prevent any thrombotic complication from Revlimid    2.  Fatty liver with elevated liver enzymes: Will monitor with CMP    3.  Diarrhea:  -Secondary to Revlimid plus rituximab.  Recommend PRN Imodium.    4.   Health maintenance: Patient does not smoke.  Had a colonoscopy in the last 2 years.    5. Advance Care Planning: For now patient remains full code and is able to make decisions.  Patient has health care surrogate mentioned on chart.         PHQ-9 Total Score: 0   -Patient is not homicidal or suicidal.  No acute intervention required.    Chasity Leon reports a pain score of 0.  Given her pain assessment as noted, treatment options were discussed and the following options were decided upon as a follow-up plan to address the patient's pain: No acute intervention required.         Tino Sood MD  10/6/2020  08:38 CDT        Part of this note may be an electronic transcription/translation of spoken language to printed text using the Dragon Dictation System.

## 2020-10-12 DIAGNOSIS — C82.08 GRADE 1 FOLLICULAR LYMPHOMA OF LYMPH NODES OF MULTIPLE REGIONS (HCC): ICD-10-CM

## 2020-10-13 ENCOUNTER — APPOINTMENT (OUTPATIENT)
Dept: ONCOLOGY | Facility: HOSPITAL | Age: 60
End: 2020-10-13

## 2020-10-13 ENCOUNTER — INFUSION (OUTPATIENT)
Dept: ONCOLOGY | Facility: HOSPITAL | Age: 60
End: 2020-10-13

## 2020-10-13 ENCOUNTER — LAB (OUTPATIENT)
Dept: ONCOLOGY | Facility: HOSPITAL | Age: 60
End: 2020-10-13

## 2020-10-13 VITALS
SYSTOLIC BLOOD PRESSURE: 147 MMHG | RESPIRATION RATE: 18 BRPM | DIASTOLIC BLOOD PRESSURE: 66 MMHG | HEART RATE: 82 BPM | TEMPERATURE: 97.5 F

## 2020-10-13 DIAGNOSIS — Z45.2 ENCOUNTER FOR VENOUS ACCESS DEVICE CARE: ICD-10-CM

## 2020-10-13 DIAGNOSIS — C82.08 GRADE 1 FOLLICULAR LYMPHOMA OF LYMPH NODES OF MULTIPLE REGIONS (HCC): ICD-10-CM

## 2020-10-13 DIAGNOSIS — C82.08 GRADE 1 FOLLICULAR LYMPHOMA OF LYMPH NODES OF MULTIPLE REGIONS (HCC): Primary | ICD-10-CM

## 2020-10-13 LAB
ALBUMIN SERPL-MCNC: 4.2 G/DL (ref 3.5–5.2)
ALBUMIN/GLOB SERPL: 1.7 G/DL
ALP SERPL-CCNC: 69 U/L (ref 39–117)
ALT SERPL W P-5'-P-CCNC: 48 U/L (ref 1–33)
ANION GAP SERPL CALCULATED.3IONS-SCNC: 12 MMOL/L (ref 5–15)
AST SERPL-CCNC: 34 U/L (ref 1–32)
BASOPHILS # BLD MANUAL: 0.16 10*3/MM3 (ref 0–0.2)
BASOPHILS NFR BLD AUTO: 5 % (ref 0–1.5)
BILIRUB SERPL-MCNC: 0.3 MG/DL (ref 0–1.2)
BUN SERPL-MCNC: 7 MG/DL (ref 8–23)
BUN/CREAT SERPL: 8.5 (ref 7–25)
CALCIUM SPEC-SCNC: 9.2 MG/DL (ref 8.6–10.5)
CHLORIDE SERPL-SCNC: 100 MMOL/L (ref 98–107)
CO2 SERPL-SCNC: 25 MMOL/L (ref 22–29)
CREAT SERPL-MCNC: 0.82 MG/DL (ref 0.57–1)
DEPRECATED RDW RBC AUTO: 47.2 FL (ref 37–54)
EOSINOPHIL # BLD MANUAL: 0.09 10*3/MM3 (ref 0–0.4)
EOSINOPHIL NFR BLD MANUAL: 3 % (ref 0.3–6.2)
ERYTHROCYTE [DISTWIDTH] IN BLOOD BY AUTOMATED COUNT: 14.7 % (ref 12.3–15.4)
GFR SERPL CREATININE-BSD FRML MDRD: 71 ML/MIN/1.73
GLOBULIN UR ELPH-MCNC: 2.5 GM/DL
GLUCOSE SERPL-MCNC: 188 MG/DL (ref 65–99)
HCT VFR BLD AUTO: 38.1 % (ref 34–46.6)
HGB BLD-MCNC: 13.4 G/DL (ref 12–15.9)
LYMPHOCYTES # BLD MANUAL: 0.79 10*3/MM3 (ref 0.7–3.1)
LYMPHOCYTES NFR BLD MANUAL: 15 % (ref 5–12)
LYMPHOCYTES NFR BLD MANUAL: 25 % (ref 19.6–45.3)
MCH RBC QN AUTO: 31.2 PG (ref 26.6–33)
MCHC RBC AUTO-ENTMCNC: 35.2 G/DL (ref 31.5–35.7)
MCV RBC AUTO: 88.6 FL (ref 79–97)
MONOCYTES # BLD AUTO: 0.47 10*3/MM3 (ref 0.1–0.9)
NEUTROPHILS # BLD AUTO: 1.64 10*3/MM3 (ref 1.7–7)
NEUTROPHILS NFR BLD MANUAL: 52 % (ref 42.7–76)
PLATELET # BLD AUTO: 305 10*3/MM3 (ref 140–450)
PMV BLD AUTO: 9.1 FL (ref 6–12)
POTASSIUM SERPL-SCNC: 3.9 MMOL/L (ref 3.5–5.2)
PROT SERPL-MCNC: 6.7 G/DL (ref 6–8.5)
RBC # BLD AUTO: 4.3 10*6/MM3 (ref 3.77–5.28)
RBC MORPH BLD: NORMAL
SMALL PLATELETS BLD QL SMEAR: ADEQUATE
SODIUM SERPL-SCNC: 137 MMOL/L (ref 136–145)
WBC # BLD AUTO: 3.16 10*3/MM3 (ref 3.4–10.8)
WBC MORPH BLD: NORMAL

## 2020-10-13 PROCEDURE — 85025 COMPLETE CBC W/AUTO DIFF WBC: CPT

## 2020-10-13 PROCEDURE — 25010000002 DIPHENHYDRAMINE PER 50 MG: Performed by: INTERNAL MEDICINE

## 2020-10-13 PROCEDURE — 96415 CHEMO IV INFUSION ADDL HR: CPT | Performed by: INTERNAL MEDICINE

## 2020-10-13 PROCEDURE — 85007 BL SMEAR W/DIFF WBC COUNT: CPT

## 2020-10-13 PROCEDURE — 25010000002 RITUXIMAB 500 MG/50ML SOLUTION 50 ML VIAL: Performed by: INTERNAL MEDICINE

## 2020-10-13 PROCEDURE — 80053 COMPREHEN METABOLIC PANEL: CPT

## 2020-10-13 PROCEDURE — 96375 TX/PRO/DX INJ NEW DRUG ADDON: CPT | Performed by: INTERNAL MEDICINE

## 2020-10-13 PROCEDURE — 96413 CHEMO IV INFUSION 1 HR: CPT | Performed by: INTERNAL MEDICINE

## 2020-10-13 PROCEDURE — 25010000003 HEPARIN LOCK FLUSH PER 10 UNITS: Performed by: INTERNAL MEDICINE

## 2020-10-13 PROCEDURE — 25010000002 RITUXIMAB 100 MG/10ML SOLUTION 10 ML VIAL: Performed by: INTERNAL MEDICINE

## 2020-10-13 RX ORDER — FAMOTIDINE 10 MG/ML
20 INJECTION, SOLUTION INTRAVENOUS AS NEEDED
Status: CANCELLED | OUTPATIENT
Start: 2020-10-13

## 2020-10-13 RX ORDER — SODIUM CHLORIDE 0.9 % (FLUSH) 0.9 %
20 SYRINGE (ML) INJECTION AS NEEDED
Status: CANCELLED | OUTPATIENT
Start: 2020-10-20

## 2020-10-13 RX ORDER — MEPERIDINE HYDROCHLORIDE 50 MG/ML
25 INJECTION INTRAMUSCULAR; INTRAVENOUS; SUBCUTANEOUS
Status: CANCELLED | OUTPATIENT
Start: 2020-10-13

## 2020-10-13 RX ORDER — ACETAMINOPHEN 325 MG/1
650 TABLET ORAL ONCE
Status: COMPLETED | OUTPATIENT
Start: 2020-10-13 | End: 2020-10-13

## 2020-10-13 RX ORDER — SODIUM CHLORIDE 9 MG/ML
250 INJECTION, SOLUTION INTRAVENOUS ONCE
Status: CANCELLED | OUTPATIENT
Start: 2020-10-13

## 2020-10-13 RX ORDER — ACETAMINOPHEN 325 MG/1
650 TABLET ORAL ONCE
Status: CANCELLED | OUTPATIENT
Start: 2020-10-13

## 2020-10-13 RX ORDER — SODIUM CHLORIDE 0.9 % (FLUSH) 0.9 %
20 SYRINGE (ML) INJECTION AS NEEDED
Status: DISCONTINUED | OUTPATIENT
Start: 2020-10-13 | End: 2020-10-13 | Stop reason: HOSPADM

## 2020-10-13 RX ORDER — DIPHENHYDRAMINE HYDROCHLORIDE 50 MG/ML
50 INJECTION INTRAMUSCULAR; INTRAVENOUS AS NEEDED
Status: DISCONTINUED | OUTPATIENT
Start: 2020-10-13 | End: 2020-10-13 | Stop reason: HOSPADM

## 2020-10-13 RX ORDER — SODIUM CHLORIDE 0.9 % (FLUSH) 0.9 %
10 SYRINGE (ML) INJECTION AS NEEDED
Status: DISCONTINUED | OUTPATIENT
Start: 2020-10-13 | End: 2020-10-13 | Stop reason: HOSPADM

## 2020-10-13 RX ORDER — FAMOTIDINE 10 MG/ML
20 INJECTION, SOLUTION INTRAVENOUS AS NEEDED
Status: COMPLETED | OUTPATIENT
Start: 2020-10-13 | End: 2020-10-13

## 2020-10-13 RX ORDER — SODIUM CHLORIDE 0.9 % (FLUSH) 0.9 %
10 SYRINGE (ML) INJECTION AS NEEDED
Status: CANCELLED | OUTPATIENT
Start: 2020-10-20

## 2020-10-13 RX ORDER — LENALIDOMIDE 20 MG/1
CAPSULE ORAL
Qty: 21 CAPSULE | Refills: 1 | Status: SHIPPED | OUTPATIENT
Start: 2020-10-13 | End: 2020-12-05

## 2020-10-13 RX ORDER — HEPARIN SODIUM (PORCINE) LOCK FLUSH IV SOLN 100 UNIT/ML 100 UNIT/ML
500 SOLUTION INTRAVENOUS AS NEEDED
Status: CANCELLED | OUTPATIENT
Start: 2020-10-20

## 2020-10-13 RX ORDER — DIPHENHYDRAMINE HYDROCHLORIDE 50 MG/ML
50 INJECTION INTRAMUSCULAR; INTRAVENOUS AS NEEDED
Status: CANCELLED | OUTPATIENT
Start: 2020-10-13

## 2020-10-13 RX ORDER — SODIUM CHLORIDE 9 MG/ML
250 INJECTION, SOLUTION INTRAVENOUS ONCE
Status: COMPLETED | OUTPATIENT
Start: 2020-10-13 | End: 2020-10-13

## 2020-10-13 RX ORDER — HEPARIN SODIUM (PORCINE) LOCK FLUSH IV SOLN 100 UNIT/ML 100 UNIT/ML
500 SOLUTION INTRAVENOUS AS NEEDED
Status: DISCONTINUED | OUTPATIENT
Start: 2020-10-13 | End: 2020-10-13 | Stop reason: HOSPADM

## 2020-10-13 RX ADMIN — SODIUM CHLORIDE 250 ML: 9 INJECTION, SOLUTION INTRAVENOUS at 11:32

## 2020-10-13 RX ADMIN — HEPARIN 500 UNITS: 100 SYRINGE at 15:10

## 2020-10-13 RX ADMIN — FAMOTIDINE 20 MG: 10 INJECTION INTRAVENOUS at 14:30

## 2020-10-13 RX ADMIN — RITUXIMAB 770 MG: 10 INJECTION, SOLUTION INTRAVENOUS at 12:04

## 2020-10-13 RX ADMIN — SODIUM CHLORIDE, PRESERVATIVE FREE 10 ML: 5 INJECTION INTRAVENOUS at 15:10

## 2020-10-13 RX ADMIN — ACETAMINOPHEN 650 MG: 325 TABLET, FILM COATED ORAL at 11:32

## 2020-10-13 RX ADMIN — DIPHENHYDRAMINE HYDROCHLORIDE 25 MG: 50 INJECTION, SOLUTION INTRAMUSCULAR; INTRAVENOUS at 11:33

## 2020-10-20 ENCOUNTER — INFUSION (OUTPATIENT)
Dept: ONCOLOGY | Facility: HOSPITAL | Age: 60
End: 2020-10-20

## 2020-10-20 ENCOUNTER — TELEPHONE (OUTPATIENT)
Dept: ONCOLOGY | Facility: CLINIC | Age: 60
End: 2020-10-20

## 2020-10-20 ENCOUNTER — OFFICE VISIT (OUTPATIENT)
Dept: ONCOLOGY | Facility: CLINIC | Age: 60
End: 2020-10-20

## 2020-10-20 VITALS
BODY MASS INDEX: 38.57 KG/M2 | HEIGHT: 64 IN | RESPIRATION RATE: 18 BRPM | HEART RATE: 89 BPM | SYSTOLIC BLOOD PRESSURE: 167 MMHG | DIASTOLIC BLOOD PRESSURE: 77 MMHG | WEIGHT: 225.9 LBS | TEMPERATURE: 97.6 F

## 2020-10-20 DIAGNOSIS — Z45.2 ENCOUNTER FOR VENOUS ACCESS DEVICE CARE: ICD-10-CM

## 2020-10-20 DIAGNOSIS — C82.03 FOLLICULAR LYMPHOMA GRADE I OF INTRA-ABDOMINAL LYMPH NODES (HCC): ICD-10-CM

## 2020-10-20 DIAGNOSIS — K11.8 MASS OF BOTH PAROTID GLANDS: Primary | ICD-10-CM

## 2020-10-20 DIAGNOSIS — R79.89 ELEVATED LFTS: ICD-10-CM

## 2020-10-20 DIAGNOSIS — R93.3 ABNORMAL CT SCAN, COLON: ICD-10-CM

## 2020-10-20 DIAGNOSIS — C82.08 GRADE 1 FOLLICULAR LYMPHOMA OF LYMPH NODES OF MULTIPLE REGIONS (HCC): Primary | ICD-10-CM

## 2020-10-20 LAB
ALBUMIN SERPL-MCNC: 4.2 G/DL (ref 3.5–5.2)
ALBUMIN/GLOB SERPL: 1.6 G/DL
ALP SERPL-CCNC: 70 U/L (ref 39–117)
ALT SERPL W P-5'-P-CCNC: 38 U/L (ref 1–33)
ANION GAP SERPL CALCULATED.3IONS-SCNC: 11 MMOL/L (ref 5–15)
AST SERPL-CCNC: 29 U/L (ref 1–32)
BASOPHILS # BLD AUTO: 0.15 10*3/MM3 (ref 0–0.2)
BASOPHILS NFR BLD AUTO: 3.2 % (ref 0–1.5)
BILIRUB SERPL-MCNC: 0.3 MG/DL (ref 0–1.2)
BUN SERPL-MCNC: 8 MG/DL (ref 8–23)
BUN/CREAT SERPL: 11 (ref 7–25)
CALCIUM SPEC-SCNC: 9.3 MG/DL (ref 8.6–10.5)
CHLORIDE SERPL-SCNC: 100 MMOL/L (ref 98–107)
CO2 SERPL-SCNC: 23 MMOL/L (ref 22–29)
CREAT SERPL-MCNC: 0.73 MG/DL (ref 0.57–1)
DEPRECATED RDW RBC AUTO: 47.8 FL (ref 37–54)
EOSINOPHIL # BLD AUTO: 0 10*3/MM3 (ref 0–0.4)
EOSINOPHIL NFR BLD AUTO: 0 % (ref 0.3–6.2)
ERYTHROCYTE [DISTWIDTH] IN BLOOD BY AUTOMATED COUNT: 14.7 % (ref 12.3–15.4)
GFR SERPL CREATININE-BSD FRML MDRD: 81 ML/MIN/1.73
GLOBULIN UR ELPH-MCNC: 2.7 GM/DL
GLUCOSE SERPL-MCNC: 205 MG/DL (ref 65–99)
HCT VFR BLD AUTO: 39.3 % (ref 34–46.6)
HGB BLD-MCNC: 13.7 G/DL (ref 12–15.9)
HOLD SPECIMEN: NORMAL
IMM GRANULOCYTES # BLD AUTO: 0.04 10*3/MM3 (ref 0–0.05)
IMM GRANULOCYTES NFR BLD AUTO: 0.9 % (ref 0–0.5)
LYMPHOCYTES # BLD AUTO: 0.89 10*3/MM3 (ref 0.7–3.1)
LYMPHOCYTES NFR BLD AUTO: 19 % (ref 19.6–45.3)
MCH RBC QN AUTO: 31.2 PG (ref 26.6–33)
MCHC RBC AUTO-ENTMCNC: 34.9 G/DL (ref 31.5–35.7)
MCV RBC AUTO: 89.5 FL (ref 79–97)
MONOCYTES # BLD AUTO: 0.7 10*3/MM3 (ref 0.1–0.9)
MONOCYTES NFR BLD AUTO: 15 % (ref 5–12)
NEUTROPHILS NFR BLD AUTO: 2.9 10*3/MM3 (ref 1.7–7)
NEUTROPHILS NFR BLD AUTO: 61.9 % (ref 42.7–76)
NRBC BLD AUTO-RTO: 0 /100 WBC (ref 0–0.2)
PLATELET # BLD AUTO: 391 10*3/MM3 (ref 140–450)
PMV BLD AUTO: 8.8 FL (ref 6–12)
POTASSIUM SERPL-SCNC: 3.9 MMOL/L (ref 3.5–5.2)
PROT SERPL-MCNC: 6.9 G/DL (ref 6–8.5)
RBC # BLD AUTO: 4.39 10*6/MM3 (ref 3.77–5.28)
SODIUM SERPL-SCNC: 134 MMOL/L (ref 136–145)
WBC # BLD AUTO: 4.68 10*3/MM3 (ref 3.4–10.8)

## 2020-10-20 PROCEDURE — 85025 COMPLETE CBC W/AUTO DIFF WBC: CPT

## 2020-10-20 PROCEDURE — G8731 PAIN NEG NO PLAN: HCPCS | Performed by: INTERNAL MEDICINE

## 2020-10-20 PROCEDURE — 80053 COMPREHEN METABOLIC PANEL: CPT

## 2020-10-20 PROCEDURE — 1123F ACP DISCUSS/DSCN MKR DOCD: CPT | Performed by: INTERNAL MEDICINE

## 2020-10-20 PROCEDURE — G9903 PT SCRN TBCO ID AS NON USER: HCPCS | Performed by: INTERNAL MEDICINE

## 2020-10-20 PROCEDURE — 25010000003 HEPARIN LOCK FLUSH PER 10 UNITS: Performed by: INTERNAL MEDICINE

## 2020-10-20 PROCEDURE — 36415 COLL VENOUS BLD VENIPUNCTURE: CPT

## 2020-10-20 PROCEDURE — 99214 OFFICE O/P EST MOD 30 MIN: CPT | Performed by: INTERNAL MEDICINE

## 2020-10-20 PROCEDURE — 36591 DRAW BLOOD OFF VENOUS DEVICE: CPT | Performed by: INTERNAL MEDICINE

## 2020-10-20 RX ORDER — HEPARIN SODIUM (PORCINE) LOCK FLUSH IV SOLN 100 UNIT/ML 100 UNIT/ML
500 SOLUTION INTRAVENOUS AS NEEDED
Status: CANCELLED | OUTPATIENT
Start: 2020-11-16

## 2020-10-20 RX ORDER — HEPARIN SODIUM (PORCINE) LOCK FLUSH IV SOLN 100 UNIT/ML 100 UNIT/ML
500 SOLUTION INTRAVENOUS AS NEEDED
Status: DISCONTINUED | OUTPATIENT
Start: 2020-10-20 | End: 2020-10-20 | Stop reason: HOSPADM

## 2020-10-20 RX ORDER — SODIUM CHLORIDE 0.9 % (FLUSH) 0.9 %
10 SYRINGE (ML) INJECTION AS NEEDED
Status: CANCELLED | OUTPATIENT
Start: 2020-11-16

## 2020-10-20 RX ORDER — SODIUM CHLORIDE 0.9 % (FLUSH) 0.9 %
20 SYRINGE (ML) INJECTION AS NEEDED
Status: DISCONTINUED | OUTPATIENT
Start: 2020-10-20 | End: 2020-10-20 | Stop reason: HOSPADM

## 2020-10-20 RX ORDER — SODIUM CHLORIDE 0.9 % (FLUSH) 0.9 %
20 SYRINGE (ML) INJECTION AS NEEDED
Status: CANCELLED | OUTPATIENT
Start: 2020-10-20

## 2020-10-20 RX ORDER — SODIUM CHLORIDE 0.9 % (FLUSH) 0.9 %
10 SYRINGE (ML) INJECTION AS NEEDED
Status: DISCONTINUED | OUTPATIENT
Start: 2020-10-20 | End: 2020-10-20 | Stop reason: HOSPADM

## 2020-10-20 RX ADMIN — HEPARIN 500 UNITS: 100 SYRINGE at 14:09

## 2020-10-20 NOTE — PROGRESS NOTES
DATE OF VISIT: 10/20/2020      REASON FOR VISIT: Recurrent follicular lymphoma with involvement of bilateral parotid gland, fatty liver with elevated liver function test      HISTORY OF PRESENT ILLNESS:    60-year-old female with medical problem consisting of obesity, hypertension, anxiety, dyslipidemia, recurrent follicular lymphoma with most recent recurrence diagnosed on August 27, 2020 with biopsy of parotid gland on left side.  Patient was started on Revlimid with rituximab on September 22, 2020.  Patient is here for follow-up appointment today to start cycle 2 of Revlimid.  Complains of intermittent nausea without vomiting.  Complains of intermittent diarrhea.  Denies any new lymph node enlargement.  Denies any bleeding.  Denies any drenching night sweats.        Oncology history:    1.  Recurrent follicular lymphoma grade 1,  -Patient initially diagnosed in April 2015 for which patient underwent palliative radiation treatment.  -Second relapse happened in September 2016 for again patient had a radiation treatment.  - Patient was found to have enlarged aortocaval lymph node in April 2017 for which patient initially received rituximab every 2 months without any significant improvement.  -Subsequently patient received 4 cycles of bendamustine and rituximab between April 30, 2018 and August 21, 2018.  -Patient was diagnosed with involvement of left parotid gland on biopsy done on August 27, 2020 by Dr. Ibrahim.  PET/CT done shows uptake in bilateral parotid gland along with left sided neck lymph node involvement.  -Patient was started on Revlimid with rituximab on September 22, 2020.            PAST MEDICAL HISTORY:    Past Medical History:   Diagnosis Date   • Acute bronchitis    • Agoraphobia with panic disorder     on SNRI, prn buspar, prn klonopin     • Anxiety    • Arthritis    • Atrophic vaginitis    • Chest pain     work up as new onset angina    • Depression      MDD      • Essential hypertension    •  Follicular non-Hodgkin's lymphoma (CMS/HCC)    • Generalized anxiety disorder     SSRI - buspar, cont cymbalta, prn klonopin      • Hip pain     arthritis, artifical right hip     • History of bone density study 2009    DEXA (bone density) test, peripheral (Normal   • History of echocardiogram 2012    Echocardiogram 63115 (Normal echocardigram. EF 55-60%)   • History of mammogram    • Hyperlipidemia    • Mass of lower limb    • Nuclear senile cataract    • Obesity    • Otalgia     ENT REFER   • Panic disorder     with agoraphobia. On buspar and klonopin now      • Type 2 diabetes mellitus (CMS/HCC)     NO BDR   • Upper respiratory infection        SOCIAL HISTORY:    Social History     Tobacco Use   • Smoking status: Former Smoker     Years: 12.00     Quit date: 1998     Years since quittin.8   • Smokeless tobacco: Never Used   • Tobacco comment: Smoked 1 pack per 2 weeks   Substance Use Topics   • Alcohol use: No   • Drug use: No       Surgical History :  Past Surgical History:   Procedure Laterality Date   • CENTRAL VENOUS LINE INSERTION  2016    Central line insertion (Successful placement of right upper extremity midline.)   • COLONOSCOPY     • COLONOSCOPY N/A 2019    Procedure: COLONOSCOPY;  Surgeon: Brandon Salvador DO;  Location: [facility] ENDOSCOPY;  Service: Gastroenterology   • CYSTOSCOPY  1976    Cystoscopy (external urethroplasty and cysto-panendoscopy,urethal hymenal fusion with hypospadias)   • DILATATION AND CURETTAGE  1980    D&C (removal of IUD)   • EXCISION LESION  04/15/2014    Remove thigh lesion (Excision of a mass of the right thigh using a 16.2 cm incision with intermediate layered closure.)   • HYSTEROSCOPY  2003    Hysteroscope procedure (diagnostic hysteroscopy with fractional D&C)   • INJECTION OF MEDICATION  2011    kenalog (1)   • OTHER SURGICAL HISTORY      Explore parathyroid glands   • OTHER SURGICAL HISTORY  1985    Laparosc  diagnostic (desires elective tubal reversal)   • PAP SMEAR  05/24/2005    PAP SMEAR (normal)   • ND INSJ TUNNELED CVC W/O SUBQ PORT/ AGE 5 YR/> Right 4/4/2017    Procedure: INSERTION VENOUS ACCESS DEVICE (MEDIPORT) right chest;  Surgeon: Fortino Medina MD;  Location: [facility] OR;  Service: General   • ND INSJ TUNNELED CVC W/O SUBQ PORT/ AGE 5 YR/> N/A 6/6/2017    Procedure: INSERTION VENOUS ACCESS DEVICE   (MEDIPORT)   (C-ARM#1);  Surgeon: Fortino Medina MD;  Location: [facility] OR;  Service: General   • SHOULDER ARTHROSCOPY  11/11/2013    Shoulder arthroscopy/surgery (Right shoulder. Rotator cuff repair. Subacromial decompression. Edgar procedure.)   • SHOULDER SURGERY  12/28/2015    Shoulder surgery procedure (Arthroscopy of the left shoulder with rotator cuff repair.Edgar procedure.Subacromial decompression.)   • TONSILLECTOMY  1972   • TONSILLECTOMY AND ADENOIDECTOMY  05/29/1967    T&A (hypertrophied tonsils and adenoids with recurrent infection)   • TOTAL ABDOMINAL HYSTERECTOMY WITH SALPINGO OOPHORECTOMY  02/03/2004    ARIELLE/BSO (with a preop fractional dilation and curettage with frozen section,menorrhagia,dysmenorrhea,unresponsive to medical intervention)   • TOTAL HIP ARTHROPLASTY     • VAGINA SURGERY  03/02/1981    Anesth, surgery on vagina (colpotomy with bilateral partial salpingectomy, multiparity contraindicated)       ALLERGIES:    Allergies   Allergen Reactions   • Ace Inhibitors Cough   • Latex      BLISTER     • Morphine And Related Nausea And Vomiting   • Lortab [Hydrocodone-Acetaminophen] Palpitations         FAMILY HISTORY:  Family History   Problem Relation Age of Onset   • Breast cancer Mother    • Cancer Mother    • Diabetes Mother    • Heart disease Father    • Liver cancer Father    • Cancer Father    • Diabetes Sister         INSULIN DEPENDENT   • Cancer Sister    • Diabetes Brother         INSULIN DEPENDENT   • Coronary artery disease Other    • Diabetes Other    • Diabetes Maternal  "Grandmother    • Cancer Maternal Grandfather            REVIEW OF SYSTEMS:      CONSTITUTIONAL:  Complains of fatigue.  Has lost 5 pounds since last clinic visit.  Denies any fever, chills .     EYES: No visual disturbances. No discharge. No new lesions    ENMT: States swelling involving bilateral parotid gland is improved.  No epistaxis, mouth sores or difficulty swallowing.    RESPIRATORY:  No new shortness of breath. No new cough or hemoptysis.    CARDIOVASCULAR:  No chest pain or palpitations.    GASTROINTESTINAL: Complains of intermittent nausea without vomiting.  Complains of intermittent diarrhea.  No abdominal pain or blood in the stool.    GENITOURINARY: No Dysuria or Hematuria.    MUSCULOSKELETAL: Positive for chronic back pain and hip pain.    LYMPHATICS:  Denies any abnormal swollen glands anywhere in the body.    NEUROLOGICAL : No tingling or numbness. No headache or dizziness. No seizures or balance problems.    SKIN: No new skin lesions.    ENDOCRINE : No new hot or cold intolerance. No new polyuria . No polydipsia.        PHYSICAL EXAMINATION:      VITAL SIGNS:  /77   Pulse 89   Temp 97.6 °F (36.4 °C) (Temporal)   Resp 18   Ht 162.6 cm (64.02\")   Wt 102 kg (225 lb 14.4 oz)   BMI 38.76 kg/m²       10/20/20  0936   Weight: 102 kg (225 lb 14.4 oz)         ECOG performance status: 2    CONSTITUTIONAL:  Not in any distress.    EYES: Mild conjunctival Pallor. No Icterus. No Pterygium. Extraocular Movements intact.No ptosis.    ENMT:  Normocephalic, Atraumatic.swelling involving bilateral parotid gland has decreased in size.    NECK:  No adenopathy.Trachea midline. NO JVD.    RESPIRATORY:  Fair air entry bilateral. No rhonchi or wheezing.Fair respiratory effort.    CARDIOVASCULAR:  S1, S2. Regular rate and rhythm. No murmur or gallop appreciated.  Port-A-Cath present    ABDOMEN:  Soft, obese, nontender. Bowel sounds present in all four quadrants.  No Hepatosplenomegaly " appreciated.    MUSCULOSKELETAL:  No edema.No Calf Tenderness.Decreased range of motion.    NEUROLOGIC:    No  Motor or sensory deficit appreciated. Cranial Nerves 2-12 grossly intact.    SKIN : No new skin lesion identified. Skin is warm and moist to touch.    LYMPHATICS: No new enlarged lymph nodes in neck or supraclavicular area.    PSYCHIATRY: Alert, awake and oriented ×3.  Appears anxious.  Normal judgment.  Makes good eye contact.        DIAGNOSTIC DATA:    Glucose   Date Value Ref Range Status   10/20/2020 205 (H) 65 - 99 mg/dL Final     Sodium   Date Value Ref Range Status   10/20/2020 134 (L) 136 - 145 mmol/L Final     Potassium   Date Value Ref Range Status   10/20/2020 3.9 3.5 - 5.2 mmol/L Final     CO2   Date Value Ref Range Status   10/20/2020 23.0 22.0 - 29.0 mmol/L Final     Chloride   Date Value Ref Range Status   10/20/2020 100 98 - 107 mmol/L Final     Anion Gap   Date Value Ref Range Status   10/20/2020 11.0 5.0 - 15.0 mmol/L Final     Creatinine   Date Value Ref Range Status   10/20/2020 0.73 0.57 - 1.00 mg/dL Final     BUN   Date Value Ref Range Status   10/20/2020 8 8 - 23 mg/dL Final     BUN/Creatinine Ratio   Date Value Ref Range Status   10/20/2020 11.0 7.0 - 25.0 Final     Calcium   Date Value Ref Range Status   10/20/2020 9.3 8.6 - 10.5 mg/dL Final     eGFR Non  Amer   Date Value Ref Range Status   10/20/2020 81 >60 mL/min/1.73 Final     Alkaline Phosphatase   Date Value Ref Range Status   10/20/2020 70 39 - 117 U/L Final     Total Protein   Date Value Ref Range Status   10/20/2020 6.9 6.0 - 8.5 g/dL Final     ALT (SGPT)   Date Value Ref Range Status   10/20/2020 38 (H) 1 - 33 U/L Final     AST (SGOT)   Date Value Ref Range Status   10/20/2020 29 1 - 32 U/L Final     Total Bilirubin   Date Value Ref Range Status   10/20/2020 0.3 0.0 - 1.2 mg/dL Final     Albumin   Date Value Ref Range Status   10/20/2020 4.20 3.50 - 5.20 g/dL Final     Globulin   Date Value Ref Range Status    10/20/2020 2.7 gm/dL Final     Lab Results   Component Value Date    WBC 4.68 10/20/2020    HGB 13.7 10/20/2020    HCT 39.3 10/20/2020    MCV 89.5 10/20/2020     10/20/2020     Lab Results   Component Value Date    NEUTROABS 2.90 10/20/2020     No results found for: , LABCA2, AFPTM, HCGQUANT, , CHROMGRNA, [id number], CEA, REFLABREPO        PATHOLOGY:  * Cannot find OR log *         RADIOLOGY DATA :  No radiology results for the last 7 days      ASSESSMENT AND PLAN:      1.  Recurrent follicular lymphoma:  -Please review oncology history for prior treatment details  -Patient was started on cycle 1 of Revlimid with rituximab on September 22, 2020.  Patient is tolerating treatment well so far.  -We will go ahead and with cycle 2 of Revlimid with rituximab today.  -We will ask patient to return to clinic in 4 weeks with repeat CBC and CMP to be done on that day.  -Plan is to do restaging CT scan of maxillofacial, neck, chest and abdomen and pelvis with contrast prior to next clinic visit in 4 weeks.  Recommendation were discussed with patient  -Recommend continuing with aspirin 81 mg p.o. daily to prevent any thrombotic complication from Revlimid    2.  Fatty liver with elevated liver enzymes: We will monitor with CMP    3.  Diarrhea from Revlimid:  -Recommend as needed Imodium    4.  Health maintenance: Patient does not smoke.  Had a colonoscopy done in last 5 years.    5. Advance Care Planning: For now patient remains full code and is able to make decisions.  Patient has health care surrogate mentioned on chart.        PHQ-9 Total Score: 0   Patient is not homicidal or suicidal.  No acute intervention required.    Chasity Leon reports a pain score of 0.  Given her pain assessment as noted, treatment options were discussed and the following options were decided upon as a follow-up plan to address the patient's pain: No acute intervention required.         Tino Sood MD  10/20/2020  10:05  CDT        Part of this note may be an electronic transcription/translation of spoken language to printed text using the Dragon Dictation System.

## 2020-10-20 NOTE — TELEPHONE ENCOUNTER
Patient called she ask to schedule a Lab appt before her CT scan on 11/16  Best call back number [phone]

## 2020-10-20 NOTE — TELEPHONE ENCOUNTER
Informed patient that she will need port accessed prior. Gave date/time of new appointments. She verbalized understanding.

## 2020-10-21 ENCOUNTER — TELEPHONE (OUTPATIENT)
Dept: ONCOLOGY | Facility: CLINIC | Age: 60
End: 2020-10-21

## 2020-11-16 ENCOUNTER — INFUSION (OUTPATIENT)
Dept: ONCOLOGY | Facility: HOSPITAL | Age: 60
End: 2020-11-16

## 2020-11-16 ENCOUNTER — HOSPITAL ENCOUNTER (OUTPATIENT)
Dept: CT IMAGING | Facility: HOSPITAL | Age: 60
Discharge: HOME | End: 2020-11-16
Admitting: INTERNAL MEDICINE

## 2020-11-16 DIAGNOSIS — C82.03 FOLLICULAR LYMPHOMA GRADE I OF INTRA-ABDOMINAL LYMPH NODES (HCC): ICD-10-CM

## 2020-11-16 DIAGNOSIS — Z45.2 ENCOUNTER FOR VENOUS ACCESS DEVICE CARE: ICD-10-CM

## 2020-11-16 DIAGNOSIS — C82.03 FOLLICULAR LYMPHOMA GRADE I OF INTRA-ABDOMINAL LYMPH NODES (HCC): Primary | ICD-10-CM

## 2020-11-16 DIAGNOSIS — K11.8 MASS OF BOTH PAROTID GLANDS: ICD-10-CM

## 2020-11-16 LAB
ALBUMIN SERPL-MCNC: 4.2 G/DL (ref 3.5–5.2)
ALBUMIN/GLOB SERPL: 1.5 G/DL
ALP SERPL-CCNC: 78 U/L (ref 39–117)
ALT SERPL W P-5'-P-CCNC: 44 U/L (ref 1–33)
ANION GAP SERPL CALCULATED.3IONS-SCNC: 11 MMOL/L (ref 5–15)
AST SERPL-CCNC: 29 U/L (ref 1–32)
BASOPHILS # BLD AUTO: 0.15 10*3/MM3 (ref 0–0.2)
BASOPHILS NFR BLD AUTO: 3.2 % (ref 0–1.5)
BILIRUB SERPL-MCNC: 0.2 MG/DL (ref 0–1.2)
BUN SERPL-MCNC: 8 MG/DL (ref 8–23)
BUN/CREAT SERPL: 12.1 (ref 7–25)
CALCIUM SPEC-SCNC: 9.9 MG/DL (ref 8.6–10.5)
CHLORIDE SERPL-SCNC: 100 MMOL/L (ref 98–107)
CO2 SERPL-SCNC: 24 MMOL/L (ref 22–29)
CREAT SERPL-MCNC: 0.66 MG/DL (ref 0.57–1)
DEPRECATED RDW RBC AUTO: 49.3 FL (ref 37–54)
EOSINOPHIL # BLD AUTO: 0 10*3/MM3 (ref 0–0.4)
EOSINOPHIL NFR BLD AUTO: 0 % (ref 0.3–6.2)
ERYTHROCYTE [DISTWIDTH] IN BLOOD BY AUTOMATED COUNT: 14.7 % (ref 12.3–15.4)
GFR SERPL CREATININE-BSD FRML MDRD: 91 ML/MIN/1.73
GLOBULIN UR ELPH-MCNC: 2.8 GM/DL
GLUCOSE SERPL-MCNC: 112 MG/DL (ref 65–99)
HCT VFR BLD AUTO: 40.2 % (ref 34–46.6)
HGB BLD-MCNC: 13.9 G/DL (ref 12–15.9)
IMM GRANULOCYTES # BLD AUTO: 0.04 10*3/MM3 (ref 0–0.05)
IMM GRANULOCYTES NFR BLD AUTO: 0.8 % (ref 0–0.5)
LYMPHOCYTES # BLD AUTO: 0.83 10*3/MM3 (ref 0.7–3.1)
LYMPHOCYTES NFR BLD AUTO: 17.5 % (ref 19.6–45.3)
MCH RBC QN AUTO: 31.6 PG (ref 26.6–33)
MCHC RBC AUTO-ENTMCNC: 34.6 G/DL (ref 31.5–35.7)
MCV RBC AUTO: 91.4 FL (ref 79–97)
MONOCYTES # BLD AUTO: 0.78 10*3/MM3 (ref 0.1–0.9)
MONOCYTES NFR BLD AUTO: 16.5 % (ref 5–12)
NEUTROPHILS NFR BLD AUTO: 2.93 10*3/MM3 (ref 1.7–7)
NEUTROPHILS NFR BLD AUTO: 62 % (ref 42.7–76)
NRBC BLD AUTO-RTO: 0 /100 WBC (ref 0–0.2)
PLATELET # BLD AUTO: 370 10*3/MM3 (ref 140–450)
PMV BLD AUTO: 8.6 FL (ref 6–12)
POTASSIUM SERPL-SCNC: 4 MMOL/L (ref 3.5–5.2)
PROT SERPL-MCNC: 7 G/DL (ref 6–8.5)
RBC # BLD AUTO: 4.4 10*6/MM3 (ref 3.77–5.28)
SODIUM SERPL-SCNC: 135 MMOL/L (ref 136–145)
WBC # BLD AUTO: 4.73 10*3/MM3 (ref 3.4–10.8)

## 2020-11-16 PROCEDURE — 25010000003 HEPARIN LOCK FLUSH PER 10 UNITS: Performed by: INTERNAL MEDICINE

## 2020-11-16 PROCEDURE — 71260 CT THORAX DX C+: CPT

## 2020-11-16 PROCEDURE — 70491 CT SOFT TISSUE NECK W/DYE: CPT

## 2020-11-16 PROCEDURE — 25010000002 IOPAMIDOL 61 % SOLUTION: Performed by: INTERNAL MEDICINE

## 2020-11-16 PROCEDURE — 36591 DRAW BLOOD OFF VENOUS DEVICE: CPT | Performed by: INTERNAL MEDICINE

## 2020-11-16 PROCEDURE — 80053 COMPREHEN METABOLIC PANEL: CPT

## 2020-11-16 PROCEDURE — 70487 CT MAXILLOFACIAL W/DYE: CPT

## 2020-11-16 PROCEDURE — 85025 COMPLETE CBC W/AUTO DIFF WBC: CPT

## 2020-11-16 PROCEDURE — 74177 CT ABD & PELVIS W/CONTRAST: CPT

## 2020-11-16 RX ORDER — SODIUM CHLORIDE 0.9 % (FLUSH) 0.9 %
20 SYRINGE (ML) INJECTION AS NEEDED
Status: DISCONTINUED | OUTPATIENT
Start: 2020-11-16 | End: 2020-11-16 | Stop reason: HOSPADM

## 2020-11-16 RX ORDER — SODIUM CHLORIDE 0.9 % (FLUSH) 0.9 %
10 SYRINGE (ML) INJECTION AS NEEDED
Status: DISCONTINUED | OUTPATIENT
Start: 2020-11-16 | End: 2020-11-16 | Stop reason: HOSPADM

## 2020-11-16 RX ORDER — SODIUM CHLORIDE 0.9 % (FLUSH) 0.9 %
10 SYRINGE (ML) INJECTION AS NEEDED
Status: CANCELLED | OUTPATIENT
Start: 2020-11-16

## 2020-11-16 RX ORDER — HEPARIN SODIUM (PORCINE) LOCK FLUSH IV SOLN 100 UNIT/ML 100 UNIT/ML
500 SOLUTION INTRAVENOUS AS NEEDED
Status: DISCONTINUED | OUTPATIENT
Start: 2020-11-16 | End: 2020-11-16 | Stop reason: HOSPADM

## 2020-11-16 RX ORDER — SODIUM CHLORIDE 0.9 % (FLUSH) 0.9 %
20 SYRINGE (ML) INJECTION AS NEEDED
Status: CANCELLED | OUTPATIENT
Start: 2020-11-16

## 2020-11-16 RX ORDER — HEPARIN SODIUM (PORCINE) LOCK FLUSH IV SOLN 100 UNIT/ML 100 UNIT/ML
500 SOLUTION INTRAVENOUS AS NEEDED
Status: CANCELLED | OUTPATIENT
Start: 2020-11-16

## 2020-11-16 RX ADMIN — IOPAMIDOL 90 ML: 612 INJECTION, SOLUTION INTRAVENOUS at 12:12

## 2020-11-16 RX ADMIN — HEPARIN 500 UNITS: 100 SYRINGE at 12:16

## 2020-11-17 ENCOUNTER — OFFICE VISIT (OUTPATIENT)
Dept: ONCOLOGY | Facility: CLINIC | Age: 60
End: 2020-11-17

## 2020-11-17 VITALS
WEIGHT: 223 LBS | HEART RATE: 88 BPM | DIASTOLIC BLOOD PRESSURE: 78 MMHG | TEMPERATURE: 98.3 F | HEIGHT: 64 IN | RESPIRATION RATE: 18 BRPM | SYSTOLIC BLOOD PRESSURE: 146 MMHG | BODY MASS INDEX: 38.07 KG/M2

## 2020-11-17 DIAGNOSIS — C82.08 GRADE 1 FOLLICULAR LYMPHOMA OF LYMPH NODES OF MULTIPLE REGIONS (HCC): ICD-10-CM

## 2020-11-17 DIAGNOSIS — C82.03 FOLLICULAR LYMPHOMA GRADE I OF INTRA-ABDOMINAL LYMPH NODES (HCC): Primary | ICD-10-CM

## 2020-11-17 DIAGNOSIS — K11.8 MASS OF BOTH PAROTID GLANDS: ICD-10-CM

## 2020-11-17 DIAGNOSIS — R79.89 ELEVATED LFTS: ICD-10-CM

## 2020-11-17 PROCEDURE — G8730 PAIN DOC POS AND PLAN: HCPCS | Performed by: INTERNAL MEDICINE

## 2020-11-17 PROCEDURE — 99214 OFFICE O/P EST MOD 30 MIN: CPT | Performed by: INTERNAL MEDICINE

## 2020-11-17 PROCEDURE — G0463 HOSPITAL OUTPT CLINIC VISIT: HCPCS | Performed by: INTERNAL MEDICINE

## 2020-11-17 PROCEDURE — 1123F ACP DISCUSS/DSCN MKR DOCD: CPT | Performed by: INTERNAL MEDICINE

## 2020-11-17 PROCEDURE — G9903 PT SCRN TBCO ID AS NON USER: HCPCS | Performed by: INTERNAL MEDICINE

## 2020-11-17 NOTE — PROGRESS NOTES
DATE OF VISIT: 11/17/2020      REASON FOR VISIT: Recurrent follicular lymphoma with involvement of bilateral parotid gland, fatty liver, elevated liver function test      HISTORY OF PRESENT ILLNESS:    60-year-old female with medical problem consisting of obesity, hypertension, anxiety, dyslipidemia, fatty liver with elevated liver function test, recurrent follicular lymphoma with most recent recurrence diagnosed on August 27, 2020 with biopsy of parotid gland on left side.  Patient has been started on Revlimid with rituximab on September 22, 2020.  Patient is here for follow-up appointment today.  Patient is scheduled to start cycle 3 of Revlimid today.  Complains of intermittent nausea without vomiting.  Complains of intermittent diarrhea.  Denies any new lymph node enlargement.  Denies any bleeding.  Denies any drenching night sweats.        Oncologic history:    1.  Recurrent follicular lymphoma grade 1,  -Patient initially diagnosed in April 2015 for which patient underwent palliative radiation treatment.  -Second relapse happened in September 2016 for again patient had a radiation treatment.  - Patient was found to have enlarged aortocaval lymph node in April 2017 for which patient initially received rituximab every 2 months without any significant improvement.  -Subsequently patient received 4 cycles of bendamustine and rituximab between April 30, 2018 and August 21, 2018.  -Patient was diagnosed with involvement of left parotid gland on biopsy done on August 27, 2020 by Dr. Ibrahim.  PET/CT done shows uptake in bilateral parotid gland along with left sided neck lymph node involvement.  -Patient was started on Revlimid with rituximab on September 22, 2020.            PAST MEDICAL HISTORY:    Past Medical History:   Diagnosis Date   • Acute bronchitis    • Agoraphobia with panic disorder     on SNRI, prn buspar, prn klonopin     • Anxiety    • Arthritis    • Atrophic vaginitis    • Chest pain     work up as new  onset angina    • Depression      MDD      • Essential hypertension    • Follicular non-Hodgkin's lymphoma (CMS/HCC)    • Generalized anxiety disorder     SSRI - buspar, cont cymbalta, prn klonopin      • Hip pain     arthritis, artifical right hip     • History of bone density study 2009    DEXA (bone density) test, peripheral (Normal   • History of echocardiogram 2012    Echocardiogram 26687 (Normal echocardigram. EF 55-60%)   • History of mammogram    • Hyperlipidemia    • Mass of lower limb    • Nuclear senile cataract    • Obesity    • Otalgia     ENT REFER   • Panic disorder     with agoraphobia. On buspar and klonopin now      • Type 2 diabetes mellitus (CMS/HCC)     NO BDR   • Upper respiratory infection        SOCIAL HISTORY:    Social History     Tobacco Use   • Smoking status: Former Smoker     Years: 12.00     Quit date: 1998     Years since quittin.8   • Smokeless tobacco: Never Used   • Tobacco comment: Smoked 1 pack per 2 weeks   Substance Use Topics   • Alcohol use: No   • Drug use: No       Surgical History :  Past Surgical History:   Procedure Laterality Date   • CENTRAL VENOUS LINE INSERTION  2016    Central line insertion (Successful placement of right upper extremity midline.)   • COLONOSCOPY     • COLONOSCOPY N/A 2019    Procedure: COLONOSCOPY;  Surgeon: Brandon Salvador DO;  Location: [facility] ENDOSCOPY;  Service: Gastroenterology   • CYSTOSCOPY  1976    Cystoscopy (external urethroplasty and cysto-panendoscopy,urethal hymenal fusion with hypospadias)   • DILATATION AND CURETTAGE  1980    D&C (removal of IUD)   • EXCISION LESION  04/15/2014    Remove thigh lesion (Excision of a mass of the right thigh using a 16.2 cm incision with intermediate layered closure.)   • HYSTEROSCOPY  2003    Hysteroscope procedure (diagnostic hysteroscopy with fractional D&C)   • INJECTION OF MEDICATION  2011    kenalog (1)   • OTHER SURGICAL HISTORY       Explore parathyroid glands   • OTHER SURGICAL HISTORY  08/26/1985    Laparosc diagnostic (desires elective tubal reversal)   • PAP SMEAR  05/24/2005    PAP SMEAR (normal)   • CA INSJ TUNNELED CVC W/O SUBQ PORT/ AGE 5 YR/> Right 4/4/2017    Procedure: INSERTION VENOUS ACCESS DEVICE (MEDIPORT) right chest;  Surgeon: Fortino Medina MD;  Location: [facility];  Service: General   • CA INSJ TUNNELED CVC W/O SUBQ PORT/ AGE 5 YR/> N/A 6/6/2017    Procedure: INSERTION VENOUS ACCESS DEVICE   (MEDIPORT)   (C-ARM#1);  Surgeon: Fortino Medina MD;  Location: [facility] OR;  Service: General   • SHOULDER ARTHROSCOPY  11/11/2013    Shoulder arthroscopy/surgery (Right shoulder. Rotator cuff repair. Subacromial decompression. Edgar procedure.)   • SHOULDER SURGERY  12/28/2015    Shoulder surgery procedure (Arthroscopy of the left shoulder with rotator cuff repair.Edgar procedure.Subacromial decompression.)   • TONSILLECTOMY  1972   • TONSILLECTOMY AND ADENOIDECTOMY  05/29/1967    T&A (hypertrophied tonsils and adenoids with recurrent infection)   • TOTAL ABDOMINAL HYSTERECTOMY WITH SALPINGO OOPHORECTOMY  02/03/2004    ARIELLE/BSO (with a preop fractional dilation and curettage with frozen section,menorrhagia,dysmenorrhea,unresponsive to medical intervention)   • TOTAL HIP ARTHROPLASTY     • VAGINA SURGERY  03/02/1981    Anesth, surgery on vagina (colpotomy with bilateral partial salpingectomy, multiparity contraindicated)       ALLERGIES:    Allergies   Allergen Reactions   • Ace Inhibitors Cough   • Latex      BLISTER     • Morphine And Related Nausea And Vomiting   • Lortab [Hydrocodone-Acetaminophen] Palpitations         FAMILY HISTORY:  Family History   Problem Relation Age of Onset   • Breast cancer Mother    • Cancer Mother    • Diabetes Mother    • Heart disease Father    • Liver cancer Father    • Cancer Father    • Diabetes Sister         INSULIN DEPENDENT   • Cancer Sister    • Diabetes Brother         INSULIN DEPENDENT   •  "Coronary artery disease Other    • Diabetes Other    • Diabetes Maternal Grandmother    • Cancer Maternal Grandfather            REVIEW OF SYSTEMS:      CONSTITUTIONAL:  Complains of fatigue. Denies any fever, chills or weight loss.     EYES: No visual disturbances. No discharge. No new lesions    ENMT: States swelling of bilateral parotid glands are improved.  No epistaxis, mouth sores or difficulty swallowing.    RESPIRATORY:  No new shortness of breath. No new cough or hemoptysis.    CARDIOVASCULAR:  No chest pain or palpitations.    GASTROINTESTINAL: Complains of intermittent nausea without vomiting.  Complains of intermittent diarrhea.  No abdominal pain or blood in stool.    GENITOURINARY: No Dysuria or Hematuria.    MUSCULOSKELETAL: Positive for  chronic back pain and hip pain.    LYMPHATICS:  Denies any abnormal swollen glands anywhere in the body.    NEUROLOGICAL : No tingling or numbness. No headache or dizziness. No seizures or balance problems.    SKIN: No new skin lesions.    ENDOCRINE : No new hot or cold intolerance. No new polyuria . No polydipsia.        PHYSICAL EXAMINATION:      VITAL SIGNS:  /78   Pulse 88   Temp 98.3 °F (36.8 °C) (Temporal)   Resp 18   Ht 162.6 cm (64.02\")   Wt 101 kg (223 lb)   BMI 38.26 kg/m²       11/17/20  1012   Weight: 101 kg (223 lb)         ECOG performance status: 2    CONSTITUTIONAL:  Not in any distress.    EYES: Mild conjunctival Pallor. No Icterus. No Pterygium. Extraocular Movements intact.No ptosis.    ENMT:  Normocephalic, Atraumatic.No Facial Asymmetry noted.    NECK:  No adenopathy.Trachea midline. NO JVD.    RESPIRATORY:  Fair air entry bilateral. No rhonchi or wheezing.Fair respiratory effort.    CARDIOVASCULAR:  S1, S2. Regular rate and rhythm. No murmur or gallop appreciated.  Port-A-Cath present.    ABDOMEN:  Soft, obese, nontender. Bowel sounds present in all four quadrants.  No Hepatosplenomegaly appreciated.    MUSCULOSKELETAL:  No edema.No " Calf Tenderness.Decreased  range of motion.    NEUROLOGIC:    No  Motor or sensory deficit appreciated. Cranial Nerves 2-12 grossly intact.    SKIN : No new skin lesion identified. Skin is warm and moist to touch.    LYMPHATICS: No new enlarged lymph nodes in neck or supraclavicular area.    PSYCHIATRY: Alert, awake and oriented ×3.  Appears anxious.  Normal judgment.  Makes good eye contact.        DIAGNOSTIC DATA:    Glucose   Date Value Ref Range Status   11/16/2020 112 (H) 65 - 99 mg/dL Final     Sodium   Date Value Ref Range Status   11/16/2020 135 (L) 136 - 145 mmol/L Final     Potassium   Date Value Ref Range Status   11/16/2020 4.0 3.5 - 5.2 mmol/L Final     CO2   Date Value Ref Range Status   11/16/2020 24.0 22.0 - 29.0 mmol/L Final     Chloride   Date Value Ref Range Status   11/16/2020 100 98 - 107 mmol/L Final     Anion Gap   Date Value Ref Range Status   11/16/2020 11.0 5.0 - 15.0 mmol/L Final     Creatinine   Date Value Ref Range Status   11/16/2020 0.66 0.57 - 1.00 mg/dL Final     BUN   Date Value Ref Range Status   11/16/2020 8 8 - 23 mg/dL Final     BUN/Creatinine Ratio   Date Value Ref Range Status   11/16/2020 12.1 7.0 - 25.0 Final     Calcium   Date Value Ref Range Status   11/16/2020 9.9 8.6 - 10.5 mg/dL Final     eGFR Non  Amer   Date Value Ref Range Status   11/16/2020 91 >60 mL/min/1.73 Final     Alkaline Phosphatase   Date Value Ref Range Status   11/16/2020 78 39 - 117 U/L Final     Total Protein   Date Value Ref Range Status   11/16/2020 7.0 6.0 - 8.5 g/dL Final     ALT (SGPT)   Date Value Ref Range Status   11/16/2020 44 (H) 1 - 33 U/L Final     AST (SGOT)   Date Value Ref Range Status   11/16/2020 29 1 - 32 U/L Final     Total Bilirubin   Date Value Ref Range Status   11/16/2020 0.2 0.0 - 1.2 mg/dL Final     Albumin   Date Value Ref Range Status   11/16/2020 4.20 3.50 - 5.20 g/dL Final     Globulin   Date Value Ref Range Status   11/16/2020 2.8 gm/dL Final     Lab Results    Component Value Date    WBC 4.73 11/16/2020    HGB 13.9 11/16/2020    HCT 40.2 11/16/2020    MCV 91.4 11/16/2020     11/16/2020     Lab Results   Component Value Date    NEUTROABS 2.93 11/16/2020     No results found for: , LABCA2, AFPTM, HCGQUANT, , CHROMGRNA, 4ATGO23HLR, CEA, REFLABREPO        PATHOLOGY:  * Cannot find OR log *         RADIOLOGY DATA :  Ct Soft Tissue Neck With Contrast    Result Date: 11/16/2020  CT SOFT TISSUE NECK IMPRESSION: Favorable posttreatment changes with significant volumetric size reduction of the lymph nodes involving the anterior superficial lobes of both parotid glands as detailed above. No new or suspicious lymph nodes. CT CHEST IMPRESSION: No CT evidence of acute pulmonary or pleural finding. No pulmonary mass, suspicious nodule, or adenopathy. CT ABDOMEN AND PELVIS IMPRESSION: No CT evidence of acute abdominal or pelvic finding. No suspicious appearing lymph nodes based on size or morphologic criteria. COMMENT: PLEASE NOTE THAT MULTIPLE EXAMS ARE INCLUDED IN THIS REPORT. Electronically signed by:  Israel Whittington MD  11/16/2020 1:36 PM CST Workstation: [id number]    Ct Chest With Contrast    Result Date: 11/16/2020  CT SOFT TISSUE NECK IMPRESSION: Favorable posttreatment changes with significant volumetric size reduction of the lymph nodes involving the anterior superficial lobes of both parotid glands as detailed above. No new or suspicious lymph nodes. CT CHEST IMPRESSION: No CT evidence of acute pulmonary or pleural finding. No pulmonary mass, suspicious nodule, or adenopathy. CT ABDOMEN AND PELVIS IMPRESSION: No CT evidence of acute abdominal or pelvic finding. No suspicious appearing lymph nodes based on size or morphologic criteria. COMMENT: PLEASE NOTE THAT MULTIPLE EXAMS ARE INCLUDED IN THIS REPORT. Electronically signed by:  Israel Whittington MD  11/16/2020 1:36 PM CST Workstation: [id number]    Ct Abdomen Pelvis With Contrast    Result Date: 11/16/2020  CT SOFT  TISSUE NECK IMPRESSION: Favorable posttreatment changes with significant volumetric size reduction of the lymph nodes involving the anterior superficial lobes of both parotid glands as detailed above. No new or suspicious lymph nodes. CT CHEST IMPRESSION: No CT evidence of acute pulmonary or pleural finding. No pulmonary mass, suspicious nodule, or adenopathy. CT ABDOMEN AND PELVIS IMPRESSION: No CT evidence of acute abdominal or pelvic finding. No suspicious appearing lymph nodes based on size or morphologic criteria. COMMENT: PLEASE NOTE THAT MULTIPLE EXAMS ARE INCLUDED IN THIS REPORT. Electronically signed by:  Israel Whittington MD  11/16/2020 1:36 PM CST Workstation: [id number]        ASSESSMENT AND PLAN:      1.  Recurrent follicular lymphoma:  -Please review oncology history for prior treatment details  -Patient was started on cycle 1 of Revlimid with rituximab on September 22, 2020.  Patient is tolerating treatment well so far.  -CT of face, chest abdomen and pelvis with contrast done on November 16 2020 shows good response with decrease in size of bilateral parotid gland mass without any evidence of new adenopathy.  -Recommend starting Revlimid from today.  -Recommend continue with aspirin 81 mg p.o. daily to prevent any thrombotic complication.    2.  Fatty liver with elevated liver enzymes: Monitor with CMP    3.  Diarrhea from Revlimid:  -Continue with as needed Imodium    4.  Health maintenance: Patient does not smoke.  Had colonoscopy last 5 years    5. Advance Care Planning: For now patient remains full code and is able to make decisions.  Patient has health care surrogate mentioned on chart.         PHQ-9 Total Score: 0   -Patient is not homicidal or suicidal.  No acute intervention required.    Chasity Leon reports a pain score of 4.  Given her pain assessment as noted, treatment options were discussed and the following options were decided upon as a follow-up plan to address the patient's pain:  continuation of current treatment plan for pain.         Tino Sood MD  11/17/2020  10:38 CST        Part of this note may be an electronic transcription/translation of spoken language to printed text using the Dragon Dictation System.

## 2020-11-25 ENCOUNTER — TELEPHONE (OUTPATIENT)
Dept: ONCOLOGY | Facility: CLINIC | Age: 60
End: 2020-11-25

## 2020-11-25 NOTE — TELEPHONE ENCOUNTER
Caller: VALDO CADET    Relationship to patient: SELF    Best call back number: [phone]    PT STATES SHE WAS ASKED TO STOP BY TODAY 11/25 TO SEE DR AVILA, SAYS SHE CANNOT MAKE IT IN TODAY AND WANTS TO WAIT UNTIL HER APPT ON 12/15

## 2020-11-30 ENCOUNTER — TELEPHONE (OUTPATIENT)
Dept: ONCOLOGY | Facility: CLINIC | Age: 60
End: 2020-11-30

## 2020-12-04 DIAGNOSIS — C82.08 GRADE 1 FOLLICULAR LYMPHOMA OF LYMPH NODES OF MULTIPLE REGIONS (HCC): ICD-10-CM

## 2020-12-05 RX ORDER — LENALIDOMIDE 20 MG/1
CAPSULE ORAL
Qty: 21 CAPSULE | Refills: 1 | Status: SHIPPED | OUTPATIENT
Start: 2020-12-05 | End: 2021-02-02

## 2020-12-15 ENCOUNTER — OFFICE VISIT (OUTPATIENT)
Dept: ONCOLOGY | Facility: CLINIC | Age: 60
End: 2020-12-15

## 2020-12-15 ENCOUNTER — INFUSION (OUTPATIENT)
Dept: ONCOLOGY | Facility: HOSPITAL | Age: 60
End: 2020-12-15

## 2020-12-15 VITALS
WEIGHT: 223.1 LBS | HEART RATE: 80 BPM | HEIGHT: 64 IN | TEMPERATURE: 97 F | DIASTOLIC BLOOD PRESSURE: 73 MMHG | BODY MASS INDEX: 38.09 KG/M2 | SYSTOLIC BLOOD PRESSURE: 160 MMHG | RESPIRATION RATE: 18 BRPM

## 2020-12-15 DIAGNOSIS — C82.08 GRADE 1 FOLLICULAR LYMPHOMA OF LYMPH NODES OF MULTIPLE REGIONS (HCC): Primary | ICD-10-CM

## 2020-12-15 DIAGNOSIS — Z45.2 ENCOUNTER FOR VENOUS ACCESS DEVICE CARE: ICD-10-CM

## 2020-12-15 DIAGNOSIS — K11.8 MASS OF BOTH PAROTID GLANDS: ICD-10-CM

## 2020-12-15 DIAGNOSIS — R79.89 ELEVATED LFTS: ICD-10-CM

## 2020-12-15 DIAGNOSIS — C82.08 GRADE 1 FOLLICULAR LYMPHOMA OF LYMPH NODES OF MULTIPLE REGIONS (HCC): ICD-10-CM

## 2020-12-15 LAB
ALBUMIN SERPL-MCNC: 4.1 G/DL (ref 3.5–5.2)
ALBUMIN/GLOB SERPL: 1.6 G/DL
ALP SERPL-CCNC: 75 U/L (ref 39–117)
ALT SERPL W P-5'-P-CCNC: 38 U/L (ref 1–33)
ANION GAP SERPL CALCULATED.3IONS-SCNC: 12 MMOL/L (ref 5–15)
AST SERPL-CCNC: 29 U/L (ref 1–32)
BASOPHILS # BLD AUTO: 0.11 10*3/MM3 (ref 0–0.2)
BASOPHILS NFR BLD AUTO: 3.2 % (ref 0–1.5)
BILIRUB SERPL-MCNC: 0.3 MG/DL (ref 0–1.2)
BUN SERPL-MCNC: 6 MG/DL (ref 8–23)
BUN/CREAT SERPL: 7.9 (ref 7–25)
CALCIUM SPEC-SCNC: 9.4 MG/DL (ref 8.6–10.5)
CHLORIDE SERPL-SCNC: 101 MMOL/L (ref 98–107)
CO2 SERPL-SCNC: 23 MMOL/L (ref 22–29)
CREAT SERPL-MCNC: 0.76 MG/DL (ref 0.57–1)
DEPRECATED RDW RBC AUTO: 52.7 FL (ref 37–54)
EOSINOPHIL # BLD AUTO: 0 10*3/MM3 (ref 0–0.4)
EOSINOPHIL NFR BLD AUTO: 0 % (ref 0.3–6.2)
ERYTHROCYTE [DISTWIDTH] IN BLOOD BY AUTOMATED COUNT: 15.6 % (ref 12.3–15.4)
GFR SERPL CREATININE-BSD FRML MDRD: 78 ML/MIN/1.73
GLOBULIN UR ELPH-MCNC: 2.5 GM/DL
GLUCOSE SERPL-MCNC: 178 MG/DL (ref 65–99)
HCT VFR BLD AUTO: 39.2 % (ref 34–46.6)
HGB BLD-MCNC: 13.6 G/DL (ref 12–15.9)
IMM GRANULOCYTES # BLD AUTO: 0.02 10*3/MM3 (ref 0–0.05)
IMM GRANULOCYTES NFR BLD AUTO: 0.6 % (ref 0–0.5)
LYMPHOCYTES # BLD AUTO: 0.61 10*3/MM3 (ref 0.7–3.1)
LYMPHOCYTES NFR BLD AUTO: 17.9 % (ref 19.6–45.3)
MCH RBC QN AUTO: 32.2 PG (ref 26.6–33)
MCHC RBC AUTO-ENTMCNC: 34.7 G/DL (ref 31.5–35.7)
MCV RBC AUTO: 92.7 FL (ref 79–97)
MONOCYTES # BLD AUTO: 0.56 10*3/MM3 (ref 0.1–0.9)
MONOCYTES NFR BLD AUTO: 16.5 % (ref 5–12)
NEUTROPHILS NFR BLD AUTO: 2.1 10*3/MM3 (ref 1.7–7)
NEUTROPHILS NFR BLD AUTO: 61.8 % (ref 42.7–76)
NRBC BLD AUTO-RTO: 0 /100 WBC (ref 0–0.2)
PLATELET # BLD AUTO: 370 10*3/MM3 (ref 140–450)
PMV BLD AUTO: 8.9 FL (ref 6–12)
POTASSIUM SERPL-SCNC: 3.9 MMOL/L (ref 3.5–5.2)
PROT SERPL-MCNC: 6.6 G/DL (ref 6–8.5)
RBC # BLD AUTO: 4.23 10*6/MM3 (ref 3.77–5.28)
SODIUM SERPL-SCNC: 136 MMOL/L (ref 136–145)
WBC # BLD AUTO: 3.4 10*3/MM3 (ref 3.4–10.8)

## 2020-12-15 PROCEDURE — 85025 COMPLETE CBC W/AUTO DIFF WBC: CPT

## 2020-12-15 PROCEDURE — 96413 CHEMO IV INFUSION 1 HR: CPT | Performed by: INTERNAL MEDICINE

## 2020-12-15 PROCEDURE — 1123F ACP DISCUSS/DSCN MKR DOCD: CPT | Performed by: INTERNAL MEDICINE

## 2020-12-15 PROCEDURE — 25010000002 RITUXIMAB 100 MG/10ML SOLUTION 10 ML VIAL: Performed by: INTERNAL MEDICINE

## 2020-12-15 PROCEDURE — 96375 TX/PRO/DX INJ NEW DRUG ADDON: CPT | Performed by: INTERNAL MEDICINE

## 2020-12-15 PROCEDURE — 80053 COMPREHEN METABOLIC PANEL: CPT

## 2020-12-15 PROCEDURE — 25010000002 DIPHENHYDRAMINE PER 50 MG: Performed by: INTERNAL MEDICINE

## 2020-12-15 PROCEDURE — 25010000002 RITUXIMAB 500 MG/50ML SOLUTION 50 ML VIAL: Performed by: INTERNAL MEDICINE

## 2020-12-15 PROCEDURE — 96415 CHEMO IV INFUSION ADDL HR: CPT | Performed by: INTERNAL MEDICINE

## 2020-12-15 PROCEDURE — G9903 PT SCRN TBCO ID AS NON USER: HCPCS | Performed by: INTERNAL MEDICINE

## 2020-12-15 PROCEDURE — 25010000003 HEPARIN LOCK FLUSH PER 10 UNITS: Performed by: INTERNAL MEDICINE

## 2020-12-15 PROCEDURE — G8731 PAIN NEG NO PLAN: HCPCS | Performed by: INTERNAL MEDICINE

## 2020-12-15 PROCEDURE — 99214 OFFICE O/P EST MOD 30 MIN: CPT | Performed by: INTERNAL MEDICINE

## 2020-12-15 RX ORDER — HEPARIN SODIUM (PORCINE) LOCK FLUSH IV SOLN 100 UNIT/ML 100 UNIT/ML
500 SOLUTION INTRAVENOUS AS NEEDED
Status: DISCONTINUED | OUTPATIENT
Start: 2020-12-15 | End: 2020-12-15 | Stop reason: HOSPADM

## 2020-12-15 RX ORDER — SODIUM CHLORIDE 9 MG/ML
250 INJECTION, SOLUTION INTRAVENOUS ONCE
Status: CANCELLED | OUTPATIENT
Start: 2020-12-15

## 2020-12-15 RX ORDER — ACETAMINOPHEN 325 MG/1
650 TABLET ORAL ONCE
Status: COMPLETED | OUTPATIENT
Start: 2020-12-15 | End: 2020-12-15

## 2020-12-15 RX ORDER — SODIUM CHLORIDE 9 MG/ML
250 INJECTION, SOLUTION INTRAVENOUS ONCE
Status: COMPLETED | OUTPATIENT
Start: 2020-12-15 | End: 2020-12-15

## 2020-12-15 RX ORDER — SODIUM CHLORIDE 0.9 % (FLUSH) 0.9 %
10 SYRINGE (ML) INJECTION AS NEEDED
Status: DISCONTINUED | OUTPATIENT
Start: 2020-12-15 | End: 2020-12-15 | Stop reason: HOSPADM

## 2020-12-15 RX ORDER — SODIUM CHLORIDE 0.9 % (FLUSH) 0.9 %
10 SYRINGE (ML) INJECTION AS NEEDED
Status: CANCELLED | OUTPATIENT
Start: 2021-01-12

## 2020-12-15 RX ORDER — SODIUM CHLORIDE 0.9 % (FLUSH) 0.9 %
20 SYRINGE (ML) INJECTION AS NEEDED
Status: DISCONTINUED | OUTPATIENT
Start: 2020-12-15 | End: 2020-12-15 | Stop reason: HOSPADM

## 2020-12-15 RX ORDER — SODIUM CHLORIDE 0.9 % (FLUSH) 0.9 %
20 SYRINGE (ML) INJECTION AS NEEDED
Status: CANCELLED | OUTPATIENT
Start: 2021-01-12

## 2020-12-15 RX ORDER — ACETAMINOPHEN 325 MG/1
650 TABLET ORAL ONCE
Status: CANCELLED | OUTPATIENT
Start: 2020-12-15

## 2020-12-15 RX ORDER — HEPARIN SODIUM (PORCINE) LOCK FLUSH IV SOLN 100 UNIT/ML 100 UNIT/ML
500 SOLUTION INTRAVENOUS AS NEEDED
Status: CANCELLED | OUTPATIENT
Start: 2021-01-12

## 2020-12-15 RX ADMIN — SODIUM CHLORIDE, PRESERVATIVE FREE 10 ML: 5 INJECTION INTRAVENOUS at 12:47

## 2020-12-15 RX ADMIN — DIPHENHYDRAMINE HYDROCHLORIDE 25 MG: 50 INJECTION, SOLUTION INTRAMUSCULAR; INTRAVENOUS at 08:54

## 2020-12-15 RX ADMIN — HEPARIN 500 UNITS: 100 SYRINGE at 12:47

## 2020-12-15 RX ADMIN — ACETAMINOPHEN 650 MG: 325 TABLET, FILM COATED ORAL at 08:44

## 2020-12-15 RX ADMIN — SODIUM CHLORIDE 250 ML: 9 INJECTION, SOLUTION INTRAVENOUS at 08:43

## 2020-12-15 RX ADMIN — SODIUM CHLORIDE 770 MG: 9 INJECTION, SOLUTION INTRAVENOUS at 09:26

## 2020-12-15 NOTE — PROGRESS NOTES
DATE OF VISIT: 12/15/2020      REASON FOR VISIT: Recurrent follicular lymphoma with involvement of bilateral parotid gland, fatty liver, elevated liver function test      HISTORY OF PRESENT ILLNESS:    60-year-old female with medical problem consisting of obesity, hypertension, anxiety, dyslipidemia, fatty liver with elevated liver function test, recurrent follicular lymphoma with most recent recurrence diagnosed in August 27, 2020 with biopsy of parotid gland on left side.  Patient has been on rituximab with Revlimid since September 22, 2020.  Patient is here to start cycle 4 of Revlimid today.  Complains of intermittent nausea without vomiting.  Complains of intermittent diarrhea.  Denies any new lymph node enlargement.  Denies any fevers denies any active bleeding.  Denies any drenching night sweats.        Oncology history:    1.  Recurrent follicular lymphoma grade 1,  -Patient initially diagnosed in April 2015 for which patient underwent palliative radiation treatment.  -Second relapse happened in September 2016 for again patient had a radiation treatment.  - Patient was found to have enlarged aortocaval lymph node in April 2017 for which patient initially received rituximab every 2 months without any significant improvement.  -Subsequently patient received 4 cycles of bendamustine and rituximab between April 30, 2018 and August 21, 2018.  -Patient was diagnosed with involvement of left parotid gland on biopsy done on August 27, 2020 by Dr. Ibrahim.  PET/CT done shows uptake in bilateral parotid gland along with left sided neck lymph node involvement.  -Patient was started on Revlimid with rituximab on September 27, 2020.            PAST MEDICAL HISTORY:    Past Medical History:   Diagnosis Date   • Acute bronchitis    • Agoraphobia with panic disorder     on SNRI, prn buspar, prn klonopin     • Anxiety    • Arthritis    • Atrophic vaginitis    • Chest pain     work up as new onset angina    • Depression       MDD      • Essential hypertension    • Follicular non-Hodgkin's lymphoma (CMS/HCC)    • Generalized anxiety disorder     SSRI - buspar, cont cymbalta, prn klonopin      • Hip pain     arthritis, artifical right hip     • History of bone density study 2009    DEXA (bone density) test, peripheral (Normal   • History of echocardiogram 2012    Echocardiogram 80371 (Normal echocardigram. EF 55-60%)   • History of mammogram    • Hyperlipidemia    • Mass of lower limb    • Nuclear senile cataract    • Obesity    • Otalgia     ENT REFER   • Panic disorder     with agoraphobia. On buspar and klonopin now      • Type 2 diabetes mellitus (CMS/HCC)     NO BDR   • Upper respiratory infection        SOCIAL HISTORY:    Social History     Tobacco Use   • Smoking status: Former Smoker     Years: 12.00     Quit date: 1998     Years since quittin.9   • Smokeless tobacco: Never Used   • Tobacco comment: Smoked 1 pack per 2 weeks   Substance Use Topics   • Alcohol use: No   • Drug use: No       Surgical History :  Past Surgical History:   Procedure Laterality Date   • CENTRAL VENOUS LINE INSERTION  2016    Central line insertion (Successful placement of right upper extremity midline.)   • COLONOSCOPY     • COLONOSCOPY N/A 2019    Procedure: COLONOSCOPY;  Surgeon: Brandon Salvador DO;  Location: [facility] ENDOSCOPY;  Service: Gastroenterology   • CYSTOSCOPY  1976    Cystoscopy (external urethroplasty and cysto-panendoscopy,urethal hymenal fusion with hypospadias)   • DILATATION AND CURETTAGE  1980    D&C (removal of IUD)   • EXCISION LESION  04/15/2014    Remove thigh lesion (Excision of a mass of the right thigh using a 16.2 cm incision with intermediate layered closure.)   • HYSTEROSCOPY  2003    Hysteroscope procedure (diagnostic hysteroscopy with fractional D&C)   • INJECTION OF MEDICATION  2011    kenalog (1)   • OTHER SURGICAL HISTORY      Explore parathyroid glands   • OTHER  SURGICAL HISTORY  08/26/1985    Laparosc diagnostic (desires elective tubal reversal)   • PAP SMEAR  05/24/2005    PAP SMEAR (normal)   • [place] INSJ TUNNELED CVC W/O SUBQ PORT/ AGE 5 YR/> Right 4/4/2017    Procedure: INSERTION VENOUS ACCESS DEVICE (MEDIPORT) right chest;  Surgeon: Fortino Medina MD;  Location: [facility];  Service: General   • [place] INSJ TUNNELED CVC W/O SUBQ PORT/ AGE 5 YR/> N/A 6/6/2017    Procedure: INSERTION VENOUS ACCESS DEVICE   (MEDIPORT)   (C-ARM#1);  Surgeon: Fortino Medina MD;  Location: [facility];  Service: General   • SHOULDER ARTHROSCOPY  11/11/2013    Shoulder arthroscopy/surgery (Right shoulder. Rotator cuff repair. Subacromial decompression. Edgar procedure.)   • SHOULDER SURGERY  12/28/2015    Shoulder surgery procedure (Arthroscopy of the left shoulder with rotator cuff repair.Edgar procedure.Subacromial decompression.)   • TONSILLECTOMY  1972   • TONSILLECTOMY AND ADENOIDECTOMY  05/29/1967    T&A (hypertrophied tonsils and adenoids with recurrent infection)   • TOTAL ABDOMINAL HYSTERECTOMY WITH SALPINGO OOPHORECTOMY  02/03/2004    ARIELLE/BSO (with a preop fractional dilation and curettage with frozen section,menorrhagia,dysmenorrhea,unresponsive to medical intervention)   • TOTAL HIP ARTHROPLASTY     • VAGINA SURGERY  03/02/1981    Anesth, surgery on vagina (colpotomy with bilateral partial salpingectomy, multiparity contraindicated)       ALLERGIES:    Allergies   Allergen Reactions   • Ace Inhibitors Cough   • Latex      BLISTER     • Morphine And Related Nausea And Vomiting   • Lortab [Hydrocodone-Acetaminophen] Palpitations         FAMILY HISTORY:  Family History   Problem Relation Age of Onset   • Breast cancer Mother    • Cancer Mother    • Diabetes Mother    • Heart disease Father    • Liver cancer Father    • Cancer Father    • Diabetes Sister         INSULIN DEPENDENT   • Cancer Sister    • Diabetes Brother         INSULIN DEPENDENT   • Coronary artery disease Other    •  "Diabetes Other    • Diabetes Maternal Grandmother    • Cancer Maternal Grandfather            REVIEW OF SYSTEMS:      CONSTITUTIONAL:  Complains of fatigue. Denies any fever, chills or weight loss.     EYES: No visual disturbances. No discharge. No new lesions    ENMT: Street swelling of bilateral parotid glands are improved.  No epistaxis, mouth sores or difficulty swallowing.    RESPIRATORY:  No new shortness of breath. No new cough or hemoptysis.    CARDIOVASCULAR:  No chest pain or palpitations.    GASTROINTESTINAL: Active for intermittent nausea without vomiting.  Complains of intermittent diarrhea.  No abdominal pain  or blood in the stool.    GENITOURINARY: No Dysuria or Hematuria.    MUSCULOSKELETAL: Positive for chronic back pain and hip pain    LYMPHATICS:  Denies any abnormal swollen glands anywhere in the body.    NEUROLOGICAL : No tingling or numbness. No headache or dizziness. No seizures or balance problems.    SKIN: No new skin lesions.    ENDOCRINE : No new hot or cold intolerance. No new polyuria . No polydipsia.        PHYSICAL EXAMINATION:      VITAL SIGNS:  /73   Pulse 80   Temp 97 °F (36.1 °C)   Resp 18   Ht 162.6 cm (64.02\")   Wt 101 kg (223 lb 1.6 oz)   BMI 38.27 kg/m²       12/15/20  0819   Weight: 101 kg (223 lb 1.6 oz)         ECOG performance status: 2    CONSTITUTIONAL:  Not in any distress.    EYES: Mild conjunctival Pallor. No Icterus. No Pterygium. Extraocular Movements intact.No ptosis.    ENMT:  Normocephalic, Atraumatic.No Facial Asymmetry noted.    NECK:  No adenopathy.Trachea midline. NO JVD.    RESPIRATORY:  Fair air entry bilateral. No rhonchi or wheezing.Fair respiratory effort.    CARDIOVASCULAR:  S1, S2. Regular rate and rhythm. No murmur or gallop appreciated.  Port-A-Cath present.    ABDOMEN:  Soft, obese, nontender. Bowel sounds present in all four quadrants.  No Hepatosplenomegaly appreciated.    MUSCULOSKELETAL:  No edema.No Calf Tenderness.Decreased range " of motion.    NEUROLOGIC:    No  Motor or sensory deficit appreciated. Cranial Nerves 2-12 grossly intact.    SKIN : No new skin lesion identified. Skin is warm and moist to touch.    LYMPHATICS: No new enlarged lymph nodes in neck or supraclavicular area.    PSYCHIATRY: Alert, awake and oriented ×3.  Appears anxious.  Normal judgment.  Makes good eye contact.        DIAGNOSTIC DATA:    Glucose   Date Value Ref Range Status   12/15/2020 178 (H) 65 - 99 mg/dL Final     Sodium   Date Value Ref Range Status   12/15/2020 136 136 - 145 mmol/L Final     Potassium   Date Value Ref Range Status   12/15/2020 3.9 3.5 - 5.2 mmol/L Final     CO2   Date Value Ref Range Status   12/15/2020 23.0 22.0 - 29.0 mmol/L Final     Chloride   Date Value Ref Range Status   12/15/2020 101 98 - 107 mmol/L Final     Anion Gap   Date Value Ref Range Status   12/15/2020 12.0 5.0 - 15.0 mmol/L Final     Creatinine   Date Value Ref Range Status   12/15/2020 0.76 0.57 - 1.00 mg/dL Final     BUN   Date Value Ref Range Status   12/15/2020 6 (L) 8 - 23 mg/dL Final     BUN/Creatinine Ratio   Date Value Ref Range Status   12/15/2020 7.9 7.0 - 25.0 Final     Calcium   Date Value Ref Range Status   12/15/2020 9.4 8.6 - 10.5 mg/dL Final     eGFR Non  Amer   Date Value Ref Range Status   12/15/2020 78 >60 mL/min/1.73 Final     Alkaline Phosphatase   Date Value Ref Range Status   12/15/2020 75 39 - 117 U/L Final     Total Protein   Date Value Ref Range Status   12/15/2020 6.6 6.0 - 8.5 g/dL Final     ALT (SGPT)   Date Value Ref Range Status   12/15/2020 38 (H) 1 - 33 U/L Final     AST (SGOT)   Date Value Ref Range Status   12/15/2020 29 1 - 32 U/L Final     Total Bilirubin   Date Value Ref Range Status   12/15/2020 0.3 0.0 - 1.2 mg/dL Final     Albumin   Date Value Ref Range Status   12/15/2020 4.10 3.50 - 5.20 g/dL Final     Globulin   Date Value Ref Range Status   12/15/2020 2.5 gm/dL Final     Lab Results   Component Value Date    WBC 3.40  12/15/2020    HGB 13.6 12/15/2020    HCT 39.2 12/15/2020    MCV 92.7 12/15/2020     12/15/2020     Lab Results   Component Value Date    NEUTROABS 2.10 12/15/2020     No results found for: , LABCA2, AFPTM, HCGQUANT, , CHROMGRNA, [id number], CEA, REFLABREPO        PATHOLOGY:  * Cannot find OR log *         RADIOLOGY DATA :  No radiology results for the last 7 days      ASSESSMENT AND PLAN:      1.  Recurrent follicular lymphoma:  -Please review oncology history for prior treatment details  -Patient was started on cycle 1 of Revlimid with rituximab on September 22, 2020.  Tolerating treatment so far.  -CT scan of face, chest, abdomen and pelvis with contrast done on November 16, 2020 after cycle 3 showed good response.  We will continue with same treatment for now.  -Recommend starting cycle 4 of Revlimid from today.  We will go ahead with rituximab infusion today.  -Recommend continuing aspirin 81 mg p.o. daily  -Plan is to do restaging CT of chest abdomen pelvis with contrast with CT scan of face with contrast in February 2021.    2.  Fatty liver with elevated liver enzymes:  -We will monitor with CMP    3.  Diarrhea from Revlimid:  -We will continue with as needed Imodium    4.  Health maintenance: Patient does not smoke.  Had a colonoscopy last 5 years    5. Advance Care Planning: For now patient remains full code and is able to make decisions.  Patient has health care surrogate mentioned on chart.         PHQ-9 Total Score:       Chasity Leon reports a pain score of 0.  Given her pain assessment as noted, treatment options were discussed and the following options were decided upon as a follow-up plan to address the patient's pain: No acute intervention required.         Tino Sood MD  12/15/2020  08:31 CST        Part of this note may be an electronic transcription/translation of spoken language to printed text using the Dragon Dictation System.

## 2021-01-12 ENCOUNTER — TELEPHONE (OUTPATIENT)
Dept: ONCOLOGY | Facility: CLINIC | Age: 61
End: 2021-01-12

## 2021-01-12 ENCOUNTER — OFFICE VISIT (OUTPATIENT)
Dept: ONCOLOGY | Facility: CLINIC | Age: 61
End: 2021-01-12

## 2021-01-12 ENCOUNTER — INFUSION (OUTPATIENT)
Dept: ONCOLOGY | Facility: HOSPITAL | Age: 61
End: 2021-01-12

## 2021-01-12 VITALS
TEMPERATURE: 97.8 F | RESPIRATION RATE: 18 BRPM | DIASTOLIC BLOOD PRESSURE: 82 MMHG | BODY MASS INDEX: 37.78 KG/M2 | WEIGHT: 221.3 LBS | SYSTOLIC BLOOD PRESSURE: 145 MMHG | HEIGHT: 64 IN | HEART RATE: 91 BPM

## 2021-01-12 DIAGNOSIS — R19.7 DIARRHEA, UNSPECIFIED TYPE: ICD-10-CM

## 2021-01-12 DIAGNOSIS — K11.8 MASS OF BOTH PAROTID GLANDS: ICD-10-CM

## 2021-01-12 DIAGNOSIS — C82.08 GRADE 1 FOLLICULAR LYMPHOMA OF LYMPH NODES OF MULTIPLE REGIONS (HCC): Primary | ICD-10-CM

## 2021-01-12 DIAGNOSIS — R79.89 ELEVATED LFTS: ICD-10-CM

## 2021-01-12 DIAGNOSIS — Z45.2 ENCOUNTER FOR VENOUS ACCESS DEVICE CARE: ICD-10-CM

## 2021-01-12 PROBLEM — C82.00 FOLLICULAR LYMPHOMA GRADE I (HCC): Chronic | Status: ACTIVE | Noted: 2017-03-31

## 2021-01-12 LAB
ALBUMIN SERPL-MCNC: 4 G/DL (ref 3.5–5.2)
ALBUMIN/GLOB SERPL: 1.5 G/DL
ALP SERPL-CCNC: 80 U/L (ref 39–117)
ALT SERPL W P-5'-P-CCNC: 46 U/L (ref 1–33)
ANION GAP SERPL CALCULATED.3IONS-SCNC: 12 MMOL/L (ref 5–15)
AST SERPL-CCNC: 34 U/L (ref 1–32)
BASOPHILS # BLD AUTO: 0.1 10*3/MM3 (ref 0–0.2)
BASOPHILS NFR BLD AUTO: 2.4 % (ref 0–1.5)
BILIRUB SERPL-MCNC: 0.3 MG/DL (ref 0–1.2)
BUN SERPL-MCNC: 8 MG/DL (ref 8–23)
BUN/CREAT SERPL: 11.9 (ref 7–25)
CALCIUM SPEC-SCNC: 10.1 MG/DL (ref 8.6–10.5)
CHLORIDE SERPL-SCNC: 99 MMOL/L (ref 98–107)
CO2 SERPL-SCNC: 24 MMOL/L (ref 22–29)
CREAT SERPL-MCNC: 0.67 MG/DL (ref 0.57–1)
DEPRECATED RDW RBC AUTO: 51.1 FL (ref 37–54)
EOSINOPHIL # BLD AUTO: 0 10*3/MM3 (ref 0–0.4)
EOSINOPHIL NFR BLD AUTO: 0 % (ref 0.3–6.2)
ERYTHROCYTE [DISTWIDTH] IN BLOOD BY AUTOMATED COUNT: 15.6 % (ref 12.3–15.4)
GFR SERPL CREATININE-BSD FRML MDRD: 90 ML/MIN/1.73
GLOBULIN UR ELPH-MCNC: 2.7 GM/DL
GLUCOSE SERPL-MCNC: 178 MG/DL (ref 65–99)
HCT VFR BLD AUTO: 39 % (ref 34–46.6)
HGB BLD-MCNC: 13.8 G/DL (ref 12–15.9)
IMM GRANULOCYTES # BLD AUTO: 0.01 10*3/MM3 (ref 0–0.05)
IMM GRANULOCYTES NFR BLD AUTO: 0.2 % (ref 0–0.5)
LYMPHOCYTES # BLD AUTO: 0.97 10*3/MM3 (ref 0.7–3.1)
LYMPHOCYTES NFR BLD AUTO: 23.7 % (ref 19.6–45.3)
MCH RBC QN AUTO: 32.2 PG (ref 26.6–33)
MCHC RBC AUTO-ENTMCNC: 35.4 G/DL (ref 31.5–35.7)
MCV RBC AUTO: 90.9 FL (ref 79–97)
MONOCYTES # BLD AUTO: 0.52 10*3/MM3 (ref 0.1–0.9)
MONOCYTES NFR BLD AUTO: 12.7 % (ref 5–12)
NEUTROPHILS NFR BLD AUTO: 2.49 10*3/MM3 (ref 1.7–7)
NEUTROPHILS NFR BLD AUTO: 61 % (ref 42.7–76)
NRBC BLD AUTO-RTO: 0 /100 WBC (ref 0–0.2)
PLATELET # BLD AUTO: 332 10*3/MM3 (ref 140–450)
PMV BLD AUTO: 8.7 FL (ref 6–12)
POTASSIUM SERPL-SCNC: 3.9 MMOL/L (ref 3.5–5.2)
PROT SERPL-MCNC: 6.7 G/DL (ref 6–8.5)
RBC # BLD AUTO: 4.29 10*6/MM3 (ref 3.77–5.28)
SODIUM SERPL-SCNC: 135 MMOL/L (ref 136–145)
WBC # BLD AUTO: 4.09 10*3/MM3 (ref 3.4–10.8)

## 2021-01-12 PROCEDURE — 25010000003 HEPARIN LOCK FLUSH PER 10 UNITS: Performed by: INTERNAL MEDICINE

## 2021-01-12 PROCEDURE — 99214 OFFICE O/P EST MOD 30 MIN: CPT | Performed by: INTERNAL MEDICINE

## 2021-01-12 PROCEDURE — 85025 COMPLETE CBC W/AUTO DIFF WBC: CPT

## 2021-01-12 PROCEDURE — 36591 DRAW BLOOD OFF VENOUS DEVICE: CPT | Performed by: INTERNAL MEDICINE

## 2021-01-12 PROCEDURE — 80053 COMPREHEN METABOLIC PANEL: CPT

## 2021-01-12 RX ORDER — SODIUM CHLORIDE 0.9 % (FLUSH) 0.9 %
20 SYRINGE (ML) INJECTION AS NEEDED
Status: DISCONTINUED | OUTPATIENT
Start: 2021-01-12 | End: 2021-01-12 | Stop reason: HOSPADM

## 2021-01-12 RX ORDER — SODIUM CHLORIDE 0.9 % (FLUSH) 0.9 %
20 SYRINGE (ML) INJECTION AS NEEDED
Status: CANCELLED | OUTPATIENT
Start: 2021-01-12

## 2021-01-12 RX ORDER — SODIUM CHLORIDE 0.9 % (FLUSH) 0.9 %
10 SYRINGE (ML) INJECTION AS NEEDED
Status: CANCELLED | OUTPATIENT
Start: 2021-02-05

## 2021-01-12 RX ORDER — HEPARIN SODIUM (PORCINE) LOCK FLUSH IV SOLN 100 UNIT/ML 100 UNIT/ML
500 SOLUTION INTRAVENOUS AS NEEDED
Status: DISCONTINUED | OUTPATIENT
Start: 2021-01-12 | End: 2021-01-12 | Stop reason: HOSPADM

## 2021-01-12 RX ORDER — HEPARIN SODIUM (PORCINE) LOCK FLUSH IV SOLN 100 UNIT/ML 100 UNIT/ML
500 SOLUTION INTRAVENOUS AS NEEDED
Status: CANCELLED | OUTPATIENT
Start: 2021-02-05

## 2021-01-12 RX ADMIN — HEPARIN 500 UNITS: 100 SYRINGE at 09:59

## 2021-01-12 NOTE — PROGRESS NOTES
DATE OF VISIT: 1/12/2021      REASON FOR VISIT: Recurrent follicular lymphoma with involvement of bilateral parotid gland, fatty liver, elevated liver function test      HISTORY OF PRESENT ILLNESS:    60-year-old female with medical problem consisting of obesity, hypertension, anxiety, dyslipidemia, fatty liver with elevated liver function test, recurrent follicular lymphoma with most recent recurrence diagnosed in August 27, 2020 based on biopsy of parotid gland on left side.  Patient has been started on rituximab with Revlimid since September 22, 2020.  Patient is here to start cycle 5 of Revlimid with rituximab today.  Complains of intermittent nausea without vomiting.  Complains of intermittent diarrhea that is being helped with Imodium.  Denies any abdominal pain.  Denies any bleeding.  Denies any drenching night sweats or any new lymph node enlargement.        Oncology history:    1.  Recurrent follicular lymphoma grade 1,  -Patient initially diagnosed in April 2015 for which patient underwent palliative radiation treatment.  -Second relapse happened in September 2016 for again patient had a radiation treatment.  - Patient was found to have enlarged aortocaval lymph node in April 2017 for which patient initially received rituximab every 2 months without any significant improvement.  -Subsequently patient received 4 cycles of bendamustine and rituximab between April 30, 2018 and August 21, 2018.  -Patient was diagnosed with involvement of left parotid gland on biopsy done on August 27, 2020 by Dr. Ibrahim.  PET/CT done shows uptake in bilateral parotid gland along with left sided neck lymph node involvement.  -Patient was started on Revlimid with rituximab on September 22, 2020.          PAST MEDICAL HISTORY:    Past Medical History:   Diagnosis Date   • Acute bronchitis    • Agoraphobia with panic disorder     on SNRI, prn buspar, prn klonopin     • Anxiety    • Arthritis    • Atrophic vaginitis    • Chest pain      work up as new onset angina    • Depression      MDD      • Essential hypertension    • Follicular non-Hodgkin's lymphoma (CMS/HCC)    • Generalized anxiety disorder     SSRI - buspar, cont cymbalta, prn klonopin      • Hip pain     arthritis, artifical right hip     • History of bone density study 2009    DEXA (bone density) test, peripheral (Normal   • History of echocardiogram 2012    Echocardiogram 83141 (Normal echocardigram. EF 55-60%)   • History of mammogram    • Hyperlipidemia    • Mass of lower limb    • Nuclear senile cataract    • Obesity    • Otalgia     ENT REFER   • Panic disorder     with agoraphobia. On buspar and klonopin now      • Type 2 diabetes mellitus (CMS/HCC)     NO BDR   • Upper respiratory infection        SOCIAL HISTORY:    Social History     Tobacco Use   • Smoking status: Former Smoker     Years: 12.00     Quit date: 1998     Years since quittin.0   • Smokeless tobacco: Never Used   • Tobacco comment: Smoked 1 pack per 2 weeks   Substance Use Topics   • Alcohol use: No   • Drug use: No       Surgical History :  Past Surgical History:   Procedure Laterality Date   • CENTRAL VENOUS LINE INSERTION  2016    Central line insertion (Successful placement of right upper extremity midline.)   • COLONOSCOPY     • COLONOSCOPY N/A 2019    Procedure: COLONOSCOPY;  Surgeon: Brandon Salvador DO;  Location: [facility] ENDOSCOPY;  Service: Gastroenterology   • CYSTOSCOPY  1976    Cystoscopy (external urethroplasty and cysto-panendoscopy,urethal hymenal fusion with hypospadias)   • DILATATION AND CURETTAGE  1980    D&C (removal of IUD)   • EXCISION LESION  04/15/2014    Remove thigh lesion (Excision of a mass of the right thigh using a 16.2 cm incision with intermediate layered closure.)   • HYSTEROSCOPY  2003    Hysteroscope procedure (diagnostic hysteroscopy with fractional D&C)   • INJECTION OF MEDICATION  2011    kenalog (1)   • OTHER SURGICAL  HISTORY      Explore parathyroid glands   • OTHER SURGICAL HISTORY  08/26/1985    Laparosc diagnostic (desires elective tubal reversal)   • PAP SMEAR  05/24/2005    PAP SMEAR (normal)   • [place] INSJ TUNNELED CVC W/O SUBQ PORT/ AGE 5 YR/> Right 4/4/2017    Procedure: INSERTION VENOUS ACCESS DEVICE (MEDIPORT) right chest;  Surgeon: Fortino Medina MD;  Location: [facility];  Service: General   • [place] INSJ TUNNELED CVC W/O SUBQ PORT/ AGE 5 YR/> N/A 6/6/2017    Procedure: INSERTION VENOUS ACCESS DEVICE   (MEDIPORT)   (C-ARM#1);  Surgeon: Fortino Medina MD;  Location: [facility] OR;  Service: General   • SHOULDER ARTHROSCOPY  11/11/2013    Shoulder arthroscopy/surgery (Right shoulder. Rotator cuff repair. Subacromial decompression. Edgar procedure.)   • SHOULDER SURGERY  12/28/2015    Shoulder surgery procedure (Arthroscopy of the left shoulder with rotator cuff repair.Edgar procedure.Subacromial decompression.)   • TONSILLECTOMY  1972   • TONSILLECTOMY AND ADENOIDECTOMY  05/29/1967    T&A (hypertrophied tonsils and adenoids with recurrent infection)   • TOTAL ABDOMINAL HYSTERECTOMY WITH SALPINGO OOPHORECTOMY  02/03/2004    ARIELLE/BSO (with a preop fractional dilation and curettage with frozen section,menorrhagia,dysmenorrhea,unresponsive to medical intervention)   • TOTAL HIP ARTHROPLASTY     • VAGINA SURGERY  03/02/1981    Anesth, surgery on vagina (colpotomy with bilateral partial salpingectomy, multiparity contraindicated)       ALLERGIES:    Allergies   Allergen Reactions   • Ace Inhibitors Cough   • Latex      BLISTER     • Morphine And Related Nausea And Vomiting   • Lortab [Hydrocodone-Acetaminophen] Palpitations         FAMILY HISTORY:  Family History   Problem Relation Age of Onset   • Breast cancer Mother    • Cancer Mother    • Diabetes Mother    • Heart disease Father    • Liver cancer Father    • Cancer Father    • Diabetes Sister         INSULIN DEPENDENT   • Cancer Sister    • Diabetes Brother         INSULIN  "DEPENDENT   • Coronary artery disease Other    • Diabetes Other    • Diabetes Maternal Grandmother    • Cancer Maternal Grandfather            REVIEW OF SYSTEMS:      CONSTITUTIONAL:  Complains of fatigue. Denies any fever, chills or weight loss.     EYES: No visual disturbances. No discharge. No new lesions    ENMT: States swelling of parotid gland on bilateral side is improved.  No epistaxis, mouth sores or difficulty swallowing.    RESPIRATORY:  No new shortness of breath. No new cough or hemoptysis.    CARDIOVASCULAR:  No chest pain or palpitations.    GASTROINTESTINAL: Positive for intermittent nausea without vomiting.  Positive for  diarrhea.  No abdominal pain or blood in the stool.    GENITOURINARY: No Dysuria or Hematuria.    MUSCULOSKELETAL: Positive for chronic back pain and hip pain    LYMPHATICS:  Denies any abnormal swollen glands anywhere in the body.    NEUROLOGICAL : No tingling or numbness. No headache or dizziness. No seizures or balance problems.    SKIN: No new skin lesions.    ENDOCRINE : No new hot or cold intolerance. No new polyuria . No polydipsia.        PHYSICAL EXAMINATION:      VITAL SIGNS:  /82   Pulse 91   Temp 97.8 °F (36.6 °C) (Temporal)   Resp 18   Ht 162.6 cm (64.02\")   Wt 100 kg (221 lb 4.8 oz)   BMI 37.97 kg/m²       01/12/21  1021   Weight: 100 kg (221 lb 4.8 oz)         ECOG performance status: 2    CONSTITUTIONAL:  Not in any distress.    EYES: Mild conjunctival Pallor. No Icterus. No Pterygium. Extraocular Movements intact.No ptosis.    ENMT:  Normocephalic, Atraumatic.No Facial Asymmetry noted.    NECK:  No adenopathy.Trachea midline. NO JVD.    RESPIRATORY:  Fair air entry bilateral. No rhonchi or wheezing.Fair respiratory effort.    CARDIOVASCULAR:  S1, S2. Regular rate and rhythm. No murmur or gallop appreciated.  Port-A-Cath present.    ABDOMEN:  Soft, obese, nontender. Bowel sounds present in all four quadrants.  No Hepatosplenomegaly " appreciated.    MUSCULOSKELETAL:  No edema.No Calf Tenderness.Decreased  range of motion.    NEUROLOGIC:    No  Motor or sensory deficit appreciated. Cranial Nerves 2-12 grossly intact.    SKIN : No new skin lesion identified. Skin is warm and moist to touch.    LYMPHATICS: No new enlarged lymph nodes in neck or supraclavicular area.    PSYCHIATRY: Alert, awake and oriented ×3.  Appears anxious.  Normal judgment.  Makes good eye contact.        DIAGNOSTIC DATA:    Glucose   Date Value Ref Range Status   01/12/2021 178 (H) 65 - 99 mg/dL Final     Sodium   Date Value Ref Range Status   01/12/2021 135 (L) 136 - 145 mmol/L Final     Potassium   Date Value Ref Range Status   01/12/2021 3.9 3.5 - 5.2 mmol/L Final     CO2   Date Value Ref Range Status   01/12/2021 24.0 22.0 - 29.0 mmol/L Final     Chloride   Date Value Ref Range Status   01/12/2021 99 98 - 107 mmol/L Final     Anion Gap   Date Value Ref Range Status   01/12/2021 12.0 5.0 - 15.0 mmol/L Final     Creatinine   Date Value Ref Range Status   01/12/2021 0.67 0.57 - 1.00 mg/dL Final     BUN   Date Value Ref Range Status   01/12/2021 8 8 - 23 mg/dL Final     BUN/Creatinine Ratio   Date Value Ref Range Status   01/12/2021 11.9 7.0 - 25.0 Final     Calcium   Date Value Ref Range Status   01/12/2021 10.1 8.6 - 10.5 mg/dL Final     eGFR Non  Amer   Date Value Ref Range Status   01/12/2021 90 >60 mL/min/1.73 Final     Alkaline Phosphatase   Date Value Ref Range Status   01/12/2021 80 39 - 117 U/L Final     Total Protein   Date Value Ref Range Status   01/12/2021 6.7 6.0 - 8.5 g/dL Final     ALT (SGPT)   Date Value Ref Range Status   01/12/2021 46 (H) 1 - 33 U/L Final     AST (SGOT)   Date Value Ref Range Status   01/12/2021 34 (H) 1 - 32 U/L Final     Total Bilirubin   Date Value Ref Range Status   01/12/2021 0.3 0.0 - 1.2 mg/dL Final     Albumin   Date Value Ref Range Status   01/12/2021 4.00 3.50 - 5.20 g/dL Final     Globulin   Date Value Ref Range Status    01/12/2021 2.7 gm/dL Final     Lab Results   Component Value Date    WBC 4.09 01/12/2021    HGB 13.8 01/12/2021    HCT 39.0 01/12/2021    MCV 90.9 01/12/2021     01/12/2021     Lab Results   Component Value Date    NEUTROABS 2.49 01/12/2021     No results found for: , LABCA2, AFPTM, HCGQUANT, , CHROMGRNA, [id number], CEA, REFLABREPO        PATHOLOGY:  * Cannot find OR log *         RADIOLOGY DATA :  No radiology results for the last 7 days      ASSESSMENT AND PLAN:      1.  Recurrent follicular lymphoma:  -Please review oncology history for prior treatment details  -Patient started on cycle 1 of Revlimid with rituximab on September 22, 2020.  -We will go ahead with cycle 5 of Revlimid from today.  -CBC and CMP from today shows elevated liver enzymes otherwise unremarkable.  -Recommend continue with aspirin 81 mg p.o. daily  -We will ask patient to return to clinic in 4 weeks with repeat CBC, CMP to be done on that day.  -We will repeat restaging CT of face, chest, abdomen and pelvis with contrast prior to next clinic visit in 4 weeks.  Recommendation were discussed with patient.    2.  Fatty liver with elevated liver enzyme:  -We will monitor with CMP for now    3.  Diarrhea from Revlimid:  -We will continue using as needed Imodium    4.  Health maintenance: Patient does not smoke.  Had a colonoscopy done in the last 10 years    5. Advance Care Planning: For now patient remains full code and is able to make decisions.  Patient has health care surrogate mentioned on chart.        PHQ-9 Total Score: 0   -Patient is not homicidal or suicidal.  No acute intervention required.    Chasity Leon reports a pain score of 0.  Given her pain assessment as noted, treatment options were discussed and the following options were decided upon as a follow-up plan to address the patient's pain: continuation of current treatment plan for pain.         Tino Sood MD  1/12/2021  12:40 CST        Part of this  note may be an electronic transcription/translation of spoken language to printed text using the Dragon Dictation System.

## 2021-01-12 NOTE — TELEPHONE ENCOUNTER
Patient needs to get CT scheduled at [facility] a few days before her 2/9 appt with Dr. Sood.    Any day is fine.    [phone]

## 2021-01-13 ENCOUNTER — TELEPHONE (OUTPATIENT)
Dept: ONCOLOGY | Facility: CLINIC | Age: 61
End: 2021-01-13

## 2021-01-13 NOTE — TELEPHONE ENCOUNTER
Caller: madyson    Relationship to patient: self    Best call back number: [phone]    Pt is calling to check if the ct scan appt has been made yet. Pt would like a call when the appt is made. Thank you

## 2021-01-13 NOTE — TELEPHONE ENCOUNTER
PT:SELF    PT CALLING TO SEE IF SOMEONE IS GOING TO SCHEDULE HER PORT ACCESS APPT FOR HER CT SCAN SO THEY CAN ACCESS THE PORT.   PLEASE ADVISE.    PT #: [phone]

## 2021-02-02 DIAGNOSIS — C82.08 GRADE 1 FOLLICULAR LYMPHOMA OF LYMPH NODES OF MULTIPLE REGIONS (HCC): ICD-10-CM

## 2021-02-02 RX ORDER — LENALIDOMIDE 20 MG/1
CAPSULE ORAL
Qty: 21 CAPSULE | Refills: 1 | Status: SHIPPED | OUTPATIENT
Start: 2021-02-02 | End: 2021-03-02

## 2021-02-05 ENCOUNTER — HOSPITAL ENCOUNTER (OUTPATIENT)
Dept: CT IMAGING | Facility: HOSPITAL | Age: 61
Discharge: HOME | End: 2021-02-05
Admitting: INTERNAL MEDICINE

## 2021-02-05 ENCOUNTER — LAB (OUTPATIENT)
Dept: ONCOLOGY | Facility: HOSPITAL | Age: 61
End: 2021-02-05

## 2021-02-05 DIAGNOSIS — C82.08 GRADE 1 FOLLICULAR LYMPHOMA OF LYMPH NODES OF MULTIPLE REGIONS (HCC): ICD-10-CM

## 2021-02-05 DIAGNOSIS — Z45.2 ENCOUNTER FOR VENOUS ACCESS DEVICE CARE: ICD-10-CM

## 2021-02-05 DIAGNOSIS — C82.08 GRADE 1 FOLLICULAR LYMPHOMA OF LYMPH NODES OF MULTIPLE REGIONS (HCC): Primary | ICD-10-CM

## 2021-02-05 DIAGNOSIS — K11.8 MASS OF BOTH PAROTID GLANDS: ICD-10-CM

## 2021-02-05 LAB
ALBUMIN SERPL-MCNC: 4 G/DL (ref 3.5–5.2)
ALBUMIN/GLOB SERPL: 1.5 G/DL
ALP SERPL-CCNC: 74 U/L (ref 39–117)
ALT SERPL W P-5'-P-CCNC: 62 U/L (ref 1–33)
ANION GAP SERPL CALCULATED.3IONS-SCNC: 12 MMOL/L (ref 5–15)
AST SERPL-CCNC: 49 U/L (ref 1–32)
BASOPHILS # BLD AUTO: 0.05 10*3/MM3 (ref 0–0.2)
BASOPHILS NFR BLD AUTO: 1.9 % (ref 0–1.5)
BILIRUB SERPL-MCNC: 0.5 MG/DL (ref 0–1.2)
BUN SERPL-MCNC: 6 MG/DL (ref 8–23)
BUN/CREAT SERPL: 8.7 (ref 7–25)
CALCIUM SPEC-SCNC: 10.1 MG/DL (ref 8.6–10.5)
CHLORIDE SERPL-SCNC: 100 MMOL/L (ref 98–107)
CO2 SERPL-SCNC: 27 MMOL/L (ref 22–29)
CREAT SERPL-MCNC: 0.69 MG/DL (ref 0.57–1)
DEPRECATED RDW RBC AUTO: 52.6 FL (ref 37–54)
EOSINOPHIL # BLD AUTO: 0 10*3/MM3 (ref 0–0.4)
EOSINOPHIL NFR BLD AUTO: 0 % (ref 0.3–6.2)
ERYTHROCYTE [DISTWIDTH] IN BLOOD BY AUTOMATED COUNT: 15.6 % (ref 12.3–15.4)
GFR SERPL CREATININE-BSD FRML MDRD: 86 ML/MIN/1.73
GLOBULIN UR ELPH-MCNC: 2.6 GM/DL
GLUCOSE SERPL-MCNC: 191 MG/DL (ref 65–99)
HCT VFR BLD AUTO: 39.2 % (ref 34–46.6)
HGB BLD-MCNC: 13.8 G/DL (ref 12–15.9)
HOLD SPECIMEN: NORMAL
IMM GRANULOCYTES # BLD AUTO: 0.01 10*3/MM3 (ref 0–0.05)
IMM GRANULOCYTES NFR BLD AUTO: 0.4 % (ref 0–0.5)
LYMPHOCYTES # BLD AUTO: 0.84 10*3/MM3 (ref 0.7–3.1)
LYMPHOCYTES NFR BLD AUTO: 31.1 % (ref 19.6–45.3)
MCH RBC QN AUTO: 32.5 PG (ref 26.6–33)
MCHC RBC AUTO-ENTMCNC: 35.2 G/DL (ref 31.5–35.7)
MCV RBC AUTO: 92.5 FL (ref 79–97)
MONOCYTES # BLD AUTO: 0.4 10*3/MM3 (ref 0.1–0.9)
MONOCYTES NFR BLD AUTO: 14.8 % (ref 5–12)
NEUTROPHILS NFR BLD AUTO: 1.4 10*3/MM3 (ref 1.7–7)
NEUTROPHILS NFR BLD AUTO: 51.8 % (ref 42.7–76)
NRBC BLD AUTO-RTO: 0 /100 WBC (ref 0–0.2)
PLATELET # BLD AUTO: 274 10*3/MM3 (ref 140–450)
PMV BLD AUTO: 9 FL (ref 6–12)
POTASSIUM SERPL-SCNC: 4 MMOL/L (ref 3.5–5.2)
PROT SERPL-MCNC: 6.6 G/DL (ref 6–8.5)
RBC # BLD AUTO: 4.24 10*6/MM3 (ref 3.77–5.28)
SODIUM SERPL-SCNC: 139 MMOL/L (ref 136–145)
WBC # BLD AUTO: 2.7 10*3/MM3 (ref 3.4–10.8)

## 2021-02-05 PROCEDURE — 71260 CT THORAX DX C+: CPT

## 2021-02-05 PROCEDURE — 36415 COLL VENOUS BLD VENIPUNCTURE: CPT

## 2021-02-05 PROCEDURE — 85025 COMPLETE CBC W/AUTO DIFF WBC: CPT

## 2021-02-05 PROCEDURE — 36591 DRAW BLOOD OFF VENOUS DEVICE: CPT | Performed by: INTERNAL MEDICINE

## 2021-02-05 PROCEDURE — 25010000002 IOPAMIDOL 61 % SOLUTION: Performed by: INTERNAL MEDICINE

## 2021-02-05 PROCEDURE — 80053 COMPREHEN METABOLIC PANEL: CPT

## 2021-02-05 PROCEDURE — 74177 CT ABD & PELVIS W/CONTRAST: CPT

## 2021-02-05 PROCEDURE — 70487 CT MAXILLOFACIAL W/DYE: CPT

## 2021-02-05 PROCEDURE — 25010000003 HEPARIN LOCK FLUSH PER 10 UNITS: Performed by: INTERNAL MEDICINE

## 2021-02-05 RX ORDER — HEPARIN SODIUM (PORCINE) LOCK FLUSH IV SOLN 100 UNIT/ML 100 UNIT/ML
500 SOLUTION INTRAVENOUS AS NEEDED
Status: DISCONTINUED | OUTPATIENT
Start: 2021-02-05 | End: 2021-02-05 | Stop reason: HOSPADM

## 2021-02-05 RX ORDER — SODIUM CHLORIDE 0.9 % (FLUSH) 0.9 %
20 SYRINGE (ML) INJECTION AS NEEDED
Status: DISCONTINUED | OUTPATIENT
Start: 2021-02-05 | End: 2021-02-05 | Stop reason: HOSPADM

## 2021-02-05 RX ORDER — SODIUM CHLORIDE 0.9 % (FLUSH) 0.9 %
20 SYRINGE (ML) INJECTION AS NEEDED
Status: CANCELLED | OUTPATIENT
Start: 2021-02-05

## 2021-02-05 RX ORDER — SODIUM CHLORIDE 0.9 % (FLUSH) 0.9 %
10 SYRINGE (ML) INJECTION AS NEEDED
Status: DISCONTINUED | OUTPATIENT
Start: 2021-02-05 | End: 2021-02-05 | Stop reason: HOSPADM

## 2021-02-05 RX ORDER — SODIUM CHLORIDE 0.9 % (FLUSH) 0.9 %
10 SYRINGE (ML) INJECTION AS NEEDED
Status: CANCELLED | OUTPATIENT
Start: 2021-02-05

## 2021-02-05 RX ORDER — HEPARIN SODIUM (PORCINE) LOCK FLUSH IV SOLN 100 UNIT/ML 100 UNIT/ML
500 SOLUTION INTRAVENOUS AS NEEDED
Status: CANCELLED | OUTPATIENT
Start: 2021-02-05

## 2021-02-05 RX ADMIN — IOPAMIDOL 90 ML: 612 INJECTION, SOLUTION INTRAVENOUS at 08:44

## 2021-02-05 RX ADMIN — HEPARIN 500 UNITS: 100 SYRINGE at 08:51

## 2021-02-09 ENCOUNTER — INFUSION (OUTPATIENT)
Dept: ONCOLOGY | Facility: HOSPITAL | Age: 61
End: 2021-02-09

## 2021-02-09 ENCOUNTER — TELEPHONE (OUTPATIENT)
Dept: ONCOLOGY | Facility: CLINIC | Age: 61
End: 2021-02-09

## 2021-02-09 ENCOUNTER — OFFICE VISIT (OUTPATIENT)
Dept: ONCOLOGY | Facility: CLINIC | Age: 61
End: 2021-02-09

## 2021-02-09 VITALS
SYSTOLIC BLOOD PRESSURE: 157 MMHG | BODY MASS INDEX: 37.42 KG/M2 | WEIGHT: 219.2 LBS | HEART RATE: 101 BPM | DIASTOLIC BLOOD PRESSURE: 94 MMHG | HEIGHT: 64 IN | RESPIRATION RATE: 20 BRPM | TEMPERATURE: 98.5 F

## 2021-02-09 DIAGNOSIS — Z45.2 ENCOUNTER FOR VENOUS ACCESS DEVICE CARE: Primary | ICD-10-CM

## 2021-02-09 DIAGNOSIS — K11.8 MASS OF BOTH PAROTID GLANDS: ICD-10-CM

## 2021-02-09 DIAGNOSIS — C82.08 GRADE 1 FOLLICULAR LYMPHOMA OF LYMPH NODES OF MULTIPLE REGIONS (HCC): Primary | ICD-10-CM

## 2021-02-09 DIAGNOSIS — R19.7 DIARRHEA, UNSPECIFIED TYPE: ICD-10-CM

## 2021-02-09 DIAGNOSIS — C82.03 FOLLICULAR LYMPHOMA GRADE I OF INTRA-ABDOMINAL LYMPH NODES (HCC): ICD-10-CM

## 2021-02-09 DIAGNOSIS — R79.89 ELEVATED LFTS: ICD-10-CM

## 2021-02-09 DIAGNOSIS — C82.08 GRADE 1 FOLLICULAR LYMPHOMA OF LYMPH NODES OF MULTIPLE REGIONS (HCC): ICD-10-CM

## 2021-02-09 LAB
BASOPHILS # BLD AUTO: 0.1 10*3/MM3 (ref 0–0.2)
BASOPHILS NFR BLD AUTO: 2.8 % (ref 0–1.5)
DEPRECATED RDW RBC AUTO: 53.2 FL (ref 37–54)
EOSINOPHIL # BLD AUTO: 0 10*3/MM3 (ref 0–0.4)
EOSINOPHIL NFR BLD AUTO: 0 % (ref 0.3–6.2)
ERYTHROCYTE [DISTWIDTH] IN BLOOD BY AUTOMATED COUNT: 15.5 % (ref 12.3–15.4)
HCT VFR BLD AUTO: 39.7 % (ref 34–46.6)
HGB BLD-MCNC: 14 G/DL (ref 12–15.9)
IMM GRANULOCYTES # BLD AUTO: 0.01 10*3/MM3 (ref 0–0.05)
IMM GRANULOCYTES NFR BLD AUTO: 0.3 % (ref 0–0.5)
LYMPHOCYTES # BLD AUTO: 0.9 10*3/MM3 (ref 0.7–3.1)
LYMPHOCYTES NFR BLD AUTO: 24.9 % (ref 19.6–45.3)
MCH RBC QN AUTO: 32.9 PG (ref 26.6–33)
MCHC RBC AUTO-ENTMCNC: 35.3 G/DL (ref 31.5–35.7)
MCV RBC AUTO: 93.4 FL (ref 79–97)
MONOCYTES # BLD AUTO: 0.47 10*3/MM3 (ref 0.1–0.9)
MONOCYTES NFR BLD AUTO: 13 % (ref 5–12)
NEUTROPHILS NFR BLD AUTO: 2.14 10*3/MM3 (ref 1.7–7)
NEUTROPHILS NFR BLD AUTO: 59 % (ref 42.7–76)
NRBC BLD AUTO-RTO: 0 /100 WBC (ref 0–0.2)
PLATELET # BLD AUTO: 334 10*3/MM3 (ref 140–450)
PMV BLD AUTO: 8.9 FL (ref 6–12)
RBC # BLD AUTO: 4.25 10*6/MM3 (ref 3.77–5.28)
WBC # BLD AUTO: 3.62 10*3/MM3 (ref 3.4–10.8)

## 2021-02-09 PROCEDURE — 25010000002 DIPHENHYDRAMINE PER 50 MG: Performed by: INTERNAL MEDICINE

## 2021-02-09 PROCEDURE — 99214 OFFICE O/P EST MOD 30 MIN: CPT | Performed by: INTERNAL MEDICINE

## 2021-02-09 PROCEDURE — 96413 CHEMO IV INFUSION 1 HR: CPT | Performed by: INTERNAL MEDICINE

## 2021-02-09 PROCEDURE — 96415 CHEMO IV INFUSION ADDL HR: CPT | Performed by: INTERNAL MEDICINE

## 2021-02-09 PROCEDURE — 25010000002 RITUXIMAB 500 MG/50ML SOLUTION 50 ML VIAL: Performed by: INTERNAL MEDICINE

## 2021-02-09 PROCEDURE — 96375 TX/PRO/DX INJ NEW DRUG ADDON: CPT | Performed by: INTERNAL MEDICINE

## 2021-02-09 PROCEDURE — 25010000003 HEPARIN LOCK FLUSH PER 10 UNITS: Performed by: INTERNAL MEDICINE

## 2021-02-09 PROCEDURE — G9903 PT SCRN TBCO ID AS NON USER: HCPCS | Performed by: INTERNAL MEDICINE

## 2021-02-09 PROCEDURE — 1126F AMNT PAIN NOTED NONE PRSNT: CPT | Performed by: INTERNAL MEDICINE

## 2021-02-09 PROCEDURE — 1123F ACP DISCUSS/DSCN MKR DOCD: CPT | Performed by: INTERNAL MEDICINE

## 2021-02-09 PROCEDURE — 85025 COMPLETE CBC W/AUTO DIFF WBC: CPT

## 2021-02-09 RX ORDER — LENALIDOMIDE 20 MG/1
20 CAPSULE ORAL DAILY
Qty: 21 CAPSULE | Refills: 0 | Status: SHIPPED | OUTPATIENT
Start: 2021-02-09 | End: 2021-05-04

## 2021-02-09 RX ORDER — SODIUM CHLORIDE 0.9 % (FLUSH) 0.9 %
10 SYRINGE (ML) INJECTION AS NEEDED
Status: DISCONTINUED | OUTPATIENT
Start: 2021-02-09 | End: 2021-02-09 | Stop reason: HOSPADM

## 2021-02-09 RX ORDER — ACETAMINOPHEN 325 MG/1
650 TABLET ORAL ONCE
Status: COMPLETED | OUTPATIENT
Start: 2021-02-09 | End: 2021-02-09

## 2021-02-09 RX ORDER — ACETAMINOPHEN 325 MG/1
650 TABLET ORAL ONCE
Status: CANCELLED | OUTPATIENT
Start: 2021-02-09

## 2021-02-09 RX ORDER — SODIUM CHLORIDE 0.9 % (FLUSH) 0.9 %
10 SYRINGE (ML) INJECTION AS NEEDED
Status: CANCELLED | OUTPATIENT
Start: 2021-02-09

## 2021-02-09 RX ORDER — HEPARIN SODIUM (PORCINE) LOCK FLUSH IV SOLN 100 UNIT/ML 100 UNIT/ML
500 SOLUTION INTRAVENOUS AS NEEDED
Status: DISCONTINUED | OUTPATIENT
Start: 2021-02-09 | End: 2021-02-09 | Stop reason: HOSPADM

## 2021-02-09 RX ORDER — SODIUM CHLORIDE 9 MG/ML
250 INJECTION, SOLUTION INTRAVENOUS ONCE
Status: COMPLETED | OUTPATIENT
Start: 2021-02-09 | End: 2021-02-09

## 2021-02-09 RX ORDER — SODIUM CHLORIDE 9 MG/ML
250 INJECTION, SOLUTION INTRAVENOUS ONCE
Status: CANCELLED | OUTPATIENT
Start: 2021-02-09

## 2021-02-09 RX ORDER — HEPARIN SODIUM (PORCINE) LOCK FLUSH IV SOLN 100 UNIT/ML 100 UNIT/ML
500 SOLUTION INTRAVENOUS AS NEEDED
Status: CANCELLED | OUTPATIENT
Start: 2021-02-09

## 2021-02-09 RX ORDER — SODIUM CHLORIDE 0.9 % (FLUSH) 0.9 %
20 SYRINGE (ML) INJECTION AS NEEDED
Status: CANCELLED | OUTPATIENT
Start: 2021-02-09

## 2021-02-09 RX ADMIN — ACETAMINOPHEN 650 MG: 325 TABLET, FILM COATED ORAL at 08:21

## 2021-02-09 RX ADMIN — RITUXIMAB 770 MG: 10 INJECTION, SOLUTION INTRAVENOUS at 08:55

## 2021-02-09 RX ADMIN — SODIUM CHLORIDE, PRESERVATIVE FREE 10 ML: 5 INJECTION INTRAVENOUS at 11:44

## 2021-02-09 RX ADMIN — DIPHENHYDRAMINE HYDROCHLORIDE 25 MG: 50 INJECTION, SOLUTION INTRAMUSCULAR; INTRAVENOUS at 08:21

## 2021-02-09 RX ADMIN — SODIUM CHLORIDE 250 ML: 9 INJECTION, SOLUTION INTRAVENOUS at 08:21

## 2021-02-09 RX ADMIN — HEPARIN 500 UNITS: 100 SYRINGE at 11:44

## 2021-02-09 NOTE — PROGRESS NOTES
DATE OF VISIT: 2/9/2021      REASON FOR VISIT: Recurrent follicular lymphoma with involvement of bilateral parotid gland, fatty liver, elevated liver function test, neutropenia from chemotherapy      HISTORY OF PRESENT ILLNESS:   61-year-old female with medical problem consisting of obesity, hypertension, anxiety, dyslipidemia, fatty liver with elevated liver function test, recurrent follicular lymphoma with most recent recurrence diagnosed on August 27, 2020 based on biopsy of parotid gland on the left side.  Patient has been on rituximab with Revlimid since September 22, 2020.  Patient needs he is to start cycle 6 of Revlimid with rituximab today.  Patient had a restaging CT scan done of maxillofacial bone, chest, abdomen and pelvis, she is here to discuss the results and further recommendation.  Complains of intermittent nausea and vomiting with chemotherapy.  Complains of intermittent diarrhea for which she takes Imodium.  Denies any abdominal pain.  Denies any blood in stool.  Denies any drenching night sweats or any new lymph node enlargement.        Oncology history:    1.  Recurrent follicular lymphoma grade 1,  -Patient initially diagnosed in April 2015 for which patient underwent palliative radiation treatment.  -Second relapse happened in September 2016 for again patient had a radiation treatment.  - Patient was found to have enlarged aortocaval lymph node in April 2017 for which patient initially received rituximab every 2 months without any significant improvement.  -Subsequently patient received 4 cycles of bendamustine and rituximab between April 30, 2018 and August 21, 2018.  -Patient was diagnosed with involvement of left parotid gland on biopsy done on August 27, 2020 by Dr. Ibrahim.  PET/CT done shows uptake in bilateral parotid gland along with left sided neck lymph node involvement.  -Patient was started on Revlimid with rituximab on September 22, 2020.          Past Medical History, Past Surgical  "History, Social History, Family History have been reviewed and are without significant changes except as mentioned.    Review of Systems   Constitutional: Positive for fatigue.        Has lost 2 pounds since last clinic visit   HENT:        States swelling of bilateral parotid gland region is improved.  Complains of discomfort in right parotid area at night.   Gastrointestinal: Positive for diarrhea and nausea.   Musculoskeletal: Positive for arthralgias and back pain.      A comprehensive 14 point review of systems was performed and was negative except as mentioned.    Medications:  The current medication list was reviewed in the EMR    ALLERGIES:    Allergies   Allergen Reactions   • Ace Inhibitors Cough   • Latex      BLISTER     • Morphine And Related Nausea And Vomiting   • Lortab [Hydrocodone-Acetaminophen] Palpitations       Objective      Vitals:    02/09/21 0744   BP: 157/94   Pulse: 101   Resp: 20   Temp: 98.5 °F (36.9 °C)   TempSrc: Temporal   Weight: 99.4 kg (219 lb 3.2 oz)   Height: 162.6 cm (64.02\")   PainSc: 0-No pain     Current Status 2/9/2021   ECOG score 0       Physical Exam  Constitutional:       Appearance: She is obese.   Cardiovascular:      Rate and Rhythm: Normal rate and regular rhythm.      Heart sounds: Normal heart sounds.   Pulmonary:      Effort: Pulmonary effort is normal.      Breath sounds: Normal breath sounds.   Abdominal:      General: Bowel sounds are normal.      Palpations: Abdomen is soft.   Neurological:      Mental Status: She is alert and oriented to person, place, and time.   Psychiatric:      Comments: Appears anxious           RECENT LABS:  Glucose   Date Value Ref Range Status   02/05/2021 191 (H) 65 - 99 mg/dL Final     Sodium   Date Value Ref Range Status   02/05/2021 139 136 - 145 mmol/L Final     Potassium   Date Value Ref Range Status   02/05/2021 4.0 3.5 - 5.2 mmol/L Final     CO2   Date Value Ref Range Status   02/05/2021 27.0 22.0 - 29.0 mmol/L Final "     Chloride   Date Value Ref Range Status   02/05/2021 100 98 - 107 mmol/L Final     Anion Gap   Date Value Ref Range Status   02/05/2021 12.0 5.0 - 15.0 mmol/L Final     Creatinine   Date Value Ref Range Status   02/05/2021 0.69 0.57 - 1.00 mg/dL Final     BUN   Date Value Ref Range Status   02/05/2021 6 (L) 8 - 23 mg/dL Final     BUN/Creatinine Ratio   Date Value Ref Range Status   02/05/2021 8.7 7.0 - 25.0 Final     Calcium   Date Value Ref Range Status   02/05/2021 10.1 8.6 - 10.5 mg/dL Final     eGFR Non  Amer   Date Value Ref Range Status   02/05/2021 86 >60 mL/min/1.73 Final     Alkaline Phosphatase   Date Value Ref Range Status   02/05/2021 74 39 - 117 U/L Final     Total Protein   Date Value Ref Range Status   02/05/2021 6.6 6.0 - 8.5 g/dL Final     ALT (SGPT)   Date Value Ref Range Status   02/05/2021 62 (H) 1 - 33 U/L Final     AST (SGOT)   Date Value Ref Range Status   02/05/2021 49 (H) 1 - 32 U/L Final     Total Bilirubin   Date Value Ref Range Status   02/05/2021 0.5 0.0 - 1.2 mg/dL Final     Albumin   Date Value Ref Range Status   02/05/2021 4.00 3.50 - 5.20 g/dL Final     Globulin   Date Value Ref Range Status   02/05/2021 2.6 gm/dL Final     Lab Results   Component Value Date    WBC 3.62 02/09/2021    HGB 14.0 02/09/2021    HCT 39.7 02/09/2021    MCV 93.4 02/09/2021     02/09/2021     Lab Results   Component Value Date    NEUTROABS 2.14 02/09/2021     No results found for: , LABCA2, AFPTM, HCGQUANT, , CHROMGRNA, [id number], CEA, REFLABREPO      PATHOLOGY:  * Cannot find OR log *         RADIOLOGY DATA :  Ct Chest With Contrast Diagnostic    Result Date: 2/5/2021  1. No acute CT chest abdomen or pelvis abnormality. Electronically signed by:  Benito Matthews MD  2/5/2021 1:04 PM CST Workstation: [id number]    Ct Abdomen Pelvis With Contrast    Result Date: 2/5/2021  1. No acute CT chest abdomen or pelvis abnormality. Electronically signed by:  Benito Matthews MD  2/5/2021 1:04 PM CST  Workstation: [id number]    Ct Maxillofacial With Contrast    Result Date: 2/5/2021  Significant decrease in size of bilateral parotid and left paravertebral lymph nodes consistent with response to treatment. Electronically signed by:  Khadijah Gtz MD  2/5/2021 1:08 PM CST Workstation: [id number]          Assessment/Plan     1.  Recurrent follicular lymphoma:  -Please review oncology history for prior treatment details  -Patient was started on cycle 1 of Revlimid with rituximab on September 22, 2020.    -CT of maxillofacial, chest, abdomen and pelvis with contrast done on February 5, 2021 shows significant decrease in size of bilateral parotid gland mass without any evidence of progression.  Results of CT scan were discussed with patient  -We will go ahead with cycle 6 of Revlimid today.  -We will ask patient to return to clinic in 4 weeks with repeat CBC and CMP to be done on that day.  -Recommend continuing with aspirin 81 mg p.o. daily  -We will do restaging CT of maxillofacial, chest, abdomen and pelvis with contrast after cycle 8 of Revlimid and rituximab which was discussed with patient    2.  Fatty liver with elevated liver enzyme  -We will monitor with CMP for now    3.  Diarrhea from Revlimid:  -Recommend continue with as needed Imodium    4.  Health maintenance: Patient does not smoke.  Colonoscopy last 10 years    5. Advance Care Planning: For now patient remains full code and is able to make decisions.  Patient has health care surrogate mentioned on chart.                 PHQ-9 Total Score: 0   -Patient is not homicidal or suicidal.  No acute intervention required.    Chasity Leon reports a pain score of 0.  Given her pain assessment as noted, treatment options were discussed and the following options were decided upon as a follow-up plan to address the patient's pain: No acute intervention required.         Tino Sood MD  2/9/2021  08:10 CST        Part of this note may be an  electronic transcription/translation of spoken language to printed text using the Dragon Dictation System.          CC:

## 2021-02-09 NOTE — TELEPHONE ENCOUNTER
Caller: JAC    Relationship to patient: [facility]    Best call back number: [phone]    CALLING TO LET DR AVILA KNOW THEY WILL BE UNABLE TO FILL PT'S RX OF REVLIMID. STATES THEY HAVE NONE IN STOCK AND ARE UNABLE TO ORDER ANY MORE AT THIS TIME

## 2021-03-02 DIAGNOSIS — C82.08 GRADE 1 FOLLICULAR LYMPHOMA OF LYMPH NODES OF MULTIPLE REGIONS (HCC): ICD-10-CM

## 2021-03-02 RX ORDER — LENALIDOMIDE 20 MG/1
CAPSULE ORAL
Qty: 21 CAPSULE | Refills: 2 | Status: SHIPPED | OUTPATIENT
Start: 2021-03-02 | End: 2021-03-26

## 2021-03-09 ENCOUNTER — INFUSION (OUTPATIENT)
Dept: ONCOLOGY | Facility: HOSPITAL | Age: 61
End: 2021-03-09

## 2021-03-09 ENCOUNTER — OFFICE VISIT (OUTPATIENT)
Dept: ONCOLOGY | Facility: CLINIC | Age: 61
End: 2021-03-09

## 2021-03-09 ENCOUNTER — APPOINTMENT (OUTPATIENT)
Dept: ONCOLOGY | Facility: HOSPITAL | Age: 61
End: 2021-03-09

## 2021-03-09 VITALS
DIASTOLIC BLOOD PRESSURE: 79 MMHG | WEIGHT: 220.9 LBS | TEMPERATURE: 97.7 F | RESPIRATION RATE: 20 BRPM | BODY MASS INDEX: 37.71 KG/M2 | HEIGHT: 64 IN | SYSTOLIC BLOOD PRESSURE: 142 MMHG | HEART RATE: 84 BPM

## 2021-03-09 DIAGNOSIS — Z45.2 ENCOUNTER FOR VENOUS ACCESS DEVICE CARE: Primary | ICD-10-CM

## 2021-03-09 DIAGNOSIS — R79.89 ELEVATED LFTS: Chronic | ICD-10-CM

## 2021-03-09 DIAGNOSIS — C82.08 GRADE 1 FOLLICULAR LYMPHOMA OF LYMPH NODES OF MULTIPLE REGIONS (HCC): ICD-10-CM

## 2021-03-09 DIAGNOSIS — R19.7 DIARRHEA, UNSPECIFIED TYPE: Chronic | ICD-10-CM

## 2021-03-09 DIAGNOSIS — C82.08 GRADE 1 FOLLICULAR LYMPHOMA OF LYMPH NODES OF MULTIPLE REGIONS (HCC): Primary | Chronic | ICD-10-CM

## 2021-03-09 LAB
ALBUMIN SERPL-MCNC: 4 G/DL (ref 3.5–5.2)
ALBUMIN/GLOB SERPL: 1.6 G/DL
ALP SERPL-CCNC: 76 U/L (ref 39–117)
ALT SERPL W P-5'-P-CCNC: 44 U/L (ref 1–33)
ANION GAP SERPL CALCULATED.3IONS-SCNC: 12 MMOL/L (ref 5–15)
AST SERPL-CCNC: 34 U/L (ref 1–32)
BASOPHILS # BLD AUTO: 0.11 10*3/MM3 (ref 0–0.2)
BASOPHILS NFR BLD AUTO: 3.1 % (ref 0–1.5)
BILIRUB SERPL-MCNC: 0.4 MG/DL (ref 0–1.2)
BUN SERPL-MCNC: 8 MG/DL (ref 8–23)
BUN/CREAT SERPL: 10.4 (ref 7–25)
CALCIUM SPEC-SCNC: 9.9 MG/DL (ref 8.6–10.5)
CHLORIDE SERPL-SCNC: 99 MMOL/L (ref 98–107)
CO2 SERPL-SCNC: 25 MMOL/L (ref 22–29)
CREAT SERPL-MCNC: 0.77 MG/DL (ref 0.57–1)
DEPRECATED RDW RBC AUTO: 53.5 FL (ref 37–54)
EOSINOPHIL # BLD AUTO: 0.01 10*3/MM3 (ref 0–0.4)
EOSINOPHIL NFR BLD AUTO: 0.3 % (ref 0.3–6.2)
ERYTHROCYTE [DISTWIDTH] IN BLOOD BY AUTOMATED COUNT: 15.3 % (ref 12.3–15.4)
GFR SERPL CREATININE-BSD FRML MDRD: 76 ML/MIN/1.73
GLOBULIN UR ELPH-MCNC: 2.5 GM/DL
GLUCOSE SERPL-MCNC: 223 MG/DL (ref 65–99)
HCT VFR BLD AUTO: 39.4 % (ref 34–46.6)
HGB BLD-MCNC: 13.5 G/DL (ref 12–15.9)
HOLD SPECIMEN: NORMAL
IMM GRANULOCYTES # BLD AUTO: 0.03 10*3/MM3 (ref 0–0.05)
IMM GRANULOCYTES NFR BLD AUTO: 0.8 % (ref 0–0.5)
LYMPHOCYTES # BLD AUTO: 0.81 10*3/MM3 (ref 0.7–3.1)
LYMPHOCYTES NFR BLD AUTO: 22.9 % (ref 19.6–45.3)
MCH RBC QN AUTO: 32.8 PG (ref 26.6–33)
MCHC RBC AUTO-ENTMCNC: 34.3 G/DL (ref 31.5–35.7)
MCV RBC AUTO: 95.9 FL (ref 79–97)
MONOCYTES # BLD AUTO: 0.52 10*3/MM3 (ref 0.1–0.9)
MONOCYTES NFR BLD AUTO: 14.7 % (ref 5–12)
NEUTROPHILS NFR BLD AUTO: 2.06 10*3/MM3 (ref 1.7–7)
NEUTROPHILS NFR BLD AUTO: 58.2 % (ref 42.7–76)
NRBC BLD AUTO-RTO: 0 /100 WBC (ref 0–0.2)
PLATELET # BLD AUTO: 347 10*3/MM3 (ref 140–450)
PMV BLD AUTO: 8.7 FL (ref 6–12)
POTASSIUM SERPL-SCNC: 3.8 MMOL/L (ref 3.5–5.2)
PROT SERPL-MCNC: 6.5 G/DL (ref 6–8.5)
RBC # BLD AUTO: 4.11 10*6/MM3 (ref 3.77–5.28)
SODIUM SERPL-SCNC: 136 MMOL/L (ref 136–145)
WBC # BLD AUTO: 3.54 10*3/MM3 (ref 3.4–10.8)

## 2021-03-09 PROCEDURE — 1126F AMNT PAIN NOTED NONE PRSNT: CPT | Performed by: INTERNAL MEDICINE

## 2021-03-09 PROCEDURE — 1123F ACP DISCUSS/DSCN MKR DOCD: CPT | Performed by: INTERNAL MEDICINE

## 2021-03-09 PROCEDURE — 80053 COMPREHEN METABOLIC PANEL: CPT

## 2021-03-09 PROCEDURE — 36415 COLL VENOUS BLD VENIPUNCTURE: CPT

## 2021-03-09 PROCEDURE — 25010000003 HEPARIN LOCK FLUSH PER 10 UNITS: Performed by: INTERNAL MEDICINE

## 2021-03-09 PROCEDURE — 36592 COLLECT BLOOD FROM PICC: CPT | Performed by: NURSE PRACTITIONER

## 2021-03-09 PROCEDURE — G9903 PT SCRN TBCO ID AS NON USER: HCPCS | Performed by: INTERNAL MEDICINE

## 2021-03-09 PROCEDURE — 85025 COMPLETE CBC W/AUTO DIFF WBC: CPT

## 2021-03-09 PROCEDURE — 99214 OFFICE O/P EST MOD 30 MIN: CPT | Performed by: INTERNAL MEDICINE

## 2021-03-09 RX ORDER — DIPHENOXYLATE HYDROCHLORIDE AND ATROPINE SULFATE 2.5; .025 MG/1; MG/1
1 TABLET ORAL 4 TIMES DAILY PRN
Qty: 60 TABLET | Refills: 0 | Status: SHIPPED | OUTPATIENT
Start: 2021-03-09 | End: 2021-04-20

## 2021-03-09 RX ORDER — HEPARIN SODIUM (PORCINE) LOCK FLUSH IV SOLN 100 UNIT/ML 100 UNIT/ML
500 SOLUTION INTRAVENOUS AS NEEDED
Status: DISCONTINUED | OUTPATIENT
Start: 2021-03-09 | End: 2021-03-09 | Stop reason: HOSPADM

## 2021-03-09 RX ORDER — SODIUM CHLORIDE 0.9 % (FLUSH) 0.9 %
10 SYRINGE (ML) INJECTION AS NEEDED
Status: CANCELLED | OUTPATIENT
Start: 2021-03-09

## 2021-03-09 RX ORDER — SODIUM CHLORIDE 0.9 % (FLUSH) 0.9 %
20 SYRINGE (ML) INJECTION AS NEEDED
Status: CANCELLED | OUTPATIENT
Start: 2021-03-09

## 2021-03-09 RX ORDER — HEPARIN SODIUM (PORCINE) LOCK FLUSH IV SOLN 100 UNIT/ML 100 UNIT/ML
500 SOLUTION INTRAVENOUS AS NEEDED
Status: CANCELLED | OUTPATIENT
Start: 2021-03-09

## 2021-03-09 RX ADMIN — HEPARIN 500 UNITS: 100 SYRINGE at 08:20

## 2021-03-09 NOTE — PROGRESS NOTES
DATE OF VISIT: 3/9/2021      REASON FOR VISIT: Recurrent follicular lymphoma with involvement of bilateral parotid gland, fatty liver, elevated liver function test, neutropenia from chemotherapy      HISTORY OF PRESENT ILLNESS:   61-year-old female with medical problem consisting of obesity, hypertension, anxiety, dyslipidemia, fatty liver with elevated liver function test, recurrent follicular lymphoma with most recent recurrence diagnosed on August 27, 2020 based on biopsy of parotid gland on left side.  Patient is currently on rituximab with Revlimid since September 22, 2020.  Patient is here to start cycle 7 of Revlimid with rituximab today.  Denies any new lymph node enlargement.  Denies any new drenching night sweats.  Denies any bleeding.  Complains of intermittent nausea and vomiting with chemotherapy.  Complains of diarrhea which is got worse since last month and Imodium is not helping that much.  Denies any new abdominal pain.        Oncology history:    1.  Recurrent follicular lymphoma grade 1,  -Patient initially diagnosed in April 2015 for which patient underwent palliative radiation treatment.  -Second relapse happened in September 2016 for again patient had a radiation treatment.  - Patient was found to have enlarged aortocaval lymph node in April 2017 for which patient initially received rituximab every 2 months without any significant improvement.  -Subsequently patient received 4 cycles of bendamustine and rituximab between April 30, 2018 and August 21, 2018.  -Patient was diagnosed with involvement of left parotid gland on biopsy done on August 27, 2020 by Dr. Ibrahim.  PET/CT done shows uptake in bilateral parotid gland along with left sided neck lymph node involvement.  -Patient was started on Revlimid with rituximab on September 22, 2020.            Past Medical History, Past Surgical History, Social History, Family History have been reviewed and are without significant changes except as  "mentioned.    Review of Systems   Constitutional: Positive for fatigue.   Gastrointestinal: Positive for diarrhea, nausea and vomiting.   Musculoskeletal: Positive for arthralgias and back pain.   Hematological: Negative for adenopathy.      A comprehensive 14 point review of systems was performed and was negative except as mentioned.    Medications:  The current medication list was reviewed in the EMR    ALLERGIES:    Allergies   Allergen Reactions   • Ace Inhibitors Cough   • Latex      BLISTER     • Morphine And Related Nausea And Vomiting   • Lortab [Hydrocodone-Acetaminophen] Palpitations       Objective      Vitals:    03/09/21 0750   BP: 142/79   Pulse: 84   Resp: 20   Temp: 97.7 °F (36.5 °C)   TempSrc: Temporal   Weight: 100 kg (220 lb 14.4 oz)   Height: 162.6 cm (64.02\")   PainSc: 0-No pain     Current Status 3/9/2021   ECOG score 0       Physical Exam  Constitutional:       Appearance: She is obese.   HENT:      Head:      Comments: There is prominence in bilateral parotid gland area without any enlargement as compared to last clinic visit  Cardiovascular:      Rate and Rhythm: Normal rate and regular rhythm.      Heart sounds: Normal heart sounds.   Pulmonary:      Breath sounds: Normal breath sounds.   Abdominal:      General: Bowel sounds are normal.      Palpations: Abdomen is soft.      Tenderness: There is no abdominal tenderness.   Neurological:      Mental Status: She is alert and oriented to person, place, and time.           RECENT LABS:  Glucose   Date Value Ref Range Status   03/09/2021 223 (H) 65 - 99 mg/dL Final     Sodium   Date Value Ref Range Status   03/09/2021 136 136 - 145 mmol/L Final     Potassium   Date Value Ref Range Status   03/09/2021 3.8 3.5 - 5.2 mmol/L Final     CO2   Date Value Ref Range Status   03/09/2021 25.0 22.0 - 29.0 mmol/L Final     Chloride   Date Value Ref Range Status   03/09/2021 99 98 - 107 mmol/L Final     Anion Gap   Date Value Ref Range Status   03/09/2021 " 12.0 5.0 - 15.0 mmol/L Final     Creatinine   Date Value Ref Range Status   03/09/2021 0.77 0.57 - 1.00 mg/dL Final     BUN   Date Value Ref Range Status   03/09/2021 8 8 - 23 mg/dL Final     BUN/Creatinine Ratio   Date Value Ref Range Status   03/09/2021 10.4 7.0 - 25.0 Final     Calcium   Date Value Ref Range Status   03/09/2021 9.9 8.6 - 10.5 mg/dL Final     eGFR Non  Amer   Date Value Ref Range Status   03/09/2021 76 >60 mL/min/1.73 Final     Alkaline Phosphatase   Date Value Ref Range Status   03/09/2021 76 39 - 117 U/L Final     Total Protein   Date Value Ref Range Status   03/09/2021 6.5 6.0 - 8.5 g/dL Final     ALT (SGPT)   Date Value Ref Range Status   03/09/2021 44 (H) 1 - 33 U/L Final     AST (SGOT)   Date Value Ref Range Status   03/09/2021 34 (H) 1 - 32 U/L Final     Total Bilirubin   Date Value Ref Range Status   03/09/2021 0.4 0.0 - 1.2 mg/dL Final     Albumin   Date Value Ref Range Status   03/09/2021 4.00 3.50 - 5.20 g/dL Final     Globulin   Date Value Ref Range Status   03/09/2021 2.5 gm/dL Final     Lab Results   Component Value Date    WBC 3.54 03/09/2021    HGB 13.5 03/09/2021    HCT 39.4 03/09/2021    MCV 95.9 03/09/2021     03/09/2021     Lab Results   Component Value Date    NEUTROABS 2.06 03/09/2021     No results found for: , LABCA2, AFPTM, HCGQUANT, , CHROMGRNA, [id number], CEA, REFLABREPO      PATHOLOGY:  * Cannot find OR log *         RADIOLOGY DATA :  No radiology results for the last 7 days        Assessment/Plan     1.  Recurrent follicular lymphoma:  -Please review oncology history for prior treatment details  -Patient was started on cycle 1 of Revlimid with rituximab on September 22, 2020  -We will go ahead with cycle 7 of Revlimid and rituximab today  -We will ask patient to return to clinic in 4 weeks with repeat CBC, CMP to be done on that day  -Recommend continue with aspirin 81 mg p.o. daily as a prophylaxis for DVT and pulmonary embolism  -Plan is to  do restaging CT of chest, abdomen and pelvis, maxillofacial with contrast after cycle 8 of Revlimid and rituximab which was discussed with patient.    2.  Elevated liver enzymes due to fatty liver  -We will monitor with CMP for now    3.  Diarrhea from Revlimid:  -Due to worsening diarrhea and Imodium not helping.  Will prescribe her Lomotil.  Prescription for Lomotil has been sent to pharmacy  -Patient was instructed to contact our office in 2 weeks if diarrhea is still not better.  In that case we will decrease the dose of Revlimid which was discussed with patient today    4.  Health maintenance: Patient does not smoke.  Had colonoscopy last 10 years    5. Advance Care Planning: For now patient remains full code and is able to make decisions.  Patient has health care surrogate mentioned on chart.                 PHQ-9 Total Score: 0   -Patient is not homicidal or suicidal.  No acute intervention required.    Chasity Leon reports a pain score of 0.  Given her pain assessment as noted, treatment options were discussed and the following options were decided upon as a follow-up plan to address the patient's pain: No acute intervention required.         Tino Sood MD  3/9/2021  08:20 CST        Part of this note may be an electronic transcription/translation of spoken language to printed text using the Dragon Dictation System.          CC:

## 2021-03-26 ENCOUNTER — APPOINTMENT (OUTPATIENT)
Dept: VACCINE CLINIC | Facility: HOSPITAL | Age: 61
End: 2021-03-26

## 2021-03-26 DIAGNOSIS — C82.08 GRADE 1 FOLLICULAR LYMPHOMA OF LYMPH NODES OF MULTIPLE REGIONS (HCC): ICD-10-CM

## 2021-03-26 RX ORDER — LENALIDOMIDE 20 MG/1
CAPSULE ORAL
Qty: 21 CAPSULE | Refills: 2 | Status: SHIPPED | OUTPATIENT
Start: 2021-03-26 | End: 2021-05-04

## 2021-04-06 ENCOUNTER — OFFICE VISIT (OUTPATIENT)
Dept: ONCOLOGY | Facility: CLINIC | Age: 61
End: 2021-04-06

## 2021-04-06 ENCOUNTER — INFUSION (OUTPATIENT)
Dept: ONCOLOGY | Facility: HOSPITAL | Age: 61
End: 2021-04-06

## 2021-04-06 VITALS
WEIGHT: 217.7 LBS | SYSTOLIC BLOOD PRESSURE: 161 MMHG | BODY MASS INDEX: 37.17 KG/M2 | DIASTOLIC BLOOD PRESSURE: 73 MMHG | HEART RATE: 81 BPM | HEIGHT: 64 IN | RESPIRATION RATE: 18 BRPM | TEMPERATURE: 97.7 F

## 2021-04-06 DIAGNOSIS — C82.08 GRADE 1 FOLLICULAR LYMPHOMA OF LYMPH NODES OF MULTIPLE REGIONS (HCC): Primary | ICD-10-CM

## 2021-04-06 DIAGNOSIS — R19.7 DIARRHEA, UNSPECIFIED TYPE: ICD-10-CM

## 2021-04-06 DIAGNOSIS — C82.08 GRADE 1 FOLLICULAR LYMPHOMA OF LYMPH NODES OF MULTIPLE REGIONS (HCC): ICD-10-CM

## 2021-04-06 DIAGNOSIS — K11.8 MASS OF BOTH PAROTID GLANDS: ICD-10-CM

## 2021-04-06 DIAGNOSIS — R79.89 ELEVATED LFTS: ICD-10-CM

## 2021-04-06 DIAGNOSIS — Z45.2 ENCOUNTER FOR VENOUS ACCESS DEVICE CARE: ICD-10-CM

## 2021-04-06 LAB
ALBUMIN SERPL-MCNC: 4.1 G/DL (ref 3.5–5.2)
ALBUMIN/GLOB SERPL: 1.7 G/DL
ALP SERPL-CCNC: 86 U/L (ref 39–117)
ALT SERPL W P-5'-P-CCNC: 48 U/L (ref 1–33)
ANION GAP SERPL CALCULATED.3IONS-SCNC: 12 MMOL/L (ref 5–15)
AST SERPL-CCNC: 32 U/L (ref 1–32)
BASOPHILS # BLD AUTO: 0.13 10*3/MM3 (ref 0–0.2)
BASOPHILS NFR BLD AUTO: 3.7 % (ref 0–1.5)
BILIRUB SERPL-MCNC: 0.3 MG/DL (ref 0–1.2)
BUN SERPL-MCNC: 6 MG/DL (ref 8–23)
BUN/CREAT SERPL: 7.6 (ref 7–25)
CALCIUM SPEC-SCNC: 9.5 MG/DL (ref 8.6–10.5)
CHLORIDE SERPL-SCNC: 98 MMOL/L (ref 98–107)
CO2 SERPL-SCNC: 24 MMOL/L (ref 22–29)
CREAT SERPL-MCNC: 0.79 MG/DL (ref 0.57–1)
DEPRECATED RDW RBC AUTO: 50.7 FL (ref 37–54)
EOSINOPHIL # BLD AUTO: 0 10*3/MM3 (ref 0–0.4)
EOSINOPHIL NFR BLD AUTO: 0 % (ref 0.3–6.2)
ERYTHROCYTE [DISTWIDTH] IN BLOOD BY AUTOMATED COUNT: 14.7 % (ref 12.3–15.4)
GFR SERPL CREATININE-BSD FRML MDRD: 74 ML/MIN/1.73
GLOBULIN UR ELPH-MCNC: 2.4 GM/DL
GLUCOSE SERPL-MCNC: 171 MG/DL (ref 65–99)
HCT VFR BLD AUTO: 40 % (ref 34–46.6)
HGB BLD-MCNC: 13.6 G/DL (ref 12–15.9)
HOLD SPECIMEN: NORMAL
IMM GRANULOCYTES # BLD AUTO: 0.03 10*3/MM3 (ref 0–0.05)
IMM GRANULOCYTES NFR BLD AUTO: 0.9 % (ref 0–0.5)
LYMPHOCYTES # BLD AUTO: 0.72 10*3/MM3 (ref 0.7–3.1)
LYMPHOCYTES NFR BLD AUTO: 20.5 % (ref 19.6–45.3)
MCH RBC QN AUTO: 32.2 PG (ref 26.6–33)
MCHC RBC AUTO-ENTMCNC: 34 G/DL (ref 31.5–35.7)
MCV RBC AUTO: 94.6 FL (ref 79–97)
MONOCYTES # BLD AUTO: 0.6 10*3/MM3 (ref 0.1–0.9)
MONOCYTES NFR BLD AUTO: 17.1 % (ref 5–12)
NEUTROPHILS NFR BLD AUTO: 2.03 10*3/MM3 (ref 1.7–7)
NEUTROPHILS NFR BLD AUTO: 57.8 % (ref 42.7–76)
NRBC BLD AUTO-RTO: 0 /100 WBC (ref 0–0.2)
PLATELET # BLD AUTO: 346 10*3/MM3 (ref 140–450)
PMV BLD AUTO: 8.8 FL (ref 6–12)
POTASSIUM SERPL-SCNC: 3.9 MMOL/L (ref 3.5–5.2)
PROT SERPL-MCNC: 6.5 G/DL (ref 6–8.5)
RBC # BLD AUTO: 4.23 10*6/MM3 (ref 3.77–5.28)
SODIUM SERPL-SCNC: 134 MMOL/L (ref 136–145)
WBC # BLD AUTO: 3.51 10*3/MM3 (ref 3.4–10.8)

## 2021-04-06 PROCEDURE — 25010000003 HEPARIN LOCK FLUSH PER 10 UNITS: Performed by: INTERNAL MEDICINE

## 2021-04-06 PROCEDURE — 96413 CHEMO IV INFUSION 1 HR: CPT | Performed by: INTERNAL MEDICINE

## 2021-04-06 PROCEDURE — 1123F ACP DISCUSS/DSCN MKR DOCD: CPT | Performed by: INTERNAL MEDICINE

## 2021-04-06 PROCEDURE — 99214 OFFICE O/P EST MOD 30 MIN: CPT | Performed by: INTERNAL MEDICINE

## 2021-04-06 PROCEDURE — 85025 COMPLETE CBC W/AUTO DIFF WBC: CPT

## 2021-04-06 PROCEDURE — 96415 CHEMO IV INFUSION ADDL HR: CPT | Performed by: INTERNAL MEDICINE

## 2021-04-06 PROCEDURE — 25010000002 RITUXIMAB 100 MG/10ML SOLUTION 10 ML VIAL: Performed by: INTERNAL MEDICINE

## 2021-04-06 PROCEDURE — 25010000002 RITUXIMAB 500 MG/50ML SOLUTION 50 ML VIAL: Performed by: INTERNAL MEDICINE

## 2021-04-06 PROCEDURE — 80053 COMPREHEN METABOLIC PANEL: CPT

## 2021-04-06 PROCEDURE — G9903 PT SCRN TBCO ID AS NON USER: HCPCS | Performed by: INTERNAL MEDICINE

## 2021-04-06 PROCEDURE — 1126F AMNT PAIN NOTED NONE PRSNT: CPT | Performed by: INTERNAL MEDICINE

## 2021-04-06 PROCEDURE — 96375 TX/PRO/DX INJ NEW DRUG ADDON: CPT | Performed by: INTERNAL MEDICINE

## 2021-04-06 PROCEDURE — 25010000002 DIPHENHYDRAMINE PER 50 MG: Performed by: INTERNAL MEDICINE

## 2021-04-06 PROCEDURE — 36415 COLL VENOUS BLD VENIPUNCTURE: CPT | Performed by: INTERNAL MEDICINE

## 2021-04-06 RX ORDER — ACETAMINOPHEN 325 MG/1
650 TABLET ORAL ONCE
Status: CANCELLED | OUTPATIENT
Start: 2021-04-06

## 2021-04-06 RX ORDER — SODIUM CHLORIDE 0.9 % (FLUSH) 0.9 %
10 SYRINGE (ML) INJECTION AS NEEDED
Status: CANCELLED | OUTPATIENT
Start: 2021-05-03

## 2021-04-06 RX ORDER — SODIUM CHLORIDE 0.9 % (FLUSH) 0.9 %
20 SYRINGE (ML) INJECTION AS NEEDED
Status: CANCELLED | OUTPATIENT
Start: 2021-04-06

## 2021-04-06 RX ORDER — SODIUM CHLORIDE 0.9 % (FLUSH) 0.9 %
10 SYRINGE (ML) INJECTION AS NEEDED
Status: DISCONTINUED | OUTPATIENT
Start: 2021-04-06 | End: 2021-04-06 | Stop reason: HOSPADM

## 2021-04-06 RX ORDER — ACETAMINOPHEN 325 MG/1
650 TABLET ORAL ONCE
Status: COMPLETED | OUTPATIENT
Start: 2021-04-06 | End: 2021-04-06

## 2021-04-06 RX ORDER — LENALIDOMIDE 20 MG/1
20 CAPSULE ORAL DAILY
Qty: 21 CAPSULE | Refills: 0 | Status: SHIPPED | OUTPATIENT
Start: 2021-04-06 | End: 2021-05-04

## 2021-04-06 RX ORDER — HEPARIN SODIUM (PORCINE) LOCK FLUSH IV SOLN 100 UNIT/ML 100 UNIT/ML
500 SOLUTION INTRAVENOUS AS NEEDED
Status: CANCELLED | OUTPATIENT
Start: 2021-05-03

## 2021-04-06 RX ORDER — SODIUM CHLORIDE 9 MG/ML
250 INJECTION, SOLUTION INTRAVENOUS ONCE
Status: CANCELLED | OUTPATIENT
Start: 2021-04-06

## 2021-04-06 RX ORDER — SODIUM CHLORIDE 9 MG/ML
250 INJECTION, SOLUTION INTRAVENOUS ONCE
Status: COMPLETED | OUTPATIENT
Start: 2021-04-06 | End: 2021-04-06

## 2021-04-06 RX ORDER — HEPARIN SODIUM (PORCINE) LOCK FLUSH IV SOLN 100 UNIT/ML 100 UNIT/ML
500 SOLUTION INTRAVENOUS AS NEEDED
Status: DISCONTINUED | OUTPATIENT
Start: 2021-04-06 | End: 2021-04-06 | Stop reason: HOSPADM

## 2021-04-06 RX ADMIN — ACETAMINOPHEN 650 MG: 325 TABLET, FILM COATED ORAL at 08:31

## 2021-04-06 RX ADMIN — SODIUM CHLORIDE, PRESERVATIVE FREE 10 ML: 5 INJECTION INTRAVENOUS at 12:21

## 2021-04-06 RX ADMIN — DIPHENHYDRAMINE HYDROCHLORIDE 25 MG: 50 INJECTION, SOLUTION INTRAMUSCULAR; INTRAVENOUS at 08:34

## 2021-04-06 RX ADMIN — RITUXIMAB 770 MG: 10 INJECTION, SOLUTION INTRAVENOUS at 09:04

## 2021-04-06 RX ADMIN — SODIUM CHLORIDE 250 ML: 9 INJECTION, SOLUTION INTRAVENOUS at 08:31

## 2021-04-06 RX ADMIN — HEPARIN 500 UNITS: 100 SYRINGE at 12:21

## 2021-04-06 NOTE — PROGRESS NOTES
DATE OF VISIT: 4/6/2021      REASON FOR VISIT: Recurrent follicular lymphoma with involvement of bilateral parotid gland, fatty liver, elevated liver function test,       HISTORY OF PRESENT ILLNESS:   61-year-old female with medical problem consisting of obesity, hypertension, anxiety, dyslipidemia, fatty liver with elevated liver function test, recurrent follicular lymphoma with most recent recurrence diagnosed on August 27, 2020 based on biopsy of parotid gland on left side.  Patient is currently on rituximab with Revlimid since September 22, 2020.  Patient is here to start cycle 8 of rituximab with Revlimid today.  Denies any new lymph node enlargement.  Denies any drenching night sweats.  Denies any bleeding.  Complains of intermittent nausea and vomiting with chemotherapy.  Complains of diarrhea.  Denies any new abdominal pain.        Oncology history:    1.  Recurrent follicular lymphoma grade 1,  -Patient initially diagnosed in April 2015 for which patient underwent palliative radiation treatment.  -Second relapse happened in September 2016 for again patient had a radiation treatment.  - Patient was found to have enlarged aortocaval lymph node in April 2017 for which patient initially received rituximab every 2 months without any significant improvement.  -Subsequently patient received 4 cycles of bendamustine and rituximab between April 30, 2018 and August 21, 2018.  -Patient was diagnosed with involvement of left parotid gland on biopsy done on August 27, 2020 by Dr. Ibrahim.  PET/CT done shows uptake in bilateral parotid gland along with left sided neck lymph node involvement.  -Patient was started on Revlimid with rituximab on September 22, 2020.            Past Medical History, Past Surgical History, Social History, Family History have been reviewed and are without significant changes except as mentioned.    Review of Systems   Constitutional: Positive for fatigue.   Gastrointestinal: Positive for diarrhea  "and nausea.   Musculoskeletal: Positive for arthralgias and back pain.   Hematological: Negative for adenopathy.      A comprehensive 14 point review of systems was performed and was negative except as mentioned.    Medications:  The current medication list was reviewed in the EMR    ALLERGIES:    Allergies   Allergen Reactions   • Ace Inhibitors Cough   • Latex      BLISTER     • Morphine And Related Nausea And Vomiting   • Lortab [Hydrocodone-Acetaminophen] Palpitations       Objective      Vitals:    04/06/21 0753   BP: 161/73   Pulse: 81   Resp: 18   Temp: 97.7 °F (36.5 °C)   TempSrc: Temporal   Weight: 98.7 kg (217 lb 11.2 oz)   Height: 162.6 cm (64.02\")   PainSc: 0-No pain     Current Status 4/6/2021   ECOG score 0       Physical Exam  Constitutional:       Appearance: She is obese.   Cardiovascular:      Rate and Rhythm: Normal rate and regular rhythm.      Comments: Port-A-Cath present  Pulmonary:      Breath sounds: Normal breath sounds.   Neurological:      Mental Status: She is alert and oriented to person, place, and time.           RECENT LABS:  Glucose   Date Value Ref Range Status   04/06/2021 171 (H) 65 - 99 mg/dL Final     Sodium   Date Value Ref Range Status   04/06/2021 134 (L) 136 - 145 mmol/L Final     Potassium   Date Value Ref Range Status   04/06/2021 3.9 3.5 - 5.2 mmol/L Final     CO2   Date Value Ref Range Status   04/06/2021 24.0 22.0 - 29.0 mmol/L Final     Chloride   Date Value Ref Range Status   04/06/2021 98 98 - 107 mmol/L Final     Anion Gap   Date Value Ref Range Status   04/06/2021 12.0 5.0 - 15.0 mmol/L Final     Creatinine   Date Value Ref Range Status   04/06/2021 0.79 0.57 - 1.00 mg/dL Final     BUN   Date Value Ref Range Status   04/06/2021 6 (L) 8 - 23 mg/dL Final     BUN/Creatinine Ratio   Date Value Ref Range Status   04/06/2021 7.6 7.0 - 25.0 Final     Calcium   Date Value Ref Range Status   04/06/2021 9.5 8.6 - 10.5 mg/dL Final     eGFR Non  Amer   Date Value Ref " Range Status   04/06/2021 74 >60 mL/min/1.73 Final     Alkaline Phosphatase   Date Value Ref Range Status   04/06/2021 86 39 - 117 U/L Final     Total Protein   Date Value Ref Range Status   04/06/2021 6.5 6.0 - 8.5 g/dL Final     ALT (SGPT)   Date Value Ref Range Status   04/06/2021 48 (H) 1 - 33 U/L Final     AST (SGOT)   Date Value Ref Range Status   04/06/2021 32 1 - 32 U/L Final     Total Bilirubin   Date Value Ref Range Status   04/06/2021 0.3 0.0 - 1.2 mg/dL Final     Albumin   Date Value Ref Range Status   04/06/2021 4.10 3.50 - 5.20 g/dL Final     Globulin   Date Value Ref Range Status   04/06/2021 2.4 gm/dL Final     Lab Results   Component Value Date    WBC 3.51 04/06/2021    HGB 13.6 04/06/2021    HCT 40.0 04/06/2021    MCV 94.6 04/06/2021     04/06/2021     Lab Results   Component Value Date    NEUTROABS 2.03 04/06/2021     No results found for: , LABCA2, AFPTM, HCGQUANT, , CHROMGRNA, [id number], CEA, REFLABREPO      PATHOLOGY:  * Cannot find OR log *         RADIOLOGY DATA :  No radiology results for the last 7 days        Assessment/Plan     1.  Recurrent follicular lymphoma:  -Please review oncology history for prior treatment details  -Patient was started on cycle 1 of Revlimid with rituximab on September 22, 2020.  -We will go ahead with cycle 8 of Revlimid with rituximab today.  -We will continue close monitoring for chemotherapy related side effect  -Recommend continue taking aspirin 81 mg p.o. daily for prophylaxis for DVT and pulmonary embolism  -We will ask patient to return to clinic in 4 weeks with CBC and CMP on that day  -We will get restaging CT of maxillofacial, chest abdomen and pelvis with contrast prior to next clinic visit in 4 weeks for restaging purpose.  Recommendation were discussed with patient    2.  Elevated liver enzymes due to fatty liver  -We will monitor with CMP    3.  Diarrhea from Revlimid:  -Recommend continue with as needed Lomotil.  Patient states  her diarrhea is improved with use of Lomotil.    4.  Health maintenance: Patient does not smoke.  Had a colonoscopy in last 10 years    5. Advance Care Planning: For now patient remains full code and is able to make decisions.  Patient has health care surrogate mentioned on chart.             PHQ-9 Total Score:       Chasity Leon reports a pain score of 0.  Given her pain assessment as noted, treatment options were discussed and the following options were decided upon as a follow-up plan to address the patient's pain: No acute intervention required.         Tino Sood MD  4/6/2021  08:18 CDT        Part of this note may be an electronic transcription/translation of spoken language to printed text using the Dragon Dictation System.          CC:

## 2021-04-16 ENCOUNTER — APPOINTMENT (OUTPATIENT)
Dept: VACCINE CLINIC | Facility: HOSPITAL | Age: 61
End: 2021-04-16

## 2021-04-20 DIAGNOSIS — R19.7 DIARRHEA, UNSPECIFIED TYPE: Chronic | ICD-10-CM

## 2021-04-20 RX ORDER — DIPHENOXYLATE HYDROCHLORIDE AND ATROPINE SULFATE 2.5; .025 MG/1; MG/1
1 TABLET ORAL 4 TIMES DAILY PRN
Qty: 60 TABLET | Refills: 0 | Status: SHIPPED | OUTPATIENT
Start: 2021-04-20 | End: 2021-06-01

## 2021-05-03 ENCOUNTER — HOSPITAL ENCOUNTER (OUTPATIENT)
Dept: CT IMAGING | Facility: HOSPITAL | Age: 61
Discharge: HOME | End: 2021-05-03

## 2021-05-03 ENCOUNTER — INFUSION (OUTPATIENT)
Dept: ONCOLOGY | Facility: HOSPITAL | Age: 61
End: 2021-05-03

## 2021-05-03 DIAGNOSIS — Z45.2 ENCOUNTER FOR VENOUS ACCESS DEVICE CARE: ICD-10-CM

## 2021-05-03 DIAGNOSIS — C82.08 GRADE 1 FOLLICULAR LYMPHOMA OF LYMPH NODES OF MULTIPLE REGIONS (HCC): Primary | ICD-10-CM

## 2021-05-03 DIAGNOSIS — K11.8 MASS OF BOTH PAROTID GLANDS: ICD-10-CM

## 2021-05-03 DIAGNOSIS — C82.08 GRADE 1 FOLLICULAR LYMPHOMA OF LYMPH NODES OF MULTIPLE REGIONS (HCC): ICD-10-CM

## 2021-05-03 LAB
ALBUMIN SERPL-MCNC: 4 G/DL (ref 3.5–5.2)
ALBUMIN/GLOB SERPL: 1.7 G/DL
ALP SERPL-CCNC: 84 U/L (ref 39–117)
ALT SERPL W P-5'-P-CCNC: 42 U/L (ref 1–33)
ANION GAP SERPL CALCULATED.3IONS-SCNC: 9 MMOL/L (ref 5–15)
AST SERPL-CCNC: 33 U/L (ref 1–32)
BASOPHILS # BLD AUTO: 0.07 10*3/MM3 (ref 0–0.2)
BASOPHILS NFR BLD AUTO: 2.1 % (ref 0–1.5)
BILIRUB SERPL-MCNC: 0.3 MG/DL (ref 0–1.2)
BUN SERPL-MCNC: 9 MG/DL (ref 8–23)
BUN/CREAT SERPL: 11.5 (ref 7–25)
CALCIUM SPEC-SCNC: 9.3 MG/DL (ref 8.6–10.5)
CHLORIDE SERPL-SCNC: 100 MMOL/L (ref 98–107)
CO2 SERPL-SCNC: 26 MMOL/L (ref 22–29)
CREAT SERPL-MCNC: 0.78 MG/DL (ref 0.57–1)
DEPRECATED RDW RBC AUTO: 52.1 FL (ref 37–54)
EOSINOPHIL # BLD AUTO: 0 10*3/MM3 (ref 0–0.4)
EOSINOPHIL NFR BLD AUTO: 0 % (ref 0.3–6.2)
ERYTHROCYTE [DISTWIDTH] IN BLOOD BY AUTOMATED COUNT: 14.9 % (ref 12.3–15.4)
GFR SERPL CREATININE-BSD FRML MDRD: 75 ML/MIN/1.73
GLOBULIN UR ELPH-MCNC: 2.3 GM/DL
GLUCOSE SERPL-MCNC: 157 MG/DL (ref 65–99)
HCT VFR BLD AUTO: 38 % (ref 34–46.6)
HGB BLD-MCNC: 13 G/DL (ref 12–15.9)
HOLD SPECIMEN: NORMAL
IMM GRANULOCYTES # BLD AUTO: 0.03 10*3/MM3 (ref 0–0.05)
IMM GRANULOCYTES NFR BLD AUTO: 0.9 % (ref 0–0.5)
LYMPHOCYTES # BLD AUTO: 0.55 10*3/MM3 (ref 0.7–3.1)
LYMPHOCYTES NFR BLD AUTO: 16.8 % (ref 19.6–45.3)
MCH RBC QN AUTO: 32.6 PG (ref 26.6–33)
MCHC RBC AUTO-ENTMCNC: 34.2 G/DL (ref 31.5–35.7)
MCV RBC AUTO: 95.2 FL (ref 79–97)
MONOCYTES # BLD AUTO: 0.59 10*3/MM3 (ref 0.1–0.9)
MONOCYTES NFR BLD AUTO: 18 % (ref 5–12)
NEUTROPHILS NFR BLD AUTO: 2.03 10*3/MM3 (ref 1.7–7)
NEUTROPHILS NFR BLD AUTO: 62.2 % (ref 42.7–76)
NRBC BLD AUTO-RTO: 0 /100 WBC (ref 0–0.2)
PLATELET # BLD AUTO: 312 10*3/MM3 (ref 140–450)
PMV BLD AUTO: 8.9 FL (ref 6–12)
POTASSIUM SERPL-SCNC: 3.9 MMOL/L (ref 3.5–5.2)
PROT SERPL-MCNC: 6.3 G/DL (ref 6–8.5)
RBC # BLD AUTO: 3.99 10*6/MM3 (ref 3.77–5.28)
SODIUM SERPL-SCNC: 135 MMOL/L (ref 136–145)
WBC # BLD AUTO: 3.27 10*3/MM3 (ref 3.4–10.8)

## 2021-05-03 PROCEDURE — 74177 CT ABD & PELVIS W/CONTRAST: CPT

## 2021-05-03 PROCEDURE — 25010000002 IOPAMIDOL 61 % SOLUTION: Performed by: INTERNAL MEDICINE

## 2021-05-03 PROCEDURE — 25010000002 HEPARIN LOCK FLUSH PER 10 UNITS: Performed by: INTERNAL MEDICINE

## 2021-05-03 PROCEDURE — 36415 COLL VENOUS BLD VENIPUNCTURE: CPT

## 2021-05-03 PROCEDURE — 70487 CT MAXILLOFACIAL W/DYE: CPT

## 2021-05-03 PROCEDURE — 85025 COMPLETE CBC W/AUTO DIFF WBC: CPT

## 2021-05-03 PROCEDURE — 71260 CT THORAX DX C+: CPT

## 2021-05-03 PROCEDURE — 80053 COMPREHEN METABOLIC PANEL: CPT

## 2021-05-03 RX ORDER — HEPARIN SODIUM (PORCINE) LOCK FLUSH IV SOLN 100 UNIT/ML 100 UNIT/ML
500 SOLUTION INTRAVENOUS AS NEEDED
Status: DISCONTINUED | OUTPATIENT
Start: 2021-05-03 | End: 2021-05-03 | Stop reason: HOSPADM

## 2021-05-03 RX ORDER — SODIUM CHLORIDE 0.9 % (FLUSH) 0.9 %
10 SYRINGE (ML) INJECTION AS NEEDED
Status: DISCONTINUED | OUTPATIENT
Start: 2021-05-03 | End: 2021-05-03 | Stop reason: HOSPADM

## 2021-05-03 RX ORDER — SODIUM CHLORIDE 0.9 % (FLUSH) 0.9 %
10 SYRINGE (ML) INJECTION AS NEEDED
Status: CANCELLED | OUTPATIENT
Start: 2021-05-03

## 2021-05-03 RX ORDER — SODIUM CHLORIDE 0.9 % (FLUSH) 0.9 %
20 SYRINGE (ML) INJECTION AS NEEDED
Status: CANCELLED | OUTPATIENT
Start: 2021-05-03

## 2021-05-03 RX ORDER — HEPARIN SODIUM (PORCINE) LOCK FLUSH IV SOLN 100 UNIT/ML 100 UNIT/ML
500 SOLUTION INTRAVENOUS AS NEEDED
Status: CANCELLED | OUTPATIENT
Start: 2021-05-03

## 2021-05-03 RX ADMIN — SODIUM CHLORIDE, PRESERVATIVE FREE 10 ML: 5 INJECTION INTRAVENOUS at 09:26

## 2021-05-03 RX ADMIN — IOPAMIDOL 90 ML: 612 INJECTION, SOLUTION INTRAVENOUS at 09:23

## 2021-05-03 RX ADMIN — HEPARIN 500 UNITS: 100 SYRINGE at 09:26

## 2021-05-04 ENCOUNTER — OFFICE VISIT (OUTPATIENT)
Dept: ONCOLOGY | Facility: CLINIC | Age: 61
End: 2021-05-04

## 2021-05-04 VITALS
BODY MASS INDEX: 37.13 KG/M2 | RESPIRATION RATE: 20 BRPM | HEIGHT: 64 IN | WEIGHT: 217.5 LBS | SYSTOLIC BLOOD PRESSURE: 173 MMHG | HEART RATE: 80 BPM | DIASTOLIC BLOOD PRESSURE: 78 MMHG | TEMPERATURE: 98 F

## 2021-05-04 DIAGNOSIS — R19.7 DIARRHEA, UNSPECIFIED TYPE: Chronic | ICD-10-CM

## 2021-05-04 DIAGNOSIS — C82.08 GRADE 1 FOLLICULAR LYMPHOMA OF LYMPH NODES OF MULTIPLE REGIONS (HCC): Primary | Chronic | ICD-10-CM

## 2021-05-04 DIAGNOSIS — R79.89 ELEVATED LFTS: Chronic | ICD-10-CM

## 2021-05-04 PROCEDURE — 1123F ACP DISCUSS/DSCN MKR DOCD: CPT | Performed by: INTERNAL MEDICINE

## 2021-05-04 PROCEDURE — G9903 PT SCRN TBCO ID AS NON USER: HCPCS | Performed by: INTERNAL MEDICINE

## 2021-05-04 PROCEDURE — 1126F AMNT PAIN NOTED NONE PRSNT: CPT | Performed by: INTERNAL MEDICINE

## 2021-05-04 PROCEDURE — 99214 OFFICE O/P EST MOD 30 MIN: CPT | Performed by: INTERNAL MEDICINE

## 2021-05-04 PROCEDURE — G0463 HOSPITAL OUTPT CLINIC VISIT: HCPCS | Performed by: INTERNAL MEDICINE

## 2021-05-04 PROCEDURE — 87635 SARS-COV-2 COVID-19 AMP PRB: CPT | Performed by: NURSE PRACTITIONER

## 2021-05-04 RX ORDER — LENALIDOMIDE 15 MG/1
CAPSULE ORAL
Qty: 21 CAPSULE | Refills: 2 | Status: SHIPPED | OUTPATIENT
Start: 2021-05-04 | End: 2021-07-22

## 2021-05-04 NOTE — PROGRESS NOTES
DATE OF VISIT: 5/4/2021      REASON FOR VISIT: Recurrent follicular lymphoma with involvement of bilateral parotid gland, fatty liver, elevated liver function test      HISTORY OF PRESENT ILLNESS:   61-year-old female with medical problems significant for obesity, hypertension, anxiety, dyslipidemia, fatty liver with elevated liver function test, recurrent follicular lymphoma with most recent recurrence diagnosed on August 27, 2020 based on biopsy of parotid gland on the left side.  Patient is currently on rituximab and Revlimid since September 20, 2020.  Patient scheduled to get cycle 9 of rituximab and Revlimid today.  Denies any lymph node enlargement.  Denies any drenching night sweats.  Denies any bleeding.  Complains of intermittent nausea and diarrhea with chemotherapy.  Denies any abdominal pain.  Denies any new cough or fever.      Oncology history:    1.  Recurrent follicular lymphoma grade 1,  -Patient initially diagnosed in April 2015 for which patient underwent palliative radiation treatment.  -Second relapse happened in September 2016 for again patient had a radiation treatment.  - Patient was found to have enlarged aortocaval lymph node in April 2017 for which patient initially received rituximab every 2 months without any significant improvement.  -Subsequently patient received 4 cycles of bendamustine and rituximab between April 30, 2018 and August 21, 2018.  -Patient was diagnosed with involvement of left parotid gland on biopsy done on August 27, 2020 by Dr. Ibrahim.  PET/CT done shows uptake in bilateral parotid gland along with left sided neck lymph node involvement.  -Patient was started on Revlimid with rituximab on September 22, 2020.          Past Medical History, Past Surgical History, Social History, Family History have been reviewed and are without significant changes except as mentioned.    Review of Systems   Constitutional: Positive for fatigue.   Respiratory: Positive for shortness of  "breath (Positive for chronic shortness of breath with exertion). Negative for cough.    Gastrointestinal: Positive for diarrhea and nausea.   Musculoskeletal: Positive for arthralgias and back pain.   Hematological: Negative for adenopathy.      A comprehensive 14 point review of systems was performed and was negative except as mentioned.    Medications:  The current medication list was reviewed in the EMR    ALLERGIES:    Allergies   Allergen Reactions   • Ace Inhibitors Cough   • Latex      BLISTER     • Morphine And Related Nausea And Vomiting   • Lortab [Hydrocodone-Acetaminophen] Palpitations       Objective      Vitals:    05/04/21 1024   BP: 173/78   Pulse: 80   Resp: 20   Temp: 98 °F (36.7 °C)   TempSrc: Temporal   Weight: 98.7 kg (217 lb 8 oz)   Height: 162.6 cm (64.02\")   PainSc: 0-No pain     Current Status 5/4/2021   ECOG score 0       Physical Exam  Constitutional:       Appearance: She is obese.   Cardiovascular:      Rate and Rhythm: Normal rate and regular rhythm.   Pulmonary:      Breath sounds: Normal breath sounds.   Neurological:      Mental Status: She is alert and oriented to person, place, and time.           RECENT LABS:  Glucose   Date Value Ref Range Status   05/03/2021 157 (H) 65 - 99 mg/dL Final     Sodium   Date Value Ref Range Status   05/03/2021 135 (L) 136 - 145 mmol/L Final     Potassium   Date Value Ref Range Status   05/03/2021 3.9 3.5 - 5.2 mmol/L Final     CO2   Date Value Ref Range Status   05/03/2021 26.0 22.0 - 29.0 mmol/L Final     Chloride   Date Value Ref Range Status   05/03/2021 100 98 - 107 mmol/L Final     Anion Gap   Date Value Ref Range Status   05/03/2021 9.0 5.0 - 15.0 mmol/L Final     Creatinine   Date Value Ref Range Status   05/03/2021 0.78 0.57 - 1.00 mg/dL Final     BUN   Date Value Ref Range Status   05/03/2021 9 8 - 23 mg/dL Final     BUN/Creatinine Ratio   Date Value Ref Range Status   05/03/2021 11.5 7.0 - 25.0 Final     Calcium   Date Value Ref Range " Status   05/03/2021 9.3 8.6 - 10.5 mg/dL Final     eGFR Non  Amer   Date Value Ref Range Status   05/03/2021 75 >60 mL/min/1.73 Final     Alkaline Phosphatase   Date Value Ref Range Status   05/03/2021 84 39 - 117 U/L Final     Total Protein   Date Value Ref Range Status   05/03/2021 6.3 6.0 - 8.5 g/dL Final     ALT (SGPT)   Date Value Ref Range Status   05/03/2021 42 (H) 1 - 33 U/L Final     AST (SGOT)   Date Value Ref Range Status   05/03/2021 33 (H) 1 - 32 U/L Final     Total Bilirubin   Date Value Ref Range Status   05/03/2021 0.3 0.0 - 1.2 mg/dL Final     Albumin   Date Value Ref Range Status   05/03/2021 4.00 3.50 - 5.20 g/dL Final     Globulin   Date Value Ref Range Status   05/03/2021 2.3 gm/dL Final     Lab Results   Component Value Date    WBC 3.27 (L) 05/03/2021    HGB 13.0 05/03/2021    HCT 38.0 05/03/2021    MCV 95.2 05/03/2021     05/03/2021     Lab Results   Component Value Date    NEUTROABS 2.03 05/03/2021     No results found for: , LABCA2, AFPTM, HCGQUANT, , CHROMGRNA, [id number], CEA, REFLABREPO      PATHOLOGY:  * Cannot find OR log *         RADIOLOGY DATA :  CT Chest With Contrast Diagnostic    Result Date: 5/3/2021  Interval development of patchy groundglass opacities involving the left upper lobe and right lung base as above. Findings are nonspecific. The findings are not typical for viral pneumonia 19. Have a clinical correlation and exclusion. Testing is recommended if clinically indicated. 2. No radiographic evidence of distant metastasis in this patient with given history of recurrent follicular lymphoma. No enlarged adenopathy is identified within the chest abdomen and pelvis. Electronically signed by:  Araceli Owens MD  5/3/2021 10:02 AM CDT Workstation: [id number]    CT Abdomen Pelvis With Contrast    Result Date: 5/3/2021  Interval development of patchy groundglass opacities involving the left upper lobe and right lung base as above. Findings are nonspecific. The  findings are not typical for viral pneumonia 19. Have a clinical correlation and exclusion. Testing is recommended if clinically indicated. 2. No radiographic evidence of distant metastasis in this patient with given history of recurrent follicular lymphoma. No enlarged adenopathy is identified within the chest abdomen and pelvis. Electronically signed by:  Araceli Owens MD  5/3/2021 10:02 AM CDT Workstation: [id number]    CT Maxillofacial With Contrast    Result Date: 5/3/2021  1. Soft tissue thickening in the bilateral parotid gland is stable since the most recent, 2/5/2021 CT though remains rather significantly improved relative to the prior, 8/25/2020 CT. 2. No regions of new or enlarging soft tissue thickening/lymphadenopathy. Electronically signed by:  Vincenzo Rogers MD  5/3/2021 9:54 AM CDT Workstation: [id number]          Assessment/Plan     1.  Recurrent follicular lymphoma:  -Review oncology history for prior treatment details  -Patient was started on cycle 1 of Revlimid with rituximab on September 22, 2020.  -CT of maxillofacial bone, chest abdomen and pelvis with contrast done on May 3, 2021 does not show any evidence of progression.  Results of CT scan were discussed with patient.  -We will proceed with cycle 9 of Revlimid with rituximab today  -We will continue with close monitoring for chemotherapy related side effect  -Recommend continue with aspirin 81 mg p.o. daily for prophylaxis for DVT and pulmonary embolism  -We will ask patient to return to clinic in 4 weeks with repeat CBC and CMP on that day  -Plan is to do restaging CT of maxillofacial, chest, abdomen and pelvis with contrast after cycle 11 which was discussed with patient.    2.  Groundglass opacity involving bilateral lung, nonspecific finding which could be early pneumonia versus inflammation.  -Patient has chronic shortness of breath with exertion.  Denies any new cough or hemoptysis.  Does have some runny nose.  -Due to her recent travel  and having runny nose, recommend testing for COVID-19.  Patient was instructed to start taking her chemo pill after negative Covid testing.    3.  Elevated liver function test:  -Due to fatty liver.  Will monitor with CMP    4.  Leukopenia:  -Secondary to Revlimid  -CBC shows white blood cell count is 3.27 with absolute neutrophil count of 2030.  Will monitor with CBC    5.  Diarrhea from Revlimid:  -Recommend continue using as needed Lomotil  -Since patient is having 4-5 episode of diarrhea despite of taking 4 Lomotil in a day.  Will decrease dose of Revlimid to 15 mg from next month which was discussed with patient.    6.  Health maintenance: Patient does not smoke.  Had a colonoscopy last 10 years    7. Advance Care Planning: For now patient remains full code and is able to make decisions.  Patient has health care surrogate mentioned on chart.                 PHQ-9 Total Score: 0   -Patient is not homicidal or suicidal.  No acute intervention required.    Chasity Leon reports a pain score of 0.  Given her pain assessment as noted, treatment options were discussed and the following options were decided upon as a follow-up plan to address the patient's pain: No acute intervention required.         Tino Sood MD  5/4/2021  10:44 CDT        Part of this note may be an electronic transcription/translation of spoken language to printed text using the Dragon Dictation System.          CC:

## 2021-05-15 VITALS
HEIGHT: 64 IN | BODY MASS INDEX: 35.56 KG/M2 | SYSTOLIC BLOOD PRESSURE: 131 MMHG | DIASTOLIC BLOOD PRESSURE: 65 MMHG | WEIGHT: 208.31 LBS

## 2021-06-01 ENCOUNTER — OFFICE VISIT (OUTPATIENT)
Dept: ONCOLOGY | Facility: CLINIC | Age: 61
End: 2021-06-01

## 2021-06-01 ENCOUNTER — INFUSION (OUTPATIENT)
Dept: ONCOLOGY | Facility: HOSPITAL | Age: 61
End: 2021-06-01

## 2021-06-01 VITALS
RESPIRATION RATE: 20 BRPM | SYSTOLIC BLOOD PRESSURE: 144 MMHG | HEART RATE: 79 BPM | BODY MASS INDEX: 37.36 KG/M2 | WEIGHT: 218.8 LBS | TEMPERATURE: 97.7 F | HEIGHT: 64 IN | DIASTOLIC BLOOD PRESSURE: 89 MMHG

## 2021-06-01 DIAGNOSIS — C82.08 GRADE 1 FOLLICULAR LYMPHOMA OF LYMPH NODES OF MULTIPLE REGIONS (HCC): ICD-10-CM

## 2021-06-01 DIAGNOSIS — C82.08 GRADE 1 FOLLICULAR LYMPHOMA OF LYMPH NODES OF MULTIPLE REGIONS (HCC): Primary | ICD-10-CM

## 2021-06-01 DIAGNOSIS — R79.89 ELEVATED LFTS: Chronic | ICD-10-CM

## 2021-06-01 DIAGNOSIS — K11.8 MASS OF BOTH PAROTID GLANDS: Chronic | ICD-10-CM

## 2021-06-01 DIAGNOSIS — R19.7 DIARRHEA, UNSPECIFIED TYPE: ICD-10-CM

## 2021-06-01 DIAGNOSIS — Z45.2 ENCOUNTER FOR VENOUS ACCESS DEVICE CARE: ICD-10-CM

## 2021-06-01 DIAGNOSIS — R79.89 ELEVATED LFTS: ICD-10-CM

## 2021-06-01 DIAGNOSIS — R19.7 DIARRHEA, UNSPECIFIED TYPE: Chronic | ICD-10-CM

## 2021-06-01 LAB
ALBUMIN SERPL-MCNC: 4.1 G/DL (ref 3.5–5.2)
ALBUMIN/GLOB SERPL: 1.7 G/DL
ALP SERPL-CCNC: 82 U/L (ref 39–117)
ALT SERPL W P-5'-P-CCNC: 52 U/L (ref 1–33)
ANION GAP SERPL CALCULATED.3IONS-SCNC: 9 MMOL/L (ref 5–15)
AST SERPL-CCNC: 32 U/L (ref 1–32)
BASOPHILS # BLD AUTO: 0.06 10*3/MM3 (ref 0–0.2)
BASOPHILS NFR BLD AUTO: 1.9 % (ref 0–1.5)
BILIRUB SERPL-MCNC: 0.3 MG/DL (ref 0–1.2)
BUN SERPL-MCNC: 8 MG/DL (ref 8–23)
BUN/CREAT SERPL: 10.8 (ref 7–25)
CALCIUM SPEC-SCNC: 10 MG/DL (ref 8.6–10.5)
CHLORIDE SERPL-SCNC: 99 MMOL/L (ref 98–107)
CO2 SERPL-SCNC: 30 MMOL/L (ref 22–29)
CREAT SERPL-MCNC: 0.74 MG/DL (ref 0.57–1)
DEPRECATED RDW RBC AUTO: 52.3 FL (ref 37–54)
EOSINOPHIL # BLD AUTO: 0 10*3/MM3 (ref 0–0.4)
EOSINOPHIL NFR BLD AUTO: 0 % (ref 0.3–6.2)
ERYTHROCYTE [DISTWIDTH] IN BLOOD BY AUTOMATED COUNT: 15.5 % (ref 12.3–15.4)
GFR SERPL CREATININE-BSD FRML MDRD: 80 ML/MIN/1.73
GLOBULIN UR ELPH-MCNC: 2.4 GM/DL
GLUCOSE SERPL-MCNC: 177 MG/DL (ref 65–99)
HCT VFR BLD AUTO: 36.7 % (ref 34–46.6)
HGB BLD-MCNC: 12.9 G/DL (ref 12–15.9)
HOLD SPECIMEN: NORMAL
IMM GRANULOCYTES # BLD AUTO: 0.03 10*3/MM3 (ref 0–0.05)
IMM GRANULOCYTES NFR BLD AUTO: 0.9 % (ref 0–0.5)
LYMPHOCYTES # BLD AUTO: 0.66 10*3/MM3 (ref 0.7–3.1)
LYMPHOCYTES NFR BLD AUTO: 20.4 % (ref 19.6–45.3)
MCH RBC QN AUTO: 33.1 PG (ref 26.6–33)
MCHC RBC AUTO-ENTMCNC: 35.1 G/DL (ref 31.5–35.7)
MCV RBC AUTO: 94.1 FL (ref 79–97)
MONOCYTES # BLD AUTO: 0.42 10*3/MM3 (ref 0.1–0.9)
MONOCYTES NFR BLD AUTO: 13 % (ref 5–12)
NEUTROPHILS NFR BLD AUTO: 2.07 10*3/MM3 (ref 1.7–7)
NEUTROPHILS NFR BLD AUTO: 63.8 % (ref 42.7–76)
NRBC BLD AUTO-RTO: 0 /100 WBC (ref 0–0.2)
PLATELET # BLD AUTO: 278 10*3/MM3 (ref 140–450)
PMV BLD AUTO: 8.8 FL (ref 6–12)
POTASSIUM SERPL-SCNC: 4.2 MMOL/L (ref 3.5–5.2)
PROT SERPL-MCNC: 6.5 G/DL (ref 6–8.5)
RBC # BLD AUTO: 3.9 10*6/MM3 (ref 3.77–5.28)
SODIUM SERPL-SCNC: 138 MMOL/L (ref 136–145)
WBC # BLD AUTO: 3.24 10*3/MM3 (ref 3.4–10.8)

## 2021-06-01 PROCEDURE — 96375 TX/PRO/DX INJ NEW DRUG ADDON: CPT | Performed by: INTERNAL MEDICINE

## 2021-06-01 PROCEDURE — 96413 CHEMO IV INFUSION 1 HR: CPT | Performed by: INTERNAL MEDICINE

## 2021-06-01 PROCEDURE — 25010000002 DIPHENHYDRAMINE PER 50 MG: Performed by: INTERNAL MEDICINE

## 2021-06-01 PROCEDURE — 36415 COLL VENOUS BLD VENIPUNCTURE: CPT

## 2021-06-01 PROCEDURE — 25010000002 RITUXIMAB 10 MG/ML SOLUTION 10 ML VIAL: Performed by: INTERNAL MEDICINE

## 2021-06-01 PROCEDURE — 25010000002 HEPARIN LOCK FLUSH PER 10 UNITS: Performed by: INTERNAL MEDICINE

## 2021-06-01 PROCEDURE — 85025 COMPLETE CBC W/AUTO DIFF WBC: CPT

## 2021-06-01 PROCEDURE — 1123F ACP DISCUSS/DSCN MKR DOCD: CPT | Performed by: INTERNAL MEDICINE

## 2021-06-01 PROCEDURE — 96415 CHEMO IV INFUSION ADDL HR: CPT | Performed by: INTERNAL MEDICINE

## 2021-06-01 PROCEDURE — 25010000002 RITUXIMAB 10 MG/ML SOLUTION 50 ML VIAL: Performed by: INTERNAL MEDICINE

## 2021-06-01 PROCEDURE — 99214 OFFICE O/P EST MOD 30 MIN: CPT | Performed by: INTERNAL MEDICINE

## 2021-06-01 PROCEDURE — G9903 PT SCRN TBCO ID AS NON USER: HCPCS | Performed by: INTERNAL MEDICINE

## 2021-06-01 PROCEDURE — 80053 COMPREHEN METABOLIC PANEL: CPT

## 2021-06-01 PROCEDURE — 1126F AMNT PAIN NOTED NONE PRSNT: CPT | Performed by: INTERNAL MEDICINE

## 2021-06-01 RX ORDER — SODIUM CHLORIDE 9 MG/ML
250 INJECTION, SOLUTION INTRAVENOUS ONCE
Status: CANCELLED | OUTPATIENT
Start: 2021-06-01

## 2021-06-01 RX ORDER — HEPARIN SODIUM (PORCINE) LOCK FLUSH IV SOLN 100 UNIT/ML 100 UNIT/ML
500 SOLUTION INTRAVENOUS AS NEEDED
Status: CANCELLED | OUTPATIENT
Start: 2021-06-29

## 2021-06-01 RX ORDER — SODIUM CHLORIDE 0.9 % (FLUSH) 0.9 %
20 SYRINGE (ML) INJECTION AS NEEDED
Status: DISCONTINUED | OUTPATIENT
Start: 2021-06-01 | End: 2021-06-01 | Stop reason: HOSPADM

## 2021-06-01 RX ORDER — SODIUM CHLORIDE 0.9 % (FLUSH) 0.9 %
20 SYRINGE (ML) INJECTION AS NEEDED
Status: CANCELLED | OUTPATIENT
Start: 2021-06-29

## 2021-06-01 RX ORDER — ACETAMINOPHEN 325 MG/1
650 TABLET ORAL ONCE
Status: CANCELLED | OUTPATIENT
Start: 2021-06-01

## 2021-06-01 RX ORDER — SODIUM CHLORIDE 0.9 % (FLUSH) 0.9 %
10 SYRINGE (ML) INJECTION AS NEEDED
Status: DISCONTINUED | OUTPATIENT
Start: 2021-06-01 | End: 2021-06-01 | Stop reason: HOSPADM

## 2021-06-01 RX ORDER — SODIUM CHLORIDE 9 MG/ML
250 INJECTION, SOLUTION INTRAVENOUS ONCE
Status: COMPLETED | OUTPATIENT
Start: 2021-06-01 | End: 2021-06-01

## 2021-06-01 RX ORDER — DIPHENOXYLATE HYDROCHLORIDE AND ATROPINE SULFATE 2.5; .025 MG/1; MG/1
2 TABLET ORAL 4 TIMES DAILY PRN
Qty: 60 TABLET | Refills: 2 | Status: SHIPPED | OUTPATIENT
Start: 2021-06-01

## 2021-06-01 RX ORDER — HEPARIN SODIUM (PORCINE) LOCK FLUSH IV SOLN 100 UNIT/ML 100 UNIT/ML
500 SOLUTION INTRAVENOUS AS NEEDED
Status: DISCONTINUED | OUTPATIENT
Start: 2021-06-01 | End: 2021-06-01 | Stop reason: HOSPADM

## 2021-06-01 RX ORDER — SODIUM CHLORIDE 0.9 % (FLUSH) 0.9 %
10 SYRINGE (ML) INJECTION AS NEEDED
Status: CANCELLED | OUTPATIENT
Start: 2021-06-29

## 2021-06-01 RX ORDER — ACETAMINOPHEN 325 MG/1
650 TABLET ORAL ONCE
Status: COMPLETED | OUTPATIENT
Start: 2021-06-01 | End: 2021-06-01

## 2021-06-01 RX ADMIN — ACETAMINOPHEN 650 MG: 325 TABLET, FILM COATED ORAL at 09:01

## 2021-06-01 RX ADMIN — RITUXIMAB 770 MG: 10 INJECTION, SOLUTION INTRAVENOUS at 09:54

## 2021-06-01 RX ADMIN — SODIUM CHLORIDE, PRESERVATIVE FREE 10 ML: 5 INJECTION INTRAVENOUS at 12:57

## 2021-06-01 RX ADMIN — SODIUM CHLORIDE 250 ML: 9 INJECTION, SOLUTION INTRAVENOUS at 09:01

## 2021-06-01 RX ADMIN — HEPARIN 500 UNITS: 100 SYRINGE at 12:56

## 2021-06-01 RX ADMIN — DIPHENHYDRAMINE HYDROCHLORIDE 25 MG: 50 INJECTION, SOLUTION INTRAMUSCULAR; INTRAVENOUS at 09:07

## 2021-06-29 ENCOUNTER — INFUSION (OUTPATIENT)
Dept: ONCOLOGY | Facility: HOSPITAL | Age: 61
End: 2021-06-29

## 2021-06-29 ENCOUNTER — OFFICE VISIT (OUTPATIENT)
Dept: ONCOLOGY | Facility: CLINIC | Age: 61
End: 2021-06-29

## 2021-06-29 VITALS
BODY MASS INDEX: 36.69 KG/M2 | WEIGHT: 214.9 LBS | TEMPERATURE: 97.8 F | HEIGHT: 64 IN | DIASTOLIC BLOOD PRESSURE: 74 MMHG | HEART RATE: 81 BPM | RESPIRATION RATE: 20 BRPM | SYSTOLIC BLOOD PRESSURE: 165 MMHG

## 2021-06-29 DIAGNOSIS — K11.8 MASS OF BOTH PAROTID GLANDS: Chronic | ICD-10-CM

## 2021-06-29 DIAGNOSIS — C82.08 GRADE 1 FOLLICULAR LYMPHOMA OF LYMPH NODES OF MULTIPLE REGIONS (HCC): ICD-10-CM

## 2021-06-29 DIAGNOSIS — R79.89 ELEVATED LFTS: Chronic | ICD-10-CM

## 2021-06-29 DIAGNOSIS — C82.08 GRADE 1 FOLLICULAR LYMPHOMA OF LYMPH NODES OF MULTIPLE REGIONS (HCC): Primary | Chronic | ICD-10-CM

## 2021-06-29 DIAGNOSIS — R19.7 DIARRHEA, UNSPECIFIED TYPE: Chronic | ICD-10-CM

## 2021-06-29 DIAGNOSIS — Z45.2 ENCOUNTER FOR VENOUS ACCESS DEVICE CARE: Primary | ICD-10-CM

## 2021-06-29 LAB
ALBUMIN SERPL-MCNC: 4.1 G/DL (ref 3.5–5.2)
ALBUMIN/GLOB SERPL: 1.6 G/DL
ALP SERPL-CCNC: 85 U/L (ref 39–117)
ALT SERPL W P-5'-P-CCNC: 62 U/L (ref 1–33)
ANION GAP SERPL CALCULATED.3IONS-SCNC: 14 MMOL/L (ref 5–15)
AST SERPL-CCNC: 45 U/L (ref 1–32)
BASOPHILS # BLD AUTO: 0.08 10*3/MM3 (ref 0–0.2)
BASOPHILS NFR BLD AUTO: 2.1 % (ref 0–1.5)
BILIRUB SERPL-MCNC: 0.4 MG/DL (ref 0–1.2)
BUN SERPL-MCNC: 7 MG/DL (ref 8–23)
BUN/CREAT SERPL: 10.3 (ref 7–25)
CALCIUM SPEC-SCNC: 9.6 MG/DL (ref 8.6–10.5)
CHLORIDE SERPL-SCNC: 94 MMOL/L (ref 98–107)
CO2 SERPL-SCNC: 25 MMOL/L (ref 22–29)
CREAT SERPL-MCNC: 0.68 MG/DL (ref 0.57–1)
DEPRECATED RDW RBC AUTO: 52.5 FL (ref 37–54)
EOSINOPHIL # BLD AUTO: 0 10*3/MM3 (ref 0–0.4)
EOSINOPHIL NFR BLD AUTO: 0 % (ref 0.3–6.2)
ERYTHROCYTE [DISTWIDTH] IN BLOOD BY AUTOMATED COUNT: 15.1 % (ref 12.3–15.4)
GFR SERPL CREATININE-BSD FRML MDRD: 88 ML/MIN/1.73
GLOBULIN UR ELPH-MCNC: 2.6 GM/DL
GLUCOSE SERPL-MCNC: 197 MG/DL (ref 65–99)
HCT VFR BLD AUTO: 38.4 % (ref 34–46.6)
HGB BLD-MCNC: 13.2 G/DL (ref 12–15.9)
HOLD SPECIMEN: NORMAL
IMM GRANULOCYTES # BLD AUTO: 0.06 10*3/MM3 (ref 0–0.05)
IMM GRANULOCYTES NFR BLD AUTO: 1.5 % (ref 0–0.5)
LYMPHOCYTES # BLD AUTO: 0.81 10*3/MM3 (ref 0.7–3.1)
LYMPHOCYTES NFR BLD AUTO: 20.9 % (ref 19.6–45.3)
MCH RBC QN AUTO: 32.4 PG (ref 26.6–33)
MCHC RBC AUTO-ENTMCNC: 34.4 G/DL (ref 31.5–35.7)
MCV RBC AUTO: 94.1 FL (ref 79–97)
MONOCYTES # BLD AUTO: 0.65 10*3/MM3 (ref 0.1–0.9)
MONOCYTES NFR BLD AUTO: 16.8 % (ref 5–12)
NEUTROPHILS NFR BLD AUTO: 2.28 10*3/MM3 (ref 1.7–7)
NEUTROPHILS NFR BLD AUTO: 58.7 % (ref 42.7–76)
NRBC BLD AUTO-RTO: 0 /100 WBC (ref 0–0.2)
PLATELET # BLD AUTO: 349 10*3/MM3 (ref 140–450)
PMV BLD AUTO: 8.5 FL (ref 6–12)
POTASSIUM SERPL-SCNC: 3.8 MMOL/L (ref 3.5–5.2)
PROT SERPL-MCNC: 6.7 G/DL (ref 6–8.5)
RBC # BLD AUTO: 4.08 10*6/MM3 (ref 3.77–5.28)
SODIUM SERPL-SCNC: 133 MMOL/L (ref 136–145)
WBC # BLD AUTO: 3.88 10*3/MM3 (ref 3.4–10.8)

## 2021-06-29 PROCEDURE — 85025 COMPLETE CBC W/AUTO DIFF WBC: CPT

## 2021-06-29 PROCEDURE — 99214 OFFICE O/P EST MOD 30 MIN: CPT | Performed by: INTERNAL MEDICINE

## 2021-06-29 PROCEDURE — 36591 DRAW BLOOD OFF VENOUS DEVICE: CPT | Performed by: INTERNAL MEDICINE

## 2021-06-29 PROCEDURE — 1126F AMNT PAIN NOTED NONE PRSNT: CPT | Performed by: INTERNAL MEDICINE

## 2021-06-29 PROCEDURE — 25010000002 HEPARIN LOCK FLUSH PER 10 UNITS: Performed by: INTERNAL MEDICINE

## 2021-06-29 PROCEDURE — 80053 COMPREHEN METABOLIC PANEL: CPT

## 2021-06-29 PROCEDURE — 36415 COLL VENOUS BLD VENIPUNCTURE: CPT

## 2021-06-29 PROCEDURE — 1123F ACP DISCUSS/DSCN MKR DOCD: CPT | Performed by: INTERNAL MEDICINE

## 2021-06-29 PROCEDURE — G9903 PT SCRN TBCO ID AS NON USER: HCPCS | Performed by: INTERNAL MEDICINE

## 2021-06-29 RX ORDER — HEPARIN SODIUM (PORCINE) LOCK FLUSH IV SOLN 100 UNIT/ML 100 UNIT/ML
500 SOLUTION INTRAVENOUS AS NEEDED
Status: CANCELLED | OUTPATIENT
Start: 2021-07-20

## 2021-06-29 RX ORDER — SODIUM CHLORIDE 0.9 % (FLUSH) 0.9 %
10 SYRINGE (ML) INJECTION AS NEEDED
Status: CANCELLED | OUTPATIENT
Start: 2021-07-20

## 2021-06-29 RX ORDER — SODIUM CHLORIDE 0.9 % (FLUSH) 0.9 %
20 SYRINGE (ML) INJECTION AS NEEDED
Status: CANCELLED | OUTPATIENT
Start: 2021-07-20

## 2021-06-29 RX ORDER — HEPARIN SODIUM (PORCINE) LOCK FLUSH IV SOLN 100 UNIT/ML 100 UNIT/ML
500 SOLUTION INTRAVENOUS AS NEEDED
Status: DISCONTINUED | OUTPATIENT
Start: 2021-06-29 | End: 2021-06-29 | Stop reason: HOSPADM

## 2021-06-29 RX ADMIN — HEPARIN 500 UNITS: 100 SYRINGE at 07:33

## 2021-06-29 NOTE — PROGRESS NOTES
DATE OF VISIT: 6/29/2021      REASON FOR VISIT: Recurrent follicular lymphoma with involvement of bilateral parotid gland, fatty liver, elevated liver function test      HISTORY OF PRESENT ILLNESS:   61-year-old female with medical problems significant for obesity, hypertension, anxiety, dyslipidemia, fatty liver with elevated liver function test, recurrent follicular lymphoma with most recent recurrence diagnosed in August 2020 with involvement of parotid gland.  Patient is currently on treatment with rituximab and Revlimid since 9/20/2020.  Patient is here to get cycle 11 of rituximab and Revlimid today.  States her diarrhea is resolved since decreasing dose of Revlimid.  Complains of constipation today.  Complains of intermittent nausea with vomiting.  Denies any new lymph node enlargement.  Denies any drenching night sweats.  Denies any fever.        Oncology history:    1.  Recurrent follicular lymphoma grade 1,  -Patient initially diagnosed in April 2015 for which patient underwent palliative radiation treatment.  -Second relapse happened in September 2016 for again patient had a radiation treatment.  - Patient was found to have enlarged aortocaval lymph node in April 2017 for which patient initially received rituximab every 2 months without any significant improvement.  -Subsequently patient received 4 cycles of bendamustine and rituximab between April 30, 2018 and August 21, 2018.  -Patient was diagnosed with involvement of left parotid gland on biopsy done on August 27, 2020 by Dr. Ibrahim.  PET/CT done shows uptake in bilateral parotid gland along with left sided neck lymph node involvement.  -Patient was started on Revlimid with rituximab on September 22, 2020.              Past Medical History, Past Surgical History, Social History, Family History have been reviewed and are without significant changes except as mentioned.    Review of Systems   Constitutional: Positive for fatigue.   Respiratory: Positive  "for shortness of breath.    Gastrointestinal: Positive for constipation and nausea. Negative for diarrhea.   Musculoskeletal: Positive for arthralgias and back pain.   Hematological: Negative for adenopathy.      A comprehensive 14 point review of systems was performed and was negative except as mentioned.    Medications:  The current medication list was reviewed in the EMR    ALLERGIES:    Allergies   Allergen Reactions   • Ace Inhibitors Cough   • Latex      BLISTER     • Morphine And Related Nausea And Vomiting   • Lortab [Hydrocodone-Acetaminophen] Palpitations       Objective      Vitals:    06/29/21 0742   BP: 165/74   Pulse: 81   Resp: 20   Temp: 97.8 °F (36.6 °C)   TempSrc: Temporal   Weight: 97.5 kg (214 lb 14.4 oz)   Height: 162.6 cm (64.02\")   PainSc: 0-No pain     Current Status 6/29/2021   ECOG score 0       Physical Exam  Neck:      Comments: Fullness present in bilateral parotid gland area without any discrete adenopathy.  Cardiovascular:      Rate and Rhythm: Normal rate and regular rhythm.      Comments: Port-A-Cath present  Pulmonary:      Breath sounds: Normal breath sounds.   Neurological:      Mental Status: She is alert and oriented to person, place, and time.           RECENT LABS:  Glucose   Date Value Ref Range Status   06/29/2021 197 (H) 65 - 99 mg/dL Final     Sodium   Date Value Ref Range Status   06/29/2021 133 (L) 136 - 145 mmol/L Final     Potassium   Date Value Ref Range Status   06/29/2021 3.8 3.5 - 5.2 mmol/L Final     CO2   Date Value Ref Range Status   06/29/2021 25.0 22.0 - 29.0 mmol/L Final     Chloride   Date Value Ref Range Status   06/29/2021 94 (L) 98 - 107 mmol/L Final     Anion Gap   Date Value Ref Range Status   06/29/2021 14.0 5.0 - 15.0 mmol/L Final     Creatinine   Date Value Ref Range Status   06/29/2021 0.68 0.57 - 1.00 mg/dL Final     BUN   Date Value Ref Range Status   06/29/2021 7 (L) 8 - 23 mg/dL Final     BUN/Creatinine Ratio   Date Value Ref Range Status "   06/29/2021 10.3 7.0 - 25.0 Final     Calcium   Date Value Ref Range Status   06/29/2021 9.6 8.6 - 10.5 mg/dL Final     eGFR Non  Amer   Date Value Ref Range Status   06/29/2021 88 >60 mL/min/1.73 Final     Alkaline Phosphatase   Date Value Ref Range Status   06/29/2021 85 39 - 117 U/L Final     Total Protein   Date Value Ref Range Status   06/29/2021 6.7 6.0 - 8.5 g/dL Final     ALT (SGPT)   Date Value Ref Range Status   06/29/2021 62 (H) 1 - 33 U/L Final     AST (SGOT)   Date Value Ref Range Status   06/29/2021 45 (H) 1 - 32 U/L Final     Total Bilirubin   Date Value Ref Range Status   06/29/2021 0.4 0.0 - 1.2 mg/dL Final     Albumin   Date Value Ref Range Status   06/29/2021 4.10 3.50 - 5.20 g/dL Final     Globulin   Date Value Ref Range Status   06/29/2021 2.6 gm/dL Final     Lab Results   Component Value Date    WBC 3.88 06/29/2021    HGB 13.2 06/29/2021    HCT 38.4 06/29/2021    MCV 94.1 06/29/2021     06/29/2021     Lab Results   Component Value Date    NEUTROABS 2.28 06/29/2021     No results found for: , LABCA2, AFPTM, HCGQUANT, , CHROMGRNA, [id number], CEA, REFLABREPO      PATHOLOGY:  * Cannot find OR log *         RADIOLOGY DATA :  No radiology results for the last 7 days        Assessment/Plan     1.  Recurrent follicular lymphoma:  -Review oncology history for prior treatment details  -Patient was started on cycle 1 of Revlimid with rituximab on 9/22/2020  -We will proceed with cycle 11 of Revlimid with rituximab today  -Due to diarrhea, dose of Revlimid has been decreased to 15 mg p.o. from cycle 10  -Recommend continue with aspirin 81 mg p.o. daily for DVT and pulmonary embolism prophylaxis  -We will ask patient to return to clinic in 4 weeks with repeat CBC and CMP on that day  -We will get restaging CT of maxillofacial, chest, abdomen and pelvis with contrast prior to next clinic visit in 4 weeks.  Recommendation were discussed with patient.    2.  Elevated liver function  test:  -Due to fatty liver.  Will monitor with CBC    3.  Diarrhea from Revlimid  -Currently patient is on Lomotil 2 tablets every 6 hours as needed.  -Dose of Revlimid has been decreased to 15 mg with cycle 10.  -States her diarrhea is significantly improved since decreasing dose of Revlimid.    4.  Health maintenance: Patient does not smoke.  Had a colonoscopy last 10 years    5. Advance Care Planning: For now patient remains full code and is able to make decisions.  Patient has health care surrogate mentioned on chart.                 PHQ-9 Total Score: 0   -Patient is not homicidal or suicidal.  No acute intervention required.    Chasity Leon reports a pain score of 0.  Given her pain assessment as noted, treatment options were discussed and the following options were decided upon as a follow-up plan to address the patient's pain: continuation of current treatment plan for pain.         Tino Sood MD  6/29/2021  09:57 CDT        Part of this note may be an electronic transcription/translation of spoken language to printed text using the Dragon Dictation System.          CC:

## 2021-07-20 ENCOUNTER — HOSPITAL ENCOUNTER (OUTPATIENT)
Dept: CT IMAGING | Facility: HOSPITAL | Age: 61
Discharge: HOME | End: 2021-07-20
Admitting: INTERNAL MEDICINE

## 2021-07-20 ENCOUNTER — INFUSION (OUTPATIENT)
Dept: ONCOLOGY | Facility: HOSPITAL | Age: 61
End: 2021-07-20

## 2021-07-20 DIAGNOSIS — K11.8 MASS OF BOTH PAROTID GLANDS: ICD-10-CM

## 2021-07-20 DIAGNOSIS — R19.7 DIARRHEA, UNSPECIFIED TYPE: ICD-10-CM

## 2021-07-20 DIAGNOSIS — C82.08 GRADE 1 FOLLICULAR LYMPHOMA OF LYMPH NODES OF MULTIPLE REGIONS (HCC): Primary | ICD-10-CM

## 2021-07-20 DIAGNOSIS — C82.08 GRADE 1 FOLLICULAR LYMPHOMA OF LYMPH NODES OF MULTIPLE REGIONS (HCC): Chronic | ICD-10-CM

## 2021-07-20 DIAGNOSIS — R79.89 ELEVATED LFTS: ICD-10-CM

## 2021-07-20 DIAGNOSIS — K11.8 MASS OF BOTH PAROTID GLANDS: Chronic | ICD-10-CM

## 2021-07-20 DIAGNOSIS — Z45.2 ENCOUNTER FOR VENOUS ACCESS DEVICE CARE: ICD-10-CM

## 2021-07-20 LAB
ALBUMIN SERPL-MCNC: 4.2 G/DL (ref 3.5–5.2)
ALBUMIN/GLOB SERPL: 1.6 G/DL
ALP SERPL-CCNC: 73 U/L (ref 39–117)
ALT SERPL W P-5'-P-CCNC: 62 U/L (ref 1–33)
ANION GAP SERPL CALCULATED.3IONS-SCNC: 13 MMOL/L (ref 5–15)
AST SERPL-CCNC: 44 U/L (ref 1–32)
BASOPHILS # BLD AUTO: 0.07 10*3/MM3 (ref 0–0.2)
BASOPHILS NFR BLD AUTO: 2 % (ref 0–1.5)
BILIRUB SERPL-MCNC: 0.5 MG/DL (ref 0–1.2)
BUN SERPL-MCNC: 6 MG/DL (ref 8–23)
BUN/CREAT SERPL: 7.9 (ref 7–25)
CALCIUM SPEC-SCNC: 9.6 MG/DL (ref 8.6–10.5)
CHLORIDE SERPL-SCNC: 96 MMOL/L (ref 98–107)
CO2 SERPL-SCNC: 26 MMOL/L (ref 22–29)
CREAT SERPL-MCNC: 0.76 MG/DL (ref 0.57–1)
DEPRECATED RDW RBC AUTO: 52 FL (ref 37–54)
EOSINOPHIL # BLD AUTO: 0 10*3/MM3 (ref 0–0.4)
EOSINOPHIL NFR BLD AUTO: 0 % (ref 0.3–6.2)
ERYTHROCYTE [DISTWIDTH] IN BLOOD BY AUTOMATED COUNT: 15 % (ref 12.3–15.4)
GFR SERPL CREATININE-BSD FRML MDRD: 77 ML/MIN/1.73
GLOBULIN UR ELPH-MCNC: 2.6 GM/DL
GLUCOSE SERPL-MCNC: 172 MG/DL (ref 65–99)
HCT VFR BLD AUTO: 39.1 % (ref 34–46.6)
HGB BLD-MCNC: 13.3 G/DL (ref 12–15.9)
HOLD SPECIMEN: NORMAL
IMM GRANULOCYTES # BLD AUTO: 0.07 10*3/MM3 (ref 0–0.05)
IMM GRANULOCYTES NFR BLD AUTO: 2 % (ref 0–0.5)
LYMPHOCYTES # BLD AUTO: 0.58 10*3/MM3 (ref 0.7–3.1)
LYMPHOCYTES NFR BLD AUTO: 16.2 % (ref 19.6–45.3)
MCH RBC QN AUTO: 32 PG (ref 26.6–33)
MCHC RBC AUTO-ENTMCNC: 34 G/DL (ref 31.5–35.7)
MCV RBC AUTO: 94 FL (ref 79–97)
MONOCYTES # BLD AUTO: 0.7 10*3/MM3 (ref 0.1–0.9)
MONOCYTES NFR BLD AUTO: 19.6 % (ref 5–12)
NEUTROPHILS NFR BLD AUTO: 2.15 10*3/MM3 (ref 1.7–7)
NEUTROPHILS NFR BLD AUTO: 60.2 % (ref 42.7–76)
NRBC BLD AUTO-RTO: 0 /100 WBC (ref 0–0.2)
PLATELET # BLD AUTO: 294 10*3/MM3 (ref 140–450)
PMV BLD AUTO: 9 FL (ref 6–12)
POTASSIUM SERPL-SCNC: 3.6 MMOL/L (ref 3.5–5.2)
PROT SERPL-MCNC: 6.8 G/DL (ref 6–8.5)
RBC # BLD AUTO: 4.16 10*6/MM3 (ref 3.77–5.28)
SODIUM SERPL-SCNC: 135 MMOL/L (ref 136–145)
WBC # BLD AUTO: 3.57 10*3/MM3 (ref 3.4–10.8)

## 2021-07-20 PROCEDURE — 70487 CT MAXILLOFACIAL W/DYE: CPT

## 2021-07-20 PROCEDURE — 36591 DRAW BLOOD OFF VENOUS DEVICE: CPT | Performed by: NURSE PRACTITIONER

## 2021-07-20 PROCEDURE — 74177 CT ABD & PELVIS W/CONTRAST: CPT

## 2021-07-20 PROCEDURE — 71260 CT THORAX DX C+: CPT

## 2021-07-20 PROCEDURE — 80053 COMPREHEN METABOLIC PANEL: CPT

## 2021-07-20 PROCEDURE — 36415 COLL VENOUS BLD VENIPUNCTURE: CPT

## 2021-07-20 PROCEDURE — 25010000002 IOPAMIDOL 61 % SOLUTION: Performed by: INTERNAL MEDICINE

## 2021-07-20 PROCEDURE — 25010000002 HEPARIN LOCK FLUSH PER 10 UNITS: Performed by: INTERNAL MEDICINE

## 2021-07-20 PROCEDURE — 85025 COMPLETE CBC W/AUTO DIFF WBC: CPT

## 2021-07-20 RX ORDER — HEPARIN SODIUM (PORCINE) LOCK FLUSH IV SOLN 100 UNIT/ML 100 UNIT/ML
500 SOLUTION INTRAVENOUS AS NEEDED
Status: DISCONTINUED | OUTPATIENT
Start: 2021-07-20 | End: 2021-07-20 | Stop reason: HOSPADM

## 2021-07-20 RX ORDER — HEPARIN SODIUM (PORCINE) LOCK FLUSH IV SOLN 100 UNIT/ML 100 UNIT/ML
500 SOLUTION INTRAVENOUS AS NEEDED
Status: CANCELLED | OUTPATIENT
Start: 2021-07-20

## 2021-07-20 RX ADMIN — HEPARIN 500 UNITS: 100 SYRINGE at 09:16

## 2021-07-20 RX ADMIN — IOPAMIDOL 90 ML: 612 INJECTION, SOLUTION INTRAVENOUS at 08:35

## 2021-07-22 RX ORDER — LENALIDOMIDE 15 MG/1
CAPSULE ORAL
Qty: 21 CAPSULE | Refills: 0 | Status: SHIPPED | OUTPATIENT
Start: 2021-07-22 | End: 2021-11-23

## 2021-07-27 ENCOUNTER — APPOINTMENT (OUTPATIENT)
Dept: ONCOLOGY | Facility: HOSPITAL | Age: 61
End: 2021-07-27

## 2021-07-27 ENCOUNTER — OFFICE VISIT (OUTPATIENT)
Dept: ONCOLOGY | Facility: CLINIC | Age: 61
End: 2021-07-27

## 2021-07-27 VITALS
HEART RATE: 86 BPM | RESPIRATION RATE: 20 BRPM | TEMPERATURE: 97.9 F | SYSTOLIC BLOOD PRESSURE: 174 MMHG | WEIGHT: 212.8 LBS | BODY MASS INDEX: 36.51 KG/M2 | DIASTOLIC BLOOD PRESSURE: 79 MMHG

## 2021-07-27 DIAGNOSIS — C82.08 GRADE 1 FOLLICULAR LYMPHOMA OF LYMPH NODES OF MULTIPLE REGIONS (HCC): Primary | Chronic | ICD-10-CM

## 2021-07-27 PROCEDURE — G0463 HOSPITAL OUTPT CLINIC VISIT: HCPCS | Performed by: NURSE PRACTITIONER

## 2021-07-27 PROCEDURE — 99213 OFFICE O/P EST LOW 20 MIN: CPT | Performed by: NURSE PRACTITIONER

## 2021-07-28 NOTE — PROGRESS NOTES
DATE OF VISIT: 7/27/2021      REASON FOR VISIT:  Recurrent follicular lymphoma with involvement of bilateral parotid gland, fatty liver, elevated liver function test        HISTORY OF PRESENT ILLNESS:   61-year-old female with medical problems significant for obesity, hypertension, anxiety, dyslipidemia, fatty liver with elevated liver function test, recurrent follicular lymphoma with most recent recurrence diagnosed in August 2020 with involvement of parotid gland.  Patient is currently on treatment with rituximab and Revlimid since 9/20/2020.  Here today to discuss recent CT scan results. She is due to start new cycle of Revlimid tomorrow 7/27/2021. Denies any new lymph node enlargement.  Denies any drenching night sweats.  Denies any fever.           Oncology history:     1.  Recurrent follicular lymphoma grade 1,  -Patient initially diagnosed in April 2015 for which patient underwent palliative radiation treatment.  -Second relapse happened in September 2016 for again patient had a radiation treatment.  - Patient was found to have enlarged aortocaval lymph node in April 2017 for which patient initially received rituximab every 2 months without any significant improvement.  -Subsequently patient received 4 cycles of bendamustine and rituximab between April 30, 2018 and August 21, 2018.  -Patient was diagnosed with involvement of left parotid gland on biopsy done on August 27, 2020 by Dr. Ibrahim.  PET/CT done shows uptake in bilateral parotid gland along with left sided neck lymph node involvement.  -Patient was started on Revlimid with rituximab on September 22, 2020.      Past Medical History, Past Surgical History, Social History, Family History have been reviewed and are without significant changes except as mentioned.    Review of Systems   A comprehensive 14 point review of systems was performed and was negative except as mentioned.    Medications:  The current medication list was reviewed in the  EMR    ALLERGIES:    Allergies   Allergen Reactions   • Ace Inhibitors Cough   • Latex      BLISTER     • Morphine And Related Nausea And Vomiting   • Lortab [Hydrocodone-Acetaminophen] Palpitations       Objective      Vitals:    07/27/21 0903   BP: 174/79   Pulse: 86   Resp: 20   Temp: 97.9 °F (36.6 °C)   TempSrc: Temporal   Weight: 96.5 kg (212 lb 12.8 oz)   PainSc: 0-No pain     Current Status 7/27/2021   ECOG score 0       Physical Exam    Neck:      Comments: no discrete adenopathy.  Cardiovascular:      Rate and Rhythm: Normal rate and regular rhythm.      Comments: Port-A-Cath present  Pulmonary:      Breath sounds: Normal breath sounds.   Neurological:      Mental Status: She is alert and oriented to person, place, and time.   RECENT LABS:  Glucose   Date Value Ref Range Status   07/20/2021 172 (H) 65 - 99 mg/dL Final     Sodium   Date Value Ref Range Status   07/20/2021 135 (L) 136 - 145 mmol/L Final     Potassium   Date Value Ref Range Status   07/20/2021 3.6 3.5 - 5.2 mmol/L Final     CO2   Date Value Ref Range Status   07/20/2021 26.0 22.0 - 29.0 mmol/L Final     Chloride   Date Value Ref Range Status   07/20/2021 96 (L) 98 - 107 mmol/L Final     Anion Gap   Date Value Ref Range Status   07/20/2021 13.0 5.0 - 15.0 mmol/L Final     Creatinine   Date Value Ref Range Status   07/20/2021 0.76 0.57 - 1.00 mg/dL Final     BUN   Date Value Ref Range Status   07/20/2021 6 (L) 8 - 23 mg/dL Final     BUN/Creatinine Ratio   Date Value Ref Range Status   07/20/2021 7.9 7.0 - 25.0 Final     Calcium   Date Value Ref Range Status   07/20/2021 9.6 8.6 - 10.5 mg/dL Final     eGFR Non  Amer   Date Value Ref Range Status   07/20/2021 77 >60 mL/min/1.73 Final     Alkaline Phosphatase   Date Value Ref Range Status   07/20/2021 73 39 - 117 U/L Final     Total Protein   Date Value Ref Range Status   07/20/2021 6.8 6.0 - 8.5 g/dL Final     ALT (SGPT)   Date Value Ref Range Status   07/20/2021 62 (H) 1 - 33 U/L Final      AST (SGOT)   Date Value Ref Range Status   07/20/2021 44 (H) 1 - 32 U/L Final     Total Bilirubin   Date Value Ref Range Status   07/20/2021 0.5 0.0 - 1.2 mg/dL Final     Albumin   Date Value Ref Range Status   07/20/2021 4.20 3.50 - 5.20 g/dL Final     Globulin   Date Value Ref Range Status   07/20/2021 2.6 gm/dL Final     Lab Results   Component Value Date    WBC 3.57 07/20/2021    HGB 13.3 07/20/2021    HCT 39.1 07/20/2021    MCV 94.0 07/20/2021     07/20/2021     Lab Results   Component Value Date    NEUTROABS 2.15 07/20/2021     No results found for: , LABCA2, AFPTM, HCGQUANT, , CHROMGRNA, [id number], CEA, REFLABREPO            RADIOLOGY DATA : CT neck, chest abdomen and pelvis with contrast 7/20/2021; results reviewed by Dr. Sood and myself and discussed with patient today:    No radiology results for the last 7 days  IMPRESSION:  Small residual soft tissue mass anterior inferior  aspect of the parotid gland, smaller in comparison to prior  examination May 3, 2021. CT chest abdomen and pelvis is otherwise  unremarkable.      Assessment/Plan     1.  Recurrent follicular lymphoma:  -Review oncology history for prior treatment details  -Patient was started on cycle 1 of Revlimid with rituximab on 9/22/2020  -She is due to start new cycle of Revlimid tomorrow 7/27 and her next Rituxan infusion will be next week.  -CT scan results show decrease in size of parotid tumor and no other areas of concern for metastatic disease.     2.  Elevated liver function test:  -Due to fatty liver.  Will monitor with CMP     3.  Diarrhea from Revlimid  -Currently patient is on Lomotil 2 tablets every 6 hours as needed.  -Dose of Revlimid has been decreased to 15 mg with cycle 10.  -States her diarrhea is significantly improved since decreasing dose of Revlimid.     4.  Health maintenance: Patient does not smoke.  Had a colonoscopy last 10 years     5. Advance Care Planning: For now patient remains full code and  is able to make decisions.  Patient has health care surrogate mentioned on chart.               PHQ-9 Total Score: 0     Chasity Leon reports a pain score of 0.  Given her pain assessment as noted, treatment options were discussed and the following options were decided upon as a follow-up plan to address the patient's pain: no pain reported today.         Jessica Segal, APRN  7/28/2021  13:21 CDT        Part of this note may be an electronic transcription/translation of spoken language to printed text using the Dragon Dictation System.          CC:

## 2021-08-04 DIAGNOSIS — K11.8 MASS OF BOTH PAROTID GLANDS: ICD-10-CM

## 2021-08-04 DIAGNOSIS — C82.08 GRADE 1 FOLLICULAR LYMPHOMA OF LYMPH NODES OF MULTIPLE REGIONS (HCC): Primary | ICD-10-CM

## 2021-08-05 ENCOUNTER — APPOINTMENT (OUTPATIENT)
Dept: ONCOLOGY | Facility: HOSPITAL | Age: 61
End: 2021-08-05

## 2021-08-05 ENCOUNTER — TELEPHONE (OUTPATIENT)
Dept: RADIATION ONCOLOGY | Facility: HOSPITAL | Age: 61
End: 2021-08-05

## 2021-08-05 ENCOUNTER — APPOINTMENT (OUTPATIENT)
Dept: ONCOLOGY | Facility: CLINIC | Age: 61
End: 2021-08-05

## 2021-08-05 NOTE — TELEPHONE ENCOUNTER
Called and spoke with pt regarding sending a message to par staff for appt to be moved to the end of the month.  V/u obtained.

## 2021-08-20 ENCOUNTER — APPOINTMENT (OUTPATIENT)
Dept: CT IMAGING | Facility: HOSPITAL | Age: 61
End: 2021-08-20

## 2021-08-24 ENCOUNTER — APPOINTMENT (OUTPATIENT)
Dept: CT IMAGING | Facility: HOSPITAL | Age: 61
End: 2021-08-24

## 2021-08-27 DIAGNOSIS — C82.08 GRADE 1 FOLLICULAR LYMPHOMA OF LYMPH NODES OF MULTIPLE REGIONS (HCC): Primary | ICD-10-CM

## 2021-08-30 ENCOUNTER — APPOINTMENT (OUTPATIENT)
Dept: ONCOLOGY | Facility: HOSPITAL | Age: 61
End: 2021-08-30

## 2021-08-30 ENCOUNTER — INFUSION (OUTPATIENT)
Dept: ONCOLOGY | Facility: HOSPITAL | Age: 61
End: 2021-08-30

## 2021-08-30 ENCOUNTER — OFFICE VISIT (OUTPATIENT)
Dept: ONCOLOGY | Facility: CLINIC | Age: 61
End: 2021-08-30

## 2021-08-30 VITALS
RESPIRATION RATE: 18 BRPM | WEIGHT: 212 LBS | BODY MASS INDEX: 36.19 KG/M2 | HEIGHT: 64 IN | TEMPERATURE: 97.5 F | SYSTOLIC BLOOD PRESSURE: 145 MMHG | HEART RATE: 88 BPM | DIASTOLIC BLOOD PRESSURE: 67 MMHG

## 2021-08-30 DIAGNOSIS — C82.08 GRADE 1 FOLLICULAR LYMPHOMA OF LYMPH NODES OF MULTIPLE REGIONS (HCC): Chronic | ICD-10-CM

## 2021-08-30 DIAGNOSIS — R79.89 ELEVATED LFTS: Primary | ICD-10-CM

## 2021-08-30 DIAGNOSIS — K11.8 MASS OF BOTH PAROTID GLANDS: ICD-10-CM

## 2021-08-30 DIAGNOSIS — C82.08 GRADE 1 FOLLICULAR LYMPHOMA OF LYMPH NODES OF MULTIPLE REGIONS (HCC): Primary | ICD-10-CM

## 2021-08-30 DIAGNOSIS — Z45.2 ENCOUNTER FOR VENOUS ACCESS DEVICE CARE: ICD-10-CM

## 2021-08-30 LAB
ALBUMIN SERPL-MCNC: 4 G/DL (ref 3.5–5.2)
ALBUMIN/GLOB SERPL: 1.7 G/DL
ALP SERPL-CCNC: 89 U/L (ref 39–117)
ALT SERPL W P-5'-P-CCNC: 50 U/L (ref 1–33)
ANION GAP SERPL CALCULATED.3IONS-SCNC: 16 MMOL/L (ref 5–15)
AST SERPL-CCNC: 35 U/L (ref 1–32)
BASOPHILS # BLD AUTO: 0.13 10*3/MM3 (ref 0–0.2)
BASOPHILS NFR BLD AUTO: 2.9 % (ref 0–1.5)
BILIRUB SERPL-MCNC: 0.2 MG/DL (ref 0–1.2)
BUN SERPL-MCNC: 9 MG/DL (ref 8–23)
BUN/CREAT SERPL: 12 (ref 7–25)
CALCIUM SPEC-SCNC: 9.3 MG/DL (ref 8.6–10.5)
CHLORIDE SERPL-SCNC: 98 MMOL/L (ref 98–107)
CO2 SERPL-SCNC: 25 MMOL/L (ref 22–29)
CREAT SERPL-MCNC: 0.75 MG/DL (ref 0.57–1)
DEPRECATED RDW RBC AUTO: 52.1 FL (ref 37–54)
EOSINOPHIL # BLD AUTO: 0 10*3/MM3 (ref 0–0.4)
EOSINOPHIL NFR BLD AUTO: 0 % (ref 0.3–6.2)
ERYTHROCYTE [DISTWIDTH] IN BLOOD BY AUTOMATED COUNT: 14.9 % (ref 12.3–15.4)
GFR SERPL CREATININE-BSD FRML MDRD: 79 ML/MIN/1.73
GLOBULIN UR ELPH-MCNC: 2.4 GM/DL
GLUCOSE SERPL-MCNC: 155 MG/DL (ref 65–99)
HCT VFR BLD AUTO: 39.3 % (ref 34–46.6)
HGB BLD-MCNC: 13.4 G/DL (ref 12–15.9)
IMM GRANULOCYTES # BLD AUTO: 0.06 10*3/MM3 (ref 0–0.05)
IMM GRANULOCYTES NFR BLD AUTO: 1.3 % (ref 0–0.5)
LYMPHOCYTES # BLD AUTO: 0.67 10*3/MM3 (ref 0.7–3.1)
LYMPHOCYTES NFR BLD AUTO: 14.8 % (ref 19.6–45.3)
MCH RBC QN AUTO: 32.3 PG (ref 26.6–33)
MCHC RBC AUTO-ENTMCNC: 34.1 G/DL (ref 31.5–35.7)
MCV RBC AUTO: 94.7 FL (ref 79–97)
MONOCYTES # BLD AUTO: 0.63 10*3/MM3 (ref 0.1–0.9)
MONOCYTES NFR BLD AUTO: 13.9 % (ref 5–12)
NEUTROPHILS NFR BLD AUTO: 3.05 10*3/MM3 (ref 1.7–7)
NEUTROPHILS NFR BLD AUTO: 67.1 % (ref 42.7–76)
NRBC BLD AUTO-RTO: 0 /100 WBC (ref 0–0.2)
PLATELET # BLD AUTO: 307 10*3/MM3 (ref 140–450)
PMV BLD AUTO: 8.4 FL (ref 6–12)
POTASSIUM SERPL-SCNC: 3.8 MMOL/L (ref 3.5–5.2)
PROT SERPL-MCNC: 6.4 G/DL (ref 6–8.5)
RBC # BLD AUTO: 4.15 10*6/MM3 (ref 3.77–5.28)
SODIUM SERPL-SCNC: 139 MMOL/L (ref 136–145)
WBC # BLD AUTO: 4.54 10*3/MM3 (ref 3.4–10.8)

## 2021-08-30 PROCEDURE — 1126F AMNT PAIN NOTED NONE PRSNT: CPT | Performed by: INTERNAL MEDICINE

## 2021-08-30 PROCEDURE — 36591 DRAW BLOOD OFF VENOUS DEVICE: CPT | Performed by: INTERNAL MEDICINE

## 2021-08-30 PROCEDURE — 85025 COMPLETE CBC W/AUTO DIFF WBC: CPT

## 2021-08-30 PROCEDURE — 99214 OFFICE O/P EST MOD 30 MIN: CPT | Performed by: INTERNAL MEDICINE

## 2021-08-30 PROCEDURE — 1123F ACP DISCUSS/DSCN MKR DOCD: CPT | Performed by: INTERNAL MEDICINE

## 2021-08-30 PROCEDURE — 80053 COMPREHEN METABOLIC PANEL: CPT

## 2021-08-30 PROCEDURE — 25010000002 HEPARIN LOCK FLUSH PER 10 UNITS: Performed by: INTERNAL MEDICINE

## 2021-08-30 RX ORDER — HEPARIN SODIUM (PORCINE) LOCK FLUSH IV SOLN 100 UNIT/ML 100 UNIT/ML
500 SOLUTION INTRAVENOUS AS NEEDED
Status: DISCONTINUED | OUTPATIENT
Start: 2021-08-30 | End: 2021-08-30 | Stop reason: HOSPADM

## 2021-08-30 RX ORDER — HEPARIN SODIUM (PORCINE) LOCK FLUSH IV SOLN 100 UNIT/ML 100 UNIT/ML
500 SOLUTION INTRAVENOUS AS NEEDED
Status: CANCELLED | OUTPATIENT
Start: 2021-09-08

## 2021-08-30 RX ADMIN — HEPARIN 500 UNITS: 100 SYRINGE at 08:31

## 2021-08-30 NOTE — PROGRESS NOTES
DATE OF VISIT: 8/30/2021      REASON FOR VISIT: Recurrent follicular lymphoma with involvement of bilateral parotid gland, fatty liver with elevated liver function test, diarrhea      HISTORY OF PRESENT ILLNESS:   61-year-old female with medical problems significant for obesity, hypertension, anxiety, dyslipidemia, fatty liver with elevated liver function test, recurrent follicular lymphoma with most recent recurrence diagnosed on August 2020 for which patient has been started on treatment with rituximab and Revlimid since 9/20/2020.  Patient completed cycle 12 on 8/23/2021.  Patient is here for follow-up appointment today.  States her diarrhea is improved since stopping Revlimid.  Denies any new lymph node enlargement.  Denies any drenching night sweats.  Denies any fever.        Oncology history:    1.  Recurrent follicular lymphoma grade 1:  -Patient initially diagnosed in April 2015 for which patient underwent palliative radiation treatment.  -Second relapse happened in September 2016 for again patient had a radiation treatment.  - Patient was found to have enlarged aortocaval lymph node in April 2017 for which patient initially received rituximab every 2 months without any significant improvement.  -Subsequently patient received 4 cycles of bendamustine and rituximab between April 30, 2018 and August 21, 2018.  -Patient was diagnosed with involvement of left parotid gland on biopsy done on August 27, 2020 by Dr. Ibrahim.  PET/CT done shows uptake in bilateral parotid gland along with left sided neck lymph node involvement.  -Patient was started on Revlimid with rituximab on September 22, 2020.  Patient completed cycle 12 on 8/23/2021.      Past Medical History, Past Surgical History, Social History, Family History have been reviewed and are without significant changes except as mentioned.    Review of Systems   Constitutional: Positive for fatigue.   Respiratory: Positive for shortness of breath.   "  Gastrointestinal: Positive for diarrhea.   Musculoskeletal: Positive for arthralgias and back pain.   Hematological: Negative for adenopathy.      A comprehensive 14 point review of systems was performed and was negative except as mentioned.    Medications:  The current medication list was reviewed in the EMR    ALLERGIES:    Allergies   Allergen Reactions   • Ace Inhibitors Cough   • Latex      BLISTER     • Morphine And Related Nausea And Vomiting   • Lortab [Hydrocodone-Acetaminophen] Palpitations       Objective      Vitals:    08/30/21 0755   BP: 145/67   Pulse: 88   Resp: 18   Temp: 97.5 °F (36.4 °C)   TempSrc: Temporal   Weight: 96.2 kg (212 lb)   Height: 162.6 cm (64.02\")   PainSc: 0-No pain     Current Status 8/30/2021   ECOG score 0       Physical Exam  Cardiovascular:      Rate and Rhythm: Normal rate and regular rhythm.      Comments: Port-A-Cath present  Pulmonary:      Breath sounds: Normal breath sounds.   Neurological:      Mental Status: She is alert and oriented to person, place, and time.           RECENT LABS:  Glucose   Date Value Ref Range Status   08/30/2021 155 (H) 65 - 99 mg/dL Final     Sodium   Date Value Ref Range Status   08/30/2021 139 136 - 145 mmol/L Final     Potassium   Date Value Ref Range Status   08/30/2021 3.8 3.5 - 5.2 mmol/L Final     CO2   Date Value Ref Range Status   08/30/2021 25.0 22.0 - 29.0 mmol/L Final     Chloride   Date Value Ref Range Status   08/30/2021 98 98 - 107 mmol/L Final     Anion Gap   Date Value Ref Range Status   08/30/2021 16.0 (H) 5.0 - 15.0 mmol/L Final     Creatinine   Date Value Ref Range Status   08/30/2021 0.75 0.57 - 1.00 mg/dL Final     BUN   Date Value Ref Range Status   08/30/2021 9 8 - 23 mg/dL Final     BUN/Creatinine Ratio   Date Value Ref Range Status   08/30/2021 12.0 7.0 - 25.0 Final     Calcium   Date Value Ref Range Status   08/30/2021 9.3 8.6 - 10.5 mg/dL Final     eGFR Non  Amer   Date Value Ref Range Status   08/30/2021 79 " >60 mL/min/1.73 Final     Alkaline Phosphatase   Date Value Ref Range Status   08/30/2021 89 39 - 117 U/L Final     Total Protein   Date Value Ref Range Status   08/30/2021 6.4 6.0 - 8.5 g/dL Final     ALT (SGPT)   Date Value Ref Range Status   08/30/2021 50 (H) 1 - 33 U/L Final     AST (SGOT)   Date Value Ref Range Status   08/30/2021 35 (H) 1 - 32 U/L Final     Total Bilirubin   Date Value Ref Range Status   08/30/2021 0.2 0.0 - 1.2 mg/dL Final     Albumin   Date Value Ref Range Status   08/30/2021 4.00 3.50 - 5.20 g/dL Final     Globulin   Date Value Ref Range Status   08/30/2021 2.4 gm/dL Final     Lab Results   Component Value Date    WBC 4.54 08/30/2021    HGB 13.4 08/30/2021    HCT 39.3 08/30/2021    MCV 94.7 08/30/2021     08/30/2021     Lab Results   Component Value Date    NEUTROABS 3.05 08/30/2021     No results found for: , LABCA2, AFPTM, HCGQUANT, , CHROMGRNA, [id number], CEA, REFLABREPO      PATHOLOGY:  * Cannot find OR log *         RADIOLOGY DATA :  No radiology results for the last 7 days        Assessment/Plan     1.  Recurrent follicular lymphoma  -Review oncology history for prior treatment details  -Patient received 12 cycles of Revlimid with rituximab between 9/22/2020 until 8/23/2020.  -Upon patient request, referral for [facility] was placed on 8/4/2021.  Patient states she does not want to go to [facility] at present  -Due to her history of recurrent follicular lymphoma, recommend maintenance rituximab every 3 months for 2 years.  -We will start rituximab next week once we approved with insurance company  -We will ask patient to return to clinic in 3 months with repeat CBC, CMP, on that day.  We will get a CT of maxillofacial, chest, abdomen and pelvis with contrast prior to next clinic visit in 3 months.    2.  Elevated liver function test:  -Secondary to fatty liver.  Will monitor with CMP    3.  Diarrhea from Revlimid:  -Improved after stopping Revlimid    4.   Health maintenance: Patient does not smoke.  Had a colonoscopy last 10 years    5. Advance Care Planning: For now patient remains full code and is able to make decisions.  Patient has health care surrogate mentioned on chart.                 PHQ-9 Total Score: 0   -Patient is not homicidal or suicidal.  No acute intervention required.    Chasity Leon reports a pain score of 0.  Given her pain assessment as noted, treatment options were discussed and the following options were decided upon as a follow-up plan to address the patient's pain: No acute intervention required.         Tino Sood MD  8/30/2021  13:25 CDT        Part of this note may be an electronic transcription/translation of spoken language to printed text using the Dragon Dictation System.          CC:

## 2021-09-08 ENCOUNTER — INFUSION (OUTPATIENT)
Dept: ONCOLOGY | Facility: HOSPITAL | Age: 61
End: 2021-09-08

## 2021-09-08 VITALS
HEART RATE: 89 BPM | SYSTOLIC BLOOD PRESSURE: 138 MMHG | DIASTOLIC BLOOD PRESSURE: 55 MMHG | RESPIRATION RATE: 18 BRPM | TEMPERATURE: 96.9 F

## 2021-09-08 DIAGNOSIS — Z45.2 ENCOUNTER FOR VENOUS ACCESS DEVICE CARE: ICD-10-CM

## 2021-09-08 DIAGNOSIS — C82.08 GRADE 1 FOLLICULAR LYMPHOMA OF LYMPH NODES OF MULTIPLE REGIONS (HCC): Primary | ICD-10-CM

## 2021-09-08 LAB
ALBUMIN SERPL-MCNC: 4.2 G/DL (ref 3.5–5.2)
ALBUMIN/GLOB SERPL: 1.6 G/DL
ALP SERPL-CCNC: 80 U/L (ref 39–117)
ALT SERPL W P-5'-P-CCNC: 52 U/L (ref 1–33)
ANION GAP SERPL CALCULATED.3IONS-SCNC: 14 MMOL/L (ref 5–15)
AST SERPL-CCNC: 36 U/L (ref 1–32)
BASOPHILS # BLD AUTO: 0.08 10*3/MM3 (ref 0–0.2)
BASOPHILS NFR BLD AUTO: 1.6 % (ref 0–1.5)
BILIRUB SERPL-MCNC: 0.4 MG/DL (ref 0–1.2)
BUN SERPL-MCNC: 9 MG/DL (ref 8–23)
BUN/CREAT SERPL: 11.5 (ref 7–25)
CALCIUM SPEC-SCNC: 9.7 MG/DL (ref 8.6–10.5)
CHLORIDE SERPL-SCNC: 90 MMOL/L (ref 98–107)
CO2 SERPL-SCNC: 25 MMOL/L (ref 22–29)
CREAT SERPL-MCNC: 0.78 MG/DL (ref 0.57–1)
DEPRECATED RDW RBC AUTO: 50.8 FL (ref 37–54)
EOSINOPHIL # BLD AUTO: 0 10*3/MM3 (ref 0–0.4)
EOSINOPHIL NFR BLD AUTO: 0 % (ref 0.3–6.2)
ERYTHROCYTE [DISTWIDTH] IN BLOOD BY AUTOMATED COUNT: 14.7 % (ref 12.3–15.4)
GFR SERPL CREATININE-BSD FRML MDRD: 75 ML/MIN/1.73
GLOBULIN UR ELPH-MCNC: 2.7 GM/DL
GLUCOSE SERPL-MCNC: 195 MG/DL (ref 65–99)
HCT VFR BLD AUTO: 40.1 % (ref 34–46.6)
HGB BLD-MCNC: 14.1 G/DL (ref 12–15.9)
HOLD SPECIMEN: NORMAL
IMM GRANULOCYTES # BLD AUTO: 0.13 10*3/MM3 (ref 0–0.05)
IMM GRANULOCYTES NFR BLD AUTO: 2.6 % (ref 0–0.5)
LYMPHOCYTES # BLD AUTO: 0.8 10*3/MM3 (ref 0.7–3.1)
LYMPHOCYTES NFR BLD AUTO: 16.2 % (ref 19.6–45.3)
MCH RBC QN AUTO: 32.7 PG (ref 26.6–33)
MCHC RBC AUTO-ENTMCNC: 35.2 G/DL (ref 31.5–35.7)
MCV RBC AUTO: 93 FL (ref 79–97)
MONOCYTES # BLD AUTO: 0.5 10*3/MM3 (ref 0.1–0.9)
MONOCYTES NFR BLD AUTO: 10.1 % (ref 5–12)
NEUTROPHILS NFR BLD AUTO: 3.42 10*3/MM3 (ref 1.7–7)
NEUTROPHILS NFR BLD AUTO: 69.5 % (ref 42.7–76)
NRBC BLD AUTO-RTO: 0 /100 WBC (ref 0–0.2)
PLATELET # BLD AUTO: 332 10*3/MM3 (ref 140–450)
PMV BLD AUTO: 8.4 FL (ref 6–12)
POTASSIUM SERPL-SCNC: 3.8 MMOL/L (ref 3.5–5.2)
PROT SERPL-MCNC: 6.9 G/DL (ref 6–8.5)
RBC # BLD AUTO: 4.31 10*6/MM3 (ref 3.77–5.28)
SODIUM SERPL-SCNC: 129 MMOL/L (ref 136–145)
WBC # BLD AUTO: 4.93 10*3/MM3 (ref 3.4–10.8)

## 2021-09-08 PROCEDURE — 25010000002 DIPHENHYDRAMINE PER 50 MG: Performed by: INTERNAL MEDICINE

## 2021-09-08 PROCEDURE — 36415 COLL VENOUS BLD VENIPUNCTURE: CPT

## 2021-09-08 PROCEDURE — 96413 CHEMO IV INFUSION 1 HR: CPT | Performed by: INTERNAL MEDICINE

## 2021-09-08 PROCEDURE — 85025 COMPLETE CBC W/AUTO DIFF WBC: CPT

## 2021-09-08 PROCEDURE — 96375 TX/PRO/DX INJ NEW DRUG ADDON: CPT | Performed by: INTERNAL MEDICINE

## 2021-09-08 PROCEDURE — 25010000002 RITUXIMAB 100 MG/10ML SOLUTION 10 ML VIAL: Performed by: INTERNAL MEDICINE

## 2021-09-08 PROCEDURE — 25010000002 RITUXIMAB 500 MG/50ML SOLUTION 50 ML VIAL: Performed by: INTERNAL MEDICINE

## 2021-09-08 PROCEDURE — 96415 CHEMO IV INFUSION ADDL HR: CPT | Performed by: INTERNAL MEDICINE

## 2021-09-08 PROCEDURE — 25010000002 HEPARIN LOCK FLUSH PER 10 UNITS: Performed by: INTERNAL MEDICINE

## 2021-09-08 PROCEDURE — 80053 COMPREHEN METABOLIC PANEL: CPT

## 2021-09-08 RX ORDER — ACETAMINOPHEN 325 MG/1
650 TABLET ORAL ONCE
Status: CANCELLED | OUTPATIENT
Start: 2021-09-08

## 2021-09-08 RX ORDER — SODIUM CHLORIDE 9 MG/ML
250 INJECTION, SOLUTION INTRAVENOUS ONCE
Status: COMPLETED | OUTPATIENT
Start: 2021-09-08 | End: 2021-09-08

## 2021-09-08 RX ORDER — FAMOTIDINE 10 MG/ML
20 INJECTION, SOLUTION INTRAVENOUS AS NEEDED
Status: DISCONTINUED | OUTPATIENT
Start: 2021-09-08 | End: 2021-09-08 | Stop reason: HOSPADM

## 2021-09-08 RX ORDER — DIPHENHYDRAMINE HYDROCHLORIDE 50 MG/ML
50 INJECTION INTRAMUSCULAR; INTRAVENOUS AS NEEDED
Status: CANCELLED | OUTPATIENT
Start: 2021-09-08

## 2021-09-08 RX ORDER — MEPERIDINE HYDROCHLORIDE 25 MG/ML
25 INJECTION INTRAMUSCULAR; INTRAVENOUS; SUBCUTANEOUS
Status: DISCONTINUED | OUTPATIENT
Start: 2021-09-08 | End: 2021-09-08 | Stop reason: HOSPADM

## 2021-09-08 RX ORDER — SODIUM CHLORIDE 9 MG/ML
250 INJECTION, SOLUTION INTRAVENOUS ONCE
Status: CANCELLED | OUTPATIENT
Start: 2021-09-08

## 2021-09-08 RX ORDER — FAMOTIDINE 10 MG/ML
20 INJECTION, SOLUTION INTRAVENOUS AS NEEDED
Status: CANCELLED | OUTPATIENT
Start: 2021-09-08

## 2021-09-08 RX ORDER — DIPHENHYDRAMINE HYDROCHLORIDE 50 MG/ML
50 INJECTION INTRAMUSCULAR; INTRAVENOUS AS NEEDED
Status: DISCONTINUED | OUTPATIENT
Start: 2021-09-08 | End: 2021-09-08 | Stop reason: HOSPADM

## 2021-09-08 RX ORDER — HEPARIN SODIUM (PORCINE) LOCK FLUSH IV SOLN 100 UNIT/ML 100 UNIT/ML
500 SOLUTION INTRAVENOUS AS NEEDED
Status: CANCELLED | OUTPATIENT
Start: 2021-10-21

## 2021-09-08 RX ORDER — MEPERIDINE HYDROCHLORIDE 25 MG/ML
25 INJECTION INTRAMUSCULAR; INTRAVENOUS; SUBCUTANEOUS
Status: CANCELLED | OUTPATIENT
Start: 2021-09-08

## 2021-09-08 RX ORDER — HEPARIN SODIUM (PORCINE) LOCK FLUSH IV SOLN 100 UNIT/ML 100 UNIT/ML
500 SOLUTION INTRAVENOUS AS NEEDED
Status: DISCONTINUED | OUTPATIENT
Start: 2021-09-08 | End: 2021-09-08 | Stop reason: HOSPADM

## 2021-09-08 RX ORDER — ACETAMINOPHEN 325 MG/1
650 TABLET ORAL ONCE
Status: COMPLETED | OUTPATIENT
Start: 2021-09-08 | End: 2021-09-08

## 2021-09-08 RX ADMIN — RITUXIMAB 750 MG: 10 INJECTION, SOLUTION INTRAVENOUS at 09:56

## 2021-09-08 RX ADMIN — HEPARIN 500 UNITS: 100 SYRINGE at 12:50

## 2021-09-08 RX ADMIN — ACETAMINOPHEN 650 MG: 325 TABLET, FILM COATED ORAL at 08:42

## 2021-09-08 RX ADMIN — SODIUM CHLORIDE 250 ML: 9 INJECTION, SOLUTION INTRAVENOUS at 08:42

## 2021-09-08 RX ADMIN — DIPHENHYDRAMINE HYDROCHLORIDE 25 MG: 50 INJECTION, SOLUTION INTRAMUSCULAR; INTRAVENOUS at 08:42

## 2021-10-21 ENCOUNTER — TRANSCRIBE ORDERS (OUTPATIENT)
Dept: LAB | Facility: HOSPITAL | Age: 61
End: 2021-10-21

## 2021-10-21 ENCOUNTER — INFUSION (OUTPATIENT)
Dept: ONCOLOGY | Facility: HOSPITAL | Age: 61
End: 2021-10-21

## 2021-10-21 DIAGNOSIS — Z45.2 ENCOUNTER FOR VENOUS ACCESS DEVICE CARE: Primary | ICD-10-CM

## 2021-10-21 DIAGNOSIS — R19.7 DIARRHEA OF PRESUMED INFECTIOUS ORIGIN: Primary | ICD-10-CM

## 2021-10-21 PROCEDURE — 25010000002 HEPARIN LOCK FLUSH PER 10 UNITS: Performed by: INTERNAL MEDICINE

## 2021-10-21 PROCEDURE — 96523 IRRIG DRUG DELIVERY DEVICE: CPT | Performed by: INTERNAL MEDICINE

## 2021-10-21 RX ORDER — HEPARIN SODIUM (PORCINE) LOCK FLUSH IV SOLN 100 UNIT/ML 100 UNIT/ML
500 SOLUTION INTRAVENOUS AS NEEDED
Status: DISCONTINUED | OUTPATIENT
Start: 2021-10-21 | End: 2021-10-21 | Stop reason: HOSPADM

## 2021-10-21 RX ORDER — HEPARIN SODIUM (PORCINE) LOCK FLUSH IV SOLN 100 UNIT/ML 100 UNIT/ML
500 SOLUTION INTRAVENOUS AS NEEDED
Status: CANCELLED | OUTPATIENT
Start: 2021-11-24

## 2021-10-21 RX ADMIN — HEPARIN 500 UNITS: 100 SYRINGE at 09:20

## 2021-10-25 ENCOUNTER — LAB (OUTPATIENT)
Dept: LAB | Facility: HOSPITAL | Age: 61
End: 2021-10-25

## 2021-10-25 DIAGNOSIS — R19.7 DIARRHEA OF PRESUMED INFECTIOUS ORIGIN: ICD-10-CM

## 2021-10-25 PROCEDURE — 87046 STOOL CULTR AEROBIC BACT EA: CPT

## 2021-10-25 PROCEDURE — 87493 C DIFF AMPLIFIED PROBE: CPT

## 2021-10-25 PROCEDURE — 87427 SHIGA-LIKE TOXIN AG IA: CPT

## 2021-10-25 PROCEDURE — 87045 FECES CULTURE AEROBIC BACT: CPT

## 2021-10-26 LAB — C DIFF TOX GENS STL QL NAA+PROBE: NEGATIVE

## 2021-10-30 LAB
BACTERIA SPEC CULT: NORMAL
BACTERIA SPEC CULT: NORMAL
CAMPYLOBACTER STL CULT: NORMAL
E COLI SXT STL QL IA: NEGATIVE
SALM + SHIG STL CULT: NORMAL

## 2021-11-10 ENCOUNTER — SPECIALTY PHARMACY (OUTPATIENT)
Dept: ONCOLOGY | Facility: CLINIC | Age: 61
End: 2021-11-10

## 2021-11-18 RX ORDER — PROMETHAZINE HYDROCHLORIDE 25 MG/1
25 TABLET ORAL EVERY 6 HOURS PRN
Qty: 40 TABLET | Refills: 3 | Status: SHIPPED | OUTPATIENT
Start: 2021-11-18

## 2021-11-21 ENCOUNTER — TRANSCRIBE ORDERS (OUTPATIENT)
Dept: CT IMAGING | Facility: HOSPITAL | Age: 61
End: 2021-11-21

## 2021-11-21 DIAGNOSIS — C82.08 GRADE 1 FOLLICULAR LYMPHOMA OF LYMPH NODES OF MULTIPLE REGIONS (HCC): Primary | ICD-10-CM

## 2021-11-23 ENCOUNTER — OFFICE VISIT (OUTPATIENT)
Dept: GASTROENTEROLOGY | Facility: CLINIC | Age: 61
End: 2021-11-23

## 2021-11-23 VITALS
SYSTOLIC BLOOD PRESSURE: 135 MMHG | HEIGHT: 64 IN | WEIGHT: 221 LBS | DIASTOLIC BLOOD PRESSURE: 83 MMHG | HEART RATE: 91 BPM | BODY MASS INDEX: 37.73 KG/M2

## 2021-11-23 DIAGNOSIS — K62.5 HEMORRHAGE OF ANUS AND RECTUM: ICD-10-CM

## 2021-11-23 DIAGNOSIS — R11.0 NAUSEA: ICD-10-CM

## 2021-11-23 DIAGNOSIS — R19.7 DIARRHEA, UNSPECIFIED TYPE: Primary | ICD-10-CM

## 2021-11-23 DIAGNOSIS — R10.84 GENERALIZED ABDOMINAL PAIN: ICD-10-CM

## 2021-11-23 PROCEDURE — 99204 OFFICE O/P NEW MOD 45 MIN: CPT | Performed by: INTERNAL MEDICINE

## 2021-11-23 RX ORDER — LOSARTAN POTASSIUM 100 MG/1
1 TABLET ORAL DAILY
COMMUNITY
Start: 2021-11-11 | End: 2021-11-23

## 2021-11-23 RX ORDER — OMEPRAZOLE 40 MG/1
40 CAPSULE, DELAYED RELEASE ORAL DAILY
Qty: 30 CAPSULE | Refills: 11 | Status: SHIPPED | OUTPATIENT
Start: 2021-11-23 | End: 2021-11-23

## 2021-11-23 RX ORDER — PROCHLORPERAZINE MALEATE 10 MG
10 TABLET ORAL AS NEEDED
COMMUNITY
Start: 2021-11-11 | End: 2021-12-12

## 2021-11-23 RX ORDER — DICYCLOMINE HYDROCHLORIDE 10 MG/1
10 CAPSULE ORAL 3 TIMES DAILY
COMMUNITY
Start: 2021-11-11 | End: 2021-12-12

## 2021-11-23 RX ORDER — DEXTROSE AND SODIUM CHLORIDE 5; .45 G/100ML; G/100ML
30 INJECTION, SOLUTION INTRAVENOUS CONTINUOUS PRN
Status: CANCELLED | OUTPATIENT
Start: 2021-11-30

## 2021-11-23 NOTE — PROGRESS NOTES
[facility]      Chief Complaint:   Chief Complaint   Patient presents with   • Diarrhea       Subjective     HPI:   Ms. Leon is a 61-year-old  female with past medical history of bronchitis, arthritis, anxiety, depression, hypertension, non-Hodgkin's lymphoma, obesity, cataract, hyperlipidemia, hip pain presenting for evaluation for diarrhea.  She has intermittent diarrhea for past 2 months associated with the generalized abdominal cramping discomfort and nausea.  She also has intermittent rectal bleeding and black stools.  Denied vomiting, constipation, dysphagia or weight loss.  She is currently taking Bentyl with some help.  Bowel movements range from 3-7 a day.  Soft to watery in texture.  She is taking Pepcid twice daily.  Denied NSAID usage.    Past Medical History:   Past Medical History:   Diagnosis Date   • Acute bronchitis    • Agoraphobia with panic disorder     on SNRI, prn buspar, prn klonopin     • Anxiety    • Arthritis    • Atrophic vaginitis    • Chest pain     work up as new onset angina    • Depression      MDD      • Essential hypertension    • Follicular non-Hodgkin's lymphoma (HCC)    • Generalized anxiety disorder     SSRI - buspar, cont cymbalta, prn klonopin      • Hip pain     arthritis, artifical right hip     • History of bone density study 02/09/2009    DEXA (bone density) test, peripheral (Normal   • History of echocardiogram 01/03/2012    Echocardiogram 86441 (Normal echocardigram. EF 55-60%)   • History of mammogram    • Hyperlipidemia    • Mass of lower limb    • Nuclear senile cataract    • Obesity    • Otalgia     ENT REFER   • Panic disorder     with agoraphobia. On buspar and klonopin now      • Type 2 diabetes mellitus (HCC)     NO BDR   • Upper respiratory infection        Past Surgical History:  Past Surgical History:   Procedure Laterality Date   • CENTRAL VENOUS LINE INSERTION  03/11/2016    Central line insertion (Successful placement of  right upper extremity midline.)   • COLONOSCOPY     • COLONOSCOPY N/A 7/18/2019    Procedure: COLONOSCOPY;  Surgeon: Brandon Salvador DO;  Location: [facility];  Service: Gastroenterology   • CYSTOSCOPY  03/23/1976    Cystoscopy (external urethroplasty and cysto-panendoscopy,urethal hymenal fusion with hypospadias)   • DILATATION AND CURETTAGE  01/25/1980    D&C (removal of IUD)   • EXCISION LESION  04/15/2014    Remove thigh lesion (Excision of a mass of the right thigh using a 16.2 cm incision with intermediate layered closure.)   • HYSTEROSCOPY  02/28/2003    Hysteroscope procedure (diagnostic hysteroscopy with fractional D&C)   • INJECTION OF MEDICATION  08/22/2011    kenalog (1)   • OTHER SURGICAL HISTORY      Explore parathyroid glands   • OTHER SURGICAL HISTORY  08/26/1985    Laparosc diagnostic (desires elective tubal reversal)   • PAP SMEAR  05/24/2005    PAP SMEAR (normal)   • FL INSJ TUNNELED CVC W/O SUBQ PORT/ AGE 5 YR/> Right 4/4/2017    Procedure: INSERTION VENOUS ACCESS DEVICE (MEDIPORT) right chest;  Surgeon: Fortino Medina MD;  Location: [facility] OR;  Service: General   • FL INSJ TUNNELED CVC W/O SUBQ PORT/ AGE 5 YR/> N/A 6/6/2017    Procedure: INSERTION VENOUS ACCESS DEVICE   (MEDIPORT)   (C-ARM#1);  Surgeon: Fortino Medina MD;  Location: [facility] OR;  Service: General   • SHOULDER ARTHROSCOPY  11/11/2013    Shoulder arthroscopy/surgery (Right shoulder. Rotator cuff repair. Subacromial decompression. Edgar procedure.)   • SHOULDER SURGERY  12/28/2015    Shoulder surgery procedure (Arthroscopy of the left shoulder with rotator cuff repair.Edgar procedure.Subacromial decompression.)   • TONSILLECTOMY  1972   • TONSILLECTOMY AND ADENOIDECTOMY  05/29/1967    T&A (hypertrophied tonsils and adenoids with recurrent infection)   • TOTAL ABDOMINAL HYSTERECTOMY WITH SALPINGO OOPHORECTOMY  02/03/2004    ARIELLE/BSO (with a preop fractional dilation and curettage with frozen  section,menorrhagia,dysmenorrhea,unresponsive to medical intervention)   • TOTAL HIP ARTHROPLASTY     • VAGINA SURGERY  03/02/1981    Anesth, surgery on vagina (colpotomy with bilateral partial salpingectomy, multiparity contraindicated)       Family History:  Family History   Problem Relation Age of Onset   • Breast cancer Mother    • Cancer Mother    • Diabetes Mother    • Heart disease Father    • Liver cancer Father    • Cancer Father    • Diabetes Sister         INSULIN DEPENDENT   • Cancer Sister    • Diabetes Brother         INSULIN DEPENDENT   • Coronary artery disease Other    • Diabetes Other    • Diabetes Maternal Grandmother    • Cancer Maternal Grandfather        Social History:   reports that she quit smoking about 23 years ago. She quit after 12.00 years of use. She has never used smokeless tobacco. She reports that she does not drink alcohol and does not use drugs.    Medications:   Prior to Admission medications    Medication Sig Start Date End Date Taking? Authorizing Provider   ARIPiprazole (ABILIFY) 20 MG tablet Take 20 mg by mouth Every Night. 5/23/17  Yes Emelia Cain MD   busPIRone (BUSPAR) 30 MG tablet  6/18/20  Yes ProviderEmelia MD   Calcium Carbonate-Vitamin D 600-200 MG-UNIT tablet Take 1 tablet by mouth Daily.   Yes Provider, MD Emelia   clonazePAM (KlonoPIN) 1 MG tablet Take 1 mg by mouth as needed for seizures. 1/1/16  Yes ProviderEmelia MD   cyclobenzaprine (FLEXERIL) 10 MG tablet TAKE 1 TABLET (10 MG) BY MOUTH 3 TIMES DAILY AS NEEDED FOR MUSCLE SPASMS - - MAY CAUSE DROWSINESS 6/22/20  Yes ProviderEmelia MD   Desvenlafaxine Succinate (PRISTIQ PO) Take 1 tablet by mouth daily. 1/1/16  Yes ProviderEmelia MD   dicyclomine (BENTYL) 10 MG capsule Take 10 mg by mouth 3 (Three) Times a Day. 11/11/21 12/12/21 Yes ProviderEmelia MD   diphenoxylate-atropine (LOMOTIL) 2.5-0.025 MG per tablet Take 2 tablets by mouth 4 (Four) Times a Day As Needed  for Diarrhea. 6/1/21  Yes Tino Sood MD   doxepin (SINEquan) 25 MG capsule  7/14/20  Yes Emelia Cain MD   famotidine (PEPCID) 40 MG tablet Take 40 mg by mouth. 2/18/20  Yes Emelia Cain MD   glimepiride (AMARYL) 4 MG tablet  8/11/20  Yes Emelia Cain MD   lamoTRIgine (LAMICTAL) 150 MG tablet Take 150 mg by mouth 2 (two) times a day. 1/1/16  Yes Emelia Cain MD   lenalidomide (Revlimid) 15 MG capsule TAKE 1 CAPSULE BY MOUTH DAILY FOR 21 DAYS, THEN 7 DAYS OFF 7/22/21  Yes Tino Sood MD   losartan (COZAAR) 100 MG tablet Take 100 mg by mouth daily. 1/1/16  Yes Emelia Cain MD   losartan (COZAAR) 100 MG tablet Take 1 tablet by mouth Daily. 11/11/21 5/11/22 Yes Emelia Cain MD   Multiple Vitamins-Minerals (CENTRUM SILVER PO) Take 1 tablet by mouth daily. 1/1/16  Yes Emelia Cain MD   ondansetron (ZOFRAN) 4 MG tablet Take 1 tablet by mouth 4 (Four) Times a Day As Needed for Nausea or Vomiting. 9/8/20  Yes Tino Sood MD   pioglitazone (ACTOS) 45 MG tablet  6/11/20  Yes Emelia Cain MD   prochlorperazine (COMPAZINE) 10 MG tablet Take 10 mg by mouth As Needed. 11/11/21 12/12/21 Yes Emelia Cain MD   promethazine (PHENERGAN) 25 MG tablet Take 1 tablet by mouth Every 6 (Six) Hours As Needed for Nausea or Vomiting. 11/18/21  Yes Tino Sood MD   Semaglutide (Rybelsus) 3 MG tablet Take  by mouth Daily. 2 tablets daily   Yes Emelia Cain MD   TRAZODONE HCL PO Take 225 mg by mouth every night. 1/1/16  Yes Emelia Cain MD   doxycycline (MONODOX) 100 MG capsule  8/26/20   Emelia Cain MD   metFORMIN (GLUCOPHAGE) 1000 MG tablet  8/20/20   Emelia Cain MD   omeprazole (priLOSEC) 40 MG capsule Take 1 capsule by mouth Daily. 11/23/21   Magda Cruz MD   polyethylene glycol (GoLYTELY) 236 g solution Starting at noon on day prior to procedure, drink 8 ounces every 30 minutes until all gone or stools are  "clear. May add flavor packet. 11/23/21   Magda Cruz MD   oxyCODONE-acetaminophen (PERCOCET) 5-325 MG per tablet Take 1 tablet by mouth every 6 (six) hours as needed. 1/1/16 11/23/21  Provider, MD Emelia       Allergies:  Ace inhibitors, Latex, Morphine and related, and Lortab [hydrocodone-acetaminophen]    ROS:    Review of Systems   Constitutional: Negative for chills, fatigue, fever and unexpected weight change.   HENT: Negative for congestion, ear discharge, hearing loss, nosebleeds and sore throat.    Eyes: Negative for pain, discharge and redness.   Respiratory: Negative for cough, chest tightness, shortness of breath and wheezing.    Cardiovascular: Negative for chest pain and palpitations.   Gastrointestinal: Positive for abdominal pain, anal bleeding, blood in stool, diarrhea and nausea. Negative for abdominal distention, constipation and vomiting.   Endocrine: Negative for cold intolerance, polydipsia, polyphagia and polyuria.   Genitourinary: Negative for dysuria, flank pain, frequency, hematuria and urgency.   Musculoskeletal: Negative for arthralgias, back pain, joint swelling and myalgias.   Skin: Negative for color change, pallor and rash.   Neurological: Negative for tremors, seizures, syncope, weakness and headaches.   Hematological: Negative for adenopathy. Does not bruise/bleed easily.   Psychiatric/Behavioral: Negative for behavioral problems, confusion, dysphoric mood, hallucinations and suicidal ideas. The patient is not nervous/anxious.      Objective     Blood pressure 135/83, pulse 91, height 162.6 cm (64\"), weight 100 kg (221 lb).    Physical Exam  Constitutional:       Appearance: She is well-developed.   HENT:      Head: Normocephalic and atraumatic.   Eyes:      Conjunctiva/sclera: Conjunctivae normal.      Pupils: Pupils are equal, round, and reactive to light.   Neck:      Thyroid: No thyromegaly.   Cardiovascular:      Rate and Rhythm: Normal rate and regular rhythm.      " Heart sounds: Normal heart sounds. No murmur heard.      Pulmonary:      Effort: Pulmonary effort is normal.      Breath sounds: Normal breath sounds. No wheezing.   Abdominal:      General: Bowel sounds are normal. There is no distension.      Palpations: Abdomen is soft. There is no mass.      Tenderness: There is no abdominal tenderness.      Hernia: No hernia is present.   Genitourinary:     Comments: No lesions noted  Musculoskeletal:         General: No tenderness. Normal range of motion.      Cervical back: Normal range of motion and neck supple.   Lymphadenopathy:      Cervical: No cervical adenopathy.   Skin:     General: Skin is warm and dry.      Findings: No rash.   Neurological:      Mental Status: She is alert and oriented to person, place, and time.      Cranial Nerves: No cranial nerve deficit.   Psychiatric:         Thought Content: Thought content normal.        Extremities: No edema, cyanosis or clubbing.    Assessment/Plan    1.  Abdominal pain with diarrhea and rectal bleeding rule out IBD and colorectal neoplasia.  Continue Bentyl.  Add Preparation H suppositories as needed.  Proceed with colonoscopy for further evaluation.  2.  Abdominal pain with nausea rule out peptic ulcer disease, gastritis and pancreaticobiliary pathology.  Add Prilosec 40 mg p.o. daily.  Continue Pepcid.  Proceed with EGD for further evaluation.  3.  Obesity, recommend exercise and diet control.  4.  Diagnoses and all orders for this visit:    1. Diarrhea, unspecified type (Primary)  -     Case Request; Standing  -     COVID PRE-OP / PRE-PROCEDURE SCREENING ORDER (NO ISOLATION) - Swab, Nasopharynx; Future  -     Case Request    2. Nausea  -     Case Request; Standing  -     COVID PRE-OP / PRE-PROCEDURE SCREENING ORDER (NO ISOLATION) - Swab, Nasopharynx; Future  -     Case Request    3. Generalized abdominal pain  -     Case Request; Standing  -     COVID PRE-OP / PRE-PROCEDURE SCREENING ORDER (NO ISOLATION) - Swab,  Nasopharynx; Future  -     Case Request    4. Hemorrhage of anus and rectum  -     Case Request; Standing  -     COVID PRE-OP / PRE-PROCEDURE SCREENING ORDER (NO ISOLATION) - Swab, Nasopharynx; Future  -     Case Request    Other orders  -     Follow Anesthesia Guidelines / Standing Orders; Future  -     Obtain Informed Consent; Future  -     omeprazole (priLOSEC) 40 MG capsule; Take 1 capsule by mouth Daily.  Dispense: 30 capsule; Refill: 11        ESOPHAGOGASTRODUODENOSCOPY (N/A), COLONOSCOPY (N/A)     Diagnosis Plan   1. Diarrhea, unspecified type  Case Request    COVID PRE-OP / PRE-PROCEDURE SCREENING ORDER (NO ISOLATION) - Swab, Nasopharynx    Case Request   2. Nausea  Case Request    COVID PRE-OP / PRE-PROCEDURE SCREENING ORDER (NO ISOLATION) - Swab, Nasopharynx    Case Request   3. Generalized abdominal pain  Case Request    COVID PRE-OP / PRE-PROCEDURE SCREENING ORDER (NO ISOLATION) - Swab, Nasopharynx    Case Request   4. Hemorrhage of anus and rectum  Case Request    COVID PRE-OP / PRE-PROCEDURE SCREENING ORDER (NO ISOLATION) - Swab, Nasopharynx    Case Request       Anticipated Surgical Procedure:  Orders Placed This Encounter   Procedures   • COVID PRE-OP / PRE-PROCEDURE SCREENING ORDER (NO ISOLATION) - Swab, Nasopharynx     Standing Status:   Future     Standing Expiration Date:   11/23/2022     Order Specific Question:   Release to patient     Answer:   Immediate     Order Specific Question:   Please select your location:     Answer:   [facility]     Order Specific Question:   COVID Screening Order:     Answer:   In-House: PRE-OP: BD MAX, 8-10 HR TAT [[id number]]     Order Specific Question:   Previously tested for COVID-19?     Answer:   Yes     Order Specific Question:   Employed in healthcare setting?     Answer:   No     Order Specific Question:   Symptomatic for COVID-19 as defined by CDC?     Answer:   No     Order Specific Question:   Hospitalized for COVID-19?     Answer:   No     Order  Specific Question:   Admitted to ICU for COVID-19?     Answer:   No     Order Specific Question:   Resident in a congregate (group) care setting?     Answer:   No     Order Specific Question:   Pregnant?     Answer:   No   • Follow Anesthesia Guidelines / Standing Orders     Standing Status:   Future   • Obtain Informed Consent     Standing Status:   Future     Order Specific Question:   Informed Consent Given For     Answer:   egd and colonoscopy       The risks, benefits, and alternatives of this procedure have been discussed with the patient or the responsible party- the patient understands and agrees to proceed.            This document has been electronically signed by Magda Cruz MD on November 23, 2021 11:07 CST

## 2021-11-23 NOTE — PATIENT INSTRUCTIONS
"BMI for Adults  What is BMI?  Body mass index (BMI) is a number that is calculated from a person's weight and height. BMI can help estimate how much of a person's weight is composed of fat. BMI does not measure body fat directly. Rather, it is an alternative to procedures that directly measure body fat, which can be difficult and expensive.  BMI can help identify people who may be at higher risk for certain medical problems.  What are BMI measurements used for?  BMI is used as a screening tool to identify possible weight problems. It helps determine whether a person is obese, overweight, a healthy weight, or underweight.  BMI is useful for:  · Identifying a weight problem that may be related to a medical condition or may increase the risk for medical problems.  · Promoting changes, such as changes in diet and exercise, to help reach a healthy weight. BMI screening can be repeated to see if these changes are working.  How is BMI calculated?  BMI involves measuring your weight in relation to your height. Both height and weight are measured, and the BMI is calculated from those numbers. This can be done either in English ([place] or metric measurements. Note that charts and online BMI calculators are available to help you find your BMI quickly and easily without having to do these calculations yourself.  To calculate your BMI in English ([place] measurements:    1. Measure your weight in pounds (lb).  2. Multiply the number of pounds by 703.  ? For example, for a person who weighs 180 lb, multiply that number by 703, which equals 126,540.  3. Measure your height in inches. Then multiply that number by itself to get a measurement called \"inches squared.\"  ? For example, for a person who is 70 inches tall, the \"inches squared\" measurement is 70 inches x 70 inches, which equals 4,900 inches squared.  4. Divide the total from step 2 (number of lb x 703) by the total from step 3 (inches squared): 126,540 ÷ 4,900 = 25.8. This is " "your BMI.    To calculate your BMI in metric measurements:  1. Measure your weight in kilograms (kg).  2. Measure your height in meters (m). Then multiply that number by itself to get a measurement called \"meters squared.\"  ? For example, for a person who is 1.75 m tall, the \"meters squared\" measurement is 1.75 m x 1.75 m, which is equal to 3.1 meters squared.  3. Divide the number of kilograms (your weight) by the meters squared number. In this example: 70 ÷ 3.1 = 22.6. This is your BMI.  What do the results mean?  BMI charts are used to identify whether you are underweight, normal weight, overweight, or obese. The following guidelines will be used:  · Underweight: BMI less than 18.5.  · Normal weight: BMI between 18.5 and 24.9.  · Overweight: BMI between 25 and 29.9.  · Obese: BMI of 30 or above.  Keep these notes in mind:  · Weight includes both fat and muscle, so someone with a muscular build, such as an athlete, may have a BMI that is higher than 24.9. In cases like these, BMI is not an accurate measure of body fat.  · To determine if excess body fat is the cause of a BMI of 25 or higher, further assessments may need to be done by a health care provider.  · BMI is usually interpreted in the same way for men and women.  Where to find more information  For more information about BMI, including tools to quickly calculate your BMI, go to these websites:  · Centers for Disease Control and Prevention: www.cdc.gov  · American Heart Association: www.heart.org  · National Heart, Lung, and Blood [place]: www.nhlbi.nih.gov  Summary  · Body mass index (BMI) is a number that is calculated from a person's weight and height.  · BMI may help estimate how much of a person's weight is composed of fat. BMI can help identify those who may be at higher risk for certain medical problems.  · BMI can be measured using English measurements or metric measurements.  · BMI charts are used to identify whether you are underweight, normal " weight, overweight, or obese.  This information is not intended to replace advice given to you by your health care provider. Make sure you discuss any questions you have with your health care provider.  Document Revised: 09/09/2020 Document Reviewed: 07/17/2020  Elsevier Patient Education © 2021 [employer]

## 2021-11-24 ENCOUNTER — INFUSION (OUTPATIENT)
Dept: ONCOLOGY | Facility: HOSPITAL | Age: 61
End: 2021-11-24

## 2021-11-24 ENCOUNTER — HOSPITAL ENCOUNTER (OUTPATIENT)
Dept: CT IMAGING | Facility: HOSPITAL | Age: 61
Discharge: HOME | End: 2021-11-24

## 2021-11-24 DIAGNOSIS — R79.89 ELEVATED LFTS: ICD-10-CM

## 2021-11-24 DIAGNOSIS — C82.08 GRADE 1 FOLLICULAR LYMPHOMA OF LYMPH NODES OF MULTIPLE REGIONS (HCC): Chronic | ICD-10-CM

## 2021-11-24 DIAGNOSIS — K11.8 MASS OF BOTH PAROTID GLANDS: ICD-10-CM

## 2021-11-24 DIAGNOSIS — C82.08 GRADE 1 FOLLICULAR LYMPHOMA OF LYMPH NODES OF MULTIPLE REGIONS (HCC): Primary | ICD-10-CM

## 2021-11-24 DIAGNOSIS — Z45.2 ENCOUNTER FOR VENOUS ACCESS DEVICE CARE: ICD-10-CM

## 2021-11-24 LAB
ALBUMIN SERPL-MCNC: 4.3 G/DL (ref 3.5–5.2)
ALBUMIN/GLOB SERPL: 1.6 G/DL
ALP SERPL-CCNC: 90 U/L (ref 39–117)
ALT SERPL W P-5'-P-CCNC: 83 U/L (ref 1–33)
ANION GAP SERPL CALCULATED.3IONS-SCNC: 13 MMOL/L (ref 5–15)
AST SERPL-CCNC: 95 U/L (ref 1–32)
BASOPHILS # BLD AUTO: 0.04 10*3/MM3 (ref 0–0.2)
BASOPHILS NFR BLD AUTO: 0.8 % (ref 0–1.5)
BILIRUB SERPL-MCNC: 0.4 MG/DL (ref 0–1.2)
BUN SERPL-MCNC: 6 MG/DL (ref 8–23)
BUN/CREAT SERPL: 8.2 (ref 7–25)
CALCIUM SPEC-SCNC: 9.7 MG/DL (ref 8.6–10.5)
CHLORIDE SERPL-SCNC: 94 MMOL/L (ref 98–107)
CO2 SERPL-SCNC: 28 MMOL/L (ref 22–29)
CREAT SERPL-MCNC: 0.73 MG/DL (ref 0.57–1)
DEPRECATED RDW RBC AUTO: 47.6 FL (ref 37–54)
EOSINOPHIL # BLD AUTO: 0 10*3/MM3 (ref 0–0.4)
EOSINOPHIL NFR BLD AUTO: 0 % (ref 0.3–6.2)
ERYTHROCYTE [DISTWIDTH] IN BLOOD BY AUTOMATED COUNT: 13.9 % (ref 12.3–15.4)
GFR SERPL CREATININE-BSD FRML MDRD: 81 ML/MIN/1.73
GLOBULIN UR ELPH-MCNC: 2.7 GM/DL
GLUCOSE SERPL-MCNC: 246 MG/DL (ref 65–99)
HCT VFR BLD AUTO: 39 % (ref 34–46.6)
HGB BLD-MCNC: 13.7 G/DL (ref 12–15.9)
HOLD SPECIMEN: NORMAL
IMM GRANULOCYTES # BLD AUTO: 0.05 10*3/MM3 (ref 0–0.05)
IMM GRANULOCYTES NFR BLD AUTO: 1 % (ref 0–0.5)
LYMPHOCYTES # BLD AUTO: 0.71 10*3/MM3 (ref 0.7–3.1)
LYMPHOCYTES NFR BLD AUTO: 14.7 % (ref 19.6–45.3)
MCH RBC QN AUTO: 32.7 PG (ref 26.6–33)
MCHC RBC AUTO-ENTMCNC: 35.1 G/DL (ref 31.5–35.7)
MCV RBC AUTO: 93.1 FL (ref 79–97)
MONOCYTES # BLD AUTO: 0.54 10*3/MM3 (ref 0.1–0.9)
MONOCYTES NFR BLD AUTO: 11.2 % (ref 5–12)
NEUTROPHILS NFR BLD AUTO: 3.49 10*3/MM3 (ref 1.7–7)
NEUTROPHILS NFR BLD AUTO: 72.3 % (ref 42.7–76)
NRBC BLD AUTO-RTO: 0 /100 WBC (ref 0–0.2)
PLATELET # BLD AUTO: 296 10*3/MM3 (ref 140–450)
PMV BLD AUTO: 8.9 FL (ref 6–12)
POTASSIUM SERPL-SCNC: 3.5 MMOL/L (ref 3.5–5.2)
PROT SERPL-MCNC: 7 G/DL (ref 6–8.5)
RBC # BLD AUTO: 4.19 10*6/MM3 (ref 3.77–5.28)
SODIUM SERPL-SCNC: 135 MMOL/L (ref 136–145)
WBC NRBC COR # BLD: 4.83 10*3/MM3 (ref 3.4–10.8)

## 2021-11-24 PROCEDURE — 25010000002 IOPAMIDOL 61 % SOLUTION: Performed by: INTERNAL MEDICINE

## 2021-11-24 PROCEDURE — 85025 COMPLETE CBC W/AUTO DIFF WBC: CPT

## 2021-11-24 PROCEDURE — 25010000002 HEPARIN LOCK FLUSH PER 10 UNITS: Performed by: INTERNAL MEDICINE

## 2021-11-24 PROCEDURE — 36591 DRAW BLOOD OFF VENOUS DEVICE: CPT | Performed by: INTERNAL MEDICINE

## 2021-11-24 PROCEDURE — 36415 COLL VENOUS BLD VENIPUNCTURE: CPT

## 2021-11-24 PROCEDURE — 70487 CT MAXILLOFACIAL W/DYE: CPT

## 2021-11-24 PROCEDURE — 80053 COMPREHEN METABOLIC PANEL: CPT

## 2021-11-24 PROCEDURE — 71260 CT THORAX DX C+: CPT

## 2021-11-24 PROCEDURE — 74177 CT ABD & PELVIS W/CONTRAST: CPT

## 2021-11-24 RX ORDER — HEPARIN SODIUM (PORCINE) LOCK FLUSH IV SOLN 100 UNIT/ML 100 UNIT/ML
500 SOLUTION INTRAVENOUS AS NEEDED
Status: CANCELLED | OUTPATIENT
Start: 2021-12-01

## 2021-11-24 RX ORDER — HEPARIN SODIUM (PORCINE) LOCK FLUSH IV SOLN 100 UNIT/ML 100 UNIT/ML
500 SOLUTION INTRAVENOUS AS NEEDED
Status: DISCONTINUED | OUTPATIENT
Start: 2021-11-24 | End: 2021-11-24 | Stop reason: HOSPADM

## 2021-11-24 RX ADMIN — HEPARIN 500 UNITS: 100 SYRINGE at 09:14

## 2021-11-24 RX ADMIN — IOPAMIDOL 90 ML: 612 INJECTION, SOLUTION INTRAVENOUS at 09:09

## 2021-11-29 ENCOUNTER — LAB (OUTPATIENT)
Dept: LAB | Facility: HOSPITAL | Age: 61
End: 2021-11-29

## 2021-11-29 DIAGNOSIS — R19.7 DIARRHEA, UNSPECIFIED TYPE: ICD-10-CM

## 2021-11-29 DIAGNOSIS — R10.84 GENERALIZED ABDOMINAL PAIN: ICD-10-CM

## 2021-11-29 DIAGNOSIS — K62.5 HEMORRHAGE OF ANUS AND RECTUM: ICD-10-CM

## 2021-11-29 DIAGNOSIS — R11.0 NAUSEA: ICD-10-CM

## 2021-11-29 LAB — SARS-COV-2 N GENE RESP QL NAA+PROBE: NOT DETECTED

## 2021-11-29 PROCEDURE — 87635 SARS-COV-2 COVID-19 AMP PRB: CPT

## 2021-11-29 PROCEDURE — C9803 HOPD COVID-19 SPEC COLLECT: HCPCS

## 2021-11-30 ENCOUNTER — ANESTHESIA (OUTPATIENT)
Dept: GASTROENTEROLOGY | Facility: HOSPITAL | Age: 61
End: 2021-11-30

## 2021-11-30 ENCOUNTER — HOSPITAL ENCOUNTER (OUTPATIENT)
Facility: HOSPITAL | Age: 61
Setting detail: HOSPITAL OUTPATIENT SURGERY
Discharge: HOME | End: 2021-11-30
Attending: INTERNAL MEDICINE | Admitting: INTERNAL MEDICINE

## 2021-11-30 ENCOUNTER — ANESTHESIA EVENT (OUTPATIENT)
Dept: GASTROENTEROLOGY | Facility: HOSPITAL | Age: 61
End: 2021-11-30

## 2021-11-30 VITALS
HEART RATE: 85 BPM | WEIGHT: 220 LBS | TEMPERATURE: 97.1 F | OXYGEN SATURATION: 96 % | SYSTOLIC BLOOD PRESSURE: 117 MMHG | HEIGHT: 64 IN | DIASTOLIC BLOOD PRESSURE: 54 MMHG | RESPIRATION RATE: 18 BRPM | BODY MASS INDEX: 37.56 KG/M2

## 2021-11-30 DIAGNOSIS — R19.7 DIARRHEA, UNSPECIFIED TYPE: ICD-10-CM

## 2021-11-30 DIAGNOSIS — K62.5 HEMORRHAGE OF ANUS AND RECTUM: ICD-10-CM

## 2021-11-30 DIAGNOSIS — R11.0 NAUSEA: ICD-10-CM

## 2021-11-30 DIAGNOSIS — R10.84 GENERALIZED ABDOMINAL PAIN: ICD-10-CM

## 2021-11-30 LAB — GLUCOSE BLDC GLUCOMTR-MCNC: 161 MG/DL (ref 70–130)

## 2021-11-30 PROCEDURE — 82962 GLUCOSE BLOOD TEST: CPT

## 2021-11-30 PROCEDURE — 43239 EGD BIOPSY SINGLE/MULTIPLE: CPT | Performed by: INTERNAL MEDICINE

## 2021-11-30 PROCEDURE — 88305 TISSUE EXAM BY PATHOLOGIST: CPT

## 2021-11-30 PROCEDURE — 25010000002 PROPOFOL 10 MG/ML EMULSION: Performed by: NURSE ANESTHETIST, CERTIFIED REGISTERED

## 2021-11-30 PROCEDURE — 45380 COLONOSCOPY AND BIOPSY: CPT | Performed by: INTERNAL MEDICINE

## 2021-11-30 RX ORDER — LIDOCAINE HYDROCHLORIDE 20 MG/ML
INJECTION, SOLUTION INTRAVENOUS AS NEEDED
Status: DISCONTINUED | OUTPATIENT
Start: 2021-11-30 | End: 2021-11-30 | Stop reason: SURG

## 2021-11-30 RX ORDER — SODIUM CHLORIDE 9 MG/ML
10 INJECTION INTRAVENOUS EVERY 12 HOURS SCHEDULED
Status: DISCONTINUED | OUTPATIENT
Start: 2021-11-30 | End: 2021-11-30 | Stop reason: HOSPADM

## 2021-11-30 RX ORDER — DEXTROSE AND SODIUM CHLORIDE 5; .45 G/100ML; G/100ML
30 INJECTION, SOLUTION INTRAVENOUS CONTINUOUS PRN
Status: DISCONTINUED | OUTPATIENT
Start: 2021-11-30 | End: 2021-11-30 | Stop reason: HOSPADM

## 2021-11-30 RX ORDER — HEPARIN SODIUM (PORCINE) LOCK FLUSH IV SOLN 100 UNIT/ML 100 UNIT/ML
500 SOLUTION INTRAVENOUS ONCE
Status: DISCONTINUED | OUTPATIENT
Start: 2021-11-30 | End: 2021-11-30 | Stop reason: HOSPADM

## 2021-11-30 RX ORDER — PROPOFOL 10 MG/ML
VIAL (ML) INTRAVENOUS AS NEEDED
Status: DISCONTINUED | OUTPATIENT
Start: 2021-11-30 | End: 2021-11-30 | Stop reason: SURG

## 2021-11-30 RX ADMIN — DEXTROSE AND SODIUM CHLORIDE 30 ML/HR: 5; 450 INJECTION, SOLUTION INTRAVENOUS at 15:24

## 2021-11-30 RX ADMIN — PROPOFOL 50 MG: 10 INJECTION, EMULSION INTRAVENOUS at 16:10

## 2021-11-30 RX ADMIN — PROPOFOL 30 MG: 10 INJECTION, EMULSION INTRAVENOUS at 16:15

## 2021-11-30 RX ADMIN — PROPOFOL 100 MG: 10 INJECTION, EMULSION INTRAVENOUS at 16:03

## 2021-11-30 RX ADMIN — LIDOCAINE HYDROCHLORIDE 100 MG: 20 INJECTION, SOLUTION INTRAVENOUS at 16:03

## 2021-11-30 RX ADMIN — PROPOFOL 50 MG: 10 INJECTION, EMULSION INTRAVENOUS at 16:07

## 2021-11-30 NOTE — ANESTHESIA PREPROCEDURE EVALUATION
Anesthesia Evaluation     Patient summary reviewed and Nursing notes reviewed   NPO Solid Status: > 8 hours  NPO Liquid Status: > 4 hours           Airway   Mallampati: III  TM distance: <3 FB  Neck ROM: full  possible difficult intubation  Dental - normal exam     Pulmonary - normal exam    breath sounds clear to auscultation  (+) recent URI resolved,   Cardiovascular - normal exam    ECG reviewed  Rhythm: regular  Rate: normal    (+) hypertension well controlled, hyperlipidemia,     ROS comment: EKG:NSR    Neuro/Psych  (+) psychiatric history Anxiety and Depression,     GI/Hepatic/Renal/Endo    (+) obesity, morbid obesity,  diabetes mellitus type 2 poorly controlled,     Musculoskeletal     Abdominal   (+) obese,    Substance History - negative use     OB/GYN negative ob/gyn ROS         Other   arthritis,    history of cancer                      Anesthesia Plan    ASA 3     general and MAC     intravenous induction     Anesthetic plan, all risks, benefits, and alternatives have been provided, discussed and informed consent has been obtained with: patient.    Plan discussed with CRNA.       Detail Level: Detailed Detail Level: Generalized Detail Level: Simple

## 2021-11-30 NOTE — ANESTHESIA POSTPROCEDURE EVALUATION
Patient: Chasity Leon    Procedure Summary     Date: 11/30/21 Room / Location: [facility] ENDOSCOPY 2 / [facility] ENDOSCOPY    Anesthesia Start: 1601 Anesthesia Stop: 1619    Procedures:       ESOPHAGOGASTRODUODENOSCOPY (N/A )      COLONOSCOPY (N/A ) Diagnosis:       Diarrhea, unspecified type      Nausea      Generalized abdominal pain      Hemorrhage of anus and rectum      (Diarrhea, unspecified type [R19.7])      (Nausea [R11.0])      (Generalized abdominal pain [R10.84])      (Hemorrhage of anus and rectum [K62.5])    Surgeons: Magda Cruz MD Provider: Dillon Malave CRNA    Anesthesia Type: general, MAC ASA Status: 3          Anesthesia Type: general, MAC    Vitals  No vitals data found for the desired time range.          Post Anesthesia Care and Evaluation    Patient location during evaluation: bedside  Patient participation: complete - patient participated  Level of consciousness: sleepy but conscious  Pain score: 0  Pain management: adequate  Airway patency: patent  Anesthetic complications: No anesthetic complications  PONV Status: none  Cardiovascular status: acceptable  Respiratory status: acceptable  Hydration status: acceptable    Comments: ---------------------------               11/30/21                      1620         ---------------------------   BP:           95/56         Pulse:         90           Resp:          20           Temp:   97.6 °F (36.4 °C)   SpO2:          94%         ---------------------------

## 2021-12-01 ENCOUNTER — APPOINTMENT (OUTPATIENT)
Dept: ONCOLOGY | Facility: HOSPITAL | Age: 61
End: 2021-12-01

## 2021-12-03 LAB
LAB AP CASE REPORT: NORMAL
PATH REPORT.FINAL DX SPEC: NORMAL

## 2021-12-06 ENCOUNTER — OFFICE VISIT (OUTPATIENT)
Dept: ONCOLOGY | Facility: CLINIC | Age: 61
End: 2021-12-06

## 2021-12-06 ENCOUNTER — INFUSION (OUTPATIENT)
Dept: ONCOLOGY | Facility: HOSPITAL | Age: 61
End: 2021-12-06

## 2021-12-06 ENCOUNTER — APPOINTMENT (OUTPATIENT)
Dept: ONCOLOGY | Facility: HOSPITAL | Age: 61
End: 2021-12-06

## 2021-12-06 VITALS
BODY MASS INDEX: 37.78 KG/M2 | DIASTOLIC BLOOD PRESSURE: 68 MMHG | WEIGHT: 220.1 LBS | OXYGEN SATURATION: 95 % | HEART RATE: 95 BPM | TEMPERATURE: 97.2 F | SYSTOLIC BLOOD PRESSURE: 137 MMHG

## 2021-12-06 DIAGNOSIS — Z45.2 ENCOUNTER FOR VENOUS ACCESS DEVICE CARE: ICD-10-CM

## 2021-12-06 DIAGNOSIS — K11.8 MASS OF BOTH PAROTID GLANDS: Chronic | ICD-10-CM

## 2021-12-06 DIAGNOSIS — C82.08 GRADE 1 FOLLICULAR LYMPHOMA OF LYMPH NODES OF MULTIPLE REGIONS (HCC): Primary | ICD-10-CM

## 2021-12-06 DIAGNOSIS — C82.08 GRADE 1 FOLLICULAR LYMPHOMA OF LYMPH NODES OF MULTIPLE REGIONS (HCC): Primary | Chronic | ICD-10-CM

## 2021-12-06 DIAGNOSIS — R79.89 ELEVATED LFTS: Chronic | ICD-10-CM

## 2021-12-06 LAB
ALBUMIN SERPL-MCNC: 4.3 G/DL (ref 3.5–5.2)
ALBUMIN/GLOB SERPL: 1.5 G/DL
ALP SERPL-CCNC: 88 U/L (ref 39–117)
ALT SERPL W P-5'-P-CCNC: 70 U/L (ref 1–33)
ANION GAP SERPL CALCULATED.3IONS-SCNC: 13 MMOL/L (ref 5–15)
AST SERPL-CCNC: 63 U/L (ref 1–32)
BASOPHILS # BLD AUTO: 0.05 10*3/MM3 (ref 0–0.2)
BASOPHILS NFR BLD AUTO: 1.1 % (ref 0–1.5)
BILIRUB SERPL-MCNC: 0.4 MG/DL (ref 0–1.2)
BUN SERPL-MCNC: 9 MG/DL (ref 8–23)
BUN/CREAT SERPL: 12.3 (ref 7–25)
CALCIUM SPEC-SCNC: 9.5 MG/DL (ref 8.6–10.5)
CHLORIDE SERPL-SCNC: 94 MMOL/L (ref 98–107)
CO2 SERPL-SCNC: 26 MMOL/L (ref 22–29)
CREAT SERPL-MCNC: 0.73 MG/DL (ref 0.57–1)
DEPRECATED RDW RBC AUTO: 47.1 FL (ref 37–54)
EOSINOPHIL # BLD AUTO: 0 10*3/MM3 (ref 0–0.4)
EOSINOPHIL NFR BLD AUTO: 0 % (ref 0.3–6.2)
ERYTHROCYTE [DISTWIDTH] IN BLOOD BY AUTOMATED COUNT: 13.8 % (ref 12.3–15.4)
GFR SERPL CREATININE-BSD FRML MDRD: 81 ML/MIN/1.73
GLOBULIN UR ELPH-MCNC: 2.8 GM/DL
GLUCOSE SERPL-MCNC: 331 MG/DL (ref 65–99)
HCT VFR BLD AUTO: 40.1 % (ref 34–46.6)
HGB BLD-MCNC: 14 G/DL (ref 12–15.9)
IMM GRANULOCYTES # BLD AUTO: 0.06 10*3/MM3 (ref 0–0.05)
IMM GRANULOCYTES NFR BLD AUTO: 1.3 % (ref 0–0.5)
LYMPHOCYTES # BLD AUTO: 0.64 10*3/MM3 (ref 0.7–3.1)
LYMPHOCYTES NFR BLD AUTO: 14.3 % (ref 19.6–45.3)
MCH RBC QN AUTO: 32.6 PG (ref 26.6–33)
MCHC RBC AUTO-ENTMCNC: 34.9 G/DL (ref 31.5–35.7)
MCV RBC AUTO: 93.5 FL (ref 79–97)
MONOCYTES # BLD AUTO: 0.39 10*3/MM3 (ref 0.1–0.9)
MONOCYTES NFR BLD AUTO: 8.7 % (ref 5–12)
NEUTROPHILS NFR BLD AUTO: 3.34 10*3/MM3 (ref 1.7–7)
NEUTROPHILS NFR BLD AUTO: 74.6 % (ref 42.7–76)
NRBC BLD AUTO-RTO: 0 /100 WBC (ref 0–0.2)
PLATELET # BLD AUTO: 303 10*3/MM3 (ref 140–450)
PMV BLD AUTO: 9.1 FL (ref 6–12)
POTASSIUM SERPL-SCNC: 3.5 MMOL/L (ref 3.5–5.2)
PROT SERPL-MCNC: 7.1 G/DL (ref 6–8.5)
RBC # BLD AUTO: 4.29 10*6/MM3 (ref 3.77–5.28)
SODIUM SERPL-SCNC: 133 MMOL/L (ref 136–145)
WBC NRBC COR # BLD: 4.48 10*3/MM3 (ref 3.4–10.8)

## 2021-12-06 PROCEDURE — 25010000002 HEPARIN LOCK FLUSH PER 10 UNITS: Performed by: INTERNAL MEDICINE

## 2021-12-06 PROCEDURE — 96375 TX/PRO/DX INJ NEW DRUG ADDON: CPT | Performed by: INTERNAL MEDICINE

## 2021-12-06 PROCEDURE — 25010000002 DIPHENHYDRAMINE PER 50 MG: Performed by: INTERNAL MEDICINE

## 2021-12-06 PROCEDURE — 80053 COMPREHEN METABOLIC PANEL: CPT

## 2021-12-06 PROCEDURE — 1123F ACP DISCUSS/DSCN MKR DOCD: CPT | Performed by: INTERNAL MEDICINE

## 2021-12-06 PROCEDURE — 1125F AMNT PAIN NOTED PAIN PRSNT: CPT | Performed by: INTERNAL MEDICINE

## 2021-12-06 PROCEDURE — 96415 CHEMO IV INFUSION ADDL HR: CPT | Performed by: INTERNAL MEDICINE

## 2021-12-06 PROCEDURE — 85025 COMPLETE CBC W/AUTO DIFF WBC: CPT

## 2021-12-06 PROCEDURE — 96413 CHEMO IV INFUSION 1 HR: CPT | Performed by: INTERNAL MEDICINE

## 2021-12-06 PROCEDURE — A9270 NON-COVERED ITEM OR SERVICE: HCPCS | Performed by: INTERNAL MEDICINE

## 2021-12-06 PROCEDURE — 25010000002 RITUXIMAB 100 MG/10ML SOLUTION 10 ML VIAL: Performed by: INTERNAL MEDICINE

## 2021-12-06 PROCEDURE — 25010000002 RITUXIMAB 500 MG/50ML SOLUTION 50 ML VIAL: Performed by: INTERNAL MEDICINE

## 2021-12-06 PROCEDURE — 63710000001 ACETAMINOPHEN 325 MG TABLET: Performed by: INTERNAL MEDICINE

## 2021-12-06 PROCEDURE — 99214 OFFICE O/P EST MOD 30 MIN: CPT | Performed by: INTERNAL MEDICINE

## 2021-12-06 RX ORDER — DIPHENHYDRAMINE HYDROCHLORIDE 50 MG/ML
50 INJECTION INTRAMUSCULAR; INTRAVENOUS AS NEEDED
Status: DISCONTINUED | OUTPATIENT
Start: 2021-12-06 | End: 2021-12-06 | Stop reason: HOSPADM

## 2021-12-06 RX ORDER — MEPERIDINE HYDROCHLORIDE 25 MG/ML
25 INJECTION INTRAMUSCULAR; INTRAVENOUS; SUBCUTANEOUS
Status: DISCONTINUED | OUTPATIENT
Start: 2021-12-06 | End: 2021-12-06 | Stop reason: HOSPADM

## 2021-12-06 RX ORDER — HEPARIN SODIUM (PORCINE) LOCK FLUSH IV SOLN 100 UNIT/ML 100 UNIT/ML
500 SOLUTION INTRAVENOUS AS NEEDED
Status: CANCELLED | OUTPATIENT
Start: 2022-01-17

## 2021-12-06 RX ORDER — SODIUM CHLORIDE 9 MG/ML
250 INJECTION, SOLUTION INTRAVENOUS ONCE
Status: COMPLETED | OUTPATIENT
Start: 2021-12-06 | End: 2021-12-06

## 2021-12-06 RX ORDER — MEPERIDINE HYDROCHLORIDE 25 MG/ML
25 INJECTION INTRAMUSCULAR; INTRAVENOUS; SUBCUTANEOUS
Status: CANCELLED | OUTPATIENT
Start: 2021-12-06

## 2021-12-06 RX ORDER — HEPARIN SODIUM (PORCINE) LOCK FLUSH IV SOLN 100 UNIT/ML 100 UNIT/ML
500 SOLUTION INTRAVENOUS AS NEEDED
Status: DISCONTINUED | OUTPATIENT
Start: 2021-12-06 | End: 2021-12-06 | Stop reason: HOSPADM

## 2021-12-06 RX ORDER — DIPHENHYDRAMINE HYDROCHLORIDE 50 MG/ML
50 INJECTION INTRAMUSCULAR; INTRAVENOUS AS NEEDED
Status: CANCELLED | OUTPATIENT
Start: 2021-12-06

## 2021-12-06 RX ORDER — FAMOTIDINE 10 MG/ML
20 INJECTION, SOLUTION INTRAVENOUS AS NEEDED
Status: DISCONTINUED | OUTPATIENT
Start: 2021-12-06 | End: 2021-12-06 | Stop reason: HOSPADM

## 2021-12-06 RX ORDER — ACETAMINOPHEN 325 MG/1
650 TABLET ORAL ONCE
Status: CANCELLED | OUTPATIENT
Start: 2021-12-06

## 2021-12-06 RX ORDER — FAMOTIDINE 10 MG/ML
20 INJECTION, SOLUTION INTRAVENOUS AS NEEDED
Status: CANCELLED | OUTPATIENT
Start: 2021-12-06

## 2021-12-06 RX ORDER — SODIUM CHLORIDE 9 MG/ML
250 INJECTION, SOLUTION INTRAVENOUS ONCE
Status: CANCELLED | OUTPATIENT
Start: 2021-12-06

## 2021-12-06 RX ORDER — ACETAMINOPHEN 325 MG/1
650 TABLET ORAL ONCE
Status: COMPLETED | OUTPATIENT
Start: 2021-12-06 | End: 2021-12-06

## 2021-12-06 RX ADMIN — DIPHENHYDRAMINE HYDROCHLORIDE 25 MG: 50 INJECTION, SOLUTION INTRAMUSCULAR; INTRAVENOUS at 09:02

## 2021-12-06 RX ADMIN — HEPARIN 500 UNITS: 100 SYRINGE at 12:20

## 2021-12-06 RX ADMIN — RITUXIMAB 750 MG: 10 INJECTION, SOLUTION INTRAVENOUS at 09:35

## 2021-12-06 RX ADMIN — ACETAMINOPHEN 650 MG: 325 TABLET, FILM COATED ORAL at 08:42

## 2021-12-06 RX ADMIN — SODIUM CHLORIDE 250 ML: 9 INJECTION, SOLUTION INTRAVENOUS at 08:42

## 2021-12-06 NOTE — PROGRESS NOTES
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  Patient is here to get cycle 2 of rituximab maintenance today.  Had a restaging CT of chest, abdomen and pelvis along with CT of maxillofacial with contrast done recently, she is here to discuss the results.  Complains of intermittent diarrhea.  Denies any new lymph node enlargement.  Denies any fever or drenching night sweats.  Denies any bleeding.        Oncology history:    1.  Recurrent follicular lymphoma grade 1:  -Patient initially diagnosed in April 2015 for which patient underwent palliative radiation treatment.  -Second relapse happened in September 2016 for again patient had a radiation treatment.  - Patient was found to have enlarged aortocaval lymph node in April 2017 for which patient initially received rituximab every 2 months without any significant improvement.  -Subsequently patient received 4 cycles of bendamustine and rituximab between April 30, 2018 and August 21, 2018.  -Patient was diagnosed with involvement of left parotid gland on biopsy done on August 27, 2020 by Dr. Ibrahim.  PET/CT done shows uptake in bilateral parotid gland along with left sided neck lymph node involvement.  -Patient was started on Revlimid with rituximab on September 22, 2020.  Patient completed cycle 12 on 8/23/2021.            Past Medical History, Past Surgical History, Social History, Family History have been reviewed and are without significant changes except as mentioned.    Review of  Systems   Constitutional: Positive for fatigue.   Respiratory: Positive for shortness of breath.    Gastrointestinal: Positive for diarrhea.   Musculoskeletal: Positive for arthralgias and back pain.   Hematological: Negative for adenopathy.      A comprehensive 14 point review of systems was performed and was negative except as mentioned.    Medications:  The current medication list was reviewed in the EMR    ALLERGIES:    Allergies   Allergen Reactions   • Ace Inhibitors Cough   • Latex      BLISTER     • Morphine And Related Nausea And Vomiting   • Lortab [Hydrocodone-Acetaminophen] Palpitations       Objective      Vitals:    12/06/21 0800   BP: 137/68   Pulse: 95   Temp: 97.2 °F (36.2 °C)   SpO2: 95%   Weight: 99.8 kg (220 lb 1.6 oz)   PainSc:   2   PainLoc: Generalized  Comment: joints / particularly RT shoulder     Current Status 10/21/2021   ECOG score 0       Physical Exam  Cardiovascular:      Rate and Rhythm: Normal rate and regular rhythm.      Comments: Port-A-Cath present  Pulmonary:      Breath sounds: Normal breath sounds.   Lymphadenopathy:      Cervical: No cervical adenopathy.   Neurological:      Mental Status: She is alert and oriented to person, place, and time.           RECENT LABS:  Glucose   Date Value Ref Range Status   12/06/2021 331 (H) 65 - 99 mg/dL Final     Sodium   Date Value Ref Range Status   12/06/2021 133 (L) 136 - 145 mmol/L Final     Potassium   Date Value Ref Range Status   12/06/2021 3.5 3.5 - 5.2 mmol/L Final     CO2   Date Value Ref Range Status   12/06/2021 26.0 22.0 - 29.0 mmol/L Final     Chloride   Date Value Ref Range Status   12/06/2021 94 (L) 98 - 107 mmol/L Final     Anion Gap   Date Value Ref Range Status   12/06/2021 13.0 5.0 - 15.0 mmol/L Final     Creatinine   Date Value Ref Range Status   12/06/2021 0.73 0.57 - 1.00 mg/dL Final     BUN   Date Value Ref Range Status   12/06/2021 9 8 - 23 mg/dL Final     BUN/Creatinine Ratio   Date Value Ref Range Status    12/06/2021 12.3 7.0 - 25.0 Final     Calcium   Date Value Ref Range Status   12/06/2021 9.5 8.6 - 10.5 mg/dL Final     eGFR Non  Amer   Date Value Ref Range Status   12/06/2021 81 >60 mL/min/1.73 Final     Alkaline Phosphatase   Date Value Ref Range Status   12/06/2021 88 39 - 117 U/L Final     Total Protein   Date Value Ref Range Status   12/06/2021 7.1 6.0 - 8.5 g/dL Final     ALT (SGPT)   Date Value Ref Range Status   12/06/2021 70 (H) 1 - 33 U/L Final     AST (SGOT)   Date Value Ref Range Status   12/06/2021 63 (H) 1 - 32 U/L Final     Total Bilirubin   Date Value Ref Range Status   12/06/2021 0.4 0.0 - 1.2 mg/dL Final     Albumin   Date Value Ref Range Status   12/06/2021 4.30 3.50 - 5.20 g/dL Final     Globulin   Date Value Ref Range Status   12/06/2021 2.8 gm/dL Final     Lab Results   Component Value Date    WBC 4.48 12/06/2021    HGB 14.0 12/06/2021    HCT 40.1 12/06/2021    MCV 93.5 12/06/2021     12/06/2021     Lab Results   Component Value Date    NEUTROABS 3.34 12/06/2021     No results found for: , LABCA2, AFPTM, HCGQUANT, , CHROMGRNA, [id number], CEA, REFLABREPO      PATHOLOGY:  * Cannot find OR log *         RADIOLOGY DATA :  CT of maxillofacial with contrast done on November 24, 2021 showed:    IMPRESSION:  CONCLUSION:    There is a 1 cm irregular shaped density in the anterior right  parotid gland similar to the prior exam. No significant change in  appearance of either parotid.         CT of chest, abdomen and pelvis with contrast done on November 24, 2021 showed:    IMPRESSION:  Fatty liver.     CT chest, abdomen and pelvis with contrast is otherwise  unremarkable. No findings suggesting active or recurrent disease  in this patient with history of lymphoma.              No radiology results for the last 7 days        Assessment/Plan     1.  Recurrent follicular lymphoma  -Review oncology history for prior treatment details  -Was started on maintenance rituximab on  September 8, 2021.  -We will proceed with cycle 2 of maintenance rituximab today.  -We will continue close monitoring for any chemotherapy related side effect  -We will have patient return to clinic in 3 months with repeat CBC, CMP on that day.  -Plan is to do restaging CT of chest, abdomen, pelvis and maxillofacial with contrast around June 2022.    2.  Elevated liver function test:  We will secondary to fatty liver.  Will monitor with CMP    3.  Health maintenance: Patient does not smoke.  Had a colonoscopy done on November 30, 2021 by Dr Cruz    4. Advance Care Planning: For now patient remains full code and is able to make decisions.  Patient has health care surrogate mentioned on chart.    5.  Uncontrolled diabetes mellitus:  -Blood glucose is elevated at 331 today.  Patient was again notified about elevated blood glucose and importance of controlling her blood glucose regularly in view of fatty liver and elevated liver enzymes.             PHQ-9 Total Score: 0   -Patient is not homicidal or suicidal.  No acute intervention required.    Chasity Leon reports a pain score of 2.  Given her pain assessment as noted, treatment options were discussed and the following options were decided upon as a follow-up plan to address the patient's pain: continuation of current treatment plan for pain.         Tino Sood MD  12/6/2021  08:18 CST        Part of this note may be an electronic transcription/translation of spoken language to printed text using the Dragon Dictation System.          CC:

## 2021-12-15 ENCOUNTER — TELEPHONE (OUTPATIENT)
Dept: ONCOLOGY | Facility: HOSPITAL | Age: 61
End: 2021-12-15

## 2021-12-15 NOTE — TELEPHONE ENCOUNTER
----- Message from Tino Sood MD sent at 12/15/2021  3:00 PM CST -----  Regarding: Pain  No.  Recent CT scan did not show any new finding involving parotid gland.  Thank you    ----- Message -----  From: Nancy Pacheco RN  Sent: 12/15/2021   2:32 PM CST  To: Tino Sood MD  Subject: Pain                                             Please advise    ----- Message -----  From: Chasity Leon  Sent: 12/15/2021  10:38 AM CST  To: [facility]  Subject: Pain                                             Is there any reason my right parotid gland area should be hurting? It is hurting.

## 2021-12-15 NOTE — TELEPHONE ENCOUNTER
PT advised as per message. Instructed to follow with PCP if pain continues. PT verbalizes understanding and denies any further questions.

## 2021-12-23 ENCOUNTER — OFFICE VISIT (OUTPATIENT)
Dept: GASTROENTEROLOGY | Facility: CLINIC | Age: 61
End: 2021-12-23

## 2021-12-23 DIAGNOSIS — R19.7 DIARRHEA, UNSPECIFIED TYPE: Primary | ICD-10-CM

## 2021-12-23 PROCEDURE — 99442 PR PHYS/QHP TELEPHONE EVALUATION 11-20 MIN: CPT | Performed by: INTERNAL MEDICINE

## 2022-01-17 ENCOUNTER — INFUSION (OUTPATIENT)
Dept: ONCOLOGY | Facility: HOSPITAL | Age: 62
End: 2022-01-17

## 2022-01-17 DIAGNOSIS — Z45.2 ENCOUNTER FOR VENOUS ACCESS DEVICE CARE: Primary | ICD-10-CM

## 2022-01-17 PROCEDURE — 25010000002 HEPARIN LOCK FLUSH PER 10 UNITS: Performed by: INTERNAL MEDICINE

## 2022-01-17 PROCEDURE — 96523 IRRIG DRUG DELIVERY DEVICE: CPT | Performed by: INTERNAL MEDICINE

## 2022-01-17 RX ORDER — HEPARIN SODIUM (PORCINE) LOCK FLUSH IV SOLN 100 UNIT/ML 100 UNIT/ML
300 SOLUTION INTRAVENOUS AS NEEDED
Status: CANCELLED | OUTPATIENT
Start: 2022-02-28

## 2022-01-17 RX ORDER — HEPARIN SODIUM (PORCINE) LOCK FLUSH IV SOLN 100 UNIT/ML 100 UNIT/ML
500 SOLUTION INTRAVENOUS AS NEEDED
Status: DISCONTINUED | OUTPATIENT
Start: 2022-01-17 | End: 2022-01-17 | Stop reason: HOSPADM

## 2022-01-17 RX ADMIN — HEPARIN 500 UNITS: 100 SYRINGE at 10:00

## 2022-01-18 RX ORDER — DICYCLOMINE HYDROCHLORIDE 10 MG/1
20 CAPSULE ORAL
Qty: 240 CAPSULE | Refills: 5 | Status: SHIPPED | OUTPATIENT
Start: 2022-01-18 | End: 2022-02-17

## 2022-01-26 NOTE — PROGRESS NOTES
No chief complaint on file.      Subjective    Chasity Leon is a 62 y.o. female.    History of Present Illness  Patient had a 15-minute telephone follow-up visit today due to current pandemic.  She feels some better currently.  Has intermittent symptoms of diarrhea.  Abdominal pain has improved.  No further rectal bleeding.  Weight is stable. Taking Bentyl once a day.  EGD was consistent with hiatal hernia, esophagitis and gastritis.  Path was unremarkable.  Colonoscopy was consistent with hemorrhoids.  Path was unremarkable.       The following portions of the patient's history were reviewed and updated as appropriate:   Past Medical History:   Diagnosis Date   • Acute bronchitis    • Agoraphobia with panic disorder     on SNRI, prn buspar, prn klonopin     • Anxiety    • Arthritis    • Atrophic vaginitis    • Chest pain     work up as new onset angina    • Depression      MDD      • Elevated cholesterol    • Essential hypertension    • Follicular non-Hodgkin's lymphoma (HCC)    • Generalized anxiety disorder     SSRI - buspar, cont cymbalta, prn klonopin      • GERD (gastroesophageal reflux disease)    • Hip pain     arthritis, artifical right hip     • History of bone density study 02/09/2009    DEXA (bone density) test, peripheral (Normal   • History of echocardiogram 01/03/2012    Echocardiogram 19020 (Normal echocardigram. EF 55-60%)   • History of mammogram    • History of transfusion    • Hyperlipidemia    • Mass of lower limb    • Nuclear senile cataract    • Obesity    • Otalgia     ENT REFER   • Panic disorder     with agoraphobia. On buspar and klonopin now      • Type 2 diabetes mellitus (HCC)     NO BDR   • Upper respiratory infection      Past Surgical History:   Procedure Laterality Date   • CENTRAL VENOUS LINE INSERTION  03/11/2016    Central line insertion (Successful placement of right upper extremity midline.)   • COLONOSCOPY     • COLONOSCOPY N/A 7/18/2019    Procedure: COLONOSCOPY;   Surgeon: Brandon Salvador DO;  Location: [facility] ENDOSCOPY;  Service: Gastroenterology   • COLONOSCOPY N/A 11/30/2021    Procedure: COLONOSCOPY;  Surgeon: Magda Cruz MD;  Location: [facility] ENDOSCOPY;  Service: Gastroenterology;  Laterality: N/A;   • CYSTOSCOPY  03/23/1976    Cystoscopy (external urethroplasty and cysto-panendoscopy,urethal hymenal fusion with hypospadias)   • DILATATION AND CURETTAGE  01/25/1980    D&C (removal of IUD)   • ENDOSCOPY N/A 11/30/2021    Procedure: ESOPHAGOGASTRODUODENOSCOPY;  Surgeon: Magda Cruz MD;  Location: [facility] ENDOSCOPY;  Service: Gastroenterology;  Laterality: N/A;   • EXCISION LESION  04/15/2014    Remove thigh lesion (Excision of a mass of the right thigh using a 16.2 cm incision with intermediate layered closure.)   • HYSTEROSCOPY  02/28/2003    Hysteroscope procedure (diagnostic hysteroscopy with fractional D&C)   • INJECTION OF MEDICATION  08/22/2011    kenalog (1)   • JOINT REPLACEMENT Right     hip   • OTHER SURGICAL HISTORY      Explore parathyroid glands   • OTHER SURGICAL HISTORY  08/26/1985    Laparosc diagnostic (desires elective tubal reversal)   • PAP SMEAR  05/24/2005    PAP SMEAR (normal)   • MA INSJ TUNNELED CVC W/O SUBQ PORT/ AGE 5 YR/> Right 4/4/2017    Procedure: INSERTION VENOUS ACCESS DEVICE (MEDIPORT) right chest;  Surgeon: Fortino Medina MD;  Location: [facility] OR;  Service: General   • MA INSJ TUNNELED CVC W/O SUBQ PORT/ AGE 5 YR/> N/A 6/6/2017    Procedure: INSERTION VENOUS ACCESS DEVICE   (MEDIPORT)   (C-ARM#1);  Surgeon: Fortino Medina MD;  Location: [facility] OR;  Service: General   • SHOULDER ARTHROSCOPY  11/11/2013    Shoulder arthroscopy/surgery (Right shoulder. Rotator cuff repair. Subacromial decompression. Edgar procedure.)   • SHOULDER SURGERY  12/28/2015    Shoulder surgery procedure (Arthroscopy of the left shoulder with rotator cuff repair.Edgar procedure.Subacromial decompression.)   • TONSILLECTOMY  1972   •  TONSILLECTOMY AND ADENOIDECTOMY  1967    T&A (hypertrophied tonsils and adenoids with recurrent infection)   • TOTAL ABDOMINAL HYSTERECTOMY WITH SALPINGO OOPHORECTOMY  2004    ARIELLE/BSO (with a preop fractional dilation and curettage with frozen section,menorrhagia,dysmenorrhea,unresponsive to medical intervention)   • TOTAL HIP ARTHROPLASTY     • VAGINA SURGERY  1981    Anesth, surgery on vagina (colpotomy with bilateral partial salpingectomy, multiparity contraindicated)     Family History   Problem Relation Age of Onset   • Breast cancer Mother    • Cancer Mother    • Diabetes Mother    • Heart disease Father    • Liver cancer Father    • Cancer Father    • Diabetes Sister         INSULIN DEPENDENT   • Cancer Sister    • Diabetes Brother         INSULIN DEPENDENT   • Coronary artery disease Other    • Diabetes Other    • Diabetes Maternal Grandmother    • Cancer Maternal Grandfather      OB History        2    Para   2    Term   2            AB        Living           SAB        IAB        Ectopic        Molar        Multiple        Live Births                  Prior to Admission medications    Medication Sig Start Date End Date Taking? Authorizing Provider   ARIPiprazole (ABILIFY) 20 MG tablet Take 20 mg by mouth Every Night. 17   Emelia Cain MD   busPIRone (BUSPAR) 30 MG tablet Take 30 mg by mouth 2 (Two) Times a Day. 20   Emelia Cain MD   Calcium Carbonate-Vitamin D 600-200 MG-UNIT tablet Take 1 tablet by mouth Daily.    Emelia Cain MD   clonazePAM (KlonoPIN) 1 MG tablet Take 1 mg by mouth as needed for seizures. 16   Emelia Cain MD   cyclobenzaprine (FLEXERIL) 10 MG tablet TAKE 1 TABLET (10 MG) BY MOUTH 3 TIMES DAILY AS NEEDED FOR MUSCLE SPASMS - - MAY CAUSE DROWSINESS 20   Emelia Cain MD   Desvenlafaxine Succinate (PRISTIQ PO) Take 1 tablet by mouth daily. 16   Emelia Cain MD   dicyclomine  (Bentyl) 10 MG capsule Take 2 capsules by mouth 4 (Four) Times a Day Before Meals & at Bedtime for 30 days. 1/18/22 2/17/22  Magda Cruz MD   diphenoxylate-atropine (LOMOTIL) 2.5-0.025 MG per tablet Take 2 tablets by mouth 4 (Four) Times a Day As Needed for Diarrhea. 6/1/21   Tino Sood MD   doxepin (SINEquan) 25 MG capsule  7/14/20   Emelia Cain MD   famotidine (PEPCID) 40 MG tablet Take 40 mg by mouth. 2/18/20   Emelia Cain MD   glimepiride (AMARYL) 4 MG tablet  8/11/20   Emelia Cain MD   lamoTRIgine (LAMICTAL) 150 MG tablet Take 150 mg by mouth 2 (two) times a day. 1/1/16   Emelia Cain MD   losartan (COZAAR) 100 MG tablet Take 100 mg by mouth daily. 1/1/16   Emelia Cain MD   metFORMIN (GLUCOPHAGE) 1000 MG tablet  8/20/20   Emelia Cain MD   Multiple Vitamins-Minerals (CENTRUM SILVER PO) Take 1 tablet by mouth daily. 1/1/16   Emelia Cain MD   ondansetron (ZOFRAN) 4 MG tablet Take 1 tablet by mouth 4 (Four) Times a Day As Needed for Nausea or Vomiting. 9/8/20   Tino Sood MD   pioglitazone (ACTOS) 45 MG tablet  6/11/20   Emelia Cain MD   polyethylene glycol (GoLYTELY) 236 g solution Starting at noon on day prior to procedure, drink 8 ounces every 30 minutes until all gone or stools are clear. May add flavor packet. 11/23/21   Magda Cruz MD   promethazine (PHENERGAN) 25 MG tablet Take 1 tablet by mouth Every 6 (Six) Hours As Needed for Nausea or Vomiting. 11/18/21   Tino Sood MD   TRAZODONE HCL PO Take 225 mg by mouth every night. 1/1/16   Emelia Cain MD     Allergies   Allergen Reactions   • Ace Inhibitors Cough   • Latex      BLISTER     • Morphine And Related Nausea And Vomiting   • Lortab [Hydrocodone-Acetaminophen] Palpitations     Social History     Socioeconomic History   • Marital status:    Tobacco Use   • Smoking status: Former Smoker     Years: 12.00     Quit date: 1/1/1998     Years  since quittin.0   • Smokeless tobacco: Never Used   • Tobacco comment: Smoked 1 pack per 2 weeks   Substance and Sexual Activity   • Alcohol use: No   • Drug use: No   • Sexual activity: Defer       Review of Systems  Review of Systems   Constitutional: Negative for chills, fatigue, fever and unexpected weight change.   HENT: Negative for congestion, ear discharge, hearing loss, nosebleeds and sore throat.    Eyes: Negative for pain, discharge and redness.   Respiratory: Negative for cough, chest tightness, shortness of breath and wheezing.    Cardiovascular: Negative for chest pain and palpitations.   Gastrointestinal: Positive for abdominal pain and diarrhea. Negative for abdominal distention, blood in stool, constipation, nausea and vomiting.   Endocrine: Negative for cold intolerance, polydipsia, polyphagia and polyuria.   Genitourinary: Negative for dysuria, flank pain, frequency, hematuria and urgency.   Musculoskeletal: Negative for arthralgias, back pain, joint swelling and myalgias.   Skin: Negative for color change, pallor and rash.   Neurological: Negative for tremors, seizures, syncope, weakness and headaches.   Hematological: Negative for adenopathy. Does not bruise/bleed easily.   Psychiatric/Behavioral: Negative for behavioral problems, confusion, dysphoric mood, hallucinations and suicidal ideas. The patient is not nervous/anxious.         There were no vitals taken for this visit.    Objective    Physical Exam telephone visit  Infusion on 2021   Component Date Value Ref Range Status   • Glucose 2021 331* 65 - 99 mg/dL Final   • BUN 2021 9  8 - 23 mg/dL Final   • Creatinine 2021 0.73  0.57 - 1.00 mg/dL Final   • Sodium 2021 133* 136 - 145 mmol/L Final   • Potassium 2021 3.5  3.5 - 5.2 mmol/L Final   • Chloride 2021 94* 98 - 107 mmol/L Final   • CO2 2021 26.0  22.0 - 29.0 mmol/L Final   • Calcium 2021 9.5  8.6 - 10.5 mg/dL Final   • Total  Protein 12/06/2021 7.1  6.0 - 8.5 g/dL Final   • Albumin 12/06/2021 4.30  3.50 - 5.20 g/dL Final   • ALT (SGPT) 12/06/2021 70* 1 - 33 U/L Final   • AST (SGOT) 12/06/2021 63* 1 - 32 U/L Final   • Alkaline Phosphatase 12/06/2021 88  39 - 117 U/L Final   • Total Bilirubin 12/06/2021 0.4  0.0 - 1.2 mg/dL Final   • eGFR Non  Amer 12/06/2021 81  >60 mL/min/1.73 Final   • Globulin 12/06/2021 2.8  gm/dL Final   • A/G Ratio 12/06/2021 1.5  g/dL Final   • BUN/Creatinine Ratio 12/06/2021 12.3  7.0 - 25.0 Final   • Anion Gap 12/06/2021 13.0  5.0 - 15.0 mmol/L Final   • WBC 12/06/2021 4.48  3.40 - 10.80 10*3/mm3 Final   • RBC 12/06/2021 4.29  3.77 - 5.28 10*6/mm3 Final   • Hemoglobin 12/06/2021 14.0  12.0 - 15.9 g/dL Final   • Hematocrit 12/06/2021 40.1  34.0 - 46.6 % Final   • MCV 12/06/2021 93.5  79.0 - 97.0 fL Final   • MCH 12/06/2021 32.6  26.6 - 33.0 pg Final   • MCHC 12/06/2021 34.9  31.5 - 35.7 g/dL Final   • RDW 12/06/2021 13.8  12.3 - 15.4 % Final   • RDW-SD 12/06/2021 47.1  37.0 - 54.0 fl Final   • MPV 12/06/2021 9.1  6.0 - 12.0 fL Final   • Platelets 12/06/2021 303  140 - 450 10*3/mm3 Final   • Neutrophil % 12/06/2021 74.6  42.7 - 76.0 % Final   • Lymphocyte % 12/06/2021 14.3* 19.6 - 45.3 % Final   • Monocyte % 12/06/2021 8.7  5.0 - 12.0 % Final   • Eosinophil % 12/06/2021 0.0* 0.3 - 6.2 % Final   • Basophil % 12/06/2021 1.1  0.0 - 1.5 % Final   • Immature Grans % 12/06/2021 1.3* 0.0 - 0.5 % Final   • Neutrophils, Absolute 12/06/2021 3.34  1.70 - 7.00 10*3/mm3 Final   • Lymphocytes, Absolute 12/06/2021 0.64* 0.70 - 3.10 10*3/mm3 Final   • Monocytes, Absolute 12/06/2021 0.39  0.10 - 0.90 10*3/mm3 Final   • Eosinophils, Absolute 12/06/2021 0.00  0.00 - 0.40 10*3/mm3 Final   • Basophils, Absolute 12/06/2021 0.05  0.00 - 0.20 10*3/mm3 Final   • Immature Grans, Absolute 12/06/2021 0.06* 0.00 - 0.05 10*3/mm3 Final   • nRBC 12/06/2021 0.0  0.0 - 0.2 /100 WBC Final     Assessment/Plan    No diagnosis found..   1.   Abdominal pain with diarrhea and rectal bleeding, improving.  Continue Bentyl and increase to 4 times daily.  Add Preparation H suppositories as needed.    2.  Abdominal pain with nausea, improving.  Continue Prilosec 40 mg p.o. daily and Pepcid nightly.    3.  Obesity, recommend exercise and diet control.    Orders placed during this encounter include:  No orders of the defined types were placed in this encounter.      * Surgery not found *    Review and/or summary of lab tests, radiology, procedures, medications. Review and summary of old records and obtaining of history. The risks and benefits of my recommendations, as well as other treatment options were discussed with the patient today. Questions were answered.    No orders of the defined types were placed in this encounter.      Follow-up: No follow-ups on file.               Results for orders placed or performed in visit on 12/06/21   CBC Auto Differential    Specimen: Blood   Result Value Ref Range    WBC 4.48 3.40 - 10.80 10*3/mm3    RBC 4.29 3.77 - 5.28 10*6/mm3    Hemoglobin 14.0 12.0 - 15.9 g/dL    Hematocrit 40.1 34.0 - 46.6 %    MCV 93.5 79.0 - 97.0 fL    MCH 32.6 26.6 - 33.0 pg    MCHC 34.9 31.5 - 35.7 g/dL    RDW 13.8 12.3 - 15.4 %    RDW-SD 47.1 37.0 - 54.0 fl    MPV 9.1 6.0 - 12.0 fL    Platelets 303 140 - 450 10*3/mm3    Neutrophil % 74.6 42.7 - 76.0 %    Lymphocyte % 14.3 (L) 19.6 - 45.3 %    Monocyte % 8.7 5.0 - 12.0 %    Eosinophil % 0.0 (L) 0.3 - 6.2 %    Basophil % 1.1 0.0 - 1.5 %    Immature Grans % 1.3 (H) 0.0 - 0.5 %    Neutrophils, Absolute 3.34 1.70 - 7.00 10*3/mm3    Lymphocytes, Absolute 0.64 (L) 0.70 - 3.10 10*3/mm3    Monocytes, Absolute 0.39 0.10 - 0.90 10*3/mm3    Eosinophils, Absolute 0.00 0.00 - 0.40 10*3/mm3    Basophils, Absolute 0.05 0.00 - 0.20 10*3/mm3    Immature Grans, Absolute 0.06 (H) 0.00 - 0.05 10*3/mm3    nRBC 0.0 0.0 - 0.2 /100 WBC   Comprehensive metabolic panel    Specimen: Blood   Result Value Ref Range     Glucose 331 (H) 65 - 99 mg/dL    BUN 9 8 - 23 mg/dL    Creatinine 0.73 0.57 - 1.00 mg/dL    Sodium 133 (L) 136 - 145 mmol/L    Potassium 3.5 3.5 - 5.2 mmol/L    Chloride 94 (L) 98 - 107 mmol/L    CO2 26.0 22.0 - 29.0 mmol/L    Calcium 9.5 8.6 - 10.5 mg/dL    Total Protein 7.1 6.0 - 8.5 g/dL    Albumin 4.30 3.50 - 5.20 g/dL    ALT (SGPT) 70 (H) 1 - 33 U/L    AST (SGOT) 63 (H) 1 - 32 U/L    Alkaline Phosphatase 88 39 - 117 U/L    Total Bilirubin 0.4 0.0 - 1.2 mg/dL    eGFR Non African Amer 81 >60 mL/min/1.73    Globulin 2.8 gm/dL    A/G Ratio 1.5 g/dL    BUN/Creatinine Ratio 12.3 7.0 - 25.0    Anion Gap 13.0 5.0 - 15.0 mmol/L   Results for orders placed or performed during the hospital encounter of 11/30/21   Tissue Pathology Exam    Specimen: A: Small Intestine, Duodenum; Tissue    B: Gastric, Antrum; Tissue    C: Esophagus; Tissue    D: Large Intestine; Tissue   Result Value Ref Range    Case Report       Surgical Pathology Report                         Case: [id number]                                  Authorizing Provider:  Magda Cruz MD      Collected:           11/30/2021 04:10 PM          Ordering Location:     [facility]   Received:            12/01/2021 07:12 AM                                 [place] [facility]                                                     Pathologist:           Mitch Marks MD                                                           Specimens:   1) - Small Intestine, Duodenum, MM                                                                  2) - Gastric, Antrum, MM                                                                            3) - Esophagus, eg junction MM                                                                      4) - Large Intestine, colonic mucosa MM                                                    Final Diagnosis       SEE SCANNED REPORT       POC Glucose Once    Specimen: Blood   Result Value Ref Range    Glucose 161 (H)  70 - 130 mg/dL   Results for orders placed or performed in visit on 11/29/21   COVID-19, [facility] IN-HOUSE, NP SWAB IN TRANSPORT MEDIA 8-10 HR TAT - Swab, Nasopharynx    Specimen: Nasopharynx; Swab   Result Value Ref Range    COVID19 Not Detected Not Detected - Ref. Range   Results for orders placed or performed in visit on 11/24/21   Gold Top - SST   Result Value Ref Range    Extra Tube Hold for add-ons.    CBC Auto Differential    Specimen: Blood   Result Value Ref Range    WBC 4.83 3.40 - 10.80 10*3/mm3    RBC 4.19 3.77 - 5.28 10*6/mm3    Hemoglobin 13.7 12.0 - 15.9 g/dL    Hematocrit 39.0 34.0 - 46.6 %    MCV 93.1 79.0 - 97.0 fL    MCH 32.7 26.6 - 33.0 pg    MCHC 35.1 31.5 - 35.7 g/dL    RDW 13.9 12.3 - 15.4 %    RDW-SD 47.6 37.0 - 54.0 fl    MPV 8.9 6.0 - 12.0 fL    Platelets 296 140 - 450 10*3/mm3    Neutrophil % 72.3 42.7 - 76.0 %    Lymphocyte % 14.7 (L) 19.6 - 45.3 %    Monocyte % 11.2 5.0 - 12.0 %    Eosinophil % 0.0 (L) 0.3 - 6.2 %    Basophil % 0.8 0.0 - 1.5 %    Immature Grans % 1.0 (H) 0.0 - 0.5 %    Neutrophils, Absolute 3.49 1.70 - 7.00 10*3/mm3    Lymphocytes, Absolute 0.71 0.70 - 3.10 10*3/mm3    Monocytes, Absolute 0.54 0.10 - 0.90 10*3/mm3    Eosinophils, Absolute 0.00 0.00 - 0.40 10*3/mm3    Basophils, Absolute 0.04 0.00 - 0.20 10*3/mm3    Immature Grans, Absolute 0.05 0.00 - 0.05 10*3/mm3    nRBC 0.0 0.0 - 0.2 /100 WBC   Comprehensive Metabolic Panel    Specimen: Blood   Result Value Ref Range    Glucose 246 (H) 65 - 99 mg/dL    BUN 6 (L) 8 - 23 mg/dL    Creatinine 0.73 0.57 - 1.00 mg/dL    Sodium 135 (L) 136 - 145 mmol/L    Potassium 3.5 3.5 - 5.2 mmol/L    Chloride 94 (L) 98 - 107 mmol/L    CO2 28.0 22.0 - 29.0 mmol/L    Calcium 9.7 8.6 - 10.5 mg/dL    Total Protein 7.0 6.0 - 8.5 g/dL    Albumin 4.30 3.50 - 5.20 g/dL    ALT (SGPT) 83 (H) 1 - 33 U/L    AST (SGOT) 95 (H) 1 - 32 U/L    Alkaline Phosphatase 90 39 - 117 U/L    Total Bilirubin 0.4 0.0 - 1.2 mg/dL    eGFR Non  Amer 81 >60  mL/min/1.73    Globulin 2.7 gm/dL    A/G Ratio 1.6 g/dL    BUN/Creatinine Ratio 8.2 7.0 - 25.0    Anion Gap 13.0 5.0 - 15.0 mmol/L   Results for orders placed or performed in visit on 10/25/21   Clostridium Difficile Toxin, PCR - Stool, Per Rectum    Specimen: Per Rectum; Stool   Result Value Ref Range    C. Difficile Toxins by PCR Negative Negative   Stool Culture (Reference Lab) - Stool, Per Rectum    Specimen: Per Rectum; Stool   Result Value Ref Range    Salmonella/Shigella Screen Final report     Result 1 Comment     Campylobacter Culture Final report     Result 1 Comment     E coli, Shiga toxin Assay Negative Negative   Results for orders placed or performed in visit on 09/08/21   Gold Top - SST   Result Value Ref Range    Extra Tube Hold for add-ons.    CBC Auto Differential    Specimen: Blood   Result Value Ref Range    WBC 4.93 3.40 - 10.80 10*3/mm3    RBC 4.31 3.77 - 5.28 10*6/mm3    Hemoglobin 14.1 12.0 - 15.9 g/dL    Hematocrit 40.1 34.0 - 46.6 %    MCV 93.0 79.0 - 97.0 fL    MCH 32.7 26.6 - 33.0 pg    MCHC 35.2 31.5 - 35.7 g/dL    RDW 14.7 12.3 - 15.4 %    RDW-SD 50.8 37.0 - 54.0 fl    MPV 8.4 6.0 - 12.0 fL    Platelets 332 140 - 450 10*3/mm3    Neutrophil % 69.5 42.7 - 76.0 %    Lymphocyte % 16.2 (L) 19.6 - 45.3 %    Monocyte % 10.1 5.0 - 12.0 %    Eosinophil % 0.0 (L) 0.3 - 6.2 %    Basophil % 1.6 (H) 0.0 - 1.5 %    Immature Grans % 2.6 (H) 0.0 - 0.5 %    Neutrophils, Absolute 3.42 1.70 - 7.00 10*3/mm3    Lymphocytes, Absolute 0.80 0.70 - 3.10 10*3/mm3    Monocytes, Absolute 0.50 0.10 - 0.90 10*3/mm3    Eosinophils, Absolute 0.00 0.00 - 0.40 10*3/mm3    Basophils, Absolute 0.08 0.00 - 0.20 10*3/mm3    Immature Grans, Absolute 0.13 (H) 0.00 - 0.05 10*3/mm3    nRBC 0.0 0.0 - 0.2 /100 WBC     *Note: Due to a large number of results and/or encounters for the requested time period, some results have not been displayed. A complete set of results can be found in Results Review.         This document has been  electronically signed by Magda Cruz MD on January 26, 2022 07:06 CST

## 2022-01-27 ENCOUNTER — OFFICE VISIT (OUTPATIENT)
Dept: GASTROENTEROLOGY | Facility: CLINIC | Age: 62
End: 2022-01-27

## 2022-01-27 DIAGNOSIS — R19.7 DIARRHEA, UNSPECIFIED TYPE: Primary | ICD-10-CM

## 2022-01-27 PROCEDURE — 99442 PR PHYS/QHP TELEPHONE EVALUATION 11-20 MIN: CPT | Performed by: INTERNAL MEDICINE

## 2022-01-27 RX ORDER — COLESEVELAM 180 1/1
1875 TABLET ORAL 2 TIMES DAILY WITH MEALS
Qty: 180 TABLET | Refills: 6 | Status: SHIPPED | OUTPATIENT
Start: 2022-01-27 | End: 2022-10-14

## 2022-02-11 ENCOUNTER — OFFICE VISIT (OUTPATIENT)
Dept: GASTROENTEROLOGY | Facility: CLINIC | Age: 62
End: 2022-02-11

## 2022-02-11 DIAGNOSIS — R10.11 RIGHT UPPER QUADRANT ABDOMINAL PAIN: Primary | ICD-10-CM

## 2022-02-11 PROCEDURE — 99442 PR PHYS/QHP TELEPHONE EVALUATION 11-20 MIN: CPT | Performed by: INTERNAL MEDICINE

## 2022-02-11 NOTE — PROGRESS NOTES
No chief complaint on file.      Subjective    Chasity Leon is a 62 y.o. female.    History of Present Illness  Patient had a 15-minute telephone follow-up visit today due to current pandemic.  She feels better currently.  Has intermittent symptoms with right upper quadrant pain and nausea.  Diarrhea has improved.  Denied vomiting, rectal bleeding or weight loss.  Taking Bentyl 4 times a day and PPI daily.  CT abdomen pelvis in August of last year was consistent with fatty liver disease.       The following portions of the patient's history were reviewed and updated as appropriate:   Past Medical History:   Diagnosis Date   • Acute bronchitis    • Agoraphobia with panic disorder     on SNRI, prn buspar, prn klonopin     • Anxiety    • Arthritis    • Atrophic vaginitis    • Chest pain     work up as new onset angina    • Depression      MDD      • Elevated cholesterol    • Essential hypertension    • Follicular non-Hodgkin's lymphoma (HCC)    • Generalized anxiety disorder     SSRI - buspar, cont cymbalta, prn klonopin      • GERD (gastroesophageal reflux disease)    • Hip pain     arthritis, artifical right hip     • History of bone density study 02/09/2009    DEXA (bone density) test, peripheral (Normal   • History of echocardiogram 01/03/2012    Echocardiogram 23745 (Normal echocardigram. EF 55-60%)   • History of mammogram    • History of transfusion    • Hyperlipidemia    • Mass of lower limb    • Nuclear senile cataract    • Obesity    • Otalgia     ENT REFER   • Panic disorder     with agoraphobia. On buspar and klonopin now      • Type 2 diabetes mellitus (HCC)     NO BDR   • Upper respiratory infection      Past Surgical History:   Procedure Laterality Date   • CENTRAL VENOUS LINE INSERTION  03/11/2016    Central line insertion (Successful placement of right upper extremity midline.)   • COLONOSCOPY     • COLONOSCOPY N/A 7/18/2019    Procedure: COLONOSCOPY;  Surgeon: Brandon Salvador DO;  Location:   [facility] ENDOSCOPY;  Service: Gastroenterology   • COLONOSCOPY N/A 11/30/2021    Procedure: COLONOSCOPY;  Surgeon: Magda Cruz MD;  Location: [facility] ENDOSCOPY;  Service: Gastroenterology;  Laterality: N/A;   • CYSTOSCOPY  03/23/1976    Cystoscopy (external urethroplasty and cysto-panendoscopy,urethal hymenal fusion with hypospadias)   • DILATATION AND CURETTAGE  01/25/1980    D&C (removal of IUD)   • ENDOSCOPY N/A 11/30/2021    Procedure: ESOPHAGOGASTRODUODENOSCOPY;  Surgeon: Magda Cruz MD;  Location: [facility] ENDOSCOPY;  Service: Gastroenterology;  Laterality: N/A;   • EXCISION LESION  04/15/2014    Remove thigh lesion (Excision of a mass of the right thigh using a 16.2 cm incision with intermediate layered closure.)   • HYSTEROSCOPY  02/28/2003    Hysteroscope procedure (diagnostic hysteroscopy with fractional D&C)   • INJECTION OF MEDICATION  08/22/2011    kenalog (1)   • JOINT REPLACEMENT Right     hip   • OTHER SURGICAL HISTORY      Explore parathyroid glands   • OTHER SURGICAL HISTORY  08/26/1985    Laparosc diagnostic (desires elective tubal reversal)   • PAP SMEAR  05/24/2005    PAP SMEAR (normal)   • [place] INSJ TUNNELED CVC W/O SUBQ PORT/ AGE 5 YR/> Right 4/4/2017    Procedure: INSERTION VENOUS ACCESS DEVICE (MEDIPORT) right chest;  Surgeon: Fortino Medina MD;  Location: [facility] OR;  Service: General   • [place] INSJ TUNNELED CVC W/O SUBQ PORT/ AGE 5 YR/> N/A 6/6/2017    Procedure: INSERTION VENOUS ACCESS DEVICE   (MEDIPORT)   (C-ARM#1);  Surgeon: Fortino Medina MD;  Location: [facility] OR;  Service: General   • SHOULDER ARTHROSCOPY  11/11/2013    Shoulder arthroscopy/surgery (Right shoulder. Rotator cuff repair. Subacromial decompression. Edgar procedure.)   • SHOULDER SURGERY  12/28/2015    Shoulder surgery procedure (Arthroscopy of the left shoulder with rotator cuff repair.Edgar procedure.Subacromial decompression.)   • TONSILLECTOMY  1972   • TONSILLECTOMY AND ADENOIDECTOMY  05/29/1967    T&A  (hypertrophied tonsils and adenoids with recurrent infection)   • TOTAL ABDOMINAL HYSTERECTOMY WITH SALPINGO OOPHORECTOMY  2004    ARIELLE/BSO (with a preop fractional dilation and curettage with frozen section,menorrhagia,dysmenorrhea,unresponsive to medical intervention)   • TOTAL HIP ARTHROPLASTY     • VAGINA SURGERY  1981    Anesth, surgery on vagina (colpotomy with bilateral partial salpingectomy, multiparity contraindicated)     Family History   Problem Relation Age of Onset   • Breast cancer Mother    • Cancer Mother    • Diabetes Mother    • Heart disease Father    • Liver cancer Father    • Cancer Father    • Diabetes Sister         INSULIN DEPENDENT   • Cancer Sister    • Diabetes Brother         INSULIN DEPENDENT   • Coronary artery disease Other    • Diabetes Other    • Diabetes Maternal Grandmother    • Cancer Maternal Grandfather      OB History        2    Para   2    Term   2            AB        Living           SAB        IAB        Ectopic        Molar        Multiple        Live Births                  Prior to Admission medications    Medication Sig Start Date End Date Taking? Authorizing Provider   ARIPiprazole (ABILIFY) 20 MG tablet Take 20 mg by mouth Every Night. 17   Emelia Cain MD   busPIRone (BUSPAR) 30 MG tablet Take 30 mg by mouth 2 (Two) Times a Day. 20   Emelia Cain MD   Calcium Carbonate-Vitamin D 600-200 MG-UNIT tablet Take 1 tablet by mouth Daily.    Emelia Cain MD   clonazePAM (KlonoPIN) 1 MG tablet Take 1 mg by mouth as needed for seizures. 16   Emelia Cain MD   colesevelam (Welchol) 625 MG tablet Take 3 tablets by mouth 2 (Two) Times a Day With Meals for 30 days. 22  Magda Cruz MD   cyclobenzaprine (FLEXERIL) 10 MG tablet TAKE 1 TABLET (10 MG) BY MOUTH 3 TIMES DAILY AS NEEDED FOR MUSCLE SPASMS - - MAY CAUSE DROWSINESS 20   Emelia Cain MD   Desvenlafaxine Succinate  (PRISTIQ PO) Take 1 tablet by mouth daily. 1/1/16   Emelia Cain MD   dicyclomine (Bentyl) 10 MG capsule Take 2 capsules by mouth 4 (Four) Times a Day Before Meals & at Bedtime for 30 days. 1/18/22 2/17/22  Magda Cruz MD   diphenoxylate-atropine (LOMOTIL) 2.5-0.025 MG per tablet Take 2 tablets by mouth 4 (Four) Times a Day As Needed for Diarrhea. 6/1/21   Tino Sood MD   doxepin (SINEquan) 25 MG capsule  7/14/20   Emelia Cain MD   famotidine (PEPCID) 40 MG tablet Take 40 mg by mouth. 2/18/20   Emelia Cain MD   glimepiride (AMARYL) 4 MG tablet  8/11/20   Emelia Cain MD   lamoTRIgine (LAMICTAL) 150 MG tablet Take 150 mg by mouth 2 (two) times a day. 1/1/16   Emelia Cain MD   losartan (COZAAR) 100 MG tablet Take 100 mg by mouth daily. 1/1/16   Emelia Cain MD   metFORMIN (GLUCOPHAGE) 1000 MG tablet  8/20/20   Emelia Cain MD   Multiple Vitamins-Minerals (CENTRUM SILVER PO) Take 1 tablet by mouth daily. 1/1/16   Emelia Cain MD   ondansetron (ZOFRAN) 4 MG tablet Take 1 tablet by mouth 4 (Four) Times a Day As Needed for Nausea or Vomiting. 9/8/20   Tino Sood MD   pioglitazone (ACTOS) 45 MG tablet  6/11/20   Emelia Cain MD   polyethylene glycol (GoLYTELY) 236 g solution Starting at noon on day prior to procedure, drink 8 ounces every 30 minutes until all gone or stools are clear. May add flavor packet. 11/23/21   Magda Cruz MD   promethazine (PHENERGAN) 25 MG tablet Take 1 tablet by mouth Every 6 (Six) Hours As Needed for Nausea or Vomiting. 11/18/21   Tino Sood MD   TRAZODONE HCL PO Take 225 mg by mouth every night. 1/1/16   Emelia Cain MD     Allergies   Allergen Reactions   • Ace Inhibitors Cough   • Latex      BLISTER     • Morphine And Related Nausea And Vomiting   • Lortab [Hydrocodone-Acetaminophen] Palpitations     Social History     Socioeconomic History   • Marital status:     Tobacco Use   • Smoking status: Former Smoker     Years: 12.00     Quit date: 1998     Years since quittin.1   • Smokeless tobacco: Never Used   • Tobacco comment: Smoked 1 pack per 2 weeks   Substance and Sexual Activity   • Alcohol use: No   • Drug use: No   • Sexual activity: Defer       Review of Systems  Review of Systems   Constitutional: Negative for chills, fatigue, fever and unexpected weight change.   HENT: Negative for congestion, ear discharge, hearing loss, nosebleeds and sore throat.    Eyes: Negative for pain, discharge and redness.   Respiratory: Negative for cough, chest tightness, shortness of breath and wheezing.    Cardiovascular: Negative for chest pain and palpitations.   Gastrointestinal: Positive for abdominal pain, diarrhea and nausea. Negative for abdominal distention, blood in stool, constipation and vomiting.   Endocrine: Negative for cold intolerance, polydipsia, polyphagia and polyuria.   Genitourinary: Negative for dysuria, flank pain, frequency, hematuria and urgency.   Musculoskeletal: Negative for arthralgias, back pain, joint swelling and myalgias.   Skin: Negative for color change, pallor and rash.   Neurological: Negative for tremors, seizures, syncope, weakness and headaches.   Hematological: Negative for adenopathy. Does not bruise/bleed easily.   Psychiatric/Behavioral: Negative for behavioral problems, confusion, dysphoric mood, hallucinations and suicidal ideas. The patient is not nervous/anxious.         There were no vitals taken for this visit.    Objective    Physical Exam telephone visit  Infusion on 2021   Component Date Value Ref Range Status   • Glucose 2021 331* 65 - 99 mg/dL Final   • BUN 2021 9  8 - 23 mg/dL Final   • Creatinine 2021 0.73  0.57 - 1.00 mg/dL Final   • Sodium 2021 133* 136 - 145 mmol/L Final   • Potassium 2021 3.5  3.5 - 5.2 mmol/L Final   • Chloride 2021 94* 98 - 107 mmol/L Final   • CO2  12/06/2021 26.0  22.0 - 29.0 mmol/L Final   • Calcium 12/06/2021 9.5  8.6 - 10.5 mg/dL Final   • Total Protein 12/06/2021 7.1  6.0 - 8.5 g/dL Final   • Albumin 12/06/2021 4.30  3.50 - 5.20 g/dL Final   • ALT (SGPT) 12/06/2021 70* 1 - 33 U/L Final   • AST (SGOT) 12/06/2021 63* 1 - 32 U/L Final   • Alkaline Phosphatase 12/06/2021 88  39 - 117 U/L Final   • Total Bilirubin 12/06/2021 0.4  0.0 - 1.2 mg/dL Final   • eGFR Non  Amer 12/06/2021 81  >60 mL/min/1.73 Final   • Globulin 12/06/2021 2.8  gm/dL Final   • A/G Ratio 12/06/2021 1.5  g/dL Final   • BUN/Creatinine Ratio 12/06/2021 12.3  7.0 - 25.0 Final   • Anion Gap 12/06/2021 13.0  5.0 - 15.0 mmol/L Final   • WBC 12/06/2021 4.48  3.40 - 10.80 10*3/mm3 Final   • RBC 12/06/2021 4.29  3.77 - 5.28 10*6/mm3 Final   • Hemoglobin 12/06/2021 14.0  12.0 - 15.9 g/dL Final   • Hematocrit 12/06/2021 40.1  34.0 - 46.6 % Final   • MCV 12/06/2021 93.5  79.0 - 97.0 fL Final   • MCH 12/06/2021 32.6  26.6 - 33.0 pg Final   • MCHC 12/06/2021 34.9  31.5 - 35.7 g/dL Final   • RDW 12/06/2021 13.8  12.3 - 15.4 % Final   • RDW-SD 12/06/2021 47.1  37.0 - 54.0 fl Final   • MPV 12/06/2021 9.1  6.0 - 12.0 fL Final   • Platelets 12/06/2021 303  140 - 450 10*3/mm3 Final   • Neutrophil % 12/06/2021 74.6  42.7 - 76.0 % Final   • Lymphocyte % 12/06/2021 14.3* 19.6 - 45.3 % Final   • Monocyte % 12/06/2021 8.7  5.0 - 12.0 % Final   • Eosinophil % 12/06/2021 0.0* 0.3 - 6.2 % Final   • Basophil % 12/06/2021 1.1  0.0 - 1.5 % Final   • Immature Grans % 12/06/2021 1.3* 0.0 - 0.5 % Final   • Neutrophils, Absolute 12/06/2021 3.34  1.70 - 7.00 10*3/mm3 Final   • Lymphocytes, Absolute 12/06/2021 0.64* 0.70 - 3.10 10*3/mm3 Final   • Monocytes, Absolute 12/06/2021 0.39  0.10 - 0.90 10*3/mm3 Final   • Eosinophils, Absolute 12/06/2021 0.00  0.00 - 0.40 10*3/mm3 Final   • Basophils, Absolute 12/06/2021 0.05  0.00 - 0.20 10*3/mm3 Final   • Immature Grans, Absolute 12/06/2021 0.06* 0.00 - 0.05 10*3/mm3 Final    • nRBC 12/06/2021 0.0  0.0 - 0.2 /100 WBC Final     Assessment/Plan      1. Right upper quadrant abdominal pain    1.  Right upper quadrant abdominal pain with nausea, rule out biliary pathology.  Continue Prilosec 40 mg p.o. daily and Pepcid nightly.  Obtain right upper quadrant sonogram and LFTs today.  2.  Abdominal pain with diarrhea and rectal bleeding, improving.  Continue Bentyl and increase to 4 times daily and Preparation H suppositories as needed.    3.  Fatty liver disease with transaminitis, repeat LFTs today.  Recommend exercise and diet control.  4.  Obesity, recommend exercise and diet control.      Orders placed during this encounter include:  Orders Placed This Encounter   Procedures   • US Gallbladder     Standing Status:   Future     Standing Expiration Date:   2/11/2023     Order Specific Question:   Reason for Exam:     Answer:   RUQ pain   • Comprehensive Metabolic Panel     Order Specific Question:   Release to patient     Answer:   Immediate       * Surgery not found *    Review and/or summary of lab tests, radiology, procedures, medications. Review and summary of old records and obtaining of history. The risks and benefits of my recommendations, as well as other treatment options were discussed with the patient today. Questions were answered.    No orders of the defined types were placed in this encounter.      Follow-up: Return in about 1 month (around 3/11/2022).               Results for orders placed or performed in visit on 12/06/21   CBC Auto Differential    Specimen: Blood   Result Value Ref Range    WBC 4.48 3.40 - 10.80 10*3/mm3    RBC 4.29 3.77 - 5.28 10*6/mm3    Hemoglobin 14.0 12.0 - 15.9 g/dL    Hematocrit 40.1 34.0 - 46.6 %    MCV 93.5 79.0 - 97.0 fL    MCH 32.6 26.6 - 33.0 pg    MCHC 34.9 31.5 - 35.7 g/dL    RDW 13.8 12.3 - 15.4 %    RDW-SD 47.1 37.0 - 54.0 fl    MPV 9.1 6.0 - 12.0 fL    Platelets 303 140 - 450 10*3/mm3    Neutrophil % 74.6 42.7 - 76.0 %    Lymphocyte % 14.3  (L) 19.6 - 45.3 %    Monocyte % 8.7 5.0 - 12.0 %    Eosinophil % 0.0 (L) 0.3 - 6.2 %    Basophil % 1.1 0.0 - 1.5 %    Immature Grans % 1.3 (H) 0.0 - 0.5 %    Neutrophils, Absolute 3.34 1.70 - 7.00 10*3/mm3    Lymphocytes, Absolute 0.64 (L) 0.70 - 3.10 10*3/mm3    Monocytes, Absolute 0.39 0.10 - 0.90 10*3/mm3    Eosinophils, Absolute 0.00 0.00 - 0.40 10*3/mm3    Basophils, Absolute 0.05 0.00 - 0.20 10*3/mm3    Immature Grans, Absolute 0.06 (H) 0.00 - 0.05 10*3/mm3    nRBC 0.0 0.0 - 0.2 /100 WBC   Comprehensive metabolic panel    Specimen: Blood   Result Value Ref Range    Glucose 331 (H) 65 - 99 mg/dL    BUN 9 8 - 23 mg/dL    Creatinine 0.73 0.57 - 1.00 mg/dL    Sodium 133 (L) 136 - 145 mmol/L    Potassium 3.5 3.5 - 5.2 mmol/L    Chloride 94 (L) 98 - 107 mmol/L    CO2 26.0 22.0 - 29.0 mmol/L    Calcium 9.5 8.6 - 10.5 mg/dL    Total Protein 7.1 6.0 - 8.5 g/dL    Albumin 4.30 3.50 - 5.20 g/dL    ALT (SGPT) 70 (H) 1 - 33 U/L    AST (SGOT) 63 (H) 1 - 32 U/L    Alkaline Phosphatase 88 39 - 117 U/L    Total Bilirubin 0.4 0.0 - 1.2 mg/dL    eGFR Non African Amer 81 >60 mL/min/1.73    Globulin 2.8 gm/dL    A/G Ratio 1.5 g/dL    BUN/Creatinine Ratio 12.3 7.0 - 25.0    Anion Gap 13.0 5.0 - 15.0 mmol/L   Results for orders placed or performed during the hospital encounter of 11/30/21   Tissue Pathology Exam    Specimen: A: Small Intestine, Duodenum; Tissue    B: Gastric, Antrum; Tissue    C: Esophagus; Tissue    D: Large Intestine; Tissue   Result Value Ref Range    Case Report       Surgical Pathology Report                         Case: [id number]                                  Authorizing Provider:  Magda Cruz MD      Collected:           11/30/2021 04:10 PM          Ordering Location:     [facility]   Received:            12/01/2021 07:12 AM                                 [place] [facility]                                                     Pathologist:           Mitch Marks MD                                                            Specimens:   1) - Small Intestine, Duodenum, MM                                                                  2) - Gastric, Antrum, MM                                                                            3) - Esophagus, eg junction MM                                                                      4) - Large Intestine, colonic mucosa MM                                                    Final Diagnosis       SEE SCANNED REPORT       POC Glucose Once    Specimen: Blood   Result Value Ref Range    Glucose 161 (H) 70 - 130 mg/dL   Results for orders placed or performed in visit on 11/29/21   COVID-19, [facility] IN-HOUSE, NP SWAB IN TRANSPORT MEDIA 8-10 HR TAT - Swab, Nasopharynx    Specimen: Nasopharynx; Swab   Result Value Ref Range    COVID19 Not Detected Not Detected - Ref. Range   Results for orders placed or performed in visit on 11/24/21   Gold Top - SST   Result Value Ref Range    Extra Tube Hold for add-ons.    CBC Auto Differential    Specimen: Blood   Result Value Ref Range    WBC 4.83 3.40 - 10.80 10*3/mm3    RBC 4.19 3.77 - 5.28 10*6/mm3    Hemoglobin 13.7 12.0 - 15.9 g/dL    Hematocrit 39.0 34.0 - 46.6 %    MCV 93.1 79.0 - 97.0 fL    MCH 32.7 26.6 - 33.0 pg    MCHC 35.1 31.5 - 35.7 g/dL    RDW 13.9 12.3 - 15.4 %    RDW-SD 47.6 37.0 - 54.0 fl    MPV 8.9 6.0 - 12.0 fL    Platelets 296 140 - 450 10*3/mm3    Neutrophil % 72.3 42.7 - 76.0 %    Lymphocyte % 14.7 (L) 19.6 - 45.3 %    Monocyte % 11.2 5.0 - 12.0 %    Eosinophil % 0.0 (L) 0.3 - 6.2 %    Basophil % 0.8 0.0 - 1.5 %    Immature Grans % 1.0 (H) 0.0 - 0.5 %    Neutrophils, Absolute 3.49 1.70 - 7.00 10*3/mm3    Lymphocytes, Absolute 0.71 0.70 - 3.10 10*3/mm3    Monocytes, Absolute 0.54 0.10 - 0.90 10*3/mm3    Eosinophils, Absolute 0.00 0.00 - 0.40 10*3/mm3    Basophils, Absolute 0.04 0.00 - 0.20 10*3/mm3    Immature Grans, Absolute 0.05 0.00 - 0.05 10*3/mm3    nRBC 0.0 0.0 - 0.2 /100 WBC    Comprehensive Metabolic Panel    Specimen: Blood   Result Value Ref Range    Glucose 246 (H) 65 - 99 mg/dL    BUN 6 (L) 8 - 23 mg/dL    Creatinine 0.73 0.57 - 1.00 mg/dL    Sodium 135 (L) 136 - 145 mmol/L    Potassium 3.5 3.5 - 5.2 mmol/L    Chloride 94 (L) 98 - 107 mmol/L    CO2 28.0 22.0 - 29.0 mmol/L    Calcium 9.7 8.6 - 10.5 mg/dL    Total Protein 7.0 6.0 - 8.5 g/dL    Albumin 4.30 3.50 - 5.20 g/dL    ALT (SGPT) 83 (H) 1 - 33 U/L    AST (SGOT) 95 (H) 1 - 32 U/L    Alkaline Phosphatase 90 39 - 117 U/L    Total Bilirubin 0.4 0.0 - 1.2 mg/dL    eGFR Non African Amer 81 >60 mL/min/1.73    Globulin 2.7 gm/dL    A/G Ratio 1.6 g/dL    BUN/Creatinine Ratio 8.2 7.0 - 25.0    Anion Gap 13.0 5.0 - 15.0 mmol/L   Results for orders placed or performed in visit on 10/25/21   Clostridium Difficile Toxin, PCR - Stool, Per Rectum    Specimen: Per Rectum; Stool   Result Value Ref Range    C. Difficile Toxins by PCR Negative Negative   Stool Culture (Reference Lab) - Stool, Per Rectum    Specimen: Per Rectum; Stool   Result Value Ref Range    Salmonella/Shigella Screen Final report     Result 1 Comment     Campylobacter Culture Final report     Result 1 Comment     E coli, Shiga toxin Assay Negative Negative   Results for orders placed or performed in visit on 09/08/21   Gold Top - SST   Result Value Ref Range    Extra Tube Hold for add-ons.    CBC Auto Differential    Specimen: Blood   Result Value Ref Range    WBC 4.93 3.40 - 10.80 10*3/mm3    RBC 4.31 3.77 - 5.28 10*6/mm3    Hemoglobin 14.1 12.0 - 15.9 g/dL    Hematocrit 40.1 34.0 - 46.6 %    MCV 93.0 79.0 - 97.0 fL    MCH 32.7 26.6 - 33.0 pg    MCHC 35.2 31.5 - 35.7 g/dL    RDW 14.7 12.3 - 15.4 %    RDW-SD 50.8 37.0 - 54.0 fl    MPV 8.4 6.0 - 12.0 fL    Platelets 332 140 - 450 10*3/mm3    Neutrophil % 69.5 42.7 - 76.0 %    Lymphocyte % 16.2 (L) 19.6 - 45.3 %    Monocyte % 10.1 5.0 - 12.0 %    Eosinophil % 0.0 (L) 0.3 - 6.2 %    Basophil % 1.6 (H) 0.0 - 1.5 %    Immature  Grans % 2.6 (H) 0.0 - 0.5 %    Neutrophils, Absolute 3.42 1.70 - 7.00 10*3/mm3    Lymphocytes, Absolute 0.80 0.70 - 3.10 10*3/mm3    Monocytes, Absolute 0.50 0.10 - 0.90 10*3/mm3    Eosinophils, Absolute 0.00 0.00 - 0.40 10*3/mm3    Basophils, Absolute 0.08 0.00 - 0.20 10*3/mm3    Immature Grans, Absolute 0.13 (H) 0.00 - 0.05 10*3/mm3    nRBC 0.0 0.0 - 0.2 /100 WBC     *Note: Due to a large number of results and/or encounters for the requested time period, some results have not been displayed. A complete set of results can be found in Results Review.         This document has been electronically signed by Magda Cruz MD on February 11, 2022 12:15 CST

## 2022-02-19 NOTE — PROGRESS NOTES
No chief complaint on file.      Bryan Leon is a 62 y.o. female.    History of Present Illness  Patient had a 15-minute telephone follow-up visit today due to current pandemic.  Has generalized abdominal cramping discomfort associated with 3-4 soft watery bowel movements a day.  Denied nausea, vomiting, rectal bleeding or weight loss.  Taking Prilosec and Bentyl daily.  Taking Lomotil as needed.  Did not have LFTs and last right upper quadrant sonogram as scheduled.       The following portions of the patient's history were reviewed and updated as appropriate:   Past Medical History:   Diagnosis Date   • Acute bronchitis    • Agoraphobia with panic disorder     on SNRI, prn buspar, prn klonopin     • Anxiety    • Arthritis    • Atrophic vaginitis    • Chest pain     work up as new onset angina    • Depression      MDD      • Elevated cholesterol    • Essential hypertension    • Follicular non-Hodgkin's lymphoma (HCC)    • Generalized anxiety disorder     SSRI - buspar, cont cymbalta, prn klonopin      • GERD (gastroesophageal reflux disease)    • Hip pain     arthritis, artifical right hip     • History of bone density study 02/09/2009    DEXA (bone density) test, peripheral (Normal   • History of echocardiogram 01/03/2012    Echocardiogram 65215 (Normal echocardigram. EF 55-60%)   • History of mammogram    • History of transfusion    • Hyperlipidemia    • Mass of lower limb    • Nuclear senile cataract    • Obesity    • Otalgia     ENT REFER   • Panic disorder     with agoraphobia. On buspar and klonopin now      • Type 2 diabetes mellitus (HCC)     NO BDR   • Upper respiratory infection      Past Surgical History:   Procedure Laterality Date   • CENTRAL VENOUS LINE INSERTION  03/11/2016    Central line insertion (Successful placement of right upper extremity midline.)   • COLONOSCOPY     • COLONOSCOPY N/A 7/18/2019    Procedure: COLONOSCOPY;  Surgeon: Brandon Salvador DO;  Location: [facility]  ENDOSCOPY;  Service: Gastroenterology   • COLONOSCOPY N/A 11/30/2021    Procedure: COLONOSCOPY;  Surgeon: Magda Cruz MD;  Location: [facility] ENDOSCOPY;  Service: Gastroenterology;  Laterality: N/A;   • CYSTOSCOPY  03/23/1976    Cystoscopy (external urethroplasty and cysto-panendoscopy,urethal hymenal fusion with hypospadias)   • DILATATION AND CURETTAGE  01/25/1980    D&C (removal of IUD)   • ENDOSCOPY N/A 11/30/2021    Procedure: ESOPHAGOGASTRODUODENOSCOPY;  Surgeon: Magda Cruz MD;  Location: [facility] ENDOSCOPY;  Service: Gastroenterology;  Laterality: N/A;   • EXCISION LESION  04/15/2014    Remove thigh lesion (Excision of a mass of the right thigh using a 16.2 cm incision with intermediate layered closure.)   • HYSTEROSCOPY  02/28/2003    Hysteroscope procedure (diagnostic hysteroscopy with fractional D&C)   • INJECTION OF MEDICATION  08/22/2011    kenalog (1)   • JOINT REPLACEMENT Right     hip   • OTHER SURGICAL HISTORY      Explore parathyroid glands   • OTHER SURGICAL HISTORY  08/26/1985    Laparosc diagnostic (desires elective tubal reversal)   • PAP SMEAR  05/24/2005    PAP SMEAR (normal)   • ID INSJ TUNNELED CVC W/O SUBQ PORT/ AGE 5 YR/> Right 4/4/2017    Procedure: INSERTION VENOUS ACCESS DEVICE (MEDIPORT) right chest;  Surgeon: Fortino Medina MD;  Location: [facility] OR;  Service: General   • ID INSJ TUNNELED CVC W/O SUBQ PORT/ AGE 5 YR/> N/A 6/6/2017    Procedure: INSERTION VENOUS ACCESS DEVICE   (MEDIPORT)   (C-ARM#1);  Surgeon: Fortino Medina MD;  Location: [facility] OR;  Service: General   • SHOULDER ARTHROSCOPY  11/11/2013    Shoulder arthroscopy/surgery (Right shoulder. Rotator cuff repair. Subacromial decompression. Edgar procedure.)   • SHOULDER SURGERY  12/28/2015    Shoulder surgery procedure (Arthroscopy of the left shoulder with rotator cuff repair.Edgar procedure.Subacromial decompression.)   • TONSILLECTOMY  1972   • TONSILLECTOMY AND ADENOIDECTOMY  05/29/1967    T&A  (hypertrophied tonsils and adenoids with recurrent infection)   • TOTAL ABDOMINAL HYSTERECTOMY WITH SALPINGO OOPHORECTOMY  2004    ARIELLE/BSO (with a preop fractional dilation and curettage with frozen section,menorrhagia,dysmenorrhea,unresponsive to medical intervention)   • TOTAL HIP ARTHROPLASTY     • VAGINA SURGERY  1981    Anesth, surgery on vagina (colpotomy with bilateral partial salpingectomy, multiparity contraindicated)     Family History   Problem Relation Age of Onset   • Breast cancer Mother    • Cancer Mother    • Diabetes Mother    • Heart disease Father    • Liver cancer Father    • Cancer Father    • Diabetes Sister         INSULIN DEPENDENT   • Cancer Sister    • Diabetes Brother         INSULIN DEPENDENT   • Coronary artery disease Other    • Diabetes Other    • Diabetes Maternal Grandmother    • Cancer Maternal Grandfather      OB History        2    Para   2    Term   2            AB        Living           SAB        IAB        Ectopic        Molar        Multiple        Live Births                  Prior to Admission medications    Medication Sig Start Date End Date Taking? Authorizing Provider   ARIPiprazole (ABILIFY) 20 MG tablet Take 20 mg by mouth Every Night. 17   Emelia Cain MD   busPIRone (BUSPAR) 30 MG tablet Take 30 mg by mouth 2 (Two) Times a Day. 20   Emelia Cain MD   Calcium Carbonate-Vitamin D 600-200 MG-UNIT tablet Take 1 tablet by mouth Daily.    Emelia Cain MD   clonazePAM (KlonoPIN) 1 MG tablet Take 1 mg by mouth as needed for seizures. 16   Emelia Cain MD   colesevelam (Welchol) 625 MG tablet Take 3 tablets by mouth 2 (Two) Times a Day With Meals for 30 days. 22  Magda Cruz MD   cyclobenzaprine (FLEXERIL) 10 MG tablet TAKE 1 TABLET (10 MG) BY MOUTH 3 TIMES DAILY AS NEEDED FOR MUSCLE SPASMS - - MAY CAUSE DROWSINESS 20   Emelia Cain MD   Desvenlafaxine Succinate  (PRISTIQ PO) Take 1 tablet by mouth daily. 16   Emelia Cain MD   diphenoxylate-atropine (LOMOTIL) 2.5-0.025 MG per tablet Take 2 tablets by mouth 4 (Four) Times a Day As Needed for Diarrhea. 21   Tino Sood MD   doxepin (SINEquan) 25 MG capsule  20   Emelia Cain MD   famotidine (PEPCID) 40 MG tablet Take 40 mg by mouth. 20   Emelia Cain MD   glimepiride (AMARYL) 4 MG tablet  20   Emelia Cain MD   lamoTRIgine (LAMICTAL) 150 MG tablet Take 150 mg by mouth 2 (two) times a day. 16   Emelia Cain MD   losartan (COZAAR) 100 MG tablet Take 100 mg by mouth daily. 16   Emelia Cain MD   metFORMIN (GLUCOPHAGE) 1000 MG tablet  20   Emelia Cain MD   Multiple Vitamins-Minerals (CENTRUM SILVER PO) Take 1 tablet by mouth daily. 16   Emelia Cain MD   ondansetron (ZOFRAN) 4 MG tablet Take 1 tablet by mouth 4 (Four) Times a Day As Needed for Nausea or Vomiting. 20   Tino Sood MD   pioglitazone (ACTOS) 45 MG tablet  20   Emelia Cain MD   polyethylene glycol (GoLYTELY) 236 g solution Starting at noon on day prior to procedure, drink 8 ounces every 30 minutes until all gone or stools are clear. May add flavor packet. 21   Magda Cruz MD   promethazine (PHENERGAN) 25 MG tablet Take 1 tablet by mouth Every 6 (Six) Hours As Needed for Nausea or Vomiting. 21   Tion Sood MD   TRAZODONE HCL PO Take 225 mg by mouth every night. 16   Emelia Cain MD     Allergies   Allergen Reactions   • Ace Inhibitors Cough   • Latex      BLISTER     • Morphine And Related Nausea And Vomiting   • Lortab [Hydrocodone-Acetaminophen] Palpitations     Social History     Socioeconomic History   • Marital status:    Tobacco Use   • Smoking status: Former Smoker     Years: 12.00     Quit date: 1998     Years since quittin.1   • Smokeless tobacco: Never Used   • Tobacco  comment: Smoked 1 pack per 2 weeks   Substance and Sexual Activity   • Alcohol use: No   • Drug use: No   • Sexual activity: Defer       Review of Systems  Review of Systems   Constitutional: Negative for chills, fatigue, fever and unexpected weight change.   HENT: Negative for congestion, ear discharge, hearing loss, nosebleeds and sore throat.    Eyes: Negative for pain, discharge and redness.   Respiratory: Negative for cough, chest tightness, shortness of breath and wheezing.    Cardiovascular: Negative for chest pain and palpitations.   Gastrointestinal: Positive for abdominal pain and diarrhea. Negative for abdominal distention, blood in stool, constipation, nausea and vomiting.   Endocrine: Negative for cold intolerance, polydipsia, polyphagia and polyuria.   Genitourinary: Negative for dysuria, flank pain, frequency, hematuria and urgency.   Musculoskeletal: Negative for arthralgias, back pain, joint swelling and myalgias.   Skin: Negative for color change, pallor and rash.   Neurological: Negative for tremors, seizures, syncope, weakness and headaches.   Hematological: Negative for adenopathy. Does not bruise/bleed easily.   Psychiatric/Behavioral: Negative for behavioral problems, confusion, dysphoric mood, hallucinations and suicidal ideas. The patient is not nervous/anxious.         There were no vitals taken for this visit.    Objective    Physical Exam telephone visit  Infusion on 12/06/2021   Component Date Value Ref Range Status   • Glucose 12/06/2021 331* 65 - 99 mg/dL Final   • BUN 12/06/2021 9  8 - 23 mg/dL Final   • Creatinine 12/06/2021 0.73  0.57 - 1.00 mg/dL Final   • Sodium 12/06/2021 133* 136 - 145 mmol/L Final   • Potassium 12/06/2021 3.5  3.5 - 5.2 mmol/L Final   • Chloride 12/06/2021 94* 98 - 107 mmol/L Final   • CO2 12/06/2021 26.0  22.0 - 29.0 mmol/L Final   • Calcium 12/06/2021 9.5  8.6 - 10.5 mg/dL Final   • Total Protein 12/06/2021 7.1  6.0 - 8.5 g/dL Final   • Albumin 12/06/2021  4.30  3.50 - 5.20 g/dL Final   • ALT (SGPT) 12/06/2021 70* 1 - 33 U/L Final   • AST (SGOT) 12/06/2021 63* 1 - 32 U/L Final   • Alkaline Phosphatase 12/06/2021 88  39 - 117 U/L Final   • Total Bilirubin 12/06/2021 0.4  0.0 - 1.2 mg/dL Final   • eGFR Non  Amer 12/06/2021 81  >60 mL/min/1.73 Final   • Globulin 12/06/2021 2.8  gm/dL Final   • A/G Ratio 12/06/2021 1.5  g/dL Final   • BUN/Creatinine Ratio 12/06/2021 12.3  7.0 - 25.0 Final   • Anion Gap 12/06/2021 13.0  5.0 - 15.0 mmol/L Final   • WBC 12/06/2021 4.48  3.40 - 10.80 10*3/mm3 Final   • RBC 12/06/2021 4.29  3.77 - 5.28 10*6/mm3 Final   • Hemoglobin 12/06/2021 14.0  12.0 - 15.9 g/dL Final   • Hematocrit 12/06/2021 40.1  34.0 - 46.6 % Final   • MCV 12/06/2021 93.5  79.0 - 97.0 fL Final   • MCH 12/06/2021 32.6  26.6 - 33.0 pg Final   • MCHC 12/06/2021 34.9  31.5 - 35.7 g/dL Final   • RDW 12/06/2021 13.8  12.3 - 15.4 % Final   • RDW-SD 12/06/2021 47.1  37.0 - 54.0 fl Final   • MPV 12/06/2021 9.1  6.0 - 12.0 fL Final   • Platelets 12/06/2021 303  140 - 450 10*3/mm3 Final   • Neutrophil % 12/06/2021 74.6  42.7 - 76.0 % Final   • Lymphocyte % 12/06/2021 14.3* 19.6 - 45.3 % Final   • Monocyte % 12/06/2021 8.7  5.0 - 12.0 % Final   • Eosinophil % 12/06/2021 0.0* 0.3 - 6.2 % Final   • Basophil % 12/06/2021 1.1  0.0 - 1.5 % Final   • Immature Grans % 12/06/2021 1.3* 0.0 - 0.5 % Final   • Neutrophils, Absolute 12/06/2021 3.34  1.70 - 7.00 10*3/mm3 Final   • Lymphocytes, Absolute 12/06/2021 0.64* 0.70 - 3.10 10*3/mm3 Final   • Monocytes, Absolute 12/06/2021 0.39  0.10 - 0.90 10*3/mm3 Final   • Eosinophils, Absolute 12/06/2021 0.00  0.00 - 0.40 10*3/mm3 Final   • Basophils, Absolute 12/06/2021 0.05  0.00 - 0.20 10*3/mm3 Final   • Immature Grans, Absolute 12/06/2021 0.06* 0.00 - 0.05 10*3/mm3 Final   • nRBC 12/06/2021 0.0  0.0 - 0.2 /100 WBC Final     Assessment/Plan    No diagnosis found..   1.  Right upper quadrant abdominal pain with nausea,  well controlled.   Continue Prilosec 40 mg p.o. daily and Pepcid nightly.    Await right upper quadrant sonogram and LFTs.  2.  Abdominal pain with diarrhea and rectal bleeding, improving.  Continue Bentyl po 4 times daily and Preparation H suppositories as needed.  Add WelChol p.o. twice daily.  3.  Fatty liver disease with transaminitis, repeat LFTs today.  Recommend exercise and diet control.  4.  Obesity, recommend exercise and diet control.    Orders placed during this encounter include:  No orders of the defined types were placed in this encounter.      * Surgery not found *    Review and/or summary of lab tests, radiology, procedures, medications. Review and summary of old records and obtaining of history. The risks and benefits of my recommendations, as well as other treatment options were discussed with the patient today. Questions were answered.    New Medications Ordered This Visit   Medications   • colesevelam (Welchol) 625 MG tablet     Sig: Take 3 tablets by mouth 2 (Two) Times a Day With Meals for 30 days.     Dispense:  180 tablet     Refill:  6       Follow-up: Return in about 1 month (around 2/27/2022).               Results for orders placed or performed in visit on 12/06/21   CBC Auto Differential    Specimen: Blood   Result Value Ref Range    WBC 4.48 3.40 - 10.80 10*3/mm3    RBC 4.29 3.77 - 5.28 10*6/mm3    Hemoglobin 14.0 12.0 - 15.9 g/dL    Hematocrit 40.1 34.0 - 46.6 %    MCV 93.5 79.0 - 97.0 fL    MCH 32.6 26.6 - 33.0 pg    MCHC 34.9 31.5 - 35.7 g/dL    RDW 13.8 12.3 - 15.4 %    RDW-SD 47.1 37.0 - 54.0 fl    MPV 9.1 6.0 - 12.0 fL    Platelets 303 140 - 450 10*3/mm3    Neutrophil % 74.6 42.7 - 76.0 %    Lymphocyte % 14.3 (L) 19.6 - 45.3 %    Monocyte % 8.7 5.0 - 12.0 %    Eosinophil % 0.0 (L) 0.3 - 6.2 %    Basophil % 1.1 0.0 - 1.5 %    Immature Grans % 1.3 (H) 0.0 - 0.5 %    Neutrophils, Absolute 3.34 1.70 - 7.00 10*3/mm3    Lymphocytes, Absolute 0.64 (L) 0.70 - 3.10 10*3/mm3    Monocytes, Absolute 0.39 0.10  - 0.90 10*3/mm3    Eosinophils, Absolute 0.00 0.00 - 0.40 10*3/mm3    Basophils, Absolute 0.05 0.00 - 0.20 10*3/mm3    Immature Grans, Absolute 0.06 (H) 0.00 - 0.05 10*3/mm3    nRBC 0.0 0.0 - 0.2 /100 WBC   Comprehensive metabolic panel    Specimen: Blood   Result Value Ref Range    Glucose 331 (H) 65 - 99 mg/dL    BUN 9 8 - 23 mg/dL    Creatinine 0.73 0.57 - 1.00 mg/dL    Sodium 133 (L) 136 - 145 mmol/L    Potassium 3.5 3.5 - 5.2 mmol/L    Chloride 94 (L) 98 - 107 mmol/L    CO2 26.0 22.0 - 29.0 mmol/L    Calcium 9.5 8.6 - 10.5 mg/dL    Total Protein 7.1 6.0 - 8.5 g/dL    Albumin 4.30 3.50 - 5.20 g/dL    ALT (SGPT) 70 (H) 1 - 33 U/L    AST (SGOT) 63 (H) 1 - 32 U/L    Alkaline Phosphatase 88 39 - 117 U/L    Total Bilirubin 0.4 0.0 - 1.2 mg/dL    eGFR Non African Amer 81 >60 mL/min/1.73    Globulin 2.8 gm/dL    A/G Ratio 1.5 g/dL    BUN/Creatinine Ratio 12.3 7.0 - 25.0    Anion Gap 13.0 5.0 - 15.0 mmol/L   Results for orders placed or performed during the hospital encounter of 11/30/21   Tissue Pathology Exam    Specimen: A: Small Intestine, Duodenum; Tissue    B: Gastric, Antrum; Tissue    C: Esophagus; Tissue    D: Large Intestine; Tissue   Result Value Ref Range    Case Report       Surgical Pathology Report                         Case: [id number]                                  Authorizing Provider:  Magda Cruz MD      Collected:           11/30/2021 04:10 PM          Ordering Location:     [facility]   Received:            12/01/2021 07:12 AM                                 [place] [facility]                                                     Pathologist:           Mitch Marks MD                                                           Specimens:   1) - Small Intestine, Duodenum, MM                                                                  2) - Gastric, Antrum, MM                                                                            3) - Esophagus, eg junction MM                                                                       4) - Large Intestine, colonic mucosa MM                                                    Final Diagnosis       SEE SCANNED REPORT       POC Glucose Once    Specimen: Blood   Result Value Ref Range    Glucose 161 (H) 70 - 130 mg/dL   Results for orders placed or performed in visit on 11/29/21   COVID-19, [facility] IN-HOUSE, NP SWAB IN TRANSPORT MEDIA 8-10 HR TAT - Swab, Nasopharynx    Specimen: Nasopharynx; Swab   Result Value Ref Range    COVID19 Not Detected Not Detected - Ref. Range   Results for orders placed or performed in visit on 11/24/21   Gold Top - SST   Result Value Ref Range    Extra Tube Hold for add-ons.    CBC Auto Differential    Specimen: Blood   Result Value Ref Range    WBC 4.83 3.40 - 10.80 10*3/mm3    RBC 4.19 3.77 - 5.28 10*6/mm3    Hemoglobin 13.7 12.0 - 15.9 g/dL    Hematocrit 39.0 34.0 - 46.6 %    MCV 93.1 79.0 - 97.0 fL    MCH 32.7 26.6 - 33.0 pg    MCHC 35.1 31.5 - 35.7 g/dL    RDW 13.9 12.3 - 15.4 %    RDW-SD 47.6 37.0 - 54.0 fl    MPV 8.9 6.0 - 12.0 fL    Platelets 296 140 - 450 10*3/mm3    Neutrophil % 72.3 42.7 - 76.0 %    Lymphocyte % 14.7 (L) 19.6 - 45.3 %    Monocyte % 11.2 5.0 - 12.0 %    Eosinophil % 0.0 (L) 0.3 - 6.2 %    Basophil % 0.8 0.0 - 1.5 %    Immature Grans % 1.0 (H) 0.0 - 0.5 %    Neutrophils, Absolute 3.49 1.70 - 7.00 10*3/mm3    Lymphocytes, Absolute 0.71 0.70 - 3.10 10*3/mm3    Monocytes, Absolute 0.54 0.10 - 0.90 10*3/mm3    Eosinophils, Absolute 0.00 0.00 - 0.40 10*3/mm3    Basophils, Absolute 0.04 0.00 - 0.20 10*3/mm3    Immature Grans, Absolute 0.05 0.00 - 0.05 10*3/mm3    nRBC 0.0 0.0 - 0.2 /100 WBC   Comprehensive Metabolic Panel    Specimen: Blood   Result Value Ref Range    Glucose 246 (H) 65 - 99 mg/dL    BUN 6 (L) 8 - 23 mg/dL    Creatinine 0.73 0.57 - 1.00 mg/dL    Sodium 135 (L) 136 - 145 mmol/L    Potassium 3.5 3.5 - 5.2 mmol/L    Chloride 94 (L) 98 - 107 mmol/L    CO2 28.0 22.0 - 29.0  mmol/L    Calcium 9.7 8.6 - 10.5 mg/dL    Total Protein 7.0 6.0 - 8.5 g/dL    Albumin 4.30 3.50 - 5.20 g/dL    ALT (SGPT) 83 (H) 1 - 33 U/L    AST (SGOT) 95 (H) 1 - 32 U/L    Alkaline Phosphatase 90 39 - 117 U/L    Total Bilirubin 0.4 0.0 - 1.2 mg/dL    eGFR Non African Amer 81 >60 mL/min/1.73    Globulin 2.7 gm/dL    A/G Ratio 1.6 g/dL    BUN/Creatinine Ratio 8.2 7.0 - 25.0    Anion Gap 13.0 5.0 - 15.0 mmol/L   Results for orders placed or performed in visit on 10/25/21   Clostridium Difficile Toxin, PCR - Stool, Per Rectum    Specimen: Per Rectum; Stool   Result Value Ref Range    C. Difficile Toxins by PCR Negative Negative   Stool Culture (Reference Lab) - Stool, Per Rectum    Specimen: Per Rectum; Stool   Result Value Ref Range    Salmonella/Shigella Screen Final report     Result 1 Comment     Campylobacter Culture Final report     Result 1 Comment     E coli, Shiga toxin Assay Negative Negative   Results for orders placed or performed in visit on 09/08/21   Gold Top - SST   Result Value Ref Range    Extra Tube Hold for add-ons.    CBC Auto Differential    Specimen: Blood   Result Value Ref Range    WBC 4.93 3.40 - 10.80 10*3/mm3    RBC 4.31 3.77 - 5.28 10*6/mm3    Hemoglobin 14.1 12.0 - 15.9 g/dL    Hematocrit 40.1 34.0 - 46.6 %    MCV 93.0 79.0 - 97.0 fL    MCH 32.7 26.6 - 33.0 pg    MCHC 35.2 31.5 - 35.7 g/dL    RDW 14.7 12.3 - 15.4 %    RDW-SD 50.8 37.0 - 54.0 fl    MPV 8.4 6.0 - 12.0 fL    Platelets 332 140 - 450 10*3/mm3    Neutrophil % 69.5 42.7 - 76.0 %    Lymphocyte % 16.2 (L) 19.6 - 45.3 %    Monocyte % 10.1 5.0 - 12.0 %    Eosinophil % 0.0 (L) 0.3 - 6.2 %    Basophil % 1.6 (H) 0.0 - 1.5 %    Immature Grans % 2.6 (H) 0.0 - 0.5 %    Neutrophils, Absolute 3.42 1.70 - 7.00 10*3/mm3    Lymphocytes, Absolute 0.80 0.70 - 3.10 10*3/mm3    Monocytes, Absolute 0.50 0.10 - 0.90 10*3/mm3    Eosinophils, Absolute 0.00 0.00 - 0.40 10*3/mm3    Basophils, Absolute 0.08 0.00 - 0.20 10*3/mm3    Immature Grans,  Absolute 0.13 (H) 0.00 - 0.05 10*3/mm3    nRBC 0.0 0.0 - 0.2 /100 WBC     *Note: Due to a large number of results and/or encounters for the requested time period, some results have not been displayed. A complete set of results can be found in Results Review.         This document has been electronically signed by Magda Cruz MD on February 19, 2022 08:05 CST

## 2022-02-22 ENCOUNTER — HOSPITAL ENCOUNTER (OUTPATIENT)
Dept: ULTRASOUND IMAGING | Facility: HOSPITAL | Age: 62
Discharge: HOME | End: 2022-02-22
Admitting: INTERNAL MEDICINE

## 2022-02-22 DIAGNOSIS — R10.11 RIGHT UPPER QUADRANT ABDOMINAL PAIN: ICD-10-CM

## 2022-02-22 PROCEDURE — 76705 ECHO EXAM OF ABDOMEN: CPT

## 2022-02-25 ENCOUNTER — TELEPHONE (OUTPATIENT)
Dept: ONCOLOGY | Facility: CLINIC | Age: 62
End: 2022-02-25

## 2022-02-25 NOTE — TELEPHONE ENCOUNTER
Called and spoke with pt regarding delaying chemo until after completion of meds, pt states that she is not going to take meds and just take sudafed. She will be at her appt.

## 2022-02-25 NOTE — TELEPHONE ENCOUNTER
Patient called stating she is on some antibiotic due to having fluid in both ears. She has her Chemo on Monday. Will this affect her treatment?

## 2022-02-28 ENCOUNTER — OFFICE VISIT (OUTPATIENT)
Dept: ONCOLOGY | Facility: CLINIC | Age: 62
End: 2022-02-28

## 2022-02-28 ENCOUNTER — INFUSION (OUTPATIENT)
Dept: ONCOLOGY | Facility: HOSPITAL | Age: 62
End: 2022-02-28

## 2022-02-28 VITALS
WEIGHT: 216.9 LBS | OXYGEN SATURATION: 93 % | SYSTOLIC BLOOD PRESSURE: 144 MMHG | HEART RATE: 89 BPM | DIASTOLIC BLOOD PRESSURE: 67 MMHG | BODY MASS INDEX: 37.23 KG/M2 | TEMPERATURE: 97.4 F

## 2022-02-28 DIAGNOSIS — Z45.2 ENCOUNTER FOR VENOUS ACCESS DEVICE CARE: ICD-10-CM

## 2022-02-28 DIAGNOSIS — C82.08 GRADE 1 FOLLICULAR LYMPHOMA OF LYMPH NODES OF MULTIPLE REGIONS: Primary | Chronic | ICD-10-CM

## 2022-02-28 DIAGNOSIS — R79.89 ELEVATED LFTS: Chronic | ICD-10-CM

## 2022-02-28 DIAGNOSIS — K11.8 MASS OF BOTH PAROTID GLANDS: Chronic | ICD-10-CM

## 2022-02-28 DIAGNOSIS — C82.08 GRADE 1 FOLLICULAR LYMPHOMA OF LYMPH NODES OF MULTIPLE REGIONS: Primary | ICD-10-CM

## 2022-02-28 LAB
ALBUMIN SERPL-MCNC: 4.3 G/DL (ref 3.5–5.2)
ALBUMIN/GLOB SERPL: 2 G/DL
ALP SERPL-CCNC: 113 U/L (ref 39–117)
ALT SERPL W P-5'-P-CCNC: 100 U/L (ref 1–33)
ANION GAP SERPL CALCULATED.3IONS-SCNC: 15 MMOL/L (ref 5–15)
AST SERPL-CCNC: 86 U/L (ref 1–32)
BASOPHILS # BLD AUTO: 0.05 10*3/MM3 (ref 0–0.2)
BASOPHILS NFR BLD AUTO: 1.2 % (ref 0–1.5)
BILIRUB SERPL-MCNC: 0.4 MG/DL (ref 0–1.2)
BUN SERPL-MCNC: 6 MG/DL (ref 8–23)
BUN/CREAT SERPL: 8.1 (ref 7–25)
CALCIUM SPEC-SCNC: 9.8 MG/DL (ref 8.6–10.5)
CHLORIDE SERPL-SCNC: 95 MMOL/L (ref 98–107)
CO2 SERPL-SCNC: 24 MMOL/L (ref 22–29)
CREAT SERPL-MCNC: 0.74 MG/DL (ref 0.57–1)
DEPRECATED RDW RBC AUTO: 46.3 FL (ref 37–54)
EGFRCR SERPLBLD CKD-EPI 2021: 91.6 ML/MIN/1.73
EOSINOPHIL # BLD AUTO: 0 10*3/MM3 (ref 0–0.4)
EOSINOPHIL NFR BLD AUTO: 0 % (ref 0.3–6.2)
ERYTHROCYTE [DISTWIDTH] IN BLOOD BY AUTOMATED COUNT: 14.1 % (ref 12.3–15.4)
GLOBULIN UR ELPH-MCNC: 2.1 GM/DL
GLUCOSE SERPL-MCNC: 364 MG/DL (ref 65–99)
HCT VFR BLD AUTO: 40.4 % (ref 34–46.6)
HGB BLD-MCNC: 14.3 G/DL (ref 12–15.9)
HOLD SPECIMEN: NORMAL
IMM GRANULOCYTES # BLD AUTO: 0.04 10*3/MM3 (ref 0–0.05)
IMM GRANULOCYTES NFR BLD AUTO: 0.9 % (ref 0–0.5)
LYMPHOCYTES # BLD AUTO: 0.56 10*3/MM3 (ref 0.7–3.1)
LYMPHOCYTES NFR BLD AUTO: 13.1 % (ref 19.6–45.3)
MCH RBC QN AUTO: 31.8 PG (ref 26.6–33)
MCHC RBC AUTO-ENTMCNC: 35.4 G/DL (ref 31.5–35.7)
MCV RBC AUTO: 90 FL (ref 79–97)
MONOCYTES # BLD AUTO: 0.42 10*3/MM3 (ref 0.1–0.9)
MONOCYTES NFR BLD AUTO: 9.9 % (ref 5–12)
NEUTROPHILS NFR BLD AUTO: 3.19 10*3/MM3 (ref 1.7–7)
NEUTROPHILS NFR BLD AUTO: 74.9 % (ref 42.7–76)
NRBC BLD AUTO-RTO: 0 /100 WBC (ref 0–0.2)
PLATELET # BLD AUTO: 305 10*3/MM3 (ref 140–450)
PMV BLD AUTO: 8.8 FL (ref 6–12)
POTASSIUM SERPL-SCNC: 3.9 MMOL/L (ref 3.5–5.2)
PROT SERPL-MCNC: 6.4 G/DL (ref 6–8.5)
RBC # BLD AUTO: 4.49 10*6/MM3 (ref 3.77–5.28)
SODIUM SERPL-SCNC: 134 MMOL/L (ref 136–145)
WBC NRBC COR # BLD: 4.26 10*3/MM3 (ref 3.4–10.8)

## 2022-02-28 PROCEDURE — 1126F AMNT PAIN NOTED NONE PRSNT: CPT | Performed by: INTERNAL MEDICINE

## 2022-02-28 PROCEDURE — 96413 CHEMO IV INFUSION 1 HR: CPT | Performed by: INTERNAL MEDICINE

## 2022-02-28 PROCEDURE — 63710000001 ACETAMINOPHEN 325 MG TABLET: Performed by: INTERNAL MEDICINE

## 2022-02-28 PROCEDURE — 96415 CHEMO IV INFUSION ADDL HR: CPT | Performed by: INTERNAL MEDICINE

## 2022-02-28 PROCEDURE — 99214 OFFICE O/P EST MOD 30 MIN: CPT | Performed by: INTERNAL MEDICINE

## 2022-02-28 PROCEDURE — 25010000002 RITUXIMAB 100 MG/10ML SOLUTION 10 ML VIAL: Performed by: INTERNAL MEDICINE

## 2022-02-28 PROCEDURE — 25010000002 DIPHENHYDRAMINE PER 50 MG: Performed by: INTERNAL MEDICINE

## 2022-02-28 PROCEDURE — A9270 NON-COVERED ITEM OR SERVICE: HCPCS | Performed by: INTERNAL MEDICINE

## 2022-02-28 PROCEDURE — 80053 COMPREHEN METABOLIC PANEL: CPT

## 2022-02-28 PROCEDURE — 25010000002 RITUXIMAB 500 MG/50ML SOLUTION 50 ML VIAL: Performed by: INTERNAL MEDICINE

## 2022-02-28 PROCEDURE — 25010000002 HEPARIN (PORCINE) PER 1000 UNITS: Performed by: INTERNAL MEDICINE

## 2022-02-28 PROCEDURE — 1123F ACP DISCUSS/DSCN MKR DOCD: CPT | Performed by: INTERNAL MEDICINE

## 2022-02-28 PROCEDURE — 96375 TX/PRO/DX INJ NEW DRUG ADDON: CPT | Performed by: INTERNAL MEDICINE

## 2022-02-28 PROCEDURE — 36415 COLL VENOUS BLD VENIPUNCTURE: CPT

## 2022-02-28 PROCEDURE — 85025 COMPLETE CBC W/AUTO DIFF WBC: CPT

## 2022-02-28 RX ORDER — SODIUM CHLORIDE 9 MG/ML
250 INJECTION, SOLUTION INTRAVENOUS ONCE
Status: CANCELLED | OUTPATIENT
Start: 2022-02-28

## 2022-02-28 RX ORDER — DIPHENHYDRAMINE HYDROCHLORIDE 50 MG/ML
50 INJECTION INTRAMUSCULAR; INTRAVENOUS AS NEEDED
Status: CANCELLED | OUTPATIENT
Start: 2022-02-28

## 2022-02-28 RX ORDER — SODIUM CHLORIDE 9 MG/ML
250 INJECTION, SOLUTION INTRAVENOUS ONCE
Status: COMPLETED | OUTPATIENT
Start: 2022-02-28 | End: 2022-02-28

## 2022-02-28 RX ORDER — ACETAMINOPHEN 325 MG/1
650 TABLET ORAL ONCE
Status: CANCELLED | OUTPATIENT
Start: 2022-02-28

## 2022-02-28 RX ORDER — MEPERIDINE HYDROCHLORIDE 25 MG/ML
25 INJECTION INTRAMUSCULAR; INTRAVENOUS; SUBCUTANEOUS
Status: DISCONTINUED | OUTPATIENT
Start: 2022-02-28 | End: 2022-02-28 | Stop reason: HOSPADM

## 2022-02-28 RX ORDER — ACETAMINOPHEN 325 MG/1
650 TABLET ORAL ONCE
Status: COMPLETED | OUTPATIENT
Start: 2022-02-28 | End: 2022-02-28

## 2022-02-28 RX ORDER — FAMOTIDINE 10 MG/ML
20 INJECTION, SOLUTION INTRAVENOUS AS NEEDED
Status: CANCELLED | OUTPATIENT
Start: 2022-02-28

## 2022-02-28 RX ORDER — MEPERIDINE HYDROCHLORIDE 25 MG/ML
25 INJECTION INTRAMUSCULAR; INTRAVENOUS; SUBCUTANEOUS
Status: CANCELLED | OUTPATIENT
Start: 2022-02-28

## 2022-02-28 RX ORDER — HEPARIN SODIUM (PORCINE) LOCK FLUSH IV SOLN 100 UNIT/ML 100 UNIT/ML
500 SOLUTION INTRAVENOUS AS NEEDED
Status: CANCELLED | OUTPATIENT
Start: 2022-04-04

## 2022-02-28 RX ORDER — DICYCLOMINE HYDROCHLORIDE 10 MG/1
CAPSULE ORAL 4 TIMES DAILY
COMMUNITY
Start: 2022-02-24 | End: 2022-10-14

## 2022-02-28 RX ORDER — HYDROCHLOROTHIAZIDE 25 MG/1
25 TABLET ORAL DAILY
COMMUNITY
Start: 2021-10-22 | End: 2022-07-28

## 2022-02-28 RX ORDER — DIPHENHYDRAMINE HYDROCHLORIDE 50 MG/ML
50 INJECTION INTRAMUSCULAR; INTRAVENOUS AS NEEDED
Status: DISCONTINUED | OUTPATIENT
Start: 2022-02-28 | End: 2022-02-28 | Stop reason: HOSPADM

## 2022-02-28 RX ORDER — HEPARIN SODIUM (PORCINE) LOCK FLUSH IV SOLN 100 UNIT/ML 100 UNIT/ML
300 SOLUTION INTRAVENOUS AS NEEDED
Status: DISCONTINUED | OUTPATIENT
Start: 2022-02-28 | End: 2022-02-28 | Stop reason: HOSPADM

## 2022-02-28 RX ORDER — FAMOTIDINE 10 MG/ML
20 INJECTION, SOLUTION INTRAVENOUS AS NEEDED
Status: DISCONTINUED | OUTPATIENT
Start: 2022-02-28 | End: 2022-02-28 | Stop reason: HOSPADM

## 2022-02-28 RX ADMIN — HEPARIN SODIUM 300 UNITS: 5000 INJECTION, SOLUTION INTRAVENOUS; SUBCUTANEOUS at 12:35

## 2022-02-28 RX ADMIN — RITUXIMAB 750 MG: 10 INJECTION, SOLUTION INTRAVENOUS at 09:38

## 2022-02-28 RX ADMIN — DIPHENHYDRAMINE HYDROCHLORIDE 25 MG: 50 INJECTION, SOLUTION INTRAMUSCULAR; INTRAVENOUS at 09:06

## 2022-02-28 RX ADMIN — SODIUM CHLORIDE 250 ML: 9 INJECTION, SOLUTION INTRAVENOUS at 08:45

## 2022-02-28 RX ADMIN — ACETAMINOPHEN 650 MG: 325 TABLET, FILM COATED ORAL at 08:45

## 2022-02-28 NOTE — PROGRESS NOTES
DATE OF VISIT: 2/28/2022      REASON FOR VISIT: Recurrent follicular lymphoma with involvement of bilateral parotid gland, fatty liver with elevated liver function test, diarrhea      HISTORY OF PRESENT ILLNESS:   62-year-old female with medical problem consisting of obesity, hypertension, anxiety, dyslipidemia, fatty liver with elevated liver function test, recurrent follicular lymphoma with most recent recurrence diagnosed in August 2020 for which patient is currently on rituximab maintenance every 3 months in September 2021. Patient is here to get third maintenance rituximab infusion today. Complains of recent infection for which she took antibiotic. Complains of intermittent diarrhea. Denies any new lymph node enlargement. Denies any fever or drenching night sweats. Denies any bleeding.        Oncology history:    1.  Recurrent follicular lymphoma grade 1:  -Patient initially diagnosed in April 2015 for which patient underwent palliative radiation treatment.  -Second relapse happened in September 2016 for again patient had a radiation treatment.  - Patient was found to have enlarged aortocaval lymph node in April 2017 for which patient initially received rituximab every 2 months without any significant improvement.  -Subsequently patient received 4 cycles of bendamustine and rituximab between April 30, 2018 and August 21, 2018.  -Patient was diagnosed with involvement of left parotid gland on biopsy done on August 27, 2020 by Dr. Ibrahim.  PET/CT done shows uptake in bilateral parotid gland along with left sided neck lymph node involvement.  -Patient was started on Revlimid with rituximab on September 22, 2020.  Patient completed cycle 12 on 8/23/2021.               Past Medical History, Past Surgical History, Social History, Family History have been reviewed and are without significant changes except as mentioned.    Review of Systems   A comprehensive 14 point review of systems was performed and was negative  except as mentioned in HPI.    Medications:  The current medication list was reviewed in the EMR    ALLERGIES:    Allergies   Allergen Reactions   • Ace Inhibitors Cough   • Latex      BLISTER     • Morphine And Related Nausea And Vomiting   • Lortab [Hydrocodone-Acetaminophen] Palpitations       Objective      Vitals:    02/28/22 0758   BP: 144/67   Pulse: 89   Temp: 97.4 °F (36.3 °C)   TempSrc: Temporal   SpO2: 93%   Weight: 98.4 kg (216 lb 14.4 oz)   PainSc: 0-No pain     Current Status 10/21/2021   ECOG score 0       Physical Exam  HENT:      Head:      Comments: Fullness present in bilateral parotid gland region without any worsening swelling since last clinic visit  Cardiovascular:      Comments: Port-A-Cath present  Pulmonary:      Breath sounds: Normal breath sounds.   Lymphadenopathy:      Cervical: No cervical adenopathy.   Neurological:      Mental Status: She is alert and oriented to person, place, and time.           RECENT LABS:  Glucose   Date Value Ref Range Status   02/28/2022 364 (H) 65 - 99 mg/dL Final     Sodium   Date Value Ref Range Status   02/28/2022 134 (L) 136 - 145 mmol/L Final     Potassium   Date Value Ref Range Status   02/28/2022 3.9 3.5 - 5.2 mmol/L Final     CO2   Date Value Ref Range Status   02/28/2022 24.0 22.0 - 29.0 mmol/L Final     Chloride   Date Value Ref Range Status   02/28/2022 95 (L) 98 - 107 mmol/L Final     Anion Gap   Date Value Ref Range Status   02/28/2022 15.0 5.0 - 15.0 mmol/L Final     Creatinine   Date Value Ref Range Status   02/28/2022 0.74 0.57 - 1.00 mg/dL Final     BUN   Date Value Ref Range Status   02/28/2022 6 (L) 8 - 23 mg/dL Final     BUN/Creatinine Ratio   Date Value Ref Range Status   02/28/2022 8.1 7.0 - 25.0 Final     Calcium   Date Value Ref Range Status   02/28/2022 9.8 8.6 - 10.5 mg/dL Final     eGFR Non  Amer   Date Value Ref Range Status   12/06/2021 81 >60 mL/min/1.73 Final     Alkaline Phosphatase   Date Value Ref Range Status    02/28/2022 113 39 - 117 U/L Final     Total Protein   Date Value Ref Range Status   02/28/2022 6.4 6.0 - 8.5 g/dL Final     ALT (SGPT)   Date Value Ref Range Status   02/28/2022 100 (H) 1 - 33 U/L Final     AST (SGOT)   Date Value Ref Range Status   02/28/2022 86 (H) 1 - 32 U/L Final     Total Bilirubin   Date Value Ref Range Status   02/28/2022 0.4 0.0 - 1.2 mg/dL Final     Albumin   Date Value Ref Range Status   02/28/2022 4.30 3.50 - 5.20 g/dL Final     Globulin   Date Value Ref Range Status   02/28/2022 2.1 gm/dL Final     Lab Results   Component Value Date    WBC 4.26 02/28/2022    HGB 14.3 02/28/2022    HCT 40.4 02/28/2022    MCV 90.0 02/28/2022     02/28/2022     Lab Results   Component Value Date    NEUTROABS 3.19 02/28/2022     No results found for: , LABCA2, AFPTM, HCGQUANT, , CHROMGRNA, [id number], CEA, REFLABREPO      PATHOLOGY:  * Cannot find OR log *         RADIOLOGY DATA :  US Gallbladder    Result Date: 2/22/2022  Limited study as above. No significant focal or acute abnormality. Suspect fatty liver. Electronically signed by:  Paxton Rocha MD  2/22/2022 10:41 AM CST Workstation: [id number]          Assessment/Plan     1. Recurrent follicular lymphoma:  -Review oncology history for prior treatment details  -Patient was started on maintenance rituximab in September 2021  -We will proceed with cycle 3 of maintenance rituximab today.  -We will continue with close monitoring for chemotherapy related side effect  -Patient will return to clinic in 3 months with repeat CBC, CMP, CT of chest abdomen and pelvis with contrast with CT of maxillofacial to be done prior to that.    2. Elevated liver function test:  -Secondary to fatty liver plus uncontrolled diabetes mellitus. Liver function test is showing worsening today. Blood glucose is again elevated at 364. Patient was notified about elevated glucose and need to control it better.  -Discussed with patient since AST and ALT is 3 times  higher than normal.  If liver function test gets any worse we may have to hold further rituximab treatment.    3. Health maintenance: Patient does not smoke. Had a colonoscopy done in November 2021 by Dr. Hansen    4. Uncontrolled diabetes mellitus:  -Blood glucose is again elevated at 364. Patient was notified about elevated blood glucose and importance of controlling her blood glucose regularly in view of her fatty liver disease and worsening liver function test.    5. Advance Care Planning: For now patient remains full code and is able to make decisions.  Patient has health care surrogate mentioned on chart.                 PHQ-9 Total Score: 0   -Patient is not homicidal or suicidal.  No acute intervention required.    Chasity Leon reports a pain score of 0.  Given her pain assessment as noted, treatment options were discussed and the following options were decided upon as a follow-up plan to address the patient's pain: continuation of current treatment plan for pain.         Tino Sood MD  2/28/2022  08:05 CST        Part of this note may be an electronic transcription/translation of spoken language to printed text using the Dragon Dictation System.          CC:

## 2022-03-02 ENCOUNTER — OFFICE VISIT (OUTPATIENT)
Dept: GASTROENTEROLOGY | Facility: CLINIC | Age: 62
End: 2022-03-02

## 2022-03-02 DIAGNOSIS — R10.11 RIGHT UPPER QUADRANT ABDOMINAL PAIN: Primary | ICD-10-CM

## 2022-03-02 PROCEDURE — 99442 PR PHYS/QHP TELEPHONE EVALUATION 11-20 MIN: CPT | Performed by: INTERNAL MEDICINE

## 2022-03-02 NOTE — PROGRESS NOTES
No chief complaint on file.      Subjective    Chasity Leon is a 62 y.o. female.    History of Present Illness  Patient had a 15-minute telephone follow-up visit today due to current pandemic.  She feels some better currently.  Has intermittent symptoms with right upper quadrant and flank pain and constipation.  Diarrhea has improved.  Denied nausea, vomiting, rectal bleeding or weight loss.  Taking MiraLAX as needed.  Taking Bentyl 4 times daily.  Awaiting CT abdomen and pelvis scheduled for next month.   AST 86 and LFTs are normal otherwise.  Right upper quadrant sonogram was consistent with fatty liver disease.       The following portions of the patient's history were reviewed and updated as appropriate:   Past Medical History:   Diagnosis Date   • Acute bronchitis    • Agoraphobia with panic disorder     on SNRI, prn buspar, prn klonopin     • Anxiety    • Arthritis    • Atrophic vaginitis    • Chest pain     work up as new onset angina    • Depression      MDD      • Elevated cholesterol    • Essential hypertension    • Follicular non-Hodgkin's lymphoma (HCC)    • Generalized anxiety disorder     SSRI - buspar, cont cymbalta, prn klonopin      • GERD (gastroesophageal reflux disease)    • Hip pain     arthritis, artifical right hip     • History of bone density study 02/09/2009    DEXA (bone density) test, peripheral (Normal   • History of echocardiogram 01/03/2012    Echocardiogram 84787 (Normal echocardigram. EF 55-60%)   • History of mammogram    • History of transfusion    • Hyperlipidemia    • Mass of lower limb    • Nuclear senile cataract    • Obesity    • Otalgia     ENT REFER   • Panic disorder     with agoraphobia. On buspar and klonopin now      • Type 2 diabetes mellitus (HCC)     NO BDR   • Upper respiratory infection      Past Surgical History:   Procedure Laterality Date   • CENTRAL VENOUS LINE INSERTION  03/11/2016    Central line insertion (Successful placement of right upper  extremity midline.)   • COLONOSCOPY     • COLONOSCOPY N/A 7/18/2019    Procedure: COLONOSCOPY;  Surgeon: Brandon Salvador DO;  Location: [facility] ENDOSCOPY;  Service: Gastroenterology   • COLONOSCOPY N/A 11/30/2021    Procedure: COLONOSCOPY;  Surgeon: Magda Cruz MD;  Location: [facility] ENDOSCOPY;  Service: Gastroenterology;  Laterality: N/A;   • CYSTOSCOPY  03/23/1976    Cystoscopy (external urethroplasty and cysto-panendoscopy,urethal hymenal fusion with hypospadias)   • DILATATION AND CURETTAGE  01/25/1980    D&C (removal of IUD)   • ENDOSCOPY N/A 11/30/2021    Procedure: ESOPHAGOGASTRODUODENOSCOPY;  Surgeon: Magda Cruz MD;  Location: [facility] ENDOSCOPY;  Service: Gastroenterology;  Laterality: N/A;   • EXCISION LESION  04/15/2014    Remove thigh lesion (Excision of a mass of the right thigh using a 16.2 cm incision with intermediate layered closure.)   • HYSTEROSCOPY  02/28/2003    Hysteroscope procedure (diagnostic hysteroscopy with fractional D&C)   • INJECTION OF MEDICATION  08/22/2011    kenalog (1)   • JOINT REPLACEMENT Right     hip   • OTHER SURGICAL HISTORY      Explore parathyroid glands   • OTHER SURGICAL HISTORY  08/26/1985    Laparosc diagnostic (desires elective tubal reversal)   • PAP SMEAR  05/24/2005    PAP SMEAR (normal)   • LA INSJ TUNNELED CVC W/O SUBQ PORT/ AGE 5 YR/> Right 4/4/2017    Procedure: INSERTION VENOUS ACCESS DEVICE (MEDIPORT) right chest;  Surgeon: Fortino Medina MD;  Location: [facility] OR;  Service: General   • LA INSJ TUNNELED CVC W/O SUBQ PORT/ AGE 5 YR/> N/A 6/6/2017    Procedure: INSERTION VENOUS ACCESS DEVICE   (MEDIPORT)   (C-ARM#1);  Surgeon: Fortino Medina MD;  Location: [facility] OR;  Service: General   • SHOULDER ARTHROSCOPY  11/11/2013    Shoulder arthroscopy/surgery (Right shoulder. Rotator cuff repair. Subacromial decompression. Edgar procedure.)   • SHOULDER SURGERY  12/28/2015    Shoulder surgery procedure (Arthroscopy of the left shoulder with rotator  cuff repair.Edgar procedure.Subacromial decompression.)   • TONSILLECTOMY     • TONSILLECTOMY AND ADENOIDECTOMY  1967    T&A (hypertrophied tonsils and adenoids with recurrent infection)   • TOTAL ABDOMINAL HYSTERECTOMY WITH SALPINGO OOPHORECTOMY  2004    ARIELLE/BSO (with a preop fractional dilation and curettage with frozen section,menorrhagia,dysmenorrhea,unresponsive to medical intervention)   • TOTAL HIP ARTHROPLASTY     • VAGINA SURGERY  1981    Anesth, surgery on vagina (colpotomy with bilateral partial salpingectomy, multiparity contraindicated)     Family History   Problem Relation Age of Onset   • Breast cancer Mother    • Cancer Mother    • Diabetes Mother    • Heart disease Father    • Liver cancer Father    • Cancer Father    • Diabetes Sister         INSULIN DEPENDENT   • Cancer Sister    • Diabetes Brother         INSULIN DEPENDENT   • Coronary artery disease Other    • Diabetes Other    • Diabetes Maternal Grandmother    • Cancer Maternal Grandfather      OB History        2    Para   2    Term   2            AB        Living           SAB        IAB        Ectopic        Molar        Multiple        Live Births                  Prior to Admission medications    Medication Sig Start Date End Date Taking? Authorizing Provider   ARIPiprazole (ABILIFY) 20 MG tablet Take 20 mg by mouth Every Night. 17   Emelia Cain MD   busPIRone (BUSPAR) 30 MG tablet Take 30 mg by mouth 2 (Two) Times a Day. 20   Emelia Cain MD   Calcium Carbonate-Vitamin D 600-200 MG-UNIT tablet Take 1 tablet by mouth Daily.    Emelia Cain MD   clonazePAM (KlonoPIN) 1 MG tablet Take 1 mg by mouth as needed for seizures. 16   Emelia Cain MD   colesevelam (Welchol) 625 MG tablet Take 3 tablets by mouth 2 (Two) Times a Day With Meals for 30 days. 22  Magda Cruz MD   cyclobenzaprine (FLEXERIL) 10 MG tablet TAKE 1 TABLET (10 MG) BY  MOUTH 3 TIMES DAILY AS NEEDED FOR MUSCLE SPASMS - - MAY CAUSE DROWSINESS 6/22/20   Emelia Cain MD   Desvenlafaxine Succinate (PRISTIQ PO) Take 1 tablet by mouth daily. 1/1/16   Emelia Cain MD   dicyclomine (BENTYL) 10 MG capsule Take  by mouth 4 (Four) Times a Day. 2/24/22   Emelia Cain MD   diphenoxylate-atropine (LOMOTIL) 2.5-0.025 MG per tablet Take 2 tablets by mouth 4 (Four) Times a Day As Needed for Diarrhea. 6/1/21   Tino Sood MD   doxepin (SINEquan) 25 MG capsule  7/14/20   Emelia Cain MD   famotidine (PEPCID) 40 MG tablet Take 40 mg by mouth. 2/18/20   Emelia Cain MD   glimepiride (AMARYL) 4 MG tablet  8/11/20   Emelia Cain MD   hydroCHLOROthiazide (HYDRODIURIL) 25 MG tablet Take 25 mg by mouth Daily. 10/22/21 4/21/22  Emelia Cain MD   lamoTRIgine (LAMICTAL) 150 MG tablet Take 150 mg by mouth 2 (two) times a day. 1/1/16   Emelia Cain MD   losartan (COZAAR) 100 MG tablet Take 100 mg by mouth daily. 1/1/16   Emelia Cain MD   metFORMIN (GLUCOPHAGE) 1000 MG tablet  8/20/20   Emelia Cain MD   Multiple Vitamins-Minerals (CENTRUM SILVER PO) Take 1 tablet by mouth daily. 1/1/16   Emelia Cain MD   ondansetron (ZOFRAN) 4 MG tablet Take 1 tablet by mouth 4 (Four) Times a Day As Needed for Nausea or Vomiting. 9/8/20   Tino Sood MD   pioglitazone (ACTOS) 45 MG tablet  6/11/20   Emelia Cain MD   polyethylene glycol (GoLYTELY) 236 g solution Starting at noon on day prior to procedure, drink 8 ounces every 30 minutes until all gone or stools are clear. May add flavor packet. 11/23/21   Magda Cruz MD   promethazine (PHENERGAN) 25 MG tablet Take 1 tablet by mouth Every 6 (Six) Hours As Needed for Nausea or Vomiting. 11/18/21   Tino Sood MD   TRAZODONE HCL PO Take 225 mg by mouth every night. 1/1/16   Provider, MD Emelia     Allergies   Allergen Reactions   • Ace Inhibitors Cough    • Latex      BLISTER     • Morphine And Related Nausea And Vomiting   • Lortab [Hydrocodone-Acetaminophen] Palpitations     Social History     Socioeconomic History   • Marital status:    Tobacco Use   • Smoking status: Former Smoker     Years: 12.00     Quit date: 1998     Years since quittin.1   • Smokeless tobacco: Never Used   • Tobacco comment: Smoked 1 pack per 2 weeks   Substance and Sexual Activity   • Alcohol use: No   • Drug use: No   • Sexual activity: Defer       Review of Systems  Review of Systems   Constitutional: Negative for chills, fatigue, fever and unexpected weight change.   HENT: Negative for congestion, ear discharge, hearing loss, nosebleeds and sore throat.    Eyes: Negative for pain, discharge and redness.   Respiratory: Negative for cough, chest tightness, shortness of breath and wheezing.    Cardiovascular: Negative for chest pain and palpitations.   Gastrointestinal: Positive for abdominal pain and constipation. Negative for abdominal distention, blood in stool, diarrhea, nausea and vomiting.   Endocrine: Negative for cold intolerance, polydipsia, polyphagia and polyuria.   Genitourinary: Negative for dysuria, flank pain, frequency, hematuria and urgency.   Musculoskeletal: Negative for arthralgias, back pain, joint swelling and myalgias.   Skin: Negative for color change, pallor and rash.   Neurological: Negative for tremors, seizures, syncope, weakness and headaches.   Hematological: Negative for adenopathy. Does not bruise/bleed easily.   Psychiatric/Behavioral: Negative for behavioral problems, confusion, dysphoric mood, hallucinations and suicidal ideas. The patient is not nervous/anxious.         There were no vitals taken for this visit.    Objective    Physical Exam telephone visit  Infusion on 2022   Component Date Value Ref Range Status   • Glucose 2022 364* 65 - 99 mg/dL Final   • BUN 2022 6* 8 - 23 mg/dL Final   • Creatinine 2022 0.74   0.57 - 1.00 mg/dL Final   • Sodium 02/28/2022 134* 136 - 145 mmol/L Final   • Potassium 02/28/2022 3.9  3.5 - 5.2 mmol/L Final   • Chloride 02/28/2022 95* 98 - 107 mmol/L Final   • CO2 02/28/2022 24.0  22.0 - 29.0 mmol/L Final   • Calcium 02/28/2022 9.8  8.6 - 10.5 mg/dL Final   • Total Protein 02/28/2022 6.4  6.0 - 8.5 g/dL Final   • Albumin 02/28/2022 4.30  3.50 - 5.20 g/dL Final   • ALT (SGPT) 02/28/2022 100* 1 - 33 U/L Final   • AST (SGOT) 02/28/2022 86* 1 - 32 U/L Final   • Alkaline Phosphatase 02/28/2022 113  39 - 117 U/L Final   • Total Bilirubin 02/28/2022 0.4  0.0 - 1.2 mg/dL Final   • Globulin 02/28/2022 2.1  gm/dL Final   • A/G Ratio 02/28/2022 2.0  g/dL Final   • BUN/Creatinine Ratio 02/28/2022 8.1  7.0 - 25.0 Final   • Anion Gap 02/28/2022 15.0  5.0 - 15.0 mmol/L Final   • eGFR 02/28/2022 91.6  >60.0 mL/min/1.73 Final    National Kidney Foundation and American Society of Nephrology (ASN) Task Force recommended calculation based on the Chronic Kidney Disease Epidemiology Collaboration (CKD-EPI) equation refit without adjustment for race.   • WBC 02/28/2022 4.26  3.40 - 10.80 10*3/mm3 Final   • RBC 02/28/2022 4.49  3.77 - 5.28 10*6/mm3 Final   • Hemoglobin 02/28/2022 14.3  12.0 - 15.9 g/dL Final   • Hematocrit 02/28/2022 40.4  34.0 - 46.6 % Final   • MCV 02/28/2022 90.0  79.0 - 97.0 fL Final   • MCH 02/28/2022 31.8  26.6 - 33.0 pg Final   • MCHC 02/28/2022 35.4  31.5 - 35.7 g/dL Final   • RDW 02/28/2022 14.1  12.3 - 15.4 % Final   • RDW-SD 02/28/2022 46.3  37.0 - 54.0 fl Final   • MPV 02/28/2022 8.8  6.0 - 12.0 fL Final   • Platelets 02/28/2022 305  140 - 450 10*3/mm3 Final   • Neutrophil % 02/28/2022 74.9  42.7 - 76.0 % Final   • Lymphocyte % 02/28/2022 13.1* 19.6 - 45.3 % Final   • Monocyte % 02/28/2022 9.9  5.0 - 12.0 % Final   • Eosinophil % 02/28/2022 0.0* 0.3 - 6.2 % Final   • Basophil % 02/28/2022 1.2  0.0 - 1.5 % Final   • Immature Grans % 02/28/2022 0.9* 0.0 - 0.5 % Final   • Neutrophils,  Absolute 02/28/2022 3.19  1.70 - 7.00 10*3/mm3 Final   • Lymphocytes, Absolute 02/28/2022 0.56* 0.70 - 3.10 10*3/mm3 Final   • Monocytes, Absolute 02/28/2022 0.42  0.10 - 0.90 10*3/mm3 Final   • Eosinophils, Absolute 02/28/2022 0.00  0.00 - 0.40 10*3/mm3 Final   • Basophils, Absolute 02/28/2022 0.05  0.00 - 0.20 10*3/mm3 Final   • Immature Grans, Absolute 02/28/2022 0.04  0.00 - 0.05 10*3/mm3 Final   • nRBC 02/28/2022 0.0  0.0 - 0.2 /100 WBC Final   • Extra Tube 02/28/2022 Hold for add-ons.   Final    Auto resulted.     Assessment/Plan    No diagnosis found..   1.  Right upper quadrant abdominal pain with nausea, improving.  Continue Prilosec 40 mg p.o. daily and Pepcid nightly.  Await CT abdomen pelvis.  2.  Abdominal pain with diarrhea and rectal bleeding, improving.  Discontinue Bentyl po 4 times daily and Preparation H suppositories as needed.  Continue WelChol p.o. twice daily.  3.  Constipation, continue MiraLAX.  Add Metamucil daily.  Discontinue WelChol if continues.  4.  Fatty liver disease with elevated transaminases, repeat LFTs in next visit.  Recommend exercise and diet control.  5.  Obesity, recommend exercise and diet control.       Orders placed during this encounter include:  No orders of the defined types were placed in this encounter.      * Surgery not found *    Review and/or summary of lab tests, radiology, procedures, medications. Review and summary of old records and obtaining of history. The risks and benefits of my recommendations, as well as other treatment options were discussed with the patient today. Questions were answered.    No orders of the defined types were placed in this encounter.      Follow-up: Return in about 1 month (around 4/2/2022).               Results for orders placed or performed in visit on 02/28/22   [facility] - [facility]   Result Value Ref Range    Extra Tube Hold for add-ons.    CBC Auto Differential    Specimen: Blood   Result Value Ref Range    WBC 4.26 3.40 - 10.80  10*3/mm3    RBC 4.49 3.77 - 5.28 10*6/mm3    Hemoglobin 14.3 12.0 - 15.9 g/dL    Hematocrit 40.4 34.0 - 46.6 %    MCV 90.0 79.0 - 97.0 fL    MCH 31.8 26.6 - 33.0 pg    MCHC 35.4 31.5 - 35.7 g/dL    RDW 14.1 12.3 - 15.4 %    RDW-SD 46.3 37.0 - 54.0 fl    MPV 8.8 6.0 - 12.0 fL    Platelets 305 140 - 450 10*3/mm3    Neutrophil % 74.9 42.7 - 76.0 %    Lymphocyte % 13.1 (L) 19.6 - 45.3 %    Monocyte % 9.9 5.0 - 12.0 %    Eosinophil % 0.0 (L) 0.3 - 6.2 %    Basophil % 1.2 0.0 - 1.5 %    Immature Grans % 0.9 (H) 0.0 - 0.5 %    Neutrophils, Absolute 3.19 1.70 - 7.00 10*3/mm3    Lymphocytes, Absolute 0.56 (L) 0.70 - 3.10 10*3/mm3    Monocytes, Absolute 0.42 0.10 - 0.90 10*3/mm3    Eosinophils, Absolute 0.00 0.00 - 0.40 10*3/mm3    Basophils, Absolute 0.05 0.00 - 0.20 10*3/mm3    Immature Grans, Absolute 0.04 0.00 - 0.05 10*3/mm3    nRBC 0.0 0.0 - 0.2 /100 WBC   Comprehensive metabolic panel    Specimen: Blood   Result Value Ref Range    Glucose 364 (H) 65 - 99 mg/dL    BUN 6 (L) 8 - 23 mg/dL    Creatinine 0.74 0.57 - 1.00 mg/dL    Sodium 134 (L) 136 - 145 mmol/L    Potassium 3.9 3.5 - 5.2 mmol/L    Chloride 95 (L) 98 - 107 mmol/L    CO2 24.0 22.0 - 29.0 mmol/L    Calcium 9.8 8.6 - 10.5 mg/dL    Total Protein 6.4 6.0 - 8.5 g/dL    Albumin 4.30 3.50 - 5.20 g/dL    ALT (SGPT) 100 (H) 1 - 33 U/L    AST (SGOT) 86 (H) 1 - 32 U/L    Alkaline Phosphatase 113 39 - 117 U/L    Total Bilirubin 0.4 0.0 - 1.2 mg/dL    Globulin 2.1 gm/dL    A/G Ratio 2.0 g/dL    BUN/Creatinine Ratio 8.1 7.0 - 25.0    Anion Gap 15.0 5.0 - 15.0 mmol/L    eGFR 91.6 >60.0 mL/min/1.73   Results for orders placed or performed in visit on 12/06/21   CBC Auto Differential    Specimen: Blood   Result Value Ref Range    WBC 4.48 3.40 - 10.80 10*3/mm3    RBC 4.29 3.77 - 5.28 10*6/mm3    Hemoglobin 14.0 12.0 - 15.9 g/dL    Hematocrit 40.1 34.0 - 46.6 %    MCV 93.5 79.0 - 97.0 fL    MCH 32.6 26.6 - 33.0 pg    MCHC 34.9 31.5 - 35.7 g/dL    RDW 13.8 12.3 - 15.4 %     RDW-SD 47.1 37.0 - 54.0 fl    MPV 9.1 6.0 - 12.0 fL    Platelets 303 140 - 450 10*3/mm3    Neutrophil % 74.6 42.7 - 76.0 %    Lymphocyte % 14.3 (L) 19.6 - 45.3 %    Monocyte % 8.7 5.0 - 12.0 %    Eosinophil % 0.0 (L) 0.3 - 6.2 %    Basophil % 1.1 0.0 - 1.5 %    Immature Grans % 1.3 (H) 0.0 - 0.5 %    Neutrophils, Absolute 3.34 1.70 - 7.00 10*3/mm3    Lymphocytes, Absolute 0.64 (L) 0.70 - 3.10 10*3/mm3    Monocytes, Absolute 0.39 0.10 - 0.90 10*3/mm3    Eosinophils, Absolute 0.00 0.00 - 0.40 10*3/mm3    Basophils, Absolute 0.05 0.00 - 0.20 10*3/mm3    Immature Grans, Absolute 0.06 (H) 0.00 - 0.05 10*3/mm3    nRBC 0.0 0.0 - 0.2 /100 WBC   Comprehensive metabolic panel    Specimen: Blood   Result Value Ref Range    Glucose 331 (H) 65 - 99 mg/dL    BUN 9 8 - 23 mg/dL    Creatinine 0.73 0.57 - 1.00 mg/dL    Sodium 133 (L) 136 - 145 mmol/L    Potassium 3.5 3.5 - 5.2 mmol/L    Chloride 94 (L) 98 - 107 mmol/L    CO2 26.0 22.0 - 29.0 mmol/L    Calcium 9.5 8.6 - 10.5 mg/dL    Total Protein 7.1 6.0 - 8.5 g/dL    Albumin 4.30 3.50 - 5.20 g/dL    ALT (SGPT) 70 (H) 1 - 33 U/L    AST (SGOT) 63 (H) 1 - 32 U/L    Alkaline Phosphatase 88 39 - 117 U/L    Total Bilirubin 0.4 0.0 - 1.2 mg/dL    eGFR Non African Amer 81 >60 mL/min/1.73    Globulin 2.8 gm/dL    A/G Ratio 1.5 g/dL    BUN/Creatinine Ratio 12.3 7.0 - 25.0    Anion Gap 13.0 5.0 - 15.0 mmol/L   Results for orders placed or performed during the hospital encounter of 11/30/21   Tissue Pathology Exam    Specimen: A: Small Intestine, Duodenum; Tissue    B: Gastric, Antrum; Tissue    C: Esophagus; Tissue    D: Large Intestine; Tissue   Result Value Ref Range    Case Report       Surgical Pathology Report                         Case: [id number]                                  Authorizing Provider:  Magda Cruz MD      Collected:           11/30/2021 04:10 PM          Ordering Location:     [facility]   Received:            12/01/2021 07:12 AM                                  [place] [facility]                                                     Pathologist:           Mitch Marks MD                                                           Specimens:   1) - Small Intestine, Duodenum, MM                                                                  2) - Gastric, Antrum, MM                                                                            3) - Esophagus, eg junction MM                                                                      4) - Large Intestine, colonic mucosa MM                                                    Final Diagnosis       SEE SCANNED REPORT       POC Glucose Once    Specimen: Blood   Result Value Ref Range    Glucose 161 (H) 70 - 130 mg/dL   Results for orders placed or performed in visit on 11/29/21   COVID-19, [facility] IN-HOUSE, NP SWAB IN TRANSPORT MEDIA 8-10 HR TAT - Swab, Nasopharynx    Specimen: Nasopharynx; Swab   Result Value Ref Range    COVID19 Not Detected Not Detected - Ref. Range   Results for orders placed or performed in visit on 11/24/21   [facility] - SST   Result Value Ref Range    Extra Tube Hold for add-ons.    CBC Auto Differential    Specimen: Blood   Result Value Ref Range    WBC 4.83 3.40 - 10.80 10*3/mm3    RBC 4.19 3.77 - 5.28 10*6/mm3    Hemoglobin 13.7 12.0 - 15.9 g/dL    Hematocrit 39.0 34.0 - 46.6 %    MCV 93.1 79.0 - 97.0 fL    MCH 32.7 26.6 - 33.0 pg    MCHC 35.1 31.5 - 35.7 g/dL    RDW 13.9 12.3 - 15.4 %    RDW-SD 47.6 37.0 - 54.0 fl    MPV 8.9 6.0 - 12.0 fL    Platelets 296 140 - 450 10*3/mm3    Neutrophil % 72.3 42.7 - 76.0 %    Lymphocyte % 14.7 (L) 19.6 - 45.3 %    Monocyte % 11.2 5.0 - 12.0 %    Eosinophil % 0.0 (L) 0.3 - 6.2 %    Basophil % 0.8 0.0 - 1.5 %    Immature Grans % 1.0 (H) 0.0 - 0.5 %    Neutrophils, Absolute 3.49 1.70 - 7.00 10*3/mm3    Lymphocytes, Absolute 0.71 0.70 - 3.10 10*3/mm3    Monocytes, Absolute 0.54 0.10 - 0.90 10*3/mm3    Eosinophils, Absolute 0.00 0.00 - 0.40  10*3/mm3    Basophils, Absolute 0.04 0.00 - 0.20 10*3/mm3    Immature Grans, Absolute 0.05 0.00 - 0.05 10*3/mm3    nRBC 0.0 0.0 - 0.2 /100 WBC     *Note: Due to a large number of results and/or encounters for the requested time period, some results have not been displayed. A complete set of results can be found in Results Review.         This document has been electronically signed by Magda Cruz MD on March 2, 2022 13:40 CST

## 2022-03-22 ENCOUNTER — SPECIALTY PHARMACY (OUTPATIENT)
Dept: ONCOLOGY | Facility: CLINIC | Age: 62
End: 2022-03-22

## 2022-04-04 ENCOUNTER — INFUSION (OUTPATIENT)
Dept: ONCOLOGY | Facility: HOSPITAL | Age: 62
End: 2022-04-04

## 2022-04-04 DIAGNOSIS — Z45.2 ENCOUNTER FOR VENOUS ACCESS DEVICE CARE: Primary | ICD-10-CM

## 2022-04-04 PROCEDURE — 25010000002 HEPARIN LOCK FLUSH PER 10 UNITS: Performed by: INTERNAL MEDICINE

## 2022-04-04 PROCEDURE — 96523 IRRIG DRUG DELIVERY DEVICE: CPT | Performed by: NURSE PRACTITIONER

## 2022-04-04 RX ORDER — HEPARIN SODIUM (PORCINE) LOCK FLUSH IV SOLN 100 UNIT/ML 100 UNIT/ML
500 SOLUTION INTRAVENOUS AS NEEDED
Status: DISCONTINUED | OUTPATIENT
Start: 2022-04-04 | End: 2022-04-04 | Stop reason: HOSPADM

## 2022-04-04 RX ORDER — HEPARIN SODIUM (PORCINE) LOCK FLUSH IV SOLN 100 UNIT/ML 100 UNIT/ML
500 SOLUTION INTRAVENOUS AS NEEDED
Status: CANCELLED | OUTPATIENT
Start: 2022-05-17

## 2022-04-04 RX ADMIN — HEPARIN 500 UNITS: 100 SYRINGE at 14:42

## 2022-04-08 ENCOUNTER — OFFICE VISIT (OUTPATIENT)
Dept: GASTROENTEROLOGY | Facility: CLINIC | Age: 62
End: 2022-04-08

## 2022-04-08 DIAGNOSIS — R10.11 RIGHT UPPER QUADRANT ABDOMINAL PAIN: Primary | ICD-10-CM

## 2022-04-08 PROCEDURE — 99442 PR PHYS/QHP TELEPHONE EVALUATION 11-20 MIN: CPT | Performed by: INTERNAL MEDICINE

## 2022-05-08 NOTE — PROGRESS NOTES
No chief complaint on file.      Bryan Leon is a 62 y.o. female.    History of Present Illness  Patient had a 15-minute telephone follow-up visit today due to current pandemic.  Has intermittent symptoms of diarrhea.  Denied abdominal pain and nausea.  Rectal bleeding has resolved.  Weight is stable.       The following portions of the patient's history were reviewed and updated as appropriate:   Past Medical History:   Diagnosis Date   • Acute bronchitis    • Agoraphobia with panic disorder     on SNRI, prn buspar, prn klonopin     • Anxiety    • Arthritis    • Atrophic vaginitis    • Chest pain     work up as new onset angina    • Depression      MDD      • Elevated cholesterol    • Essential hypertension    • Follicular non-Hodgkin's lymphoma (HCC)    • Generalized anxiety disorder     SSRI - buspar, cont cymbalta, prn klonopin      • GERD (gastroesophageal reflux disease)    • Hip pain     arthritis, artifical right hip     • History of bone density study 02/09/2009    DEXA (bone density) test, peripheral (Normal   • History of echocardiogram 01/03/2012    Echocardiogram 38570 (Normal echocardigram. EF 55-60%)   • History of mammogram    • History of transfusion    • Hyperlipidemia    • Mass of lower limb    • Nuclear senile cataract    • Obesity    • Otalgia     ENT REFER   • Panic disorder     with agoraphobia. On buspar and klonopin now      • Type 2 diabetes mellitus (HCC)     NO BDR   • Upper respiratory infection      Past Surgical History:   Procedure Laterality Date   • CENTRAL VENOUS LINE INSERTION  03/11/2016    Central line insertion (Successful placement of right upper extremity midline.)   • COLONOSCOPY     • COLONOSCOPY N/A 7/18/2019    Procedure: COLONOSCOPY;  Surgeon: Brandon Salvador DO;  Location: [facility] ENDOSCOPY;  Service: Gastroenterology   • COLONOSCOPY N/A 11/30/2021    Procedure: COLONOSCOPY;  Surgeon: Magda Cruz MD;  Location: [facility] ENDOSCOPY;  Service:  Gastroenterology;  Laterality: N/A;   • CYSTOSCOPY  03/23/1976    Cystoscopy (external urethroplasty and cysto-panendoscopy,urethal hymenal fusion with hypospadias)   • DILATATION AND CURETTAGE  01/25/1980    D&C (removal of IUD)   • ENDOSCOPY N/A 11/30/2021    Procedure: ESOPHAGOGASTRODUODENOSCOPY;  Surgeon: Magda Cruz MD;  Location: [facility] ENDOSCOPY;  Service: Gastroenterology;  Laterality: N/A;   • EXCISION LESION  04/15/2014    Remove thigh lesion (Excision of a mass of the right thigh using a 16.2 cm incision with intermediate layered closure.)   • HYSTEROSCOPY  02/28/2003    Hysteroscope procedure (diagnostic hysteroscopy with fractional D&C)   • INJECTION OF MEDICATION  08/22/2011    kenalog (1)   • JOINT REPLACEMENT Right     hip   • OTHER SURGICAL HISTORY      Explore parathyroid glands   • OTHER SURGICAL HISTORY  08/26/1985    Laparosc diagnostic (desires elective tubal reversal)   • PAP SMEAR  05/24/2005    PAP SMEAR (normal)   • TN INSJ TUNNELED CVC W/O SUBQ PORT/ AGE 5 YR/> Right 4/4/2017    Procedure: INSERTION VENOUS ACCESS DEVICE (MEDIPORT) right chest;  Surgeon: Fortino Medina MD;  Location: [facility] OR;  Service: General   • TN INSJ TUNNELED CVC W/O SUBQ PORT/ AGE 5 YR/> N/A 6/6/2017    Procedure: INSERTION VENOUS ACCESS DEVICE   (MEDIPORT)   (C-ARM#1);  Surgeon: Fortino Medina MD;  Location: [facility] OR;  Service: General   • SHOULDER ARTHROSCOPY  11/11/2013    Shoulder arthroscopy/surgery (Right shoulder. Rotator cuff repair. Subacromial decompression. Edgar procedure.)   • SHOULDER SURGERY  12/28/2015    Shoulder surgery procedure (Arthroscopy of the left shoulder with rotator cuff repair.Edgar procedure.Subacromial decompression.)   • TONSILLECTOMY  1972   • TONSILLECTOMY AND ADENOIDECTOMY  05/29/1967    T&A (hypertrophied tonsils and adenoids with recurrent infection)   • TOTAL ABDOMINAL HYSTERECTOMY WITH SALPINGO OOPHORECTOMY  02/03/2004    ARIELLE/BSO (with a preop fractional dilation  and curettage with frozen section,menorrhagia,dysmenorrhea,unresponsive to medical intervention)   • TOTAL HIP ARTHROPLASTY     • VAGINA SURGERY  1981    Anesth, surgery on vagina (colpotomy with bilateral partial salpingectomy, multiparity contraindicated)     Family History   Problem Relation Age of Onset   • Breast cancer Mother    • Cancer Mother    • Diabetes Mother    • Heart disease Father    • Liver cancer Father    • Cancer Father    • Diabetes Sister         INSULIN DEPENDENT   • Cancer Sister    • Diabetes Brother         INSULIN DEPENDENT   • Coronary artery disease Other    • Diabetes Other    • Diabetes Maternal Grandmother    • Cancer Maternal Grandfather      OB History        2    Para   2    Term   2            AB        Living           SAB        IAB        Ectopic        Molar        Multiple        Live Births                  Prior to Admission medications    Medication Sig Start Date End Date Taking? Authorizing Provider   ARIPiprazole (ABILIFY) 20 MG tablet Take 20 mg by mouth Every Night. 17   Emelia Cain MD   busPIRone (BUSPAR) 30 MG tablet Take 30 mg by mouth 2 (Two) Times a Day. 20   Emelia Cain MD   Calcium Carbonate-Vitamin D 600-200 MG-UNIT tablet Take 1 tablet by mouth Daily.    Emelia Cain MD   clonazePAM (KlonoPIN) 1 MG tablet Take 1 mg by mouth as needed for seizures. 16   Emelia Cain MD   colesevelam (Welchol) 625 MG tablet Take 3 tablets by mouth 2 (Two) Times a Day With Meals for 30 days. 22  Magda Cruz MD   cyclobenzaprine (FLEXERIL) 10 MG tablet TAKE 1 TABLET (10 MG) BY MOUTH 3 TIMES DAILY AS NEEDED FOR MUSCLE SPASMS - - MAY CAUSE DROWSINESS 20   Emelia Cain MD   Desvenlafaxine Succinate (PRISTIQ PO) Take 1 tablet by mouth daily. 16   Emelia Cain MD   dicyclomine (BENTYL) 10 MG capsule Take  by mouth 4 (Four) Times a Day. 22   Emelia Cain  MD   diphenoxylate-atropine (LOMOTIL) 2.5-0.025 MG per tablet Take 2 tablets by mouth 4 (Four) Times a Day As Needed for Diarrhea. 21   Tino Sood MD   doxepin (SINEquan) 25 MG capsule  20   Emelia Cain MD   famotidine (PEPCID) 40 MG tablet Take 40 mg by mouth. 20   Emelia Cain MD   glimepiride (AMARYL) 4 MG tablet  20   Emelia Cain MD   hydroCHLOROthiazide (HYDRODIURIL) 25 MG tablet Take 25 mg by mouth Daily. 10/22/21 4/21/22  Emelia Cain MD   lamoTRIgine (LAMICTAL) 150 MG tablet Take 150 mg by mouth 2 (two) times a day. 16   Emelia Cain MD   losartan (COZAAR) 100 MG tablet Take 100 mg by mouth daily. 16   Emelia Cain MD   metFORMIN (GLUCOPHAGE) 1000 MG tablet  20   Emelia Cain MD   Multiple Vitamins-Minerals (CENTRUM SILVER PO) Take 1 tablet by mouth daily. 16   Emelia Cain MD   ondansetron (ZOFRAN) 4 MG tablet Take 1 tablet by mouth 4 (Four) Times a Day As Needed for Nausea or Vomiting. 20   Tino Sood MD   pioglitazone (ACTOS) 45 MG tablet  20   Emelia Cain MD   polyethylene glycol (GoLYTELY) 236 g solution Starting at noon on day prior to procedure, drink 8 ounces every 30 minutes until all gone or stools are clear. May add flavor packet. 21   Magda Cruz MD   promethazine (PHENERGAN) 25 MG tablet Take 1 tablet by mouth Every 6 (Six) Hours As Needed for Nausea or Vomiting. 21   Tino Sood MD   TRAZODONE HCL PO Take 225 mg by mouth every night. 16   Emelia Cain MD     Allergies   Allergen Reactions   • Ace Inhibitors Cough   • Latex      BLISTER     • Morphine And Related Nausea And Vomiting   • Lortab [Hydrocodone-Acetaminophen] Palpitations     Social History     Socioeconomic History   • Marital status:    Tobacco Use   • Smoking status: Former Smoker     Years: 12.00     Quit date: 1998     Years since quittin.3   •  Smokeless tobacco: Never Used   • Tobacco comment: Smoked 1 pack per 2 weeks   Substance and Sexual Activity   • Alcohol use: No   • Drug use: No   • Sexual activity: Defer       Review of Systems  Review of Systems   Constitutional: Negative for chills, fatigue, fever and unexpected weight change.   HENT: Negative for congestion, ear discharge, hearing loss, nosebleeds and sore throat.    Eyes: Negative for pain, discharge and redness.   Respiratory: Negative for cough, chest tightness, shortness of breath and wheezing.    Cardiovascular: Negative for chest pain and palpitations.   Gastrointestinal: Positive for diarrhea. Negative for abdominal distention, abdominal pain, blood in stool, constipation, nausea and vomiting.   Endocrine: Negative for cold intolerance, polydipsia, polyphagia and polyuria.   Genitourinary: Negative for dysuria, flank pain, frequency, hematuria and urgency.   Musculoskeletal: Negative for arthralgias, back pain, joint swelling and myalgias.   Skin: Negative for color change, pallor and rash.   Neurological: Negative for tremors, seizures, syncope, weakness and headaches.   Hematological: Negative for adenopathy. Does not bruise/bleed easily.   Psychiatric/Behavioral: Negative for behavioral problems, confusion, dysphoric mood, hallucinations and suicidal ideas. The patient is not nervous/anxious.         There were no vitals taken for this visit.    Objective    Physical Exam telephone visit  Infusion on 02/28/2022   Component Date Value Ref Range Status   • Glucose 02/28/2022 364 (A) 65 - 99 mg/dL Final   • BUN 02/28/2022 6 (A) 8 - 23 mg/dL Final   • Creatinine 02/28/2022 0.74  0.57 - 1.00 mg/dL Final   • Sodium 02/28/2022 134 (A) 136 - 145 mmol/L Final   • Potassium 02/28/2022 3.9  3.5 - 5.2 mmol/L Final   • Chloride 02/28/2022 95 (A) 98 - 107 mmol/L Final   • CO2 02/28/2022 24.0  22.0 - 29.0 mmol/L Final   • Calcium 02/28/2022 9.8  8.6 - 10.5 mg/dL Final   • Total Protein 02/28/2022  6.4  6.0 - 8.5 g/dL Final   • Albumin 02/28/2022 4.30  3.50 - 5.20 g/dL Final   • ALT (SGPT) 02/28/2022 100 (A) 1 - 33 U/L Final   • AST (SGOT) 02/28/2022 86 (A) 1 - 32 U/L Final   • Alkaline Phosphatase 02/28/2022 113  39 - 117 U/L Final   • Total Bilirubin 02/28/2022 0.4  0.0 - 1.2 mg/dL Final   • Globulin 02/28/2022 2.1  gm/dL Final   • A/G Ratio 02/28/2022 2.0  g/dL Final   • BUN/Creatinine Ratio 02/28/2022 8.1  7.0 - 25.0 Final   • Anion Gap 02/28/2022 15.0  5.0 - 15.0 mmol/L Final   • eGFR 02/28/2022 91.6  >60.0 mL/min/1.73 Final    National Kidney Foundation and American Society of Nephrology (ASN) Task Force recommended calculation based on the Chronic Kidney Disease Epidemiology Collaboration (CKD-EPI) equation refit without adjustment for race.   • WBC 02/28/2022 4.26  3.40 - 10.80 10*3/mm3 Final   • RBC 02/28/2022 4.49  3.77 - 5.28 10*6/mm3 Final   • Hemoglobin 02/28/2022 14.3  12.0 - 15.9 g/dL Final   • Hematocrit 02/28/2022 40.4  34.0 - 46.6 % Final   • MCV 02/28/2022 90.0  79.0 - 97.0 fL Final   • MCH 02/28/2022 31.8  26.6 - 33.0 pg Final   • MCHC 02/28/2022 35.4  31.5 - 35.7 g/dL Final   • RDW 02/28/2022 14.1  12.3 - 15.4 % Final   • RDW-SD 02/28/2022 46.3  37.0 - 54.0 fl Final   • MPV 02/28/2022 8.8  6.0 - 12.0 fL Final   • Platelets 02/28/2022 305  140 - 450 10*3/mm3 Final   • Neutrophil % 02/28/2022 74.9  42.7 - 76.0 % Final   • Lymphocyte % 02/28/2022 13.1 (A) 19.6 - 45.3 % Final   • Monocyte % 02/28/2022 9.9  5.0 - 12.0 % Final   • Eosinophil % 02/28/2022 0.0 (A) 0.3 - 6.2 % Final   • Basophil % 02/28/2022 1.2  0.0 - 1.5 % Final   • Immature Grans % 02/28/2022 0.9 (A) 0.0 - 0.5 % Final   • Neutrophils, Absolute 02/28/2022 3.19  1.70 - 7.00 10*3/mm3 Final   • Lymphocytes, Absolute 02/28/2022 0.56 (A) 0.70 - 3.10 10*3/mm3 Final   • Monocytes, Absolute 02/28/2022 0.42  0.10 - 0.90 10*3/mm3 Final   • Eosinophils, Absolute 02/28/2022 0.00  0.00 - 0.40 10*3/mm3 Final   • Basophils, Absolute 02/28/2022  0.05  0.00 - 0.20 10*3/mm3 Final   • Immature Grans, Absolute 02/28/2022 0.04  0.00 - 0.05 10*3/mm3 Final   • nRBC 02/28/2022 0.0  0.0 - 0.2 /100 WBC Final   • Extra Tube 02/28/2022 Hold for add-ons.   Final    Auto resulted.     Assessment/Plan      1. Right upper quadrant abdominal pain    1.  Right upper quadrant abdominal pain with nausea, improving.  Continue Prilosec 40 mg p.o. daily and Pepcid nightly.    2.  Abdominal pain with diarrhea and rectal bleeding, improving.  Discontinue Bentyl po 4 times daily and Preparation H suppositories as needed.  Continue WelChol p.o. twice daily.  3.  Constipation, well controlled.  4.  Fatty liver disease with elevated transaminases, repeat LFTs  today.  Recommend exercise and diet control.  5.  Obesity, recommend exercise and diet control.      Orders placed during this encounter include:  Orders Placed This Encounter   Procedures   • Hepatic Function Panel     Order Specific Question:   Release to patient     Answer:   Immediate       * Surgery not found *    Review and/or summary of lab tests, radiology, procedures, medications. Review and summary of old records and obtaining of history. The risks and benefits of my recommendations, as well as other treatment options were discussed with the patient today. Questions were answered.    No orders of the defined types were placed in this encounter.      Follow-up: Return in about 3 months (around 7/8/2022).               Results for orders placed or performed in visit on 02/28/22   [facility]   Result Value Ref Range    Extra Tube Hold for add-ons.    CBC Auto Differential    Specimen: Blood   Result Value Ref Range    WBC 4.26 3.40 - 10.80 10*3/mm3    RBC 4.49 3.77 - 5.28 10*6/mm3    Hemoglobin 14.3 12.0 - 15.9 g/dL    Hematocrit 40.4 34.0 - 46.6 %    MCV 90.0 79.0 - 97.0 fL    MCH 31.8 26.6 - 33.0 pg    MCHC 35.4 31.5 - 35.7 g/dL    RDW 14.1 12.3 - 15.4 %    RDW-SD 46.3 37.0 - 54.0 fl    MPV 8.8 6.0 - 12.0 fL     Platelets 305 140 - 450 10*3/mm3    Neutrophil % 74.9 42.7 - 76.0 %    Lymphocyte % 13.1 (L) 19.6 - 45.3 %    Monocyte % 9.9 5.0 - 12.0 %    Eosinophil % 0.0 (L) 0.3 - 6.2 %    Basophil % 1.2 0.0 - 1.5 %    Immature Grans % 0.9 (H) 0.0 - 0.5 %    Neutrophils, Absolute 3.19 1.70 - 7.00 10*3/mm3    Lymphocytes, Absolute 0.56 (L) 0.70 - 3.10 10*3/mm3    Monocytes, Absolute 0.42 0.10 - 0.90 10*3/mm3    Eosinophils, Absolute 0.00 0.00 - 0.40 10*3/mm3    Basophils, Absolute 0.05 0.00 - 0.20 10*3/mm3    Immature Grans, Absolute 0.04 0.00 - 0.05 10*3/mm3    nRBC 0.0 0.0 - 0.2 /100 WBC   Comprehensive metabolic panel    Specimen: Blood   Result Value Ref Range    Glucose 364 (H) 65 - 99 mg/dL    BUN 6 (L) 8 - 23 mg/dL    Creatinine 0.74 0.57 - 1.00 mg/dL    Sodium 134 (L) 136 - 145 mmol/L    Potassium 3.9 3.5 - 5.2 mmol/L    Chloride 95 (L) 98 - 107 mmol/L    CO2 24.0 22.0 - 29.0 mmol/L    Calcium 9.8 8.6 - 10.5 mg/dL    Total Protein 6.4 6.0 - 8.5 g/dL    Albumin 4.30 3.50 - 5.20 g/dL    ALT (SGPT) 100 (H) 1 - 33 U/L    AST (SGOT) 86 (H) 1 - 32 U/L    Alkaline Phosphatase 113 39 - 117 U/L    Total Bilirubin 0.4 0.0 - 1.2 mg/dL    Globulin 2.1 gm/dL    A/G Ratio 2.0 g/dL    BUN/Creatinine Ratio 8.1 7.0 - 25.0    Anion Gap 15.0 5.0 - 15.0 mmol/L    eGFR 91.6 >60.0 mL/min/1.73   Results for orders placed or performed in visit on 12/06/21   CBC Auto Differential    Specimen: Blood   Result Value Ref Range    WBC 4.48 3.40 - 10.80 10*3/mm3    RBC 4.29 3.77 - 5.28 10*6/mm3    Hemoglobin 14.0 12.0 - 15.9 g/dL    Hematocrit 40.1 34.0 - 46.6 %    MCV 93.5 79.0 - 97.0 fL    MCH 32.6 26.6 - 33.0 pg    MCHC 34.9 31.5 - 35.7 g/dL    RDW 13.8 12.3 - 15.4 %    RDW-SD 47.1 37.0 - 54.0 fl    MPV 9.1 6.0 - 12.0 fL    Platelets 303 140 - 450 10*3/mm3    Neutrophil % 74.6 42.7 - 76.0 %    Lymphocyte % 14.3 (L) 19.6 - 45.3 %    Monocyte % 8.7 5.0 - 12.0 %    Eosinophil % 0.0 (L) 0.3 - 6.2 %    Basophil % 1.1 0.0 - 1.5 %    Immature Grans % 1.3  (H) 0.0 - 0.5 %    Neutrophils, Absolute 3.34 1.70 - 7.00 10*3/mm3    Lymphocytes, Absolute 0.64 (L) 0.70 - 3.10 10*3/mm3    Monocytes, Absolute 0.39 0.10 - 0.90 10*3/mm3    Eosinophils, Absolute 0.00 0.00 - 0.40 10*3/mm3    Basophils, Absolute 0.05 0.00 - 0.20 10*3/mm3    Immature Grans, Absolute 0.06 (H) 0.00 - 0.05 10*3/mm3    nRBC 0.0 0.0 - 0.2 /100 WBC   Comprehensive metabolic panel    Specimen: Blood   Result Value Ref Range    Glucose 331 (H) 65 - 99 mg/dL    BUN 9 8 - 23 mg/dL    Creatinine 0.73 0.57 - 1.00 mg/dL    Sodium 133 (L) 136 - 145 mmol/L    Potassium 3.5 3.5 - 5.2 mmol/L    Chloride 94 (L) 98 - 107 mmol/L    CO2 26.0 22.0 - 29.0 mmol/L    Calcium 9.5 8.6 - 10.5 mg/dL    Total Protein 7.1 6.0 - 8.5 g/dL    Albumin 4.30 3.50 - 5.20 g/dL    ALT (SGPT) 70 (H) 1 - 33 U/L    AST (SGOT) 63 (H) 1 - 32 U/L    Alkaline Phosphatase 88 39 - 117 U/L    Total Bilirubin 0.4 0.0 - 1.2 mg/dL    eGFR Non African Amer 81 >60 mL/min/1.73    Globulin 2.8 gm/dL    A/G Ratio 1.5 g/dL    BUN/Creatinine Ratio 12.3 7.0 - 25.0    Anion Gap 13.0 5.0 - 15.0 mmol/L   Results for orders placed or performed during the hospital encounter of 11/30/21   Tissue Pathology Exam    Specimen: A: Small Intestine, Duodenum; Tissue    B: Gastric, Antrum; Tissue    C: Esophagus; Tissue    D: Large Intestine; Tissue   Result Value Ref Range    Case Report       Surgical Pathology Report                         Case: [id number]                                  Authorizing Provider:  Magda Cruz MD      Collected:           11/30/2021 04:10 PM          Ordering Location:     [facility]   Received:            12/01/2021 07:12 AM                                 [facility]                                                     Pathologist:           Mitch Marks MD                                                           Specimens:   1) - Small Intestine, Duodenum, MM                                                                   2) - Gastric, Antrum, MM                                                                            3) - Esophagus, eg junction MM                                                                      4) - Large Intestine, colonic mucosa MM                                                    Final Diagnosis       SEE SCANNED REPORT       POC Glucose Once    Specimen: Blood   Result Value Ref Range    Glucose 161 (H) 70 - 130 mg/dL   Results for orders placed or performed in visit on 11/29/21   COVID-19, [facility] IN-HOUSE, NP SWAB IN TRANSPORT MEDIA 8-10 HR TAT - Swab, Nasopharynx    Specimen: Nasopharynx; Swab   Result Value Ref Range    COVID19 Not Detected Not Detected - Ref. Range   Results for orders placed or performed in visit on 11/24/21   [facility]   Result Value Ref Range    Extra Tube Hold for add-ons.    CBC Auto Differential    Specimen: Blood   Result Value Ref Range    WBC 4.83 3.40 - 10.80 10*3/mm3    RBC 4.19 3.77 - 5.28 10*6/mm3    Hemoglobin 13.7 12.0 - 15.9 g/dL    Hematocrit 39.0 34.0 - 46.6 %    MCV 93.1 79.0 - 97.0 fL    MCH 32.7 26.6 - 33.0 pg    MCHC 35.1 31.5 - 35.7 g/dL    RDW 13.9 12.3 - 15.4 %    RDW-SD 47.6 37.0 - 54.0 fl    MPV 8.9 6.0 - 12.0 fL    Platelets 296 140 - 450 10*3/mm3    Neutrophil % 72.3 42.7 - 76.0 %    Lymphocyte % 14.7 (L) 19.6 - 45.3 %    Monocyte % 11.2 5.0 - 12.0 %    Eosinophil % 0.0 (L) 0.3 - 6.2 %    Basophil % 0.8 0.0 - 1.5 %    Immature Grans % 1.0 (H) 0.0 - 0.5 %    Neutrophils, Absolute 3.49 1.70 - 7.00 10*3/mm3    Lymphocytes, Absolute 0.71 0.70 - 3.10 10*3/mm3    Monocytes, Absolute 0.54 0.10 - 0.90 10*3/mm3    Eosinophils, Absolute 0.00 0.00 - 0.40 10*3/mm3    Basophils, Absolute 0.04 0.00 - 0.20 10*3/mm3    Immature Grans, Absolute 0.05 0.00 - 0.05 10*3/mm3    nRBC 0.0 0.0 - 0.2 /100 WBC     *Note: Due to a large number of results and/or encounters for the requested time period, some results have not been displayed. A complete set  of results can be found in Results Review.         This document has been electronically signed by Magda Cruz MD on May 7, 2022 19:16 CDT

## 2022-05-13 ENCOUNTER — OFFICE VISIT (OUTPATIENT)
Dept: OBSTETRICS AND GYNECOLOGY | Facility: CLINIC | Age: 62
End: 2022-05-13

## 2022-05-13 VITALS
BODY MASS INDEX: 37.32 KG/M2 | SYSTOLIC BLOOD PRESSURE: 110 MMHG | DIASTOLIC BLOOD PRESSURE: 72 MMHG | HEIGHT: 64 IN | WEIGHT: 218.6 LBS

## 2022-05-13 DIAGNOSIS — C82.03 FOLLICULAR LYMPHOMA GRADE I OF INTRA-ABDOMINAL LYMPH NODES: ICD-10-CM

## 2022-05-13 DIAGNOSIS — N93.9 VAGINAL BLEEDING: Primary | ICD-10-CM

## 2022-05-13 DIAGNOSIS — N95.2 VAGINAL ATROPHY: ICD-10-CM

## 2022-05-13 DIAGNOSIS — Z90.710 STATUS POST HYSTERECTOMY: ICD-10-CM

## 2022-05-13 PROCEDURE — 99204 OFFICE O/P NEW MOD 45 MIN: CPT | Performed by: OBSTETRICS & GYNECOLOGY

## 2022-05-13 RX ORDER — INSULIN GLARGINE 100 [IU]/ML
30 INJECTION, SOLUTION SUBCUTANEOUS DAILY
COMMUNITY

## 2022-05-13 RX ORDER — SUVOREXANT 20 MG/1
20 TABLET, FILM COATED ORAL NIGHTLY
COMMUNITY
Start: 2022-05-04

## 2022-05-13 RX ORDER — GLIMEPIRIDE 4 MG/1
1 TABLET ORAL
COMMUNITY
Start: 2022-05-03 | End: 2022-08-02

## 2022-05-13 NOTE — PROGRESS NOTES
[facility]  Gynecology  Date of Service: 2022    CC: Vaginal bleeding    HPI  Chasity Leon is a 62 y.o.  postmenopausal female who presents with complaints of vaginal bleeding.    She states that over the past 3 months, she has had intermittent vaginal bleeding.  Denies any pain.  She had a hysterectomy in  for benign indications.  She is currently undergoing treatment for lymphoma.    ROS  Review of Systems   Constitutional: Negative.    HENT: Negative.    Eyes: Negative.    Respiratory: Negative.    Cardiovascular: Negative.    Gastrointestinal: Positive for constipation.   Endocrine: Negative.    Genitourinary: Positive for vaginal bleeding.   Musculoskeletal: Negative.    Skin: Negative.    Allergic/Immunologic: Negative.    Neurological: Negative.    Hematological: Negative.    Psychiatric/Behavioral: The patient is nervous/anxious.      GYN HISTORY  Menarche: age 12  Menopause: 40  History of STIs: None  Abnormal pap smear history: None  S/p ARIELLE/BSO at age 40, AUB     OB HISTORY  OB History    Para Term  AB Living   2 2 2     2   SAB IAB Ectopic Molar Multiple Live Births             2      # Outcome Date GA Lbr Haile/2nd Weight Sex Delivery Anes PTL Lv   2 Term      Vag-Spont   DOMINIK   1 Term      Vag-Spont   DOMINIK     PAST MEDICAL HISTORY  Past Medical History:   Diagnosis Date   • Agoraphobia with panic disorder     on SNRI, prn buspar, prn klonopin     • Anxiety    • Arthritis    • Atrophic vaginitis    • Depression      MDD      • Elevated cholesterol    • Essential hypertension    • Follicular non-Hodgkin's lymphoma (HCC)    • Generalized anxiety disorder     SSRI - buspar, cont cymbalta, prn klonopin      • GERD (gastroesophageal reflux disease)    • Hip pain     arthritis, artifical right hip     • History of transfusion    • Hyperlipidemia    • Mass of lower limb    • Nuclear senile cataract    • Obesity    • Otalgia     ENT REFER   •  Panic disorder     with agoraphobia. On buspar and klonopin now      • Type 2 diabetes mellitus (HCC)     NO BDR     PAST SURGICAL HISTORY  Past Surgical History:   Procedure Laterality Date   • CENTRAL VENOUS LINE INSERTION  03/11/2016    Central line insertion (Successful placement of right upper extremity midline.)   • COLONOSCOPY     • COLONOSCOPY N/A 07/18/2019    Procedure: COLONOSCOPY;  Surgeon: Brandon Salvador DO;  Location: [facility] ENDOSCOPY;  Service: Gastroenterology   • COLONOSCOPY N/A 11/30/2021    Procedure: COLONOSCOPY;  Surgeon: Magda Cruz MD;  Location: [facility] ENDOSCOPY;  Service: Gastroenterology;  Laterality: N/A;   • CYSTOSCOPY  03/23/1976    Cystoscopy (external urethroplasty and cysto-panendoscopy,urethal hymenal fusion with hypospadias)   • DILATATION AND CURETTAGE  01/25/1980    D&C (removal of IUD)   • ENDOSCOPY N/A 11/30/2021    Procedure: ESOPHAGOGASTRODUODENOSCOPY;  Surgeon: Madga Cruz MD;  Location: [facility] ENDOSCOPY;  Service: Gastroenterology;  Laterality: N/A;   • EXCISION LESION  04/15/2014    Remove thigh lesion (Excision of a mass of the right thigh using a 16.2 cm incision with intermediate layered closure.)   • HYSTEROSCOPY  02/28/2003    Hysteroscope procedure (diagnostic hysteroscopy with fractional D&C)   • JOINT REPLACEMENT Right     hip   • OTHER SURGICAL HISTORY      Explore parathyroid glands   • OTHER SURGICAL HISTORY  08/26/1985    Laparosc diagnostic (desires elective tubal reversal)   • OH INSJ TUNNELED CVC W/O SUBQ PORT/ AGE 5 YR/> Right 04/04/2017    Procedure: INSERTION VENOUS ACCESS DEVICE (MEDIPORT) right chest;  Surgeon: Fortino Medina MD;  Location: [facility] OR;  Service: General   • OH INSJ TUNNELED CVC W/O SUBQ PORT/ AGE 5 YR/> N/A 06/06/2017    Procedure: INSERTION VENOUS ACCESS DEVICE   (MEDIPORT)   (C-ARM#1);  Surgeon: Fortino Medina MD;  Location: [facility] OR;  Service: General   • SHOULDER ARTHROSCOPY  11/11/2013    Shoulder  arthroscopy/surgery (Right shoulder. Rotator cuff repair. Subacromial decompression. Edgar procedure.)   • SHOULDER SURGERY  2015    Shoulder surgery procedure (Arthroscopy of the left shoulder with rotator cuff repair.Edgar procedure.Subacromial decompression.)   • TONSILLECTOMY     • TONSILLECTOMY AND ADENOIDECTOMY  1967    T&A (hypertrophied tonsils and adenoids with recurrent infection)   • TOTAL ABDOMINAL HYSTERECTOMY WITH SALPINGO OOPHORECTOMY  2004    ARIELLE/BSO (with a preop fractional dilation and curettage with frozen section,menorrhagia,dysmenorrhea,unresponsive to medical intervention)   • TOTAL HIP ARTHROPLASTY       FAMILY HISTORY  Family History   Problem Relation Age of Onset   • Breast cancer Mother    • Cancer Mother    • Diabetes Mother    • Heart disease Father    • Liver cancer Father    • Cancer Father    • Diabetes Sister         INSULIN DEPENDENT   • Cancer Sister    • Diabetes Brother         INSULIN DEPENDENT   • Coronary artery disease Other    • Diabetes Other    • Diabetes Maternal Grandmother    • Cancer Maternal Grandfather      SOCIAL HISTORY  Social History     Socioeconomic History   • Marital status:    Tobacco Use   • Smoking status: Former Smoker     Years: 12.00     Quit date: 1998     Years since quittin.3   • Smokeless tobacco: Never Used   • Tobacco comment: Smoked 1 pack per 2 weeks   Substance and Sexual Activity   • Alcohol use: No   • Drug use: No   • Sexual activity: Not Currently     ALLERGIES  Allergies   Allergen Reactions   • Ace Inhibitors Cough   • Latex      BLISTER     • Morphine And Related Nausea And Vomiting   • Lortab [Hydrocodone-Acetaminophen] Palpitations     HOME MEDICATIONS  Prior to Admission medications    Medication Sig Start Date End Date Taking? Authorizing Provider   ARIPiprazole (ABILIFY) 20 MG tablet Take 20 mg by mouth Every Night. 17   Provider, MD Emelia   busPIRone (BUSPAR) 30 MG tablet Take 30  mg by mouth 2 (Two) Times a Day. 6/18/20   Emelia Cain MD   Calcium Carbonate-Vitamin D 600-200 MG-UNIT tablet Take 1 tablet by mouth Daily.    Emelia Cain MD   clonazePAM (KlonoPIN) 1 MG tablet Take 1 mg by mouth as needed for seizures. 1/1/16   Emelia Cain MD   colesevelam (Welchol) 625 MG tablet Take 3 tablets by mouth 2 (Two) Times a Day With Meals for 30 days. 1/27/22 2/26/22  Magda Cruz MD   cyclobenzaprine (FLEXERIL) 10 MG tablet TAKE 1 TABLET (10 MG) BY MOUTH 3 TIMES DAILY AS NEEDED FOR MUSCLE SPASMS - - MAY CAUSE DROWSINESS 6/22/20   Emelia Cain MD   Desvenlafaxine Succinate (PRISTIQ PO) Take 1 tablet by mouth daily. 1/1/16   Emelia Cain MD   dicyclomine (BENTYL) 10 MG capsule Take  by mouth 4 (Four) Times a Day. 2/24/22   Emelia Cain MD   diphenoxylate-atropine (LOMOTIL) 2.5-0.025 MG per tablet Take 2 tablets by mouth 4 (Four) Times a Day As Needed for Diarrhea. 6/1/21   Tino Sood MD   doxepin (SINEquan) 25 MG capsule  7/14/20   Emelia Cain MD   famotidine (PEPCID) 40 MG tablet Take 40 mg by mouth. 2/18/20   Emelia Cain MD   glimepiride (AMARYL) 4 MG tablet  8/11/20   Emelia Cain MD   hydroCHLOROthiazide (HYDRODIURIL) 25 MG tablet Take 25 mg by mouth Daily. 10/22/21 4/21/22  Emelia Cain MD   lamoTRIgine (LAMICTAL) 150 MG tablet Take 150 mg by mouth 2 (two) times a day. 1/1/16   Emelia Cain MD   losartan (COZAAR) 100 MG tablet Take 100 mg by mouth daily. 1/1/16   Emelia Cain MD   metFORMIN (GLUCOPHAGE) 1000 MG tablet  8/20/20   Emelia Cain MD   Multiple Vitamins-Minerals (CENTRUM SILVER PO) Take 1 tablet by mouth daily. 1/1/16   Emelia Cain MD   ondansetron (ZOFRAN) 4 MG tablet Take 1 tablet by mouth 4 (Four) Times a Day As Needed for Nausea or Vomiting. 9/8/20   Tino Sood MD   pioglitazone (ACTOS) 45 MG tablet  6/11/20   Provider, MD Emelia  "  polyethylene glycol (GoLYTELY) 236 g solution Starting at noon on day prior to procedure, drink 8 ounces every 30 minutes until all gone or stools are clear. May add flavor packet. 11/23/21   Magda Cruz MD   promethazine (PHENERGAN) 25 MG tablet Take 1 tablet by mouth Every 6 (Six) Hours As Needed for Nausea or Vomiting. 11/18/21   Tino Sood MD   TRAZODONE HCL PO Take 225 mg by mouth every night. 1/1/16   Provider, MD Emelia     PE  /72   Ht 162.6 cm (64\")   Wt 99.2 kg (218 lb 9.6 oz)   BMI 37.52 kg/m²        General: Alert, healthy, no distress, well nourished and well developed.  Neurologic: Alert, oriented to person, place, and time.  Gait normal.  Cranial nerves II-XII grossly intact.  HEENT: Normocephalic, atraumatic.  Extraocular muscles intact, pupils equal and reactive times two.    Neck: Supple, no adenopathy, thyroid normal size, non-tender, without nodularity, trachea midline.  Lungs: Normal respiratory effort.  Clear to auscultation bilaterally.  No wheezes, rhonci, or rales.  Heart: Regular rate and rhythm.  No murmer, rub or gallop.  Abdomen: Soft, non-tender, non-distended,no masses, no hepatosplenomegaly, no hernia.  Skin: No rash, no lesions.  Extremities: No cyanosis, clubbing or edema.  PELVIC EXAM:  External Genitalia/Vulva: Anatomy is normal, no significant redness of labia, no discharge on vulvar tissues, [place]'s and Bartholin's glands are normal, no ulcers, no condylomatous lesions.  Urethral meatus: Normal, no lesions, no prolapse.  Urethra: Normal, no masses, no tenderness with palpation.  Bladder: Normal, no fullness, no masses, no tenderness with palpation.  Vagina: Vaginal tissues are atrophy, no significant discharge present.  Pelvic support adequate.  Fullness palpated in the right groin.  Cervix: Absent.  Uterus: Absent.  Adnexa: Absent.  Rectal: Normal, no masses or polyps, confirms bimanual exam, perianal normal, no lesions; ALEXEI " deferred.    IMPRESSION  Chasity Leon is a 62 y.o.  presenting with vaginal bleeding, likely secondary to vaginal atrophy.  No evidence of malignancy visualized.    PLAN    1. Vaginal bleeding    2. Vaginal atrophy  Discussed treatment options including Replens, coconut oil, vaginal estrogen cream.  Reviewed risks of each.  Discussed that vaginal estrogen is not systemically absorbed and does not carry the risks of stroke, DVT, or breast cancer.  She opted to try vaginal estrogen; sample of Premarin given: use pea-sized amount of Premarin cream, place vaginally as high up as possible in the vagina, 2 times per week at night.  She will let us know after 1 month of use if improved; if so, will send in script at that time.    3. Status post hysterectomy    4. Follicular lymphoma grade I of intra-abdominal lymph nodes (HCC)         This document has been electronically signed by Clara Salvador MD on May 13, 2022 18:00 CDT.

## 2022-05-17 ENCOUNTER — INFUSION (OUTPATIENT)
Dept: ONCOLOGY | Facility: HOSPITAL | Age: 62
End: 2022-05-17

## 2022-05-17 ENCOUNTER — HOSPITAL ENCOUNTER (OUTPATIENT)
Dept: CT IMAGING | Facility: HOSPITAL | Age: 62
Discharge: HOME | End: 2022-05-17

## 2022-05-17 DIAGNOSIS — C82.08 GRADE 1 FOLLICULAR LYMPHOMA OF LYMPH NODES OF MULTIPLE REGIONS: ICD-10-CM

## 2022-05-17 DIAGNOSIS — C82.08 GRADE 1 FOLLICULAR LYMPHOMA OF LYMPH NODES OF MULTIPLE REGIONS: Chronic | ICD-10-CM

## 2022-05-17 DIAGNOSIS — K11.8 MASS OF BOTH PAROTID GLANDS: Chronic | ICD-10-CM

## 2022-05-17 DIAGNOSIS — Z45.2 ENCOUNTER FOR VENOUS ACCESS DEVICE CARE: Primary | ICD-10-CM

## 2022-05-17 LAB
ALBUMIN SERPL-MCNC: 4.2 G/DL (ref 3.5–5.2)
ALBUMIN/GLOB SERPL: 1.6 G/DL
ALP SERPL-CCNC: 93 U/L (ref 39–117)
ALT SERPL W P-5'-P-CCNC: 49 U/L (ref 1–33)
ANION GAP SERPL CALCULATED.3IONS-SCNC: 13 MMOL/L (ref 5–15)
AST SERPL-CCNC: 36 U/L (ref 1–32)
BASOPHILS # BLD AUTO: 0.06 10*3/MM3 (ref 0–0.2)
BASOPHILS NFR BLD AUTO: 1 % (ref 0–1.5)
BILIRUB SERPL-MCNC: 0.5 MG/DL (ref 0–1.2)
BUN SERPL-MCNC: 7 MG/DL (ref 8–23)
BUN/CREAT SERPL: 10.6 (ref 7–25)
CALCIUM SPEC-SCNC: 9.6 MG/DL (ref 8.6–10.5)
CHLORIDE SERPL-SCNC: 96 MMOL/L (ref 98–107)
CO2 SERPL-SCNC: 28 MMOL/L (ref 22–29)
CREAT SERPL-MCNC: 0.66 MG/DL (ref 0.57–1)
DEPRECATED RDW RBC AUTO: 44.7 FL (ref 37–54)
EGFRCR SERPLBLD CKD-EPI 2021: 99.3 ML/MIN/1.73
EOSINOPHIL # BLD AUTO: 0 10*3/MM3 (ref 0–0.4)
EOSINOPHIL NFR BLD AUTO: 0 % (ref 0.3–6.2)
ERYTHROCYTE [DISTWIDTH] IN BLOOD BY AUTOMATED COUNT: 13.6 % (ref 12.3–15.4)
GLOBULIN UR ELPH-MCNC: 2.6 GM/DL
GLUCOSE SERPL-MCNC: 200 MG/DL (ref 65–99)
HCT VFR BLD AUTO: 42.9 % (ref 34–46.6)
HGB BLD-MCNC: 15.3 G/DL (ref 12–15.9)
HOLD SPECIMEN: NORMAL
IMM GRANULOCYTES # BLD AUTO: 0.06 10*3/MM3 (ref 0–0.05)
IMM GRANULOCYTES NFR BLD AUTO: 1 % (ref 0–0.5)
LYMPHOCYTES # BLD AUTO: 0.82 10*3/MM3 (ref 0.7–3.1)
LYMPHOCYTES NFR BLD AUTO: 13.4 % (ref 19.6–45.3)
MCH RBC QN AUTO: 31.9 PG (ref 26.6–33)
MCHC RBC AUTO-ENTMCNC: 35.7 G/DL (ref 31.5–35.7)
MCV RBC AUTO: 89.4 FL (ref 79–97)
MONOCYTES # BLD AUTO: 0.64 10*3/MM3 (ref 0.1–0.9)
MONOCYTES NFR BLD AUTO: 10.4 % (ref 5–12)
NEUTROPHILS NFR BLD AUTO: 4.55 10*3/MM3 (ref 1.7–7)
NEUTROPHILS NFR BLD AUTO: 74.2 % (ref 42.7–76)
NRBC BLD AUTO-RTO: 0 /100 WBC (ref 0–0.2)
PLATELET # BLD AUTO: 308 10*3/MM3 (ref 140–450)
PMV BLD AUTO: 8.6 FL (ref 6–12)
POTASSIUM SERPL-SCNC: 3.5 MMOL/L (ref 3.5–5.2)
PROT SERPL-MCNC: 6.8 G/DL (ref 6–8.5)
RBC # BLD AUTO: 4.8 10*6/MM3 (ref 3.77–5.28)
SODIUM SERPL-SCNC: 137 MMOL/L (ref 136–145)
WBC NRBC COR # BLD: 6.13 10*3/MM3 (ref 3.4–10.8)

## 2022-05-17 PROCEDURE — 85025 COMPLETE CBC W/AUTO DIFF WBC: CPT

## 2022-05-17 PROCEDURE — 80053 COMPREHEN METABOLIC PANEL: CPT

## 2022-05-17 PROCEDURE — 36415 COLL VENOUS BLD VENIPUNCTURE: CPT

## 2022-05-17 PROCEDURE — 25010000002 HEPARIN LOCK FLUSH PER 10 UNITS: Performed by: INTERNAL MEDICINE

## 2022-05-17 PROCEDURE — 71260 CT THORAX DX C+: CPT

## 2022-05-17 PROCEDURE — 36591 DRAW BLOOD OFF VENOUS DEVICE: CPT | Performed by: INTERNAL MEDICINE

## 2022-05-17 PROCEDURE — 70487 CT MAXILLOFACIAL W/DYE: CPT

## 2022-05-17 PROCEDURE — 25010000002 IOPAMIDOL 61 % SOLUTION: Performed by: INTERNAL MEDICINE

## 2022-05-17 PROCEDURE — 74177 CT ABD & PELVIS W/CONTRAST: CPT

## 2022-05-17 RX ORDER — HEPARIN SODIUM (PORCINE) LOCK FLUSH IV SOLN 100 UNIT/ML 100 UNIT/ML
500 SOLUTION INTRAVENOUS AS NEEDED
Status: DISCONTINUED | OUTPATIENT
Start: 2022-05-17 | End: 2022-05-17 | Stop reason: HOSPADM

## 2022-05-17 RX ORDER — HEPARIN SODIUM (PORCINE) LOCK FLUSH IV SOLN 100 UNIT/ML 100 UNIT/ML
500 SOLUTION INTRAVENOUS AS NEEDED
Status: CANCELLED | OUTPATIENT
Start: 2022-05-23

## 2022-05-17 RX ADMIN — HEPARIN 500 UNITS: 100 SYRINGE at 12:10

## 2022-05-17 RX ADMIN — IOPAMIDOL 90 ML: 612 INJECTION, SOLUTION INTRAVENOUS at 12:06

## 2022-05-23 ENCOUNTER — OFFICE VISIT (OUTPATIENT)
Dept: ONCOLOGY | Facility: CLINIC | Age: 62
End: 2022-05-23

## 2022-05-23 ENCOUNTER — INFUSION (OUTPATIENT)
Dept: ONCOLOGY | Facility: HOSPITAL | Age: 62
End: 2022-05-23

## 2022-05-23 VITALS
TEMPERATURE: 97.5 F | HEART RATE: 86 BPM | OXYGEN SATURATION: 97 % | BODY MASS INDEX: 37.9 KG/M2 | DIASTOLIC BLOOD PRESSURE: 91 MMHG | WEIGHT: 220.8 LBS | SYSTOLIC BLOOD PRESSURE: 175 MMHG

## 2022-05-23 DIAGNOSIS — K11.8 MASS OF BOTH PAROTID GLANDS: Chronic | ICD-10-CM

## 2022-05-23 DIAGNOSIS — C82.08 GRADE 1 FOLLICULAR LYMPHOMA OF LYMPH NODES OF MULTIPLE REGIONS: Primary | Chronic | ICD-10-CM

## 2022-05-23 DIAGNOSIS — Z45.2 ENCOUNTER FOR VENOUS ACCESS DEVICE CARE: ICD-10-CM

## 2022-05-23 DIAGNOSIS — C82.08 GRADE 1 FOLLICULAR LYMPHOMA OF LYMPH NODES OF MULTIPLE REGIONS: Primary | ICD-10-CM

## 2022-05-23 DIAGNOSIS — R79.89 ELEVATED LFTS: Chronic | ICD-10-CM

## 2022-05-23 LAB
ALBUMIN SERPL-MCNC: 4 G/DL (ref 3.5–5.2)
ALBUMIN/GLOB SERPL: 1.5 G/DL
ALP SERPL-CCNC: 98 U/L (ref 39–117)
ALT SERPL W P-5'-P-CCNC: 46 U/L (ref 1–33)
ANION GAP SERPL CALCULATED.3IONS-SCNC: 14 MMOL/L (ref 5–15)
AST SERPL-CCNC: 30 U/L (ref 1–32)
BASOPHILS # BLD AUTO: 0.06 10*3/MM3 (ref 0–0.2)
BASOPHILS NFR BLD AUTO: 1 % (ref 0–1.5)
BILIRUB SERPL-MCNC: 0.6 MG/DL (ref 0–1.2)
BUN SERPL-MCNC: 9 MG/DL (ref 8–23)
BUN/CREAT SERPL: 12.5 (ref 7–25)
CALCIUM SPEC-SCNC: 9.6 MG/DL (ref 8.6–10.5)
CHLORIDE SERPL-SCNC: 96 MMOL/L (ref 98–107)
CO2 SERPL-SCNC: 26 MMOL/L (ref 22–29)
CREAT SERPL-MCNC: 0.72 MG/DL (ref 0.57–1)
DEPRECATED RDW RBC AUTO: 45.2 FL (ref 37–54)
EGFRCR SERPLBLD CKD-EPI 2021: 94.7 ML/MIN/1.73
EOSINOPHIL # BLD AUTO: 0.14 10*3/MM3 (ref 0–0.4)
EOSINOPHIL NFR BLD AUTO: 2.3 % (ref 0.3–6.2)
ERYTHROCYTE [DISTWIDTH] IN BLOOD BY AUTOMATED COUNT: 13.9 % (ref 12.3–15.4)
GLOBULIN UR ELPH-MCNC: 2.6 GM/DL
GLUCOSE SERPL-MCNC: 339 MG/DL (ref 65–99)
HCT VFR BLD AUTO: 40.3 % (ref 34–46.6)
HGB BLD-MCNC: 14.6 G/DL (ref 12–15.9)
IMM GRANULOCYTES # BLD AUTO: 0.05 10*3/MM3 (ref 0–0.05)
IMM GRANULOCYTES NFR BLD AUTO: 0.8 % (ref 0–0.5)
LYMPHOCYTES # BLD AUTO: 0.62 10*3/MM3 (ref 0.7–3.1)
LYMPHOCYTES NFR BLD AUTO: 10.2 % (ref 19.6–45.3)
MCH RBC QN AUTO: 32.5 PG (ref 26.6–33)
MCHC RBC AUTO-ENTMCNC: 36.2 G/DL (ref 31.5–35.7)
MCV RBC AUTO: 89.8 FL (ref 79–97)
MONOCYTES # BLD AUTO: 0.58 10*3/MM3 (ref 0.1–0.9)
MONOCYTES NFR BLD AUTO: 9.6 % (ref 5–12)
NEUTROPHILS NFR BLD AUTO: 4.61 10*3/MM3 (ref 1.7–7)
NEUTROPHILS NFR BLD AUTO: 76.1 % (ref 42.7–76)
NRBC BLD AUTO-RTO: 0 /100 WBC (ref 0–0.2)
PLATELET # BLD AUTO: 281 10*3/MM3 (ref 140–450)
PMV BLD AUTO: 9 FL (ref 6–12)
POTASSIUM SERPL-SCNC: 3.5 MMOL/L (ref 3.5–5.2)
PROT SERPL-MCNC: 6.6 G/DL (ref 6–8.5)
RBC # BLD AUTO: 4.49 10*6/MM3 (ref 3.77–5.28)
SODIUM SERPL-SCNC: 136 MMOL/L (ref 136–145)
WBC NRBC COR # BLD: 6.06 10*3/MM3 (ref 3.4–10.8)

## 2022-05-23 PROCEDURE — 85025 COMPLETE CBC W/AUTO DIFF WBC: CPT

## 2022-05-23 PROCEDURE — 1123F ACP DISCUSS/DSCN MKR DOCD: CPT | Performed by: INTERNAL MEDICINE

## 2022-05-23 PROCEDURE — 1126F AMNT PAIN NOTED NONE PRSNT: CPT | Performed by: INTERNAL MEDICINE

## 2022-05-23 PROCEDURE — 99215 OFFICE O/P EST HI 40 MIN: CPT | Performed by: INTERNAL MEDICINE

## 2022-05-23 PROCEDURE — 25010000002 HEPARIN LOCK FLUSH PER 10 UNITS: Performed by: INTERNAL MEDICINE

## 2022-05-23 PROCEDURE — G0463 HOSPITAL OUTPT CLINIC VISIT: HCPCS | Performed by: INTERNAL MEDICINE

## 2022-05-23 PROCEDURE — 80053 COMPREHEN METABOLIC PANEL: CPT

## 2022-05-23 PROCEDURE — 36591 DRAW BLOOD OFF VENOUS DEVICE: CPT | Performed by: INTERNAL MEDICINE

## 2022-05-23 RX ORDER — HEPARIN SODIUM (PORCINE) LOCK FLUSH IV SOLN 100 UNIT/ML 100 UNIT/ML
500 SOLUTION INTRAVENOUS AS NEEDED
Status: DISCONTINUED | OUTPATIENT
Start: 2022-05-23 | End: 2022-05-23 | Stop reason: HOSPADM

## 2022-05-23 RX ORDER — HEPARIN SODIUM (PORCINE) LOCK FLUSH IV SOLN 100 UNIT/ML 100 UNIT/ML
500 SOLUTION INTRAVENOUS AS NEEDED
Status: CANCELLED | OUTPATIENT
Start: 2022-05-23

## 2022-05-23 RX ADMIN — HEPARIN 500 UNITS: 100 SYRINGE at 08:40

## 2022-05-23 NOTE — PROGRESS NOTES
DATE OF VISIT: 5/23/2022      REASON FOR VISIT: Recurrent follicular lymphoma with involvement of bilateral parotid gland, fatty liver with elevated liver function test, diarrhea      HISTORY OF PRESENT ILLNESS:   62-year-old female with medical problem consisting of obesity, hypertension, anxiety, dyslipidemia, fatty liver with elevated liver function test, recurrent follicular lymphoma with multiple recurrences currently on rituximab maintenance since September 2021.  Patient is scheduled to get fourth maintenance rituximab infusion today.  Patient had a restaging CT of maxillofacial, chest abdomen and pelvis with contrast done recently.  She is easier to discuss the results and further recommendation.  Complains of vaginal bleeding/spotting for the last 2 months for which  she has been evaluated by Dr. Salvador.  Complains of diarrhea.  Denies any new lymph node enlargement.  Denies any fever or drenching night sweats.        Oncology history:    1.  Recurrent follicular lymphoma grade 1:  -Patient initially diagnosed in April 2015 for which patient underwent palliative radiation treatment.  -Second relapse happened in September 2016 for again patient had a radiation treatment.  - Patient was found to have enlarged aortocaval lymph node in April 2017 for which patient initially received rituximab every 2 months without any significant improvement.  -Subsequently patient received 4 cycles of bendamustine and rituximab between April 30, 2018 and August 21, 2018.  -Patient was diagnosed with involvement of left parotid gland on biopsy done on August 27, 2020 by Dr. Ibrahim.  PET/CT done shows uptake in bilateral parotid gland along with left sided neck lymph node involvement.  -Patient was started on Revlimid with rituximab on September 22, 2020.  Patient completed cycle 12 on 8/23/2021.            Past Medical History, Past Surgical History, Social History, Family History have been reviewed and are without significant  changes except as mentioned.    Review of Systems   A comprehensive 14 point review of systems was performed and was negative except as mentioned in HPI.    Medications:  The current medication list was reviewed in the EMR    ALLERGIES:    Allergies   Allergen Reactions   • Ace Inhibitors Cough   • Latex      BLISTER     • Morphine And Related Nausea And Vomiting   • Lortab [Hydrocodone-Acetaminophen] Palpitations       Objective      Vitals:    05/23/22 0757   BP: 175/91   Pulse: 86   Temp: 97.5 °F (36.4 °C)   TempSrc: Temporal   SpO2: 97%   Weight: 100 kg (220 lb 12.8 oz)   PainSc: 0-No pain     Current Status 4/4/2022   ECOG score 0       Physical Exam  Cardiovascular:      Comments: Port-A-Cath present  Pulmonary:      Breath sounds: Normal breath sounds.   Neurological:      Mental Status: She is alert and oriented to person, place, and time.           RECENT LABS:  Glucose   Date Value Ref Range Status   05/23/2022 339 (H) 65 - 99 mg/dL Final     Sodium   Date Value Ref Range Status   05/23/2022 136 136 - 145 mmol/L Final     Potassium   Date Value Ref Range Status   05/23/2022 3.5 3.5 - 5.2 mmol/L Final     CO2   Date Value Ref Range Status   05/23/2022 26.0 22.0 - 29.0 mmol/L Final     Chloride   Date Value Ref Range Status   05/23/2022 96 (L) 98 - 107 mmol/L Final     Anion Gap   Date Value Ref Range Status   05/23/2022 14.0 5.0 - 15.0 mmol/L Final     Creatinine   Date Value Ref Range Status   05/23/2022 0.72 0.57 - 1.00 mg/dL Final     BUN   Date Value Ref Range Status   05/23/2022 9 8 - 23 mg/dL Final     BUN/Creatinine Ratio   Date Value Ref Range Status   05/23/2022 12.5 7.0 - 25.0 Final     Calcium   Date Value Ref Range Status   05/23/2022 9.6 8.6 - 10.5 mg/dL Final     eGFR Non  Amer   Date Value Ref Range Status   12/06/2021 81 >60 mL/min/1.73 Final     Alkaline Phosphatase   Date Value Ref Range Status   05/23/2022 98 39 - 117 U/L Final     Total Protein   Date Value Ref Range Status    05/23/2022 6.6 6.0 - 8.5 g/dL Final     ALT (SGPT)   Date Value Ref Range Status   05/23/2022 46 (H) 1 - 33 U/L Final     AST (SGOT)   Date Value Ref Range Status   05/23/2022 30 1 - 32 U/L Final     Total Bilirubin   Date Value Ref Range Status   05/23/2022 0.6 0.0 - 1.2 mg/dL Final     Albumin   Date Value Ref Range Status   05/23/2022 4.00 3.50 - 5.20 g/dL Final     Globulin   Date Value Ref Range Status   05/23/2022 2.6 gm/dL Final     Lab Results   Component Value Date    WBC 6.06 05/23/2022    HGB 14.6 05/23/2022    HCT 40.3 05/23/2022    MCV 89.8 05/23/2022     05/23/2022     Lab Results   Component Value Date    NEUTROABS 4.61 05/23/2022     No results found for: , LABCA2, AFPTM, HCGQUANT, , CHROMGRNA, [id number], CEA, REFLABREPO      PATHOLOGY:  * Cannot find OR log *         RADIOLOGY DATA :  CT Chest With Contrast Diagnostic    Result Date: 5/18/2022  1. An abnormal soft tissue mass measuring up to 6.7 cm encases the right third rib anteriorly, new since the prior exam. 2. Additional soft tissue mass centered about the right margin of the vagina also appears new/increased since the prior exam. 3. Findings are malignant in appearance and are suspicious for recurrent lymphoproliferative malignancy. Electronically signed by:  Vincenzo Rogers MD  5/18/2022 1:22 PM CDT Workstation: [id number]    CT Abdomen Pelvis With Contrast    Result Date: 5/18/2022  1. An abnormal soft tissue mass measuring up to 6.7 cm encases the right third rib anteriorly, new since the prior exam. 2. Additional soft tissue mass centered about the right margin of the vagina also appears new/increased since the prior exam. 3. Findings are malignant in appearance and are suspicious for recurrent lymphoproliferative malignancy. Electronically signed by:  Vincenzo Rogers MD  5/18/2022 1:22 PM CDT Workstation: [id number]    CT Maxillofacial With Contrast    Result Date: 5/18/2022  Stable exam with somewhat irregular soft  tissue thickening in the bilateral parotid glands, as above. Electronically signed by:  Vincenzo Rogers MD  5/18/2022 1:26 PM CDT Workstation: [id number]          Assessment & Plan     1.  Recurrent follicular lymphoma:  - Review oncology history for prior treatment details  - Patient is currently on maintenance rituximab since September 21, 2021  - Recent CT of maxillofacial, chest and abdomen with contrast done on May 17, 2022 was reviewed with radiologist, discussed with patient.  CT scan is consistent with progression of disease with new onset of soft tissue mass encasing right third rib as well as vaginal mass on right side.  Due to atypical location of mass, recommend ultrasound-guided biopsy of right chest wall mass for tissue diagnosis.  - Patient does understand that CT scan is consistent with progression of disease which can be potentially life-threatening without any further intervention.  - Also recommended getting PET/CT done for restaging purposes for further treatment recommendation.  - Patient will return to clinic in 2 weeks to go over the result of biopsy as well as PET scan.  We will hold rituximab today.    2.  Elevated liver function test  - Secondary to fatty liver plus uncontrolled diabetes mellitus  - We will monitor with CMP.    3.  Health maintenance: Patient does not smoke.  Had a colonoscopy done in November 2021 by Dr Cruz    4. Advance Care Planning: For now patient remains full code and is able to make decisions.  Patient has health care surrogate mentioned on chart.                 PHQ-9 Total Score: 2   -Patient is not homicidal or suicidal.  No acute intervention required.    Chasity Leon reports a pain score of 0.  Given her pain assessment as noted, treatment options were discussed and the following options were decided upon as a follow-up plan to address the patient's pain: continuation of current treatment plan for pain.         Tino Sood MD  5/23/2022  08:07  CDT        Part of this note may be an electronic transcription/translation of spoken language to printed text using the Dragon Dictation System.          CC:

## 2022-05-26 ENCOUNTER — HOSPITAL ENCOUNTER (OUTPATIENT)
Dept: ULTRASOUND IMAGING | Facility: HOSPITAL | Age: 62
Discharge: HOME | End: 2022-05-26
Admitting: RADIOLOGY

## 2022-05-26 VITALS
TEMPERATURE: 97.6 F | SYSTOLIC BLOOD PRESSURE: 150 MMHG | HEART RATE: 75 BPM | DIASTOLIC BLOOD PRESSURE: 70 MMHG | OXYGEN SATURATION: 97 %

## 2022-05-26 DIAGNOSIS — C83.39 DIFFUSE LARGE B-CELL LYMPHOMA OF EXTRANODAL SITE EXCLUDING SPLEEN AND OTHER SOLID ORGANS: ICD-10-CM

## 2022-05-26 DIAGNOSIS — C82.08 GRADE 1 FOLLICULAR LYMPHOMA OF LYMPH NODES OF MULTIPLE REGIONS: Chronic | ICD-10-CM

## 2022-05-26 PROCEDURE — 88307 TISSUE EXAM BY PATHOLOGIST: CPT

## 2022-05-26 PROCEDURE — 88360 TUMOR IMMUNOHISTOCHEM/MANUAL: CPT

## 2022-05-26 PROCEDURE — 88342 IMHCHEM/IMCYTCHM 1ST ANTB: CPT

## 2022-05-26 PROCEDURE — 88341 IMHCHEM/IMCYTCHM EA ADD ANTB: CPT

## 2022-05-26 PROCEDURE — 76942 ECHO GUIDE FOR BIOPSY: CPT

## 2022-05-26 NOTE — POST-PROCEDURE NOTE
INTERVENTIONAL RADIOLOGY: BRIEF OP NOTE    Pre-Procedure Diagnosis: right chest wall mass, hx follicular lymphoma  Post-Procedure Diagnosis: same  Procedure: Ultrasound Guided Core Biopsy Right Chest Wall Mass  Anesthesia: local  Staff: Lynne Perdomo M.D.  EBL: <1mL  Specimens: Four 16G 13mm cores to path in formalin, two 16G 13mm cores in RPMI  Drains: none  Findings: Appropriate needle course into lesion. Grossly light tan tissue.  Complications: none    Lynne Perdomo M.D.  Vascular, Interventional and Wound Physician  IR Chief, [facility]  Cell: [phone] / Office: [phone]

## 2022-05-26 NOTE — PRE-PROCEDURE NOTE
INTERVENTIONAL RADIOLOGY    63yo F PMH agoraphobia, panic d/o, anxiety, depression, HLD, HTN, obesity, DYANA, GERD, T2DM and follicular non-Hodgkin lymphoma referred by Dr. Sood for hypermetabolic right chest wall mass, concern for dz recurrence vs synchronous primary.    On exam, obese, mildly anxious. VSS, on room air.  Sonographic exam of right chest wall between old port scar and nipple in upper breast region demonstrates a hypoechoic osseously destructive mass with mild internal vascularity.    The patient's history and physical exam were reviewed, and no changes were noted. The risks, benefits, alternatives, and indications of the procedure were discussed with the patient, and all questions were answered. Informed consent was obtained.    Thank you very much for the referral. Please do not hesitate to contact me if I may be of further assistance.    Lynne Perdomo M.D.  Vascular, Interventional and Wound Physician  IR Chief, [facility]  Cell: [phone] / Office: [phone]

## 2022-05-27 ENCOUNTER — HOSPITAL ENCOUNTER (OUTPATIENT)
Dept: PET IMAGING | Facility: HOSPITAL | Age: 62
Discharge: HOME | End: 2022-05-27
Admitting: INTERNAL MEDICINE

## 2022-05-27 DIAGNOSIS — C82.08 GRADE 1 FOLLICULAR LYMPHOMA OF LYMPH NODES OF MULTIPLE REGIONS: Chronic | ICD-10-CM

## 2022-05-27 DIAGNOSIS — K11.8 MASS OF BOTH PAROTID GLANDS: Chronic | ICD-10-CM

## 2022-05-27 PROCEDURE — A9552 F18 FDG: HCPCS | Performed by: INTERNAL MEDICINE

## 2022-05-27 PROCEDURE — 0 FLUDEOXYGLUCOSE F18 SOLUTION: Performed by: INTERNAL MEDICINE

## 2022-05-27 PROCEDURE — 78815 PET IMAGE W/CT SKULL-THIGH: CPT

## 2022-05-27 RX ADMIN — FLUDEOXYGLUCOSE F18 1 DOSE: 300 INJECTION INTRAVENOUS at 13:40

## 2022-06-03 ENCOUNTER — DOCUMENTATION (OUTPATIENT)
Dept: NUTRITION | Facility: HOSPITAL | Age: 62
End: 2022-06-03

## 2022-06-03 ENCOUNTER — LAB (OUTPATIENT)
Dept: ONCOLOGY | Facility: HOSPITAL | Age: 62
End: 2022-06-03

## 2022-06-03 ENCOUNTER — OFFICE VISIT (OUTPATIENT)
Dept: ONCOLOGY | Facility: CLINIC | Age: 62
End: 2022-06-03

## 2022-06-03 VITALS
TEMPERATURE: 97.8 F | SYSTOLIC BLOOD PRESSURE: 189 MMHG | HEART RATE: 89 BPM | WEIGHT: 221.4 LBS | DIASTOLIC BLOOD PRESSURE: 77 MMHG | OXYGEN SATURATION: 93 % | BODY MASS INDEX: 38 KG/M2

## 2022-06-03 DIAGNOSIS — Z45.2 ENCOUNTER FOR VENOUS ACCESS DEVICE CARE: Primary | ICD-10-CM

## 2022-06-03 DIAGNOSIS — C82.08 GRADE 1 FOLLICULAR LYMPHOMA OF LYMPH NODES OF MULTIPLE REGIONS: ICD-10-CM

## 2022-06-03 DIAGNOSIS — C82.03 FOLLICULAR LYMPHOMA GRADE I OF INTRA-ABDOMINAL LYMPH NODES: ICD-10-CM

## 2022-06-03 DIAGNOSIS — R79.89 ELEVATED LFTS: ICD-10-CM

## 2022-06-03 DIAGNOSIS — C83.39 DIFFUSE LARGE B-CELL LYMPHOMA OF EXTRANODAL SITE EXCLUDING SPLEEN AND OTHER SOLID ORGANS: Primary | ICD-10-CM

## 2022-06-03 DIAGNOSIS — Z51.11 ENCOUNTER FOR ANTINEOPLASTIC CHEMOTHERAPY: ICD-10-CM

## 2022-06-03 LAB
ALBUMIN SERPL-MCNC: 4.2 G/DL (ref 3.5–5.2)
ALBUMIN/GLOB SERPL: 1.6 G/DL
ALP SERPL-CCNC: 97 U/L (ref 39–117)
ALT SERPL W P-5'-P-CCNC: 63 U/L (ref 1–33)
ANION GAP SERPL CALCULATED.3IONS-SCNC: 14 MMOL/L (ref 5–15)
AST SERPL-CCNC: 40 U/L (ref 1–32)
BASOPHILS # BLD AUTO: 0.05 10*3/MM3 (ref 0–0.2)
BASOPHILS NFR BLD AUTO: 1.1 % (ref 0–1.5)
BILIRUB SERPL-MCNC: 0.4 MG/DL (ref 0–1.2)
BUN SERPL-MCNC: 9 MG/DL (ref 8–23)
BUN/CREAT SERPL: 14.1 (ref 7–25)
CALCIUM SPEC-SCNC: 9.8 MG/DL (ref 8.6–10.5)
CHLORIDE SERPL-SCNC: 97 MMOL/L (ref 98–107)
CO2 SERPL-SCNC: 27 MMOL/L (ref 22–29)
CREAT SERPL-MCNC: 0.64 MG/DL (ref 0.57–1)
DEPRECATED RDW RBC AUTO: 46.5 FL (ref 37–54)
EGFRCR SERPLBLD CKD-EPI 2021: 100.1 ML/MIN/1.73
EOSINOPHIL # BLD AUTO: 0 10*3/MM3 (ref 0–0.4)
EOSINOPHIL NFR BLD AUTO: 0 % (ref 0.3–6.2)
ERYTHROCYTE [DISTWIDTH] IN BLOOD BY AUTOMATED COUNT: 13.9 % (ref 12.3–15.4)
GLOBULIN UR ELPH-MCNC: 2.7 GM/DL
GLUCOSE SERPL-MCNC: 220 MG/DL (ref 65–99)
HCT VFR BLD AUTO: 41.8 % (ref 34–46.6)
HGB BLD-MCNC: 14.8 G/DL (ref 12–15.9)
IMM GRANULOCYTES # BLD AUTO: 0.06 10*3/MM3 (ref 0–0.05)
IMM GRANULOCYTES NFR BLD AUTO: 1.3 % (ref 0–0.5)
LDH SERPL-CCNC: 215 U/L (ref 135–214)
LYMPHOCYTES # BLD AUTO: 0.58 10*3/MM3 (ref 0.7–3.1)
LYMPHOCYTES NFR BLD AUTO: 12.7 % (ref 19.6–45.3)
MCH RBC QN AUTO: 32.5 PG (ref 26.6–33)
MCHC RBC AUTO-ENTMCNC: 35.4 G/DL (ref 31.5–35.7)
MCV RBC AUTO: 91.7 FL (ref 79–97)
MONOCYTES # BLD AUTO: 0.52 10*3/MM3 (ref 0.1–0.9)
MONOCYTES NFR BLD AUTO: 11.4 % (ref 5–12)
NEUTROPHILS NFR BLD AUTO: 3.36 10*3/MM3 (ref 1.7–7)
NEUTROPHILS NFR BLD AUTO: 73.5 % (ref 42.7–76)
NRBC BLD AUTO-RTO: 0 /100 WBC (ref 0–0.2)
PLATELET # BLD AUTO: 346 10*3/MM3 (ref 140–450)
PMV BLD AUTO: 9.6 FL (ref 6–12)
POTASSIUM SERPL-SCNC: 3.8 MMOL/L (ref 3.5–5.2)
PROT SERPL-MCNC: 6.9 G/DL (ref 6–8.5)
RBC # BLD AUTO: 4.56 10*6/MM3 (ref 3.77–5.28)
SODIUM SERPL-SCNC: 138 MMOL/L (ref 136–145)
URATE SERPL-MCNC: 3.2 MG/DL (ref 2.4–5.7)
WBC NRBC COR # BLD: 4.57 10*3/MM3 (ref 3.4–10.8)

## 2022-06-03 PROCEDURE — 1126F AMNT PAIN NOTED NONE PRSNT: CPT | Performed by: INTERNAL MEDICINE

## 2022-06-03 PROCEDURE — G0463 HOSPITAL OUTPT CLINIC VISIT: HCPCS | Performed by: INTERNAL MEDICINE

## 2022-06-03 PROCEDURE — 80053 COMPREHEN METABOLIC PANEL: CPT | Performed by: INTERNAL MEDICINE

## 2022-06-03 PROCEDURE — 25010000002 HEPARIN LOCK FLUSH PER 10 UNITS: Performed by: INTERNAL MEDICINE

## 2022-06-03 PROCEDURE — 36591 DRAW BLOOD OFF VENOUS DEVICE: CPT | Performed by: INTERNAL MEDICINE

## 2022-06-03 PROCEDURE — 85025 COMPLETE CBC W/AUTO DIFF WBC: CPT | Performed by: INTERNAL MEDICINE

## 2022-06-03 PROCEDURE — 99215 OFFICE O/P EST HI 40 MIN: CPT | Performed by: INTERNAL MEDICINE

## 2022-06-03 PROCEDURE — 84550 ASSAY OF BLOOD/URIC ACID: CPT | Performed by: INTERNAL MEDICINE

## 2022-06-03 PROCEDURE — 1123F ACP DISCUSS/DSCN MKR DOCD: CPT | Performed by: INTERNAL MEDICINE

## 2022-06-03 PROCEDURE — 83615 LACTATE (LD) (LDH) ENZYME: CPT | Performed by: INTERNAL MEDICINE

## 2022-06-03 RX ORDER — PREDNISONE 50 MG/1
100 TABLET ORAL DAILY
Qty: 10 TABLET | Refills: 5 | Status: SHIPPED | OUTPATIENT
Start: 2022-06-03 | End: 2022-06-08

## 2022-06-03 RX ORDER — ONDANSETRON 4 MG/1
4 TABLET, FILM COATED ORAL 4 TIMES DAILY PRN
Qty: 40 TABLET | Refills: 3 | Status: SHIPPED | OUTPATIENT
Start: 2022-06-03

## 2022-06-03 RX ORDER — HEPARIN SODIUM (PORCINE) LOCK FLUSH IV SOLN 100 UNIT/ML 100 UNIT/ML
500 SOLUTION INTRAVENOUS AS NEEDED
Status: DISCONTINUED | OUTPATIENT
Start: 2022-06-03 | End: 2022-06-03 | Stop reason: HOSPADM

## 2022-06-03 RX ORDER — HEPARIN SODIUM (PORCINE) LOCK FLUSH IV SOLN 100 UNIT/ML 100 UNIT/ML
500 SOLUTION INTRAVENOUS AS NEEDED
Status: CANCELLED | OUTPATIENT
Start: 2022-06-03

## 2022-06-03 RX ADMIN — HEPARIN 500 UNITS: 100 SYRINGE at 11:03

## 2022-06-03 NOTE — PROGRESS NOTES
DATE OF VISIT: 6/3/2022      REASON FOR VISIT: Follicular lymphoma with transformation into diffuse large B-cell lymphoma, fatty liver with elevated liver function test, diarrhea      HISTORY OF PRESENT ILLNESS:   62-year-old female with medical problem consisting of obesity, hypertension, anxiety, dyslipidemia, fatty liver with elevated liver function test, poorly controlled diabetes mellitus, recurrent follicular lymphoma with multiple recurrences for which patient is currently on rituximab since September 2021.  Patient was last seen here at clinic on May 23, 2022 and was found to have right chest wall mass as well as pelvic mass on staging CT scan.  Patient underwent biopsy of chest wall mass as well as PET/CT.  She is here to discuss the results and further recommendation.  Complains of diarrhea which is chronic for her.  Denies any new palpable lymph node enlargement.  Denies any fevers or any drenching night sweats.          Oncology history:    1.  Recurrent follicular lymphoma grade 1:  -Patient initially diagnosed in April 2015 for which patient underwent palliative radiation treatment.  -Second relapse happened in September 2016 for again patient had a radiation treatment.  - Patient was found to have enlarged aortocaval lymph node in April 2017 for which patient initially received rituximab every 2 months without any significant improvement.  -Subsequently patient received 4 cycles of bendamustine and rituximab between April 30, 2018 and August 21, 2018.  -Patient was diagnosed with involvement of left parotid gland on biopsy done on August 27, 2020 by Dr. Ibrahim.  PET/CT done shows uptake in bilateral parotid gland along with left sided neck lymph node involvement.  -Patient was started on Revlimid with rituximab on September 22, 2020.  Patient completed cycle 12 on 8/23/2021.  -Patient received 3 cycles of rituximab maintenance between September 8, 2021 and February 28, 2022  - CT of chest abdomen and  pelvis with contrast on May 17, 2022 showed new onset of chest wall mass on right side along with pelvic mass.  - Ultrasound-guided biopsy showed large B-cell lymphoma in background of follicular lymphoma consistent with transformation  - PET/CT shows SUV of 12 in the region of right chest wall mass without any other abnormal uptake  - Patient will be started on cycle 1 of R-CHOP on Raquel 15, 2022.  Mutation for Bcl-2, BCL6 and C MYC FISH is still pending          Past Medical History, Past Surgical History, Social History, Family History have been reviewed and are without significant changes except as mentioned.    Review of Systems   A comprehensive 14 point review of systems was performed and was negative except as mentioned in HPI.    Medications:  The current medication list was reviewed in the EMR    ALLERGIES:    Allergies   Allergen Reactions   • Ace Inhibitors Cough   • Latex      BLISTER     • Morphine And Related Nausea And Vomiting   • Lortab [Hydrocodone-Acetaminophen] Palpitations       Objective      Vitals:    06/03/22 1003   BP: (!) 189/77   Pulse: 89   Temp: 97.8 °F (36.6 °C)   TempSrc: Temporal   SpO2: 93%   Weight: 100 kg (221 lb 6.4 oz)   PainSc: 0-No pain     Current Status 4/4/2022   ECOG score 0       Physical Exam  Neurological:      Mental Status: She is alert and oriented to person, place, and time.           RECENT LABS:  Glucose   Date Value Ref Range Status   06/03/2022 220 (H) 65 - 99 mg/dL Final     Sodium   Date Value Ref Range Status   06/03/2022 138 136 - 145 mmol/L Final     Potassium   Date Value Ref Range Status   06/03/2022 3.8 3.5 - 5.2 mmol/L Final     CO2   Date Value Ref Range Status   06/03/2022 27.0 22.0 - 29.0 mmol/L Final     Chloride   Date Value Ref Range Status   06/03/2022 97 (L) 98 - 107 mmol/L Final     Anion Gap   Date Value Ref Range Status   06/03/2022 14.0 5.0 - 15.0 mmol/L Final     Creatinine   Date Value Ref Range Status   06/03/2022 0.64 0.57 - 1.00 mg/dL  Final     BUN   Date Value Ref Range Status   2022 9 8 - 23 mg/dL Final     BUN/Creatinine Ratio   Date Value Ref Range Status   2022 14.1 7.0 - 25.0 Final     Calcium   Date Value Ref Range Status   2022 9.8 8.6 - 10.5 mg/dL Final     eGFR Non  Amer   Date Value Ref Range Status   2021 81 >60 mL/min/1.73 Final     Alkaline Phosphatase   Date Value Ref Range Status   2022 97 39 - 117 U/L Final     Total Protein   Date Value Ref Range Status   2022 6.9 6.0 - 8.5 g/dL Final     ALT (SGPT)   Date Value Ref Range Status   2022 63 (H) 1 - 33 U/L Final     AST (SGOT)   Date Value Ref Range Status   2022 40 (H) 1 - 32 U/L Final     Total Bilirubin   Date Value Ref Range Status   2022 0.4 0.0 - 1.2 mg/dL Final     Albumin   Date Value Ref Range Status   2022 4.20 3.50 - 5.20 g/dL Final     Globulin   Date Value Ref Range Status   2022 2.7 gm/dL Final     Lab Results   Component Value Date    WBC 4.57 2022    HGB 14.8 2022    HCT 41.8 2022    MCV 91.7 2022     2022     Lab Results   Component Value Date    NEUTROABS 3.36 2022     Lab Results   Component Value Date    REFLABREPO  2022     [facility]  [address] [place] [address]  Phone: [phone] or [phone]  Fax: [phone]  Deven James M.D., Medical Director    PATIENT NAME                           LABORATORY NO.  VALDO ROBERTSON.                     [id number]  [id number]                         AGE              SEX  SSN           CLIENT REF #  [facility]           62      1960  F    [id number]   [id number]    [place]                       REQUESTING M.D.     ATTENDING M.D.     COPY TO.  [address] [place] [address]                 [place] [address]             DATE COLLECTED      DATE RECEIVED      DATE REPORTED  2022           05/26/2022 06/02/2022    DIAGNOSIS:  RIGHT CHEST WALL MASS, NEEDLE CORE BIOPSIES:  Large B-cell lymphoma, germinal center type, arising out of follicular  lymphoma.      COMMENT:  In terms of therapeutic markers, large B-cell lymphoma  expresses both CD20 and CD30 (see microscopic description for details). The  large cell lymphoma is negative for a double expresser status of BCL2 and C-  MYC by immunohistochemistry. Ki-67 is 40 to 50%. FISH analysis for ordering  arrangements of BCL6, BCL2, and MYC are pending and will be reported in an  addendum. This case is reviewed internally within hematopathology by Padma Ascencio and Hallie, with agreement on the diagnosis. The finding is discussed  with Dr. Sood by Dr. Ascencio on 6/2/2022.    CLINICAL HISTORY:  Grade 1 follicular lymphoma of lymph nodes of multiple regions  Chart review shows history of low-grade follicular lymphoma, currently on  maintenance rituximab. Radiology demonstrates a 7 cm right sided chest wall  mass with associated destruction of one of the right anterior ribs, with an SUV of  12.4, as well as a right-sided pelvic mass posterior to the urinary bladder.    SPECIMENS RECEIVED:  RIGHT CHEST WALL MASS    MICROSCOPIC DESCRIPTION:  Histologic sections demonstrate needle core biopsies comprised of an atypical  lymphoid population investing fibrous tissue. The blocks show a variable  appearance, with block A1 demonstrating vague nodularity and a mixture of  small and large atypical lymphoid cells along with small mature lymphocytes,  and blocks A2-A4 demonstrating sheets of larger, centroblast-like cells with  background small mature lymphocytes. Atypical mitotic figures are occasionally  present.    Fourteen distinct immunohistochemical stains are performed. Some of these  were analyzed on flow cytometry but are necessary to repeat on tissue to assess  the architecture, which flow cytometry cannot do and is required for the  diagnosis. An initial  panel was ordered on block A1. PAX5 shows sheets and  nodules of B cells which have strong, intact expression of CD20 (100%). CD3  shows a significant number of associated small T cells. CD21 shows follicular  dendritic cell networks covering approximately 20% of the lymphoid tissue, with  many diffuse areas. The B cells are positive for CD10, BCL6, BCL2, and  MUM1 and are negative for cyclin D1 and LEF1. CD5 shows expression of  many T cells and is favored to show weak to moderate positivity on the B cells.  Ki-67 shows a variable labeling index, around 30% in the diffuse areas. A  second panel is performed on block A3. PAX5 shows a sheet of enlarged B cells  which are strong, diffuse expression of CD20 (100%). B cells are intermixed  with many CD3 positive T cells and are devoid of follicular dendritic cell  networks by CD21 expression. The B cells are positive for CD10 (weak), BCL2  (moderate to strong), and CD30 (~15%, moderate to strong) and are negative for  C-MYC (~15%, considered negative for immunophenotyping purposes). This is  consistent with a germinal center phenotype by Leonardo algorithm. CD5 shows  strong staining on the mini background T cells and shows weak to moderate  positivity in the large B cells. Ki-67 on this diffuse area highlights  approximately 35-40% of cells overall, though when only the large lymphoid  cells are considered, it is closer to 50%. The morphology is diagnostic of a large  B-cell lymphoma arising out of a follicular lymphoma.    Professional interpretation rendered by Reagan Ascencio M.D., F.C.A.P. at [facility], [facility], [address] [place] [address].    FLOW CYTOMETRY:  Interpretation: B-cell lymphoma, CD5+/CD10+.    Flow cytometric analysis shows a cellular, viable specimen with 31%  lymphocytes and 59% CD45 negative events/debris. There is a clonal B-cell  population totaling 12% of events with a small to intermediate cell size by  forward light scatter which is positive  "for CD19, CD20 (slightly dimmer than  CD19), CD5 (partial), CD10, CD23, CD38, HLA-DR, and cytoplasmic lambda  light chain (dim) and is negative for CD30, CD43, FMC7, and surface light  chain expression. T-cells show preserved expression of pan-T-cell antigens and  have a CD4 to CD8 ratio of 5.2:1.    The following antibodies were tested: CD2, CD3, CD4, CD5, CD7, CD8, CD10,  CD11b, CD19, CD20, CD23, CD30, CD38, CD43, CD45, CD56, CD57, FMC-  7, HLA-DR, kappa, lambda, CytoKappa, CytoLambda. Technical component  performed at [facility] in [place], [place]. The interpretation is  performed by Dr. Reagan Ascencio at [facility] (see above for  address). These tests use analyte specific reagents. The performance of these  analyte specific reagents was determined by [facility]. These tests  have not been cleared or approved by the U.S. Food & Drug Administration  (FDA). The FDA has determined that such approval is not necessary. These tests  are used for clinical purposes and should not be considered experimental or  research.    GROSS DESCRIPTION:  Specimen is received in one formalin filled container labeled \"chest\", and  consists of multiple pieces of tan cylindrical soft tissue ranging in length from  0.8 to 0.9 cm, and all measuring 0.1 cm in diameter.  Specimen submitted  entirely in four cassettes.  BKO    REVIEWED, DIAGNOSED AND ELECTRONICALLY  SIGNED BY:    Reagan Ascencio M.D., F.C.A.P.  CPT CODES:  50502, 27211, 88341x11, 11814           PATHOLOGY:  * Cannot find OR log *         RADIOLOGY DATA :  NM PET/CT Skull Base to Mid Thigh    Result Date: 5/30/2022  1.  Large right-sided chest wall mass with associated right anterior rib destruction similar to findings seen on recent CT examination. 2. Right-sided posterior pelvic mass just posterior to the bladder. Similar in size to CT May 17, 2022. There is however no pathologic F-18 FDG glucose uptake in this mass. This mass is therefore of " uncertain significance. PET/CT examination is otherwise unremarkable. Electronically signed by:  Cedric Rangel MD  5/30/2022 1:00 PM CDT Workstation: [id number]          Assessment & Plan     1.  Diffuse large B-cell lymphoma in background of follicular lymphoma consistent with transformation.  - Patient underwent ultrasound-guided biopsy of chest wall mass on May 30, 2022 which is showing large B-cell lymphoma in background of follicular lymphoma consistent with transformation which can be potentially life-threatening without any treatment which was discussed with patient and her family.  Result of pathology was discussed with pathologist Dr. Ascencio.  - Patient does not have any other abnormal uptake on PET/CT.  Pelvic mass that was found on CT scan does not have any FDG avidity.  Significance of mass is uncertain at present.  - Treatment option for diffuse large B-cell lymphoma with transformation consisting of R-CHOP was discussed with patient and her family.  - Recommend getting 2D echo done for evaluation of ejection fraction since she will need Adriamycin based treatment.  - Bcl-2, BCL6 as well as c-Myc FISH is pending.  Discussed with patient if she has any double hit or triple hit lymphoma that may change her treatment requirement.  - Since patient needs high-dose of prednisone with her new treatment regimen consisting of R-CHOP, recommend better glycemic control prior to starting chemotherapy.  -CBC from today shows white blood cell count hemoglobin and platelet is normal.  Since PET scan does not have any abnormal uptake in bone, no need for any bone marrow biopsy which was discussed with patient  - LDH is borderline elevated at 215, uric acid is normal at 3.4.  No need to start any allopurinol at present  - Patient will return to clinic in 2 weeks from now to start her first cycle of R-CHOP  - Prescription for prednisone and Zofran has been sent to her pharmacy today      2.  Elevated liver function test:  -  Secondary to fatty liver plus uncontrolled diabetes mellitus  - We will monitor with CMP    3.  Health maintenance: Patient does not smoke.  Had a colonoscopy in November 2021 by Dr Cruz    4. Advance Care Planning: For now patient remains full code and is able to make decisions.  Patient has health care surrogate mentioned on chart.                         PHQ-9 Total Score: 0   -Patient is not homicidal or suicidal.  No acute intervention required.    Chasity Leon reports a pain score of 0.  Given her pain assessment as noted, treatment options were discussed and the following options were decided upon as a follow-up plan to address the patient's pain: continuation of current treatment plan for pain.         Tino Sood MD  6/3/2022  18:24 CDT        Part of this note may be an electronic transcription/translation of spoken language to printed text using the Dragon Dictation System.          CC:

## 2022-06-03 NOTE — PROGRESS NOTES
Adult Outpatient Nutrition  Assessment    Patient Name:  Chasity Leon  YOB: 1960  MRN: [id number]    Assessment Date:  6/3/2022    Comments: Follow-up to check nutrition. Wt 221.4 lb--up/down (overall stable). Preparing to start chemo w/steroids. Glucose 339/200/364--197 yesterday at home. Takes amaryl + lantus. Pt has been trying to avoid carbohydrate as much as possible--eating more protein. Recommended x3 carb choices with each meal--x3 meals daily. Urged pt to keep detailed blood glucose records, and to alert PCP (who ordered insulin) if hyperglycemia continues. Pt may require fasting acting insulin while on steroids.                           Electronically signed by:  Raegan Penny RD  06/03/22 11:25 CDT

## 2022-06-06 ENCOUNTER — APPOINTMENT (OUTPATIENT)
Dept: ONCOLOGY | Facility: CLINIC | Age: 62
End: 2022-06-06

## 2022-06-06 ENCOUNTER — TELEPHONE (OUTPATIENT)
Dept: ONCOLOGY | Facility: CLINIC | Age: 62
End: 2022-06-06

## 2022-06-06 ENCOUNTER — TELEPHONE (OUTPATIENT)
Dept: ONCOLOGY | Facility: HOSPITAL | Age: 62
End: 2022-06-06

## 2022-06-08 ENCOUNTER — APPOINTMENT (OUTPATIENT)
Dept: ONCOLOGY | Facility: CLINIC | Age: 62
End: 2022-06-08

## 2022-06-08 ENCOUNTER — TELEPHONE (OUTPATIENT)
Dept: ONCOLOGY | Facility: HOSPITAL | Age: 62
End: 2022-06-08

## 2022-06-08 LAB — REF LAB TEST METHOD: NORMAL

## 2022-06-08 NOTE — TELEPHONE ENCOUNTER
Called and spoke with pt regarding lab results and continuing chemo plan as scheduled. V/u obtained.

## 2022-06-08 NOTE — TELEPHONE ENCOUNTER
----- Message from Tino Sood MD sent at 6/8/2022 12:50 PM CDT -----  Regarding: Biopsy result  Please let patient know, all 3 mutation for Bcl-2, BCL C as well as c-Myc is negative.  We can continue with chemotherapy that we have planned here.  No need to refer her to tertiary care center.  Thank you

## 2022-06-09 ENCOUNTER — OFFICE VISIT (OUTPATIENT)
Dept: ONCOLOGY | Facility: CLINIC | Age: 62
End: 2022-06-09

## 2022-06-09 VITALS
OXYGEN SATURATION: 96 % | TEMPERATURE: 97.4 F | DIASTOLIC BLOOD PRESSURE: 67 MMHG | WEIGHT: 221.1 LBS | HEART RATE: 88 BPM | SYSTOLIC BLOOD PRESSURE: 128 MMHG | BODY MASS INDEX: 37.95 KG/M2

## 2022-06-09 DIAGNOSIS — C83.39 DIFFUSE LARGE B-CELL LYMPHOMA OF EXTRANODAL SITE EXCLUDING SPLEEN AND OTHER SOLID ORGANS: ICD-10-CM

## 2022-06-09 PROCEDURE — 99214 OFFICE O/P EST MOD 30 MIN: CPT | Performed by: NURSE PRACTITIONER

## 2022-06-09 PROCEDURE — G0463 HOSPITAL OUTPT CLINIC VISIT: HCPCS | Performed by: NURSE PRACTITIONER

## 2022-06-09 RX ORDER — ALPRAZOLAM 1 MG/1
1 TABLET ORAL
COMMUNITY
Start: 2022-06-08 | End: 2022-07-09

## 2022-06-10 ENCOUNTER — TELEPHONE (OUTPATIENT)
Dept: ONCOLOGY | Facility: HOSPITAL | Age: 62
End: 2022-06-10

## 2022-06-10 NOTE — PROGRESS NOTES
6/9/2022    CHEMOTHERAPY PREPARATION    Chasity Leon  [id number]  1960    Chief Complaint: chemo education     History of present illness:  Chasity Leon is a 62 y.o. year old female who is here today for chemotherapy preparation and needs assessment. The patient has been diagnosed withDLBCL and is scheduled to begin treatment with R-CHOP.     Oncology History:    Oncology/Hematology History   Follicular lymphoma grade I (HCC)   3/31/2017 Initial Diagnosis    Follicular lymphoma grade I (CMS/HCC)     9/22/2020 - 6/28/2021 Chemotherapy    OP LYMPHOMA Lenalidomide / RiTUXimab      9/8/2021 - 2/28/2022 Chemotherapy    OP LYMPHOMA RiTUXimab (Maintenance - Every 3 Months)     6/15/2022 -  Chemotherapy    OP LYMPHOMA R-CHOP RiTUXimab / Cyclophosphamide / DOXOrubicin / VinCRIStine / PredniSONE     Follicular lymphoma grade I of intra-abdominal lymph nodes (HCC)   3/31/2017 Initial Diagnosis    Follicular lymphoma grade I of intra-abdominal lymph nodes     4/30/2018 -  Chemotherapy    OP LYMPHOMA Bendamustine 90 mg/m2 / RiTUXimab 375 mg/m2       Grade 1 follicular lymphoma of lymph nodes of multiple regions (HCC)   2/9/2021 Initial Diagnosis    Grade 1 follicular lymphoma of lymph nodes of multiple regions (CMS/HCC)     9/8/2021 - 2/28/2022 Chemotherapy    OP LYMPHOMA RiTUXimab (Maintenance - Every 3 Months)     6/15/2022 -  Chemotherapy    OP LYMPHOMA R-CHOP RiTUXimab / Cyclophosphamide / DOXOrubicin / VinCRIStine / PredniSONE     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)   6/3/2022 Initial Diagnosis    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)     6/15/2022 -  Chemotherapy    OP LYMPHOMA R-CHOP RiTUXimab / Cyclophosphamide / DOXOrubicin / VinCRIStine / PredniSONE         Past Medical History:   Diagnosis Date   • Agoraphobia with panic disorder     on SNRI, prn buspar, prn klonopin     • Anxiety    •  Arthritis    • Atrophic vaginitis    • Depression      MDD      • Elevated cholesterol    • Essential hypertension    • Follicular non-Hodgkin's lymphoma (HCC)    • Generalized anxiety disorder     SSRI - buspar, cont cymbalta, prn klonopin      • GERD (gastroesophageal reflux disease)    • Hip pain     arthritis, artifical right hip     • History of transfusion    • Hyperlipidemia    • Mass of lower limb    • Nuclear senile cataract    • Obesity    • Otalgia     ENT REFER   • Panic disorder     with agoraphobia. On buspar and klonopin now      • Type 2 diabetes mellitus (HCC)     NO BDR       Past Surgical History:   Procedure Laterality Date   • CENTRAL VENOUS LINE INSERTION  03/11/2016    Central line insertion (Successful placement of right upper extremity midline.)   • COLONOSCOPY     • COLONOSCOPY N/A 07/18/2019    Procedure: COLONOSCOPY;  Surgeon: Brandon Salvador DO;  Location: [facility];  Service: Gastroenterology   • COLONOSCOPY N/A 11/30/2021    Procedure: COLONOSCOPY;  Surgeon: Magda Cruz MD;  Location: [facility] ENDOSCOPY;  Service: Gastroenterology;  Laterality: N/A;   • CYSTOSCOPY  03/23/1976    Cystoscopy (external urethroplasty and cysto-panendoscopy,urethal hymenal fusion with hypospadias)   • DILATATION AND CURETTAGE  01/25/1980    D&C (removal of IUD)   • ENDOSCOPY N/A 11/30/2021    Procedure: ESOPHAGOGASTRODUODENOSCOPY;  Surgeon: Magda Cruz MD;  Location: [facility] END[facility]COPY;  Service: Gastroenterology;  Laterality: N/A;   • EXCISION LESION  04/15/2014    Remove thigh lesion (Excision of a mass of the right thigh using a 16.2 cm incision with intermediate layered closure.)   • HYSTEROSCOPY  02/28/2003    Hysteroscope procedure (diagnostic hysteroscopy with fractional D&C)   • JOINT REPLACEMENT Right     hip   • OTHER SURGICAL HISTORY      Explore parathyroid glands   • OTHER SURGICAL HISTORY  08/26/1985    Laparosc diagnostic (desires elective tubal reversal)   • CO INSJ  TUNNELED CVC W/O SUBQ PORT/ AGE 5 YR/> Right 04/04/2017    Procedure: INSERTION VENOUS ACCESS DEVICE (MEDIPORT) right chest;  Surgeon: Fortino Medina MD;  Location: [facility];  Service: General   • NM INSJ TUNNELED CVC W/O SUBQ PORT/ AGE 5 YR/> N/A 06/06/2017    Procedure: INSERTION VENOUS ACCESS DEVICE   (MEDIPORT)   (C-ARM#1);  Surgeon: Fortino Medina MD;  Location: [facility];  Service: General   • SHOULDER ARTHROSCOPY  11/11/2013    Shoulder arthroscopy/surgery (Right shoulder. Rotator cuff repair. Subacromial decompression. Edgar procedure.)   • SHOULDER SURGERY  12/28/2015    Shoulder surgery procedure (Arthroscopy of the left shoulder with rotator cuff repair.Edgar procedure.Subacromial decompression.)   • TONSILLECTOMY  1972   • TONSILLECTOMY AND ADENOIDECTOMY  05/29/1967    T&A (hypertrophied tonsils and adenoids with recurrent infection)   • TOTAL ABDOMINAL HYSTERECTOMY WITH SALPINGO OOPHORECTOMY  02/03/2004    ARIELLE/BSO (with a preop fractional dilation and curettage with frozen section,menorrhagia,dysmenorrhea,unresponsive to medical intervention)   • TOTAL HIP ARTHROPLASTY         MEDICATIONS: The current medication list was reviewed and reconciled.     Allergies:  is allergic to ace inhibitors, latex, morphine and related, and lortab [hydrocodone-acetaminophen].    Family History   Problem Relation Age of Onset   • Breast cancer Mother    • Cancer Mother    • Diabetes Mother    • Heart disease Father    • Liver cancer Father    • Cancer Father    • Diabetes Sister         INSULIN DEPENDENT   • Cancer Sister    • Diabetes Brother         INSULIN DEPENDENT   • Coronary artery disease Other    • Diabetes Other    • Diabetes Maternal Grandmother    • Cancer Maternal Grandfather          Review of Systems    Physical Exam  Vital Signs: /67   Pulse 88   Temp 97.4 °F (36.3 °C) (Temporal)   Wt 100 kg (221 lb 1.6 oz)   SpO2 96%   BMI 37.95 kg/m²    General Appearance:  alert, cooperative, no  "apparent distress, appears stated age and obese   Neurologic/Psychiatric: A&O x 3, gait steady, appropriate affect   HEENT:  Normocephalic, without obvious abnormality, mucous membranes moist   Lungs:   Clear to auscultation bilaterally; respirations regular, even, and unlabored bilaterally   Heart:  Regular rate and rhythm, no murmurs appreciated   Extremities: Normal, atraumatic; no clubbing, cyanosis, or edema    Skin: No rashes, lesions, or abnormal coloration noted     ECOG Performance Status: (1) Restricted in Physically Strenuous Activity, Ambulatory & Able to Do Work of Light Nature          NEEDS ASSESSMENTS    Genetics  The patient's new diagnosis and family history have been reviewed for genetic counseling needs. A genetic referral is not recommended.     Psychosocial  The patient has completed a PHQ-2 Depression Screening    PHQ-9 results show 0 (No Depression).   Patient is aware of Marilee Lloyd LCSW.  Copies of patient's questionnaires will be scanned into EMR for details and further reference.    Barriers to care  A barriers form was also completed by the patient today. We discussed services offered by our facility to help her have adequate access to care. Based upon barriers assessment today, the patient will not require a follow-up call from the  to further discuss needs.   A copy of the barriers form will also be scanned into EMR for details and further reference.     VAD Assessment  The patient has medi port in place..     Advanced Care Planning  The patient and I discussed advanced care planning, \"Conversations that Matter\".   This service was offered, free of charge, for development of advance directives with a certified ACP facilitator.  The patient does not have an up-to-date advanced directive. This document is not on file with our office. The patient is not interested in an appointment with one of our facilitators to create or update their advanced directives.      Palliative " Care  The patient and I discussed palliative care services. Palliative care is not the same as Hospice care. This is specialized medical care for people living with serious illness with the goal of improving quality of life for the patient and their family. [facility] has partnered with [facility] to offer our patients outpatient palliative care early along with their treatment to assist in coordination of care, symptom management, pain management, and medical decision making.  Oncology criteria for palliative care referral is not met at this time.      Additional Referral needs  none      CHEMOTHERAPY EDUCATION    Booklets Given: Chemo cares educations sheet on R-CHOP      Chemotherapy/Biotherapy Education Sheets: (list all that apply)  nausea management, acid reflux management, diarrhea management, Cancer resourse contacts information, skin and mouth care, vaccination information and wig information                                                                                                                                                                 Chemotherapy Regimen:   Treatment Plans     Name Type Plan Dates Plan Provider         Active    OP LYMPHOMA R-CHOP RiTUXimab / Cyclophosphamide / DOXOrubicin / VinCRIStine / PredniSONE ONCOLOGY TREATMENT  6/3/2022 - Present Tino Sood MD                    TOPICS EDUCATION PROVIDED COMMENTS   ANEMIA:  role of RBC, cause, s/s, ways to manage, role of transfusion [x]    THROMBOCYTOPENIA:  role of platelet, cause, s/s, ways to prevent bleeding, things to avoid, when to seek help [x]    NEUTROPENIA:  role of WBC, cause, infection precautions, s/s of infection, when to call MD [x] Pt will be having Neulasta  Educated on neutropenic fever   NUTRITION & APPETITE CHANGES:  importance of maintaining healthy diet & weight, ways to manage to improve intake, dietary consult, exercise regimen [x]    DIARRHEA:  causes, s/s of dehydration, ways to manage,  dietary changes, when to call MD [x]    CONSTIPATION:  causes, ways to manage, dietary changes, when to call MD [x]    NAUSEA & VOMITING:  cause, use of antiemetics, dietary changes, when to call MD [x]    MOUTH SORES:  causes, oral care, ways to manage [x]    ALOPECIA:  cause, ways to manage, resources [x]    INFERTILITY & SEXUALITY:  causes, fertility preservation options, sexuality changes, ways to manage, importance of birth control [x]    NERVOUS SYSTEM CHANGES:  causes, s/s, neuropathies, cognitive changes, ways to manage [x] Educated on potential for peripheral neuropathy with Vincristine   PAIN:  causes, ways to manage [x] ????   SKIN & NAIL CHANGES:  cause, s/s, ways to manage [x]    ORGAN TOXICITIES:  cause, s/s, need for diagnostic tests, labs, when to notify MD [x] Educated on increased risk for cardiomyopathy with Doxorubicin  Baseline EFis 52%   SURVIVORSHIP:  distress, distress assessment, secondary malignancies, early/late effects, follow-up, social issues, social support [x]    HOME CARE:  use of spill kits, storing of PO chemo, how to manage bodily fluids [x]    MISCELLANEOUS:  drug interactions, administration, vesicant, et [x]        Assessment and Plan:    Diagnoses and all orders for this visit:    1. 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)        This was a 30 minute face-to-face visit with 30 minutes spent in  counseling and coordination of care as documented above.   The patient and I have reviewed their new cancer diagnosis and scheduled treatment plan. Needs assessment was completed including genetics, psychosocial needs, barriers to care, VAD evaluation, advanced care planning, and palliative care services. Referrals have been ordered as appropriate based upon our evaluation and patient desires.     Chemotherapy teaching was also completed today as documented above. Adequate time was given to answer all questions to her satisfaction.  Patient and family are aware of their care team members and contact information if they have questions or problems throughout the treatment course. Needs assessments and education has been completed. The patient is adequately prepared to begin treatment as scheduled.       Jessica Segal APRN

## 2022-06-10 NOTE — TELEPHONE ENCOUNTER
----- Message from Tino Sood MD sent at 6/10/2022  7:10 AM CDT -----  RegardinD echo  Please let patient know 2D echo shows normal ejection fraction at 54%.  She is okay to get chemotherapy as we discussed last clinic visit.  Thank you

## 2022-06-13 LAB — REF LAB TEST METHOD: NORMAL

## 2022-06-15 ENCOUNTER — OFFICE VISIT (OUTPATIENT)
Dept: ONCOLOGY | Facility: CLINIC | Age: 62
End: 2022-06-15

## 2022-06-15 ENCOUNTER — INFUSION (OUTPATIENT)
Dept: ONCOLOGY | Facility: HOSPITAL | Age: 62
End: 2022-06-15

## 2022-06-15 VITALS
HEART RATE: 73 BPM | OXYGEN SATURATION: 96 % | DIASTOLIC BLOOD PRESSURE: 63 MMHG | SYSTOLIC BLOOD PRESSURE: 139 MMHG | TEMPERATURE: 97.3 F | WEIGHT: 222.4 LBS | BODY MASS INDEX: 38.16 KG/M2

## 2022-06-15 DIAGNOSIS — C83.39 DIFFUSE LARGE B-CELL LYMPHOMA OF EXTRANODAL SITE EXCLUDING SPLEEN AND OTHER SOLID ORGANS: Primary | ICD-10-CM

## 2022-06-15 DIAGNOSIS — C82.08 GRADE 1 FOLLICULAR LYMPHOMA OF LYMPH NODES OF MULTIPLE REGIONS: ICD-10-CM

## 2022-06-15 DIAGNOSIS — Z45.2 ENCOUNTER FOR VENOUS ACCESS DEVICE CARE: ICD-10-CM

## 2022-06-15 LAB
ALBUMIN SERPL-MCNC: 4.1 G/DL (ref 3.5–5.2)
ALBUMIN/GLOB SERPL: 1.9 G/DL
ALP SERPL-CCNC: 84 U/L (ref 39–117)
ALT SERPL W P-5'-P-CCNC: 83 U/L (ref 1–33)
ANION GAP SERPL CALCULATED.3IONS-SCNC: 9 MMOL/L (ref 5–15)
AST SERPL-CCNC: 64 U/L (ref 1–32)
BASOPHILS # BLD AUTO: 0.05 10*3/MM3 (ref 0–0.2)
BASOPHILS NFR BLD AUTO: 1.1 % (ref 0–1.5)
BILIRUB SERPL-MCNC: 0.5 MG/DL (ref 0–1.2)
BUN SERPL-MCNC: 8 MG/DL (ref 8–23)
BUN/CREAT SERPL: 12.3 (ref 7–25)
CALCIUM SPEC-SCNC: 9.5 MG/DL (ref 8.6–10.5)
CHLORIDE SERPL-SCNC: 99 MMOL/L (ref 98–107)
CO2 SERPL-SCNC: 29 MMOL/L (ref 22–29)
CREAT SERPL-MCNC: 0.65 MG/DL (ref 0.57–1)
DEPRECATED RDW RBC AUTO: 46.9 FL (ref 37–54)
EGFRCR SERPLBLD CKD-EPI 2021: 99.7 ML/MIN/1.73
EOSINOPHIL # BLD AUTO: 0.12 10*3/MM3 (ref 0–0.4)
EOSINOPHIL NFR BLD AUTO: 2.5 % (ref 0.3–6.2)
ERYTHROCYTE [DISTWIDTH] IN BLOOD BY AUTOMATED COUNT: 14.2 % (ref 12.3–15.4)
GLOBULIN UR ELPH-MCNC: 2.2 GM/DL
GLUCOSE SERPL-MCNC: 170 MG/DL (ref 65–99)
HCT VFR BLD AUTO: 40.4 % (ref 34–46.6)
HGB BLD-MCNC: 14.4 G/DL (ref 12–15.9)
HOLD SPECIMEN: NORMAL
IMM GRANULOCYTES # BLD AUTO: 0.05 10*3/MM3 (ref 0–0.05)
IMM GRANULOCYTES NFR BLD AUTO: 1.1 % (ref 0–0.5)
LYMPHOCYTES # BLD AUTO: 0.58 10*3/MM3 (ref 0.7–3.1)
LYMPHOCYTES NFR BLD AUTO: 12.2 % (ref 19.6–45.3)
MCH RBC QN AUTO: 32.3 PG (ref 26.6–33)
MCHC RBC AUTO-ENTMCNC: 35.6 G/DL (ref 31.5–35.7)
MCV RBC AUTO: 90.6 FL (ref 79–97)
MONOCYTES # BLD AUTO: 0.47 10*3/MM3 (ref 0.1–0.9)
MONOCYTES NFR BLD AUTO: 9.9 % (ref 5–12)
NEUTROPHILS NFR BLD AUTO: 3.48 10*3/MM3 (ref 1.7–7)
NEUTROPHILS NFR BLD AUTO: 73.2 % (ref 42.7–76)
NRBC BLD AUTO-RTO: 0.4 /100 WBC (ref 0–0.2)
PLATELET # BLD AUTO: 317 10*3/MM3 (ref 140–450)
PMV BLD AUTO: 9 FL (ref 6–12)
POTASSIUM SERPL-SCNC: 4 MMOL/L (ref 3.5–5.2)
PROT SERPL-MCNC: 6.3 G/DL (ref 6–8.5)
RBC # BLD AUTO: 4.46 10*6/MM3 (ref 3.77–5.28)
SODIUM SERPL-SCNC: 137 MMOL/L (ref 136–145)
WBC NRBC COR # BLD: 4.75 10*3/MM3 (ref 3.4–10.8)

## 2022-06-15 PROCEDURE — 85025 COMPLETE CBC W/AUTO DIFF WBC: CPT

## 2022-06-15 PROCEDURE — 96413 CHEMO IV INFUSION 1 HR: CPT | Performed by: NURSE PRACTITIONER

## 2022-06-15 PROCEDURE — 25010000002 DOXORUBICIN PER 10 MG: Performed by: NURSE PRACTITIONER

## 2022-06-15 PROCEDURE — 96367 TX/PROPH/DG ADDL SEQ IV INF: CPT | Performed by: NURSE PRACTITIONER

## 2022-06-15 PROCEDURE — 25010000002 RITUXIMAB 500 MG/50ML SOLUTION 50 ML VIAL: Performed by: NURSE PRACTITIONER

## 2022-06-15 PROCEDURE — 96366 THER/PROPH/DIAG IV INF ADDON: CPT | Performed by: NURSE PRACTITIONER

## 2022-06-15 PROCEDURE — 25010000002 DIPHENHYDRAMINE PER 50 MG: Performed by: NURSE PRACTITIONER

## 2022-06-15 PROCEDURE — 25010000002 PALONOSETRON PER 25 MCG: Performed by: NURSE PRACTITIONER

## 2022-06-15 PROCEDURE — 25010000002 VINCRISTINE PER 1 MG: Performed by: NURSE PRACTITIONER

## 2022-06-15 PROCEDURE — 96377 APPLICATON ON-BODY INJECTOR: CPT | Performed by: NURSE PRACTITIONER

## 2022-06-15 PROCEDURE — 96417 CHEMO IV INFUS EACH ADDL SEQ: CPT | Performed by: NURSE PRACTITIONER

## 2022-06-15 PROCEDURE — 80053 COMPREHEN METABOLIC PANEL: CPT | Performed by: NURSE PRACTITIONER

## 2022-06-15 PROCEDURE — 96375 TX/PRO/DX INJ NEW DRUG ADDON: CPT | Performed by: NURSE PRACTITIONER

## 2022-06-15 PROCEDURE — 25010000002 FOSAPREPITANT PER 1 MG: Performed by: NURSE PRACTITIONER

## 2022-06-15 PROCEDURE — 96411 CHEMO IV PUSH ADDL DRUG: CPT | Performed by: NURSE PRACTITIONER

## 2022-06-15 PROCEDURE — 96415 CHEMO IV INFUSION ADDL HR: CPT | Performed by: NURSE PRACTITIONER

## 2022-06-15 PROCEDURE — 25010000002 DEXAMETHASONE SODIUM PHOSPHATE 100 MG/10ML SOLUTION: Performed by: NURSE PRACTITIONER

## 2022-06-15 PROCEDURE — 25010000002 RITUXIMAB 100 MG/10ML SOLUTION 10 ML VIAL: Performed by: NURSE PRACTITIONER

## 2022-06-15 PROCEDURE — 36415 COLL VENOUS BLD VENIPUNCTURE: CPT

## 2022-06-15 PROCEDURE — 99213 OFFICE O/P EST LOW 20 MIN: CPT | Performed by: NURSE PRACTITIONER

## 2022-06-15 PROCEDURE — 25010000002 CYCLOPHOSPHAMIDE 1 GM/5ML SOLUTION 5 ML VIAL: Performed by: NURSE PRACTITIONER

## 2022-06-15 PROCEDURE — 25010000002 HEPARIN LOCK FLUSH PER 10 UNITS: Performed by: INTERNAL MEDICINE

## 2022-06-15 PROCEDURE — 25010000002 PEGFILGRASTIM 6 MG/0.6ML PREFILLED SYRINGE KIT: Performed by: NURSE PRACTITIONER

## 2022-06-15 RX ORDER — FAMOTIDINE 10 MG/ML
20 INJECTION, SOLUTION INTRAVENOUS AS NEEDED
Status: CANCELLED | OUTPATIENT
Start: 2022-06-15

## 2022-06-15 RX ORDER — PALONOSETRON 0.05 MG/ML
0.25 INJECTION, SOLUTION INTRAVENOUS ONCE
Status: CANCELLED | OUTPATIENT
Start: 2022-06-15

## 2022-06-15 RX ORDER — ACETAMINOPHEN 325 MG/1
650 TABLET ORAL ONCE
Status: COMPLETED | OUTPATIENT
Start: 2022-06-15 | End: 2022-06-15

## 2022-06-15 RX ORDER — PALONOSETRON 0.05 MG/ML
0.25 INJECTION, SOLUTION INTRAVENOUS ONCE
Status: COMPLETED | OUTPATIENT
Start: 2022-06-15 | End: 2022-06-15

## 2022-06-15 RX ORDER — OLANZAPINE 5 MG/1
5 TABLET ORAL ONCE
Status: CANCELLED | OUTPATIENT
Start: 2022-06-15

## 2022-06-15 RX ORDER — HEPARIN SODIUM (PORCINE) LOCK FLUSH IV SOLN 100 UNIT/ML 100 UNIT/ML
500 SOLUTION INTRAVENOUS AS NEEDED
Status: DISCONTINUED | OUTPATIENT
Start: 2022-06-15 | End: 2022-06-15 | Stop reason: HOSPADM

## 2022-06-15 RX ORDER — INSULIN ASPART INJECTION 100 [IU]/ML
INJECTION, SOLUTION SUBCUTANEOUS
COMMUNITY
Start: 2022-06-03 | End: 2023-06-04

## 2022-06-15 RX ORDER — DIPHENHYDRAMINE HYDROCHLORIDE 50 MG/ML
50 INJECTION INTRAMUSCULAR; INTRAVENOUS AS NEEDED
Status: DISCONTINUED | OUTPATIENT
Start: 2022-06-15 | End: 2022-06-15 | Stop reason: HOSPADM

## 2022-06-15 RX ORDER — DIPHENHYDRAMINE HYDROCHLORIDE 50 MG/ML
50 INJECTION INTRAMUSCULAR; INTRAVENOUS AS NEEDED
Status: CANCELLED | OUTPATIENT
Start: 2022-06-15

## 2022-06-15 RX ORDER — HEPARIN SODIUM (PORCINE) LOCK FLUSH IV SOLN 100 UNIT/ML 100 UNIT/ML
500 SOLUTION INTRAVENOUS AS NEEDED
Status: CANCELLED | OUTPATIENT
Start: 2022-07-07

## 2022-06-15 RX ORDER — MEPERIDINE HYDROCHLORIDE 25 MG/ML
25 INJECTION INTRAMUSCULAR; INTRAVENOUS; SUBCUTANEOUS
Status: CANCELLED | OUTPATIENT
Start: 2022-06-15

## 2022-06-15 RX ORDER — OLANZAPINE 5 MG/1
5 TABLET ORAL ONCE
Status: COMPLETED | OUTPATIENT
Start: 2022-06-15 | End: 2022-06-15

## 2022-06-15 RX ORDER — DOXORUBICIN HYDROCHLORIDE 2 MG/ML
100 INJECTION, SOLUTION INTRAVENOUS ONCE
Status: COMPLETED | OUTPATIENT
Start: 2022-06-15 | End: 2022-06-15

## 2022-06-15 RX ORDER — ACETAMINOPHEN 325 MG/1
650 TABLET ORAL ONCE
Status: CANCELLED | OUTPATIENT
Start: 2022-06-15

## 2022-06-15 RX ORDER — MEPERIDINE HYDROCHLORIDE 25 MG/ML
25 INJECTION INTRAMUSCULAR; INTRAVENOUS; SUBCUTANEOUS
Status: DISCONTINUED | OUTPATIENT
Start: 2022-06-15 | End: 2022-06-15 | Stop reason: HOSPADM

## 2022-06-15 RX ORDER — SODIUM CHLORIDE 9 MG/ML
250 INJECTION, SOLUTION INTRAVENOUS ONCE
Status: CANCELLED | OUTPATIENT
Start: 2022-06-15

## 2022-06-15 RX ORDER — DOXORUBICIN HYDROCHLORIDE 2 MG/ML
50 INJECTION, SOLUTION INTRAVENOUS ONCE
Status: CANCELLED | OUTPATIENT
Start: 2022-06-15

## 2022-06-15 RX ORDER — FAMOTIDINE 10 MG/ML
20 INJECTION, SOLUTION INTRAVENOUS AS NEEDED
Status: DISCONTINUED | OUTPATIENT
Start: 2022-06-15 | End: 2022-06-15 | Stop reason: HOSPADM

## 2022-06-15 RX ORDER — SODIUM CHLORIDE 9 MG/ML
250 INJECTION, SOLUTION INTRAVENOUS ONCE
Status: COMPLETED | OUTPATIENT
Start: 2022-06-15 | End: 2022-06-15

## 2022-06-15 RX ADMIN — PALONOSETRON 0.25 MG: 0.05 INJECTION, SOLUTION INTRAVENOUS at 13:28

## 2022-06-15 RX ADMIN — SODIUM CHLORIDE 250 ML: 9 INJECTION, SOLUTION INTRAVENOUS at 09:36

## 2022-06-15 RX ADMIN — RITUXIMAB 770 MG: 10 INJECTION, SOLUTION INTRAVENOUS at 10:19

## 2022-06-15 RX ADMIN — VINCRISTINE SULFATE 2 MG: 1 INJECTION, SOLUTION INTRAVENOUS at 15:21

## 2022-06-15 RX ADMIN — CYCLOPHOSPHAMIDE 1530 MG: 200 INJECTION, SOLUTION INTRAVENOUS at 15:45

## 2022-06-15 RX ADMIN — FOSAPREPITANT 100 ML: 150 INJECTION, POWDER, LYOPHILIZED, FOR SOLUTION INTRAVENOUS at 13:35

## 2022-06-15 RX ADMIN — ACETAMINOPHEN 650 MG: 325 TABLET ORAL at 09:37

## 2022-06-15 RX ADMIN — DEXAMETHASONE SODIUM PHOSPHATE 12 MG: 10 INJECTION, SOLUTION INTRAMUSCULAR; INTRAVENOUS at 14:22

## 2022-06-15 RX ADMIN — OLANZAPINE 5 MG: 5 TABLET, FILM COATED ORAL at 13:28

## 2022-06-15 RX ADMIN — DOXORUBICIN HYDROCHLORIDE 100 MG: 2 INJECTION, SOLUTION INTRAVENOUS at 14:59

## 2022-06-15 RX ADMIN — PEGFILGRASTIM 6 MG: KIT SUBCUTANEOUS at 16:33

## 2022-06-15 RX ADMIN — DIPHENHYDRAMINE HYDROCHLORIDE 50 MG: 50 INJECTION, SOLUTION INTRAMUSCULAR; INTRAVENOUS at 09:43

## 2022-06-15 RX ADMIN — HEPARIN 500 UNITS: 100 SYRINGE at 16:32

## 2022-06-16 NOTE — PROGRESS NOTES
DATE OF VISIT: 6/15/2022      REASON FOR VISIT:  Follicular lymphoma with transformation into diffuse large B-cell lymphoma, fatty liver with elevated liver function test, diarrhea        HISTORY OF PRESENT ILLNESS:     As of 6/15/2022:    62-year-old female with medical problem consisting of obesity, hypertension, anxiety, dyslipidemia, fatty liver with elevated liver function test, poorly controlled diabetes mellitus, recurrent follicular lymphoma with multiple recurrences for which patient is currently on rituximab since September 2021.  Patient was last seen here at clinic on May 23, 2022 and was found to have right chest wall mass as well as pelvic mass on staging CT scan.  Patient underwent biopsy of chest wall mass as well as PET/CT that does not have any other abnormal uptake on PET/CT.  Pelvic mass that was found on CT scan does not have any FDG avidity.  Significance of mass is uncertain at present. Treatment option for diffuse large B-cell lymphoma with transformation consisting of R-CHOP was discussed with patient and her family.   She has education session and here today for cycle 1 R-CHOP      Oncology history:     1.  Recurrent follicular lymphoma grade 1:  -Patient initially diagnosed in April 2015 for which patient underwent palliative radiation treatment.  -Second relapse happened in September 2016 for again patient had a radiation treatment.  - Patient was found to have enlarged aortocaval lymph node in April 2017 for which patient initially received rituximab every 2 months without any significant improvement.  -Subsequently patient received 4 cycles of bendamustine and rituximab between April 30, 2018 and August 21, 2018.  -Patient was diagnosed with involvement of left parotid gland on biopsy done on August 27, 2020 by Dr. Ibrahim.  PET/CT done shows uptake in bilateral parotid gland along with left sided neck lymph node involvement.  -Patient was started on Revlimid with rituximab on September  22, 2020.  Patient completed cycle 12 on 8/23/2021.  -Patient received 3 cycles of rituximab maintenance between September 8, 2021 and February 28, 2022  - CT of chest abdomen and pelvis with contrast on May 17, 2022 showed new onset of chest wall mass on right side along with pelvic mass.  - Ultrasound-guided biopsy showed large B-cell lymphoma in background of follicular lymphoma consistent with transformation  - PET/CT shows SUV of 12 in the region of right chest wall mass without any other abnormal uptake  - Patient will be started on cycle 1 of R-CHOP on Raquel 15, 2022.  Mutation for Bcl-2, BCL6 and C MYC FISH is still pending                 Past Medical History, Past Surgical History, Social History, Family History have been reviewed and are without significant changes except as mentioned.    Review of Systems   A comprehensive 14 point review of systems was performed and was negative except as mentioned.    Medications:  The current medication list was reviewed in the EMR    ALLERGIES:    Allergies   Allergen Reactions   • Ace Inhibitors Cough   • Latex      BLISTER     • Morphine And Related Nausea And Vomiting   • Lortab [Hydrocodone-Acetaminophen] Palpitations       Objective      Vitals:    06/15/22 0824   BP: 139/63   Pulse: 73   Temp: 97.3 °F (36.3 °C)   TempSrc: Temporal   SpO2: 96%   Weight: 101 kg (222 lb 6.4 oz)   PainSc: 0-No pain     Current Status 4/4/2022   ECOG score 0       Physical Exam      RECENT LABS:  Glucose   Date Value Ref Range Status   06/15/2022 170 (H) 65 - 99 mg/dL Final     Sodium   Date Value Ref Range Status   06/15/2022 137 136 - 145 mmol/L Final     Potassium   Date Value Ref Range Status   06/15/2022 4.0 3.5 - 5.2 mmol/L Final     CO2   Date Value Ref Range Status   06/15/2022 29.0 22.0 - 29.0 mmol/L Final     Chloride   Date Value Ref Range Status   06/15/2022 99 98 - 107 mmol/L Final     Anion Gap   Date Value Ref Range Status   06/15/2022 9.0 5.0 - 15.0 mmol/L Final      Creatinine   Date Value Ref Range Status   06/15/2022 0.65 0.57 - 1.00 mg/dL Final     BUN   Date Value Ref Range Status   06/15/2022 8 8 - 23 mg/dL Final     BUN/Creatinine Ratio   Date Value Ref Range Status   06/15/2022 12.3 7.0 - 25.0 Final     Calcium   Date Value Ref Range Status   06/15/2022 9.5 8.6 - 10.5 mg/dL Final     eGFR Non  Amer   Date Value Ref Range Status   12/06/2021 81 >60 mL/min/1.73 Final     Alkaline Phosphatase   Date Value Ref Range Status   06/15/2022 84 39 - 117 U/L Final     Total Protein   Date Value Ref Range Status   06/15/2022 6.3 6.0 - 8.5 g/dL Final     ALT (SGPT)   Date Value Ref Range Status   06/15/2022 83 (H) 1 - 33 U/L Final     AST (SGOT)   Date Value Ref Range Status   06/15/2022 64 (H) 1 - 32 U/L Final     Total Bilirubin   Date Value Ref Range Status   06/15/2022 0.5 0.0 - 1.2 mg/dL Final     Albumin   Date Value Ref Range Status   06/15/2022 4.10 3.50 - 5.20 g/dL Final     Globulin   Date Value Ref Range Status   06/15/2022 2.2 gm/dL Final     Lab Results   Component Value Date    WBC 4.75 06/15/2022    HGB 14.4 06/15/2022    HCT 40.4 06/15/2022    MCV 90.6 06/15/2022     06/15/2022     Lab Results   Component Value Date    NEUTROABS 3.48 06/15/2022           RADIOLOGY DATA :  No radiology results for the last 7 days        Assessment & Plan       1.  Diffuse large B-cell lymphoma in background of follicular lymphoma consistent with transformation.  - Patient underwent ultrasound-guided biopsy of chest wall mass on May 30, 2022 which is showing large B-cell lymphoma in background of follicular lymphoma consistent with transformation which can be potentially life-threatening without any treatment which was discussed with patient and her family.  Result of pathology was discussed with pathologist Dr. Ascencio.  - Patient does not have any other abnormal uptake on PET/CT.  Pelvic mass that was found on CT scan does not have any FDG avidity.  Significance of  mass is uncertain at present.  - Treatment option for diffuse large B-cell lymphoma with transformation consisting of R-CHOP was discussed with patient and her family. She had 2 D echo and here for cycle 1 R-CHOP  -She has been place on sliding scale by her PCP to have better glycemic control while she is on Prednisone.  -labs reviewed today and will proceed with cycle 1  -she will return to clinic in 3 weeks with CBC CMP and possible cycle 2        2.  Elevated liver function test:  - Secondary to fatty liver plus uncontrolled diabetes mellitus  - We will monitor with CMP     3.  Health maintenance: Patient does not smoke.  Had a colonoscopy in November 2021 by Dr Cruz     4. Advance Care Planning: For now patient remains full code and is able to make decisions.  Patient has health care surrogate mentioned on chart.                   PHQ-9 Total Score: 0     Chasity Leon reports a pain score of 0.  Given her pain assessment as noted, treatment options were discussed and the following options were decided upon as a follow-up plan to address the patient's pain: no pain today.         Jessica Segal, APRN  6/16/2022  13:07 CDT        Part of this note may be an electronic transcription/translation of spoken language to printed text using the Dragon Dictation System.          CC:

## 2022-06-30 ENCOUNTER — TELEPHONE (OUTPATIENT)
Dept: ONCOLOGY | Facility: HOSPITAL | Age: 62
End: 2022-06-30

## 2022-07-06 ENCOUNTER — APPOINTMENT (OUTPATIENT)
Dept: ONCOLOGY | Facility: CLINIC | Age: 62
End: 2022-07-06

## 2022-07-06 ENCOUNTER — APPOINTMENT (OUTPATIENT)
Dept: ONCOLOGY | Facility: HOSPITAL | Age: 62
End: 2022-07-06

## 2022-07-07 ENCOUNTER — OFFICE VISIT (OUTPATIENT)
Dept: ONCOLOGY | Facility: CLINIC | Age: 62
End: 2022-07-07

## 2022-07-07 ENCOUNTER — INFUSION (OUTPATIENT)
Dept: ONCOLOGY | Facility: HOSPITAL | Age: 62
End: 2022-07-07

## 2022-07-07 VITALS
WEIGHT: 226.6 LBS | DIASTOLIC BLOOD PRESSURE: 73 MMHG | BODY MASS INDEX: 38.88 KG/M2 | SYSTOLIC BLOOD PRESSURE: 133 MMHG | HEART RATE: 84 BPM | OXYGEN SATURATION: 98 % | TEMPERATURE: 97.3 F

## 2022-07-07 DIAGNOSIS — C83.39 DIFFUSE LARGE B-CELL LYMPHOMA OF EXTRANODAL SITE EXCLUDING SPLEEN AND OTHER SOLID ORGANS: Primary | ICD-10-CM

## 2022-07-07 DIAGNOSIS — Z45.2 ENCOUNTER FOR VENOUS ACCESS DEVICE CARE: ICD-10-CM

## 2022-07-07 DIAGNOSIS — E87.6 HYPOKALEMIA: ICD-10-CM

## 2022-07-07 DIAGNOSIS — C82.08 GRADE 1 FOLLICULAR LYMPHOMA OF LYMPH NODES OF MULTIPLE REGIONS: Chronic | ICD-10-CM

## 2022-07-07 DIAGNOSIS — K11.8 MASS OF BOTH PAROTID GLANDS: Chronic | ICD-10-CM

## 2022-07-07 DIAGNOSIS — C82.08 GRADE 1 FOLLICULAR LYMPHOMA OF LYMPH NODES OF MULTIPLE REGIONS: ICD-10-CM

## 2022-07-07 DIAGNOSIS — C83.39 DIFFUSE LARGE B-CELL LYMPHOMA OF EXTRANODAL SITE EXCLUDING SPLEEN AND OTHER SOLID ORGANS: Primary | Chronic | ICD-10-CM

## 2022-07-07 DIAGNOSIS — R79.89 ELEVATED LFTS: Chronic | ICD-10-CM

## 2022-07-07 LAB
ALBUMIN SERPL-MCNC: 3.9 G/DL (ref 3.5–5.2)
ALBUMIN/GLOB SERPL: 1.5 G/DL
ALP SERPL-CCNC: 84 U/L (ref 39–117)
ALT SERPL W P-5'-P-CCNC: 36 U/L (ref 1–33)
ANION GAP SERPL CALCULATED.3IONS-SCNC: 12 MMOL/L (ref 5–15)
AST SERPL-CCNC: 30 U/L (ref 1–32)
BASOPHILS # BLD AUTO: 0.1 10*3/MM3 (ref 0–0.2)
BASOPHILS NFR BLD AUTO: 2.1 % (ref 0–1.5)
BILIRUB SERPL-MCNC: 0.3 MG/DL (ref 0–1.2)
BUN SERPL-MCNC: 9 MG/DL (ref 8–23)
BUN/CREAT SERPL: 14.3 (ref 7–25)
CALCIUM SPEC-SCNC: 9.3 MG/DL (ref 8.6–10.5)
CHLORIDE SERPL-SCNC: 93 MMOL/L (ref 98–107)
CO2 SERPL-SCNC: 27 MMOL/L (ref 22–29)
CREAT SERPL-MCNC: 0.63 MG/DL (ref 0.57–1)
DEPRECATED RDW RBC AUTO: 49.8 FL (ref 37–54)
EGFRCR SERPLBLD CKD-EPI 2021: 100.4 ML/MIN/1.73
EOSINOPHIL # BLD AUTO: 0.01 10*3/MM3 (ref 0–0.4)
EOSINOPHIL NFR BLD AUTO: 0.2 % (ref 0.3–6.2)
ERYTHROCYTE [DISTWIDTH] IN BLOOD BY AUTOMATED COUNT: 15.3 % (ref 12.3–15.4)
GLOBULIN UR ELPH-MCNC: 2.6 GM/DL
GLUCOSE SERPL-MCNC: 183 MG/DL (ref 65–99)
HCT VFR BLD AUTO: 36.5 % (ref 34–46.6)
HGB BLD-MCNC: 12.8 G/DL (ref 12–15.9)
IMM GRANULOCYTES # BLD AUTO: 0.08 10*3/MM3 (ref 0–0.05)
IMM GRANULOCYTES NFR BLD AUTO: 1.7 % (ref 0–0.5)
LYMPHOCYTES # BLD AUTO: 0.6 10*3/MM3 (ref 0.7–3.1)
LYMPHOCYTES NFR BLD AUTO: 12.6 % (ref 19.6–45.3)
MCH RBC QN AUTO: 31.5 PG (ref 26.6–33)
MCHC RBC AUTO-ENTMCNC: 35.1 G/DL (ref 31.5–35.7)
MCV RBC AUTO: 89.9 FL (ref 79–97)
MONOCYTES # BLD AUTO: 0.85 10*3/MM3 (ref 0.1–0.9)
MONOCYTES NFR BLD AUTO: 17.9 % (ref 5–12)
NEUTROPHILS NFR BLD AUTO: 3.12 10*3/MM3 (ref 1.7–7)
NEUTROPHILS NFR BLD AUTO: 65.5 % (ref 42.7–76)
NRBC BLD AUTO-RTO: 0 /100 WBC (ref 0–0.2)
PLATELET # BLD AUTO: 377 10*3/MM3 (ref 140–450)
PMV BLD AUTO: 8.7 FL (ref 6–12)
POTASSIUM SERPL-SCNC: 3.4 MMOL/L (ref 3.5–5.2)
PROT SERPL-MCNC: 6.5 G/DL (ref 6–8.5)
RBC # BLD AUTO: 4.06 10*6/MM3 (ref 3.77–5.28)
SODIUM SERPL-SCNC: 132 MMOL/L (ref 136–145)
WBC NRBC COR # BLD: 4.76 10*3/MM3 (ref 3.4–10.8)

## 2022-07-07 PROCEDURE — 1126F AMNT PAIN NOTED NONE PRSNT: CPT | Performed by: INTERNAL MEDICINE

## 2022-07-07 PROCEDURE — 80053 COMPREHEN METABOLIC PANEL: CPT

## 2022-07-07 PROCEDURE — 96375 TX/PRO/DX INJ NEW DRUG ADDON: CPT | Performed by: INTERNAL MEDICINE

## 2022-07-07 PROCEDURE — 25010000002 RITUXIMAB 100 MG/10ML SOLUTION 10 ML VIAL: Performed by: INTERNAL MEDICINE

## 2022-07-07 PROCEDURE — 96377 APPLICATON ON-BODY INJECTOR: CPT | Performed by: INTERNAL MEDICINE

## 2022-07-07 PROCEDURE — 25010000002 DIPHENHYDRAMINE PER 50 MG: Performed by: INTERNAL MEDICINE

## 2022-07-07 PROCEDURE — 96367 TX/PROPH/DG ADDL SEQ IV INF: CPT | Performed by: INTERNAL MEDICINE

## 2022-07-07 PROCEDURE — 96411 CHEMO IV PUSH ADDL DRUG: CPT | Performed by: INTERNAL MEDICINE

## 2022-07-07 PROCEDURE — 1123F ACP DISCUSS/DSCN MKR DOCD: CPT | Performed by: INTERNAL MEDICINE

## 2022-07-07 PROCEDURE — 25010000002 CYCLOPHOSPHAMIDE 1 GM/5ML SOLUTION 5 ML VIAL: Performed by: INTERNAL MEDICINE

## 2022-07-07 PROCEDURE — 25010000002 FOSAPREPITANT PER 1 MG: Performed by: INTERNAL MEDICINE

## 2022-07-07 PROCEDURE — 25010000002 PALONOSETRON PER 25 MCG: Performed by: INTERNAL MEDICINE

## 2022-07-07 PROCEDURE — 99214 OFFICE O/P EST MOD 30 MIN: CPT | Performed by: INTERNAL MEDICINE

## 2022-07-07 PROCEDURE — 25010000002 VINCRISTINE PER 1 MG: Performed by: INTERNAL MEDICINE

## 2022-07-07 PROCEDURE — 96413 CHEMO IV INFUSION 1 HR: CPT | Performed by: INTERNAL MEDICINE

## 2022-07-07 PROCEDURE — 25010000002 RITUXIMAB 500 MG/50ML SOLUTION 50 ML VIAL: Performed by: INTERNAL MEDICINE

## 2022-07-07 PROCEDURE — 25010000002 DEXAMETHASONE SODIUM PHOSPHATE 100 MG/10ML SOLUTION: Performed by: INTERNAL MEDICINE

## 2022-07-07 PROCEDURE — 25010000002 DOXORUBICIN PER 10 MG: Performed by: INTERNAL MEDICINE

## 2022-07-07 PROCEDURE — 25010000002 PEGFILGRASTIM 6 MG/0.6ML PREFILLED SYRINGE KIT: Performed by: INTERNAL MEDICINE

## 2022-07-07 PROCEDURE — 25010000002 HEPARIN LOCK FLUSH PER 10 UNITS: Performed by: INTERNAL MEDICINE

## 2022-07-07 PROCEDURE — 96417 CHEMO IV INFUS EACH ADDL SEQ: CPT | Performed by: INTERNAL MEDICINE

## 2022-07-07 PROCEDURE — 85025 COMPLETE CBC W/AUTO DIFF WBC: CPT

## 2022-07-07 PROCEDURE — 96415 CHEMO IV INFUSION ADDL HR: CPT | Performed by: INTERNAL MEDICINE

## 2022-07-07 RX ORDER — FAMOTIDINE 10 MG/ML
20 INJECTION, SOLUTION INTRAVENOUS AS NEEDED
Status: CANCELLED | OUTPATIENT
Start: 2022-07-07

## 2022-07-07 RX ORDER — POTASSIUM CHLORIDE 750 MG/1
40 CAPSULE, EXTENDED RELEASE ORAL DAILY
Status: CANCELLED
Start: 2022-07-07

## 2022-07-07 RX ORDER — MEPERIDINE HYDROCHLORIDE 25 MG/ML
25 INJECTION INTRAMUSCULAR; INTRAVENOUS; SUBCUTANEOUS
Status: DISCONTINUED | OUTPATIENT
Start: 2022-07-07 | End: 2022-07-07 | Stop reason: HOSPADM

## 2022-07-07 RX ORDER — CLONAZEPAM 1 MG/1
1 TABLET ORAL
COMMUNITY
Start: 2022-06-01 | End: 2022-08-01

## 2022-07-07 RX ORDER — SODIUM CHLORIDE 9 MG/ML
250 INJECTION, SOLUTION INTRAVENOUS ONCE
Status: COMPLETED | OUTPATIENT
Start: 2022-07-07 | End: 2022-07-07

## 2022-07-07 RX ORDER — OLANZAPINE 5 MG/1
5 TABLET ORAL ONCE
Status: CANCELLED | OUTPATIENT
Start: 2022-07-07

## 2022-07-07 RX ORDER — OLANZAPINE 5 MG/1
5 TABLET ORAL ONCE
Status: COMPLETED | OUTPATIENT
Start: 2022-07-07 | End: 2022-07-07

## 2022-07-07 RX ORDER — DOXORUBICIN HYDROCHLORIDE 2 MG/ML
100 INJECTION, SOLUTION INTRAVENOUS ONCE
Status: COMPLETED | OUTPATIENT
Start: 2022-07-07 | End: 2022-07-07

## 2022-07-07 RX ORDER — ACETAMINOPHEN 325 MG/1
650 TABLET ORAL ONCE
Status: COMPLETED | OUTPATIENT
Start: 2022-07-07 | End: 2022-07-07

## 2022-07-07 RX ORDER — DOXEPIN HYDROCHLORIDE 25 MG/1
50 CAPSULE ORAL
COMMUNITY
Start: 2022-05-19 | End: 2022-07-07

## 2022-07-07 RX ORDER — MEPERIDINE HYDROCHLORIDE 25 MG/ML
25 INJECTION INTRAMUSCULAR; INTRAVENOUS; SUBCUTANEOUS
Status: CANCELLED | OUTPATIENT
Start: 2022-07-07

## 2022-07-07 RX ORDER — HEPARIN SODIUM (PORCINE) LOCK FLUSH IV SOLN 100 UNIT/ML 100 UNIT/ML
500 SOLUTION INTRAVENOUS AS NEEDED
Status: CANCELLED | OUTPATIENT
Start: 2022-07-28

## 2022-07-07 RX ORDER — DIPHENHYDRAMINE HYDROCHLORIDE 50 MG/ML
50 INJECTION INTRAMUSCULAR; INTRAVENOUS AS NEEDED
Status: DISCONTINUED | OUTPATIENT
Start: 2022-07-07 | End: 2022-07-07 | Stop reason: HOSPADM

## 2022-07-07 RX ORDER — PALONOSETRON 0.05 MG/ML
0.25 INJECTION, SOLUTION INTRAVENOUS ONCE
Status: CANCELLED | OUTPATIENT
Start: 2022-07-07

## 2022-07-07 RX ORDER — DIPHENHYDRAMINE HYDROCHLORIDE 50 MG/ML
50 INJECTION INTRAMUSCULAR; INTRAVENOUS AS NEEDED
Status: CANCELLED | OUTPATIENT
Start: 2022-07-07

## 2022-07-07 RX ORDER — DOXORUBICIN HYDROCHLORIDE 2 MG/ML
50 INJECTION, SOLUTION INTRAVENOUS ONCE
Status: CANCELLED | OUTPATIENT
Start: 2022-07-07

## 2022-07-07 RX ORDER — POTASSIUM CHLORIDE 750 MG/1
40 CAPSULE, EXTENDED RELEASE ORAL DAILY
Status: DISCONTINUED | OUTPATIENT
Start: 2022-07-07 | End: 2022-07-07 | Stop reason: HOSPADM

## 2022-07-07 RX ORDER — PREDNISONE 20 MG/1
20 TABLET ORAL
COMMUNITY
Start: 2022-06-20

## 2022-07-07 RX ORDER — ACETAMINOPHEN 325 MG/1
650 TABLET ORAL ONCE
Status: CANCELLED | OUTPATIENT
Start: 2022-07-07

## 2022-07-07 RX ORDER — SODIUM CHLORIDE 9 MG/ML
250 INJECTION, SOLUTION INTRAVENOUS ONCE
Status: CANCELLED | OUTPATIENT
Start: 2022-07-07

## 2022-07-07 RX ORDER — OMEPRAZOLE 40 MG/1
40 CAPSULE, DELAYED RELEASE ORAL DAILY
COMMUNITY
Start: 2022-05-18

## 2022-07-07 RX ORDER — BLOOD-GLUCOSE METER
EACH MISCELLANEOUS
COMMUNITY
Start: 2022-06-23

## 2022-07-07 RX ORDER — LIDOCAINE HYDROCHLORIDE 20 MG/ML
SOLUTION OROPHARYNGEAL
COMMUNITY
Start: 2022-06-30

## 2022-07-07 RX ORDER — FAMOTIDINE 10 MG/ML
20 INJECTION, SOLUTION INTRAVENOUS AS NEEDED
Status: DISCONTINUED | OUTPATIENT
Start: 2022-07-07 | End: 2022-07-07 | Stop reason: HOSPADM

## 2022-07-07 RX ORDER — HEPARIN SODIUM (PORCINE) LOCK FLUSH IV SOLN 100 UNIT/ML 100 UNIT/ML
500 SOLUTION INTRAVENOUS AS NEEDED
Status: DISCONTINUED | OUTPATIENT
Start: 2022-07-07 | End: 2022-07-07 | Stop reason: HOSPADM

## 2022-07-07 RX ORDER — FLUTICASONE PROPIONATE 50 MCG
SPRAY, SUSPENSION (ML) NASAL
COMMUNITY
Start: 2022-04-20 | End: 2022-10-14

## 2022-07-07 RX ORDER — PALONOSETRON 0.05 MG/ML
0.25 INJECTION, SOLUTION INTRAVENOUS ONCE
Status: COMPLETED | OUTPATIENT
Start: 2022-07-07 | End: 2022-07-07

## 2022-07-07 RX ORDER — SEMAGLUTIDE 1.34 MG/ML
0.5 INJECTION, SOLUTION SUBCUTANEOUS
COMMUNITY
Start: 2022-06-20 | End: 2022-07-21

## 2022-07-07 RX ADMIN — CYCLOPHOSPHAMIDE 1530 MG: 200 INJECTION, SOLUTION INTRAVENOUS at 14:12

## 2022-07-07 RX ADMIN — DOXORUBICIN HYDROCHLORIDE 100 MG: 2 INJECTION, SOLUTION INTRAVENOUS at 13:31

## 2022-07-07 RX ADMIN — DEXAMETHASONE SODIUM PHOSPHATE 12 MG: 10 INJECTION, SOLUTION INTRAMUSCULAR; INTRAVENOUS at 12:54

## 2022-07-07 RX ADMIN — SODIUM CHLORIDE 250 ML: 9 INJECTION, SOLUTION INTRAVENOUS at 08:35

## 2022-07-07 RX ADMIN — DIPHENHYDRAMINE HYDROCHLORIDE 50 MG: 50 INJECTION, SOLUTION INTRAMUSCULAR; INTRAVENOUS at 08:53

## 2022-07-07 RX ADMIN — HEPARIN 500 UNITS: 100 SYRINGE at 14:53

## 2022-07-07 RX ADMIN — OLANZAPINE 5 MG: 5 TABLET, FILM COATED ORAL at 12:17

## 2022-07-07 RX ADMIN — PALONOSETRON 0.25 MG: 0.05 INJECTION, SOLUTION INTRAVENOUS at 13:16

## 2022-07-07 RX ADMIN — FOSAPREPITANT 100 ML: 150 INJECTION, POWDER, LYOPHILIZED, FOR SOLUTION INTRAVENOUS at 12:16

## 2022-07-07 RX ADMIN — RITUXIMAB 770 MG: 10 INJECTION, SOLUTION INTRAVENOUS at 09:26

## 2022-07-07 RX ADMIN — PEGFILGRASTIM 6 MG: KIT SUBCUTANEOUS at 14:53

## 2022-07-07 RX ADMIN — POTASSIUM CHLORIDE 40 MEQ: 750 CAPSULE, EXTENDED RELEASE ORAL at 08:34

## 2022-07-07 RX ADMIN — ACETAMINOPHEN 650 MG: 325 TABLET, FILM COATED ORAL at 08:34

## 2022-07-07 RX ADMIN — VINCRISTINE SULFATE 1.5 MG: 1 INJECTION, SOLUTION INTRAVENOUS at 13:50

## 2022-07-07 NOTE — PROGRESS NOTES
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  Patient is here to get cycle 2 of R-CHOP.  Complains of scalp tenderness.  Had a mouth sores with cycle 1 of 5 throughout.  Complains of chronic diarrhea.  Complains of tingling and numbness affecting fingertips that started after cycle 1 of chemotherapy.  Denies any fevers.  Denies any drenching night sweats.          Oncology history:    1.  Recurrent follicular lymphoma grade 1:  -Patient initially diagnosed in April 2015 for which patient underwent palliative radiation treatment.  -Second relapse happened in September 2016 for again patient had a radiation treatment.  - Patient was found to have enlarged aortocaval lymph node in April 2017 for which patient initially received rituximab every 2 months without any significant improvement.  -Subsequently patient received 4 cycles of bendamustine and rituximab between April 30, 2018 and August 21, 2018.  -Patient was diagnosed with involvement of left parotid gland on biopsy done on August 27, 2020 by Dr. Ibrahim.  PET/CT done shows uptake in bilateral parotid gland along with left sided neck lymph node involvement.  -Patient was started on Revlimid with rituximab on September 22, 2020.  Patient completed cycle 12 on 8/23/2021.  -Patient received 3 cycles of rituximab maintenance between September 8, 2021 and February 28, 2022  - CT of chest  abdomen and pelvis with contrast on May 17, 2022 showed new onset of chest wall mass on right side along with pelvic mass.  - Ultrasound-guided biopsy showed large B-cell lymphoma in background of follicular lymphoma consistent with transformation  - PET/CT shows SUV of 12 in the region of right chest wall mass without any other abnormal uptake  -FISH for Bcl-2, BCL6 and c-Myc was negative.  - Patient was started on cycle 1 of R-CHOP on Raquel 15, 2022.                Past Medical History, Past Surgical History, Social History, Family History have been reviewed and are without significant changes except as mentioned.    Review of Systems   A comprehensive 14 point review of systems was performed and was negative except as mentioned in HPI.    Medications:  The current medication list was reviewed in the EMR    ALLERGIES:    Allergies   Allergen Reactions   • Ace Inhibitors Cough   • Latex      BLISTER     • Morphine And Related Nausea And Vomiting   • Lortab [Hydrocodone-Acetaminophen] Palpitations       Objective      Vitals:    07/07/22 0802   BP: 133/73   Pulse: 84   Temp: 97.3 °F (36.3 °C)   TempSrc: Temporal   SpO2: 98%   Weight: 103 kg (226 lb 9.6 oz)   PainSc: 0-No pain     Current Status 4/4/2022   ECOG score 0       Physical Exam  Pulmonary:      Breath sounds: Normal breath sounds.   Neurological:      Mental Status: She is alert and oriented to person, place, and time.           RECENT LABS:  Glucose   Date Value Ref Range Status   07/07/2022 183 (H) 65 - 99 mg/dL Final     Sodium   Date Value Ref Range Status   07/07/2022 132 (L) 136 - 145 mmol/L Final     Potassium   Date Value Ref Range Status   07/07/2022 3.4 (L) 3.5 - 5.2 mmol/L Final     Comment:     Slight hemolysis detected by analyzer. Results may be affected.     CO2   Date Value Ref Range Status   07/07/2022 27.0 22.0 - 29.0 mmol/L Final     Chloride   Date Value Ref Range Status   07/07/2022 93 (L) 98 - 107 mmol/L Final     Anion Gap   Date  Value Ref Range Status   07/07/2022 12.0 5.0 - 15.0 mmol/L Final     Creatinine   Date Value Ref Range Status   07/07/2022 0.63 0.57 - 1.00 mg/dL Final     BUN   Date Value Ref Range Status   07/07/2022 9 8 - 23 mg/dL Final     BUN/Creatinine Ratio   Date Value Ref Range Status   07/07/2022 14.3 7.0 - 25.0 Final     Calcium   Date Value Ref Range Status   07/07/2022 9.3 8.6 - 10.5 mg/dL Final     eGFR Non  Amer   Date Value Ref Range Status   12/06/2021 81 >60 mL/min/1.73 Final     Alkaline Phosphatase   Date Value Ref Range Status   07/07/2022 84 39 - 117 U/L Final     Total Protein   Date Value Ref Range Status   07/07/2022 6.5 6.0 - 8.5 g/dL Final     ALT (SGPT)   Date Value Ref Range Status   07/07/2022 36 (H) 1 - 33 U/L Final     AST (SGOT)   Date Value Ref Range Status   07/07/2022 30 1 - 32 U/L Final     Total Bilirubin   Date Value Ref Range Status   07/07/2022 0.3 0.0 - 1.2 mg/dL Final     Albumin   Date Value Ref Range Status   07/07/2022 3.90 3.50 - 5.20 g/dL Final     Globulin   Date Value Ref Range Status   07/07/2022 2.6 gm/dL Final     Lab Results   Component Value Date    WBC 4.76 07/07/2022    HGB 12.8 07/07/2022    HCT 36.5 07/07/2022    MCV 89.9 07/07/2022     07/07/2022     Lab Results   Component Value Date    NEUTROABS 3.12 07/07/2022           PATHOLOGY:  * Cannot find OR log *         RADIOLOGY DATA :  No radiology results for the last 7 days        Assessment & Plan     1.Diffuse large B-cell lymphoma in background of follicular lymphoma consistent with transformation.  - FISH for Bcl-2, BCL6 and c-Myc is negative.  - 2D echo done on June 9, 2022 showed normal ejection fraction at 53.8%.  - We will repeat 2D echo after cycle 4 of R-CHOP.  - Patient was started on cycle 1 of R-CHOP on Raquel 15, 2022.  Patient tolerated first cycle well.  - We will proceed with cycle 2 of R-CHOP today.  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  - We will continue close monitoring for chemotherapy related side effect  - We will have patient return to clinic in 3 weeks with repeat CBC, CMP to be done on that day.  - We will repeat PET/CT after cycle 3 of R-CHOP which was discussed with patient.    2.  Recurrent follicular lymphoma  -Review oncology history for prior treatment details  - Currently patient is on R-CHOP for transformation of follicular lymphoma into diffuse large B-cell lymphoma    3.  Elevated liver function test:  - Secondary to fatty liver plus diabetes mellitus  - We will monitor with CBC    4.  Pelvic mass  - Patient had a recent PET/CT that did not show any uptake in pelvic mass.  - Plan is to repeat PET/CT after cycle 3 of R-CHOP and follow-up on pelvic mass as well.    5.  Health maintenance: Patient does not smoke.  Had a colonoscopy in November 2021 by Dr Cruz    6. Advance Care Planning: For now patient remains full code and is able to make decisions.  Patient has health care surrogate mentioned on chart.    7.  Hypokalemia:  - Potassium is 3.4.  We will provide her with K-Dur 40 mEq p.o. x1 here today.             PHQ-9 Total Score: 0   -Patient is not homicidal or suicidal.  No acute intervention required.    Chasity Leon reports a pain score of 0.  Given her pain assessment as noted, treatment options were discussed and the following options were decided upon as a follow-up plan to address the patient's pain: continuation of current treatment plan for pain.         Tino Sood MD  7/7/2022  08:10 CDT        Part of this note may be an electronic transcription/translation of spoken language to printed text using the Dragon Dictation System.          CC:

## 2022-07-28 ENCOUNTER — INFUSION (OUTPATIENT)
Dept: ONCOLOGY | Facility: HOSPITAL | Age: 62
End: 2022-07-28

## 2022-07-28 ENCOUNTER — OFFICE VISIT (OUTPATIENT)
Dept: ONCOLOGY | Facility: CLINIC | Age: 62
End: 2022-07-28

## 2022-07-28 VITALS
TEMPERATURE: 97.1 F | DIASTOLIC BLOOD PRESSURE: 65 MMHG | OXYGEN SATURATION: 93 % | BODY MASS INDEX: 38.36 KG/M2 | WEIGHT: 223.6 LBS | SYSTOLIC BLOOD PRESSURE: 130 MMHG | HEART RATE: 93 BPM

## 2022-07-28 DIAGNOSIS — C82.08 GRADE 1 FOLLICULAR LYMPHOMA OF LYMPH NODES OF MULTIPLE REGIONS: Chronic | ICD-10-CM

## 2022-07-28 DIAGNOSIS — C82.08 GRADE 1 FOLLICULAR LYMPHOMA OF LYMPH NODES OF MULTIPLE REGIONS: ICD-10-CM

## 2022-07-28 DIAGNOSIS — E87.6 HYPOKALEMIA: ICD-10-CM

## 2022-07-28 DIAGNOSIS — C83.39 DIFFUSE LARGE B-CELL LYMPHOMA OF EXTRANODAL SITE EXCLUDING SPLEEN AND OTHER SOLID ORGANS: Primary | Chronic | ICD-10-CM

## 2022-07-28 DIAGNOSIS — T45.1X5A NEUROPATHY DUE TO CHEMOTHERAPEUTIC DRUG: ICD-10-CM

## 2022-07-28 DIAGNOSIS — R79.89 ELEVATED LFTS: Chronic | ICD-10-CM

## 2022-07-28 DIAGNOSIS — K11.8 MASS OF BOTH PAROTID GLANDS: Chronic | ICD-10-CM

## 2022-07-28 DIAGNOSIS — C83.39 DIFFUSE LARGE B-CELL LYMPHOMA OF EXTRANODAL SITE EXCLUDING SPLEEN AND OTHER SOLID ORGANS: Primary | ICD-10-CM

## 2022-07-28 DIAGNOSIS — Z45.2 ENCOUNTER FOR VENOUS ACCESS DEVICE CARE: ICD-10-CM

## 2022-07-28 DIAGNOSIS — G62.0 NEUROPATHY DUE TO CHEMOTHERAPEUTIC DRUG: ICD-10-CM

## 2022-07-28 LAB
ALBUMIN SERPL-MCNC: 4 G/DL (ref 3.5–5.2)
ALBUMIN/GLOB SERPL: 1.5 G/DL
ALP SERPL-CCNC: 75 U/L (ref 39–117)
ALT SERPL W P-5'-P-CCNC: 36 U/L (ref 1–33)
ANION GAP SERPL CALCULATED.3IONS-SCNC: 11 MMOL/L (ref 5–15)
AST SERPL-CCNC: 34 U/L (ref 1–32)
BASOPHILS # BLD AUTO: 0.08 10*3/MM3 (ref 0–0.2)
BASOPHILS NFR BLD AUTO: 1.4 % (ref 0–1.5)
BILIRUB SERPL-MCNC: 0.3 MG/DL (ref 0–1.2)
BUN SERPL-MCNC: 8 MG/DL (ref 8–23)
BUN/CREAT SERPL: 13.1 (ref 7–25)
CALCIUM SPEC-SCNC: 9.6 MG/DL (ref 8.6–10.5)
CHLORIDE SERPL-SCNC: 92 MMOL/L (ref 98–107)
CO2 SERPL-SCNC: 32 MMOL/L (ref 22–29)
CREAT SERPL-MCNC: 0.61 MG/DL (ref 0.57–1)
DEPRECATED RDW RBC AUTO: 52.3 FL (ref 37–54)
EGFRCR SERPLBLD CKD-EPI 2021: 101.2 ML/MIN/1.73
EOSINOPHIL # BLD AUTO: 0.01 10*3/MM3 (ref 0–0.4)
EOSINOPHIL NFR BLD AUTO: 0.2 % (ref 0.3–6.2)
ERYTHROCYTE [DISTWIDTH] IN BLOOD BY AUTOMATED COUNT: 15.9 % (ref 12.3–15.4)
GLOBULIN UR ELPH-MCNC: 2.6 GM/DL
GLUCOSE SERPL-MCNC: 153 MG/DL (ref 65–99)
HCT VFR BLD AUTO: 37.1 % (ref 34–46.6)
HGB BLD-MCNC: 13 G/DL (ref 12–15.9)
HOLD SPECIMEN: NORMAL
IMM GRANULOCYTES # BLD AUTO: 0.27 10*3/MM3 (ref 0–0.05)
IMM GRANULOCYTES NFR BLD AUTO: 4.8 % (ref 0–0.5)
LYMPHOCYTES # BLD AUTO: 0.44 10*3/MM3 (ref 0.7–3.1)
LYMPHOCYTES NFR BLD AUTO: 7.8 % (ref 19.6–45.3)
MCH RBC QN AUTO: 32.2 PG (ref 26.6–33)
MCHC RBC AUTO-ENTMCNC: 35 G/DL (ref 31.5–35.7)
MCV RBC AUTO: 91.8 FL (ref 79–97)
MONOCYTES # BLD AUTO: 0.88 10*3/MM3 (ref 0.1–0.9)
MONOCYTES NFR BLD AUTO: 15.6 % (ref 5–12)
NEUTROPHILS NFR BLD AUTO: 3.97 10*3/MM3 (ref 1.7–7)
NEUTROPHILS NFR BLD AUTO: 70.2 % (ref 42.7–76)
NRBC BLD AUTO-RTO: 0 /100 WBC (ref 0–0.2)
PLATELET # BLD AUTO: 305 10*3/MM3 (ref 140–450)
PMV BLD AUTO: 8.5 FL (ref 6–12)
POTASSIUM SERPL-SCNC: 3.2 MMOL/L (ref 3.5–5.2)
PROT SERPL-MCNC: 6.6 G/DL (ref 6–8.5)
RBC # BLD AUTO: 4.04 10*6/MM3 (ref 3.77–5.28)
SODIUM SERPL-SCNC: 135 MMOL/L (ref 136–145)
WBC NRBC COR # BLD: 5.65 10*3/MM3 (ref 3.4–10.8)

## 2022-07-28 PROCEDURE — 1126F AMNT PAIN NOTED NONE PRSNT: CPT | Performed by: INTERNAL MEDICINE

## 2022-07-28 PROCEDURE — 96415 CHEMO IV INFUSION ADDL HR: CPT | Performed by: INTERNAL MEDICINE

## 2022-07-28 PROCEDURE — 25010000002 RITUXIMAB 10 MG/ML SOLUTION 10 ML VIAL: Performed by: INTERNAL MEDICINE

## 2022-07-28 PROCEDURE — 36415 COLL VENOUS BLD VENIPUNCTURE: CPT

## 2022-07-28 PROCEDURE — 80053 COMPREHEN METABOLIC PANEL: CPT

## 2022-07-28 PROCEDURE — 25010000002 PALONOSETRON PER 25 MCG: Performed by: INTERNAL MEDICINE

## 2022-07-28 PROCEDURE — 25010000002 HEPARIN LOCK FLUSH PER 10 UNITS: Performed by: INTERNAL MEDICINE

## 2022-07-28 PROCEDURE — 25010000002 DEXAMETHASONE SODIUM PHOSPHATE 100 MG/10ML SOLUTION: Performed by: INTERNAL MEDICINE

## 2022-07-28 PROCEDURE — 96375 TX/PRO/DX INJ NEW DRUG ADDON: CPT | Performed by: INTERNAL MEDICINE

## 2022-07-28 PROCEDURE — 85025 COMPLETE CBC W/AUTO DIFF WBC: CPT

## 2022-07-28 PROCEDURE — 25010000002 FOSAPREPITANT PER 1 MG: Performed by: INTERNAL MEDICINE

## 2022-07-28 PROCEDURE — 96411 CHEMO IV PUSH ADDL DRUG: CPT | Performed by: INTERNAL MEDICINE

## 2022-07-28 PROCEDURE — 99214 OFFICE O/P EST MOD 30 MIN: CPT | Performed by: INTERNAL MEDICINE

## 2022-07-28 PROCEDURE — 96367 TX/PROPH/DG ADDL SEQ IV INF: CPT | Performed by: INTERNAL MEDICINE

## 2022-07-28 PROCEDURE — 25010000002 VINCRISTINE PER 1 MG: Performed by: INTERNAL MEDICINE

## 2022-07-28 PROCEDURE — 25010000002 CYCLOPHOSPHAMIDE 1 GM/5ML SOLUTION 5 ML VIAL: Performed by: INTERNAL MEDICINE

## 2022-07-28 PROCEDURE — 25010000002 DIPHENHYDRAMINE PER 50 MG: Performed by: INTERNAL MEDICINE

## 2022-07-28 PROCEDURE — 25010000002 PEGFILGRASTIM 6 MG/0.6ML PREFILLED SYRINGE KIT: Performed by: INTERNAL MEDICINE

## 2022-07-28 PROCEDURE — 1123F ACP DISCUSS/DSCN MKR DOCD: CPT | Performed by: INTERNAL MEDICINE

## 2022-07-28 PROCEDURE — 25010000002 RITUXIMAB 10 MG/ML SOLUTION 50 ML VIAL: Performed by: INTERNAL MEDICINE

## 2022-07-28 PROCEDURE — 96413 CHEMO IV INFUSION 1 HR: CPT | Performed by: INTERNAL MEDICINE

## 2022-07-28 PROCEDURE — 96377 APPLICATON ON-BODY INJECTOR: CPT | Performed by: INTERNAL MEDICINE

## 2022-07-28 PROCEDURE — 25010000002 DOXORUBICIN PER 10 MG: Performed by: INTERNAL MEDICINE

## 2022-07-28 PROCEDURE — 96417 CHEMO IV INFUS EACH ADDL SEQ: CPT | Performed by: INTERNAL MEDICINE

## 2022-07-28 RX ORDER — ACETAMINOPHEN 325 MG/1
650 TABLET ORAL ONCE
Status: COMPLETED | OUTPATIENT
Start: 2022-07-28 | End: 2022-07-28

## 2022-07-28 RX ORDER — HEPARIN SODIUM (PORCINE) LOCK FLUSH IV SOLN 100 UNIT/ML 100 UNIT/ML
500 SOLUTION INTRAVENOUS AS NEEDED
Status: CANCELLED | OUTPATIENT
Start: 2022-07-28

## 2022-07-28 RX ORDER — PALONOSETRON 0.05 MG/ML
0.25 INJECTION, SOLUTION INTRAVENOUS ONCE
Status: COMPLETED | OUTPATIENT
Start: 2022-07-28 | End: 2022-07-28

## 2022-07-28 RX ORDER — DOXORUBICIN HYDROCHLORIDE 2 MG/ML
50 INJECTION, SOLUTION INTRAVENOUS ONCE
Status: CANCELLED | OUTPATIENT
Start: 2022-07-28

## 2022-07-28 RX ORDER — SODIUM CHLORIDE 9 MG/ML
250 INJECTION, SOLUTION INTRAVENOUS ONCE
Status: CANCELLED | OUTPATIENT
Start: 2022-07-28

## 2022-07-28 RX ORDER — POTASSIUM CHLORIDE 750 MG/1
20 CAPSULE, EXTENDED RELEASE ORAL DAILY
Qty: 4 CAPSULE | Refills: 0 | Status: SHIPPED | OUTPATIENT
Start: 2022-07-28 | End: 2022-07-30

## 2022-07-28 RX ORDER — ACETAMINOPHEN 325 MG/1
650 TABLET ORAL ONCE
Status: CANCELLED | OUTPATIENT
Start: 2022-07-28

## 2022-07-28 RX ORDER — POTASSIUM CHLORIDE 750 MG/1
40 CAPSULE, EXTENDED RELEASE ORAL ONCE
Status: CANCELLED
Start: 2022-07-28 | End: 2022-07-28

## 2022-07-28 RX ORDER — MEPERIDINE HYDROCHLORIDE 25 MG/ML
25 INJECTION INTRAMUSCULAR; INTRAVENOUS; SUBCUTANEOUS
Status: CANCELLED | OUTPATIENT
Start: 2022-07-28

## 2022-07-28 RX ORDER — PALONOSETRON 0.05 MG/ML
0.25 INJECTION, SOLUTION INTRAVENOUS ONCE
Status: CANCELLED | OUTPATIENT
Start: 2022-07-28

## 2022-07-28 RX ORDER — LEVOFLOXACIN 500 MG/1
500 TABLET, FILM COATED ORAL DAILY
COMMUNITY
Start: 2022-07-25 | End: 2022-08-02

## 2022-07-28 RX ORDER — DESVENLAFAXINE 100 MG/1
100 TABLET, EXTENDED RELEASE ORAL DAILY
COMMUNITY
Start: 2022-07-11

## 2022-07-28 RX ORDER — SODIUM CHLORIDE 9 MG/ML
250 INJECTION, SOLUTION INTRAVENOUS ONCE
Status: COMPLETED | OUTPATIENT
Start: 2022-07-28 | End: 2022-07-28

## 2022-07-28 RX ORDER — FAMOTIDINE 10 MG/ML
20 INJECTION, SOLUTION INTRAVENOUS AS NEEDED
Status: DISCONTINUED | OUTPATIENT
Start: 2022-07-28 | End: 2022-07-28 | Stop reason: HOSPADM

## 2022-07-28 RX ORDER — LAMOTRIGINE 150 MG/1
1 TABLET ORAL 2 TIMES DAILY
COMMUNITY
Start: 2022-07-25 | End: 2022-10-26

## 2022-07-28 RX ORDER — DIPHENHYDRAMINE HYDROCHLORIDE 50 MG/ML
50 INJECTION INTRAMUSCULAR; INTRAVENOUS AS NEEDED
Status: CANCELLED | OUTPATIENT
Start: 2022-07-28

## 2022-07-28 RX ORDER — SODIUM CHLORIDE 0.9 % (FLUSH) 0.9 %
20 SYRINGE (ML) INJECTION AS NEEDED
Status: DISCONTINUED | OUTPATIENT
Start: 2022-07-28 | End: 2022-07-28 | Stop reason: HOSPADM

## 2022-07-28 RX ORDER — MEPERIDINE HYDROCHLORIDE 25 MG/ML
25 INJECTION INTRAMUSCULAR; INTRAVENOUS; SUBCUTANEOUS
Status: DISCONTINUED | OUTPATIENT
Start: 2022-07-28 | End: 2022-07-28 | Stop reason: HOSPADM

## 2022-07-28 RX ORDER — PROMETHAZINE HYDROCHLORIDE AND CODEINE PHOSPHATE 6.25; 1 MG/5ML; MG/5ML
5 SYRUP ORAL EVERY 4 HOURS PRN
COMMUNITY
Start: 2022-07-25 | End: 2022-08-25

## 2022-07-28 RX ORDER — HEPARIN SODIUM (PORCINE) LOCK FLUSH IV SOLN 100 UNIT/ML 100 UNIT/ML
500 SOLUTION INTRAVENOUS AS NEEDED
Status: DISCONTINUED | OUTPATIENT
Start: 2022-07-28 | End: 2022-07-28 | Stop reason: HOSPADM

## 2022-07-28 RX ORDER — OLANZAPINE 5 MG/1
5 TABLET ORAL ONCE
Status: CANCELLED | OUTPATIENT
Start: 2022-07-28

## 2022-07-28 RX ORDER — DOXORUBICIN HYDROCHLORIDE 2 MG/ML
100 INJECTION, SOLUTION INTRAVENOUS ONCE
Status: COMPLETED | OUTPATIENT
Start: 2022-07-28 | End: 2022-07-28

## 2022-07-28 RX ORDER — POTASSIUM CHLORIDE 750 MG/1
40 CAPSULE, EXTENDED RELEASE ORAL ONCE
Status: COMPLETED | OUTPATIENT
Start: 2022-07-28 | End: 2022-07-28

## 2022-07-28 RX ORDER — SODIUM CHLORIDE 0.9 % (FLUSH) 0.9 %
10 SYRINGE (ML) INJECTION AS NEEDED
Status: CANCELLED | OUTPATIENT
Start: 2022-07-28

## 2022-07-28 RX ORDER — DIPHENHYDRAMINE HYDROCHLORIDE 50 MG/ML
50 INJECTION INTRAMUSCULAR; INTRAVENOUS AS NEEDED
Status: DISCONTINUED | OUTPATIENT
Start: 2022-07-28 | End: 2022-07-28 | Stop reason: HOSPADM

## 2022-07-28 RX ORDER — SODIUM CHLORIDE 0.9 % (FLUSH) 0.9 %
10 SYRINGE (ML) INJECTION AS NEEDED
Status: DISCONTINUED | OUTPATIENT
Start: 2022-07-28 | End: 2022-07-28 | Stop reason: HOSPADM

## 2022-07-28 RX ORDER — SODIUM CHLORIDE 0.9 % (FLUSH) 0.9 %
20 SYRINGE (ML) INJECTION AS NEEDED
Status: CANCELLED | OUTPATIENT
Start: 2022-07-28

## 2022-07-28 RX ORDER — OLANZAPINE 5 MG/1
5 TABLET ORAL ONCE
Status: COMPLETED | OUTPATIENT
Start: 2022-07-28 | End: 2022-07-28

## 2022-07-28 RX ORDER — LOSARTAN POTASSIUM 100 MG/1
1 TABLET ORAL DAILY
COMMUNITY
Start: 2022-07-25 | End: 2023-01-22

## 2022-07-28 RX ORDER — FAMOTIDINE 10 MG/ML
20 INJECTION, SOLUTION INTRAVENOUS AS NEEDED
Status: CANCELLED | OUTPATIENT
Start: 2022-07-28

## 2022-07-28 RX ORDER — BUSPIRONE HYDROCHLORIDE 30 MG/1
1 TABLET ORAL 2 TIMES DAILY
COMMUNITY
Start: 2022-07-25 | End: 2022-10-26

## 2022-07-28 RX ADMIN — VINCRISTINE SULFATE 1.5 MG: 1 INJECTION, SOLUTION INTRAVENOUS at 14:58

## 2022-07-28 RX ADMIN — PALONOSETRON 0.25 MG: 0.05 INJECTION, SOLUTION INTRAVENOUS at 08:50

## 2022-07-28 RX ADMIN — ACETAMINOPHEN 650 MG: 325 TABLET, FILM COATED ORAL at 08:46

## 2022-07-28 RX ADMIN — PEGFILGRASTIM 6 MG: KIT SUBCUTANEOUS at 16:13

## 2022-07-28 RX ADMIN — DIPHENHYDRAMINE HYDROCHLORIDE 50 MG: 50 INJECTION, SOLUTION INTRAMUSCULAR; INTRAVENOUS at 08:59

## 2022-07-28 RX ADMIN — DEXAMETHASONE SODIUM PHOSPHATE 12 MG: 10 INJECTION, SOLUTION INTRAMUSCULAR; INTRAVENOUS at 13:40

## 2022-07-28 RX ADMIN — POTASSIUM CHLORIDE 40 MEQ: 750 CAPSULE, EXTENDED RELEASE ORAL at 08:47

## 2022-07-28 RX ADMIN — OLANZAPINE 5 MG: 5 TABLET, FILM COATED ORAL at 08:46

## 2022-07-28 RX ADMIN — RITUXIMAB 770 MG: 10 INJECTION, SOLUTION INTRAVENOUS at 09:53

## 2022-07-28 RX ADMIN — DOXORUBICIN HYDROCHLORIDE 100 MG: 2 INJECTION, SOLUTION INTRAVENOUS at 14:33

## 2022-07-28 RX ADMIN — CYCLOPHOSPHAMIDE 1530 MG: 200 INJECTION, SOLUTION INTRAVENOUS at 15:27

## 2022-07-28 RX ADMIN — SODIUM CHLORIDE 250 ML: 9 INJECTION, SOLUTION INTRAVENOUS at 08:45

## 2022-07-28 RX ADMIN — Medication 10 ML: at 16:18

## 2022-07-28 RX ADMIN — HEPARIN 500 UNITS: 100 SYRINGE at 16:18

## 2022-07-28 RX ADMIN — FOSAPREPITANT 100 ML: 150 INJECTION, POWDER, LYOPHILIZED, FOR SOLUTION INTRAVENOUS at 12:50

## 2022-07-28 NOTE — PROGRESS NOTES
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  Patient is here to get cycle 3 of R-CHOP today.  Complains of chronic diarrhea.  Complains of worsening neuropathy affecting fingertips since cycle 1.  Denies any fever.  Denies any drenching night sweats.  Denies any new lymph node worsening vaginal bleeding.            Oncology history:    1.  Recurrent follicular lymphoma grade 1:  -Patient initially diagnosed in April 2015 for which patient underwent palliative radiation treatment.  -Second relapse happened in September 2016 for again patient had a radiation treatment.  - Patient was found to have enlarged aortocaval lymph node in April 2017 for which patient initially received rituximab every 2 months without any significant improvement.  -Subsequently patient received 4 cycles of bendamustine and rituximab between April 30, 2018 and August 21, 2018.  -Patient was diagnosed with involvement of left parotid gland on biopsy done on August 27, 2020 by Dr. Ibrahim.  PET/CT done shows uptake in bilateral parotid gland along with left sided neck lymph node involvement.  -Patient was started on Revlimid with rituximab on September 22, 2020.  Patient completed cycle 12 on 8/23/2021.  -Patient received 3 cycles of rituximab maintenance between September 8, 2021 and February 28, 2022  - CT of chest abdomen and pelvis with contrast on May  17, 2022 showed new onset of chest wall mass on right side along with pelvic mass.  - Ultrasound-guided biopsy showed large B-cell lymphoma in background of follicular lymphoma consistent with transformation  - PET/CT shows SUV of 12 in the region of right chest wall mass without any other abnormal uptake  -FISH for Bcl-2, BCL6 and c-Myc was negative.  - Patient was started on cycle 1 of R-CHOP on Raquel 15, 2022.             Past Medical History, Past Surgical History, Social History, Family History have been reviewed and are without significant changes except as mentioned.    Review of Systems   A comprehensive 14 point review of systems was performed and was negative except as mentioned in HPI.    Medications:  The current medication list was reviewed in the EMR    ALLERGIES:    Allergies   Allergen Reactions   • Ace Inhibitors Cough   • Latex      BLISTER     • Morphine And Related Nausea And Vomiting   • Lortab [Hydrocodone-Acetaminophen] Palpitations       Objective      Vitals:    07/28/22 0811   BP: 130/65   Pulse: 93   Temp: 97.1 °F (36.2 °C)   TempSrc: Temporal   SpO2: 93%   Weight: 101 kg (223 lb 9.6 oz)   PainSc: 0-No pain     Current Status 7/7/2022   ECOG score 1       Physical Exam  Cardiovascular:      Comments: Port-A-Cath present  Pulmonary:      Breath sounds: Normal breath sounds.   Neurological:      Mental Status: She is alert and oriented to person, place, and time.           RECENT LABS:  Glucose   Date Value Ref Range Status   07/28/2022 153 (H) 65 - 99 mg/dL Final     Sodium   Date Value Ref Range Status   07/28/2022 135 (L) 136 - 145 mmol/L Final     Potassium   Date Value Ref Range Status   07/28/2022 3.2 (L) 3.5 - 5.2 mmol/L Final     Comment:     Slight hemolysis detected by analyzer. Results may be affected.     CO2   Date Value Ref Range Status   07/28/2022 32.0 (H) 22.0 - 29.0 mmol/L Final     Chloride   Date Value Ref Range Status   07/28/2022 92 (L) 98 - 107 mmol/L Final     Anion  Gap   Date Value Ref Range Status   07/28/2022 11.0 5.0 - 15.0 mmol/L Final     Creatinine   Date Value Ref Range Status   07/28/2022 0.61 0.57 - 1.00 mg/dL Final     BUN   Date Value Ref Range Status   07/28/2022 8 8 - 23 mg/dL Final     BUN/Creatinine Ratio   Date Value Ref Range Status   07/28/2022 13.1 7.0 - 25.0 Final     Calcium   Date Value Ref Range Status   07/28/2022 9.6 8.6 - 10.5 mg/dL Final     eGFR Non  Amer   Date Value Ref Range Status   12/06/2021 81 >60 mL/min/1.73 Final     Alkaline Phosphatase   Date Value Ref Range Status   07/28/2022 75 39 - 117 U/L Final     Total Protein   Date Value Ref Range Status   07/28/2022 6.6 6.0 - 8.5 g/dL Final     ALT (SGPT)   Date Value Ref Range Status   07/28/2022 36 (H) 1 - 33 U/L Final     AST (SGOT)   Date Value Ref Range Status   07/28/2022 34 (H) 1 - 32 U/L Final     Total Bilirubin   Date Value Ref Range Status   07/28/2022 0.3 0.0 - 1.2 mg/dL Final     Albumin   Date Value Ref Range Status   07/28/2022 4.00 3.50 - 5.20 g/dL Final     Globulin   Date Value Ref Range Status   07/28/2022 2.6 gm/dL Final     Lab Results   Component Value Date    WBC 5.65 07/28/2022    HGB 13.0 07/28/2022    HCT 37.1 07/28/2022    MCV 91.8 07/28/2022     07/28/2022     Lab Results   Component Value Date    NEUTROABS 3.97 07/28/2022           PATHOLOGY:  * Cannot find OR log *         RADIOLOGY DATA :  No radiology results for the last 7 days        Assessment & Plan     1.  Diffuse large B-cell lymphoma in background of follicular lymphoma consistent with transformation.  - FISH for Bcl-2, BCL6 and c-Myc was negative  - 2D echo done on June 9, 2022 showed normal ejection fraction at 53.8%  - We will repeat 2D echo after cycle 4 of R-CHOP  - Patient is currently on R-CHOP since Raquel 15, 2022  - Dose of vincristine has been decreased by 25% due to development of.  More neuropathy after cycle 1 of R-CHOP  - We will proceed with cycle 3 of chemotherapy today.  -  Patient will be monitored closely for any chemotherapy related side effect  - Patient will return to clinic in 3 weeks with repeat CBC CMP to be done on that day  - We will get restaging PET/CT done prior to next clinic visit in 3 weeks.  Recommendation were discussed with patient and her family    2.  Recurrent follicular lymphoma  - We will oncology history for prior treatment details  - Patient is currently on treatment with R-CHOP for transformation into large cell lymphoma.    3.  Elevated liver function test:  - Secondary to fatty liver plus diabetes mellitus  - We will monitor with CMP    4.  Pelvic mass  - PET/CT did not show any uptake in pelvic mass.  We will follow-up pelvic mass on PET/CT to be done in 3 weeks.    5.  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    6.  Health maintenance: Patient does not smoke.  Had a colonoscopy in November 2021 by Dr Cruz    7. Advance Care Planning: For now patient remains full code and is able to make decisions.  Patient has health care surrogate mentioned on chart.    8.  Hypokalemia:  - Potassium is 3.2.  We will provided with potassium 40 mEq p.o. x1 here.  - Prescription for potassium 20 mEq daily for 2 days has been sent to her pharmacy today.             PHQ-9 Total Score: 0   -Patient is not homicidal or suicidal.  No acute intervention required.    Chasity Leon reports a pain score of 0.  Given her pain assessment as noted, treatment options were discussed and the following options were decided upon as a follow-up plan to address the patient's pain: continuation of current treatment plan for pain.         Tino Sood MD  7/28/2022  08:30 CDT        Part of this note may be an electronic transcription/translation of spoken language to printed text using the Dragon Dictation System.          CC:

## 2022-08-12 ENCOUNTER — TELEPHONE (OUTPATIENT)
Dept: ONCOLOGY | Facility: CLINIC | Age: 62
End: 2022-08-12

## 2022-08-12 NOTE — TELEPHONE ENCOUNTER
Per patient she found out today she has shingles. She was supposed to have port flush before pet scan on 8/15. She also had a follow up for t.r and infusion on 8/18. Radiology has told patient to ask us when dr cabrera says she can reschedule these appointments. Please call patient at [phone]

## 2022-08-12 NOTE — TELEPHONE ENCOUNTER
----- Message from Chasity Leon sent at 8/12/2022  3:55 PM CDT -----  Regarding: Shingles   Ok Zoe

## 2022-08-15 ENCOUNTER — APPOINTMENT (OUTPATIENT)
Dept: ONCOLOGY | Facility: HOSPITAL | Age: 62
End: 2022-08-15

## 2022-08-15 ENCOUNTER — APPOINTMENT (OUTPATIENT)
Dept: PET IMAGING | Facility: HOSPITAL | Age: 62
End: 2022-08-15

## 2022-08-18 ENCOUNTER — APPOINTMENT (OUTPATIENT)
Dept: ONCOLOGY | Facility: CLINIC | Age: 62
End: 2022-08-18

## 2022-08-18 ENCOUNTER — APPOINTMENT (OUTPATIENT)
Dept: ONCOLOGY | Facility: HOSPITAL | Age: 62
End: 2022-08-18

## 2022-08-20 ENCOUNTER — APPOINTMENT (OUTPATIENT)
Dept: PET IMAGING | Facility: HOSPITAL | Age: 62
End: 2022-08-20

## 2022-08-22 ENCOUNTER — APPOINTMENT (OUTPATIENT)
Dept: PET IMAGING | Facility: HOSPITAL | Age: 62
End: 2022-08-22

## 2022-08-25 ENCOUNTER — APPOINTMENT (OUTPATIENT)
Dept: ONCOLOGY | Facility: HOSPITAL | Age: 62
End: 2022-08-25

## 2022-08-29 ENCOUNTER — HOSPITAL ENCOUNTER (OUTPATIENT)
Dept: PET IMAGING | Facility: HOSPITAL | Age: 62
Discharge: HOME | End: 2022-08-29
Admitting: INTERNAL MEDICINE

## 2022-08-29 ENCOUNTER — INFUSION (OUTPATIENT)
Dept: ONCOLOGY | Facility: HOSPITAL | Age: 62
End: 2022-08-29

## 2022-08-29 DIAGNOSIS — K11.8 MASS OF BOTH PAROTID GLANDS: Chronic | ICD-10-CM

## 2022-08-29 DIAGNOSIS — C82.08 GRADE 1 FOLLICULAR LYMPHOMA OF LYMPH NODES OF MULTIPLE REGIONS: Chronic | ICD-10-CM

## 2022-08-29 DIAGNOSIS — Z45.2 ENCOUNTER FOR VENOUS ACCESS DEVICE CARE: Primary | ICD-10-CM

## 2022-08-29 DIAGNOSIS — C83.39 DIFFUSE LARGE B-CELL LYMPHOMA OF EXTRANODAL SITE EXCLUDING SPLEEN AND OTHER SOLID ORGANS: Chronic | ICD-10-CM

## 2022-08-29 PROCEDURE — 0 FLUDEOXYGLUCOSE F18 SOLUTION: Performed by: INTERNAL MEDICINE

## 2022-08-29 PROCEDURE — 25010000002 HEPARIN LOCK FLUSH PER 10 UNITS: Performed by: INTERNAL MEDICINE

## 2022-08-29 PROCEDURE — A9552 F18 FDG: HCPCS | Performed by: INTERNAL MEDICINE

## 2022-08-29 PROCEDURE — 78815 PET IMAGE W/CT SKULL-THIGH: CPT

## 2022-08-29 PROCEDURE — 96523 IRRIG DRUG DELIVERY DEVICE: CPT | Performed by: INTERNAL MEDICINE

## 2022-08-29 RX ORDER — HEPARIN SODIUM (PORCINE) LOCK FLUSH IV SOLN 100 UNIT/ML 100 UNIT/ML
500 SOLUTION INTRAVENOUS AS NEEDED
Status: CANCELLED | OUTPATIENT
Start: 2022-09-01

## 2022-08-29 RX ORDER — HEPARIN SODIUM (PORCINE) LOCK FLUSH IV SOLN 100 UNIT/ML 100 UNIT/ML
500 SOLUTION INTRAVENOUS AS NEEDED
Status: DISCONTINUED | OUTPATIENT
Start: 2022-08-29 | End: 2022-08-29 | Stop reason: HOSPADM

## 2022-08-29 RX ORDER — SODIUM CHLORIDE 0.9 % (FLUSH) 0.9 %
20 SYRINGE (ML) INJECTION AS NEEDED
Status: CANCELLED | OUTPATIENT
Start: 2022-08-29

## 2022-08-29 RX ORDER — SODIUM CHLORIDE 0.9 % (FLUSH) 0.9 %
10 SYRINGE (ML) INJECTION AS NEEDED
Status: CANCELLED | OUTPATIENT
Start: 2022-08-29

## 2022-08-29 RX ADMIN — HEPARIN 500 UNITS: 100 SYRINGE at 12:19

## 2022-08-29 RX ADMIN — SODIUM FLUORIDE F 18 1 DOSE: 200 INJECTION, SOLUTION INTRAVENOUS at 12:15

## 2022-08-30 DIAGNOSIS — C83.39 DIFFUSE LARGE B-CELL LYMPHOMA OF EXTRANODAL SITE EXCLUDING SPLEEN AND OTHER SOLID ORGANS: Primary | ICD-10-CM

## 2022-09-01 ENCOUNTER — INFUSION (OUTPATIENT)
Dept: ONCOLOGY | Facility: HOSPITAL | Age: 62
End: 2022-09-01

## 2022-09-01 ENCOUNTER — OFFICE VISIT (OUTPATIENT)
Dept: ONCOLOGY | Facility: CLINIC | Age: 62
End: 2022-09-01

## 2022-09-01 VITALS
DIASTOLIC BLOOD PRESSURE: 69 MMHG | OXYGEN SATURATION: 94 % | HEART RATE: 63 BPM | TEMPERATURE: 97.1 F | BODY MASS INDEX: 38.33 KG/M2 | WEIGHT: 223.4 LBS | SYSTOLIC BLOOD PRESSURE: 136 MMHG

## 2022-09-01 DIAGNOSIS — C83.39 DIFFUSE LARGE B-CELL LYMPHOMA OF EXTRANODAL SITE EXCLUDING SPLEEN AND OTHER SOLID ORGANS: Primary | Chronic | ICD-10-CM

## 2022-09-01 DIAGNOSIS — Z45.2 ENCOUNTER FOR VENOUS ACCESS DEVICE CARE: ICD-10-CM

## 2022-09-01 DIAGNOSIS — G62.0 NEUROPATHY DUE TO CHEMOTHERAPEUTIC DRUG: Chronic | ICD-10-CM

## 2022-09-01 DIAGNOSIS — C82.08 GRADE 1 FOLLICULAR LYMPHOMA OF LYMPH NODES OF MULTIPLE REGIONS: Chronic | ICD-10-CM

## 2022-09-01 DIAGNOSIS — T45.1X5A NEUROPATHY DUE TO CHEMOTHERAPEUTIC DRUG: Chronic | ICD-10-CM

## 2022-09-01 DIAGNOSIS — C83.39 DIFFUSE LARGE B-CELL LYMPHOMA OF EXTRANODAL SITE EXCLUDING SPLEEN AND OTHER SOLID ORGANS: Primary | ICD-10-CM

## 2022-09-01 DIAGNOSIS — R79.89 ELEVATED LFTS: Chronic | ICD-10-CM

## 2022-09-01 DIAGNOSIS — E87.6 HYPOKALEMIA: ICD-10-CM

## 2022-09-01 DIAGNOSIS — C82.08 GRADE 1 FOLLICULAR LYMPHOMA OF LYMPH NODES OF MULTIPLE REGIONS: ICD-10-CM

## 2022-09-01 LAB
ALBUMIN SERPL-MCNC: 4.1 G/DL (ref 3.5–5.2)
ALBUMIN/GLOB SERPL: 1.4 G/DL
ALP SERPL-CCNC: 67 U/L (ref 39–117)
ALT SERPL W P-5'-P-CCNC: 59 U/L (ref 1–33)
ANION GAP SERPL CALCULATED.3IONS-SCNC: 10 MMOL/L (ref 5–15)
AST SERPL-CCNC: 41 U/L (ref 1–32)
BASOPHILS # BLD AUTO: 0.06 10*3/MM3 (ref 0–0.2)
BASOPHILS NFR BLD AUTO: 0.9 % (ref 0–1.5)
BILIRUB SERPL-MCNC: 0.4 MG/DL (ref 0–1.2)
BUN SERPL-MCNC: 7 MG/DL (ref 8–23)
BUN/CREAT SERPL: 10 (ref 7–25)
CALCIUM SPEC-SCNC: 10 MG/DL (ref 8.6–10.5)
CHLORIDE SERPL-SCNC: 94 MMOL/L (ref 98–107)
CO2 SERPL-SCNC: 31 MMOL/L (ref 22–29)
CREAT SERPL-MCNC: 0.7 MG/DL (ref 0.57–1)
DEPRECATED RDW RBC AUTO: 56.2 FL (ref 37–54)
EGFRCR SERPLBLD CKD-EPI 2021: 97.9 ML/MIN/1.73
EOSINOPHIL # BLD AUTO: 0 10*3/MM3 (ref 0–0.4)
EOSINOPHIL NFR BLD AUTO: 0 % (ref 0.3–6.2)
ERYTHROCYTE [DISTWIDTH] IN BLOOD BY AUTOMATED COUNT: 16.7 % (ref 12.3–15.4)
GLOBULIN UR ELPH-MCNC: 2.9 GM/DL
GLUCOSE SERPL-MCNC: 152 MG/DL (ref 65–99)
HCT VFR BLD AUTO: 38.9 % (ref 34–46.6)
HGB BLD-MCNC: 14.1 G/DL (ref 12–15.9)
HOLD SPECIMEN: NORMAL
IMM GRANULOCYTES # BLD AUTO: 0.05 10*3/MM3 (ref 0–0.05)
IMM GRANULOCYTES NFR BLD AUTO: 0.8 % (ref 0–0.5)
LYMPHOCYTES # BLD AUTO: 0.59 10*3/MM3 (ref 0.7–3.1)
LYMPHOCYTES NFR BLD AUTO: 9.2 % (ref 19.6–45.3)
MCH RBC QN AUTO: 33.7 PG (ref 26.6–33)
MCHC RBC AUTO-ENTMCNC: 36.2 G/DL (ref 31.5–35.7)
MCV RBC AUTO: 92.8 FL (ref 79–97)
MONOCYTES # BLD AUTO: 0.76 10*3/MM3 (ref 0.1–0.9)
MONOCYTES NFR BLD AUTO: 11.8 % (ref 5–12)
NEUTROPHILS NFR BLD AUTO: 4.96 10*3/MM3 (ref 1.7–7)
NEUTROPHILS NFR BLD AUTO: 77.3 % (ref 42.7–76)
NRBC BLD AUTO-RTO: 0 /100 WBC (ref 0–0.2)
PLATELET # BLD AUTO: 344 10*3/MM3 (ref 140–450)
PMV BLD AUTO: 8.4 FL (ref 6–12)
POTASSIUM SERPL-SCNC: 3.3 MMOL/L (ref 3.5–5.2)
PROT SERPL-MCNC: 7 G/DL (ref 6–8.5)
RBC # BLD AUTO: 4.19 10*6/MM3 (ref 3.77–5.28)
SODIUM SERPL-SCNC: 135 MMOL/L (ref 136–145)
WBC NRBC COR # BLD: 6.42 10*3/MM3 (ref 3.4–10.8)

## 2022-09-01 PROCEDURE — 96411 CHEMO IV PUSH ADDL DRUG: CPT | Performed by: INTERNAL MEDICINE

## 2022-09-01 PROCEDURE — 25010000002 PEGFILGRASTIM 6 MG/0.6ML PREFILLED SYRINGE KIT: Performed by: INTERNAL MEDICINE

## 2022-09-01 PROCEDURE — 80053 COMPREHEN METABOLIC PANEL: CPT

## 2022-09-01 PROCEDURE — 96375 TX/PRO/DX INJ NEW DRUG ADDON: CPT | Performed by: INTERNAL MEDICINE

## 2022-09-01 PROCEDURE — 25010000002 ALTEPLASE 2 MG RECONSTITUTED SOLUTION: Performed by: INTERNAL MEDICINE

## 2022-09-01 PROCEDURE — 96415 CHEMO IV INFUSION ADDL HR: CPT | Performed by: INTERNAL MEDICINE

## 2022-09-01 PROCEDURE — 99214 OFFICE O/P EST MOD 30 MIN: CPT | Performed by: INTERNAL MEDICINE

## 2022-09-01 PROCEDURE — 25010000002 DOXORUBICIN PER 10 MG: Performed by: INTERNAL MEDICINE

## 2022-09-01 PROCEDURE — 25010000002 CYCLOPHOSPHAMIDE 1 GM/5ML SOLUTION 5 ML VIAL: Performed by: INTERNAL MEDICINE

## 2022-09-01 PROCEDURE — 25010000002 VINCRISTINE PER 1 MG: Performed by: INTERNAL MEDICINE

## 2022-09-01 PROCEDURE — 96377 APPLICATON ON-BODY INJECTOR: CPT | Performed by: INTERNAL MEDICINE

## 2022-09-01 PROCEDURE — 36415 COLL VENOUS BLD VENIPUNCTURE: CPT

## 2022-09-01 PROCEDURE — 96413 CHEMO IV INFUSION 1 HR: CPT | Performed by: INTERNAL MEDICINE

## 2022-09-01 PROCEDURE — 25010000002 RITUXIMAB 100 MG/10ML SOLUTION 10 ML VIAL: Performed by: INTERNAL MEDICINE

## 2022-09-01 PROCEDURE — 25010000002 FOSAPREPITANT PER 1 MG: Performed by: INTERNAL MEDICINE

## 2022-09-01 PROCEDURE — 25010000002 HEPARIN LOCK FLUSH PER 10 UNITS: Performed by: INTERNAL MEDICINE

## 2022-09-01 PROCEDURE — 25010000002 DEXAMETHASONE SODIUM PHOSPHATE 100 MG/10ML SOLUTION: Performed by: INTERNAL MEDICINE

## 2022-09-01 PROCEDURE — 1126F AMNT PAIN NOTED NONE PRSNT: CPT | Performed by: INTERNAL MEDICINE

## 2022-09-01 PROCEDURE — 25010000002 DIPHENHYDRAMINE PER 50 MG: Performed by: INTERNAL MEDICINE

## 2022-09-01 PROCEDURE — 96417 CHEMO IV INFUS EACH ADDL SEQ: CPT | Performed by: INTERNAL MEDICINE

## 2022-09-01 PROCEDURE — 36593 DECLOT VASCULAR DEVICE: CPT | Performed by: INTERNAL MEDICINE

## 2022-09-01 PROCEDURE — 25010000002 RITUXIMAB 500 MG/50ML SOLUTION 50 ML VIAL: Performed by: INTERNAL MEDICINE

## 2022-09-01 PROCEDURE — 85025 COMPLETE CBC W/AUTO DIFF WBC: CPT

## 2022-09-01 PROCEDURE — 1123F ACP DISCUSS/DSCN MKR DOCD: CPT | Performed by: INTERNAL MEDICINE

## 2022-09-01 PROCEDURE — 96367 TX/PROPH/DG ADDL SEQ IV INF: CPT | Performed by: INTERNAL MEDICINE

## 2022-09-01 PROCEDURE — 25010000002 PALONOSETRON PER 25 MCG: Performed by: INTERNAL MEDICINE

## 2022-09-01 RX ORDER — MEPERIDINE HYDROCHLORIDE 25 MG/ML
25 INJECTION INTRAMUSCULAR; INTRAVENOUS; SUBCUTANEOUS
Status: DISCONTINUED | OUTPATIENT
Start: 2022-09-01 | End: 2022-09-01 | Stop reason: HOSPADM

## 2022-09-01 RX ORDER — FAMOTIDINE 10 MG/ML
20 INJECTION, SOLUTION INTRAVENOUS AS NEEDED
Status: DISCONTINUED | OUTPATIENT
Start: 2022-09-01 | End: 2022-09-01 | Stop reason: HOSPADM

## 2022-09-01 RX ORDER — POTASSIUM CHLORIDE 750 MG/1
40 CAPSULE, EXTENDED RELEASE ORAL ONCE
Status: CANCELLED
Start: 2022-09-01 | End: 2022-09-01

## 2022-09-01 RX ORDER — OLANZAPINE 5 MG/1
5 TABLET ORAL ONCE
Status: CANCELLED | OUTPATIENT
Start: 2022-09-01

## 2022-09-01 RX ORDER — ACETAMINOPHEN 325 MG/1
650 TABLET ORAL ONCE
Status: CANCELLED | OUTPATIENT
Start: 2022-09-01

## 2022-09-01 RX ORDER — SODIUM CHLORIDE 0.9 % (FLUSH) 0.9 %
20 SYRINGE (ML) INJECTION AS NEEDED
Status: CANCELLED | OUTPATIENT
Start: 2022-09-01

## 2022-09-01 RX ORDER — DIPHENHYDRAMINE HYDROCHLORIDE 50 MG/ML
50 INJECTION INTRAMUSCULAR; INTRAVENOUS AS NEEDED
Status: CANCELLED | OUTPATIENT
Start: 2022-09-01

## 2022-09-01 RX ORDER — SODIUM CHLORIDE 0.9 % (FLUSH) 0.9 %
10 SYRINGE (ML) INJECTION AS NEEDED
Status: CANCELLED | OUTPATIENT
Start: 2022-09-01

## 2022-09-01 RX ORDER — MEPERIDINE HYDROCHLORIDE 25 MG/ML
25 INJECTION INTRAMUSCULAR; INTRAVENOUS; SUBCUTANEOUS
Status: CANCELLED | OUTPATIENT
Start: 2022-09-01

## 2022-09-01 RX ORDER — SODIUM CHLORIDE 9 MG/ML
250 INJECTION, SOLUTION INTRAVENOUS ONCE
Status: CANCELLED | OUTPATIENT
Start: 2022-09-01

## 2022-09-01 RX ORDER — OXYCODONE AND ACETAMINOPHEN 10; 325 MG/1; MG/1
1 TABLET ORAL
COMMUNITY
Start: 2022-08-01

## 2022-09-01 RX ORDER — PALONOSETRON 0.05 MG/ML
0.25 INJECTION, SOLUTION INTRAVENOUS ONCE
Status: CANCELLED | OUTPATIENT
Start: 2022-09-01

## 2022-09-01 RX ORDER — FAMOTIDINE 10 MG/ML
20 INJECTION, SOLUTION INTRAVENOUS AS NEEDED
Status: CANCELLED | OUTPATIENT
Start: 2022-09-01

## 2022-09-01 RX ORDER — DIPHENHYDRAMINE HYDROCHLORIDE 50 MG/ML
50 INJECTION INTRAMUSCULAR; INTRAVENOUS AS NEEDED
Status: DISCONTINUED | OUTPATIENT
Start: 2022-09-01 | End: 2022-09-01 | Stop reason: HOSPADM

## 2022-09-01 RX ORDER — ACETAMINOPHEN 325 MG/1
650 TABLET ORAL ONCE
Status: COMPLETED | OUTPATIENT
Start: 2022-09-01 | End: 2022-09-01

## 2022-09-01 RX ORDER — DOXORUBICIN HYDROCHLORIDE 2 MG/ML
100 INJECTION, SOLUTION INTRAVENOUS ONCE
Status: COMPLETED | OUTPATIENT
Start: 2022-09-01 | End: 2022-09-01

## 2022-09-01 RX ORDER — OLANZAPINE 5 MG/1
5 TABLET ORAL ONCE
Status: COMPLETED | OUTPATIENT
Start: 2022-09-01 | End: 2022-09-01

## 2022-09-01 RX ORDER — DOXORUBICIN HYDROCHLORIDE 2 MG/ML
50 INJECTION, SOLUTION INTRAVENOUS ONCE
Status: CANCELLED | OUTPATIENT
Start: 2022-09-01

## 2022-09-01 RX ORDER — SODIUM CHLORIDE 0.9 % (FLUSH) 0.9 %
10 SYRINGE (ML) INJECTION AS NEEDED
Status: DISCONTINUED | OUTPATIENT
Start: 2022-09-01 | End: 2022-09-01 | Stop reason: HOSPADM

## 2022-09-01 RX ORDER — SODIUM CHLORIDE 9 MG/ML
250 INJECTION, SOLUTION INTRAVENOUS ONCE
Status: COMPLETED | OUTPATIENT
Start: 2022-09-01 | End: 2022-09-01

## 2022-09-01 RX ORDER — GABAPENTIN 100 MG/1
100-200 CAPSULE ORAL 3 TIMES DAILY
COMMUNITY
Start: 2022-08-18 | End: 2022-09-18

## 2022-09-01 RX ORDER — HEPARIN SODIUM (PORCINE) LOCK FLUSH IV SOLN 100 UNIT/ML 100 UNIT/ML
500 SOLUTION INTRAVENOUS AS NEEDED
Status: DISCONTINUED | OUTPATIENT
Start: 2022-09-01 | End: 2022-09-01 | Stop reason: HOSPADM

## 2022-09-01 RX ORDER — HEPARIN SODIUM (PORCINE) LOCK FLUSH IV SOLN 100 UNIT/ML 100 UNIT/ML
500 SOLUTION INTRAVENOUS AS NEEDED
Status: CANCELLED | OUTPATIENT
Start: 2022-09-22

## 2022-09-01 RX ORDER — PALONOSETRON 0.05 MG/ML
0.25 INJECTION, SOLUTION INTRAVENOUS ONCE
Status: COMPLETED | OUTPATIENT
Start: 2022-09-01 | End: 2022-09-01

## 2022-09-01 RX ORDER — POTASSIUM CHLORIDE 750 MG/1
40 CAPSULE, EXTENDED RELEASE ORAL ONCE
Status: COMPLETED | OUTPATIENT
Start: 2022-09-01 | End: 2022-09-01

## 2022-09-01 RX ADMIN — ACETAMINOPHEN 650 MG: 325 TABLET, FILM COATED ORAL at 10:33

## 2022-09-01 RX ADMIN — ALTEPLASE: 2.2 INJECTION, POWDER, LYOPHILIZED, FOR SOLUTION INTRAVENOUS at 09:09

## 2022-09-01 RX ADMIN — VINCRISTINE SULFATE 1.5 MG: 1 INJECTION, SOLUTION INTRAVENOUS at 15:46

## 2022-09-01 RX ADMIN — PALONOSETRON 0.25 MG: 0.05 INJECTION, SOLUTION INTRAVENOUS at 10:36

## 2022-09-01 RX ADMIN — DOXORUBICIN HYDROCHLORIDE 100 MG: 2 INJECTION, SOLUTION INTRAVENOUS at 15:30

## 2022-09-01 RX ADMIN — OLANZAPINE 5 MG: 5 TABLET, FILM COATED ORAL at 14:14

## 2022-09-01 RX ADMIN — DEXAMETHASONE SODIUM PHOSPHATE 12 MG: 10 INJECTION, SOLUTION INTRAMUSCULAR; INTRAVENOUS at 15:01

## 2022-09-01 RX ADMIN — SODIUM CHLORIDE 250 ML: 9 INJECTION, SOLUTION INTRAVENOUS at 10:35

## 2022-09-01 RX ADMIN — PEGFILGRASTIM 6 MG: KIT SUBCUTANEOUS at 16:44

## 2022-09-01 RX ADMIN — FOSAPREPITANT 100 ML: 150 INJECTION, POWDER, LYOPHILIZED, FOR SOLUTION INTRAVENOUS at 14:20

## 2022-09-01 RX ADMIN — CYCLOPHOSPHAMIDE 1530 MG: 200 INJECTION, SOLUTION INTRAVENOUS at 16:05

## 2022-09-01 RX ADMIN — RITUXIMAB 770 MG: 10 INJECTION, SOLUTION INTRAVENOUS at 11:32

## 2022-09-01 RX ADMIN — Medication 10 ML: at 16:43

## 2022-09-01 RX ADMIN — DIPHENHYDRAMINE HYDROCHLORIDE 50 MG: 50 INJECTION, SOLUTION INTRAMUSCULAR; INTRAVENOUS at 11:01

## 2022-09-01 RX ADMIN — HEPARIN 500 UNITS: 100 SYRINGE at 16:43

## 2022-09-01 RX ADMIN — POTASSIUM CHLORIDE 40 MEQ: 750 CAPSULE, EXTENDED RELEASE ORAL at 10:33

## 2022-09-01 NOTE — PROGRESS NOTES
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  Patient is here to get cycle 4 of R-CHOP today.  Chemotherapy has been delayed this time secondary to development of shingles.  Patient had a PET/CT done earlier this week, she is here to discuss results and further recommendation.  Complains of chest wall pain due to shingles.  Denies any worsening neuropathy affecting fingertips.  Denies any drenching night sweats.  Denies any new lymph node enlargement.            Oncology history:    1.  Recurrent follicular lymphoma grade 1:  -Patient initially diagnosed in April 2015 for which patient underwent palliative radiation treatment.  -Second relapse happened in September 2016 for again patient had a radiation treatment.  - Patient was found to have enlarged aortocaval lymph node in April 2017 for which patient initially received rituximab every 2 months without any significant improvement.  -Subsequently patient received 4 cycles of bendamustine and rituximab between April 30, 2018 and August 21, 2018.  -Patient was diagnosed with involvement of left parotid gland on biopsy done on August 27, 2020 by Dr. Ibrahim.  PET/CT done shows uptake in bilateral parotid gland along with left sided neck lymph node involvement.  -Patient was started on Revlimid with rituximab on September 22, 2020.  Patient completed cycle 12 on 8/23/2021.  -Patient  received 3 cycles of rituximab maintenance between September 8, 2021 and February 28, 2022  - CT of chest abdomen and pelvis with contrast on May 17, 2022 showed new onset of chest wall mass on right side along with pelvic mass.  - Ultrasound-guided biopsy showed large B-cell lymphoma in background of follicular lymphoma consistent with transformation  - PET/CT shows SUV of 12 in the region of right chest wall mass without any other abnormal uptake  -FISH for Bcl-2, BCL6 and c-Myc was negative.  - Patient was started on cycle 1 of R-CHOP on Raquel 15, 2022.              Past Medical History, Past Surgical History, Social History, Family History have been reviewed and are without significant changes except as mentioned.    Review of Systems   A comprehensive 14 point review of systems was performed and was negative except as mentioned in HPI.    Medications:  The current medication list was reviewed in the EMR    ALLERGIES:    Allergies   Allergen Reactions   • Ace Inhibitors Cough   • Latex      BLISTER     • Morphine And Related Nausea And Vomiting   • Lortab [Hydrocodone-Acetaminophen] Palpitations       Objective      Vitals:    09/01/22 0826   BP: 136/69   Pulse: 63   Temp: 97.1 °F (36.2 °C)   TempSrc: Temporal   SpO2: 94%   Weight: 101 kg (223 lb 6.4 oz)   PainSc: 0-No pain     Current Status 9/1/2022   ECOG score 1       Physical Exam  Cardiovascular:      Comments: Port-A-Cath present  Pulmonary:      Breath sounds: Normal breath sounds.   Neurological:      Mental Status: She is alert and oriented to person, place, and time.           RECENT LABS:  Glucose   Date Value Ref Range Status   09/01/2022 152 (H) 65 - 99 mg/dL Final     Sodium   Date Value Ref Range Status   09/01/2022 135 (L) 136 - 145 mmol/L Final     Potassium   Date Value Ref Range Status   09/01/2022 3.3 (L) 3.5 - 5.2 mmol/L Final     CO2   Date Value Ref Range Status   09/01/2022 31.0 (H) 22.0 - 29.0 mmol/L Final     Chloride   Date Value Ref  Range Status   09/01/2022 94 (L) 98 - 107 mmol/L Final     Anion Gap   Date Value Ref Range Status   09/01/2022 10.0 5.0 - 15.0 mmol/L Final     Creatinine   Date Value Ref Range Status   09/01/2022 0.70 0.57 - 1.00 mg/dL Final     BUN   Date Value Ref Range Status   09/01/2022 7 (L) 8 - 23 mg/dL Final     BUN/Creatinine Ratio   Date Value Ref Range Status   09/01/2022 10.0 7.0 - 25.0 Final     Calcium   Date Value Ref Range Status   09/01/2022 10.0 8.6 - 10.5 mg/dL Final     eGFR Non  Amer   Date Value Ref Range Status   12/06/2021 81 >60 mL/min/1.73 Final     Alkaline Phosphatase   Date Value Ref Range Status   09/01/2022 67 39 - 117 U/L Final     Total Protein   Date Value Ref Range Status   09/01/2022 7.0 6.0 - 8.5 g/dL Final     ALT (SGPT)   Date Value Ref Range Status   09/01/2022 59 (H) 1 - 33 U/L Final     AST (SGOT)   Date Value Ref Range Status   09/01/2022 41 (H) 1 - 32 U/L Final     Total Bilirubin   Date Value Ref Range Status   09/01/2022 0.4 0.0 - 1.2 mg/dL Final     Albumin   Date Value Ref Range Status   09/01/2022 4.10 3.50 - 5.20 g/dL Final     Globulin   Date Value Ref Range Status   09/01/2022 2.9 gm/dL Final     Lab Results   Component Value Date    WBC 6.42 09/01/2022    HGB 14.1 09/01/2022    HCT 38.9 09/01/2022    MCV 92.8 09/01/2022     09/01/2022     Lab Results   Component Value Date    NEUTROABS 4.96 09/01/2022           PATHOLOGY:  * Cannot find OR log *         RADIOLOGY DATA :  NM PET/CT Skull Base to Mid Thigh    Result Date: 8/30/2022  CONCLUSION: 1.  Normal PET scan with no evidence of residual or recurrent disease. (Please note:  a Nuclear Medicine bone scan is considered more sensitive for the detection of osteoblastic metastatic disease). Electronically signed by:  Benito Matthews MD  8/30/2022 10:39 AM CDT Workstation: [id number]          Assessment & Plan     1.  Diffuse large B-cell lymphoma in background of follicular lymphoma consistent with Hernandez's  transformation  - FISH for Bcl-2, BCL6 and c-Myc was negative  - 2D echo on June 9, 2022 showed normal ejection fraction at 53.8%  - PET/CT done on August 29, 2022 shows resolution of abnormal uptake involving right chest wall mass.  Pelvic mass which did not have any uptake on previous PET/CT is also smaller in size without any uptake.  - We will proceed with cycle 4 of R-CHOP today.  - Dose of vincristine has been decreased with cycle 2 x 25% due to development of neuropathy  - Patient will return to clinic in 3 weeks with repeat CBC and CMP on that day.  - We will get 2D echo prior to next clinic visit in 3 weeks  - Plan is to repeat PET/CT after cycle 6 of R-CHOP which was discussed with patient and her family.    2.  Recurrent follicular lymphoma  - Review oncology history for prior treatment details  - Currently on treatment with R-CHOP.    3.  Elevated liver function test  Is secondary to fatty liver plus diabetes mellitus  - We will monitor with CMP    4.  Pelvic mass  - PET/CT did not have uptake on pelvic mass in May 2022  - Repeat PET/CT on August 29, 2022 shows pelvic mass has somewhat decreased in size without any uptake on PET/CT again.    5.  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    6.  Shingles  - Patient developed shingles about 3 weeks ago for which she is status posttreatment    7.  Health maintenance: Patient does not smoke.  Had a colonoscopy November 2021 by Dr Cruz    8. Advance Care Planning: For now patient remains full code and is able to make decisions.  Patient has health care surrogate mentioned on chart.    9.  Hypokalemia  - Potassium is 3.3.  Patient was provided with potassium 40 mEq p.o. x1 here today.                 PHQ-9 Total Score: 0   -Patient is not homicidal or suicidal.  No acute intervention required.    Chasity Leon reports a pain score of 0.  Given her pain assessment as noted, treatment options were  discussed and the following options were decided upon as a follow-up plan to address the patient's pain: continuation of current treatment plan for pain.         Tino Sood MD  9/1/2022  16:23 CDT        Part of this note may be an electronic transcription/translation of spoken language to printed text using the Dragon Dictation System.          CC:

## 2022-09-18 NOTE — PROGRESS NOTES
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  Patient is here to get cycle 5 of R-CHOP today.  Denies any recent worsening of neuropathy affecting fingerstick.  Denies any drenching night sweats.  Complains of intermittent nausea and diarrhea.  Complains of hemorrhoidal pain for which she has been prescribed steroid cream by primary medical provider.  Denies any bleeding.  Denies any new lymph node enlargement.        Oncology history:    1.  Recurrent follicular lymphoma grade 1:  -Patient initially diagnosed in April 2015 for which patient underwent palliative radiation treatment.  -Second relapse happened in September 2016 for again patient had a radiation treatment.  - Patient was found to have enlarged aortocaval lymph node in April 2017 for which patient initially received rituximab every 2 months without any significant improvement.  -Subsequently patient received 4 cycles of bendamustine and rituximab between April 30, 2018 and August 21, 2018.  -Patient was diagnosed with involvement of left parotid gland on biopsy done on August 27, 2020 by Dr. Ibrahim.  PET/CT done shows uptake in bilateral parotid gland along with left sided neck lymph node involvement.  -Patient was started on Revlimid with rituximab on September 22, 2020.  Patient completed cycle 12 on 8/23/2021.  -Patient received 3 cycles of rituximab maintenance between  September 8, 2021 and February 28, 2022  - CT of chest abdomen and pelvis with contrast on May 17, 2022 showed new onset of chest wall mass on right side along with pelvic mass.  - Ultrasound-guided biopsy showed large B-cell lymphoma in background of follicular lymphoma consistent with transformation  - PET/CT shows SUV of 12 in the region of right chest wall mass without any other abnormal uptake  -FISH for Bcl-2, BCL6 and c-Myc was negative.  - Patient was started on cycle 1 of R-CHOP on Raquel 15, 2022.              Past Medical History, Past Surgical History, Social History, Family History have been reviewed and are without significant changes except as mentioned.    Review of Systems   A comprehensive 14 point review of systems was performed and was negative except as mentioned in HPI.    Medications:  The current medication list was reviewed in the EMR    ALLERGIES:    Allergies   Allergen Reactions   • Ace Inhibitors Cough   • Latex      BLISTER     • Morphine And Related Nausea And Vomiting   • Lortab [Hydrocodone-Acetaminophen] Palpitations       Objective      Vitals:    09/22/22 0812   BP: 129/65   Pulse: 91   SpO2: 99%   Weight: 102 kg (224 lb 11.2 oz)   PainSc: 0-No pain   PainLoc: Rectum     Current Status 9/1/2022   ECOG score 1       Physical Exam  Cardiovascular:      Comments: Port-A-Cath present  Pulmonary:      Breath sounds: Normal breath sounds.   Neurological:      Mental Status: She is alert and oriented to person, place, and time.           RECENT LABS:  Glucose   Date Value Ref Range Status   09/22/2022 225 (H) 65 - 99 mg/dL Final     Sodium   Date Value Ref Range Status   09/22/2022 140 136 - 145 mmol/L Final     Potassium   Date Value Ref Range Status   09/22/2022 3.4 (L) 3.5 - 5.2 mmol/L Final     CO2   Date Value Ref Range Status   09/22/2022 30.0 (H) 22.0 - 29.0 mmol/L Final     Chloride   Date Value Ref Range Status   09/22/2022 100 98 - 107 mmol/L Final     Anion Gap   Date Value Ref  Range Status   09/22/2022 10.0 5.0 - 15.0 mmol/L Final     Creatinine   Date Value Ref Range Status   09/22/2022 0.59 0.57 - 1.00 mg/dL Final     BUN   Date Value Ref Range Status   09/22/2022 12 8 - 23 mg/dL Final     BUN/Creatinine Ratio   Date Value Ref Range Status   09/22/2022 20.3 7.0 - 25.0 Final     Calcium   Date Value Ref Range Status   09/22/2022 9.2 8.6 - 10.5 mg/dL Final     eGFR Non  Amer   Date Value Ref Range Status   12/06/2021 81 >60 mL/min/1.73 Final     Alkaline Phosphatase   Date Value Ref Range Status   09/22/2022 69 39 - 117 U/L Final     Total Protein   Date Value Ref Range Status   09/22/2022 6.5 6.0 - 8.5 g/dL Final     ALT (SGPT)   Date Value Ref Range Status   09/22/2022 39 (H) 1 - 33 U/L Final     AST (SGOT)   Date Value Ref Range Status   09/22/2022 35 (H) 1 - 32 U/L Final     Total Bilirubin   Date Value Ref Range Status   09/22/2022 0.3 0.0 - 1.2 mg/dL Final     Albumin   Date Value Ref Range Status   09/22/2022 4.10 3.50 - 5.20 g/dL Final     Globulin   Date Value Ref Range Status   09/22/2022 2.4 gm/dL Final     Lab Results   Component Value Date    WBC 3.53 09/22/2022    HGB 12.2 09/22/2022    HCT 35.8 09/22/2022    MCV 98.9 (H) 09/22/2022     09/22/2022     Lab Results   Component Value Date    NEUTROABS 2.61 09/22/2022           PATHOLOGY:  * Cannot find OR log *         RADIOLOGY DATA :  No radiology results for the last 7 days        Assessment & Plan     1.  Diffuse large B-cell lymphoma with background of follicular lymphoma consisting of Hernandez's transformation  - Placed for Bcl-2, BCL6 and c-Myc was negative  - 2D echo done on September 21, 2022 shows ejection fraction is normal at 54.5%  - We will proceed with cycle 5 of R-CHOP today  - Dose of vincristine has been decreased by 25% since cycle 2 due to development of neuropathy  - Patient will return to clinic in 3 weeks with repeat CBC CMP on that day.  - We will continue close monitoring for chemotherapy  related side effect  - Plan is to repeat PET/CT 4 weeks after completion of cycle 6 of R-CHOP.  Recommendation has been discussed with patient and her family    2.  Recurrent follicular lymphoma  - Review oncology history for prior treatment details  - Currently on treatment for Hernandez's transformation with R-CHOP    3.  Elevated liver function test  - Secondary to fatty liver plus diabetes mellitus  - We will monitor with CMP    4.  Pelvic mass  - Mild does not have any uptake on PET/CT.  - Most recent PET/CT shows pelvic mass has decreased in size.    5.  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    6.  Shingles  - Clinically there is no evidence of recurrence of shingles    7.  Health maintenance: Patient does not smoke.  Had a colonoscopy November 2021 by Dr Cruz    8. Advance Care Planning: For now patient remains full code and is able to make decisions.  Patient has health care surrogate mentioned on chart.    9.  Hypokalemia:  - Potassium is 3.4.  Will provide with potassium 40 mEq p.o. x1 here today.             PHQ-9 Total Score: 0   -Patient is not homicidal or suicidal.  No acute intervention required.    Chasity Leon reports a pain score of 0.  Given her pain assessment as noted, treatment options were discussed and the following options were decided upon as a follow-up plan to address the patient's pain: continuation of current treatment plan for pain.         Tino Sood MD  9/22/2022  08:20 CDT        Part of this note may be an electronic transcription/translation of spoken language to printed text using the Dragon Dictation System.          CC:

## 2022-09-20 DIAGNOSIS — Z51.11 ENCOUNTER FOR ANTINEOPLASTIC CHEMOTHERAPY: Primary | ICD-10-CM

## 2022-09-21 LAB
BH CV ECHO MEAS - ACS: 1.57 CM
BH CV ECHO MEAS - AO MAX PG: 8.8 MMHG
BH CV ECHO MEAS - AO MEAN PG: 4.4 MMHG
BH CV ECHO MEAS - AO ROOT DIAM: 2.9 CM
BH CV ECHO MEAS - AO V2 MAX: 148 CM/SEC
BH CV ECHO MEAS - AO V2 VTI: 20.1 CM
BH CV ECHO MEAS - AVA(I,D): 2.8 CM2
BH CV ECHO MEAS - EDV(CUBED): 126.5 ML
BH CV ECHO MEAS - EDV(MOD-SP2): 67.6 ML
BH CV ECHO MEAS - EDV(MOD-SP4): 82.9 ML
BH CV ECHO MEAS - EF(MOD-BP): 54.5 %
BH CV ECHO MEAS - EF(MOD-SP2): 55.5 %
BH CV ECHO MEAS - EF(MOD-SP4): 54.3 %
BH CV ECHO MEAS - EPSS: 0.5 CM
BH CV ECHO MEAS - ESV(CUBED): 31.8 ML
BH CV ECHO MEAS - ESV(MOD-SP2): 30.1 ML
BH CV ECHO MEAS - ESV(MOD-SP4): 37.9 ML
BH CV ECHO MEAS - FS: 36.9 %
BH CV ECHO MEAS - IVS/LVPW: 0.81 CM
BH CV ECHO MEAS - IVSD: 0.8 CM
BH CV ECHO MEAS - LA DIMENSION: 4.1 CM
BH CV ECHO MEAS - LAT PEAK E' VEL: 8.6 CM/SEC
BH CV ECHO MEAS - LV MASS(C)D: 158.3 GRAMS
BH CV ECHO MEAS - LV MAX PG: 4.5 MMHG
BH CV ECHO MEAS - LV MEAN PG: 2.28 MMHG
BH CV ECHO MEAS - LV V1 MAX: 106.2 CM/SEC
BH CV ECHO MEAS - LV V1 VTI: 16.7 CM
BH CV ECHO MEAS - LVIDD: 5 CM
BH CV ECHO MEAS - LVIDS: 3.2 CM
BH CV ECHO MEAS - LVOT AREA: 3.4 CM2
BH CV ECHO MEAS - LVOT DIAM: 2.09 CM
BH CV ECHO MEAS - LVPWD: 0.99 CM
BH CV ECHO MEAS - MED PEAK E' VEL: 9.1 CM/SEC
BH CV ECHO MEAS - MV A MAX VEL: 86.5 CM/SEC
BH CV ECHO MEAS - MV DEC SLOPE: 405.6 CM/SEC2
BH CV ECHO MEAS - MV DEC TIME: 0.17 MSEC
BH CV ECHO MEAS - MV E MAX VEL: 67.3 CM/SEC
BH CV ECHO MEAS - MV E/A: 0.78
BH CV ECHO MEAS - MV MAX PG: 4.3 MMHG
BH CV ECHO MEAS - MV MEAN PG: 1.76 MMHG
BH CV ECHO MEAS - MV V2 VTI: 24.5 CM
BH CV ECHO MEAS - MVA(VTI): 2.32 CM2
BH CV ECHO MEAS - PA V2 MAX: 126.7 CM/SEC
BH CV ECHO MEAS - PI END-D VEL: 85.7 CM/SEC
BH CV ECHO MEAS - RV MAX PG: 3.5 MMHG
BH CV ECHO MEAS - RV V1 MAX: 93 CM/SEC
BH CV ECHO MEAS - RV V1 VTI: 15 CM
BH CV ECHO MEAS - RVDD: 3.3 CM
BH CV ECHO MEAS - SV(LVOT): 57 ML
BH CV ECHO MEAS - SV(MOD-SP2): 37.5 ML
BH CV ECHO MEAS - SV(MOD-SP4): 45 ML
BH CV ECHO MEAS - TAPSE (>1.6): 2.03 CM
BH CV ECHO MEAS - TR MAX PG: 14.9 MMHG
BH CV ECHO MEAS - TR MAX VEL: 192.7 CM/SEC
BH CV ECHO MEASUREMENTS AVERAGE E/E' RATIO: 7.6
BH CV XLRA - RV BASE: 3.4 CM
BH CV XLRA - RV MID: 2.8 CM
LEFT ATRIUM VOLUME INDEX: 18.1 ML/M2
MAXIMAL PREDICTED HEART RATE: 158 BPM
SINUS: 2.9 CM
STRESS TARGET HR: 134 BPM

## 2022-09-22 ENCOUNTER — INFUSION (OUTPATIENT)
Dept: ONCOLOGY | Facility: HOSPITAL | Age: 62
End: 2022-09-22

## 2022-09-22 ENCOUNTER — OFFICE VISIT (OUTPATIENT)
Dept: ONCOLOGY | Facility: CLINIC | Age: 62
End: 2022-09-22

## 2022-09-22 VITALS
BODY MASS INDEX: 38.55 KG/M2 | DIASTOLIC BLOOD PRESSURE: 65 MMHG | OXYGEN SATURATION: 99 % | SYSTOLIC BLOOD PRESSURE: 129 MMHG | HEART RATE: 91 BPM | WEIGHT: 224.7 LBS

## 2022-09-22 DIAGNOSIS — T45.1X5A NEUROPATHY DUE TO CHEMOTHERAPEUTIC DRUG: Chronic | ICD-10-CM

## 2022-09-22 DIAGNOSIS — E87.6 HYPOKALEMIA: ICD-10-CM

## 2022-09-22 DIAGNOSIS — C82.08 GRADE 1 FOLLICULAR LYMPHOMA OF LYMPH NODES OF MULTIPLE REGIONS: Chronic | ICD-10-CM

## 2022-09-22 DIAGNOSIS — R19.7 DIARRHEA, UNSPECIFIED TYPE: Chronic | ICD-10-CM

## 2022-09-22 DIAGNOSIS — R79.89 ELEVATED LFTS: Chronic | ICD-10-CM

## 2022-09-22 DIAGNOSIS — G62.0 NEUROPATHY DUE TO CHEMOTHERAPEUTIC DRUG: Chronic | ICD-10-CM

## 2022-09-22 DIAGNOSIS — C82.08 GRADE 1 FOLLICULAR LYMPHOMA OF LYMPH NODES OF MULTIPLE REGIONS: ICD-10-CM

## 2022-09-22 DIAGNOSIS — Z45.2 ENCOUNTER FOR VENOUS ACCESS DEVICE CARE: ICD-10-CM

## 2022-09-22 DIAGNOSIS — C83.39 DIFFUSE LARGE B-CELL LYMPHOMA OF EXTRANODAL SITE EXCLUDING SPLEEN AND OTHER SOLID ORGANS: Primary | ICD-10-CM

## 2022-09-22 DIAGNOSIS — C83.39 DIFFUSE LARGE B-CELL LYMPHOMA OF EXTRANODAL SITE EXCLUDING SPLEEN AND OTHER SOLID ORGANS: Primary | Chronic | ICD-10-CM

## 2022-09-22 LAB
ALBUMIN SERPL-MCNC: 4.1 G/DL (ref 3.5–5.2)
ALBUMIN/GLOB SERPL: 1.7 G/DL
ALP SERPL-CCNC: 69 U/L (ref 39–117)
ALT SERPL W P-5'-P-CCNC: 39 U/L (ref 1–33)
ANION GAP SERPL CALCULATED.3IONS-SCNC: 10 MMOL/L (ref 5–15)
AST SERPL-CCNC: 35 U/L (ref 1–32)
BILIRUB SERPL-MCNC: 0.3 MG/DL (ref 0–1.2)
BUN SERPL-MCNC: 12 MG/DL (ref 8–23)
BUN/CREAT SERPL: 20.3 (ref 7–25)
CALCIUM SPEC-SCNC: 9.2 MG/DL (ref 8.6–10.5)
CHLORIDE SERPL-SCNC: 100 MMOL/L (ref 98–107)
CO2 SERPL-SCNC: 30 MMOL/L (ref 22–29)
CREAT SERPL-MCNC: 0.59 MG/DL (ref 0.57–1)
DEPRECATED RDW RBC AUTO: 63.5 FL (ref 37–54)
EGFRCR SERPLBLD CKD-EPI 2021: 102 ML/MIN/1.73
ERYTHROCYTE [DISTWIDTH] IN BLOOD BY AUTOMATED COUNT: 17.3 % (ref 12.3–15.4)
GLOBULIN UR ELPH-MCNC: 2.4 GM/DL
GLUCOSE SERPL-MCNC: 225 MG/DL (ref 65–99)
HCT VFR BLD AUTO: 35.8 % (ref 34–46.6)
HGB BLD-MCNC: 12.2 G/DL (ref 12–15.9)
HOLD SPECIMEN: NORMAL
LYMPHOCYTES # BLD MANUAL: 0.35 10*3/MM3 (ref 0.7–3.1)
LYMPHOCYTES NFR BLD MANUAL: 16 % (ref 5–12)
MCH RBC QN AUTO: 33.7 PG (ref 26.6–33)
MCHC RBC AUTO-ENTMCNC: 34.1 G/DL (ref 31.5–35.7)
MCV RBC AUTO: 98.9 FL (ref 79–97)
MONOCYTES # BLD: 0.56 10*3/MM3 (ref 0.1–0.9)
NEUTROPHILS # BLD AUTO: 2.61 10*3/MM3 (ref 1.7–7)
NEUTROPHILS NFR BLD MANUAL: 71 % (ref 42.7–76)
NEUTS BAND NFR BLD MANUAL: 3 % (ref 0–5)
PLATELET # BLD AUTO: 354 10*3/MM3 (ref 140–450)
PMV BLD AUTO: 8.5 FL (ref 6–12)
POTASSIUM SERPL-SCNC: 3.4 MMOL/L (ref 3.5–5.2)
PROT SERPL-MCNC: 6.5 G/DL (ref 6–8.5)
RBC # BLD AUTO: 3.62 10*6/MM3 (ref 3.77–5.28)
RBC MORPH BLD: NORMAL
SMALL PLATELETS BLD QL SMEAR: ADEQUATE
SODIUM SERPL-SCNC: 140 MMOL/L (ref 136–145)
VARIANT LYMPHS NFR BLD MANUAL: 10 % (ref 19.6–45.3)
WBC MORPH BLD: NORMAL
WBC NRBC COR # BLD: 3.53 10*3/MM3 (ref 3.4–10.8)

## 2022-09-22 PROCEDURE — 25010000002 DEXAMETHASONE SODIUM PHOSPHATE 100 MG/10ML SOLUTION: Performed by: INTERNAL MEDICINE

## 2022-09-22 PROCEDURE — 96367 TX/PROPH/DG ADDL SEQ IV INF: CPT | Performed by: INTERNAL MEDICINE

## 2022-09-22 PROCEDURE — 36415 COLL VENOUS BLD VENIPUNCTURE: CPT

## 2022-09-22 PROCEDURE — 25010000002 RITUXIMAB 500 MG/50ML SOLUTION 50 ML VIAL: Performed by: INTERNAL MEDICINE

## 2022-09-22 PROCEDURE — 96377 APPLICATON ON-BODY INJECTOR: CPT | Performed by: INTERNAL MEDICINE

## 2022-09-22 PROCEDURE — 96411 CHEMO IV PUSH ADDL DRUG: CPT | Performed by: INTERNAL MEDICINE

## 2022-09-22 PROCEDURE — 96375 TX/PRO/DX INJ NEW DRUG ADDON: CPT | Performed by: INTERNAL MEDICINE

## 2022-09-22 PROCEDURE — 96413 CHEMO IV INFUSION 1 HR: CPT | Performed by: INTERNAL MEDICINE

## 2022-09-22 PROCEDURE — 25010000002 PALONOSETRON PER 25 MCG: Performed by: INTERNAL MEDICINE

## 2022-09-22 PROCEDURE — 85025 COMPLETE CBC W/AUTO DIFF WBC: CPT

## 2022-09-22 PROCEDURE — 25010000002 PEGFILGRASTIM 6 MG/0.6ML PREFILLED SYRINGE KIT: Performed by: INTERNAL MEDICINE

## 2022-09-22 PROCEDURE — 99214 OFFICE O/P EST MOD 30 MIN: CPT | Performed by: INTERNAL MEDICINE

## 2022-09-22 PROCEDURE — 1123F ACP DISCUSS/DSCN MKR DOCD: CPT | Performed by: INTERNAL MEDICINE

## 2022-09-22 PROCEDURE — 25010000002 RITUXIMAB 100 MG/10ML SOLUTION 10 ML VIAL: Performed by: INTERNAL MEDICINE

## 2022-09-22 PROCEDURE — 25010000002 CYCLOPHOSPHAMIDE 1 GM/5ML SOLUTION 5 ML VIAL: Performed by: INTERNAL MEDICINE

## 2022-09-22 PROCEDURE — 85007 BL SMEAR W/DIFF WBC COUNT: CPT

## 2022-09-22 PROCEDURE — 25010000002 FOSAPREPITANT PER 1 MG: Performed by: INTERNAL MEDICINE

## 2022-09-22 PROCEDURE — 80053 COMPREHEN METABOLIC PANEL: CPT

## 2022-09-22 PROCEDURE — 96415 CHEMO IV INFUSION ADDL HR: CPT | Performed by: INTERNAL MEDICINE

## 2022-09-22 PROCEDURE — 25010000002 DIPHENHYDRAMINE PER 50 MG: Performed by: INTERNAL MEDICINE

## 2022-09-22 PROCEDURE — 96417 CHEMO IV INFUS EACH ADDL SEQ: CPT | Performed by: INTERNAL MEDICINE

## 2022-09-22 PROCEDURE — 25010000002 HEPARIN LOCK FLUSH PER 10 UNITS: Performed by: INTERNAL MEDICINE

## 2022-09-22 PROCEDURE — 25010000002 DOXORUBICIN PER 10 MG: Performed by: INTERNAL MEDICINE

## 2022-09-22 PROCEDURE — 25010000002 VINCRISTINE PER 1 MG: Performed by: INTERNAL MEDICINE

## 2022-09-22 PROCEDURE — 1126F AMNT PAIN NOTED NONE PRSNT: CPT | Performed by: INTERNAL MEDICINE

## 2022-09-22 RX ORDER — DOXORUBICIN HYDROCHLORIDE 2 MG/ML
50 INJECTION, SOLUTION INTRAVENOUS ONCE
Status: CANCELLED | OUTPATIENT
Start: 2022-09-22

## 2022-09-22 RX ORDER — FAMOTIDINE 10 MG/ML
20 INJECTION, SOLUTION INTRAVENOUS AS NEEDED
Status: CANCELLED | OUTPATIENT
Start: 2022-09-22

## 2022-09-22 RX ORDER — POTASSIUM CHLORIDE 750 MG/1
40 CAPSULE, EXTENDED RELEASE ORAL ONCE
Status: COMPLETED | OUTPATIENT
Start: 2022-09-22 | End: 2022-09-22

## 2022-09-22 RX ORDER — SODIUM CHLORIDE 0.9 % (FLUSH) 0.9 %
20 SYRINGE (ML) INJECTION AS NEEDED
Status: DISCONTINUED | OUTPATIENT
Start: 2022-09-22 | End: 2022-09-22 | Stop reason: HOSPADM

## 2022-09-22 RX ORDER — SODIUM CHLORIDE 0.9 % (FLUSH) 0.9 %
10 SYRINGE (ML) INJECTION AS NEEDED
Status: CANCELLED | OUTPATIENT
Start: 2022-09-22

## 2022-09-22 RX ORDER — DIPHENHYDRAMINE HYDROCHLORIDE 50 MG/ML
50 INJECTION INTRAMUSCULAR; INTRAVENOUS AS NEEDED
Status: CANCELLED | OUTPATIENT
Start: 2022-09-22

## 2022-09-22 RX ORDER — ACETAMINOPHEN 325 MG/1
650 TABLET ORAL ONCE
Status: COMPLETED | OUTPATIENT
Start: 2022-09-22 | End: 2022-09-22

## 2022-09-22 RX ORDER — HYDROCORTISONE 25 MG/G
CREAM TOPICAL 3 TIMES DAILY
COMMUNITY
Start: 2022-09-16 | End: 2022-10-17

## 2022-09-22 RX ORDER — SODIUM CHLORIDE 0.9 % (FLUSH) 0.9 %
10 SYRINGE (ML) INJECTION AS NEEDED
Status: DISCONTINUED | OUTPATIENT
Start: 2022-09-22 | End: 2022-09-22 | Stop reason: HOSPADM

## 2022-09-22 RX ORDER — HEPARIN SODIUM (PORCINE) LOCK FLUSH IV SOLN 100 UNIT/ML 100 UNIT/ML
500 SOLUTION INTRAVENOUS AS NEEDED
Status: DISCONTINUED | OUTPATIENT
Start: 2022-09-22 | End: 2022-09-22 | Stop reason: HOSPADM

## 2022-09-22 RX ORDER — DIPHENHYDRAMINE HYDROCHLORIDE 50 MG/ML
50 INJECTION INTRAMUSCULAR; INTRAVENOUS AS NEEDED
Status: DISCONTINUED | OUTPATIENT
Start: 2022-09-22 | End: 2022-09-22 | Stop reason: HOSPADM

## 2022-09-22 RX ORDER — PALONOSETRON 0.05 MG/ML
0.25 INJECTION, SOLUTION INTRAVENOUS ONCE
Status: COMPLETED | OUTPATIENT
Start: 2022-09-22 | End: 2022-09-22

## 2022-09-22 RX ORDER — PALONOSETRON 0.05 MG/ML
0.25 INJECTION, SOLUTION INTRAVENOUS ONCE
Status: CANCELLED | OUTPATIENT
Start: 2022-09-22

## 2022-09-22 RX ORDER — ACETAMINOPHEN 325 MG/1
650 TABLET ORAL ONCE
Status: CANCELLED | OUTPATIENT
Start: 2022-09-22

## 2022-09-22 RX ORDER — POTASSIUM CHLORIDE 750 MG/1
40 CAPSULE, EXTENDED RELEASE ORAL ONCE
Status: CANCELLED
Start: 2022-09-22 | End: 2022-09-22

## 2022-09-22 RX ORDER — SODIUM CHLORIDE 0.9 % (FLUSH) 0.9 %
20 SYRINGE (ML) INJECTION AS NEEDED
Status: CANCELLED | OUTPATIENT
Start: 2022-09-22

## 2022-09-22 RX ORDER — SODIUM CHLORIDE 9 MG/ML
250 INJECTION, SOLUTION INTRAVENOUS ONCE
Status: CANCELLED | OUTPATIENT
Start: 2022-09-22

## 2022-09-22 RX ORDER — FAMOTIDINE 10 MG/ML
20 INJECTION, SOLUTION INTRAVENOUS AS NEEDED
Status: DISCONTINUED | OUTPATIENT
Start: 2022-09-22 | End: 2022-09-22 | Stop reason: HOSPADM

## 2022-09-22 RX ORDER — MEPERIDINE HYDROCHLORIDE 25 MG/ML
25 INJECTION INTRAMUSCULAR; INTRAVENOUS; SUBCUTANEOUS
Status: CANCELLED | OUTPATIENT
Start: 2022-09-22

## 2022-09-22 RX ORDER — OLANZAPINE 5 MG/1
5 TABLET ORAL ONCE
Status: COMPLETED | OUTPATIENT
Start: 2022-09-22 | End: 2022-09-22

## 2022-09-22 RX ORDER — SODIUM CHLORIDE 9 MG/ML
250 INJECTION, SOLUTION INTRAVENOUS ONCE
Status: COMPLETED | OUTPATIENT
Start: 2022-09-22 | End: 2022-09-22

## 2022-09-22 RX ORDER — OLANZAPINE 5 MG/1
5 TABLET ORAL ONCE
Status: CANCELLED | OUTPATIENT
Start: 2022-09-22

## 2022-09-22 RX ORDER — HEPARIN SODIUM (PORCINE) LOCK FLUSH IV SOLN 100 UNIT/ML 100 UNIT/ML
500 SOLUTION INTRAVENOUS AS NEEDED
Status: CANCELLED | OUTPATIENT
Start: 2022-10-13

## 2022-09-22 RX ORDER — DOXORUBICIN HYDROCHLORIDE 2 MG/ML
100 INJECTION, SOLUTION INTRAVENOUS ONCE
Status: COMPLETED | OUTPATIENT
Start: 2022-09-22 | End: 2022-09-22

## 2022-09-22 RX ORDER — MEPERIDINE HYDROCHLORIDE 25 MG/ML
25 INJECTION INTRAMUSCULAR; INTRAVENOUS; SUBCUTANEOUS
Status: DISCONTINUED | OUTPATIENT
Start: 2022-09-22 | End: 2022-09-22 | Stop reason: HOSPADM

## 2022-09-22 RX ADMIN — DEXAMETHASONE SODIUM PHOSPHATE 12 MG: 10 INJECTION, SOLUTION INTRAMUSCULAR; INTRAVENOUS at 13:31

## 2022-09-22 RX ADMIN — OLANZAPINE 5 MG: 5 TABLET, FILM COATED ORAL at 12:45

## 2022-09-22 RX ADMIN — RITUXIMAB 770 MG: 10 INJECTION, SOLUTION INTRAVENOUS at 09:51

## 2022-09-22 RX ADMIN — HEPARIN 500 UNITS: 100 SYRINGE at 15:32

## 2022-09-22 RX ADMIN — ACETAMINOPHEN 650 MG: 325 TABLET, FILM COATED ORAL at 08:56

## 2022-09-22 RX ADMIN — VINCRISTINE SULFATE 1.5 MG: 1 INJECTION, SOLUTION INTRAVENOUS at 14:31

## 2022-09-22 RX ADMIN — CYCLOPHOSPHAMIDE 1530 MG: 200 INJECTION, SOLUTION INTRAVENOUS at 14:52

## 2022-09-22 RX ADMIN — FOSAPREPITANT 100 ML: 150 INJECTION, POWDER, LYOPHILIZED, FOR SOLUTION INTRAVENOUS at 12:46

## 2022-09-22 RX ADMIN — POTASSIUM CHLORIDE 40 MEQ: 750 CAPSULE, EXTENDED RELEASE ORAL at 08:56

## 2022-09-22 RX ADMIN — SODIUM CHLORIDE 250 ML: 9 INJECTION, SOLUTION INTRAVENOUS at 08:56

## 2022-09-22 RX ADMIN — DOXORUBICIN HYDROCHLORIDE 100 MG: 2 INJECTION, SOLUTION INTRAVENOUS at 14:01

## 2022-09-22 RX ADMIN — Medication 10 ML: at 15:32

## 2022-09-22 RX ADMIN — PEGFILGRASTIM 6 MG: KIT SUBCUTANEOUS at 15:32

## 2022-09-22 RX ADMIN — DIPHENHYDRAMINE HYDROCHLORIDE 50 MG: 50 INJECTION, SOLUTION INTRAMUSCULAR; INTRAVENOUS at 09:07

## 2022-09-22 RX ADMIN — PALONOSETRON 0.25 MG: 0.05 INJECTION, SOLUTION INTRAVENOUS at 08:55

## 2022-09-30 RX ORDER — POTASSIUM CHLORIDE 750 MG/1
20 CAPSULE, EXTENDED RELEASE ORAL DAILY
Qty: 4 CAPSULE | Refills: 0 | OUTPATIENT
Start: 2022-09-30 | End: 2022-10-02

## 2022-09-30 NOTE — PROGRESS NOTES
Chasity Leon is a 59 y.o. female   Primary provider:  Taiwo Gonzalez MD       Chief Complaint   Patient presents with   • Left Hip - Hip Pain       HISTORY OF PRESENT ILLNESS:    59-year-old  female in the office today for evaluation of lumbar spine, left hip and radicular type symptoms which started 2 to 3 weeks ago without any injury.  She was evaluated by her her primary care provider and referred to us for further evaluation of her symptoms.  She reports an aching stabbing burning type pain that radiates down the lateral anterior aspect of the left leg with some accompanied weakness.  She denies any fever chills or evidence of infection.  She denies any incontinence.    Hip Pain    The incident occurred more than 1 week ago (left  hip pain started about 2 weeks ago, no injury. xrays done today. ). There was no injury mechanism. The pain is present in the left hip. The quality of the pain is described as aching. Exacerbated by: sitting.         CONCURRENT MEDICAL HISTORY:    Past Medical History:   Diagnosis Date   • Acute bronchitis    • Agoraphobia with panic disorder     on SNRI, prn buspar, prn klonopin     • Anxiety    • Arthritis    • Atrophic vaginitis    • Chest pain     work up as new onset angina    • Depression      MDD      • Essential hypertension    • Follicular non-Hodgkin's lymphoma (CMS/HCC)    • Generalized anxiety disorder     SSRI - buspar, cont cymbalta, prn klonopin      • Hip pain     arthritis, artifical right hip     • History of bone density study 02/09/2009    DEXA (bone density) test, peripheral (Normal   • History of echocardiogram 01/03/2012    Echocardiogram 14711 (Normal echocardigram. EF 55-60%)   • History of mammogram    • Hyperlipidemia    • Mass of lower limb    • Nuclear senile cataract    • Obesity    • Otalgia     ENT REFER   • Panic disorder     with agoraphobia. On buspar and klonopin now      • Type 2 diabetes mellitus (CMS/HCC)     NO BDR   • Upper  respiratory infection        Allergies   Allergen Reactions   • Ace Inhibitors Cough   • Latex      BLISTER     • Morphine And Related Nausea And Vomiting   • Lortab [Hydrocodone-Acetaminophen] Palpitations         Current Outpatient Medications:   •  ARIPiprazole (ABILIFY) 20 MG tablet, Take 20 mg by mouth Every Night., Disp: , Rfl:   •  busPIRone (BUSPAR) 15 MG tablet, Take 15 mg by mouth 2 (two) times a day., Disp: , Rfl:   •  BYDUREON 2 MG pen-injector injection, , Disp: , Rfl:   •  Calcium Citrate-Vitamin D (CALCIUM CITRATE +D PO), Take 2 tablets by mouth daily., Disp: , Rfl:   •  clonazePAM (KlonoPIN) 1 MG tablet, Take 1 mg by mouth as needed for seizures., Disp: , Rfl:   •  Desvenlafaxine Succinate (PRISTIQ PO), Take 1 tablet by mouth daily., Disp: , Rfl:   •  fesoterodine fumarate (TOVIAZ ER) 8 MG tablet sustained-release 24 hour capsule, Take 8 mg by mouth Daily. bedtime, Disp: , Rfl:   •  Insulin Glargine-Lixisenatide (SOLIQUA) 100-33 UNT-MCG/ML solution pen-injector, INJECT 50 (FIFTY) UNITS SUBCUTANEOUSLY DAILY, Disp: , Rfl:   •  lamoTRIgine (LAMICTAL) 150 MG tablet, Take 150 mg by mouth 2 (two) times a day., Disp: , Rfl:   •  losartan (COZAAR) 100 MG tablet, Take 100 mg by mouth daily., Disp: , Rfl:   •  Multiple Vitamins-Minerals (CENTRUM SILVER PO), Take 1 tablet by mouth daily., Disp: , Rfl:   •  Omega-3 Fatty Acids (FISH OIL) 1000 MG capsule capsule, Take 1,000 mg by mouth 2 (two) times a day., Disp: , Rfl:   •  promethazine (PHENERGAN) 25 MG tablet, Take 1 tablet by mouth Every 6 (Six) Hours As Needed for Nausea or Vomiting., Disp: 40 tablet, Rfl: 3  •  ranitidine (ZANTAC) 150 MG tablet, Take 150 mg by mouth 2 (two) times a day., Disp: , Rfl:   •  sitaGLIPtin-metFORMIN (JANUMET)  MG per tablet, Take 1 tablet by mouth 2 (two) times a day., Disp: , Rfl:   •  TRAZODONE HCL PO, Take 225 mg by mouth every night., Disp: , Rfl:   •  gabapentin (NEURONTIN) 300 MG capsule, Take 300 mg by mouth every  night., Disp: , Rfl:   •  oxyCODONE-acetaminophen (PERCOCET) 5-325 MG per tablet, Take 1 tablet by mouth every 6 (six) hours as needed., Disp: , Rfl:   •  simvastatin (ZOCOR) 80 MG tablet, Take 80 mg by mouth every night., Disp: , Rfl:   •  SYMBICORT 160-4.5 MCG/ACT inhaler, , Disp: , Rfl:     Past Surgical History:   Procedure Laterality Date   • CENTRAL VENOUS LINE INSERTION  03/11/2016    Central line insertion (Successful placement of right upper extremity midline.)   • COLONOSCOPY     • COLONOSCOPY N/A 7/18/2019    Procedure: COLONOSCOPY;  Surgeon: Brandon Salvador DO;  Location: [facility] ENDOSCOPY;  Service: Gastroenterology   • CYSTOSCOPY  03/23/1976    Cystoscopy (external urethroplasty and cysto-panendoscopy,urethal hymenal fusion with hypospadias)   • DILATATION AND CURETTAGE  01/25/1980    D&C (removal of IUD)   • EXCISION LESION  04/15/2014    Remove thigh lesion (Excision of a mass of the right thigh using a 16.2 cm incision with intermediate layered closure.)   • HYSTEROSCOPY  02/28/2003    Hysteroscope procedure (diagnostic hysteroscopy with fractional D&C)   • INJECTION OF MEDICATION  08/22/2011    kenalog (1)   • OTHER SURGICAL HISTORY      Explore parathyroid glands   • OTHER SURGICAL HISTORY  08/26/1985    Laparosc diagnostic (desires elective tubal reversal)   • PAP SMEAR  05/24/2005    PAP SMEAR (normal)   • AR INSJ TUNNELED CVC W/O SUBQ PORT/ AGE 5 YR/> Right 4/4/2017    Procedure: INSERTION VENOUS ACCESS DEVICE (MEDIPORT) right chest;  Surgeon: Fortino Medina MD;  Location: [facility] OR;  Service: General   • AR INSJ TUNNELED CVC W/O SUBQ PORT/ AGE 5 YR/> N/A 6/6/2017    Procedure: INSERTION VENOUS ACCESS DEVICE   (MEDIPORT)   (C-ARM#1);  Surgeon: Fortino Medina MD;  Location: [facility] OR;  Service: General   • SHOULDER ARTHROSCOPY  11/11/2013    Shoulder arthroscopy/surgery (Right shoulder. Rotator cuff repair. Subacromial decompression. Edgar procedure.)   • SHOULDER SURGERY  12/28/2015     "Shoulder surgery procedure (Arthroscopy of the left shoulder with rotator cuff repair.Edgar procedure.Subacromial decompression.)   • TONSILLECTOMY AND ADENOIDECTOMY  1967    T&A (hypertrophied tonsils and adenoids with recurrent infection)   • TOTAL ABDOMINAL HYSTERECTOMY WITH SALPINGO OOPHORECTOMY  2004    ARIELLE/BSO (with a preop fractional dilation and curettage with frozen section,menorrhagia,dysmenorrhea,unresponsive to medical intervention)   • TOTAL HIP ARTHROPLASTY     • VAGINA SURGERY  1981    Anesth, surgery on vagina (colpotomy with bilateral partial salpingectomy, multiparity contraindicated)       Family History   Problem Relation Age of Onset   • Breast cancer Mother    • Heart disease Father    • Liver cancer Father    • Diabetes Sister         INSULIN DEPENDENT   • Diabetes Brother         INSULIN DEPENDENT   • Coronary artery disease Other    • Diabetes Other        Social History     Socioeconomic History   • Marital status:      Spouse name: Not on file   • Number of children: Not on file   • Years of education: Not on file   • Highest education level: Not on file   Tobacco Use   • Smoking status: Former Smoker     Years: 12.00     Last attempt to quit: 1998     Years since quittin.5   • Smokeless tobacco: Never Used   • Tobacco comment: Smoked 1 pack per 2 weeks   Substance and Sexual Activity   • Alcohol use: No   • Drug use: No   • Sexual activity: Defer        Review of Systems   Psychiatric/Behavioral: The patient is nervous/anxious.    All other systems reviewed and are negative.      PHYSICAL EXAMINATION:       Ht 162.6 cm (64\")   Wt 95.7 kg (211 lb)   BMI 36.22 kg/m²     Physical Exam   Constitutional: She is oriented to person, place, and time. Vital signs are normal. She appears well-developed and well-nourished. She is cooperative.   HENT:   Head: Normocephalic and atraumatic.   Neck: Trachea normal and phonation normal.   Pulmonary/Chest: Effort " normal. No respiratory distress.   Abdominal: Soft. Normal appearance. She exhibits no distension.   Neurological: She is alert and oriented to person, place, and time. GCS eye subscore is 4. GCS verbal subscore is 5. GCS motor subscore is 6.   Skin: Skin is warm, dry and intact. Capillary refill takes less than 2 seconds.   Psychiatric: She has a normal mood and affect. Her speech is normal and behavior is normal. Judgment and thought content normal. Cognition and memory are normal.   Vitals reviewed.      GAIT:     []  Normal  [x]  Antalgic    Assistive device: [x]  None  []  Walker     []  Crutches  []  Cane     []  Wheelchair  []  Stretcher    Right Hip Exam   Right hip exam is normal.       Left Hip Exam     Tenderness   The patient is experiencing tenderness in the greater trochanter.    Range of Motion   Abduction: abnormal   Flexion: abnormal   External rotation: abnormal   Internal rotation: abnormal     Muscle Strength   Abduction: 4/5   Adduction: 4/5   Flexion: 4/5     Other   Erythema: absent  Scars: absent  Sensation: normal  Pulse: present      Back Exam     Tenderness   The patient is experiencing tenderness in the lumbar and sacroiliac.    Range of Motion   Extension: abnormal   Flexion: abnormal   Lateral bend right: abnormal   Lateral bend left: abnormal   Rotation right: abnormal   Rotation left: abnormal     Muscle Strength   Right Quadriceps:  4/5   Left Quadriceps:  4/5   Right Hamstrings:  4/5   Left Hamstrings:  4/5     Tests   Straight leg raise right: negative  Straight leg raise left: positive    Reflexes   Patellar: abnormal  Achilles: abnormal    Other   Toe walk: abnormal  Heel walk: abnormal  Sensation: decreased  Gait: antalgic   Erythema: no back redness  Scars: absent                  Xr Spine Lumbar 2 Or 3 View    Result Date: 7/24/2019  Narrative: Ordering Provider:  Josue Carmona APRN Ordering Diagnosis/Indication:  Low back pain, unspecified back pain laterality,  unspecified chronicity, with sciatica presence unspecified Procedure:  XR SPINE LUMBAR 2 OR 3 VW Exam Date:  7/24/19 COMPARISON:  Not applicable, no relevant images available.     Impression:  AP lateral view of the lumbar spine reveals degenerative changes at L5-S1 without any evidence of acute fracture, bone lesion or other radiological abnormality noted. MANSI Winter 7/24/19     Xr Hip With Or Without Pelvis 2 - 3 View Left    Result Date: 7/24/2019  Narrative: Ordering Provider:  Josue Carmona APRN Ordering Diagnosis/Indication:  Left hip pain Procedure:  XR HIP W OR WO PELVIS 2-3 VIEW LEFT Exam Date:  7/24/19 COMPARISON:  Not applicable, no relevant images available.     Impression:  AP of the pelvis with 2 views of the left hip reveal no evidence of acute fracture, bony lesion or other radiological abnormality noted and there are no recent comparison films on file. MANSI Winter 7/24/19           ASSESSMENT:    Diagnoses and all orders for this visit:    Lumbar radiculopathy, acute  -     MRI Lumbar Spine Without Contrast; Future  -     MRI Pelvis Without Contrast; Future    Left hip pain  -     MRI Lumbar Spine Without Contrast; Future  -     MRI Pelvis Without Contrast; Future    Lumbar spine pain  -     MRI Lumbar Spine Without Contrast; Future  -     MRI Pelvis Without Contrast; Future    Other orders  -     Insulin Glargine-Lixisenatide (SOLIQUA) 100-33 UNT-MCG/ML solution pen-injector; INJECT 50 (FIFTY) UNITS SUBCUTANEOUSLY DAILY          PLAN  Plan will be to recommend a lumbar spine and pelvis MRI for further evaluation of radicular type symptoms and to rule out disc herniation, metastatic bone lesion/reoccurring lymphoma tumor, or other abnormality which could contribute to the significant lumbar spine and left hip/leg pain.  In the meantime the patient was instructed to continue progression of range of motion and activity as tolerated, continue anti-inflammatories and her  pain medication as needed for symptoms and follow-up after her test unless her symptoms worsen in the meantime.  No Follow-up on file.    Josue Carmona, APRN     130

## 2022-10-04 RX ORDER — OMEPRAZOLE 40 MG/1
CAPSULE, DELAYED RELEASE ORAL
Qty: 30 CAPSULE | Refills: 10 | OUTPATIENT
Start: 2022-10-04

## 2022-10-13 ENCOUNTER — OFFICE VISIT (OUTPATIENT)
Dept: ONCOLOGY | Facility: CLINIC | Age: 62
End: 2022-10-13

## 2022-10-13 ENCOUNTER — INFUSION (OUTPATIENT)
Dept: ONCOLOGY | Facility: HOSPITAL | Age: 62
End: 2022-10-13

## 2022-10-13 VITALS
DIASTOLIC BLOOD PRESSURE: 65 MMHG | OXYGEN SATURATION: 96 % | HEART RATE: 101 BPM | SYSTOLIC BLOOD PRESSURE: 133 MMHG | BODY MASS INDEX: 37.62 KG/M2 | WEIGHT: 219.3 LBS

## 2022-10-13 DIAGNOSIS — Z45.2 ENCOUNTER FOR VENOUS ACCESS DEVICE CARE: ICD-10-CM

## 2022-10-13 DIAGNOSIS — C83.39 DIFFUSE LARGE B-CELL LYMPHOMA OF EXTRANODAL SITE EXCLUDING SPLEEN AND OTHER SOLID ORGANS: Primary | Chronic | ICD-10-CM

## 2022-10-13 DIAGNOSIS — C82.08 GRADE 1 FOLLICULAR LYMPHOMA OF LYMPH NODES OF MULTIPLE REGIONS: ICD-10-CM

## 2022-10-13 DIAGNOSIS — C83.39 DIFFUSE LARGE B-CELL LYMPHOMA OF EXTRANODAL SITE EXCLUDING SPLEEN AND OTHER SOLID ORGANS: Primary | ICD-10-CM

## 2022-10-13 DIAGNOSIS — E87.6 HYPOKALEMIA: ICD-10-CM

## 2022-10-13 LAB
ALBUMIN SERPL-MCNC: 4.2 G/DL (ref 3.5–5.2)
ALBUMIN/GLOB SERPL: 1.6 G/DL
ALP SERPL-CCNC: 70 U/L (ref 39–117)
ALT SERPL W P-5'-P-CCNC: 57 U/L (ref 1–33)
ANION GAP SERPL CALCULATED.3IONS-SCNC: 13 MMOL/L (ref 5–15)
ANISOCYTOSIS BLD QL: ABNORMAL
AST SERPL-CCNC: 58 U/L (ref 1–32)
BASOPHILS # BLD MANUAL: 0.08 10*3/MM3 (ref 0–0.2)
BASOPHILS NFR BLD MANUAL: 2 % (ref 0–1.5)
BILIRUB SERPL-MCNC: 0.4 MG/DL (ref 0–1.2)
BUN SERPL-MCNC: 6 MG/DL (ref 8–23)
BUN/CREAT SERPL: 9.7 (ref 7–25)
CALCIUM SPEC-SCNC: 9.8 MG/DL (ref 8.6–10.5)
CHLORIDE SERPL-SCNC: 94 MMOL/L (ref 98–107)
CO2 SERPL-SCNC: 27 MMOL/L (ref 22–29)
CREAT SERPL-MCNC: 0.62 MG/DL (ref 0.57–1)
DEPRECATED RDW RBC AUTO: 57.9 FL (ref 37–54)
EGFRCR SERPLBLD CKD-EPI 2021: 100.8 ML/MIN/1.73
ERYTHROCYTE [DISTWIDTH] IN BLOOD BY AUTOMATED COUNT: 16.3 % (ref 12.3–15.4)
GLOBULIN UR ELPH-MCNC: 2.6 GM/DL
GLUCOSE SERPL-MCNC: 199 MG/DL (ref 65–99)
HCT VFR BLD AUTO: 38.2 % (ref 34–46.6)
HGB BLD-MCNC: 12.9 G/DL (ref 12–15.9)
HOLD SPECIMEN: NORMAL
LYMPHOCYTES # BLD MANUAL: 0.19 10*3/MM3 (ref 0.7–3.1)
LYMPHOCYTES NFR BLD MANUAL: 14 % (ref 5–12)
MCH RBC QN AUTO: 32.7 PG (ref 26.6–33)
MCHC RBC AUTO-ENTMCNC: 33.8 G/DL (ref 31.5–35.7)
MCV RBC AUTO: 96.7 FL (ref 79–97)
MONOCYTES # BLD: 0.53 10*3/MM3 (ref 0.1–0.9)
NEUTROPHILS # BLD AUTO: 2.96 10*3/MM3 (ref 1.7–7)
NEUTROPHILS NFR BLD MANUAL: 70 % (ref 42.7–76)
NEUTS BAND NFR BLD MANUAL: 9 % (ref 0–5)
PLATELET # BLD AUTO: 297 10*3/MM3 (ref 140–450)
PMV BLD AUTO: 8.5 FL (ref 6–12)
POTASSIUM SERPL-SCNC: 3.2 MMOL/L (ref 3.5–5.2)
PROT SERPL-MCNC: 6.8 G/DL (ref 6–8.5)
RBC # BLD AUTO: 3.95 10*6/MM3 (ref 3.77–5.28)
SMALL PLATELETS BLD QL SMEAR: ADEQUATE
SODIUM SERPL-SCNC: 134 MMOL/L (ref 136–145)
VARIANT LYMPHS NFR BLD MANUAL: 5 % (ref 19.6–45.3)
WBC MORPH BLD: NORMAL
WBC NRBC COR # BLD: 3.75 10*3/MM3 (ref 3.4–10.8)

## 2022-10-13 PROCEDURE — 99214 OFFICE O/P EST MOD 30 MIN: CPT | Performed by: NURSE PRACTITIONER

## 2022-10-13 PROCEDURE — 25010000002 RITUXIMAB 500 MG/50ML SOLUTION 50 ML VIAL: Performed by: NURSE PRACTITIONER

## 2022-10-13 PROCEDURE — 96415 CHEMO IV INFUSION ADDL HR: CPT | Performed by: INTERNAL MEDICINE

## 2022-10-13 PROCEDURE — 25010000002 DIPHENHYDRAMINE PER 50 MG: Performed by: NURSE PRACTITIONER

## 2022-10-13 PROCEDURE — 96411 CHEMO IV PUSH ADDL DRUG: CPT | Performed by: INTERNAL MEDICINE

## 2022-10-13 PROCEDURE — 25010000002 VINCRISTINE PER 1 MG: Performed by: NURSE PRACTITIONER

## 2022-10-13 PROCEDURE — 96377 APPLICATON ON-BODY INJECTOR: CPT | Performed by: INTERNAL MEDICINE

## 2022-10-13 PROCEDURE — 85025 COMPLETE CBC W/AUTO DIFF WBC: CPT

## 2022-10-13 PROCEDURE — 25010000002 HEPARIN LOCK FLUSH PER 10 UNITS: Performed by: INTERNAL MEDICINE

## 2022-10-13 PROCEDURE — 25010000002 CYCLOPHOSPHAMIDE 1 GM/5ML SOLUTION 5 ML VIAL: Performed by: NURSE PRACTITIONER

## 2022-10-13 PROCEDURE — 80053 COMPREHEN METABOLIC PANEL: CPT

## 2022-10-13 PROCEDURE — 96367 TX/PROPH/DG ADDL SEQ IV INF: CPT | Performed by: INTERNAL MEDICINE

## 2022-10-13 PROCEDURE — 96413 CHEMO IV INFUSION 1 HR: CPT | Performed by: INTERNAL MEDICINE

## 2022-10-13 PROCEDURE — 25010000002 RITUXIMAB 100 MG/10ML SOLUTION 10 ML VIAL: Performed by: NURSE PRACTITIONER

## 2022-10-13 PROCEDURE — 25010000002 PALONOSETRON PER 25 MCG: Performed by: NURSE PRACTITIONER

## 2022-10-13 PROCEDURE — 25010000002 FOSAPREPITANT PER 1 MG: Performed by: NURSE PRACTITIONER

## 2022-10-13 PROCEDURE — 96375 TX/PRO/DX INJ NEW DRUG ADDON: CPT | Performed by: INTERNAL MEDICINE

## 2022-10-13 PROCEDURE — 25010000002 DOXORUBICIN PER 10 MG: Performed by: NURSE PRACTITIONER

## 2022-10-13 PROCEDURE — 96417 CHEMO IV INFUS EACH ADDL SEQ: CPT | Performed by: INTERNAL MEDICINE

## 2022-10-13 PROCEDURE — 85007 BL SMEAR W/DIFF WBC COUNT: CPT

## 2022-10-13 PROCEDURE — 25010000002 DEXAMETHASONE SODIUM PHOSPHATE 100 MG/10ML SOLUTION: Performed by: NURSE PRACTITIONER

## 2022-10-13 PROCEDURE — 25010000002 PEGFILGRASTIM 6 MG/0.6ML PREFILLED SYRINGE KIT: Performed by: NURSE PRACTITIONER

## 2022-10-13 PROCEDURE — 36415 COLL VENOUS BLD VENIPUNCTURE: CPT

## 2022-10-13 RX ORDER — DOXORUBICIN HYDROCHLORIDE 2 MG/ML
100 INJECTION, SOLUTION INTRAVENOUS ONCE
Status: COMPLETED | OUTPATIENT
Start: 2022-10-13 | End: 2022-10-13

## 2022-10-13 RX ORDER — DIPHENHYDRAMINE HYDROCHLORIDE 50 MG/ML
50 INJECTION INTRAMUSCULAR; INTRAVENOUS AS NEEDED
Status: DISCONTINUED | OUTPATIENT
Start: 2022-10-13 | End: 2022-10-13 | Stop reason: HOSPADM

## 2022-10-13 RX ORDER — FLUCONAZOLE 100 MG/1
100 TABLET ORAL DAILY
COMMUNITY
Start: 2022-09-29 | End: 2022-10-13

## 2022-10-13 RX ORDER — HEPARIN SODIUM (PORCINE) LOCK FLUSH IV SOLN 100 UNIT/ML 100 UNIT/ML
500 SOLUTION INTRAVENOUS AS NEEDED
Status: CANCELLED | OUTPATIENT
Start: 2022-10-13

## 2022-10-13 RX ORDER — VALACYCLOVIR HYDROCHLORIDE 1 G/1
TABLET, FILM COATED ORAL
COMMUNITY
Start: 2022-09-30

## 2022-10-13 RX ORDER — MEPERIDINE HYDROCHLORIDE 25 MG/ML
25 INJECTION INTRAMUSCULAR; INTRAVENOUS; SUBCUTANEOUS
Status: CANCELLED | OUTPATIENT
Start: 2022-10-13

## 2022-10-13 RX ORDER — PALONOSETRON 0.05 MG/ML
0.25 INJECTION, SOLUTION INTRAVENOUS ONCE
Status: COMPLETED | OUTPATIENT
Start: 2022-10-13 | End: 2022-10-13

## 2022-10-13 RX ORDER — ACETAMINOPHEN 325 MG/1
650 TABLET ORAL ONCE
Status: CANCELLED | OUTPATIENT
Start: 2022-10-13

## 2022-10-13 RX ORDER — ACETAMINOPHEN 325 MG/1
650 TABLET ORAL ONCE
Status: COMPLETED | OUTPATIENT
Start: 2022-10-13 | End: 2022-10-13

## 2022-10-13 RX ORDER — OLANZAPINE 5 MG/1
5 TABLET ORAL ONCE
Status: CANCELLED | OUTPATIENT
Start: 2022-10-13

## 2022-10-13 RX ORDER — FAMOTIDINE 10 MG/ML
20 INJECTION, SOLUTION INTRAVENOUS AS NEEDED
Status: CANCELLED | OUTPATIENT
Start: 2022-10-13

## 2022-10-13 RX ORDER — SODIUM CHLORIDE 0.9 % (FLUSH) 0.9 %
20 SYRINGE (ML) INJECTION AS NEEDED
Status: CANCELLED | OUTPATIENT
Start: 2022-10-13

## 2022-10-13 RX ORDER — SODIUM CHLORIDE 9 MG/ML
250 INJECTION, SOLUTION INTRAVENOUS ONCE
Status: CANCELLED | OUTPATIENT
Start: 2022-10-13

## 2022-10-13 RX ORDER — OLANZAPINE 5 MG/1
5 TABLET ORAL ONCE
Status: COMPLETED | OUTPATIENT
Start: 2022-10-13 | End: 2022-10-13

## 2022-10-13 RX ORDER — DOXEPIN HYDROCHLORIDE 25 MG/1
50 CAPSULE ORAL
COMMUNITY
Start: 2022-09-30 | End: 2023-02-15

## 2022-10-13 RX ORDER — POTASSIUM CHLORIDE 750 MG/1
40 CAPSULE, EXTENDED RELEASE ORAL ONCE
Status: COMPLETED | OUTPATIENT
Start: 2022-10-13 | End: 2022-10-13

## 2022-10-13 RX ORDER — POTASSIUM CHLORIDE 750 MG/1
10 TABLET, FILM COATED, EXTENDED RELEASE ORAL DAILY
Qty: 30 TABLET | Refills: 3 | COMMUNITY
Start: 2022-09-30 | End: 2022-12-30

## 2022-10-13 RX ORDER — SODIUM CHLORIDE 9 MG/ML
250 INJECTION, SOLUTION INTRAVENOUS ONCE
Status: COMPLETED | OUTPATIENT
Start: 2022-10-13 | End: 2022-10-13

## 2022-10-13 RX ORDER — SODIUM CHLORIDE 0.9 % (FLUSH) 0.9 %
10 SYRINGE (ML) INJECTION AS NEEDED
Status: DISCONTINUED | OUTPATIENT
Start: 2022-10-13 | End: 2022-10-13 | Stop reason: HOSPADM

## 2022-10-13 RX ORDER — FAMOTIDINE 10 MG/ML
20 INJECTION, SOLUTION INTRAVENOUS AS NEEDED
Status: DISCONTINUED | OUTPATIENT
Start: 2022-10-13 | End: 2022-10-13 | Stop reason: HOSPADM

## 2022-10-13 RX ORDER — DIPHENHYDRAMINE HYDROCHLORIDE 50 MG/ML
50 INJECTION INTRAMUSCULAR; INTRAVENOUS AS NEEDED
Status: CANCELLED | OUTPATIENT
Start: 2022-10-13

## 2022-10-13 RX ORDER — POTASSIUM CHLORIDE 750 MG/1
40 CAPSULE, EXTENDED RELEASE ORAL ONCE
Status: CANCELLED
Start: 2022-10-13 | End: 2022-10-13

## 2022-10-13 RX ORDER — MEPERIDINE HYDROCHLORIDE 25 MG/ML
25 INJECTION INTRAMUSCULAR; INTRAVENOUS; SUBCUTANEOUS
Status: DISCONTINUED | OUTPATIENT
Start: 2022-10-13 | End: 2022-10-13 | Stop reason: HOSPADM

## 2022-10-13 RX ORDER — PRAMOXINE HYDROCHLORIDE 10 MG/G
AEROSOL, FOAM TOPICAL
COMMUNITY
Start: 2022-10-01 | End: 2022-11-01

## 2022-10-13 RX ORDER — FLUCONAZOLE 100 MG/1
TABLET ORAL
COMMUNITY
Start: 2022-09-29 | End: 2023-02-15

## 2022-10-13 RX ORDER — GABAPENTIN 100 MG/1
CAPSULE ORAL
COMMUNITY
Start: 2022-09-30 | End: 2023-03-30

## 2022-10-13 RX ORDER — HEPARIN SODIUM (PORCINE) LOCK FLUSH IV SOLN 100 UNIT/ML 100 UNIT/ML
500 SOLUTION INTRAVENOUS AS NEEDED
Status: DISCONTINUED | OUTPATIENT
Start: 2022-10-13 | End: 2022-10-13 | Stop reason: HOSPADM

## 2022-10-13 RX ORDER — PALONOSETRON 0.05 MG/ML
0.25 INJECTION, SOLUTION INTRAVENOUS ONCE
Status: CANCELLED | OUTPATIENT
Start: 2022-10-13

## 2022-10-13 RX ORDER — SODIUM CHLORIDE 0.9 % (FLUSH) 0.9 %
10 SYRINGE (ML) INJECTION AS NEEDED
Status: CANCELLED | OUTPATIENT
Start: 2022-10-13

## 2022-10-13 RX ORDER — DOXORUBICIN HYDROCHLORIDE 2 MG/ML
50 INJECTION, SOLUTION INTRAVENOUS ONCE
Status: CANCELLED | OUTPATIENT
Start: 2022-10-13

## 2022-10-13 RX ADMIN — Medication 10 ML: at 15:30

## 2022-10-13 RX ADMIN — PEGFILGRASTIM 6 MG: KIT SUBCUTANEOUS at 15:30

## 2022-10-13 RX ADMIN — CYCLOPHOSPHAMIDE 1530 MG: 200 INJECTION, SOLUTION INTRAVENOUS at 14:45

## 2022-10-13 RX ADMIN — DIPHENHYDRAMINE HYDROCHLORIDE 50 MG: 50 INJECTION, SOLUTION INTRAMUSCULAR; INTRAVENOUS at 08:55

## 2022-10-13 RX ADMIN — POTASSIUM CHLORIDE 40 MEQ: 750 CAPSULE, EXTENDED RELEASE ORAL at 09:06

## 2022-10-13 RX ADMIN — FOSAPREPITANT 100 ML: 150 INJECTION, POWDER, LYOPHILIZED, FOR SOLUTION INTRAVENOUS at 12:23

## 2022-10-13 RX ADMIN — HEPARIN 500 UNITS: 100 SYRINGE at 15:30

## 2022-10-13 RX ADMIN — SODIUM CHLORIDE 250 ML: 9 INJECTION, SOLUTION INTRAVENOUS at 08:40

## 2022-10-13 RX ADMIN — OLANZAPINE 5 MG: 5 TABLET, FILM COATED ORAL at 12:23

## 2022-10-13 RX ADMIN — RITUXIMAB 770 MG: 10 INJECTION, SOLUTION INTRAVENOUS at 09:28

## 2022-10-13 RX ADMIN — DEXAMETHASONE SODIUM PHOSPHATE 12 MG: 10 INJECTION, SOLUTION INTRAMUSCULAR; INTRAVENOUS at 13:07

## 2022-10-13 RX ADMIN — PALONOSETRON 0.25 MG: 0.05 INJECTION, SOLUTION INTRAVENOUS at 08:40

## 2022-10-13 RX ADMIN — ACETAMINOPHEN 650 MG: 325 TABLET, FILM COATED ORAL at 08:41

## 2022-10-13 RX ADMIN — VINCRISTINE SULFATE 1.5 MG: 1 INJECTION, SOLUTION INTRAVENOUS at 14:19

## 2022-10-13 RX ADMIN — DOXORUBICIN HYDROCHLORIDE 100 MG: 2 INJECTION, SOLUTION INTRAVENOUS at 13:44

## 2022-10-14 NOTE — PROGRESS NOTES
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 She is here today to get cycle 6 R-CHOP.  She states her neuropathy is stable not worsening. Has intermittent nausea that is relieved with Phenergan. States hemorrhoidal pain is better with use of Proctofoam.       Oncology history:     1.  Recurrent follicular lymphoma grade 1:  -Patient initially diagnosed in April 2015 for which patient underwent palliative radiation treatment.  -Second relapse happened in September 2016 for again patient had a radiation treatment.  - Patient was found to have enlarged aortocaval lymph node in April 2017 for which patient initially received rituximab every 2 months without any significant improvement.  -Subsequently patient received 4 cycles of bendamustine and rituximab between April 30, 2018 and August 21, 2018.  -Patient was diagnosed with involvement of left parotid gland on biopsy done on August 27, 2020 by Dr. Ibrahim.  PET/CT done shows uptake in bilateral parotid gland along with left sided neck lymph node involvement.  -Patient was started on Revlimid with rituximab on September 22, 2020.  Patient completed cycle 12 on 8/23/2021.  -Patient received 3 cycles of rituximab maintenance between September 8, 2021 and February 28, 2022  - CT of chest abdomen and pelvis with contrast on May 17, 2022 showed new onset of chest wall mass on right side along with  pelvic mass.  - Ultrasound-guided biopsy showed large B-cell lymphoma in background of follicular lymphoma consistent with transformation  - PET/CT shows SUV of 12 in the region of right chest wall mass without any other abnormal uptake  -FISH for Bcl-2, BCL6 and c-Myc was negative.  - Patient was started on cycle 1 of R-CHOP on Raquel 15, 2022.             Past Medical History, Past Surgical History, Social History, Family History have been reviewed and are without significant changes except as mentioned.    Review of Systems   A comprehensive 14 point review of systems was performed and was negative except as mentioned.    Medications:  The current medication list was reviewed in the EMR    ALLERGIES:    Allergies   Allergen Reactions   • Ace Inhibitors Cough   • Latex      BLISTER     • Morphine And Related Nausea And Vomiting   • Lortab [Hydrocodone-Acetaminophen] Palpitations       Objective      Vitals:    10/13/22 0801   BP: 133/65   Pulse: 101   SpO2: 96%   Weight: 99.5 kg (219 lb 4.8 oz)   PainSc: 0-No pain     Current Status 9/1/2022   ECOG score 1       Physical Exam  General: alert and oriented no acute distress  Lungs; CTAB  Card; RRR      RECENT LABS:  Glucose   Date Value Ref Range Status   10/13/2022 199 (H) 65 - 99 mg/dL Final     Sodium   Date Value Ref Range Status   10/13/2022 134 (L) 136 - 145 mmol/L Final     Potassium   Date Value Ref Range Status   10/13/2022 3.2 (L) 3.5 - 5.2 mmol/L Final     CO2   Date Value Ref Range Status   10/13/2022 27.0 22.0 - 29.0 mmol/L Final     Chloride   Date Value Ref Range Status   10/13/2022 94 (L) 98 - 107 mmol/L Final     Anion Gap   Date Value Ref Range Status   10/13/2022 13.0 5.0 - 15.0 mmol/L Final     Creatinine   Date Value Ref Range Status   10/13/2022 0.62 0.57 - 1.00 mg/dL Final     BUN   Date Value Ref Range Status   10/13/2022 6 (L) 8 - 23 mg/dL Final     BUN/Creatinine Ratio   Date Value Ref Range Status   10/13/2022 9.7 7.0 - 25.0 Final      Calcium   Date Value Ref Range Status   10/13/2022 9.8 8.6 - 10.5 mg/dL Final     eGFR Non  Amer   Date Value Ref Range Status   12/06/2021 81 >60 mL/min/1.73 Final     Alkaline Phosphatase   Date Value Ref Range Status   10/13/2022 70 39 - 117 U/L Final     Total Protein   Date Value Ref Range Status   10/13/2022 6.8 6.0 - 8.5 g/dL Final     ALT (SGPT)   Date Value Ref Range Status   10/13/2022 57 (H) 1 - 33 U/L Final     AST (SGOT)   Date Value Ref Range Status   10/13/2022 58 (H) 1 - 32 U/L Final     Total Bilirubin   Date Value Ref Range Status   10/13/2022 0.4 0.0 - 1.2 mg/dL Final     Albumin   Date Value Ref Range Status   10/13/2022 4.20 3.50 - 5.20 g/dL Final     Globulin   Date Value Ref Range Status   10/13/2022 2.6 gm/dL Final     Lab Results   Component Value Date    WBC 3.75 10/13/2022    HGB 12.9 10/13/2022    HCT 38.2 10/13/2022    MCV 96.7 10/13/2022     10/13/2022     Lab Results   Component Value Date    NEUTROABS 2.96 10/13/2022               RADIOLOGY DATA :  No radiology results for the last 7 days        Assessment & Plan     1.  Diffuse large B-cell lymphoma with background of follicular lymphoma consisting of Hernandez's transformation  - Placed for Bcl-2, BCL6 and c-Myc was negative  - 2D echo done on September 21, 2022 shows ejection fraction is normal at 54.5%  - Labs reviewed today and will proceed with cycle 6 R-CHOP today.  - Dose of vincristine has been decreased by 25% since cycle 2 due to development of neuropathy  - Plan is to repeat PET/CT scan 4 weeks after today last cycle; order has been place and patient will RTC after PET to discuss results with Dr. Sood; plan discussed with patient and she is agreeable. PET/CT order placed today.     2.  Recurrent follicular lymphoma  - Review oncology history for prior treatment details  - Currently on treatment for Hernandez's transformation with R-CHOP     3.  Elevated liver function test  - Secondary to fatty liver plus  diabetes mellitus  - We will monitor with CMP     4.  Pelvic mass  - Mild does not have any uptake on PET/CT.  - Most recent PET/CT shows pelvic mass has decreased in size.     5.  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     6.  Health maintenance: Patient does not smoke.  Had a colonoscopy November 2021 by Dr Cruz     8. Advance Care Planning: For now patient remains full code and is able to make decisions.  Patient has health care surrogate mentioned on chart.     9.  Hypokalemia:  - Potassium is 3.2; will give potassium, 40 mEq po times 1.               PHQ-9 Total Score: 0     Chasity Leon reports a pain score of 0.  Given her pain assessment as noted, treatment options were discussed and the following options were decided upon as a follow-up plan to address the patient's pain: no pain today.         Jessica Segal, APRN  10/14/2022  07:45 CDT        Part of this note may be an electronic transcription/translation of spoken language to printed text using the Dragon Dictation System.          CC:

## 2022-11-09 ENCOUNTER — OFFICE VISIT (OUTPATIENT)
Dept: SURGERY | Facility: CLINIC | Age: 62
End: 2022-11-09

## 2022-11-09 VITALS
HEIGHT: 64 IN | WEIGHT: 217.2 LBS | SYSTOLIC BLOOD PRESSURE: 126 MMHG | BODY MASS INDEX: 37.08 KG/M2 | HEART RATE: 107 BPM | OXYGEN SATURATION: 95 % | DIASTOLIC BLOOD PRESSURE: 74 MMHG

## 2022-11-09 DIAGNOSIS — K64.1 GRADE II HEMORRHOIDS: Primary | ICD-10-CM

## 2022-11-09 PROCEDURE — 99203 OFFICE O/P NEW LOW 30 MIN: CPT | Performed by: SURGERY

## 2022-11-09 RX ORDER — HYDROCORTISONE 25 MG/G
CREAM TOPICAL
COMMUNITY
Start: 2022-11-03

## 2022-11-09 RX ORDER — PRAMOXINE HYDROCHLORIDE 10 MG/G
AEROSOL, FOAM TOPICAL
COMMUNITY
Start: 2022-10-17 | End: 2022-11-17

## 2022-11-09 NOTE — PROGRESS NOTES
CC: Hemorrhoids       HPI  62 year old woman hemorrhoids since childbirth. Worse since September. 2 weeks of proctofoam with little relief. Last treatment for follicular lymphoma in October- awaiting PET scan. Colonoscopy is up to date.  Past Medical History:   Diagnosis Date   • Agoraphobia with panic disorder     on SNRI, prn buspar, prn klonopin     • Anxiety    • Arthritis    • Atrophic vaginitis    • Depression      MDD      • Elevated cholesterol    • Essential hypertension    • Follicular non-Hodgkin's lymphoma (HCC)    • Generalized anxiety disorder     SSRI - buspar, cont cymbalta, prn klonopin      • GERD (gastroesophageal reflux disease)    • Hip pain     arthritis, artifical right hip     • History of transfusion    • Hyperlipidemia    • Mass of lower limb    • Nuclear senile cataract    • Obesity    • Otalgia     ENT REFER   • Panic disorder     with agoraphobia. On buspar and klonopin now      • Type 2 diabetes mellitus (HCC)     NO BDR       Past Surgical History:   Procedure Laterality Date   • CENTRAL VENOUS LINE INSERTION  03/11/2016    Central line insertion (Successful placement of right upper extremity midline.)   • COLONOSCOPY     • COLONOSCOPY N/A 07/18/2019    Procedure: COLONOSCOPY;  Surgeon: Brandon Salvador DO;  Location: [facility] ENDOSCOPY;  Service: Gastroenterology   • COLONOSCOPY N/A 11/30/2021    Procedure: COLONOSCOPY;  Surgeon: Madga Cruz MD;  Location: [facility] ENDOSCOPY;  Service: Gastroenterology;  Laterality: N/A;   • CYSTOSCOPY  03/23/1976    Cystoscopy (external urethroplasty and cysto-panendoscopy,urethal hymenal fusion with hypospadias)   • DILATATION AND CURETTAGE  01/25/1980    D&C (removal of IUD)   • ENDOSCOPY N/A 11/30/2021    Procedure: ESOPHAGOGASTRODUODENOSCOPY;  Surgeon: Magda Cruz MD;  Location: [facility] ENDOSCOPY;  Service: Gastroenterology;  Laterality: N/A;   • EXCISION LESION  04/15/2014    Remove thigh lesion (Excision of a mass of the right thigh  using a 16.2 cm incision with intermediate layered closure.)   • HYSTEROSCOPY  02/28/2003    Hysteroscope procedure (diagnostic hysteroscopy with fractional D&C)   • JOINT REPLACEMENT Right     hip   • OTHER SURGICAL HISTORY      Explore parathyroid glands   • OTHER SURGICAL HISTORY  08/26/1985    Laparosc diagnostic (desires elective tubal reversal)   • ME INSJ TUNNELED CVC W/O SUBQ PORT/ AGE 5 YR/> Right 04/04/2017    Procedure: INSERTION VENOUS ACCESS DEVICE (MEDIPORT) right chest;  Surgeon: Fortino Medina MD;  Location: [facility];  Service: General   • ME INSJ TUNNELED CVC W/O SUBQ PORT/ AGE 5 YR/> N/A 06/06/2017    Procedure: INSERTION VENOUS ACCESS DEVICE   (MEDIPORT)   (C-ARM#1);  Surgeon: Fortino Medina MD;  Location: [facility] OR;  Service: General   • SHOULDER ARTHROSCOPY  11/11/2013    Shoulder arthroscopy/surgery (Right shoulder. Rotator cuff repair. Subacromial decompression. Edgar procedure.)   • SHOULDER SURGERY  12/28/2015    Shoulder surgery procedure (Arthroscopy of the left shoulder with rotator cuff repair.Edgar procedure.Subacromial decompression.)   • TONSILLECTOMY  1972   • TONSILLECTOMY AND ADENOIDECTOMY  05/29/1967    T&A (hypertrophied tonsils and adenoids with recurrent infection)   • TOTAL ABDOMINAL HYSTERECTOMY WITH SALPINGO OOPHORECTOMY  02/03/2004    ARIELLE/BSO (with a preop fractional dilation and curettage with frozen section,menorrhagia,dysmenorrhea,unresponsive to medical intervention)   • TOTAL HIP ARTHROPLASTY           Current Outpatient Medications:   •  ARIPiprazole (ABILIFY) 20 MG tablet, Take 20 mg by mouth Every Night., Disp: , Rfl:   •  Belsomra 20 MG tablet, Take 20 mg by mouth Every Night., Disp: , Rfl:   •  busPIRone (BUSPAR) 30 MG tablet, Take 30 mg by mouth 2 (Two) Times a Day., Disp: , Rfl:   •  Calcium Carbonate-Vitamin D 600-200 MG-UNIT tablet, Take 1 tablet by mouth Daily., Disp: , Rfl:   •  cyclobenzaprine (FLEXERIL) 10 MG tablet, TAKE 1 TABLET (10 MG) BY MOUTH 3  TIMES DAILY AS NEEDED FOR MUSCLE SPASMS - - MAY CAUSE DROWSINESS, Disp: , Rfl:   •  desvenlafaxine (PRISTIQ) 100 MG 24 hr tablet, Take 100 mg by mouth Daily., Disp: , Rfl:   •  diphenoxylate-atropine (LOMOTIL) 2.5-0.025 MG per tablet, Take 2 tablets by mouth 4 (Four) Times a Day As Needed for Diarrhea. (Patient taking differently: Take 2 tablets by mouth As Needed for Diarrhea.), Disp: 60 tablet, Rfl: 2  •  doxepin (SINEquan) 25 MG capsule, Take 2 capsules by mouth., Disp: , Rfl:   •  Easy Comfort Pen Needles 31G X 6 MM misc, , Disp: , Rfl:   •  fluconazole (DIFLUCAN) 100 MG tablet, , Disp: , Rfl:   •  gabapentin (NEURONTIN) 100 MG capsule, TAKE 1 TO 2 CAPSULES (100-200 MG) BY MOUTH 3 TIMES DAILY FOR 30 DAYS FOR SHINGLES PAIN, Disp: , Rfl:   •  glimepiride (AMARYL) 4 MG tablet, Take 4 mg by mouth Every Morning Before Breakfast., Disp: , Rfl:   •  Insulin Aspart, w/Niacinamide, (Fiasp FlexTouch) 100 UNIT/ML solution pen-injector, Inject subcu t.i.d. A.c.  150-199 -2units, 200-249 4 units,  6 units, 300-349 8 units, 350+ 10 units, Disp: , Rfl:   •  insulin glargine (LANTUS, SEMGLEE) 100 UNIT/ML injection, Inject 30 Units under the skin into the appropriate area as directed Daily. 32 units nightly, Disp: , Rfl:   •  lamoTRIgine (LaMICtal) 150 MG tablet, Take 150 mg by mouth 2 (two) times a day., Disp: , Rfl:   •  Lidocaine Viscous HCl (XYLOCAINE) 2 % solution, , Disp: , Rfl:   •  losartan (COZAAR) 100 MG tablet, Take 100 mg by mouth daily., Disp: , Rfl:   •  losartan (COZAAR) 100 MG tablet, Take 1 tablet by mouth Daily., Disp: , Rfl:   •  Multiple Vitamins-Minerals (CENTRUM SILVER PO), Take 1 tablet by mouth daily., Disp: , Rfl:   •  Nystatin (magic mouthwash), Swish and spit 5 mL Every 4 (Four) Hours As Needed (Mouth pain)., Disp: 120 mL, Rfl: 2  •  omeprazole (priLOSEC) 40 MG capsule, Take 40 mg by mouth Daily., Disp: , Rfl:   •  ondansetron (ZOFRAN) 4 MG tablet, Take 1 tablet by mouth 4 (Four) Times a Day As  Needed for Nausea or Vomiting., Disp: 40 tablet, Rfl: 3  •  oxyCODONE-acetaminophen (PERCOCET)  MG per tablet, Take 1 tablet by mouth., Disp: , Rfl:   •  pioglitazone (ACTOS) 45 MG tablet, Take 45 mg by mouth Daily., Disp: , Rfl:   •  potassium chloride 10 MEQ CR tablet, Take 1 tablet by mouth Daily., Disp: 30 tablet, Rfl: 3  •  Pramoxine HCl, Perianal, 1 % foam, Insert  into the rectum., Disp: , Rfl:   •  predniSONE (DELTASONE) 20 MG tablet, Take 20 mg by mouth 5 (Five) Times a Day., Disp: , Rfl:   •  Procto-Med HC 2.5 % rectal cream, , Disp: , Rfl:   •  promethazine (PHENERGAN) 25 MG tablet, Take 1 tablet by mouth Every 6 (Six) Hours As Needed for Nausea or Vomiting., Disp: 40 tablet, Rfl: 3  •  TRAZODONE HCL PO, Take 225 mg by mouth every night., Disp: , Rfl:   •  valACYclovir (VALTREX) 1000 MG tablet, , Disp: , Rfl:   •  glimepiride (AMARYL) 4 MG tablet, Take 1 tablet by mouth Daily With Breakfast., Disp: , Rfl:   •  hydroCHLOROthiazide (HYDRODIURIL) 25 MG tablet, Take 25 mg by mouth Daily., Disp: , Rfl:     Allergies   Allergen Reactions   • Ace Inhibitors Cough   • Latex      BLISTER     • Morphine And Related Nausea And Vomiting   • Lortab [Hydrocodone-Acetaminophen] Palpitations       Family History   Problem Relation Age of Onset   • Breast cancer Mother    • Cancer Mother    • Diabetes Mother    • Heart disease Father    • Liver cancer Father    • Cancer Father    • Diabetes Sister         INSULIN DEPENDENT   • Cancer Sister    • Diabetes Brother         INSULIN DEPENDENT   • Coronary artery disease Other    • Diabetes Other    • Diabetes Maternal Grandmother    • Cancer Maternal Grandfather        Social History     Socioeconomic History   • Marital status:    Tobacco Use   • Smoking status: Former     Years: 12.00     Types: Cigarettes     Quit date: 1998     Years since quittin.8   • Smokeless tobacco: Never   • Tobacco comments:     Smoked 1 pack per 2 weeks   Substance and Sexual  Activity   • Alcohol use: No   • Drug use: No   • Sexual activity: Not Currently       Review of Systems   Gastrointestinal: Positive for blood in stool, diarrhea and rectal pain.       Physical Exam  Genitourinary:              ASSESSMENT    Diagnoses and all orders for this visit:    1. Grade II hemorrhoids (Primary)        PLAN    1. Medical treatment  -Fibre  -Steroid cream  2. Recheck in 1 month              This document has been electronically signed by Fortino Medina MD on November 9, 2022 09:33 CST

## 2022-11-14 ENCOUNTER — HOSPITAL ENCOUNTER (OUTPATIENT)
Dept: PET IMAGING | Facility: HOSPITAL | Age: 62
Discharge: HOME | End: 2022-11-14
Admitting: NURSE PRACTITIONER

## 2022-11-14 DIAGNOSIS — C83.39 DIFFUSE LARGE B-CELL LYMPHOMA OF EXTRANODAL SITE EXCLUDING SPLEEN AND OTHER SOLID ORGANS: Chronic | ICD-10-CM

## 2022-11-14 PROCEDURE — A9552 F18 FDG: HCPCS | Performed by: NURSE PRACTITIONER

## 2022-11-14 PROCEDURE — 78815 PET IMAGE W/CT SKULL-THIGH: CPT

## 2022-11-14 PROCEDURE — 0 FLUDEOXYGLUCOSE F18 SOLUTION: Performed by: NURSE PRACTITIONER

## 2022-11-14 RX ADMIN — SODIUM FLUORIDE F 18 1 DOSE: 200 INJECTION, SOLUTION INTRAVENOUS at 07:59

## 2022-11-16 ENCOUNTER — TELEPHONE (OUTPATIENT)
Dept: ONCOLOGY | Facility: HOSPITAL | Age: 62
End: 2022-11-16

## 2022-11-16 ENCOUNTER — OFFICE VISIT (OUTPATIENT)
Dept: ONCOLOGY | Facility: CLINIC | Age: 62
End: 2022-11-16

## 2022-11-16 ENCOUNTER — INFUSION (OUTPATIENT)
Dept: ONCOLOGY | Facility: HOSPITAL | Age: 62
End: 2022-11-16

## 2022-11-16 VITALS
SYSTOLIC BLOOD PRESSURE: 121 MMHG | TEMPERATURE: 97.6 F | BODY MASS INDEX: 37.46 KG/M2 | WEIGHT: 218.4 LBS | DIASTOLIC BLOOD PRESSURE: 66 MMHG | HEART RATE: 98 BPM | OXYGEN SATURATION: 93 %

## 2022-11-16 DIAGNOSIS — C83.39 DIFFUSE LARGE B-CELL LYMPHOMA OF EXTRANODAL SITE EXCLUDING SPLEEN AND OTHER SOLID ORGANS: Primary | Chronic | ICD-10-CM

## 2022-11-16 DIAGNOSIS — T45.1X5A NEUROPATHY DUE TO CHEMOTHERAPEUTIC DRUG: Chronic | ICD-10-CM

## 2022-11-16 DIAGNOSIS — Z45.2 ENCOUNTER FOR VENOUS ACCESS DEVICE CARE: ICD-10-CM

## 2022-11-16 DIAGNOSIS — C82.08 GRADE 1 FOLLICULAR LYMPHOMA OF LYMPH NODES OF MULTIPLE REGIONS: Chronic | ICD-10-CM

## 2022-11-16 DIAGNOSIS — C83.39 DIFFUSE LARGE B-CELL LYMPHOMA OF EXTRANODAL SITE EXCLUDING SPLEEN AND OTHER SOLID ORGANS: Primary | ICD-10-CM

## 2022-11-16 DIAGNOSIS — K11.8 MASS OF BOTH PAROTID GLANDS: Chronic | ICD-10-CM

## 2022-11-16 DIAGNOSIS — R79.89 ELEVATED LFTS: Chronic | ICD-10-CM

## 2022-11-16 DIAGNOSIS — G62.0 NEUROPATHY DUE TO CHEMOTHERAPEUTIC DRUG: Chronic | ICD-10-CM

## 2022-11-16 LAB
ALBUMIN SERPL-MCNC: 3.9 G/DL (ref 3.5–5.2)
ALBUMIN/GLOB SERPL: 1.4 G/DL
ALP SERPL-CCNC: 80 U/L (ref 39–117)
ALT SERPL W P-5'-P-CCNC: 102 U/L (ref 1–33)
ANION GAP SERPL CALCULATED.3IONS-SCNC: 12 MMOL/L (ref 5–15)
AST SERPL-CCNC: 93 U/L (ref 1–32)
BASOPHILS # BLD AUTO: 0.03 10*3/MM3 (ref 0–0.2)
BASOPHILS NFR BLD AUTO: 0.6 % (ref 0–1.5)
BILIRUB SERPL-MCNC: 0.3 MG/DL (ref 0–1.2)
BUN SERPL-MCNC: 9 MG/DL (ref 8–23)
BUN/CREAT SERPL: 13.4 (ref 7–25)
CALCIUM SPEC-SCNC: 9.6 MG/DL (ref 8.6–10.5)
CHLORIDE SERPL-SCNC: 94 MMOL/L (ref 98–107)
CO2 SERPL-SCNC: 30 MMOL/L (ref 22–29)
CREAT SERPL-MCNC: 0.67 MG/DL (ref 0.57–1)
DEPRECATED RDW RBC AUTO: 52 FL (ref 37–54)
EGFRCR SERPLBLD CKD-EPI 2021: 99 ML/MIN/1.73
EOSINOPHIL # BLD AUTO: 0.01 10*3/MM3 (ref 0–0.4)
EOSINOPHIL NFR BLD AUTO: 0.2 % (ref 0.3–6.2)
ERYTHROCYTE [DISTWIDTH] IN BLOOD BY AUTOMATED COUNT: 14.9 % (ref 12.3–15.4)
GLOBULIN UR ELPH-MCNC: 2.7 GM/DL
GLUCOSE SERPL-MCNC: 188 MG/DL (ref 65–99)
HCT VFR BLD AUTO: 39.4 % (ref 34–46.6)
HGB BLD-MCNC: 13.4 G/DL (ref 12–15.9)
HOLD SPECIMEN: NORMAL
IMM GRANULOCYTES # BLD AUTO: 0.03 10*3/MM3 (ref 0–0.05)
IMM GRANULOCYTES NFR BLD AUTO: 0.6 % (ref 0–0.5)
LYMPHOCYTES # BLD AUTO: 0.45 10*3/MM3 (ref 0.7–3.1)
LYMPHOCYTES NFR BLD AUTO: 9.1 % (ref 19.6–45.3)
MCH RBC QN AUTO: 32.6 PG (ref 26.6–33)
MCHC RBC AUTO-ENTMCNC: 34 G/DL (ref 31.5–35.7)
MCV RBC AUTO: 95.9 FL (ref 79–97)
MONOCYTES # BLD AUTO: 0.81 10*3/MM3 (ref 0.1–0.9)
MONOCYTES NFR BLD AUTO: 16.3 % (ref 5–12)
NEUTROPHILS NFR BLD AUTO: 3.63 10*3/MM3 (ref 1.7–7)
NEUTROPHILS NFR BLD AUTO: 73.2 % (ref 42.7–76)
NRBC BLD AUTO-RTO: 0 /100 WBC (ref 0–0.2)
PLATELET # BLD AUTO: 267 10*3/MM3 (ref 140–450)
PMV BLD AUTO: 8.7 FL (ref 6–12)
POTASSIUM SERPL-SCNC: 3.1 MMOL/L (ref 3.5–5.2)
PROT SERPL-MCNC: 6.6 G/DL (ref 6–8.5)
RBC # BLD AUTO: 4.11 10*6/MM3 (ref 3.77–5.28)
SODIUM SERPL-SCNC: 136 MMOL/L (ref 136–145)
WBC NRBC COR # BLD: 4.96 10*3/MM3 (ref 3.4–10.8)

## 2022-11-16 PROCEDURE — 99214 OFFICE O/P EST MOD 30 MIN: CPT | Performed by: INTERNAL MEDICINE

## 2022-11-16 PROCEDURE — 36591 DRAW BLOOD OFF VENOUS DEVICE: CPT | Performed by: INTERNAL MEDICINE

## 2022-11-16 PROCEDURE — 1123F ACP DISCUSS/DSCN MKR DOCD: CPT | Performed by: INTERNAL MEDICINE

## 2022-11-16 PROCEDURE — 80053 COMPREHEN METABOLIC PANEL: CPT

## 2022-11-16 PROCEDURE — 1126F AMNT PAIN NOTED NONE PRSNT: CPT | Performed by: INTERNAL MEDICINE

## 2022-11-16 PROCEDURE — 25010000002 HEPARIN LOCK FLUSH PER 10 UNITS: Performed by: INTERNAL MEDICINE

## 2022-11-16 PROCEDURE — G0463 HOSPITAL OUTPT CLINIC VISIT: HCPCS | Performed by: INTERNAL MEDICINE

## 2022-11-16 PROCEDURE — 36415 COLL VENOUS BLD VENIPUNCTURE: CPT

## 2022-11-16 PROCEDURE — 85025 COMPLETE CBC W/AUTO DIFF WBC: CPT

## 2022-11-16 RX ORDER — SODIUM CHLORIDE 0.9 % (FLUSH) 0.9 %
10 SYRINGE (ML) INJECTION AS NEEDED
Status: CANCELLED | OUTPATIENT
Start: 2022-11-16

## 2022-11-16 RX ORDER — SODIUM CHLORIDE 0.9 % (FLUSH) 0.9 %
20 SYRINGE (ML) INJECTION AS NEEDED
Status: CANCELLED | OUTPATIENT
Start: 2022-11-16

## 2022-11-16 RX ORDER — HEPARIN SODIUM (PORCINE) LOCK FLUSH IV SOLN 100 UNIT/ML 100 UNIT/ML
500 SOLUTION INTRAVENOUS AS NEEDED
Status: CANCELLED | OUTPATIENT
Start: 2022-11-16

## 2022-11-16 RX ORDER — POTASSIUM CHLORIDE 750 MG/1
20 CAPSULE, EXTENDED RELEASE ORAL 2 TIMES DAILY
Qty: 8 CAPSULE | Refills: 0 | Status: SHIPPED | OUTPATIENT
Start: 2022-11-16 | End: 2022-11-18

## 2022-11-16 RX ORDER — HEPARIN SODIUM (PORCINE) LOCK FLUSH IV SOLN 100 UNIT/ML 100 UNIT/ML
500 SOLUTION INTRAVENOUS AS NEEDED
Status: DISCONTINUED | OUTPATIENT
Start: 2022-11-16 | End: 2022-11-16 | Stop reason: HOSPADM

## 2022-11-16 RX ORDER — PROMETHAZINE HYDROCHLORIDE AND CODEINE PHOSPHATE 6.25; 1 MG/5ML; MG/5ML
5 SYRUP ORAL EVERY 6 HOURS PRN
COMMUNITY
Start: 2022-11-12 | End: 2022-12-13

## 2022-11-16 RX ADMIN — HEPARIN 500 UNITS: 100 SYRINGE at 10:09

## 2022-11-16 NOTE — TELEPHONE ENCOUNTER
Called and spoke with pt regarding lab results and medication instructions as per Dr. Sood, v/u obtained.

## 2022-11-16 NOTE — TELEPHONE ENCOUNTER
----- Message from Tino Sood MD sent at 11/16/2022  4:05 PM CST -----  Regarding: Labs  Please let patient know her glucose was elevated at 188 today.  Liver enzymes were higher than last clinic visit, she needs to monitor her glucose very tightly at home.  Her potassium was 3.1.  She is on potassium 10 mEq p.o. daily at home as per her medication list.  I have sent prescription for potassium 20 mEq twice daily for next 2 days to her pharmacy.  She can stop regular potassium for next 2 days while she is taking twice daily dose of potassium.  After that recommend resuming potassium 10 mEq p.o. daily at home.  Thank you

## 2022-11-16 NOTE — PROGRESS NOTES
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  Patient completed cycle 6 of R-CHOP on October 13, 2022.  Patient is here to discuss the result of recently done PET/CT and further recommendation.  Denies any recent worsening of neuropathy affecting fingertips.  Complains of chronic intermittent diarrhea.  Denies any drenching night sweats.  Denies any bleeding.  Denies any new lymph node enlargement.            Oncology history:    1.  Recurrent follicular lymphoma grade 1:  -Patient initially diagnosed in April 2015 for which patient underwent palliative radiation treatment.  -Second relapse happened in September 2016 for again patient had a radiation treatment.  - Patient was found to have enlarged aortocaval lymph node in April 2017 for which patient initially received rituximab every 2 months without any significant improvement.  -Subsequently patient received 4 cycles of bendamustine and rituximab between April 30, 2018 and August 21, 2018.  -Patient was diagnosed with involvement of left parotid gland on biopsy done on August 27, 2020 by Dr. Ibrahim.  PET/CT done shows uptake in bilateral parotid gland along with left sided neck lymph node involvement.  -Patient was started on Revlimid with rituximab on September 22, 2020.  Patient completed cycle 12 on 8/23/2021.  -Patient received 3 cycles of rituximab maintenance between September  8, 2021 and February 28, 2022  - CT of chest abdomen and pelvis with contrast on May 17, 2022 showed new onset of chest wall mass on right side along with pelvic mass.  - Ultrasound-guided biopsy showed large B-cell lymphoma in background of follicular lymphoma consistent with transformation  - PET/CT shows SUV of 12 in the region of right chest wall mass without any other abnormal uptake  -FISH for Bcl-2, BCL6 and c-Myc was negative.  - Patient was started on cycle 1 of R-CHOP on Raquel 15, 2022.      2.  Diffuse large B-cell lymphoma involving right chest wall  - CT of chest abdomen and pelvis with contrast on May 17, 2022 showed new onset of chest wall mass on right side along with pelvic mass.  - Ultrasound-guided biopsy showed large B-cell lymphoma in background of follicular lymphoma consistent with transformation  - PET/CT shows SUV of 12 in the region of right chest wall mass without any other abnormal uptake  -FISH for Bcl-2, BCL6 and c-Myc was negative.  -Patient received 6 cycles of chemotherapy with R-CHOP between Raquel 15, 2022 and October 13, 2022  - PET/CT done on November 11, 2022 shows complete metabolic response without any residual lymphoma.          Past Medical History, Past Surgical History, Social History, Family History have been reviewed and are without significant changes except as mentioned.    Review of Systems   A comprehensive 14 point review of systems was performed and was negative except as mentioned in HPI.    Medications:  The current medication list was reviewed in the EMR    ALLERGIES:    Allergies   Allergen Reactions   • Ace Inhibitors Cough   • Latex      BLISTER     • Morphine And Related Nausea And Vomiting   • Lortab [Hydrocodone-Acetaminophen] Palpitations       Objective      Vitals:    11/16/22 1036   BP: 121/66   Pulse: 98   Temp: 97.6 °F (36.4 °C)   TempSrc: Temporal   SpO2: 93%   Weight: 99.1 kg (218 lb 6.4 oz)   PainSc: 0-No pain     Current Status 9/1/2022   ECOG score  1       Physical Exam  Cardiovascular:      Comments: Port-A-Cath present  Pulmonary:      Breath sounds: Normal breath sounds.   Lymphadenopathy:      Cervical: No cervical adenopathy.   Neurological:      Mental Status: She is alert and oriented to person, place, and time.           RECENT LABS:  Glucose   Date Value Ref Range Status   11/16/2022 188 (H) 65 - 99 mg/dL Final     Sodium   Date Value Ref Range Status   11/16/2022 136 136 - 145 mmol/L Final     Potassium   Date Value Ref Range Status   11/16/2022 3.1 (L) 3.5 - 5.2 mmol/L Final     CO2   Date Value Ref Range Status   11/16/2022 30.0 (H) 22.0 - 29.0 mmol/L Final     Chloride   Date Value Ref Range Status   11/16/2022 94 (L) 98 - 107 mmol/L Final     Anion Gap   Date Value Ref Range Status   11/16/2022 12.0 5.0 - 15.0 mmol/L Final     Creatinine   Date Value Ref Range Status   11/16/2022 0.67 0.57 - 1.00 mg/dL Final     BUN   Date Value Ref Range Status   11/16/2022 9 8 - 23 mg/dL Final     BUN/Creatinine Ratio   Date Value Ref Range Status   11/16/2022 13.4 7.0 - 25.0 Final     Calcium   Date Value Ref Range Status   11/16/2022 9.6 8.6 - 10.5 mg/dL Final     eGFR Non  Amer   Date Value Ref Range Status   12/06/2021 81 >60 mL/min/1.73 Final     Alkaline Phosphatase   Date Value Ref Range Status   11/16/2022 80 39 - 117 U/L Final     Total Protein   Date Value Ref Range Status   11/16/2022 6.6 6.0 - 8.5 g/dL Final     ALT (SGPT)   Date Value Ref Range Status   11/16/2022 102 (H) 1 - 33 U/L Final     AST (SGOT)   Date Value Ref Range Status   11/16/2022 93 (H) 1 - 32 U/L Final     Total Bilirubin   Date Value Ref Range Status   11/16/2022 0.3 0.0 - 1.2 mg/dL Final     Albumin   Date Value Ref Range Status   11/16/2022 3.90 3.50 - 5.20 g/dL Final     Globulin   Date Value Ref Range Status   11/16/2022 2.7 gm/dL Final     Lab Results   Component Value Date    WBC 4.96 11/16/2022    HGB 13.4 11/16/2022    HCT 39.4 11/16/2022    MCV 95.9 11/16/2022      11/16/2022     Lab Results   Component Value Date    NEUTROABS 3.63 11/16/2022           PATHOLOGY:  * Cannot find OR log *         RADIOLOGY DATA :  NM PET/CT Skull Base to Mid Thigh    Result Date: 11/14/2022  CONCLUSION:  Grossly normal PET/CT without concerning metabolic activity or obvious pathologic adenopathy. Electronically signed by:  Mandeep Lele SALDANA  11/14/2022 10:50 AM CST Workstation: [id number]          Assessment & Plan     1.  Diffuse large B-cell lymphoma with background of follicular lymphoma consisting of Hernandez's transformation  - FISH for Bcl-2, BCL6 and c-Myc was negative  - Patient received 6 cycles of CHOP that completed on October 13, 2022.  - PET/CT done on November 11, 2022 after cycle 6 of R-CHOP shows complete metabolic response.  There is no evidence of any disease on PET/CT.  Results were discussed with patient  - Recommend continue with clinical monitoring for now  - We will have patient return to clinic in 3 months with repeat CBC, CMP, CT of chest abdomen and pelvis with contrast to be done prior to that.    2.  Recurrent follicular lymphoma  - Review oncology history for prior treatment details  - Recent PET/CT shows complete metabolic response.    3.  Elevated liver function test  - Secondary to fatty liver plus diabetes mellitus.  CMP from today shows worsening LFT.  - We will monitor with CMP    4.  Pelvic mass:  - PET/CT did not show any evidence of uptake.      5.  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  - Neuropathy stable.  Remains on gabapentin 100 mg 3 times a day.  Thank you we will continue with clinical monitoring    6.  Health maintenance: Patient does not smoke.  Had a colonoscopy in November 2021 by Dr Cruz    7. Advance Care Planning: For now patient remains full code and is able to make decisions.  Patient has health care surrogate mentioned on chart.    8.  Hypokalemia  - Potassium is 3.  1.  Patient is  currently on potassium 10 mEq p.o. daily at home  - Recommend taking potassium 20 mEq twice daily for next 2 days.  After that recommend resuming potassium 20 mEq p.o. daily.  Prescription for potassium 20 mg twice daily for 2 days has been sent to her pharmacy today                 PHQ-9 Total Score: 0   -Patient is not homicidal or suicidal.  No acute intervention required.    Chasity Leon reports a pain score of 0.  Given her pain assessment as noted, treatment options were discussed and the following options were decided upon as a follow-up plan to address the patient's pain: continuation of current treatment plan for pain.         Tino Sood MD  11/16/2022  16:03 CST        Part of this note may be an electronic transcription/translation of spoken language to printed text using the Dragon Dictation System.          CC:

## 2022-12-27 RX ORDER — POTASSIUM CHLORIDE 750 MG/1
20 CAPSULE, EXTENDED RELEASE ORAL 2 TIMES DAILY
Qty: 8 CAPSULE | Refills: 0 | OUTPATIENT
Start: 2022-12-27 | End: 2022-12-29

## 2022-12-27 NOTE — TELEPHONE ENCOUNTER
Rx Refill Note  Requested Prescriptions     Pending Prescriptions Disp Refills   • potassium chloride (MICRO-K) 10 MEQ CR capsule [Pharmacy Med Name: POT CHLORIDE 10MEQ ER] 8 capsule 0     Sig: TAKE 2 CAPSULES BY MOUTH 2 (TWO) TIMES A DAY FOR 2 DAYS.      Last office visit with prescribing clinician: 11/16/2022   Last telemedicine visit with prescribing clinician: 2/15/2023   Next office visit with prescribing clinician: 2/15/2023                         Would you like a call back once the refill request has been completed: [] Yes [] No    If the office needs to give you a call back, can they leave a voicemail: [] Yes [] No    Renetta Coyle Rep  12/27/22, 08:57 CST

## 2022-12-29 ENCOUNTER — INFUSION (OUTPATIENT)
Dept: ONCOLOGY | Facility: HOSPITAL | Age: 62
End: 2022-12-29

## 2022-12-29 DIAGNOSIS — Z45.2 ENCOUNTER FOR VENOUS ACCESS DEVICE CARE: Primary | ICD-10-CM

## 2022-12-29 DIAGNOSIS — C82.08 GRADE 1 FOLLICULAR LYMPHOMA OF LYMPH NODES OF MULTIPLE REGIONS: Chronic | ICD-10-CM

## 2022-12-29 LAB
ANION GAP SERPL CALCULATED.3IONS-SCNC: 9 MMOL/L (ref 5–15)
BUN SERPL-MCNC: 10 MG/DL (ref 8–23)
BUN/CREAT SERPL: 14.5 (ref 7–25)
CALCIUM SPEC-SCNC: 9.8 MG/DL (ref 8.6–10.5)
CHLORIDE SERPL-SCNC: 93 MMOL/L (ref 98–107)
CO2 SERPL-SCNC: 31 MMOL/L (ref 22–29)
CREAT SERPL-MCNC: 0.69 MG/DL (ref 0.57–1)
EGFRCR SERPLBLD CKD-EPI 2021: 98.3 ML/MIN/1.73
GLUCOSE SERPL-MCNC: 286 MG/DL (ref 65–99)
MAGNESIUM SERPL-MCNC: 1.8 MG/DL (ref 1.6–2.4)
POTASSIUM SERPL-SCNC: 3.4 MMOL/L (ref 3.5–5.2)
SODIUM SERPL-SCNC: 133 MMOL/L (ref 136–145)

## 2022-12-29 PROCEDURE — 25010000002 HEPARIN LOCK FLUSH PER 10 UNITS: Performed by: INTERNAL MEDICINE

## 2022-12-29 PROCEDURE — 80048 BASIC METABOLIC PNL TOTAL CA: CPT

## 2022-12-29 PROCEDURE — 36591 DRAW BLOOD OFF VENOUS DEVICE: CPT | Performed by: NURSE PRACTITIONER

## 2022-12-29 PROCEDURE — 83735 ASSAY OF MAGNESIUM: CPT

## 2022-12-29 RX ORDER — SODIUM CHLORIDE 0.9 % (FLUSH) 0.9 %
10 SYRINGE (ML) INJECTION AS NEEDED
Status: DISCONTINUED | OUTPATIENT
Start: 2022-12-29 | End: 2022-12-29 | Stop reason: HOSPADM

## 2022-12-29 RX ORDER — HEPARIN SODIUM (PORCINE) LOCK FLUSH IV SOLN 100 UNIT/ML 100 UNIT/ML
500 SOLUTION INTRAVENOUS AS NEEDED
Status: DISCONTINUED | OUTPATIENT
Start: 2022-12-29 | End: 2022-12-29 | Stop reason: HOSPADM

## 2022-12-29 RX ORDER — HEPARIN SODIUM (PORCINE) LOCK FLUSH IV SOLN 100 UNIT/ML 100 UNIT/ML
500 SOLUTION INTRAVENOUS AS NEEDED
Status: CANCELLED | OUTPATIENT
Start: 2022-12-29

## 2022-12-29 RX ORDER — SODIUM CHLORIDE 0.9 % (FLUSH) 0.9 %
20 SYRINGE (ML) INJECTION AS NEEDED
Status: CANCELLED | OUTPATIENT
Start: 2022-12-29

## 2022-12-29 RX ORDER — SODIUM CHLORIDE 0.9 % (FLUSH) 0.9 %
10 SYRINGE (ML) INJECTION AS NEEDED
Status: CANCELLED | OUTPATIENT
Start: 2022-12-29

## 2022-12-29 RX ADMIN — HEPARIN 500 UNITS: 100 SYRINGE at 10:56

## 2023-02-13 ENCOUNTER — INFUSION (OUTPATIENT)
Dept: ONCOLOGY | Facility: HOSPITAL | Age: 63
End: 2023-02-13
Payer: MEDICARE

## 2023-02-13 ENCOUNTER — HOSPITAL ENCOUNTER (OUTPATIENT)
Dept: CT IMAGING | Facility: HOSPITAL | Age: 63
Discharge: HOME | End: 2023-02-13
Admitting: INTERNAL MEDICINE
Payer: MEDICARE

## 2023-02-13 DIAGNOSIS — C83.39 DIFFUSE LARGE B-CELL LYMPHOMA OF EXTRANODAL SITE EXCLUDING SPLEEN AND OTHER SOLID ORGANS: Primary | ICD-10-CM

## 2023-02-13 DIAGNOSIS — K11.8 MASS OF BOTH PAROTID GLANDS: Chronic | ICD-10-CM

## 2023-02-13 DIAGNOSIS — R79.89 ELEVATED LFTS: ICD-10-CM

## 2023-02-13 DIAGNOSIS — C82.08 GRADE 1 FOLLICULAR LYMPHOMA OF LYMPH NODES OF MULTIPLE REGIONS: Chronic | ICD-10-CM

## 2023-02-13 DIAGNOSIS — K11.8 MASS OF BOTH PAROTID GLANDS: ICD-10-CM

## 2023-02-13 DIAGNOSIS — T45.1X5A NEUROPATHY DUE TO CHEMOTHERAPEUTIC DRUG: ICD-10-CM

## 2023-02-13 DIAGNOSIS — C82.08 GRADE 1 FOLLICULAR LYMPHOMA OF LYMPH NODES OF MULTIPLE REGIONS: ICD-10-CM

## 2023-02-13 DIAGNOSIS — C83.39 DIFFUSE LARGE B-CELL LYMPHOMA OF EXTRANODAL SITE EXCLUDING SPLEEN AND OTHER SOLID ORGANS: Chronic | ICD-10-CM

## 2023-02-13 DIAGNOSIS — Z45.2 ENCOUNTER FOR VENOUS ACCESS DEVICE CARE: ICD-10-CM

## 2023-02-13 DIAGNOSIS — G62.0 NEUROPATHY DUE TO CHEMOTHERAPEUTIC DRUG: ICD-10-CM

## 2023-02-13 LAB
ALBUMIN SERPL-MCNC: 4.1 G/DL (ref 3.5–5.2)
ALBUMIN/GLOB SERPL: 1.6 G/DL
ALP SERPL-CCNC: 94 U/L (ref 39–117)
ALT SERPL W P-5'-P-CCNC: 58 U/L (ref 1–33)
ANION GAP SERPL CALCULATED.3IONS-SCNC: 10 MMOL/L (ref 5–15)
AST SERPL-CCNC: 46 U/L (ref 1–32)
BASOPHILS # BLD AUTO: 0.03 10*3/MM3 (ref 0–0.2)
BASOPHILS NFR BLD AUTO: 0.6 % (ref 0–1.5)
BILIRUB SERPL-MCNC: 0.3 MG/DL (ref 0–1.2)
BUN SERPL-MCNC: 7 MG/DL (ref 8–23)
BUN/CREAT SERPL: 11.7 (ref 7–25)
CALCIUM SPEC-SCNC: 9.6 MG/DL (ref 8.6–10.5)
CHLORIDE SERPL-SCNC: 98 MMOL/L (ref 98–107)
CO2 SERPL-SCNC: 27 MMOL/L (ref 22–29)
CREAT SERPL-MCNC: 0.6 MG/DL (ref 0.57–1)
DEPRECATED RDW RBC AUTO: 49 FL (ref 37–54)
EGFRCR SERPLBLD CKD-EPI 2021: 101 ML/MIN/1.73
EOSINOPHIL # BLD AUTO: 0 10*3/MM3 (ref 0–0.4)
EOSINOPHIL NFR BLD AUTO: 0 % (ref 0.3–6.2)
ERYTHROCYTE [DISTWIDTH] IN BLOOD BY AUTOMATED COUNT: 14.6 % (ref 12.3–15.4)
GLOBULIN UR ELPH-MCNC: 2.5 GM/DL
GLUCOSE SERPL-MCNC: 198 MG/DL (ref 65–99)
HCT VFR BLD AUTO: 39.1 % (ref 34–46.6)
HGB BLD-MCNC: 13.4 G/DL (ref 12–15.9)
HOLD SPECIMEN: NORMAL
IMM GRANULOCYTES # BLD AUTO: 0.04 10*3/MM3 (ref 0–0.05)
IMM GRANULOCYTES NFR BLD AUTO: 0.8 % (ref 0–0.5)
LYMPHOCYTES # BLD AUTO: 0.8 10*3/MM3 (ref 0.7–3.1)
LYMPHOCYTES NFR BLD AUTO: 16.8 % (ref 19.6–45.3)
MCH RBC QN AUTO: 31.4 PG (ref 26.6–33)
MCHC RBC AUTO-ENTMCNC: 34.3 G/DL (ref 31.5–35.7)
MCV RBC AUTO: 91.6 FL (ref 79–97)
MONOCYTES # BLD AUTO: 0.5 10*3/MM3 (ref 0.1–0.9)
MONOCYTES NFR BLD AUTO: 10.5 % (ref 5–12)
NEUTROPHILS NFR BLD AUTO: 3.4 10*3/MM3 (ref 1.7–7)
NEUTROPHILS NFR BLD AUTO: 71.3 % (ref 42.7–76)
NRBC BLD AUTO-RTO: 0 /100 WBC (ref 0–0.2)
PLATELET # BLD AUTO: 289 10*3/MM3 (ref 140–450)
PMV BLD AUTO: 8.5 FL (ref 6–12)
POTASSIUM SERPL-SCNC: 4 MMOL/L (ref 3.5–5.2)
PROT SERPL-MCNC: 6.6 G/DL (ref 6–8.5)
RBC # BLD AUTO: 4.27 10*6/MM3 (ref 3.77–5.28)
SODIUM SERPL-SCNC: 135 MMOL/L (ref 136–145)
WBC NRBC COR # BLD: 4.77 10*3/MM3 (ref 3.4–10.8)

## 2023-02-13 PROCEDURE — 36591 DRAW BLOOD OFF VENOUS DEVICE: CPT | Performed by: INTERNAL MEDICINE

## 2023-02-13 PROCEDURE — 80053 COMPREHEN METABOLIC PANEL: CPT

## 2023-02-13 PROCEDURE — 25010000002 IOPAMIDOL 61 % SOLUTION: Performed by: INTERNAL MEDICINE

## 2023-02-13 PROCEDURE — 25010000002 HEPARIN LOCK FLUSH PER 10 UNITS: Performed by: INTERNAL MEDICINE

## 2023-02-13 PROCEDURE — 85025 COMPLETE CBC W/AUTO DIFF WBC: CPT

## 2023-02-13 PROCEDURE — 74177 CT ABD & PELVIS W/CONTRAST: CPT

## 2023-02-13 PROCEDURE — 36415 COLL VENOUS BLD VENIPUNCTURE: CPT

## 2023-02-13 PROCEDURE — 71260 CT THORAX DX C+: CPT

## 2023-02-13 RX ORDER — SODIUM CHLORIDE 0.9 % (FLUSH) 0.9 %
20 SYRINGE (ML) INJECTION AS NEEDED
Status: DISCONTINUED | OUTPATIENT
Start: 2023-02-13 | End: 2023-02-13 | Stop reason: HOSPADM

## 2023-02-13 RX ORDER — HEPARIN SODIUM (PORCINE) LOCK FLUSH IV SOLN 100 UNIT/ML 100 UNIT/ML
500 SOLUTION INTRAVENOUS AS NEEDED
Status: CANCELLED | OUTPATIENT
Start: 2023-02-13

## 2023-02-13 RX ORDER — SODIUM CHLORIDE 0.9 % (FLUSH) 0.9 %
20 SYRINGE (ML) INJECTION AS NEEDED
Status: CANCELLED | OUTPATIENT
Start: 2023-02-13

## 2023-02-13 RX ORDER — SODIUM CHLORIDE 0.9 % (FLUSH) 0.9 %
10 SYRINGE (ML) INJECTION AS NEEDED
Status: CANCELLED | OUTPATIENT
Start: 2023-02-13

## 2023-02-13 RX ORDER — HEPARIN SODIUM (PORCINE) LOCK FLUSH IV SOLN 100 UNIT/ML 100 UNIT/ML
500 SOLUTION INTRAVENOUS AS NEEDED
Status: DISCONTINUED | OUTPATIENT
Start: 2023-02-13 | End: 2023-02-13 | Stop reason: HOSPADM

## 2023-02-13 RX ADMIN — Medication 500 UNITS: at 09:58

## 2023-02-13 RX ADMIN — IOPAMIDOL 90 ML: 612 INJECTION, SOLUTION INTRAVENOUS at 09:52

## 2023-02-13 RX ADMIN — Medication 20 ML: at 09:58

## 2023-02-15 ENCOUNTER — OFFICE VISIT (OUTPATIENT)
Dept: ONCOLOGY | Facility: CLINIC | Age: 63
End: 2023-02-15
Payer: MEDICARE

## 2023-02-15 VITALS
OXYGEN SATURATION: 93 % | HEART RATE: 101 BPM | DIASTOLIC BLOOD PRESSURE: 71 MMHG | TEMPERATURE: 97.4 F | SYSTOLIC BLOOD PRESSURE: 141 MMHG | WEIGHT: 229.5 LBS | BODY MASS INDEX: 39.37 KG/M2

## 2023-02-15 DIAGNOSIS — R19.7 DIARRHEA, UNSPECIFIED TYPE: Chronic | ICD-10-CM

## 2023-02-15 DIAGNOSIS — C83.39 DIFFUSE LARGE B-CELL LYMPHOMA OF EXTRANODAL SITE EXCLUDING SPLEEN AND OTHER SOLID ORGANS: Chronic | ICD-10-CM

## 2023-02-15 DIAGNOSIS — G62.0 NEUROPATHY DUE TO CHEMOTHERAPEUTIC DRUG: Chronic | ICD-10-CM

## 2023-02-15 DIAGNOSIS — R79.89 ELEVATED LFTS: Chronic | ICD-10-CM

## 2023-02-15 DIAGNOSIS — T45.1X5A NEUROPATHY DUE TO CHEMOTHERAPEUTIC DRUG: Chronic | ICD-10-CM

## 2023-02-15 DIAGNOSIS — C82.08 GRADE 1 FOLLICULAR LYMPHOMA OF LYMPH NODES OF MULTIPLE REGIONS: Primary | Chronic | ICD-10-CM

## 2023-02-15 PROCEDURE — 1126F AMNT PAIN NOTED NONE PRSNT: CPT | Performed by: INTERNAL MEDICINE

## 2023-02-15 PROCEDURE — G0463 HOSPITAL OUTPT CLINIC VISIT: HCPCS | Performed by: INTERNAL MEDICINE

## 2023-02-15 PROCEDURE — 99214 OFFICE O/P EST MOD 30 MIN: CPT | Performed by: INTERNAL MEDICINE

## 2023-02-15 PROCEDURE — 1123F ACP DISCUSS/DSCN MKR DOCD: CPT | Performed by: INTERNAL MEDICINE

## 2023-02-15 RX ORDER — METRONIDAZOLE 500 MG/1
TABLET ORAL
COMMUNITY
Start: 2023-01-13

## 2023-02-15 RX ORDER — SULFAMETHOXAZOLE AND TRIMETHOPRIM 800; 160 MG/1; MG/1
TABLET ORAL
COMMUNITY
Start: 2022-12-05

## 2023-02-15 RX ORDER — LAMOTRIGINE 150 MG/1
1 TABLET ORAL 2 TIMES DAILY
COMMUNITY
Start: 2022-12-27 | End: 2023-03-28

## 2023-02-15 RX ORDER — SPIRONOLACTONE 25 MG/1
TABLET ORAL
COMMUNITY
Start: 2022-12-30

## 2023-02-15 RX ORDER — FLUCONAZOLE 100 MG/1
100 TABLET ORAL AS NEEDED
COMMUNITY
Start: 2022-12-05

## 2023-02-15 RX ORDER — BUSPIRONE HYDROCHLORIDE 30 MG/1
1 TABLET ORAL 2 TIMES DAILY
COMMUNITY
Start: 2022-12-27 | End: 2023-03-28

## 2023-02-15 RX ORDER — LOSARTAN POTASSIUM 100 MG/1
1 TABLET ORAL DAILY
COMMUNITY
Start: 2023-01-30 | End: 2023-07-30

## 2023-02-15 RX ORDER — DOXEPIN HYDROCHLORIDE 25 MG/1
50 CAPSULE ORAL
COMMUNITY
Start: 2022-12-27 | End: 2023-06-26

## 2023-02-15 NOTE — PROGRESS NOTES
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  Patient is here for follow-up appointment today to discuss recently done blood work as well as CT scan for surveillance.  Denies any recent worsening of neuropathy affecting fingertips.  Complains of chronic intermittent diarrhea.  Denies any drenching night sweats.  Denies any bleeding.  Denies any new lymph node enlargement.                Oncology history:    1.  Recurrent follicular lymphoma grade 1:  -Patient initially diagnosed in April 2015 for which patient underwent palliative radiation treatment.  -Second relapse happened in September 2016 for again patient had a radiation treatment.  - Patient was found to have enlarged aortocaval lymph node in April 2017 for which patient initially received rituximab every 2 months without any significant improvement.  -Subsequently patient received 4 cycles of bendamustine and rituximab between April 30, 2018 and August 21, 2018.  -Patient was diagnosed with involvement of left parotid gland on biopsy done on August 27, 2020 by Dr. Ibrahim.  PET/CT done shows uptake in bilateral parotid gland along with left sided neck lymph node involvement.  -Patient was started on Revlimid with rituximab on September 22, 2020.  Patient completed cycle 12 on 8/23/2021.  -Patient received 3 cycles of rituximab maintenance between September 8, 2021 and February 28, 2022  - CT of chest abdomen and  pelvis with contrast on May 17, 2022 showed new onset of chest wall mass on right side along with pelvic mass.  - Ultrasound-guided biopsy showed large B-cell lymphoma in background of follicular lymphoma consistent with transformation  - PET/CT shows SUV of 12 in the region of right chest wall mass without any other abnormal uptake  -FISH for Bcl-2, BCL6 and c-Myc was negative.  - Patient was started on cycle 1 of R-CHOP on Raquel 15, 2022.        2.  Diffuse large B-cell lymphoma involving right chest wall  - CT of chest abdomen and pelvis with contrast on May 17, 2022 showed new onset of chest wall mass on right side along with pelvic mass.  - Ultrasound-guided biopsy showed large B-cell lymphoma in background of follicular lymphoma consistent with transformation  - PET/CT shows SUV of 12 in the region of right chest wall mass without any other abnormal uptake  -FISH for Bcl-2, BCL6 and c-Myc was negative.  -Patient received 6 cycles of chemotherapy with R-CHOP between Raquel 15, 2022 and October 13, 2022  - PET/CT done on November 11, 2022 shows complete metabolic response without any residual lymphoma.            Past Medical History, Past Surgical History, Social History, Family History have been reviewed and are without significant changes except as mentioned.    Review of Systems   A comprehensive 14 point review of systems was performed and was negative except as mentioned in HPI.    Medications:  The current medication list was reviewed in the EMR    ALLERGIES:    Allergies   Allergen Reactions   • Ace Inhibitors Cough   • Latex      BLISTER     • Morphine And Related Nausea And Vomiting   • Lortab [Hydrocodone-Acetaminophen] Palpitations       Objective      Vitals:    02/15/23 1015   BP: 141/71   Pulse: 101   Temp: 97.4 °F (36.3 °C)   TempSrc: Temporal   SpO2: 93%   Weight: 104 kg (229 lb 8 oz)   PainSc: 0-No pain     Current Status 9/1/2022   ECOG score 1       Physical Exam  Pulmonary:      Breath sounds:  Normal breath sounds.   Lymphadenopathy:      Cervical: No cervical adenopathy.   Neurological:      Mental Status: She is alert and oriented to person, place, and time.           RECENT LABS:  Glucose   Date Value Ref Range Status   02/13/2023 198 (H) 65 - 99 mg/dL Final     Sodium   Date Value Ref Range Status   02/13/2023 135 (L) 136 - 145 mmol/L Final     Potassium   Date Value Ref Range Status   02/13/2023 4.0 3.5 - 5.2 mmol/L Final     CO2   Date Value Ref Range Status   02/13/2023 27.0 22.0 - 29.0 mmol/L Final     Chloride   Date Value Ref Range Status   02/13/2023 98 98 - 107 mmol/L Final     Anion Gap   Date Value Ref Range Status   02/13/2023 10.0 5.0 - 15.0 mmol/L Final     Creatinine   Date Value Ref Range Status   02/13/2023 0.60 0.57 - 1.00 mg/dL Final     BUN   Date Value Ref Range Status   02/13/2023 7 (L) 8 - 23 mg/dL Final     BUN/Creatinine Ratio   Date Value Ref Range Status   02/13/2023 11.7 7.0 - 25.0 Final     Calcium   Date Value Ref Range Status   02/13/2023 9.6 8.6 - 10.5 mg/dL Final     eGFR Non  Amer   Date Value Ref Range Status   12/06/2021 81 >60 mL/min/1.73 Final     Alkaline Phosphatase   Date Value Ref Range Status   02/13/2023 94 39 - 117 U/L Final     Total Protein   Date Value Ref Range Status   02/13/2023 6.6 6.0 - 8.5 g/dL Final     ALT (SGPT)   Date Value Ref Range Status   02/13/2023 58 (H) 1 - 33 U/L Final     AST (SGOT)   Date Value Ref Range Status   02/13/2023 46 (H) 1 - 32 U/L Final     Total Bilirubin   Date Value Ref Range Status   02/13/2023 0.3 0.0 - 1.2 mg/dL Final     Albumin   Date Value Ref Range Status   02/13/2023 4.1 3.5 - 5.2 g/dL Final     Globulin   Date Value Ref Range Status   02/13/2023 2.5 gm/dL Final     Lab Results   Component Value Date    WBC 4.77 02/13/2023    HGB 13.4 02/13/2023    HCT 39.1 02/13/2023    MCV 91.6 02/13/2023     02/13/2023     Lab Results   Component Value Date    NEUTROABS 3.40 02/13/2023           PATHOLOGY:  *  Cannot find OR log *         RADIOLOGY DATA :  CT Chest With Contrast Diagnostic    Result Date: 2/14/2023  CONCLUSION: 1. No evidence of disease worsening/recurrence. 2. Please see body of report for other findings/details. Electronically signed by:  Mandeep Lele SALDANA  2/13/2023 10:48 AM CST Workstation: [id number]    CT Abdomen Pelvis With Contrast    Result Date: 2/14/2023  CONCLUSION: 1. No evidence of disease worsening/recurrence. 2. Please see body of report for other findings/details. Electronically signed by:  Mandeep Royal DO  2/13/2023 10:48 AM CST Workstation: [id number]          Assessment & Plan     1.  Diffuse large B-cell lymphoma with background of follicular lymphoma consisting of Hernandez's transformation  - FISH for BCL2, BCL6 and c-Myc was negative.  - Patient received 6 cycles of R-CHOP that completed on October 13, 2022.  - Recent CT of chest, abdomen and pelvis with contrast on February 13, 2023 does not show any evidence of recurrence which was discussed with patient today.  - We will continue with clinical monitoring.  - Patient will return to clinic in 3 months with repeat CBC, CMP on that day.  - We will repeat CT of chest abdomen and pelvis with contrast around August 2023.    2.  Recurrent follicular lymphoma:  - Review oncology history for prior treatment details  - There is no evidence of recurrence based on recent CT scan which was discussed with patient    3.  Elevated liver function test:  - Secondary to fatty liver.  - Recent CMP shows mild improvement in liver function test.  - We will monitor with CMP    4.  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  Currently on gabapentin 100 mg 3 times a day.  - We will continue with clinical monitoring      5.  Pelvic mass:  - Recent CT scan does not show any evidence of any pelvic mass which was discussed with patient and family    6.  Health maintenance: Patient does not smoke.  Had a colonoscopy in November 2021 by  Dr Cruz    7. Advance Care Planning: For now patient remains full code and is able to make decisions.  Patient has health care surrogate mentioned on chart.                   PHQ-9 Total Score: 0   -Patient is not homicidal or suicidal.  No acute intervention required.    Chasity Leon reports a pain score of 0.  Given her pain assessment as noted, treatment options were discussed and the following options were decided upon as a follow-up plan to address the patient's pain: continuation of current treatment plan for pain.         Tino Sood MD  2/15/2023  10:22 CST        Part of this note may be an electronic transcription/translation of spoken language to printed text using the Dragon Dictation System.          CC:

## 2023-03-29 ENCOUNTER — INFUSION (OUTPATIENT)
Dept: ONCOLOGY | Facility: HOSPITAL | Age: 63
End: 2023-03-29
Payer: MEDICARE

## 2023-03-29 DIAGNOSIS — Z45.2 ENCOUNTER FOR VENOUS ACCESS DEVICE CARE: Primary | ICD-10-CM

## 2023-03-29 LAB — HBA1C MFR BLD: 8.8 % (ref 4.8–5.6)

## 2023-03-29 PROCEDURE — 36591 DRAW BLOOD OFF VENOUS DEVICE: CPT | Performed by: INTERNAL MEDICINE

## 2023-03-29 PROCEDURE — 25010000002 HEPARIN LOCK FLUSH PER 10 UNITS: Performed by: INTERNAL MEDICINE

## 2023-03-29 PROCEDURE — 83036 HEMOGLOBIN GLYCOSYLATED A1C: CPT | Performed by: FAMILY MEDICINE

## 2023-03-29 RX ORDER — SODIUM CHLORIDE 0.9 % (FLUSH) 0.9 %
10 SYRINGE (ML) INJECTION AS NEEDED
Status: DISCONTINUED | OUTPATIENT
Start: 2023-03-29 | End: 2023-03-29 | Stop reason: HOSPADM

## 2023-03-29 RX ORDER — HEPARIN SODIUM (PORCINE) LOCK FLUSH IV SOLN 100 UNIT/ML 100 UNIT/ML
500 SOLUTION INTRAVENOUS AS NEEDED
Status: DISCONTINUED | OUTPATIENT
Start: 2023-03-29 | End: 2023-03-29 | Stop reason: HOSPADM

## 2023-03-29 RX ORDER — SODIUM CHLORIDE 0.9 % (FLUSH) 0.9 %
10 SYRINGE (ML) INJECTION AS NEEDED
OUTPATIENT
Start: 2023-03-29

## 2023-03-29 RX ORDER — SODIUM CHLORIDE 0.9 % (FLUSH) 0.9 %
20 SYRINGE (ML) INJECTION AS NEEDED
OUTPATIENT
Start: 2023-03-29

## 2023-03-29 RX ORDER — HEPARIN SODIUM (PORCINE) LOCK FLUSH IV SOLN 100 UNIT/ML 100 UNIT/ML
500 SOLUTION INTRAVENOUS AS NEEDED
OUTPATIENT
Start: 2023-03-29

## 2023-03-29 RX ADMIN — HEPARIN 500 UNITS: 100 SYRINGE at 13:38

## 2023-04-18 ENCOUNTER — OFFICE VISIT (OUTPATIENT)
Dept: OBSTETRICS AND GYNECOLOGY | Facility: CLINIC | Age: 63
End: 2023-04-18
Payer: MEDICARE

## 2023-04-18 VITALS
WEIGHT: 238 LBS | BODY MASS INDEX: 40.63 KG/M2 | DIASTOLIC BLOOD PRESSURE: 98 MMHG | SYSTOLIC BLOOD PRESSURE: 160 MMHG | HEIGHT: 64 IN

## 2023-04-18 DIAGNOSIS — N93.9 VAGINAL BLEEDING: ICD-10-CM

## 2023-04-18 DIAGNOSIS — N89.8 VAGINAL ODOR: Primary | ICD-10-CM

## 2023-04-18 DIAGNOSIS — N95.2 VAGINAL ATROPHY: ICD-10-CM

## 2023-04-18 LAB
CANDIDA ALBICANS: NEGATIVE
GARDNERELLA VAGINALIS: POSITIVE
T VAGINALIS DNA VAG QL PROBE+SIG AMP: NEGATIVE

## 2023-04-18 PROCEDURE — 99214 OFFICE O/P EST MOD 30 MIN: CPT | Performed by: OBSTETRICS & GYNECOLOGY

## 2023-04-18 PROCEDURE — 87660 TRICHOMONAS VAGIN DIR PROBE: CPT | Performed by: OBSTETRICS & GYNECOLOGY

## 2023-04-18 PROCEDURE — 87510 GARDNER VAG DNA DIR PROBE: CPT | Performed by: OBSTETRICS & GYNECOLOGY

## 2023-04-18 PROCEDURE — 1159F MED LIST DOCD IN RCRD: CPT | Performed by: OBSTETRICS & GYNECOLOGY

## 2023-04-18 PROCEDURE — 1160F RVW MEDS BY RX/DR IN RCRD: CPT | Performed by: OBSTETRICS & GYNECOLOGY

## 2023-04-18 PROCEDURE — 87480 CANDIDA DNA DIR PROBE: CPT | Performed by: OBSTETRICS & GYNECOLOGY

## 2023-04-18 RX ORDER — METRONIDAZOLE 500 MG/1
500 TABLET ORAL 2 TIMES DAILY
Qty: 14 TABLET | Refills: 0 | Status: SHIPPED | OUTPATIENT
Start: 2023-04-18 | End: 2023-04-25

## 2023-04-18 RX ORDER — CONJUGATED ESTROGENS 0.62 MG/G
CREAM VAGINAL
Qty: 30 G | Refills: 3 | Status: SHIPPED | OUTPATIENT
Start: 2023-04-18

## 2023-04-18 RX ORDER — BUSPIRONE HYDROCHLORIDE 30 MG/1
1 TABLET ORAL 2 TIMES DAILY
Qty: 60 TABLET | Refills: 6 | COMMUNITY
Start: 2022-12-27 | End: 2023-07-15

## 2023-04-18 RX ORDER — FAMOTIDINE 40 MG/1
TABLET, FILM COATED ORAL
COMMUNITY
Start: 2023-04-14

## 2023-04-18 RX ORDER — TIRZEPATIDE 2.5 MG/.5ML
INJECTION, SOLUTION SUBCUTANEOUS
COMMUNITY
Start: 2023-03-30

## 2023-04-18 RX ORDER — LAMOTRIGINE 150 MG/1
1 TABLET ORAL 2 TIMES DAILY
Qty: 60 TABLET | Refills: 3 | COMMUNITY
Start: 2023-04-14 | End: 2023-07-14

## 2023-04-18 NOTE — PROGRESS NOTES
[facility]  Gynecology  Date of Service: 2023    CC: Vaginal bleeding, odor    HPI  Chasity Leon is a 63 y.o.  postmenopausal female who presents with complaints of vaginal bleeding and odor.      She states that she has continued to have off and on bleeding.  She reports odor that is bothersome for her.  She states that she is washing herself 2-3x per day because of the odor and scrubbing.    She was seen in May 2022 for vaginal bleeding and vaginal atrophy was noted; she was given samples of Premarin.  She did not let us know if she wanted a prescription or not.    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  She has completed her treatments as of 2022.    ROS  Review of Systems   Constitutional: Negative.    HENT: Negative.    Eyes: Negative.    Respiratory: Negative.    Cardiovascular: Negative.    Gastrointestinal: Negative.    Endocrine: Negative.    Genitourinary: Negative.    Musculoskeletal: Negative.    Skin: Negative.    Allergic/Immunologic: Negative.    Neurological: Negative.    Hematological: Negative.    Psychiatric/Behavioral: Negative.      GYN HISTORY  Menarche: age 12  Menopause: 40  History of STIs: None  Abnormal pap smear history: None  S/p ARIELLE/BSO at age 40, AUB     OB HISTORY  OB History    Para Term  AB Living   2 2 2     2   SAB IAB Ectopic Molar Multiple Live Births             2      # Outcome Date GA Lbr Haile/2nd Weight Sex Delivery Anes PTL Lv   2 Term      Vag-Spont   DOMINIK   1 Term      Vag-Spont   DOMINIK     PAST MEDICAL HISTORY  Past Medical History:   Diagnosis Date   • Agoraphobia with panic disorder     on SNRI, prn buspar, prn klonopin     • Anxiety    • Arthritis    • Atrophic vaginitis    • Depression      MDD      • Elevated cholesterol    • Essential hypertension    • Follicular non-Hodgkin's lymphoma    • Generalized  anxiety disorder     SSRI - buspar, cont cymbalta, prn klonopin      • GERD (gastroesophageal reflux disease)    • Hip pain     arthritis, artifical right hip     • History of transfusion    • Hyperlipidemia    • Mass of lower limb    • Nuclear senile cataract    • Obesity    • Otalgia     ENT REFER   • Panic disorder     with agoraphobia. On buspar and klonopin now      • Type 2 diabetes mellitus     NO BDR     PAST SURGICAL HISTORY  Past Surgical History:   Procedure Laterality Date   • CENTRAL VENOUS LINE INSERTION  03/11/2016    Central line insertion (Successful placement of right upper extremity midline.)   • COLONOSCOPY     • COLONOSCOPY N/A 07/18/2019    Procedure: COLONOSCOPY;  Surgeon: Brandon Salvador DO;  Location: [facility] ENDOSCOPY;  Service: Gastroenterology   • COLONOSCOPY N/A 11/30/2021    Procedure: COLONOSCOPY;  Surgeon: Magda rCuz MD;  Location: [facility] ENDOSCOPY;  Service: Gastroenterology;  Laterality: N/A;   • CYSTOSCOPY  03/23/1976    Cystoscopy (external urethroplasty and cysto-panendoscopy,urethal hymenal fusion with hypospadias)   • DILATATION AND CURETTAGE  01/25/1980    D&C (removal of IUD)   • ENDOSCOPY N/A 11/30/2021    Procedure: ESOPHAGOGASTRODUODENOSCOPY;  Surgeon: Magda Cruz MD;  Location: [facility] ENDOSCOPY;  Service: Gastroenterology;  Laterality: N/A;   • EXCISION LESION  04/15/2014    Remove thigh lesion (Excision of a mass of the right thigh using a 16.2 cm incision with intermediate layered closure.)   • HYSTEROSCOPY  02/28/2003    Hysteroscope procedure (diagnostic hysteroscopy with fractional D&C)   • JOINT REPLACEMENT Right     hip   • OTHER SURGICAL HISTORY      Explore parathyroid glands   • OTHER SURGICAL HISTORY  08/26/1985    Laparosc diagnostic (desires elective tubal reversal)   • DE INSJ TUNNELED CVC W/O SUBQ PORT/ AGE 5 YR/> Right 04/04/2017    Procedure: INSERTION VENOUS ACCESS DEVICE (MEDIPORT) right chest;  Surgeon: Fortino Medina MD;  Location:  [facility] OR;  Service: General   • NC INSJ TUNNELED CVC W/O SUBQ PORT/ AGE 5 YR/> N/A 2017    Procedure: INSERTION VENOUS ACCESS DEVICE   (MEDIPORT)   (C-ARM#1);  Surgeon: Fortino Medina MD;  Location: [facility] OR;  Service: General   • SHOULDER ARTHROSCOPY  2013    Shoulder arthroscopy/surgery (Right shoulder. Rotator cuff repair. Subacromial decompression. Edgar procedure.)   • SHOULDER SURGERY  2015    Shoulder surgery procedure (Arthroscopy of the left shoulder with rotator cuff repair.Edgar procedure.Subacromial decompression.)   • TONSILLECTOMY     • TONSILLECTOMY AND ADENOIDECTOMY  1967    T&A (hypertrophied tonsils and adenoids with recurrent infection)   • TOTAL ABDOMINAL HYSTERECTOMY WITH SALPINGO OOPHORECTOMY  2004    ARIELLE/BSO (with a preop fractional dilation and curettage with frozen section,menorrhagia,dysmenorrhea,unresponsive to medical intervention)   • TOTAL HIP ARTHROPLASTY       FAMILY HISTORY  Family History   Problem Relation Age of Onset   • Breast cancer Mother    • Cancer Mother    • Diabetes Mother    • Heart disease Father    • Liver cancer Father    • Cancer Father    • Diabetes Sister         INSULIN DEPENDENT   • Cancer Sister    • Diabetes Brother         INSULIN DEPENDENT   • Coronary artery disease Other    • Diabetes Other    • Diabetes Maternal Grandmother    • Cancer Maternal Grandfather      SOCIAL HISTORY  Social History     Socioeconomic History   • Marital status:    Tobacco Use   • Smoking status: Former     Years: 12.00     Types: Cigarettes     Quit date: 1998     Years since quittin.3   • Smokeless tobacco: Never   • Tobacco comments:     Smoked 1 pack per 2 weeks   Substance and Sexual Activity   • Alcohol use: No   • Drug use: No   • Sexual activity: Not Currently     ALLERGIES  Allergies   Allergen Reactions   • Ace Inhibitors Cough   • Latex      BLISTER     • Morphine And Related Nausea And Vomiting   • Lortab  [Hydrocodone-Acetaminophen] Palpitations     HOME MEDICATIONS  Prior to Admission medications    Medication Sig Start Date End Date Taking? Authorizing Provider   ARIPiprazole (ABILIFY) 20 MG tablet Take 20 mg by mouth Every Night. 5/23/17   Emelia Cain MD Belsomra 20 MG tablet Take 20 mg by mouth Every Night. 5/4/22   Emelia Cain MD   Calcium Carbonate-Vitamin D 600-200 MG-UNIT tablet Take 1 tablet by mouth Daily.    Emelia Cain MD   cyclobenzaprine (FLEXERIL) 10 MG tablet TAKE 1 TABLET (10 MG) BY MOUTH 3 TIMES DAILY AS NEEDED FOR MUSCLE SPASMS - - MAY CAUSE DROWSINESS 6/22/20   Emelia Cain MD   desvenlafaxine (PRISTIQ) 100 MG 24 hr tablet Take 100 mg by mouth Daily. 7/11/22   Emelia Cain MD   diphenoxylate-atropine (LOMOTIL) 2.5-0.025 MG per tablet Take 2 tablets by mouth 4 (Four) Times a Day As Needed for Diarrhea.  Patient taking differently: Take 2 tablets by mouth As Needed for Diarrhea. 6/1/21   Tino Sood MD   doxepin (SINEquan) 25 MG capsule Take 50 mg by mouth. 12/27/22 6/26/23  Emelia Cain MD   Easy Comfort Pen Needles 31G X 6 MM misc  6/23/22   Emelia Cain MD   fluconazole (DIFLUCAN) 100 MG tablet Take 100 mg by mouth As Needed. 12/5/22   Emelia Cain MD   glimepiride (AMARYL) 4 MG tablet Take 4 mg by mouth Every Morning Before Breakfast. 8/11/20   Emelia Cain MD   glimepiride (AMARYL) 4 MG tablet Take 1 tablet by mouth Daily With Breakfast. 5/3/22 8/2/22  Emelia Cain MD   hydroCHLOROthiazide (HYDRODIURIL) 25 MG tablet Take 25 mg by mouth Daily. 10/22/21 7/28/22  Emelia Cain MD   Insulin Aspart, w/Niacinamide, (Fiasp FlexTouch) 100 UNIT/ML solution pen-injector Inject subcu t.i.d. A.c.  150-199 -2units, 200-249 4 units,  6 units, 300-349 8 units, 350+ 10 units 6/3/22 6/4/23  Provider, MD Emelia   insulin glargine (LANTUS, SEMGLEE) 100 UNIT/ML injection Inject 30 Units under the  "skin into the appropriate area as directed Daily. 32 units nightly    Emelia Cain MD   Lidocaine Viscous HCl (XYLOCAINE) 2 % solution  6/30/22   Emelia Cain MD   losartan (COZAAR) 100 MG tablet Take 1 tablet by mouth Daily. 1/30/23 7/30/23  Emelia Cain MD   metroNIDAZOLE (FLAGYL) 500 MG tablet  1/13/23   Emelia Cain MD   Multiple Vitamins-Minerals (CENTRUM SILVER PO) Take 1 tablet by mouth daily. 1/1/16   Emelia Cain MD   Nystatin (magic mouthwash) Swish and spit 5 mL Every 4 (Four) Hours As Needed (Mouth pain). 6/30/22   Tino Sood MD   omeprazole (priLOSEC) 40 MG capsule Take 40 mg by mouth Daily. 5/18/22   Emelia Cain MD   ondansetron (ZOFRAN) 4 MG tablet Take 1 tablet by mouth 4 (Four) Times a Day As Needed for Nausea or Vomiting. 6/3/22   Tino Sood MD   oxyCODONE-acetaminophen (PERCOCET)  MG per tablet Take 1 tablet by mouth. 8/1/22   Emelia Cain MD   pioglitazone (ACTOS) 45 MG tablet Take 45 mg by mouth Daily. 6/11/20   Emelia Cain MD   predniSONE (DELTASONE) 20 MG tablet Take 20 mg by mouth 5 (Five) Times a Day. 6/20/22   Emelia Cain MD   Procto-Med HC 2.5 % rectal cream  11/3/22   Emelia Cain MD   promethazine (PHENERGAN) 25 MG tablet Take 1 tablet by mouth Every 6 (Six) Hours As Needed for Nausea or Vomiting. 11/18/21   Tino Sood MD   spironolactone (ALDACTONE) 25 MG tablet  12/30/22   Emelia Cain MD   sulfamethoxazole-trimethoprim (BACTRIM DS,SEPTRA DS) 800-160 MG per tablet  12/5/22   Emelia Cain MD   TRAZODONE HCL PO Take 225 mg by mouth every night. 1/1/16   Emelia Cain MD   valACYclovir (VALTREX) 1000 MG tablet  9/30/22   Emelia Cain MD     PE  /98 (BP Location: Left arm, Patient Position: Sitting, Cuff Size: Adult)   Ht 162.6 cm (64\")   Wt 108 kg (238 lb)   BMI 40.85 kg/m²        General: Alert, healthy, no distress, well nourished and " well developed.  Neurologic: Alert, oriented to person, place, and time.  Gait normal.  Cranial nerves II-XII grossly intact.  HEENT: Normocephalic, atraumatic.  Extraocular muscles intact, pupils equal and reactive times two.    Neck: Supple, no adenopathy, thyroid normal size, non-tender, without nodularity, trachea midline.  Lungs: Normal respiratory effort.  Clear to auscultation bilaterally.  No wheezes, rhonci, or rales.  Heart: Regular rate and rhythm.  No murmer, rub or gallop.  Abdomen: Soft, non-tender, non-distended,no masses, no hepatosplenomegaly, no hernia.  Skin: No rash, no lesions.  Extremities: No cyanosis, clubbing or edema.  PELVIC EXAM:  External Genitalia/Vulva: Anatomy is normal, no significant redness of labia, no discharge on vulvar tissues, Solon's and Bartholin's glands are normal, no ulcers, no condylomatous lesions.  Urethral meatus: Normal, no lesions, no prolapse.  Urethra: Normal, no masses, no tenderness with palpation.  Bladder: Normal, no fullness, no masses, no tenderness with palpation.  Vagina: Vaginal tissues are atrophic, irritable, friable, light yellow discharge present.  Pelvic support adequate.  Cervix: Absent.  Uterus: Absent.  Adnexa: Absent.  Rectal: Normal, no masses or polyps, confirms bimanual exam, perianal normal, no lesions; ALEXEI deferred.    IMPRESSION  Chasity Leon is a 63 y.o.  presenting with vaginal bleeding and vaginal odor; I suspect it is related to vaginal infection with vaginal atrophy.    PLAN    1. Vaginal odor  - Gardnerella vaginalis, Trichomonas vaginalis, Candida albicans, DNA - Swab, Vagina  - OneSwab - Kit, Vagina; Future    2. Vaginal bleeding  - LIQUID-BASED PAP SMEAR WITH HPV GENOTYPING REGARDLESS OF INTERPRETATION (MIGUEL,COR,MAD); Future  - Estrogens Conjugated (Premarin) 0.625 MG/GM vaginal cream; Insert a pea size amount twice weekly.  Dispense: 30 g; Refill: 3  - LIQUID-BASED PAP SMEAR WITH HPV GENOTYPING REGARDLESS OF  INTERPRETATION (MIGUEL,COR,MAD)    3. Vaginal atrophy  - Estrogens Conjugated (Premarin) 0.625 MG/GM vaginal cream; Insert a pea size amount twice weekly.  Dispense: 30 g; Refill: 3    This document has been electronically signed by Clara Salvador MD on April 18, 2023 10:10 CDT.

## 2023-04-20 LAB — REF LAB TEST METHOD: NORMAL

## 2023-04-24 ENCOUNTER — TELEPHONE (OUTPATIENT)
Dept: OBSTETRICS AND GYNECOLOGY | Facility: CLINIC | Age: 63
End: 2023-04-24
Payer: MEDICARE

## 2023-04-24 NOTE — TELEPHONE ENCOUNTER
OneSwab results:  +Atopobium vaginae    Please call and let her know that the send out swab did not show any other bacteria. The ABX should help to clear it up.    Clara Salvador MD  4/24/2023  13:16 CDT

## 2023-05-01 ENCOUNTER — HOSPITAL ENCOUNTER (OUTPATIENT)
Dept: ULTRASOUND IMAGING | Facility: HOSPITAL | Age: 63
Discharge: HOME | End: 2023-05-01
Payer: MEDICARE

## 2023-05-01 ENCOUNTER — INFUSION (OUTPATIENT)
Dept: ONCOLOGY | Facility: HOSPITAL | Age: 63
End: 2023-05-01
Payer: MEDICARE

## 2023-05-01 ENCOUNTER — OFFICE VISIT (OUTPATIENT)
Dept: ONCOLOGY | Facility: CLINIC | Age: 63
End: 2023-05-01
Payer: MEDICARE

## 2023-05-01 VITALS
TEMPERATURE: 96.9 F | HEART RATE: 95 BPM | HEIGHT: 64 IN | DIASTOLIC BLOOD PRESSURE: 71 MMHG | RESPIRATION RATE: 18 BRPM | WEIGHT: 235.2 LBS | SYSTOLIC BLOOD PRESSURE: 142 MMHG | BODY MASS INDEX: 40.15 KG/M2

## 2023-05-01 DIAGNOSIS — R22.41 LOCALIZED SWELLING, MASS, OR LUMP OF RIGHT LOWER EXTREMITY: ICD-10-CM

## 2023-05-01 DIAGNOSIS — C83.39 DIFFUSE LARGE B-CELL LYMPHOMA OF EXTRANODAL SITE EXCLUDING SPLEEN AND OTHER SOLID ORGANS: Primary | ICD-10-CM

## 2023-05-01 DIAGNOSIS — C82.08 GRADE 1 FOLLICULAR LYMPHOMA OF LYMPH NODES OF MULTIPLE REGIONS: ICD-10-CM

## 2023-05-01 DIAGNOSIS — G62.0 NEUROPATHY DUE TO CHEMOTHERAPEUTIC DRUG: ICD-10-CM

## 2023-05-01 DIAGNOSIS — Z45.2 ENCOUNTER FOR VENOUS ACCESS DEVICE CARE: ICD-10-CM

## 2023-05-01 DIAGNOSIS — T45.1X5A NEUROPATHY DUE TO CHEMOTHERAPEUTIC DRUG: ICD-10-CM

## 2023-05-01 DIAGNOSIS — R79.89 ELEVATED LFTS: ICD-10-CM

## 2023-05-01 LAB
ALBUMIN SERPL-MCNC: 4.2 G/DL (ref 3.5–5.2)
ALBUMIN/GLOB SERPL: 1.4 G/DL
ALP SERPL-CCNC: 108 U/L (ref 39–117)
ALT SERPL W P-5'-P-CCNC: 40 U/L (ref 1–33)
ANION GAP SERPL CALCULATED.3IONS-SCNC: 11 MMOL/L (ref 5–15)
AST SERPL-CCNC: 35 U/L (ref 1–32)
BASOPHILS # BLD AUTO: 0.05 10*3/MM3 (ref 0–0.2)
BASOPHILS NFR BLD AUTO: 0.9 % (ref 0–1.5)
BILIRUB SERPL-MCNC: 0.4 MG/DL (ref 0–1.2)
BUN SERPL-MCNC: 8 MG/DL (ref 8–23)
BUN/CREAT SERPL: 13.6 (ref 7–25)
CALCIUM SPEC-SCNC: 9.7 MG/DL (ref 8.6–10.5)
CHLORIDE SERPL-SCNC: 96 MMOL/L (ref 98–107)
CO2 SERPL-SCNC: 27 MMOL/L (ref 22–29)
CREAT SERPL-MCNC: 0.59 MG/DL (ref 0.57–1)
DEPRECATED RDW RBC AUTO: 49.2 FL (ref 37–54)
EGFRCR SERPLBLD CKD-EPI 2021: 101.4 ML/MIN/1.73
EOSINOPHIL # BLD AUTO: 0 10*3/MM3 (ref 0–0.4)
EOSINOPHIL NFR BLD AUTO: 0 % (ref 0.3–6.2)
ERYTHROCYTE [DISTWIDTH] IN BLOOD BY AUTOMATED COUNT: 14.6 % (ref 12.3–15.4)
GLOBULIN UR ELPH-MCNC: 2.9 GM/DL
GLUCOSE SERPL-MCNC: 114 MG/DL (ref 65–99)
HCT VFR BLD AUTO: 40.8 % (ref 34–46.6)
HGB BLD-MCNC: 14.2 G/DL (ref 12–15.9)
HOLD SPECIMEN: NORMAL
IMM GRANULOCYTES # BLD AUTO: 0.05 10*3/MM3 (ref 0–0.05)
IMM GRANULOCYTES NFR BLD AUTO: 0.9 % (ref 0–0.5)
LDH SERPL-CCNC: 217 U/L (ref 135–214)
LYMPHOCYTES # BLD AUTO: 0.93 10*3/MM3 (ref 0.7–3.1)
LYMPHOCYTES NFR BLD AUTO: 16 % (ref 19.6–45.3)
MCH RBC QN AUTO: 31.8 PG (ref 26.6–33)
MCHC RBC AUTO-ENTMCNC: 34.8 G/DL (ref 31.5–35.7)
MCV RBC AUTO: 91.3 FL (ref 79–97)
MONOCYTES # BLD AUTO: 0.8 10*3/MM3 (ref 0.1–0.9)
MONOCYTES NFR BLD AUTO: 13.7 % (ref 5–12)
NEUTROPHILS NFR BLD AUTO: 4 10*3/MM3 (ref 1.7–7)
NEUTROPHILS NFR BLD AUTO: 68.5 % (ref 42.7–76)
NRBC BLD AUTO-RTO: 0 /100 WBC (ref 0–0.2)
PLATELET # BLD AUTO: 336 10*3/MM3 (ref 140–450)
PMV BLD AUTO: 8.3 FL (ref 6–12)
POTASSIUM SERPL-SCNC: 4.1 MMOL/L (ref 3.5–5.2)
PROT SERPL-MCNC: 7.1 G/DL (ref 6–8.5)
RBC # BLD AUTO: 4.47 10*6/MM3 (ref 3.77–5.28)
SODIUM SERPL-SCNC: 134 MMOL/L (ref 136–145)
WBC NRBC COR # BLD: 5.83 10*3/MM3 (ref 3.4–10.8)

## 2023-05-01 PROCEDURE — 76882 US LMTD JT/FCL EVL NVASC XTR: CPT

## 2023-05-01 PROCEDURE — 25010000002 HEPARIN LOCK FLUSH PER 10 UNITS: Performed by: INTERNAL MEDICINE

## 2023-05-01 PROCEDURE — 36591 DRAW BLOOD OFF VENOUS DEVICE: CPT | Performed by: INTERNAL MEDICINE

## 2023-05-01 PROCEDURE — 85025 COMPLETE CBC W/AUTO DIFF WBC: CPT | Performed by: INTERNAL MEDICINE

## 2023-05-01 PROCEDURE — 83615 LACTATE (LD) (LDH) ENZYME: CPT | Performed by: INTERNAL MEDICINE

## 2023-05-01 PROCEDURE — 80053 COMPREHEN METABOLIC PANEL: CPT | Performed by: INTERNAL MEDICINE

## 2023-05-01 RX ORDER — HEPARIN SODIUM (PORCINE) LOCK FLUSH IV SOLN 100 UNIT/ML 100 UNIT/ML
500 SOLUTION INTRAVENOUS AS NEEDED
OUTPATIENT
Start: 2023-05-01

## 2023-05-01 RX ORDER — HEPARIN SODIUM (PORCINE) LOCK FLUSH IV SOLN 100 UNIT/ML 100 UNIT/ML
500 SOLUTION INTRAVENOUS AS NEEDED
Status: DISCONTINUED | OUTPATIENT
Start: 2023-05-01 | End: 2023-05-01 | Stop reason: HOSPADM

## 2023-05-01 RX ORDER — CLINDAMYCIN PHOSPHATE 20 MG/G
CREAM VAGINAL
COMMUNITY
Start: 2023-04-29

## 2023-05-01 RX ORDER — GABAPENTIN 100 MG/1
100 CAPSULE ORAL 3 TIMES DAILY
Qty: 90 CAPSULE | Refills: 0 | Status: SHIPPED | OUTPATIENT
Start: 2023-05-01

## 2023-05-01 RX ORDER — FLUCONAZOLE 100 MG/1
TABLET ORAL
COMMUNITY
Start: 2023-04-24

## 2023-05-01 RX ORDER — SODIUM CHLORIDE 0.9 % (FLUSH) 0.9 %
10 SYRINGE (ML) INJECTION AS NEEDED
OUTPATIENT
Start: 2023-05-01

## 2023-05-01 RX ORDER — SODIUM CHLORIDE 0.9 % (FLUSH) 0.9 %
20 SYRINGE (ML) INJECTION AS NEEDED
OUTPATIENT
Start: 2023-05-01

## 2023-05-01 RX ADMIN — HEPARIN 500 UNITS: 100 SYRINGE at 14:12

## 2023-05-01 NOTE — PROGRESS NOTES
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  Patient is here for follow-up appointment today with complaint of swelling of right lower extremity involving medial aspect of thigh started about 2 days ago.  Denies any fevers.  Complains of chronic intermittent diarrhea.  Complains of fatigue.  Complains of worsening tingling and numbness affecting left hand.  Denies any fevers or any drenching night sweats.  Denies any bleeding                  Oncology history:    1.  Recurrent follicular lymphoma grade 1:  -Patient initially diagnosed in April 2015 for which patient underwent palliative radiation treatment.  -Second relapse happened in September 2016 for again patient had a radiation treatment.  - Patient was found to have enlarged aortocaval lymph node in April 2017 for which patient initially received rituximab every 2 months without any significant improvement.  -Subsequently patient received 4 cycles of bendamustine and rituximab between April 30, 2018 and August 21, 2018.  -Patient was diagnosed with involvement of left parotid gland on biopsy done on August 27, 2020 by Dr. Ibrahim.  PET/CT done shows uptake in bilateral parotid gland along with left sided neck lymph node involvement.  -Patient was started on Revlimid with rituximab on September 22, 2020.  Patient completed cycle 12 on 8/23/2021.  -Patient received 3 cycles of rituximab maintenance between  "September 8, 2021 and February 28, 2022  - CT of chest abdomen and pelvis with contrast on May 17, 2022 showed new onset of chest wall mass on right side along with pelvic mass.  - Ultrasound-guided biopsy showed large B-cell lymphoma in background of follicular lymphoma consistent with transformation  - PET/CT shows SUV of 12 in the region of right chest wall mass without any other abnormal uptake  -FISH for Bcl-2, BCL6 and c-Myc was negative.  - Patient was started on cycle 1 of R-CHOP on Raquel 15, 2022.        2.  Diffuse large B-cell lymphoma involving right chest wall  - CT of chest abdomen and pelvis with contrast on May 17, 2022 showed new onset of chest wall mass on right side along with pelvic mass.  - Ultrasound-guided biopsy showed large B-cell lymphoma in background of follicular lymphoma consistent with transformation  - PET/CT shows SUV of 12 in the region of right chest wall mass without any other abnormal uptake  -FISH for Bcl-2, BCL6 and c-Myc was negative.  -Patient received 6 cycles of chemotherapy with R-CHOP between Raquel 15, 2022 and October 13, 2022  - PET/CT done on November 11, 2022 shows complete metabolic response without any residual lymphoma.                  Past Medical History, Past Surgical History, Social History, Family History have been reviewed and are without significant changes except as mentioned.    Review of Systems   A comprehensive 14 point review of systems was performed and was negative except as mentioned in HPI.    Medications:  The current medication list was reviewed in the EMR    ALLERGIES:    Allergies   Allergen Reactions   • Ace Inhibitors Cough   • Latex      BLISTER     • Morphine And Related Nausea And Vomiting   • Lortab [Hydrocodone-Acetaminophen] Palpitations       Objective      Vitals:    05/01/23 1338   BP: 142/71   Pulse: 95   Resp: 18   Temp: 96.9 °F (36.1 °C)   TempSrc: Temporal   Weight: 107 kg (235 lb 3.2 oz)   Height: 162.6 cm (64\")   PainSc:   4 "   PainLoc: Groin  Comment: right         5/1/2023     1:39 PM   Current Status   ECOG score 0       Physical Exam  Pulmonary:      Breath sounds: Normal breath sounds.   Musculoskeletal:      Comments: Examination of thigh area was done in presence of registered nurse Adamaris.  There is no significant difference in size of right thigh as compared to left thigh.  There is no discrete enlarged lymph node in area of concern in the right thigh.   Lymphadenopathy:      Cervical: No cervical adenopathy.   Neurological:      Mental Status: She is alert and oriented to person, place, and time.           RECENT LABS:  Glucose   Date Value Ref Range Status   05/01/2023 114 (H) 65 - 99 mg/dL Final     Sodium   Date Value Ref Range Status   05/01/2023 134 (L) 136 - 145 mmol/L Final     Potassium   Date Value Ref Range Status   05/01/2023 4.1 3.5 - 5.2 mmol/L Final     CO2   Date Value Ref Range Status   05/01/2023 27.0 22.0 - 29.0 mmol/L Final     Chloride   Date Value Ref Range Status   05/01/2023 96 (L) 98 - 107 mmol/L Final     Anion Gap   Date Value Ref Range Status   05/01/2023 11.0 5.0 - 15.0 mmol/L Final     Creatinine   Date Value Ref Range Status   05/01/2023 0.59 0.57 - 1.00 mg/dL Final     BUN   Date Value Ref Range Status   05/01/2023 8 8 - 23 mg/dL Final     BUN/Creatinine Ratio   Date Value Ref Range Status   05/01/2023 13.6 7.0 - 25.0 Final     Calcium   Date Value Ref Range Status   05/01/2023 9.7 8.6 - 10.5 mg/dL Final     eGFR Non  Amer   Date Value Ref Range Status   12/06/2021 81 >60 mL/min/1.73 Final     Alkaline Phosphatase   Date Value Ref Range Status   05/01/2023 108 39 - 117 U/L Final     Total Protein   Date Value Ref Range Status   05/01/2023 7.1 6.0 - 8.5 g/dL Final     ALT (SGPT)   Date Value Ref Range Status   05/01/2023 40 (H) 1 - 33 U/L Final     AST (SGOT)   Date Value Ref Range Status   05/01/2023 35 (H) 1 - 32 U/L Final     Total Bilirubin   Date Value Ref Range Status   05/01/2023 0.4  0.0 - 1.2 mg/dL Final     Albumin   Date Value Ref Range Status   2023 4.2 3.5 - 5.2 g/dL Final     Globulin   Date Value Ref Range Status   2023 2.9 gm/dL Final     Lab Results   Component Value Date    WBC 5.83 2023    HGB 14.2 2023    HCT 40.8 2023    MCV 91.3 2023     2023     Lab Results   Component Value Date    NEUTROABS 4.00 2023     Lab Results   Component Value Date    REFLABREPO  2023     [facility]  [address] [place] [address]  Phone: [phone] or [phone]  Fax: [phone]  Deven James M.D., Medical Director    PATIENT NAME                                     LABORATORY NO.  [id number]   VALDO CADET.                               [id number]  [id number]                                 AGE                    SEX   SSN              CLIENT REF #  [facility]                63        1960      F     [id number]      [id number]    [facility]                               REQUESTING M.D.           ATTENDING M.D.         COPY TO[address] CESAR, [facility] 1, 2ND FLOOR                  DATE COLLECTED            DATE RECEIVED          [place] [address]                     2023  ThinPrep Pap with [facility]    DIAGNOSIS:  Negative for intraepithelial lesion or malignancy    Multiple factors can influence accuracy of Pap tests; therefore, screening at  regular intervals is necessary for early cancer detection.    COMMENT:     Benign cellular changes associated with inflammation are present.    SPECIMEN ADEQUACY:  SATISFACTORY FOR EVALUATION  Sparse cellularity, minimal number of squamous cells available for evaluation.  SOURCE OF SPECIMEN:       VAGINAL  SLIDES:  1  CLINICAL HISTORY:  A Routine  Vaginal bleeding  Total hysterectomy    HPV  HR-HPV POOL:  Negative    The Aptima HPV assay is an in vitro nucleic acid amplification test for the  qualitative detection of E6/E7 viral messenger RNA from 14 high risk types of  HPV in cervical specimens. The high risk HPV types detected include: 16, 18,  31, 33, 35, 39, 45, 51, 52, 56, 58, 59, 66, 68    CYTOTECHNOLOGIST:             CONCEPCION DE LA CRUZ (ASCP)    CPT CODES:  88926, 81347           PATHOLOGY:  * Cannot find OR log *         RADIOLOGY DATA :  Nonvascular ultrasound of right lower extremity done on May 1, 2023 showed:    FINDINGS:  Nonpathologic appearing lymph node with maintained fatty caryn present within the  right groin measuring 1.3 cm. No additional sonographic abnormalities identified  of the right upper thigh or right groin.            No radiology results for the last 7 days        Assessment & Plan     1.  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  - CT of chest, abdomen and pelvis with contrast on February 13, 2023 did not show any evidence of recurrence.  - Ultrasound of lower extremity done earlier today shows nonpathologically enlarged lymph node measuring at 1.3 cm with benign morphology without any other acute finding.  - Recommend continue with clinical surveillance  - We will have patient return to clinic in 3 months with repeat CBC, CMP, LDH, CT of chest abdomen and pelvis with contrast to be done prior to that for surveillance purposes.      2.  Recurrent follicular lymphoma  - Review oncology history for prior treatment details      3.  Elevated liver function test:  - Secondary to fatty liver    4.  Swelling of right lower extremity:  - Nonvascular ultrasound of right lower extremity shows 1.3 cm lymph node in the right colon with fatty caryn consistent with benign etiology rather than malignancy based on morphologic  finding.  - Recommend continue with clinical surveillance.  Patient will be encouraged to call our office if she starts noticing any more abnormality.      5.  Grade 1 neuropathy  -Patient has stopped taking gabapentin.  Complains of worsening neuropathy affecting left hand.  Prescription for gabapentin has been sent to patient's pharmacy today    6.  Health maintenance: Patient does not smoke.  Had a colonoscopy in November 2021 by Dr Cruz    7.Advance Care Planning: For now patient remains full code and is able to make decisions.  Patient has health care surrogate mentioned on chart.                 PHQ-9 Total Score:       Chasity Leon reports a pain score of 4.  Given her pain assessment as noted, treatment options were discussed and the following options were decided upon as a follow-up plan to address the patient's pain: continuation of current treatment plan for pain.         Tino Sood MD  5/2/2023  07:11 CDT        Part of this note may be an electronic transcription/translation of spoken language to printed text using the Dragon Dictation System.          CC:

## 2023-05-02 ENCOUNTER — TELEPHONE (OUTPATIENT)
Dept: ONCOLOGY | Facility: CLINIC | Age: 63
End: 2023-05-02

## 2023-05-02 NOTE — TELEPHONE ENCOUNTER
----- Message from Tino Sood MD sent at 5/2/2023  7:18 AM CDT -----  Please let patient know, ultrasound of right lower extremity done today shows normal lymph node measuring at 1.3 cm which is not enlarged by size criteria.  Appearance of lymph node appears to be benign rather than cancer related.  For now recommend continue with just monitoring.  If she starts having any significant worsening swelling or any new palpable abnormality she can call us.  Otherwise we can change her clinic appointment to 3 months from now with repeat blood work and CT scan to be done prior to that.  Provide her with new appointments.  Thank you

## 2023-05-02 NOTE — TELEPHONE ENCOUNTER
Called and spoke with pt regarding ultrasound results and appt change. Will call with new appts. V/u obtained.

## 2023-05-04 ENCOUNTER — TELEPHONE (OUTPATIENT)
Dept: ONCOLOGY | Facility: HOSPITAL | Age: 63
End: 2023-05-04
Payer: MEDICARE

## 2023-05-04 NOTE — TELEPHONE ENCOUNTER
"Pt called and stated started having vaginal bleeding for 20 minutes today and \"filled up a pad\". Pt asked if we could make Dr. Sood aware.   "

## 2023-05-09 ENCOUNTER — TELEPHONE (OUTPATIENT)
Dept: ONCOLOGY | Facility: CLINIC | Age: 63
End: 2023-05-09
Payer: MEDICARE

## 2023-05-22 ENCOUNTER — HOSPITAL ENCOUNTER (OUTPATIENT)
Dept: CT IMAGING | Facility: HOSPITAL | Age: 63
Discharge: HOME | End: 2023-05-22
Payer: MEDICARE

## 2023-05-22 ENCOUNTER — INFUSION (OUTPATIENT)
Dept: ONCOLOGY | Facility: HOSPITAL | Age: 63
End: 2023-05-22
Payer: MEDICARE

## 2023-05-22 DIAGNOSIS — R19.7 DIARRHEA, UNSPECIFIED TYPE: ICD-10-CM

## 2023-05-22 DIAGNOSIS — Z45.2 ENCOUNTER FOR VENOUS ACCESS DEVICE CARE: ICD-10-CM

## 2023-05-22 DIAGNOSIS — R79.89 ELEVATED LFTS: ICD-10-CM

## 2023-05-22 DIAGNOSIS — C83.39 DIFFUSE LARGE B-CELL LYMPHOMA OF EXTRANODAL SITE EXCLUDING SPLEEN AND OTHER SOLID ORGANS: ICD-10-CM

## 2023-05-22 DIAGNOSIS — G62.0 NEUROPATHY DUE TO CHEMOTHERAPEUTIC DRUG: ICD-10-CM

## 2023-05-22 DIAGNOSIS — T45.1X5A NEUROPATHY DUE TO CHEMOTHERAPEUTIC DRUG: ICD-10-CM

## 2023-05-22 DIAGNOSIS — C82.08 GRADE 1 FOLLICULAR LYMPHOMA OF LYMPH NODES OF MULTIPLE REGIONS: ICD-10-CM

## 2023-05-22 DIAGNOSIS — C82.08 GRADE 1 FOLLICULAR LYMPHOMA OF LYMPH NODES OF MULTIPLE REGIONS: Primary | ICD-10-CM

## 2023-05-22 DIAGNOSIS — R22.41 LOCALIZED SWELLING, MASS, OR LUMP OF RIGHT LOWER EXTREMITY: ICD-10-CM

## 2023-05-22 LAB
ALBUMIN SERPL-MCNC: 4.1 G/DL (ref 3.5–5.2)
ALBUMIN/GLOB SERPL: 1.6 G/DL
ALP SERPL-CCNC: 88 U/L (ref 39–117)
ALT SERPL W P-5'-P-CCNC: 26 U/L (ref 1–33)
ANION GAP SERPL CALCULATED.3IONS-SCNC: 9 MMOL/L (ref 5–15)
AST SERPL-CCNC: 22 U/L (ref 1–32)
BASOPHILS # BLD AUTO: 0.09 10*3/MM3 (ref 0–0.2)
BASOPHILS NFR BLD AUTO: 1.7 % (ref 0–1.5)
BILIRUB SERPL-MCNC: 0.3 MG/DL (ref 0–1.2)
BUN SERPL-MCNC: 8 MG/DL (ref 8–23)
BUN/CREAT SERPL: 11.6 (ref 7–25)
CALCIUM SPEC-SCNC: 9.3 MG/DL (ref 8.6–10.5)
CHLORIDE SERPL-SCNC: 97 MMOL/L (ref 98–107)
CO2 SERPL-SCNC: 25 MMOL/L (ref 22–29)
CREAT SERPL-MCNC: 0.69 MG/DL (ref 0.57–1)
DEPRECATED RDW RBC AUTO: 49.8 FL (ref 37–54)
EGFRCR SERPLBLD CKD-EPI 2021: 97.7 ML/MIN/1.73
EOSINOPHIL # BLD AUTO: 0 10*3/MM3 (ref 0–0.4)
EOSINOPHIL NFR BLD AUTO: 0 % (ref 0.3–6.2)
ERYTHROCYTE [DISTWIDTH] IN BLOOD BY AUTOMATED COUNT: 14.8 % (ref 12.3–15.4)
GLOBULIN UR ELPH-MCNC: 2.5 GM/DL
GLUCOSE SERPL-MCNC: 112 MG/DL (ref 65–99)
HCT VFR BLD AUTO: 37.4 % (ref 34–46.6)
HGB BLD-MCNC: 12.9 G/DL (ref 12–15.9)
HOLD SPECIMEN: NORMAL
IMM GRANULOCYTES # BLD AUTO: 0.04 10*3/MM3 (ref 0–0.05)
IMM GRANULOCYTES NFR BLD AUTO: 0.7 % (ref 0–0.5)
LYMPHOCYTES # BLD AUTO: 1.03 10*3/MM3 (ref 0.7–3.1)
LYMPHOCYTES NFR BLD AUTO: 19 % (ref 19.6–45.3)
MCH RBC QN AUTO: 31.8 PG (ref 26.6–33)
MCHC RBC AUTO-ENTMCNC: 34.5 G/DL (ref 31.5–35.7)
MCV RBC AUTO: 92.1 FL (ref 79–97)
MONOCYTES # BLD AUTO: 0.65 10*3/MM3 (ref 0.1–0.9)
MONOCYTES NFR BLD AUTO: 12 % (ref 5–12)
NEUTROPHILS NFR BLD AUTO: 3.61 10*3/MM3 (ref 1.7–7)
NEUTROPHILS NFR BLD AUTO: 66.6 % (ref 42.7–76)
NRBC BLD AUTO-RTO: 0 /100 WBC (ref 0–0.2)
PLATELET # BLD AUTO: 295 10*3/MM3 (ref 140–450)
PMV BLD AUTO: 8.5 FL (ref 6–12)
POTASSIUM SERPL-SCNC: 4 MMOL/L (ref 3.5–5.2)
PROT SERPL-MCNC: 6.6 G/DL (ref 6–8.5)
RBC # BLD AUTO: 4.06 10*6/MM3 (ref 3.77–5.28)
SODIUM SERPL-SCNC: 131 MMOL/L (ref 136–145)
WBC NRBC COR # BLD: 5.42 10*3/MM3 (ref 3.4–10.8)

## 2023-05-22 PROCEDURE — 71260 CT THORAX DX C+: CPT

## 2023-05-22 PROCEDURE — 80053 COMPREHEN METABOLIC PANEL: CPT

## 2023-05-22 PROCEDURE — 36415 COLL VENOUS BLD VENIPUNCTURE: CPT

## 2023-05-22 PROCEDURE — 25510000001 IOPAMIDOL 61 % SOLUTION: Performed by: INTERNAL MEDICINE

## 2023-05-22 PROCEDURE — 74177 CT ABD & PELVIS W/CONTRAST: CPT

## 2023-05-22 PROCEDURE — 85025 COMPLETE CBC W/AUTO DIFF WBC: CPT

## 2023-05-22 PROCEDURE — 25010000002 HEPARIN LOCK FLUSH PER 10 UNITS: Performed by: INTERNAL MEDICINE

## 2023-05-22 PROCEDURE — 36591 DRAW BLOOD OFF VENOUS DEVICE: CPT | Performed by: INTERNAL MEDICINE

## 2023-05-22 RX ORDER — SODIUM CHLORIDE 0.9 % (FLUSH) 0.9 %
20 SYRINGE (ML) INJECTION AS NEEDED
OUTPATIENT
Start: 2023-05-22

## 2023-05-22 RX ORDER — SODIUM CHLORIDE 0.9 % (FLUSH) 0.9 %
20 SYRINGE (ML) INJECTION AS NEEDED
Status: DISCONTINUED | OUTPATIENT
Start: 2023-05-22 | End: 2023-05-22 | Stop reason: HOSPADM

## 2023-05-22 RX ORDER — SODIUM CHLORIDE 0.9 % (FLUSH) 0.9 %
10 SYRINGE (ML) INJECTION AS NEEDED
Status: DISCONTINUED | OUTPATIENT
Start: 2023-05-22 | End: 2023-05-22 | Stop reason: HOSPADM

## 2023-05-22 RX ORDER — HEPARIN SODIUM (PORCINE) LOCK FLUSH IV SOLN 100 UNIT/ML 100 UNIT/ML
500 SOLUTION INTRAVENOUS AS NEEDED
Status: DISCONTINUED | OUTPATIENT
Start: 2023-05-22 | End: 2023-05-22 | Stop reason: HOSPADM

## 2023-05-22 RX ORDER — HEPARIN SODIUM (PORCINE) LOCK FLUSH IV SOLN 100 UNIT/ML 100 UNIT/ML
500 SOLUTION INTRAVENOUS AS NEEDED
OUTPATIENT
Start: 2023-05-22

## 2023-05-22 RX ORDER — SODIUM CHLORIDE 0.9 % (FLUSH) 0.9 %
10 SYRINGE (ML) INJECTION AS NEEDED
OUTPATIENT
Start: 2023-05-22

## 2023-05-22 RX ADMIN — IOPAMIDOL 90 ML: 612 INJECTION, SOLUTION INTRAVENOUS at 12:03

## 2023-05-22 RX ADMIN — HEPARIN 500 UNITS: 100 SYRINGE at 12:07

## 2023-05-22 RX ADMIN — Medication 20 ML: at 12:06

## 2023-05-23 ENCOUNTER — OFFICE VISIT (OUTPATIENT)
Dept: ONCOLOGY | Facility: CLINIC | Age: 63
End: 2023-05-23
Payer: MEDICARE

## 2023-05-23 VITALS
SYSTOLIC BLOOD PRESSURE: 144 MMHG | RESPIRATION RATE: 18 BRPM | TEMPERATURE: 98.3 F | OXYGEN SATURATION: 99 % | HEART RATE: 85 BPM | WEIGHT: 244.4 LBS | DIASTOLIC BLOOD PRESSURE: 72 MMHG | BODY MASS INDEX: 41.95 KG/M2

## 2023-05-23 DIAGNOSIS — G62.0 NEUROPATHY DUE TO CHEMOTHERAPEUTIC DRUG: Primary | Chronic | ICD-10-CM

## 2023-05-23 DIAGNOSIS — T45.1X5A NEUROPATHY DUE TO CHEMOTHERAPEUTIC DRUG: Primary | Chronic | ICD-10-CM

## 2023-05-23 DIAGNOSIS — C83.39 DIFFUSE LARGE B-CELL LYMPHOMA OF EXTRANODAL SITE EXCLUDING SPLEEN AND OTHER SOLID ORGANS: Chronic | ICD-10-CM

## 2023-05-23 DIAGNOSIS — C82.08 GRADE 1 FOLLICULAR LYMPHOMA OF LYMPH NODES OF MULTIPLE REGIONS: Chronic | ICD-10-CM

## 2023-05-23 NOTE — PROGRESS NOTES
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  Patient was having vaginal bleeding.  Patient had a restaging CT of chest abdomen and pelvis and blood work done recently, she is here to discuss results and further recommendation.  Complains of chronic intermittent diarrhea.  Complains of fatigue.  Complains of tingling and numbness affecting left hand.  Denies any more vaginal bleeding at present.  Denies any new lymph node enlargement.  Denies any drenching night sweats.            Oncology history:    1.  Recurrent follicular lymphoma grade 1:  -Patient initially diagnosed in April 2015 for which patient underwent palliative radiation treatment.  -Second relapse happened in September 2016 for again patient had a radiation treatment.  - Patient was found to have enlarged aortocaval lymph node in April 2017 for which patient initially received rituximab every 2 months without any significant improvement.  -Subsequently patient received 4 cycles of bendamustine and rituximab between April 30, 2018 and August 21, 2018.  -Patient was diagnosed with involvement of left parotid gland on biopsy done on August 27, 2020 by Dr. Ibrahim.  PET/CT done shows uptake in bilateral parotid gland along with left sided neck lymph node involvement.  -Patient was started on Revlimid with rituximab on September 22, 2020.  Patient completed cycle 12 on 8/23/2021.  -Patient  received 3 cycles of rituximab maintenance between September 8, 2021 and February 28, 2022  - CT of chest abdomen and pelvis with contrast on May 17, 2022 showed new onset of chest wall mass on right side along with pelvic mass.  - Ultrasound-guided biopsy showed large B-cell lymphoma in background of follicular lymphoma consistent with transformation  - PET/CT shows SUV of 12 in the region of right chest wall mass without any other abnormal uptake  -FISH for Bcl-2, BCL6 and c-Myc was negative.  - Patient was started on cycle 1 of R-CHOP on Raquel 15, 2022.        2.  Diffuse large B-cell lymphoma involving right chest wall  - CT of chest abdomen and pelvis with contrast on May 17, 2022 showed new onset of chest wall mass on right side along with pelvic mass.  - Ultrasound-guided biopsy showed large B-cell lymphoma in background of follicular lymphoma consistent with transformation  - PET/CT shows SUV of 12 in the region of right chest wall mass without any other abnormal uptake  -FISH for Bcl-2, BCL6 and c-Myc was negative.  -Patient received 6 cycles of chemotherapy with R-CHOP between Raquel 15, 2022 and October 13, 2022  - PET/CT done on November 11, 2022 shows complete metabolic response without any residual lymphoma.              Past Medical History, Past Surgical History, Social History, Family History have been reviewed and are without significant changes except as mentioned.    Review of Systems   A comprehensive 14 point review of systems was performed and was negative except as mentioned in HPI.    Medications:  The current medication list was reviewed in the EMR    ALLERGIES:    Allergies   Allergen Reactions   • Ace Inhibitors Cough   • Latex      BLISTER     • Morphine And Related Nausea And Vomiting   • Lortab [Hydrocodone-Acetaminophen] Palpitations       Objective      Vitals:    05/23/23 1251   BP: 144/72   Pulse: 85   Resp: 18   Temp: 98.3 °F (36.8 °C)   SpO2: 99%   Weight: 111 kg (244 lb 6.4 oz)    PainSc: 0-No pain         5/1/2023     1:39 PM   Current Status   ECOG score 0       Physical Exam  Pulmonary:      Breath sounds: Normal breath sounds.   Lymphadenopathy:      Cervical: No cervical adenopathy.   Neurological:      Mental Status: She is alert and oriented to person, place, and time.           RECENT LABS:  Glucose   Date Value Ref Range Status   05/22/2023 112 (H) 65 - 99 mg/dL Final     Sodium   Date Value Ref Range Status   05/22/2023 131 (L) 136 - 145 mmol/L Final     Potassium   Date Value Ref Range Status   05/22/2023 4.0 3.5 - 5.2 mmol/L Final     CO2   Date Value Ref Range Status   05/22/2023 25.0 22.0 - 29.0 mmol/L Final     Chloride   Date Value Ref Range Status   05/22/2023 97 (L) 98 - 107 mmol/L Final     Anion Gap   Date Value Ref Range Status   05/22/2023 9.0 5.0 - 15.0 mmol/L Final     Creatinine   Date Value Ref Range Status   05/22/2023 0.69 0.57 - 1.00 mg/dL Final     BUN   Date Value Ref Range Status   05/22/2023 8 8 - 23 mg/dL Final     BUN/Creatinine Ratio   Date Value Ref Range Status   05/22/2023 11.6 7.0 - 25.0 Final     Calcium   Date Value Ref Range Status   05/22/2023 9.3 8.6 - 10.5 mg/dL Final     eGFR Non  Amer   Date Value Ref Range Status   12/06/2021 81 >60 mL/min/1.73 Final     Alkaline Phosphatase   Date Value Ref Range Status   05/22/2023 88 39 - 117 U/L Final     Total Protein   Date Value Ref Range Status   05/22/2023 6.6 6.0 - 8.5 g/dL Final     ALT (SGPT)   Date Value Ref Range Status   05/22/2023 26 1 - 33 U/L Final     AST (SGOT)   Date Value Ref Range Status   05/22/2023 22 1 - 32 U/L Final     Total Bilirubin   Date Value Ref Range Status   05/22/2023 0.3 0.0 - 1.2 mg/dL Final     Albumin   Date Value Ref Range Status   05/22/2023 4.1 3.5 - 5.2 g/dL Final     Globulin   Date Value Ref Range Status   05/22/2023 2.5 gm/dL Final     Lab Results   Component Value Date    WBC 5.42 05/22/2023    HGB 12.9 05/22/2023    HCT 37.4 05/22/2023    MCV 92.1  2023     2023     Lab Results   Component Value Date    NEUTROABS 3.61 2023     Lab Results   Component Value Date    REFLABREPO  2023     [facility]  [address] [place] [address]  Phone: [phone] or [phone]  Fax: [phone]  Deven James M.D., Medical Director    PATIENT NAME                                     LABORATORY NO.  [id number]                               [id number]  [id number]                                 AGE                    SEX   SSN              CLIENT REF #  [facility]                63        1960      F     [id number]      [id number]    [facility]                               REQUESTING M.D.           ATTENDING M.D.         COPY TO[address] CESAR, [facility] 1, 2ND FLOOR                  DATE COLLECTED            DATE RECEIVED          [place] [address]                     2023  ThinPrep Pap with Cyty[facility]    DIAGNOSIS:  Negative for intraepithelial lesion or malignancy    Multiple factors can influence accuracy of Pap tests; therefore, screening at  regular intervals is necessary for early cancer detection.    COMMENT:     Benign cellular changes associated with inflammation are present.    SPECIMEN ADEQUACY:  SATISFACTORY FOR EVALUATION  Sparse cellularity, minimal number of squamous cells available for evaluation.  SOURCE OF SPECIMEN:       VAGINAL  SLIDES:  1  CLINICAL HISTORY:  A Routine  Vaginal bleeding  Total hysterectomy    HPV  HR-HPV POOL: Negative    The Aptima HPV assay is an in vitro nucleic acid amplification test for the  qualitative detection of E6/E7 viral messenger RNA from 14 high risk types of  HPV in cervical specimens. The high risk HPV types detected include: 16, 18,  31, 33, 35, 39, 45, 51, 52, 56, 58, 59, 66,  68    CYTOTECHNOLOGIST:             CONCEPCION DE LA CRUZ ([facility])    CPT CODES:  44689, 64113           PATHOLOGY:  * Cannot find OR log *         RADIOLOGY DATA :  CT Chest With Contrast Diagnostic    Result Date: 5/22/2023  Negative for adenopathy or mass.  Lungs are clear.    CT Abdomen Pelvis With Contrast    Result Date: 5/22/2023  1.  No suspicious adenopathy is seen. 2.  No significant change is noted compared to the patient's prior examination from 2/13/2023.          Assessment & Plan     1.  Diffuse large B-cell lymphoma with background of follicular lymphoma consistent with Hernandez's transformation  - FISH for Bcl-2, BCL6 and c-Myc was negative  - Patient completed 6 cycles of R-CHOP on October 13, 2022  The CT of chest, abdomen and pelvis with contrast done on May 22, 2023 does not show any evidence of recurrence which was discussed with patient today.  - We will continue with clinical surveillance  - We will have patient return to clinic in 3 months with repeat CBC, CMP to be done on that day.    2.  Recurrent follicular lymphoma  - Review oncology history for prior treatment details    3.  Elevated liver function test due to fatty liver  - Recent CMP showing improvement in liver function test    4.  Grade 1 neuropathy affecting left hand  - Has been restarted on gabapentin with relief of her symptoms    5.  Vaginal bleeding:  - Patient had pelvic mass at time of diagnosis of diffuse large B-cell lymphoma which completely resolved after treatment of diffuse large B-cell lymphoma  - Recent CT scan does not show any evidence of pelvic mass which was discussed with patient  - Recommend continue following up with Dr. Salvador regarding management of vaginal bleeding.    6.  Health maintenance: Patient does not smoke.  Had a colonoscopy in 2021 by Dr Cruz    7.  Advance care planning:  - CODE STATUS and resuscitation were discussed with patient today.  Patient wants to be DNR                 PHQ-9 Total Score: 0   -Patient  is not homicidal or suicidal.  No acute intervention required.    Chasity Leon reports a pain score of 0.  Given her pain assessment as noted, treatment options were discussed and the following options were decided upon as a follow-up plan to address the patient's pain: continuation of current treatment plan for pain.         Tino Sood MD  5/23/2023  13:05 CDT        Part of this note may be an electronic transcription/translation of spoken language to printed text using the Dragon Dictation System.          CC:

## 2023-06-19 DIAGNOSIS — T45.1X5A NEUROPATHY DUE TO CHEMOTHERAPEUTIC DRUG: ICD-10-CM

## 2023-06-19 DIAGNOSIS — G62.0 NEUROPATHY DUE TO CHEMOTHERAPEUTIC DRUG: ICD-10-CM

## 2023-06-19 RX ORDER — GABAPENTIN 100 MG/1
100 CAPSULE ORAL 3 TIMES DAILY
Qty: 90 CAPSULE | Refills: 0 | Status: SHIPPED | OUTPATIENT
Start: 2023-06-19

## 2023-06-19 NOTE — TELEPHONE ENCOUNTER
Rx Refill Note  Requested Prescriptions     Pending Prescriptions Disp Refills    gabapentin (NEURONTIN) 100 MG capsule [Pharmacy Med Name: GABAPENTIN 100MG] 90 capsule 0     Sig: TAKE 1 CAPSULE BY MOUTH 3 (THREE) TIMES A DAY.      Last office visit with prescribing clinician: 5/23/2023   Last telemedicine visit with prescribing clinician: Visit date not found   Next office visit with prescribing clinician: 8/1/2023                         Would you like a call back once the refill request has been completed: [] Yes [] No    If the office needs to give you a call back, can they leave a voicemail: [] Yes [] No    Renetta Coyle Rep  06/19/23, 13:10 CDT

## 2023-07-31 DIAGNOSIS — G62.0 NEUROPATHY DUE TO CHEMOTHERAPEUTIC DRUG: ICD-10-CM

## 2023-07-31 DIAGNOSIS — T45.1X5A NEUROPATHY DUE TO CHEMOTHERAPEUTIC DRUG: ICD-10-CM

## 2023-07-31 RX ORDER — GABAPENTIN 100 MG/1
100 CAPSULE ORAL 3 TIMES DAILY
Qty: 90 CAPSULE | Refills: 0 | Status: SHIPPED | OUTPATIENT
Start: 2023-07-31

## 2023-08-01 ENCOUNTER — OFFICE VISIT (OUTPATIENT)
Dept: ONCOLOGY | Facility: CLINIC | Age: 63
End: 2023-08-01
Payer: MEDICARE

## 2023-08-01 ENCOUNTER — INFUSION (OUTPATIENT)
Dept: ONCOLOGY | Facility: HOSPITAL | Age: 63
End: 2023-08-01
Payer: MEDICARE

## 2023-08-01 VITALS
HEART RATE: 82 BPM | HEIGHT: 64 IN | DIASTOLIC BLOOD PRESSURE: 64 MMHG | BODY MASS INDEX: 42.44 KG/M2 | OXYGEN SATURATION: 94 % | SYSTOLIC BLOOD PRESSURE: 140 MMHG | WEIGHT: 248.6 LBS

## 2023-08-01 DIAGNOSIS — Z45.2 ENCOUNTER FOR VENOUS ACCESS DEVICE CARE: Primary | ICD-10-CM

## 2023-08-01 DIAGNOSIS — R22.41 LOCALIZED SWELLING, MASS, OR LUMP OF RIGHT LOWER EXTREMITY: ICD-10-CM

## 2023-08-01 DIAGNOSIS — T45.1X5A NEUROPATHY DUE TO CHEMOTHERAPEUTIC DRUG: ICD-10-CM

## 2023-08-01 DIAGNOSIS — G62.0 NEUROPATHY DUE TO CHEMOTHERAPEUTIC DRUG: Chronic | ICD-10-CM

## 2023-08-01 DIAGNOSIS — C82.08 GRADE 1 FOLLICULAR LYMPHOMA OF LYMPH NODES OF MULTIPLE REGIONS: Primary | Chronic | ICD-10-CM

## 2023-08-01 DIAGNOSIS — G62.0 NEUROPATHY DUE TO CHEMOTHERAPEUTIC DRUG: ICD-10-CM

## 2023-08-01 DIAGNOSIS — T45.1X5A NEUROPATHY DUE TO CHEMOTHERAPEUTIC DRUG: Chronic | ICD-10-CM

## 2023-08-01 DIAGNOSIS — C83.39 DIFFUSE LARGE B-CELL LYMPHOMA OF EXTRANODAL SITE EXCLUDING SPLEEN AND OTHER SOLID ORGANS: Chronic | ICD-10-CM

## 2023-08-01 DIAGNOSIS — C83.39 DIFFUSE LARGE B-CELL LYMPHOMA OF EXTRANODAL SITE EXCLUDING SPLEEN AND OTHER SOLID ORGANS: ICD-10-CM

## 2023-08-01 DIAGNOSIS — R79.89 ELEVATED LFTS: ICD-10-CM

## 2023-08-01 DIAGNOSIS — C82.08 GRADE 1 FOLLICULAR LYMPHOMA OF LYMPH NODES OF MULTIPLE REGIONS: ICD-10-CM

## 2023-08-01 DIAGNOSIS — R79.89 ELEVATED LFTS: Chronic | ICD-10-CM

## 2023-08-01 LAB
ALBUMIN SERPL-MCNC: 4.1 G/DL (ref 3.5–5.2)
ALBUMIN/GLOB SERPL: 1.4 G/DL
ALP SERPL-CCNC: 96 U/L (ref 39–117)
ALT SERPL W P-5'-P-CCNC: 23 U/L (ref 1–33)
ANION GAP SERPL CALCULATED.3IONS-SCNC: 10 MMOL/L (ref 5–15)
AST SERPL-CCNC: 20 U/L (ref 1–32)
BASOPHILS # BLD AUTO: 0.08 10*3/MM3 (ref 0–0.2)
BASOPHILS NFR BLD AUTO: 1.1 % (ref 0–1.5)
BILIRUB SERPL-MCNC: 0.4 MG/DL (ref 0–1.2)
BUN SERPL-MCNC: 9 MG/DL (ref 8–23)
BUN/CREAT SERPL: 14.8 (ref 7–25)
CALCIUM SPEC-SCNC: 9.5 MG/DL (ref 8.6–10.5)
CHLORIDE SERPL-SCNC: 95 MMOL/L (ref 98–107)
CO2 SERPL-SCNC: 28 MMOL/L (ref 22–29)
CREAT SERPL-MCNC: 0.61 MG/DL (ref 0.57–1)
DEPRECATED RDW RBC AUTO: 52.2 FL (ref 37–54)
EGFRCR SERPLBLD CKD-EPI 2021: 100.6 ML/MIN/1.73
EOSINOPHIL # BLD AUTO: 0 10*3/MM3 (ref 0–0.4)
EOSINOPHIL NFR BLD AUTO: 0 % (ref 0.3–6.2)
ERYTHROCYTE [DISTWIDTH] IN BLOOD BY AUTOMATED COUNT: 15 % (ref 12.3–15.4)
GLOBULIN UR ELPH-MCNC: 3 GM/DL
GLUCOSE SERPL-MCNC: 115 MG/DL (ref 65–99)
HCT VFR BLD AUTO: 40.8 % (ref 34–46.6)
HGB BLD-MCNC: 14.2 G/DL (ref 12–15.9)
IMM GRANULOCYTES # BLD AUTO: 0.2 10*3/MM3 (ref 0–0.05)
IMM GRANULOCYTES NFR BLD AUTO: 2.8 % (ref 0–0.5)
LDH SERPL-CCNC: 230 U/L (ref 135–214)
LYMPHOCYTES # BLD AUTO: 1.09 10*3/MM3 (ref 0.7–3.1)
LYMPHOCYTES NFR BLD AUTO: 15.4 % (ref 19.6–45.3)
MCH RBC QN AUTO: 32.4 PG (ref 26.6–33)
MCHC RBC AUTO-ENTMCNC: 34.8 G/DL (ref 31.5–35.7)
MCV RBC AUTO: 93.2 FL (ref 79–97)
MONOCYTES # BLD AUTO: 0.64 10*3/MM3 (ref 0.1–0.9)
MONOCYTES NFR BLD AUTO: 9 % (ref 5–12)
NEUTROPHILS NFR BLD AUTO: 5.08 10*3/MM3 (ref 1.7–7)
NEUTROPHILS NFR BLD AUTO: 71.7 % (ref 42.7–76)
NRBC BLD AUTO-RTO: 0 /100 WBC (ref 0–0.2)
PLATELET # BLD AUTO: 344 10*3/MM3 (ref 140–450)
PMV BLD AUTO: 8.4 FL (ref 6–12)
POTASSIUM SERPL-SCNC: 4.1 MMOL/L (ref 3.5–5.2)
PROT SERPL-MCNC: 7.1 G/DL (ref 6–8.5)
RBC # BLD AUTO: 4.38 10*6/MM3 (ref 3.77–5.28)
SODIUM SERPL-SCNC: 133 MMOL/L (ref 136–145)
WBC NRBC COR # BLD: 7.09 10*3/MM3 (ref 3.4–10.8)

## 2023-08-01 PROCEDURE — 36591 DRAW BLOOD OFF VENOUS DEVICE: CPT | Performed by: INTERNAL MEDICINE

## 2023-08-01 PROCEDURE — 83615 LACTATE (LD) (LDH) ENZYME: CPT | Performed by: INTERNAL MEDICINE

## 2023-08-01 PROCEDURE — 80053 COMPREHEN METABOLIC PANEL: CPT | Performed by: INTERNAL MEDICINE

## 2023-08-01 PROCEDURE — 85025 COMPLETE CBC W/AUTO DIFF WBC: CPT | Performed by: INTERNAL MEDICINE

## 2023-08-01 RX ORDER — SODIUM CHLORIDE 0.9 % (FLUSH) 0.9 %
20 SYRINGE (ML) INJECTION AS NEEDED
OUTPATIENT
Start: 2023-08-01

## 2023-08-01 RX ORDER — INSULIN ASPART INJECTION 100 [IU]/ML
INJECTION, SOLUTION SUBCUTANEOUS
COMMUNITY
Start: 2023-07-31 | End: 2024-08-01

## 2023-08-01 RX ORDER — SODIUM CHLORIDE 0.9 % (FLUSH) 0.9 %
10 SYRINGE (ML) INJECTION AS NEEDED
OUTPATIENT
Start: 2023-08-01

## 2023-08-01 RX ORDER — SODIUM CHLORIDE 0.9 % (FLUSH) 0.9 %
10 SYRINGE (ML) INJECTION AS NEEDED
Status: CANCELLED | OUTPATIENT
Start: 2023-08-01

## 2023-08-01 RX ORDER — HEPARIN SODIUM (PORCINE) LOCK FLUSH IV SOLN 100 UNIT/ML 100 UNIT/ML
500 SOLUTION INTRAVENOUS AS NEEDED
Status: CANCELLED | OUTPATIENT
Start: 2023-08-01

## 2023-08-01 RX ORDER — HEPARIN SODIUM (PORCINE) LOCK FLUSH IV SOLN 100 UNIT/ML 100 UNIT/ML
500 SOLUTION INTRAVENOUS AS NEEDED
Status: ACTIVE | OUTPATIENT
Start: 2023-08-01

## 2023-08-01 RX ORDER — HEPARIN SODIUM (PORCINE) LOCK FLUSH IV SOLN 100 UNIT/ML 100 UNIT/ML
500 SOLUTION INTRAVENOUS AS NEEDED
OUTPATIENT
Start: 2023-08-01

## 2023-08-01 RX ORDER — TIRZEPATIDE 12.5 MG/.5ML
12.5 INJECTION, SOLUTION SUBCUTANEOUS
COMMUNITY
Start: 2023-06-30

## 2023-08-01 RX ORDER — SODIUM CHLORIDE 0.9 % (FLUSH) 0.9 %
20 SYRINGE (ML) INJECTION AS NEEDED
Status: CANCELLED | OUTPATIENT
Start: 2023-08-01

## 2023-08-07 ENCOUNTER — OFFICE VISIT (OUTPATIENT)
Dept: SLEEP MEDICINE | Facility: HOSPITAL | Age: 63
End: 2023-08-07
Payer: MEDICARE

## 2023-08-07 VITALS
DIASTOLIC BLOOD PRESSURE: 64 MMHG | WEIGHT: 248.3 LBS | HEIGHT: 64 IN | BODY MASS INDEX: 42.39 KG/M2 | SYSTOLIC BLOOD PRESSURE: 140 MMHG | OXYGEN SATURATION: 99 % | HEART RATE: 64 BPM

## 2023-08-07 DIAGNOSIS — R06.83 SNORING: Primary | ICD-10-CM

## 2023-08-07 DIAGNOSIS — G47.10 HYPERSOMNIA: ICD-10-CM

## 2023-08-07 DIAGNOSIS — E66.01 MORBID (SEVERE) OBESITY DUE TO EXCESS CALORIES: ICD-10-CM

## 2023-08-07 DIAGNOSIS — G25.81 RESTLESS LEGS SYNDROME (RLS): ICD-10-CM

## 2023-08-07 DIAGNOSIS — F51.04 PSYCHOPHYSIOLOGICAL INSOMNIA: ICD-10-CM

## 2023-08-07 PROCEDURE — 1159F MED LIST DOCD IN RCRD: CPT | Performed by: NURSE PRACTITIONER

## 2023-08-07 PROCEDURE — 1160F RVW MEDS BY RX/DR IN RCRD: CPT | Performed by: NURSE PRACTITIONER

## 2023-08-07 PROCEDURE — 99213 OFFICE O/P EST LOW 20 MIN: CPT | Performed by: NURSE PRACTITIONER

## 2023-08-07 NOTE — PROGRESS NOTES
New Patient Sleep Medicine Consultation    Encounter Date: 8/7/2023         Patient's PCP: Taiwo Gonzalez MD  Referring provider: SELF REFERRAL    Reason for consultation chief complaint: snoring, loud disruptive snoring, witnessed apneas, excessive daytime sleepiness, unrefreshing sleep, and insomnia    Chasity Leon is a 63 y.o. female whose bedtime is ~ 2000. She  falls asleep after 60 minutes, and is up 2-3 times per night. Up to the bathroom. Able to fall back asleep easily sometimes. She wakes up ~ 4012-8568. Every day of the week. She endorses 7 hours of sleep. She drinks 2 cups of coffee, 0 teas, and 1-2 sodas per day. She drinks 0 alcoholic beverages per week.      Chasity Leon admits to snoring, High blod pressure, excessive daytime sleepiness, morning headaches, stop breathing during sleep, Disturbed or restless sleep, memory loss, Up to the bathroom at night, abnormal or violent dreams, restless legs at night, difficulty falling asleep, and takes medicine to help go to sleep for >5 years. Occasional restless legs. She denies cataplexy, sleep paralysis, or hypnagogic hallucinations. She takes Belsomra, trazodone, doxepin, Abilify, Lamictal, gabapentin, buspar before bed. She has no sleepiness with driving. Does not drive. She naps daily for about 30-60minutes. She has had a sleep study. States very very long time ago. Unknown results.  She sleeps in a bed with .     She used to smoke, but has not smoked for years. Smoking history: smoked 0-1 ppds from age 13 until 38      Marital status:    Occupation: retired from nursing   Children: 2  FH of sleep disorders: daughter has sleep apnea       Past Medical History:   Diagnosis Date    Agoraphobia with panic disorder     on SNRI, prn buspar, prn klonopin      Anxiety     Arthritis     Atrophic vaginitis     Depression      MDD       Elevated cholesterol     Essential hypertension     Follicular non-Hodgkin's lymphoma      Generalized anxiety disorder     SSRI - buspar, cont cymbalta, prn klonopin       GERD (gastroesophageal reflux disease)     Hip pain     arthritis, artifical right hip      History of transfusion     Hyperlipidemia     Mass of lower limb     Nuclear senile cataract     Obesity     Otalgia     ENT REFER    Panic disorder     with agoraphobia. On buspar and klonopin now       Type 2 diabetes mellitus     NO BDR     Social History     Socioeconomic History    Marital status:    Tobacco Use    Smoking status: Former     Years: 12.00     Types: Cigarettes     Quit date: 1998     Years since quittin.6     Passive exposure: Past    Smokeless tobacco: Never    Tobacco comments:     Smoked 1 pack per 2 weeks   Substance and Sexual Activity    Alcohol use: No    Drug use: No    Sexual activity: Not Currently     Family History   Problem Relation Age of Onset    Breast cancer Mother     Cancer Mother     Diabetes Mother     Heart disease Father     Liver cancer Father     Cancer Father     Diabetes Sister         INSULIN DEPENDENT    Cancer Sister     Diabetes Brother         INSULIN DEPENDENT    Coronary artery disease Other     Diabetes Other     Diabetes Maternal Grandmother     Cancer Maternal Grandfather          Review of Systems:  Constitutional: positive for fatigue  Eyes: negative  Ears, nose, mouth, throat, and face: positive for snoring  Respiratory: negative  Cardiovascular: negative  Gastrointestinal: negative  Genitourinary:negative  Integument/breast: negative  Hematologic/lymphatic: negative  Musculoskeletal:positive for arthralgias  Neurological: negative  Behavioral/Psych: positive for anxiety, depression, and sleep disturbance  Endocrine: negative  Allergic/Immunologic: negative Patient advised to discuss any positive ROS with PCP.        K[place] Sleepiness Scale Score: 12    How likely are you to doze off or fall asleep in the following situation, in contrast to feeling just tired?     Use  "the following scale to choose the most appropriate number for each situation:    0 = would never doze  1 = slight chance of dozing   2 = moderate chance of dozing   3 = high chance of dozing    It is important that you answer each question as best you can.      Situation       Chance of Dozing (0-3)    Sitting and reading            ___3____    Watching TV          ___3____    Sitting, inactive in a public place (e.g. a theatre or meeting)   ___0____    As a passenger in a car for an hour without break    ___0____    Lying down to rest in the afternoon, when circumstances permit  ___3____    Sitting and talking to someone      ___0____    Sitting quietly after a lunch without alcohol     ___3____    In a car, while stopped for a few minutes in traffic    ___0____         Vitals:    08/07/23 1331   BP: 140/64   Pulse: 64   SpO2: 99%           08/07/23  1331   Weight: 113 kg (248 lb 4.8 oz)       Body mass index is 42.6 kg/mý. Class 3 Severe Obesity (BMI >=40). Obesity-related health conditions include the following: obstructive sleep apnea. Obesity is unchanged. BMI is is above average; BMI management plan is completed. Recommend portion control and increasing exercise.      Neck circumference: 19\"          General: Alert. Cooperative. Well developed. No acute distress.             Head:  Normocephalic. Symmetrical. Atraumatic.              Eyes: Sclera clear. No icterus. Normal EOM.             Ears: No deformities. Normal hearing.             Nose: No septal deviation. No drainage.          Throat: No oral lesions. No thrush. Moist mucous membranes. Trachea midline    Tongue is normal    Dentition is fair       Pharynx: Posterior pharyngeal pillars are narrow    Mallampati score of IV (only hard palate visible)    Pharynx is nonerythematous   Chest Wall:  Normal shape. Symmetric expansion with respiration. No tenderness.          Lungs:  Clear to auscultation bilaterally. No wheezes. No rhonchi. No rales. " Respirations regular, even and unlabored.            Heart:  Regular rhythm and normal rate. Normal S1 and S2. No murmur.  Extremities:  Moves all extremities well. No edema.               Skin: Dry. Intact. No bleeding. No rash.           Neuro: Moves all 4 extremities and cranial nerves grossly intact.  Psychiatric: Normal mood and affect.      Current Outpatient Medications:     ARIPiprazole (ABILIFY) 20 MG tablet, Take 1 tablet by mouth Every Night., Disp: , Rfl:     Belsomra 20 MG tablet, Take 20 mg by mouth Every Night., Disp: , Rfl:     Calcium Carbonate-Vitamin D 600-200 MG-UNIT tablet, Take 1 tablet by mouth Daily., Disp: , Rfl:     cyclobenzaprine (FLEXERIL) 10 MG tablet, TAKE 1 TABLET (10 MG) BY MOUTH 3 TIMES DAILY AS NEEDED FOR MUSCLE SPASMS - - MAY CAUSE DROWSINESS, Disp: , Rfl:     desvenlafaxine (PRISTIQ) 100 MG 24 hr tablet, Take 1 tablet by mouth Daily., Disp: , Rfl:     diphenoxylate-atropine (LOMOTIL) 2.5-0.025 MG per tablet, Take 2 tablets by mouth 4 (Four) Times a Day As Needed for Diarrhea. (Patient taking differently: Take 2 tablets by mouth As Needed for Diarrhea.), Disp: 60 tablet, Rfl: 2    Easy Comfort Pen Needles 31G X 6 MM misc, , Disp: , Rfl:     Estrogens Conjugated (Premarin) 0.625 MG/GM vaginal cream, Insert a pea size amount twice weekly., Disp: 30 g, Rfl: 3    famotidine (PEPCID) 40 MG tablet, , Disp: , Rfl:     gabapentin (NEURONTIN) 100 MG capsule, TAKE 1 CAPSULE BY MOUTH 3 (THREE) TIMES A DAY., Disp: 90 capsule, Rfl: 0    glimepiride (AMARYL) 4 MG tablet, Take 1 tablet by mouth Every Morning Before Breakfast., Disp: , Rfl:     Insulin Aspart, w/Niacinamide, (Fiasp FlexTouch) 100 UNIT/ML solution pen-injector, Inject  under the skin into the appropriate area as directed., Disp: , Rfl:     insulin glargine (LANTUS, SEMGLEE) 100 UNIT/ML injection, Inject 30 Units under the skin into the appropriate area as directed Daily. 32 units nightly, Disp: , Rfl:     Mounjaro 12.5 MG/0.5ML  solution pen-injector, Inject 0.5 mL under the skin into the appropriate area as directed., Disp: , Rfl:     Multiple Vitamins-Minerals (CENTRUM SILVER PO), Take 1 tablet by mouth Daily., Disp: , Rfl:     ondansetron (ZOFRAN) 4 MG tablet, Take 1 tablet by mouth 4 (Four) Times a Day As Needed for Nausea or Vomiting., Disp: 40 tablet, Rfl: 3    oxyCODONE-acetaminophen (PERCOCET)  MG per tablet, Take 1 tablet by mouth., Disp: , Rfl:     pioglitazone (ACTOS) 45 MG tablet, Take 1 tablet by mouth Daily., Disp: , Rfl:     promethazine (PHENERGAN) 25 MG tablet, Take 1 tablet by mouth Every 6 (Six) Hours As Needed for Nausea or Vomiting., Disp: 40 tablet, Rfl: 3    spironolactone (ALDACTONE) 25 MG tablet, , Disp: , Rfl:     TRAZODONE HCL PO, Take 225 mg by mouth every night., Disp: , Rfl:     doxepin (SINEquan) 25 MG capsule, Take 2 capsules by mouth., Disp: , Rfl:   No current facility-administered medications for this visit.    Facility-Administered Medications Ordered in Other Visits:     heparin injection 500 Units, 500 Units, Intravenous, PRN, Tino Sood MD    Lab Results   Component Value Date    WBC 7.09 08/01/2023    HGB 14.2 08/01/2023    HCT 40.8 08/01/2023    MCV 93.2 08/01/2023     08/01/2023     Lab Results   Component Value Date    GLUCOSE 115 (H) 08/01/2023    CALCIUM 9.5 08/01/2023     (L) 08/01/2023    K 4.1 08/01/2023    CO2 28.0 08/01/2023    CL 95 (L) 08/01/2023    BUN 9 08/01/2023    CREATININE 0.61 08/01/2023    EGFRIFNONA 81 12/06/2021    BCR 14.8 08/01/2023    ANIONGAP 10.0 08/01/2023     No results found for: INR, PROTIME  No results found for: CKTOTAL, CKMB, CKMBINDEX, TROPONINI, TROPONINT    No results found for: PHART, BJT0BGS, PO2ART]    Contraindications to home sleep test: Sleep related movement disorder like periodic limb movement disorder and Ongoing narcotic therapy especially with infusion pumps    Assessment and Plan:    Excessive daytime sleepiness- New to me,  additional work-up planned   Check split night PSG  Good sleep hygiene   Pertinent labs reviewed   Drowsy driving tips- do not drive if feeling sleepy   RTC in 2 weeks with study results   2.   Snoring- New to me, additional work-up planned    1.   As above   3.   Restless leg syndrome/PLMD   PSG   On gabapentin   Continue to monitor   4.   Insomnia - sleep onset and or maintenance - new to me, work-up planned   1.   Check PSG    2.   Managed by PCP    3.    Good sleep hygiene   5.   Witnessed apnea   6.   Morbid obesity       I obtained a brief history from the patient, reviewed the medical problems and current medications, and made medical decisions regarding treatment based on that information.   I spent 25 minutes caring for Chasity on this date of service. This time includes time spent by me in the following activities: preparing for the visit, obtaining and/or reviewing a separately obtained history, performing a medically appropriate examination and/or evaluation, counseling and educating the patient/family/caregiver, ordering medications, tests, or procedures, and documenting information in the medical record. I answered all of her questions and she verbalized understanding. Discussion involved sleep apnea, possible health consequences of sleep apnea,in lab PSG, PAP therapy, split night PSG and follow-up.           RTC 2 weeks after study for results.             This document has been electronically signed by MANSI Aguilar on August 7, 2023 13:51 CDT          This document has been electronically signed by MANSI Aguilar on August 7, 2023         CC: Taiwo Gonzalez MD          No ref. provider found

## 2023-09-06 DIAGNOSIS — T45.1X5A NEUROPATHY DUE TO CHEMOTHERAPEUTIC DRUG: ICD-10-CM

## 2023-09-06 DIAGNOSIS — G62.0 NEUROPATHY DUE TO CHEMOTHERAPEUTIC DRUG: ICD-10-CM

## 2023-09-06 RX ORDER — GABAPENTIN 100 MG/1
100 CAPSULE ORAL 3 TIMES DAILY
Qty: 90 CAPSULE | Refills: 0 | Status: SHIPPED | OUTPATIENT
Start: 2023-09-06

## 2023-09-06 NOTE — TELEPHONE ENCOUNTER
Rx Refill Note  Requested Prescriptions     Pending Prescriptions Disp Refills    gabapentin (NEURONTIN) 100 MG capsule [Pharmacy Med Name: GABAPENTIN 100MG] 90 capsule 0     Sig: TAKE 1 CAPSULE BY MOUTH 3 (THREE) TIMES A DAY.      Last office visit with prescribing clinician: 8/1/2023   Last telemedicine visit with prescribing clinician: Visit date not found   Next office visit with prescribing clinician: 11/7/2023                         Would you like a call back once the refill request has been completed: [] Yes [] No    If the office needs to give you a call back, can they leave a voicemail: [] Yes [] No    Renetta Coyle Rep  09/06/23, 13:15 CDT

## 2023-09-12 ENCOUNTER — INFUSION (OUTPATIENT)
Dept: ONCOLOGY | Facility: HOSPITAL | Age: 63
End: 2023-09-12
Payer: MEDICARE

## 2023-09-12 DIAGNOSIS — Z45.2 ENCOUNTER FOR VENOUS ACCESS DEVICE CARE: Primary | ICD-10-CM

## 2023-09-12 PROCEDURE — 96523 IRRIG DRUG DELIVERY DEVICE: CPT | Performed by: NURSE PRACTITIONER

## 2023-09-12 PROCEDURE — 25010000002 HEPARIN LOCK FLUSH PER 10 UNITS: Performed by: INTERNAL MEDICINE

## 2023-09-12 RX ORDER — SODIUM CHLORIDE 0.9 % (FLUSH) 0.9 %
10 SYRINGE (ML) INJECTION AS NEEDED
OUTPATIENT
Start: 2023-09-12

## 2023-09-12 RX ORDER — SODIUM CHLORIDE 0.9 % (FLUSH) 0.9 %
20 SYRINGE (ML) INJECTION AS NEEDED
OUTPATIENT
Start: 2023-09-12

## 2023-09-12 RX ORDER — SODIUM CHLORIDE 0.9 % (FLUSH) 0.9 %
10 SYRINGE (ML) INJECTION AS NEEDED
Status: DISCONTINUED | OUTPATIENT
Start: 2023-09-12 | End: 2023-09-12 | Stop reason: HOSPADM

## 2023-09-12 RX ORDER — HEPARIN SODIUM (PORCINE) LOCK FLUSH IV SOLN 100 UNIT/ML 100 UNIT/ML
500 SOLUTION INTRAVENOUS AS NEEDED
Status: DISCONTINUED | OUTPATIENT
Start: 2023-09-12 | End: 2023-09-12 | Stop reason: HOSPADM

## 2023-09-12 RX ORDER — HEPARIN SODIUM (PORCINE) LOCK FLUSH IV SOLN 100 UNIT/ML 100 UNIT/ML
500 SOLUTION INTRAVENOUS AS NEEDED
OUTPATIENT
Start: 2023-09-12

## 2023-09-12 RX ADMIN — HEPARIN 500 UNITS: 100 SYRINGE at 13:12

## 2024-07-28 NOTE — OP NOTE
INSERTION VENOUS ACCESS DEVICE  Procedure Note    Chasity Leon  6/6/2017    Pre-op Diagnosis:   Grade 1 follicular lymphoma of lymph nodes of multiple regions [C82.08]    Post-op Diagnosis:     Post-Op Diagnosis Codes:  Grade 1 follicular lymphoma of lymph nodes of multiple regions [C82.08]      Procedure(s):  REMOVAL OF MEDIPORT  INSERTION VENOUS ACCESS DEVICE   (MEDIPORT)   (C-ARM#1)  FLUOROSCOPY    Surgeon(s):  Fortino Medina MD    Anesthesia: General    Staff:   Circulator: Kolton Elise RN  Scrub Person: Melanie Cho  Assistant: Karime Musa CSA    Estimated Blood Loss: 5 cc  Specimens:                  ID Type Source Tests Collected by Time Destination   1 : fluid around venous access device Body Fluid Chest, Right GRAM STAIN, BODY FLUID CULTURE Fortino Medina MD 6/6/2017 0956    2 : venous access device tip Hardware / Foreign Body Chest, Right CATHETER CULTURE, GRAM STAIN Fortino Medina MD 6/6/2017 0957    A : venous access device Hardware / Foreign Body Chest, Right TISSUE EXAM Fortino Medina MD 6/6/2017 1002          Drains:    None       Findings: Gram stain of right sided port is negative    Complications: None    Indications: Patient has a dislodged RIGHT IJ port Taken to surgery for port removal and replacement    Description of procedure:   The patient is brought to the operating room and placed supine on the operating table. The entire chest is prepped and draped in a sterile manner.  Briefing and timeout performed and all parties were in agreement.     An incision is made with a 15 blade knife over the right sided internal jugular Mediport and the capsule is entered. Sutures are removed and the port and tubing are removed.  A small amount of fluid around the port is sent for Gram stain culture sensitivity.  Gram stain revealed no organisms seen.  Head is raised to decrease bleeding. Pressure is applied to the insertion site- no bleeding is noted. The wound is closed with interrupted 3-0 Vicryl  Received patient from 5th floor. Patient transferred to PCCU to be started on precedex in order to complete MRI, due to patient being very restless and agitated. ICH stroke order set put in by Dr. Lee. Q1 neuro checks completed, unable to assess all areas of neuro checks as patient only intermittently follows commands and refuses to participate in some of the assessment areas. Precedex started shortly after arrival. Patient's heart rate and blood pressure decreased, patient remained agitated and restless, unable to increase precedex due to heart rate and blood pressure. Dr. Lee notified. Per Dr. Lee, MRI to be done later in the AM. Precedex on standby.     Problem: Patient Centered Care  Goal: Patient preferences are identified and integrated in the patient's plan of care  Description: Interventions:  - What would you like us to know as we care for you? SETH: patient does not wish to answer  - Provide timely, complete, and accurate information to patient/family  - Incorporate patient and family knowledge, values, beliefs, and cultural backgrounds into the planning and delivery of care  - Encourage patient/family to participate in care and decision-making at the level they choose  - Honor patient and family perspectives and choices  Outcome: Progressing     Problem: PAIN - ADULT  Goal: Verbalizes/displays adequate comfort level or patient's stated pain goal  Description: INTERVENTIONS:  - Encourage pt to monitor pain and request assistance  - Assess pain using appropriate pain scale  - Administer analgesics based on type and severity of pain and evaluate response  - Implement non-pharmacological measures as appropriate and evaluate response  - Consider cultural and social influences on pain and pain management  - Manage/alleviate anxiety  - Utilize distraction and/or relaxation techniques  - Monitor for opioid side effects  - Notify MD/LIP if interventions unsuccessful or patient reports new pain  -  Anticipate increased pain with activity and pre-medicate as appropriate  Outcome: Progressing     Problem: RISK FOR INFECTION - ADULT  Goal: Absence of fever/infection during anticipated neutropenic period  Description: INTERVENTIONS  - Monitor WBC  - Administer growth factors as ordered  - Implement neutropenic guidelines  Outcome: Progressing     Problem: SAFETY ADULT - FALL  Goal: Free from fall injury  Description: INTERVENTIONS:  - Assess pt frequently for physical needs  - Identify cognitive and physical deficits and behaviors that affect risk of falls.  - [place] fall precautions as indicated by assessment.  - Educate pt/family on patient safety including physical limitations  - Instruct pt to call for assistance with activity based on assessment  - Modify environment to reduce risk of injury  - Provide assistive devices as appropriate  - Consider OT/PT consult to assist with strengthening/mobility  - Encourage toileting schedule  Outcome: Progressing     Problem: DISCHARGE PLANNING  Goal: Discharge to home or other facility with appropriate resources  Description: INTERVENTIONS:  - Identify barriers to discharge w/pt and caregiver  - Include patient/family/discharge partner in discharge planning  - Arrange for needed discharge resources and transportation as appropriate  - Identify discharge learning needs (meds, wound care, etc)  - Arrange for interpreters to assist at discharge as needed  - Consider post-discharge preferences of patient/family/discharge partner  - Complete POLST form as appropriate  - Assess patient's ability to be responsible for managing their own health  - Refer to Case Management Department for coordinating discharge planning if the patient needs post-hospital services based on physician/LIP order or complex needs related to functional status, cognitive ability or social support system  Outcome: Progressing     Problem: Patient/Family Goals  Goal: Patient/Family Long Term  suture, and continuous 4-0 Vicryl suture on the skin      The left subclavian area was then approached.  A needle was passed on first pass into the left subclavian vein. A wire was passed through the needle and directed centrally.  Its good position was confirmed with fluoroscopy. The needle was removed. A chest incision was made transversely beneath the clavicle.  A subcutaneous pocket was created on the chest wall and a port was placed into the pocket and sutured in place with 3-0 Prolene.  The tubing was passed subcutaneously to the wire insertion site and cut to proper length.  Under fluoroscopic guidance, a dilator and sheath were passed over the wire and directed centrally. The wire and the dilator were removed leaving the sheath in excellent position.  The tubing was then passed through the sheath and the sheath peeled away leaving the tubing in excellent position in the superior vena cava.  Excellent flow and aspiration were achieved through the port and good position was confirmed fluoroscopically. No pneumothorax was detected. The Prolene sutures were tied. The chest wound was closed with interrupted 3-0 Vicryl deep and continuous 4-0 subcuticular Vicryl on skin.     Procedure was terminated. The patient tolerated well.  Sponge and needle counts were correct and she was returned to recovery in satisfactory condition.    Chest x-ray in recovery demonstrated good position, no pneumothorax.  The tubing was visualized and I do not think it is kinked.    Date: 6/6/2017  Time: 11:44 AM   Goal  Description: Patient's Long Term Goal: SETH: patient does not wish to answer    Interventions:  -   - See additional Care Plan goals for specific interventions  Outcome: Progressing  Goal: Patient/Family Short Term Goal  Description: Patient's Short Term Goal: SETH: patient does not wish to answer    Interventions:   -   - See additional Care Plan goals for specific interventions  Outcome: Progressing     Problem: NEUROLOGICAL - ADULT  Goal: Achieves stable or improved neurological status  Description: INTERVENTIONS  - Assess for and report changes in neurological status  - Initiate measures to prevent increased intracranial pressure  - Maintain blood pressure and fluid volume within ordered parameters to optimize cerebral perfusion and minimize risk of hemorrhage  - Monitor temperature, glucose, and sodium. Initiate appropriate interventions as ordered  Outcome: Progressing  Goal: Achieves maximal functionality and self care  Description: INTERVENTIONS  - Monitor swallowing and airway patency with patient fatigue and changes in neurological status  - Encourage and assist patient to increase activity and self care with guidance from PT/OT  - Encourage visually impaired, hearing impaired and aphasic patients to use assistive/communication devices  Outcome: Progressing

## (undated) DEVICE — GLV SURG SENSICARE GREEN W/ALOE PF LF 6.5 STRL

## (undated) DEVICE — SOL IRR NACL 0.9PCT BT 1000ML

## (undated) DEVICE — SUT VIC 3/0 TIES 18IN J110T

## (undated) DEVICE — SPNG GZ STRL 2S 4X4 12PLY

## (undated) DEVICE — CVR FLUOROSCOPE C/ARM W/TP 36X28IN

## (undated) DEVICE — GOWN,AURORA,NOREINF,RAGLAN,XL,STERILE: Brand: MEDLINE

## (undated) DEVICE — GLV SURG SENSICARE ALOE LF PF SZ7.5 GRN

## (undated) DEVICE — GLV SURG TRIUMPH PF LTX 6.5 STRL

## (undated) DEVICE — SUT VIC 3/0 RB1 27IN J215H

## (undated) DEVICE — GLV SURG SENSICARE GREEN W/ALOE PF LF 6 STRL

## (undated) DEVICE — CANN SMPL SOFTECH BIFLO ETCO2 A/M 7FT

## (undated) DEVICE — SINGLE-USE BIOPSY FORCEPS: Brand: RADIAL JAW 4

## (undated) DEVICE — CVR SURG EQUIP BND RECTG 36X28

## (undated) DEVICE — GLV SURG SENSICARE GREEN W/ALOE PF LF 8 STRL

## (undated) DEVICE — GLV SURG SENSICARE GREEN W/ALOE PF LF 7 STRL

## (undated) DEVICE — ANTIBACTERIAL UNDYED BRAIDED (POLYGLACTIN 910), SYNTHETIC ABSORBABLE SUTURE: Brand: COATED VICRYL

## (undated) DEVICE — ADHS LIQ MASTISOL 2/3ML

## (undated) DEVICE — PK MAJ PROC LF 60

## (undated) DEVICE — 3M™ STERI-STRIP™ REINFORCED ADHESIVE SKIN CLOSURES, R1547, 1/2 IN X 4 IN (12 MM X 100 MM), 6 STRIPS/ENVELOPE: Brand: 3M™ STERI-STRIP™

## (undated) DEVICE — INTENDED FOR TISSUE SEPARATION, AND OTHER PROCEDURES THAT REQUIRE A SHARP SURGICAL BLADE TO PUNCTURE OR CUT.: Brand: BARD-PARKER ® CARBON RIB-BACK BLADES

## (undated) DEVICE — SUT PROLN 3/0 RB1 D/A 36IN 8558H

## (undated) DEVICE — GLV SURG SENSICARE GREEN W/ALOE PF LF 8.5 STRL

## (undated) DEVICE — BITEBLOCK ENDO W/STRAP 60F A/ LF DISP